# Patient Record
Sex: FEMALE | Race: BLACK OR AFRICAN AMERICAN | Employment: OTHER | URBAN - METROPOLITAN AREA
[De-identification: names, ages, dates, MRNs, and addresses within clinical notes are randomized per-mention and may not be internally consistent; named-entity substitution may affect disease eponyms.]

---

## 2017-05-05 PROBLEM — J96.00 ACUTE RESPIRATORY FAILURE (HCC): Status: ACTIVE | Noted: 2017-05-05

## 2017-05-05 PROBLEM — T78.03XA ANAPHYLAXIS DUE TO FISH: Status: ACTIVE | Noted: 2017-05-05

## 2017-05-30 PROBLEM — K21.9 GASTROESOPHAGEAL REFLUX DISEASE WITHOUT ESOPHAGITIS: Status: ACTIVE | Noted: 2017-05-30

## 2017-05-30 PROBLEM — K29.00 ACUTE SUPERFICIAL GASTRITIS WITHOUT HEMORRHAGE: Status: ACTIVE | Noted: 2017-05-30

## 2017-05-30 PROBLEM — R10.9 ABDOMINAL PAIN: Status: ACTIVE | Noted: 2017-05-30

## 2017-07-10 PROBLEM — S13.9XXA CERVICAL SPRAIN: Status: ACTIVE | Noted: 2017-07-10

## 2017-07-10 PROBLEM — J96.00 ACUTE RESPIRATORY FAILURE (HCC): Status: RESOLVED | Noted: 2017-05-05 | Resolved: 2017-07-10

## 2017-07-10 PROBLEM — G44.209 TENSION HEADACHE: Status: ACTIVE | Noted: 2017-07-10

## 2017-09-26 PROBLEM — H93.19 TINNITUS: Status: ACTIVE | Noted: 2017-09-26

## 2017-09-26 PROBLEM — H53.8 BLURRED VISION: Status: ACTIVE | Noted: 2017-09-26

## 2017-09-26 PROBLEM — Z86.73 HISTORY OF TIA (TRANSIENT ISCHEMIC ATTACK) AND STROKE: Status: ACTIVE | Noted: 2017-09-26

## 2017-10-27 PROBLEM — S63.502A WRIST SPRAIN, LEFT, INITIAL ENCOUNTER: Status: ACTIVE | Noted: 2017-10-27

## 2017-10-27 PROBLEM — S93.492A SPRAIN OF OTHER LIGAMENT OF LEFT ANKLE: Status: ACTIVE | Noted: 2017-10-27

## 2018-03-21 ENCOUNTER — HOSPITAL ENCOUNTER (EMERGENCY)
Age: 48
Discharge: HOME | End: 2018-03-21
Payer: MEDICAID

## 2018-03-21 VITALS
WEIGHT: 163 LBS | TEMPERATURE: 98 F | SYSTOLIC BLOOD PRESSURE: 122 MMHG | OXYGEN SATURATION: 97 % | DIASTOLIC BLOOD PRESSURE: 77 MMHG | HEIGHT: 59 IN | HEART RATE: 88 BPM | BODY MASS INDEX: 32.86 KG/M2 | RESPIRATION RATE: 16 BRPM

## 2018-03-21 DIAGNOSIS — T78.40XA ALLERGIC REACTION, INITIAL ENCOUNTER: Primary | ICD-10-CM

## 2018-03-21 PROCEDURE — 99282 EMERGENCY DEPT VISIT SF MDM: CPT

## 2018-03-21 RX ORDER — PREDNISONE 10 MG/1
40 TABLET ORAL DAILY
Qty: 20 TABLET | Refills: 0 | Status: SHIPPED | OUTPATIENT
Start: 2018-03-21 | End: 2018-03-26

## 2018-03-21 RX ORDER — EPINEPHRINE 0.3 MG/.3ML
0.3 INJECTION SUBCUTANEOUS
Qty: 1 EACH | Refills: 0 | Status: ON HOLD | OUTPATIENT
Start: 2018-03-21 | End: 2018-04-13 | Stop reason: HOSPADM

## 2018-03-21 RX ORDER — FAMOTIDINE 20 MG/1
20 TABLET, FILM COATED ORAL 2 TIMES DAILY
Qty: 14 TABLET | Refills: 0 | Status: ON HOLD | OUTPATIENT
Start: 2018-03-21 | End: 2018-04-13 | Stop reason: HOSPADM

## 2018-03-24 NOTE — ED PROVIDER NOTES
[facility]     Department of Emergency Medicine   ED  Provider Note  Admit Date/RoomTime: 3/21/2018  5:37 PM  ED Room: /   Chief Complaint:   Allergic Reaction (states believes she ate shimp two days ago and is now having throat itching)    History of Present Illness   Source of history provided by:  patient and spouse/SO. History/Exam Limitations: none. Douglas Claire is a 49 y.o. 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  Since onset the symptoms have been improving and mild in severity and relieved by medication: benadryl. The patient has a  history of allergies to food in the past.  She denies wheezing, chest tightness, shortness of breath, change in voice, metallic taste, light-headedness or loss of consciousness. She has an EpiPen but was afraid to use it. ROS   Pertinent positives and negatives are stated within HPI, all other systems reviewed and are negative. Past Surgical History:  has a past surgical history that includes Tubal ligation; Colonoscopy (3/9/2016); Colonoscopy (07/10/2017); and Upper gastrointestinal endoscopy (07/10/2017).   Social History:  reports that she quit outpatient follow-up. Counseling: The emergency provider has spoken with the patient and spouse/SO and discussed todays results, in addition to providing specific details for the plan of care and counseling regarding the diagnosis and prognosis. Questions are answered at this time and they are agreeable with the plan. Assessment      1. Allergic reaction, initial encounter      Plan   Discharge to home  Patient condition is good    New Medications     Discharge Medication List as of 3/21/2018  6:09 PM      START taking these medications    Details   !! EPINEPHrine (EPIPEN 2-ABUNDIO) 0.3 MG/0.3ML SOAJ injection Inject 0.3 mLs into the muscle once as needed (as needed) Use as directed for allergic reaction, Disp-1 each, R-0Print      predniSONE (DELTASONE) 10 MG tablet Take 4 tablets by mouth daily for 5 days, Disp-20 tablet, R-0Print      famotidine (PEPCID) 20 MG tablet Take 1 tablet by mouth 2 times daily for 7 days, Disp-14 tablet, R-0Print       !! - Potential duplicate medications found. Please discuss with provider. Electronically signed by Sulema Dandy, PA   DD: 3/24/18  **This report was transcribed using voice recognition software. Every effort was made to ensure accuracy; however, inadvertent computerized transcription errors may be present.   END OF ED PROVIDER NOTE       Miguelina Blake, [id number]  03/24/18 0607

## 2018-04-10 ENCOUNTER — HOSPITAL ENCOUNTER (OUTPATIENT)
Age: 48
Setting detail: OBSERVATION
Discharge: HOME | End: 2018-04-13
Attending: EMERGENCY MEDICINE | Admitting: INTERNAL MEDICINE
Payer: MEDICAID

## 2018-04-10 ENCOUNTER — APPOINTMENT (OUTPATIENT)
Dept: GENERAL RADIOLOGY | Age: 48
End: 2018-04-10
Payer: MEDICAID

## 2018-04-10 ENCOUNTER — APPOINTMENT (OUTPATIENT)
Dept: CT IMAGING | Age: 48
End: 2018-04-10
Payer: MEDICAID

## 2018-04-10 DIAGNOSIS — R51.9 HEADACHE, UNSPECIFIED HEADACHE TYPE: Primary | ICD-10-CM

## 2018-04-10 PROBLEM — R29.90 STROKE-LIKE SYMPTOMS: Status: ACTIVE | Noted: 2018-04-10

## 2018-04-10 LAB
ALBUMIN SERPL-MCNC: 4 G/DL (ref 3.5–5.2)
ALP BLD-CCNC: 88 U/L (ref 35–104)
ALT SERPL-CCNC: 44 U/L (ref 0–32)
ANION GAP SERPL CALCULATED.3IONS-SCNC: 14 MMOL/L (ref 7–16)
AST SERPL-CCNC: 26 U/L (ref 0–31)
BASOPHILS ABSOLUTE: 0.03 E9/L (ref 0–0.2)
BASOPHILS RELATIVE PERCENT: 0.3 % (ref 0–2)
BILIRUB SERPL-MCNC: 0.5 MG/DL (ref 0–1.2)
BUN BLDV-MCNC: 9 MG/DL (ref 6–20)
CALCIUM SERPL-MCNC: 9.3 MG/DL (ref 8.6–10.2)
CHLORIDE BLD-SCNC: 101 MMOL/L (ref 98–107)
CHP ED QC CHECK: NORMAL
CO2: 23 MMOL/L (ref 22–29)
CREAT SERPL-MCNC: 0.8 MG/DL (ref 0.5–1)
EKG ATRIAL RATE: 98 BPM
EKG P AXIS: 68 DEGREES
EKG P-R INTERVAL: 132 MS
EKG Q-T INTERVAL: 354 MS
EKG QRS DURATION: 84 MS
EKG QTC CALCULATION (BAZETT): 451 MS
EKG R AXIS: 66 DEGREES
EKG T AXIS: 46 DEGREES
EKG VENTRICULAR RATE: 98 BPM
EOSINOPHILS ABSOLUTE: 0.15 E9/L (ref 0.05–0.5)
EOSINOPHILS RELATIVE PERCENT: 1.5 % (ref 0–6)
GFR AFRICAN AMERICAN: >60
GFR NON-AFRICAN AMERICAN: >60 ML/MIN/1.73
GLUCOSE BLD-MCNC: 108 MG/DL
GLUCOSE BLD-MCNC: 194 MG/DL (ref 74–109)
HCT VFR BLD CALC: 40.6 % (ref 34–48)
HEMOGLOBIN: 13.2 G/DL (ref 11.5–15.5)
IMMATURE GRANULOCYTES #: 0.03 E9/L
IMMATURE GRANULOCYTES %: 0.3 % (ref 0–5)
INR BLD: 1.1
LYMPHOCYTES ABSOLUTE: 2.13 E9/L (ref 1.5–4)
LYMPHOCYTES RELATIVE PERCENT: 21.7 % (ref 20–42)
MAGNESIUM: 2 MG/DL (ref 1.6–2.6)
MCH RBC QN AUTO: 26.9 PG (ref 26–35)
MCHC RBC AUTO-ENTMCNC: 32.5 % (ref 32–34.5)
MCV RBC AUTO: 82.7 FL (ref 80–99.9)
METER GLUCOSE: 108 MG/DL (ref 70–110)
MONOCYTES ABSOLUTE: 0.77 E9/L (ref 0.1–0.95)
MONOCYTES RELATIVE PERCENT: 7.9 % (ref 2–12)
NEUTROPHILS ABSOLUTE: 6.69 E9/L (ref 1.8–7.3)
NEUTROPHILS RELATIVE PERCENT: 68.3 % (ref 43–80)
PDW BLD-RTO: 13.1 FL (ref 11.5–15)
PLATELET # BLD: 275 E9/L (ref 130–450)
PMV BLD AUTO: 9.9 FL (ref 7–12)
POTASSIUM SERPL-SCNC: 3.6 MMOL/L (ref 3.5–5)
PROTHROMBIN TIME: 12.6 SEC (ref 9.3–12.4)
RBC # BLD: 4.91 E12/L (ref 3.5–5.5)
SODIUM BLD-SCNC: 138 MMOL/L (ref 132–146)
TOTAL PROTEIN: 6.9 G/DL (ref 6.4–8.3)
TROPONIN: <0.01 NG/ML (ref 0–0.03)
WBC # BLD: 9.8 E9/L (ref 4.5–11.5)

## 2018-04-10 PROCEDURE — 36415 COLL VENOUS BLD VENIPUNCTURE: CPT

## 2018-04-10 PROCEDURE — 80053 COMPREHEN METABOLIC PANEL: CPT

## 2018-04-10 PROCEDURE — 83735 ASSAY OF MAGNESIUM: CPT

## 2018-04-10 PROCEDURE — 71045 X-RAY EXAM CHEST 1 VIEW: CPT

## 2018-04-10 PROCEDURE — 85610 PROTHROMBIN TIME: CPT

## 2018-04-10 PROCEDURE — 93005 ELECTROCARDIOGRAM TRACING: CPT | Performed by: NURSE PRACTITIONER

## 2018-04-10 PROCEDURE — 99285 EMERGENCY DEPT VISIT HI MDM: CPT

## 2018-04-10 PROCEDURE — 84484 ASSAY OF TROPONIN QUANT: CPT

## 2018-04-10 PROCEDURE — 82962 GLUCOSE BLOOD TEST: CPT

## 2018-04-10 PROCEDURE — 85025 COMPLETE CBC W/AUTO DIFF WBC: CPT

## 2018-04-10 PROCEDURE — 70450 CT HEAD/BRAIN W/O DYE: CPT

## 2018-04-10 ASSESSMENT — PAIN DESCRIPTION - PAIN TYPE: TYPE: ACUTE PAIN

## 2018-04-10 ASSESSMENT — ENCOUNTER SYMPTOMS
COLOR CHANGE: 0
COUGH: 0
SHORTNESS OF BREATH: 0
DIARRHEA: 0
ABDOMINAL PAIN: 0
WHEEZING: 0
CONSTIPATION: 0
VOMITING: 0
NAUSEA: 0

## 2018-04-10 ASSESSMENT — PAIN DESCRIPTION - DESCRIPTORS: DESCRIPTORS: ACHING

## 2018-04-10 ASSESSMENT — PAIN SCALES - GENERAL: PAINLEVEL_OUTOF10: 8

## 2018-04-10 ASSESSMENT — PAIN DESCRIPTION - LOCATION: LOCATION: EAR;HEAD

## 2018-04-10 ASSESSMENT — PAIN DESCRIPTION - ORIENTATION: ORIENTATION: RIGHT;LEFT

## 2018-04-11 PROBLEM — S93.492A SPRAIN OF OTHER LIGAMENT OF LEFT ANKLE: Status: RESOLVED | Noted: 2017-10-27 | Resolved: 2018-04-11

## 2018-04-11 PROBLEM — H53.8 BLURRED VISION: Status: RESOLVED | Noted: 2017-09-26 | Resolved: 2018-04-11

## 2018-04-11 PROBLEM — S13.9XXA CERVICAL SPRAIN: Status: RESOLVED | Noted: 2017-07-10 | Resolved: 2018-04-11

## 2018-04-11 PROBLEM — Z86.73 HISTORY OF TIA (TRANSIENT ISCHEMIC ATTACK) AND STROKE: Chronic | Status: ACTIVE | Noted: 2017-09-26

## 2018-04-11 PROBLEM — S63.502A WRIST SPRAIN, LEFT, INITIAL ENCOUNTER: Status: RESOLVED | Noted: 2017-10-27 | Resolved: 2018-04-11

## 2018-04-11 PROBLEM — K21.9 GASTROESOPHAGEAL REFLUX DISEASE WITHOUT ESOPHAGITIS: Chronic | Status: ACTIVE | Noted: 2017-05-30

## 2018-04-11 PROBLEM — R10.9 ABDOMINAL PAIN: Status: RESOLVED | Noted: 2017-05-30 | Resolved: 2018-04-11

## 2018-04-11 PROBLEM — G44.209 TENSION HEADACHE: Status: RESOLVED | Noted: 2017-07-10 | Resolved: 2018-04-11

## 2018-04-11 PROBLEM — K29.00 ACUTE SUPERFICIAL GASTRITIS WITHOUT HEMORRHAGE: Status: RESOLVED | Noted: 2017-05-30 | Resolved: 2018-04-11

## 2018-04-11 PROBLEM — T78.03XA ANAPHYLAXIS DUE TO FISH: Status: RESOLVED | Noted: 2017-05-05 | Resolved: 2018-04-11

## 2018-04-11 LAB
BILIRUBIN URINE: NEGATIVE
BLOOD, URINE: NEGATIVE
CHOLESTEROL, TOTAL: 135 MG/DL (ref 0–199)
CLARITY: CLEAR
COLOR: YELLOW
GLUCOSE URINE: 100 MG/DL
HDLC SERPL-MCNC: 42 MG/DL
KETONES, URINE: NEGATIVE MG/DL
LDL CHOLESTEROL CALCULATED: 77 MG/DL (ref 0–99)
LEUKOCYTE ESTERASE, URINE: NEGATIVE
NITRITE, URINE: NEGATIVE
PH UA: 6 (ref 5–9)
PROTEIN UA: NEGATIVE MG/DL
SPECIFIC GRAVITY UA: 1.01 (ref 1–1.03)
TRIGL SERPL-MCNC: 82 MG/DL (ref 0–149)
UROBILINOGEN, URINE: 0.2 E.U./DL
VLDLC SERPL CALC-MCNC: 16 MG/DL

## 2018-04-11 PROCEDURE — 6360000002 HC RX W HCPCS: Performed by: INTERNAL MEDICINE

## 2018-04-11 PROCEDURE — 99253 IP/OBS CNSLTJ NEW/EST LOW 45: CPT | Performed by: PSYCHIATRY & NEUROLOGY

## 2018-04-11 PROCEDURE — 81003 URINALYSIS AUTO W/O SCOPE: CPT

## 2018-04-11 PROCEDURE — G0378 HOSPITAL OBSERVATION PER HR: HCPCS

## 2018-04-11 PROCEDURE — 96372 THER/PROPH/DIAG INJ SC/IM: CPT

## 2018-04-11 PROCEDURE — 6370000000 HC RX 637 (ALT 250 FOR IP): Performed by: INTERNAL MEDICINE

## 2018-04-11 PROCEDURE — 36415 COLL VENOUS BLD VENIPUNCTURE: CPT

## 2018-04-11 PROCEDURE — 2580000003 HC RX 258: Performed by: INTERNAL MEDICINE

## 2018-04-11 PROCEDURE — 80061 LIPID PANEL: CPT

## 2018-04-11 RX ORDER — ACETAMINOPHEN 325 MG/1
650 TABLET ORAL EVERY 4 HOURS PRN
Status: DISCONTINUED | OUTPATIENT
Start: 2018-04-11 | End: 2018-04-13 | Stop reason: HOSPADM

## 2018-04-11 RX ORDER — SUCRALFATE 1 G/1
1 TABLET ORAL 4 TIMES DAILY
Status: DISCONTINUED | OUTPATIENT
Start: 2018-04-11 | End: 2018-04-13 | Stop reason: HOSPADM

## 2018-04-11 RX ORDER — ONDANSETRON 2 MG/ML
4 INJECTION INTRAMUSCULAR; INTRAVENOUS EVERY 6 HOURS PRN
Status: DISCONTINUED | OUTPATIENT
Start: 2018-04-11 | End: 2018-04-13 | Stop reason: HOSPADM

## 2018-04-11 RX ORDER — PANTOPRAZOLE SODIUM 40 MG/1
40 TABLET, DELAYED RELEASE ORAL DAILY
Status: DISCONTINUED | OUTPATIENT
Start: 2018-04-11 | End: 2018-04-13 | Stop reason: HOSPADM

## 2018-04-11 RX ORDER — SODIUM CHLORIDE 0.9 % (FLUSH) 0.9 %
10 SYRINGE (ML) INJECTION PRN
Status: DISCONTINUED | OUTPATIENT
Start: 2018-04-11 | End: 2018-04-13 | Stop reason: HOSPADM

## 2018-04-11 RX ORDER — ASPIRIN 81 MG/1
81 TABLET ORAL DAILY
Status: DISCONTINUED | OUTPATIENT
Start: 2018-04-11 | End: 2018-04-13 | Stop reason: HOSPADM

## 2018-04-11 RX ORDER — NAPROXEN 500 MG/1
500 TABLET ORAL 2 TIMES DAILY WITH MEALS
Status: DISCONTINUED | OUTPATIENT
Start: 2018-04-11 | End: 2018-04-13 | Stop reason: HOSPADM

## 2018-04-11 RX ORDER — GABAPENTIN 100 MG/1
100 CAPSULE ORAL NIGHTLY
Status: DISCONTINUED | OUTPATIENT
Start: 2018-04-11 | End: 2018-04-13 | Stop reason: HOSPADM

## 2018-04-11 RX ORDER — SODIUM CHLORIDE 0.9 % (FLUSH) 0.9 %
10 SYRINGE (ML) INJECTION EVERY 12 HOURS SCHEDULED
Status: DISCONTINUED | OUTPATIENT
Start: 2018-04-11 | End: 2018-04-13 | Stop reason: HOSPADM

## 2018-04-11 RX ORDER — DICYCLOMINE HYDROCHLORIDE 10 MG/1
20 CAPSULE ORAL EVERY 6 HOURS
Status: DISCONTINUED | OUTPATIENT
Start: 2018-04-11 | End: 2018-04-13 | Stop reason: HOSPADM

## 2018-04-11 RX ORDER — ALBUTEROL SULFATE 90 UG/1
2 AEROSOL, METERED RESPIRATORY (INHALATION) EVERY 6 HOURS PRN
Status: DISCONTINUED | OUTPATIENT
Start: 2018-04-11 | End: 2018-04-13 | Stop reason: HOSPADM

## 2018-04-11 RX ORDER — ACETAMINOPHEN 325 MG/1
TABLET ORAL
Status: DISPENSED
Start: 2018-04-11 | End: 2018-04-11

## 2018-04-11 RX ORDER — TRAMADOL HYDROCHLORIDE 50 MG/1
50 TABLET ORAL EVERY 6 HOURS PRN
Status: DISCONTINUED | OUTPATIENT
Start: 2018-04-11 | End: 2018-04-13 | Stop reason: HOSPADM

## 2018-04-11 RX ADMIN — ASPIRIN 81 MG: 81 TABLET, COATED ORAL at 08:32

## 2018-04-11 RX ADMIN — DICYCLOMINE HYDROCHLORIDE 20 MG: 10 CAPSULE ORAL at 21:24

## 2018-04-11 RX ADMIN — DICYCLOMINE HYDROCHLORIDE 20 MG: 10 CAPSULE ORAL at 08:32

## 2018-04-11 RX ADMIN — Medication 10 ML: at 21:29

## 2018-04-11 RX ADMIN — TRAMADOL HYDROCHLORIDE 50 MG: 50 TABLET, FILM COATED ORAL at 02:09

## 2018-04-11 RX ADMIN — SUCRALFATE 1 G: 1 TABLET ORAL at 16:00

## 2018-04-11 RX ADMIN — ACETAMINOPHEN 650 MG: 325 TABLET, FILM COATED ORAL at 00:38

## 2018-04-11 RX ADMIN — Medication 10 ML: at 08:32

## 2018-04-11 RX ADMIN — SUCRALFATE 1 G: 1 TABLET ORAL at 08:32

## 2018-04-11 RX ADMIN — DICYCLOMINE HYDROCHLORIDE 20 MG: 10 CAPSULE ORAL at 13:47

## 2018-04-11 RX ADMIN — BECLOMETHASONE DIPROPIONATE 1 PUFF: 40 AEROSOL, METERED RESPIRATORY (INHALATION) at 08:35

## 2018-04-11 RX ADMIN — SUCRALFATE 1 G: 1 TABLET ORAL at 12:59

## 2018-04-11 RX ADMIN — PANTOPRAZOLE SODIUM 40 MG: 40 TABLET, DELAYED RELEASE ORAL at 08:31

## 2018-04-11 RX ADMIN — ENOXAPARIN SODIUM 40 MG: 40 INJECTION SUBCUTANEOUS at 08:32

## 2018-04-11 RX ADMIN — TRAMADOL HYDROCHLORIDE 50 MG: 50 TABLET, FILM COATED ORAL at 22:54

## 2018-04-11 RX ADMIN — GABAPENTIN 100 MG: 100 CAPSULE ORAL at 21:29

## 2018-04-11 RX ADMIN — TRAMADOL HYDROCHLORIDE 50 MG: 50 TABLET, FILM COATED ORAL at 08:32

## 2018-04-11 RX ADMIN — SUCRALFATE 1 G: 1 TABLET ORAL at 21:26

## 2018-04-11 RX ADMIN — NAPROXEN 500 MG: 500 TABLET ORAL at 08:31

## 2018-04-11 RX ADMIN — BECLOMETHASONE DIPROPIONATE 1 PUFF: 40 AEROSOL, METERED RESPIRATORY (INHALATION) at 22:54

## 2018-04-11 RX ADMIN — NAPROXEN 500 MG: 500 TABLET ORAL at 16:00

## 2018-04-11 ASSESSMENT — PAIN DESCRIPTION - DESCRIPTORS
DESCRIPTORS: ACHING;THROBBING
DESCRIPTORS: CONSTANT;DISCOMFORT;DULL
DESCRIPTORS: ACHING;DISCOMFORT

## 2018-04-11 ASSESSMENT — PAIN SCALES - GENERAL
PAINLEVEL_OUTOF10: 8
PAINLEVEL_OUTOF10: 8
PAINLEVEL_OUTOF10: 7
PAINLEVEL_OUTOF10: 5
PAINLEVEL_OUTOF10: 7
PAINLEVEL_OUTOF10: 0
PAINLEVEL_OUTOF10: 10
PAINLEVEL_OUTOF10: 0

## 2018-04-11 ASSESSMENT — PAIN DESCRIPTION - ONSET
ONSET: ON-GOING
ONSET: ON-GOING

## 2018-04-11 ASSESSMENT — PAIN DESCRIPTION - FREQUENCY
FREQUENCY: CONTINUOUS
FREQUENCY: CONTINUOUS

## 2018-04-11 ASSESSMENT — PAIN DESCRIPTION - PAIN TYPE
TYPE: ACUTE PAIN
TYPE: ACUTE PAIN

## 2018-04-11 ASSESSMENT — PAIN DESCRIPTION - LOCATION
LOCATION: NECK
LOCATION: NECK
LOCATION: HEAD;NECK
LOCATION: HEAD;NECK

## 2018-04-11 ASSESSMENT — PAIN DESCRIPTION - PROGRESSION
CLINICAL_PROGRESSION: NOT CHANGED
CLINICAL_PROGRESSION: NOT CHANGED

## 2018-04-11 ASSESSMENT — PAIN DESCRIPTION - ORIENTATION: ORIENTATION: RIGHT;LEFT

## 2018-04-12 ENCOUNTER — APPOINTMENT (OUTPATIENT)
Dept: MRI IMAGING | Age: 48
End: 2018-04-12
Payer: MEDICAID

## 2018-04-12 PROCEDURE — 6360000004 HC RX CONTRAST MEDICATION: Performed by: RADIOLOGY

## 2018-04-12 PROCEDURE — 6370000000 HC RX 637 (ALT 250 FOR IP): Performed by: INTERNAL MEDICINE

## 2018-04-12 PROCEDURE — G0378 HOSPITAL OBSERVATION PER HR: HCPCS

## 2018-04-12 PROCEDURE — 72156 MRI NECK SPINE W/O & W/DYE: CPT

## 2018-04-12 PROCEDURE — 70553 MRI BRAIN STEM W/O & W/DYE: CPT

## 2018-04-12 PROCEDURE — 99232 SBSQ HOSP IP/OBS MODERATE 35: CPT | Performed by: PSYCHIATRY & NEUROLOGY

## 2018-04-12 PROCEDURE — A9579 GAD-BASE MR CONTRAST NOS,1ML: HCPCS | Performed by: RADIOLOGY

## 2018-04-12 PROCEDURE — 2580000003 HC RX 258: Performed by: INTERNAL MEDICINE

## 2018-04-12 PROCEDURE — 6360000002 HC RX W HCPCS: Performed by: INTERNAL MEDICINE

## 2018-04-12 PROCEDURE — G8978 MOBILITY CURRENT STATUS: HCPCS

## 2018-04-12 PROCEDURE — G8979 MOBILITY GOAL STATUS: HCPCS

## 2018-04-12 PROCEDURE — 96372 THER/PROPH/DIAG INJ SC/IM: CPT

## 2018-04-12 PROCEDURE — 97161 PT EVAL LOW COMPLEX 20 MIN: CPT

## 2018-04-12 RX ORDER — AMOXICILLIN AND CLAVULANATE POTASSIUM 875; 125 MG/1; MG/1
1 TABLET, FILM COATED ORAL EVERY 12 HOURS SCHEDULED
Status: DISCONTINUED | OUTPATIENT
Start: 2018-04-12 | End: 2018-04-13 | Stop reason: HOSPADM

## 2018-04-12 RX ADMIN — DICYCLOMINE HYDROCHLORIDE 20 MG: 10 CAPSULE ORAL at 21:03

## 2018-04-12 RX ADMIN — SUCRALFATE 1 G: 1 TABLET ORAL at 17:06

## 2018-04-12 RX ADMIN — NAPROXEN 500 MG: 500 TABLET ORAL at 17:06

## 2018-04-12 RX ADMIN — BECLOMETHASONE DIPROPIONATE 1 PUFF: 40 AEROSOL, METERED RESPIRATORY (INHALATION) at 09:13

## 2018-04-12 RX ADMIN — NAPROXEN 500 MG: 500 TABLET ORAL at 09:13

## 2018-04-12 RX ADMIN — ASPIRIN 81 MG: 81 TABLET, COATED ORAL at 09:13

## 2018-04-12 RX ADMIN — DICYCLOMINE HYDROCHLORIDE 20 MG: 10 CAPSULE ORAL at 02:07

## 2018-04-12 RX ADMIN — GABAPENTIN 100 MG: 100 CAPSULE ORAL at 21:03

## 2018-04-12 RX ADMIN — Medication 10 ML: at 09:14

## 2018-04-12 RX ADMIN — SUCRALFATE 1 G: 1 TABLET ORAL at 13:01

## 2018-04-12 RX ADMIN — SUCRALFATE 1 G: 1 TABLET ORAL at 21:03

## 2018-04-12 RX ADMIN — ENOXAPARIN SODIUM 40 MG: 40 INJECTION SUBCUTANEOUS at 09:14

## 2018-04-12 RX ADMIN — BECLOMETHASONE DIPROPIONATE 1 PUFF: 40 AEROSOL, METERED RESPIRATORY (INHALATION) at 21:08

## 2018-04-12 RX ADMIN — Medication 10 ML: at 21:03

## 2018-04-12 RX ADMIN — SUCRALFATE 1 G: 1 TABLET ORAL at 09:13

## 2018-04-12 RX ADMIN — AMOXICILLIN AND CLAVULANATE POTASSIUM 1 TABLET: 875; 125 TABLET, FILM COATED ORAL at 21:03

## 2018-04-12 RX ADMIN — PANTOPRAZOLE SODIUM 40 MG: 40 TABLET, DELAYED RELEASE ORAL at 09:14

## 2018-04-12 RX ADMIN — GADOTERIDOL 17 ML: 279.3 INJECTION, SOLUTION INTRAVENOUS at 15:26

## 2018-04-12 RX ADMIN — DICYCLOMINE HYDROCHLORIDE 20 MG: 10 CAPSULE ORAL at 09:13

## 2018-04-12 ASSESSMENT — PAIN DESCRIPTION - ORIENTATION
ORIENTATION: MID
ORIENTATION: MID

## 2018-04-12 ASSESSMENT — PAIN SCALES - GENERAL
PAINLEVEL_OUTOF10: 0
PAINLEVEL_OUTOF10: 5
PAINLEVEL_OUTOF10: 0
PAINLEVEL_OUTOF10: 0
PAINLEVEL_OUTOF10: 5
PAINLEVEL_OUTOF10: 5

## 2018-04-12 ASSESSMENT — PAIN DESCRIPTION - PAIN TYPE
TYPE: CHRONIC PAIN
TYPE: CHRONIC PAIN

## 2018-04-12 ASSESSMENT — PAIN DESCRIPTION - DESCRIPTORS
DESCRIPTORS: CONSTANT;DULL
DESCRIPTORS: CONSTANT;DULL

## 2018-04-12 ASSESSMENT — PAIN DESCRIPTION - FREQUENCY
FREQUENCY: CONTINUOUS
FREQUENCY: CONTINUOUS

## 2018-04-12 ASSESSMENT — PAIN DESCRIPTION - ONSET
ONSET: ON-GOING
ONSET: ON-GOING

## 2018-04-12 ASSESSMENT — PAIN DESCRIPTION - PROGRESSION
CLINICAL_PROGRESSION: NOT CHANGED
CLINICAL_PROGRESSION: NOT CHANGED

## 2018-04-12 ASSESSMENT — PAIN DESCRIPTION - LOCATION
LOCATION: NECK
LOCATION: NECK

## 2018-04-13 VITALS
HEART RATE: 66 BPM | BODY MASS INDEX: 37.7 KG/M2 | SYSTOLIC BLOOD PRESSURE: 118 MMHG | HEIGHT: 59 IN | TEMPERATURE: 98.3 F | OXYGEN SATURATION: 98 % | WEIGHT: 187 LBS | RESPIRATION RATE: 14 BRPM | DIASTOLIC BLOOD PRESSURE: 64 MMHG

## 2018-04-13 PROCEDURE — 97165 OT EVAL LOW COMPLEX 30 MIN: CPT

## 2018-04-13 PROCEDURE — 96372 THER/PROPH/DIAG INJ SC/IM: CPT

## 2018-04-13 PROCEDURE — G0378 HOSPITAL OBSERVATION PER HR: HCPCS

## 2018-04-13 PROCEDURE — G8987 SELF CARE CURRENT STATUS: HCPCS

## 2018-04-13 PROCEDURE — 99219 PR INITIAL OBSERVATION CARE/DAY 50 MINUTES: CPT | Performed by: NEUROLOGICAL SURGERY

## 2018-04-13 PROCEDURE — 6370000000 HC RX 637 (ALT 250 FOR IP): Performed by: INTERNAL MEDICINE

## 2018-04-13 PROCEDURE — G8988 SELF CARE GOAL STATUS: HCPCS

## 2018-04-13 PROCEDURE — 6360000002 HC RX W HCPCS: Performed by: INTERNAL MEDICINE

## 2018-04-13 PROCEDURE — 2580000003 HC RX 258: Performed by: INTERNAL MEDICINE

## 2018-04-13 PROCEDURE — 97530 THERAPEUTIC ACTIVITIES: CPT

## 2018-04-13 RX ORDER — AMOXICILLIN AND CLAVULANATE POTASSIUM 875; 125 MG/1; MG/1
1 TABLET, FILM COATED ORAL EVERY 12 HOURS SCHEDULED
Qty: 20 TABLET | Refills: 0 | Status: SHIPPED | OUTPATIENT
Start: 2018-04-13 | End: 2018-04-19

## 2018-04-13 RX ORDER — GABAPENTIN 100 MG/1
100 CAPSULE ORAL NIGHTLY
Qty: 30 CAPSULE | Refills: 0
Start: 2018-04-13 | End: 2018-09-07

## 2018-04-13 RX ADMIN — TRAMADOL HYDROCHLORIDE 50 MG: 50 TABLET, FILM COATED ORAL at 00:44

## 2018-04-13 RX ADMIN — BECLOMETHASONE DIPROPIONATE 1 PUFF: 40 AEROSOL, METERED RESPIRATORY (INHALATION) at 08:26

## 2018-04-13 RX ADMIN — DICYCLOMINE HYDROCHLORIDE 20 MG: 10 CAPSULE ORAL at 02:08

## 2018-04-13 RX ADMIN — AMOXICILLIN AND CLAVULANATE POTASSIUM 1 TABLET: 875; 125 TABLET, FILM COATED ORAL at 08:23

## 2018-04-13 RX ADMIN — Medication 10 ML: at 08:23

## 2018-04-13 RX ADMIN — TRAMADOL HYDROCHLORIDE 50 MG: 50 TABLET, FILM COATED ORAL at 08:24

## 2018-04-13 RX ADMIN — ASPIRIN 81 MG: 81 TABLET, COATED ORAL at 08:23

## 2018-04-13 RX ADMIN — ENOXAPARIN SODIUM 40 MG: 40 INJECTION SUBCUTANEOUS at 08:23

## 2018-04-13 RX ADMIN — SUCRALFATE 1 G: 1 TABLET ORAL at 08:23

## 2018-04-13 RX ADMIN — SUCRALFATE 1 G: 1 TABLET ORAL at 13:15

## 2018-04-13 RX ADMIN — PANTOPRAZOLE SODIUM 40 MG: 40 TABLET, DELAYED RELEASE ORAL at 08:24

## 2018-04-13 RX ADMIN — DICYCLOMINE HYDROCHLORIDE 20 MG: 10 CAPSULE ORAL at 13:15

## 2018-04-13 RX ADMIN — DICYCLOMINE HYDROCHLORIDE 20 MG: 10 CAPSULE ORAL at 08:23

## 2018-04-13 ASSESSMENT — PAIN DESCRIPTION - PAIN TYPE
TYPE: CHRONIC PAIN
TYPE: CHRONIC PAIN

## 2018-04-13 ASSESSMENT — PAIN DESCRIPTION - ONSET: ONSET: ON-GOING

## 2018-04-13 ASSESSMENT — PAIN SCALES - GENERAL
PAINLEVEL_OUTOF10: 5
PAINLEVEL_OUTOF10: 7

## 2018-04-13 ASSESSMENT — PAIN DESCRIPTION - FREQUENCY
FREQUENCY: CONTINUOUS
FREQUENCY: CONTINUOUS

## 2018-04-13 ASSESSMENT — PAIN DESCRIPTION - LOCATION
LOCATION: NECK
LOCATION: NECK

## 2018-04-13 ASSESSMENT — PAIN DESCRIPTION - DESCRIPTORS
DESCRIPTORS: ACHING;CONSTANT;DISCOMFORT
DESCRIPTORS: ACHING;CONSTANT

## 2018-04-13 ASSESSMENT — PAIN DESCRIPTION - PROGRESSION: CLINICAL_PROGRESSION: GRADUALLY WORSENING

## 2018-04-13 ASSESSMENT — PAIN DESCRIPTION - ORIENTATION
ORIENTATION: MID;POSTERIOR
ORIENTATION: MID

## 2018-04-19 ENCOUNTER — OFFICE VISIT (OUTPATIENT)
Dept: FAMILY MEDICINE CLINIC | Age: 48
End: 2018-04-19
Payer: MEDICAID

## 2018-04-19 VITALS
HEIGHT: 59 IN | WEIGHT: 187 LBS | SYSTOLIC BLOOD PRESSURE: 124 MMHG | RESPIRATION RATE: 20 BRPM | DIASTOLIC BLOOD PRESSURE: 68 MMHG | OXYGEN SATURATION: 97 % | BODY MASS INDEX: 37.7 KG/M2 | HEART RATE: 74 BPM

## 2018-04-19 DIAGNOSIS — M48.02 CERVICAL STENOSIS OF SPINAL CANAL: ICD-10-CM

## 2018-04-19 DIAGNOSIS — M50.30 DDD (DEGENERATIVE DISC DISEASE), CERVICAL: Primary | Chronic | ICD-10-CM

## 2018-04-19 DIAGNOSIS — M54.12 CERVICAL RADICULOPATHY: Chronic | ICD-10-CM

## 2018-04-19 PROCEDURE — 1111F DSCHRG MED/CURRENT MED MERGE: CPT | Performed by: FAMILY MEDICINE

## 2018-04-19 PROCEDURE — 99214 OFFICE O/P EST MOD 30 MIN: CPT | Performed by: FAMILY MEDICINE

## 2018-04-19 RX ORDER — MELOXICAM 7.5 MG/1
7.5 TABLET ORAL DAILY
Qty: 30 TABLET | Refills: 3 | Status: SHIPPED | OUTPATIENT
Start: 2018-04-19 | End: 2019-03-07 | Stop reason: SDUPTHER

## 2018-05-15 ENCOUNTER — OFFICE VISIT (OUTPATIENT)
Dept: NEUROSURGERY | Age: 48
End: 2018-05-15
Payer: MEDICAID

## 2018-05-15 VITALS
DIASTOLIC BLOOD PRESSURE: 71 MMHG | BODY MASS INDEX: 38.1 KG/M2 | HEIGHT: 59 IN | SYSTOLIC BLOOD PRESSURE: 105 MMHG | WEIGHT: 189 LBS | HEART RATE: 76 BPM

## 2018-05-15 DIAGNOSIS — M48.02 CERVICAL STENOSIS OF SPINE: Primary | ICD-10-CM

## 2018-05-15 PROCEDURE — 99214 OFFICE O/P EST MOD 30 MIN: CPT | Performed by: PHYSICIAN ASSISTANT

## 2018-05-15 RX ORDER — GABAPENTIN 300 MG/1
300 CAPSULE ORAL 4 TIMES DAILY
COMMUNITY
End: 2018-09-07

## 2018-09-07 ENCOUNTER — HOSPITAL ENCOUNTER (OUTPATIENT)
Age: 48
Discharge: HOME | End: 2018-09-09
Payer: MEDICAID

## 2018-09-07 ENCOUNTER — OFFICE VISIT (OUTPATIENT)
Dept: FAMILY MEDICINE CLINIC | Age: 48
End: 2018-09-07
Payer: MEDICAID

## 2018-09-07 VITALS
DIASTOLIC BLOOD PRESSURE: 70 MMHG | WEIGHT: 182 LBS | SYSTOLIC BLOOD PRESSURE: 122 MMHG | RESPIRATION RATE: 18 BRPM | BODY MASS INDEX: 36.69 KG/M2 | HEIGHT: 59 IN | HEART RATE: 75 BPM | OXYGEN SATURATION: 95 %

## 2018-09-07 DIAGNOSIS — R53.83 FATIGUE, UNSPECIFIED TYPE: ICD-10-CM

## 2018-09-07 DIAGNOSIS — M48.02 CERVICAL STENOSIS OF SPINAL CANAL: ICD-10-CM

## 2018-09-07 DIAGNOSIS — M50.30 DDD (DEGENERATIVE DISC DISEASE), CERVICAL: Chronic | ICD-10-CM

## 2018-09-07 DIAGNOSIS — J45.20 MILD INTERMITTENT ASTHMA, UNSPECIFIED WHETHER COMPLICATED: Primary | Chronic | ICD-10-CM

## 2018-09-07 LAB
ALBUMIN SERPL-MCNC: 4.5 G/DL (ref 3.5–5.2)
ALP BLD-CCNC: 96 U/L (ref 35–104)
ALT SERPL-CCNC: 22 U/L (ref 0–32)
ANION GAP SERPL CALCULATED.3IONS-SCNC: 17 MMOL/L (ref 7–16)
AST SERPL-CCNC: 17 U/L (ref 0–31)
BILIRUB SERPL-MCNC: 0.5 MG/DL (ref 0–1.2)
BUN BLDV-MCNC: 13 MG/DL (ref 6–20)
CALCIUM SERPL-MCNC: 9.7 MG/DL (ref 8.6–10.2)
CHLORIDE BLD-SCNC: 99 MMOL/L (ref 98–107)
CO2: 24 MMOL/L (ref 22–29)
CREAT SERPL-MCNC: 0.8 MG/DL (ref 0.5–1)
FOLATE: >20 NG/ML (ref 4.8–24.2)
GFR AFRICAN AMERICAN: >60
GFR NON-AFRICAN AMERICAN: >60 ML/MIN/1.73
GLUCOSE BLD-MCNC: 90 MG/DL (ref 74–109)
HCT VFR BLD CALC: 43.4 % (ref 34–48)
HEMOGLOBIN: 14.2 G/DL (ref 11.5–15.5)
MCH RBC QN AUTO: 27.4 PG (ref 26–35)
MCHC RBC AUTO-ENTMCNC: 32.7 % (ref 32–34.5)
MCV RBC AUTO: 83.8 FL (ref 80–99.9)
PDW BLD-RTO: 13.1 FL (ref 11.5–15)
PLATELET # BLD: 286 E9/L (ref 130–450)
PMV BLD AUTO: 11.4 FL (ref 7–12)
POTASSIUM SERPL-SCNC: 4.2 MMOL/L (ref 3.5–5)
RBC # BLD: 5.18 E12/L (ref 3.5–5.5)
SODIUM BLD-SCNC: 140 MMOL/L (ref 132–146)
T4 FREE: 1.32 NG/DL (ref 0.93–1.7)
TOTAL PROTEIN: 7.7 G/DL (ref 6.4–8.3)
TSH SERPL DL<=0.05 MIU/L-ACNC: 1.2 UIU/ML (ref 0.27–4.2)
VITAMIN B-12: 367 PG/ML (ref 211–946)
WBC # BLD: 6.6 E9/L (ref 4.5–11.5)

## 2018-09-07 PROCEDURE — 84443 ASSAY THYROID STIM HORMONE: CPT

## 2018-09-07 PROCEDURE — 36415 COLL VENOUS BLD VENIPUNCTURE: CPT | Performed by: FAMILY MEDICINE

## 2018-09-07 PROCEDURE — 99214 OFFICE O/P EST MOD 30 MIN: CPT | Performed by: FAMILY MEDICINE

## 2018-09-07 PROCEDURE — 84439 ASSAY OF FREE THYROXINE: CPT

## 2018-09-07 PROCEDURE — 82607 VITAMIN B-12: CPT

## 2018-09-07 PROCEDURE — 82746 ASSAY OF FOLIC ACID SERUM: CPT

## 2018-09-07 PROCEDURE — 80053 COMPREHEN METABOLIC PANEL: CPT

## 2018-09-07 PROCEDURE — 85027 COMPLETE CBC AUTOMATED: CPT

## 2018-09-07 RX ORDER — VITAMIN A ACETATE, BETA CAROTENE, ASCORBIC ACID, CHOLECALCIFEROL, .ALPHA.-TOCOPHEROL ACETATE, DL-, THIAMINE MONONITRATE, RIBOFLAVIN, NIACINAMIDE, PYRIDOXINE HYDROCHLORIDE, FOLIC ACID, CYANOCOBALAMIN, CALCIUM CARBONATE, FERROUS FUMARATE, ZINC OXIDE, CUPRIC OXIDE 3080; 12; 120; 400; 1; 1.84; 3; 20; 22; 920; 25; 200; 27; 10; 2 [IU]/1; UG/1; MG/1; [IU]/1; MG/1; MG/1; MG/1; MG/1; MG/1; [IU]/1; MG/1; MG/1; MG/1; MG/1; MG/1
TABLET, FILM COATED ORAL
Qty: 30 TABLET | Refills: 5 | Status: SHIPPED | OUTPATIENT
Start: 2018-09-07 | End: 2019-03-07 | Stop reason: SDUPTHER

## 2018-09-07 RX ORDER — ALBUTEROL SULFATE 90 UG/1
2 AEROSOL, METERED RESPIRATORY (INHALATION) PRN
Qty: 1 INHALER | Refills: 3 | Status: SHIPPED | OUTPATIENT
Start: 2018-09-07 | End: 2018-10-15 | Stop reason: SDUPTHER

## 2018-09-07 RX ORDER — TRAMADOL HYDROCHLORIDE 50 MG/1
50 TABLET ORAL EVERY 6 HOURS PRN
Qty: 120 TABLET | Refills: 0 | Status: SHIPPED | OUTPATIENT
Start: 2018-09-07 | End: 2018-10-15 | Stop reason: SDUPTHER

## 2018-09-07 ASSESSMENT — ENCOUNTER SYMPTOMS
BLOOD IN STOOL: 0
ABDOMINAL PAIN: 0
CHEST TIGHTNESS: 0
DIARRHEA: 0
RHINORRHEA: 0
CONSTIPATION: 0
COUGH: 0
VOMITING: 0
CHANGE IN BOWEL HABIT: 1
WHEEZING: 0
DIFFICULTY BREATHING: 0
EYE PAIN: 0
SHORTNESS OF BREATH: 0
SINUS PRESSURE: 0
SORE THROAT: 0
EYE ITCHING: 0
EYE REDNESS: 0
APNEA: 0
NAUSEA: 0
BACK PAIN: 0
COLOR CHANGE: 0

## 2018-09-07 ASSESSMENT — PATIENT HEALTH QUESTIONNAIRE - PHQ9
1. LITTLE INTEREST OR PLEASURE IN DOING THINGS: 0
SUM OF ALL RESPONSES TO PHQ9 QUESTIONS 1 & 2: 0
2. FEELING DOWN, DEPRESSED OR HOPELESS: 0
SUM OF ALL RESPONSES TO PHQ QUESTIONS 1-9: 0
SUM OF ALL RESPONSES TO PHQ QUESTIONS 1-9: 0

## 2018-09-07 NOTE — PROGRESS NOTES
Chief Complaint:     Chief Complaint   Patient presents with    Fatigue     x2 months    Fatigue     feet feels like they are sleeping    Dizziness    Neck Pain         Fatigue   This is a new problem. The current episode started more than 1 month ago. The problem occurs daily. The problem has been unchanged. Associated symptoms include a change in bowel habit (constipation), fatigue and neck pain. Pertinent negatives include no abdominal pain, anorexia, arthralgias, chest pain, congestion, coughing, fever, headaches, myalgias, nausea, numbness, rash, sore throat, vomiting or weakness. Nothing aggravates the symptoms. She has tried nothing for the symptoms. The treatment provided no relief. Asthma   There is no chest tightness, cough, difficulty breathing, shortness of breath or wheezing. This is a chronic problem. The problem occurs rarely. The problem has been resolved. Pertinent negatives include no appetite change, chest pain, ear pain, fever, headaches, myalgias, rhinorrhea or sore throat. Her symptoms are aggravated by URI and change in weather. Her symptoms are alleviated by beta-agonist. Her past medical history is significant for asthma. Neck Pain    This is a chronic problem. The current episode started more than 1 year ago. The problem occurs daily. The problem has been unchanged. The quality of the pain is described as aching. The pain is moderate. The symptoms are aggravated by position, twisting and bending. The pain is same all the time. Stiffness is present all day. Pertinent negatives include no chest pain, fever, headaches, numbness or weakness. She has tried oral narcotics for the symptoms. The treatment provided significant relief.        Patient Active Problem List   Diagnosis    Cervical stenosis of spinal canal    DDD (degenerative disc disease), cervical    Mild intermittent asthma    Cervical radiculopathy    Gastroesophageal reflux disease without esophagitis    Tinnitus    No rash noted. No erythema. Psychiatric: She has a normal mood and affect. Nursing note and vitals reviewed. ASSESSMENT/PLAN:    Patient Active Problem List   Diagnosis    Cervical stenosis of spinal canal    DDD (degenerative disc disease), cervical    Mild intermittent asthma    Cervical radiculopathy    Gastroesophageal reflux disease without esophagitis    Tinnitus    History of TIA (transient ischemic attack) and stroke    Stroke-like symptoms    Fatigue       Chaya Mcdaniel was seen today for fatigue, fatigue, dizziness and neck pain. Diagnoses and all orders for this visit:    Mild intermittent asthma, unspecified whether complicated    Cervical stenosis of spinal canal  -     traMADol (ULTRAM) 50 MG tablet; Take 1 tablet by mouth every 6 hours as needed for Pain for up to 30 days. .    DDD (degenerative disc disease), cervical  -     traMADol (ULTRAM) 50 MG tablet; Take 1 tablet by mouth every 6 hours as needed for Pain for up to 30 days. .    Fatigue, unspecified type  -     CBC; Future  -     Comprehensive Metabolic Panel; Future  -     TSH without Reflex; Future  -     T4, Free; Future  -     Vitamin B12 & Folate; Future    Other orders  -     Prenatal Vit-Fe Fumarate-FA (PRENATAL PLUS) 27-1 MG TABS; 1 pill daily  -     albuterol sulfate HFA (PROAIR HFA) 108 (90 Base) MCG/ACT inhaler; Inhale 2 puffs into the lungs as needed for Wheezing or Shortness of Breath          Return if symptoms worsen or fail to improve.         Bashir Solis DO  9/7/2018  11:07 AM

## 2018-10-15 DIAGNOSIS — M50.30 DDD (DEGENERATIVE DISC DISEASE), CERVICAL: Chronic | ICD-10-CM

## 2018-10-15 DIAGNOSIS — M48.02 CERVICAL STENOSIS OF SPINAL CANAL: ICD-10-CM

## 2018-10-15 RX ORDER — ALBUTEROL SULFATE 90 UG/1
2 AEROSOL, METERED RESPIRATORY (INHALATION) PRN
Qty: 1 INHALER | Refills: 3 | Status: SHIPPED | OUTPATIENT
Start: 2018-10-15 | End: 2019-03-07 | Stop reason: SDUPTHER

## 2018-10-15 RX ORDER — TRAMADOL HYDROCHLORIDE 50 MG/1
50 TABLET ORAL EVERY 6 HOURS PRN
Qty: 120 TABLET | Refills: 0 | Status: SHIPPED | OUTPATIENT
Start: 2018-10-15 | End: 2018-11-09

## 2018-11-09 ENCOUNTER — OFFICE VISIT (OUTPATIENT)
Dept: FAMILY MEDICINE CLINIC | Age: 48
End: 2018-11-09
Payer: MEDICAID

## 2018-11-09 VITALS
HEIGHT: 59 IN | OXYGEN SATURATION: 96 % | RESPIRATION RATE: 18 BRPM | WEIGHT: 174 LBS | HEART RATE: 92 BPM | SYSTOLIC BLOOD PRESSURE: 126 MMHG | BODY MASS INDEX: 35.08 KG/M2 | DIASTOLIC BLOOD PRESSURE: 74 MMHG

## 2018-11-09 DIAGNOSIS — F32.0 CURRENT MILD EPISODE OF MAJOR DEPRESSIVE DISORDER WITHOUT PRIOR EPISODE (HCC): ICD-10-CM

## 2018-11-09 DIAGNOSIS — R53.83 FATIGUE, UNSPECIFIED TYPE: ICD-10-CM

## 2018-11-09 DIAGNOSIS — F11.93 OPIATE WITHDRAWAL (HCC): ICD-10-CM

## 2018-11-09 DIAGNOSIS — M50.30 DDD (DEGENERATIVE DISC DISEASE), CERVICAL: Chronic | ICD-10-CM

## 2018-11-09 DIAGNOSIS — M48.02 CERVICAL STENOSIS OF SPINAL CANAL: Primary | ICD-10-CM

## 2018-11-09 PROCEDURE — 99214 OFFICE O/P EST MOD 30 MIN: CPT | Performed by: FAMILY MEDICINE

## 2018-11-09 PROCEDURE — G8484 FLU IMMUNIZE NO ADMIN: HCPCS | Performed by: FAMILY MEDICINE

## 2018-11-09 PROCEDURE — G8427 DOCREV CUR MEDS BY ELIG CLIN: HCPCS | Performed by: FAMILY MEDICINE

## 2018-11-09 PROCEDURE — 1036F TOBACCO NON-USER: CPT | Performed by: FAMILY MEDICINE

## 2018-11-09 PROCEDURE — G8417 CALC BMI ABV UP PARAM F/U: HCPCS | Performed by: FAMILY MEDICINE

## 2018-11-09 RX ORDER — DULOXETIN HYDROCHLORIDE 20 MG/1
20 CAPSULE, DELAYED RELEASE ORAL DAILY
Qty: 30 CAPSULE | Refills: 3 | Status: SHIPPED | OUTPATIENT
Start: 2018-11-09 | End: 2019-01-11 | Stop reason: SDUPTHER

## 2018-11-09 ASSESSMENT — ENCOUNTER SYMPTOMS
BLOOD IN STOOL: 0
VOMITING: 0
CONSTIPATION: 0
WHEEZING: 0
BACK PAIN: 1
SINUS PRESSURE: 0
ABDOMINAL PAIN: 0
VISUAL CHANGE: 1
NAUSEA: 0
EYE ITCHING: 0
COUGH: 0
DIARRHEA: 0
APNEA: 0
PHOTOPHOBIA: 0
RHINORRHEA: 0
SORE THROAT: 0
EYE PAIN: 1
EYE REDNESS: 0
SHORTNESS OF BREATH: 0
COLOR CHANGE: 0
BLURRED VISION: 1
CHEST TIGHTNESS: 0
BOWEL INCONTINENCE: 0

## 2018-11-09 ASSESSMENT — PATIENT HEALTH QUESTIONNAIRE - PHQ9
SUM OF ALL RESPONSES TO PHQ9 QUESTIONS 1 & 2: 0
SUM OF ALL RESPONSES TO PHQ QUESTIONS 1-9: 0
1. LITTLE INTEREST OR PLEASURE IN DOING THINGS: 0
SUM OF ALL RESPONSES TO PHQ QUESTIONS 1-9: 0
2. FEELING DOWN, DEPRESSED OR HOPELESS: 0

## 2019-01-11 ENCOUNTER — OFFICE VISIT (OUTPATIENT)
Dept: FAMILY MEDICINE CLINIC | Age: 49
End: 2019-01-11
Payer: MEDICAID

## 2019-01-11 VITALS
WEIGHT: 172 LBS | HEART RATE: 104 BPM | RESPIRATION RATE: 16 BRPM | SYSTOLIC BLOOD PRESSURE: 118 MMHG | HEIGHT: 59 IN | OXYGEN SATURATION: 94 % | BODY MASS INDEX: 34.68 KG/M2 | DIASTOLIC BLOOD PRESSURE: 74 MMHG

## 2019-01-11 DIAGNOSIS — R29.810 FACIAL WEAKNESS: ICD-10-CM

## 2019-01-11 DIAGNOSIS — F32.0 CURRENT MILD EPISODE OF MAJOR DEPRESSIVE DISORDER WITHOUT PRIOR EPISODE (HCC): ICD-10-CM

## 2019-01-11 DIAGNOSIS — R53.83 FATIGUE, UNSPECIFIED TYPE: ICD-10-CM

## 2019-01-11 DIAGNOSIS — Z86.73 HISTORY OF TIA (TRANSIENT ISCHEMIC ATTACK) AND STROKE: Primary | Chronic | ICD-10-CM

## 2019-01-11 DIAGNOSIS — J01.00 ACUTE NON-RECURRENT MAXILLARY SINUSITIS: ICD-10-CM

## 2019-01-11 PROBLEM — R29.90 STROKE-LIKE SYMPTOMS: Status: RESOLVED | Noted: 2018-04-10 | Resolved: 2019-01-11

## 2019-01-11 PROBLEM — J01.90 ACUTE SINUSITIS: Status: ACTIVE | Noted: 2019-01-11

## 2019-01-11 PROBLEM — H93.19 TINNITUS: Status: RESOLVED | Noted: 2017-09-26 | Resolved: 2019-01-11

## 2019-01-11 PROBLEM — F11.93 OPIATE WITHDRAWAL (HCC): Status: RESOLVED | Noted: 2018-11-09 | Resolved: 2019-01-11

## 2019-01-11 PROCEDURE — G8417 CALC BMI ABV UP PARAM F/U: HCPCS | Performed by: FAMILY MEDICINE

## 2019-01-11 PROCEDURE — 1036F TOBACCO NON-USER: CPT | Performed by: FAMILY MEDICINE

## 2019-01-11 PROCEDURE — 99214 OFFICE O/P EST MOD 30 MIN: CPT | Performed by: FAMILY MEDICINE

## 2019-01-11 PROCEDURE — G8484 FLU IMMUNIZE NO ADMIN: HCPCS | Performed by: FAMILY MEDICINE

## 2019-01-11 PROCEDURE — G8427 DOCREV CUR MEDS BY ELIG CLIN: HCPCS | Performed by: FAMILY MEDICINE

## 2019-01-11 RX ORDER — ALBUTEROL SULFATE 2.5 MG/3ML
2.5 SOLUTION RESPIRATORY (INHALATION) EVERY 6 HOURS PRN
Qty: 120 EACH | Refills: 3 | Status: SHIPPED | OUTPATIENT
Start: 2019-01-11 | End: 2019-03-07 | Stop reason: SDUPTHER

## 2019-01-11 RX ORDER — AMOXICILLIN 875 MG/1
875 TABLET, COATED ORAL 2 TIMES DAILY
Qty: 20 TABLET | Refills: 0 | Status: SHIPPED | OUTPATIENT
Start: 2019-01-11 | End: 2019-01-21

## 2019-01-11 RX ORDER — DULOXETIN HYDROCHLORIDE 30 MG/1
30 CAPSULE, DELAYED RELEASE ORAL DAILY
Qty: 30 CAPSULE | Refills: 5 | Status: SHIPPED | OUTPATIENT
Start: 2019-01-11 | End: 2019-03-07 | Stop reason: SDUPTHER

## 2019-01-11 ASSESSMENT — PATIENT HEALTH QUESTIONNAIRE - PHQ9
2. FEELING DOWN, DEPRESSED OR HOPELESS: 0
SUM OF ALL RESPONSES TO PHQ QUESTIONS 1-9: 0
1. LITTLE INTEREST OR PLEASURE IN DOING THINGS: 0
SUM OF ALL RESPONSES TO PHQ QUESTIONS 1-9: 0
SUM OF ALL RESPONSES TO PHQ9 QUESTIONS 1 & 2: 0

## 2019-01-11 ASSESSMENT — ENCOUNTER SYMPTOMS
APNEA: 0
COUGH: 1
SORE THROAT: 0
BLURRED VISION: 0
EYE REDNESS: 0
ABDOMINAL PAIN: 0
RHINORRHEA: 0
VOMITING: 0
BACK PAIN: 0
DIARRHEA: 0
CHEST TIGHTNESS: 0
COLOR CHANGE: 0
BLOOD IN STOOL: 0
EYE PAIN: 0
SHORTNESS OF BREATH: 0
SINUS PRESSURE: 1
NAUSEA: 0
EYE ITCHING: 0
VISUAL CHANGE: 1
WHEEZING: 0
CONSTIPATION: 0

## 2019-01-18 ENCOUNTER — HOSPITAL ENCOUNTER (OUTPATIENT)
Dept: CT IMAGING | Age: 49
Discharge: HOME | End: 2019-01-20
Payer: MEDICAID

## 2019-01-18 DIAGNOSIS — Z86.73 HISTORY OF TIA (TRANSIENT ISCHEMIC ATTACK) AND STROKE: Chronic | ICD-10-CM

## 2019-01-18 DIAGNOSIS — R29.810 FACIAL WEAKNESS: ICD-10-CM

## 2019-01-18 PROCEDURE — 70450 CT HEAD/BRAIN W/O DYE: CPT

## 2019-02-19 ENCOUNTER — OFFICE VISIT (OUTPATIENT)
Dept: FAMILY MEDICINE CLINIC | Age: 49
End: 2019-02-19
Payer: MEDICAID

## 2019-02-19 ENCOUNTER — HOSPITAL ENCOUNTER (OUTPATIENT)
Dept: GENERAL RADIOLOGY | Age: 49
Discharge: HOME | End: 2019-02-21
Payer: MEDICAID

## 2019-02-19 ENCOUNTER — HOSPITAL ENCOUNTER (OUTPATIENT)
Age: 49
Discharge: HOME | End: 2019-02-21
Payer: MEDICAID

## 2019-02-19 VITALS
OXYGEN SATURATION: 97 % | HEART RATE: 88 BPM | WEIGHT: 178 LBS | BODY MASS INDEX: 35.88 KG/M2 | SYSTOLIC BLOOD PRESSURE: 118 MMHG | DIASTOLIC BLOOD PRESSURE: 70 MMHG | HEIGHT: 59 IN | RESPIRATION RATE: 16 BRPM

## 2019-02-19 DIAGNOSIS — M48.02 CERVICAL STENOSIS OF SPINAL CANAL: ICD-10-CM

## 2019-02-19 DIAGNOSIS — M50.30 DDD (DEGENERATIVE DISC DISEASE), CERVICAL: Primary | Chronic | ICD-10-CM

## 2019-02-19 DIAGNOSIS — M48.061 SPINAL STENOSIS OF LUMBAR REGION WITHOUT NEUROGENIC CLAUDICATION: ICD-10-CM

## 2019-02-19 DIAGNOSIS — M79.7 FIBROMYALGIA: ICD-10-CM

## 2019-02-19 DIAGNOSIS — R53.83 FATIGUE, UNSPECIFIED TYPE: ICD-10-CM

## 2019-02-19 PROBLEM — R29.810 FACIAL WEAKNESS: Status: RESOLVED | Noted: 2019-01-11 | Resolved: 2019-02-19

## 2019-02-19 PROCEDURE — G8427 DOCREV CUR MEDS BY ELIG CLIN: HCPCS | Performed by: FAMILY MEDICINE

## 2019-02-19 PROCEDURE — 1036F TOBACCO NON-USER: CPT | Performed by: FAMILY MEDICINE

## 2019-02-19 PROCEDURE — 72110 X-RAY EXAM L-2 SPINE 4/>VWS: CPT

## 2019-02-19 PROCEDURE — G8417 CALC BMI ABV UP PARAM F/U: HCPCS | Performed by: FAMILY MEDICINE

## 2019-02-19 PROCEDURE — 99214 OFFICE O/P EST MOD 30 MIN: CPT | Performed by: FAMILY MEDICINE

## 2019-02-19 PROCEDURE — G8484 FLU IMMUNIZE NO ADMIN: HCPCS | Performed by: FAMILY MEDICINE

## 2019-02-19 RX ORDER — GABAPENTIN 100 MG/1
100 CAPSULE ORAL 3 TIMES DAILY
Qty: 90 CAPSULE | Refills: 0 | Status: SHIPPED | OUTPATIENT
Start: 2019-02-19 | End: 2019-03-07 | Stop reason: SDUPTHER

## 2019-02-19 RX ORDER — TRAMADOL HYDROCHLORIDE 50 MG/1
50 TABLET ORAL EVERY 4 HOURS PRN
Qty: 42 TABLET | Refills: 0 | Status: SHIPPED | OUTPATIENT
Start: 2019-02-19 | End: 2019-02-26

## 2019-02-19 ASSESSMENT — ENCOUNTER SYMPTOMS
EYE REDNESS: 0
SINUS PRESSURE: 0
CHEST TIGHTNESS: 0
ABDOMINAL PAIN: 0
CHANGE IN BOWEL HABIT: 0
CONSTIPATION: 0
VISUAL CHANGE: 0
COUGH: 0
SHORTNESS OF BREATH: 0
DIARRHEA: 0
BACK PAIN: 1
EYE PAIN: 0
BLOOD IN STOOL: 0
SORE THROAT: 0
EYE ITCHING: 0
APNEA: 0
COLOR CHANGE: 0
BOWEL INCONTINENCE: 1
NAUSEA: 0
VOMITING: 0
RHINORRHEA: 0
WHEEZING: 0

## 2019-02-19 ASSESSMENT — PATIENT HEALTH QUESTIONNAIRE - PHQ9
SUM OF ALL RESPONSES TO PHQ9 QUESTIONS 1 & 2: 0
SUM OF ALL RESPONSES TO PHQ QUESTIONS 1-9: 0
SUM OF ALL RESPONSES TO PHQ QUESTIONS 1-9: 0
1. LITTLE INTEREST OR PLEASURE IN DOING THINGS: 0
2. FEELING DOWN, DEPRESSED OR HOPELESS: 0

## 2019-03-08 RX ORDER — MELOXICAM 7.5 MG/1
7.5 TABLET ORAL DAILY
Qty: 30 TABLET | Refills: 3 | Status: SHIPPED | OUTPATIENT
Start: 2019-03-08 | End: 2019-06-21 | Stop reason: ALTCHOICE

## 2019-03-08 RX ORDER — GABAPENTIN 100 MG/1
100 CAPSULE ORAL 3 TIMES DAILY
Qty: 90 CAPSULE | Refills: 0 | Status: SHIPPED | OUTPATIENT
Start: 2019-03-08 | End: 2019-07-18 | Stop reason: SDUPTHER

## 2019-03-08 RX ORDER — ALBUTEROL SULFATE 2.5 MG/3ML
2.5 SOLUTION RESPIRATORY (INHALATION) EVERY 6 HOURS PRN
Qty: 120 EACH | Refills: 3 | Status: ON HOLD | OUTPATIENT
Start: 2019-03-08 | End: 2019-05-06 | Stop reason: HOSPADM

## 2019-03-08 RX ORDER — ALBUTEROL SULFATE 90 UG/1
2 AEROSOL, METERED RESPIRATORY (INHALATION) PRN
Qty: 1 INHALER | Refills: 3 | Status: SHIPPED | OUTPATIENT
Start: 2019-03-08 | End: 2020-06-25 | Stop reason: SDUPTHER

## 2019-03-08 RX ORDER — VITAMIN A ACETATE, BETA CAROTENE, ASCORBIC ACID, CHOLECALCIFEROL, .ALPHA.-TOCOPHEROL ACETATE, DL-, THIAMINE MONONITRATE, RIBOFLAVIN, NIACINAMIDE, PYRIDOXINE HYDROCHLORIDE, FOLIC ACID, CYANOCOBALAMIN, CALCIUM CARBONATE, FERROUS FUMARATE, ZINC OXIDE, CUPRIC OXIDE 3080; 12; 120; 400; 1; 1.84; 3; 20; 22; 920; 25; 200; 27; 10; 2 [IU]/1; UG/1; MG/1; [IU]/1; MG/1; MG/1; MG/1; MG/1; MG/1; [IU]/1; MG/1; MG/1; MG/1; MG/1; MG/1
TABLET, FILM COATED ORAL
Qty: 30 TABLET | Refills: 5 | Status: SHIPPED | OUTPATIENT
Start: 2019-03-08 | End: 2019-05-23 | Stop reason: SDUPTHER

## 2019-03-08 RX ORDER — ALBUTEROL SULFATE 2.5 MG/3ML
2.5 SOLUTION RESPIRATORY (INHALATION) EVERY 6 HOURS PRN
Qty: 120 EACH | Refills: 3 | Status: SHIPPED | OUTPATIENT
Start: 2019-03-08 | End: 2021-01-21 | Stop reason: SDUPTHER

## 2019-03-08 RX ORDER — DULOXETIN HYDROCHLORIDE 30 MG/1
30 CAPSULE, DELAYED RELEASE ORAL DAILY
Qty: 30 CAPSULE | Refills: 5 | Status: SHIPPED | OUTPATIENT
Start: 2019-03-08 | End: 2019-06-21 | Stop reason: ALTCHOICE

## 2019-03-18 DIAGNOSIS — M79.7 FIBROMYALGIA: ICD-10-CM

## 2019-03-18 DIAGNOSIS — M48.02 CERVICAL STENOSIS OF SPINAL CANAL: ICD-10-CM

## 2019-03-18 DIAGNOSIS — M50.30 DDD (DEGENERATIVE DISC DISEASE), CERVICAL: Chronic | ICD-10-CM

## 2019-03-18 DIAGNOSIS — M48.061 SPINAL STENOSIS OF LUMBAR REGION WITHOUT NEUROGENIC CLAUDICATION: ICD-10-CM

## 2019-03-18 RX ORDER — TRAMADOL HYDROCHLORIDE 50 MG/1
50 TABLET ORAL EVERY 4 HOURS PRN
Qty: 42 TABLET | Refills: 0 | OUTPATIENT
Start: 2019-03-18 | End: 2019-03-25

## 2019-04-23 ENCOUNTER — PATIENT MESSAGE (OUTPATIENT)
Dept: FAMILY MEDICINE CLINIC | Age: 49
End: 2019-04-23

## 2019-04-23 DIAGNOSIS — M48.02 CERVICAL STENOSIS OF SPINAL CANAL: ICD-10-CM

## 2019-04-23 DIAGNOSIS — M50.30 DDD (DEGENERATIVE DISC DISEASE), CERVICAL: Chronic | ICD-10-CM

## 2019-04-23 RX ORDER — TRAMADOL HYDROCHLORIDE 50 MG/1
50 TABLET ORAL EVERY 8 HOURS PRN
Qty: 90 TABLET | Refills: 0 | Status: SHIPPED | OUTPATIENT
Start: 2019-04-23 | End: 2019-05-23 | Stop reason: SDUPTHER

## 2019-04-23 NOTE — TELEPHONE ENCOUNTER
From: Gera hTao  To: Miguel Ovalles DO  Sent: 4/23/2019 1:29 PM EDT  Subject: Prescription Question    I need medicine for pain, tramadol

## 2019-05-04 ENCOUNTER — APPOINTMENT (OUTPATIENT)
Dept: CT IMAGING | Age: 49
End: 2019-05-04
Payer: MEDICAID

## 2019-05-04 ENCOUNTER — APPOINTMENT (OUTPATIENT)
Dept: GENERAL RADIOLOGY | Age: 49
End: 2019-05-04
Payer: MEDICAID

## 2019-05-04 ENCOUNTER — HOSPITAL ENCOUNTER (OUTPATIENT)
Age: 49
Setting detail: OBSERVATION
Discharge: HOME | End: 2019-05-06
Attending: EMERGENCY MEDICINE | Admitting: INTERNAL MEDICINE
Payer: MEDICAID

## 2019-05-04 DIAGNOSIS — R07.9 CHEST PAIN, UNSPECIFIED TYPE: Primary | ICD-10-CM

## 2019-05-04 LAB
ANION GAP SERPL CALCULATED.3IONS-SCNC: 12 MMOL/L (ref 7–16)
BASOPHILS ABSOLUTE: 0.04 E9/L (ref 0–0.2)
BASOPHILS RELATIVE PERCENT: 0.5 % (ref 0–2)
BUN BLDV-MCNC: 12 MG/DL (ref 6–20)
CALCIUM SERPL-MCNC: 9.2 MG/DL (ref 8.6–10.2)
CHLORIDE BLD-SCNC: 105 MMOL/L (ref 98–107)
CO2: 24 MMOL/L (ref 22–29)
CREAT SERPL-MCNC: 1.1 MG/DL (ref 0.5–1)
D DIMER: 297 NG/ML DDU
EOSINOPHILS ABSOLUTE: 0.25 E9/L (ref 0.05–0.5)
EOSINOPHILS RELATIVE PERCENT: 3.3 % (ref 0–6)
GFR AFRICAN AMERICAN: >60
GFR NON-AFRICAN AMERICAN: >60 ML/MIN/1.73
GLUCOSE BLD-MCNC: 119 MG/DL (ref 74–99)
HCT VFR BLD CALC: 41.7 % (ref 34–48)
HEMOGLOBIN: 13.3 G/DL (ref 11.5–15.5)
IMMATURE GRANULOCYTES #: 0.01 E9/L
IMMATURE GRANULOCYTES %: 0.1 % (ref 0–5)
LYMPHOCYTES ABSOLUTE: 2.4 E9/L (ref 1.5–4)
LYMPHOCYTES RELATIVE PERCENT: 32 % (ref 20–42)
MCH RBC QN AUTO: 27.5 PG (ref 26–35)
MCHC RBC AUTO-ENTMCNC: 31.9 % (ref 32–34.5)
MCV RBC AUTO: 86.3 FL (ref 80–99.9)
MONOCYTES ABSOLUTE: 0.59 E9/L (ref 0.1–0.95)
MONOCYTES RELATIVE PERCENT: 7.9 % (ref 2–12)
NEUTROPHILS ABSOLUTE: 4.22 E9/L (ref 1.8–7.3)
NEUTROPHILS RELATIVE PERCENT: 56.2 % (ref 43–80)
PDW BLD-RTO: 13.2 FL (ref 11.5–15)
PLATELET # BLD: 202 E9/L (ref 130–450)
PMV BLD AUTO: 10.5 FL (ref 7–12)
POTASSIUM SERPL-SCNC: 4.1 MMOL/L (ref 3.5–5)
PRO-BNP: 48 PG/ML (ref 0–125)
RBC # BLD: 4.83 E12/L (ref 3.5–5.5)
RBC # BLD: NORMAL 10*6/UL
SODIUM BLD-SCNC: 141 MMOL/L (ref 132–146)
TROPONIN: <0.01 NG/ML (ref 0–0.03)
WBC # BLD: 7.5 E9/L (ref 4.5–11.5)

## 2019-05-04 PROCEDURE — 6370000000 HC RX 637 (ALT 250 FOR IP): Performed by: EMERGENCY MEDICINE

## 2019-05-04 PROCEDURE — 83880 ASSAY OF NATRIURETIC PEPTIDE: CPT

## 2019-05-04 PROCEDURE — 84484 ASSAY OF TROPONIN QUANT: CPT

## 2019-05-04 PROCEDURE — 71275 CT ANGIOGRAPHY CHEST: CPT

## 2019-05-04 PROCEDURE — 99285 EMERGENCY DEPT VISIT HI MDM: CPT

## 2019-05-04 PROCEDURE — 71045 X-RAY EXAM CHEST 1 VIEW: CPT

## 2019-05-04 PROCEDURE — 93005 ELECTROCARDIOGRAM TRACING: CPT | Performed by: EMERGENCY MEDICINE

## 2019-05-04 PROCEDURE — 80048 BASIC METABOLIC PNL TOTAL CA: CPT

## 2019-05-04 PROCEDURE — 85378 FIBRIN DEGRADE SEMIQUANT: CPT

## 2019-05-04 PROCEDURE — 85025 COMPLETE CBC W/AUTO DIFF WBC: CPT

## 2019-05-04 RX ORDER — METHYLPREDNISOLONE SODIUM SUCCINATE 125 MG/2ML
125 INJECTION, POWDER, LYOPHILIZED, FOR SOLUTION INTRAMUSCULAR; INTRAVENOUS ONCE
Status: COMPLETED | OUTPATIENT
Start: 2019-05-05 | End: 2019-05-05

## 2019-05-04 RX ORDER — ASPIRIN 81 MG/1
243 TABLET, CHEWABLE ORAL ONCE
Status: COMPLETED | OUTPATIENT
Start: 2019-05-04 | End: 2019-05-04

## 2019-05-04 RX ORDER — DIPHENHYDRAMINE HYDROCHLORIDE 50 MG/ML
25 INJECTION INTRAMUSCULAR; INTRAVENOUS ONCE
Status: COMPLETED | OUTPATIENT
Start: 2019-05-05 | End: 2019-05-05

## 2019-05-04 RX ADMIN — ASPIRIN 81 MG 243 MG: 81 TABLET ORAL at 21:58

## 2019-05-04 ASSESSMENT — PAIN SCALES - GENERAL: PAINLEVEL_OUTOF10: 8

## 2019-05-04 ASSESSMENT — PAIN DESCRIPTION - PAIN TYPE: TYPE: ACUTE PAIN

## 2019-05-04 ASSESSMENT — PAIN DESCRIPTION - LOCATION: LOCATION: CHEST

## 2019-05-05 PROBLEM — G43.009 MIGRAINE WITHOUT AURA AND WITHOUT STATUS MIGRAINOSUS, NOT INTRACTABLE: Chronic | Status: ACTIVE | Noted: 2019-05-05

## 2019-05-05 PROBLEM — M62.830 LUMBAR PARASPINAL MUSCLE SPASM: Chronic | Status: ACTIVE | Noted: 2019-05-05

## 2019-05-05 PROBLEM — R07.9 CHEST PAIN: Status: ACTIVE | Noted: 2019-05-05

## 2019-05-05 PROBLEM — K58.9 IRRITABLE BOWEL SYNDROME WITHOUT DIARRHEA: Chronic | Status: ACTIVE | Noted: 2019-05-05

## 2019-05-05 PROBLEM — G89.29 CHRONIC LEFT-SIDED LOW BACK PAIN WITHOUT SCIATICA: Chronic | Status: ACTIVE | Noted: 2019-05-05

## 2019-05-05 PROBLEM — G47.33 OSA (OBSTRUCTIVE SLEEP APNEA): Status: ACTIVE | Noted: 2019-05-05

## 2019-05-05 PROBLEM — F32.0 CURRENT MILD EPISODE OF MAJOR DEPRESSIVE DISORDER WITHOUT PRIOR EPISODE (HCC): Chronic | Status: ACTIVE | Noted: 2018-11-09

## 2019-05-05 PROBLEM — E66.09 OBESITY DUE TO EXCESS CALORIES: Chronic | Status: ACTIVE | Noted: 2019-05-05

## 2019-05-05 PROBLEM — M54.50 CHRONIC LEFT-SIDED LOW BACK PAIN WITHOUT SCIATICA: Chronic | Status: ACTIVE | Noted: 2019-05-05

## 2019-05-05 LAB
EKG ATRIAL RATE: 81 BPM
EKG P AXIS: 43 DEGREES
EKG P-R INTERVAL: 128 MS
EKG Q-T INTERVAL: 382 MS
EKG QRS DURATION: 88 MS
EKG QTC CALCULATION (BAZETT): 443 MS
EKG R AXIS: 62 DEGREES
EKG T AXIS: 44 DEGREES
EKG VENTRICULAR RATE: 81 BPM
TROPONIN: <0.01 NG/ML (ref 0–0.03)
TROPONIN: <0.01 NG/ML (ref 0–0.03)

## 2019-05-05 PROCEDURE — 6360000002 HC RX W HCPCS: Performed by: INTERNAL MEDICINE

## 2019-05-05 PROCEDURE — 96376 TX/PRO/DX INJ SAME DRUG ADON: CPT

## 2019-05-05 PROCEDURE — 6360000004 HC RX CONTRAST MEDICATION: Performed by: RADIOLOGY

## 2019-05-05 PROCEDURE — G0378 HOSPITAL OBSERVATION PER HR: HCPCS

## 2019-05-05 PROCEDURE — 96374 THER/PROPH/DIAG INJ IV PUSH: CPT

## 2019-05-05 PROCEDURE — 6370000000 HC RX 637 (ALT 250 FOR IP): Performed by: INTERNAL MEDICINE

## 2019-05-05 PROCEDURE — 84484 ASSAY OF TROPONIN QUANT: CPT

## 2019-05-05 PROCEDURE — 6360000002 HC RX W HCPCS: Performed by: EMERGENCY MEDICINE

## 2019-05-05 PROCEDURE — 99219 PR INITIAL OBSERVATION CARE/DAY 50 MINUTES: CPT | Performed by: INTERNAL MEDICINE

## 2019-05-05 PROCEDURE — 93010 ELECTROCARDIOGRAM REPORT: CPT | Performed by: INTERNAL MEDICINE

## 2019-05-05 PROCEDURE — 36415 COLL VENOUS BLD VENIPUNCTURE: CPT

## 2019-05-05 PROCEDURE — 96375 TX/PRO/DX INJ NEW DRUG ADDON: CPT

## 2019-05-05 PROCEDURE — 94640 AIRWAY INHALATION TREATMENT: CPT

## 2019-05-05 PROCEDURE — 71275 CT ANGIOGRAPHY CHEST: CPT

## 2019-05-05 PROCEDURE — 2580000003 HC RX 258: Performed by: INTERNAL MEDICINE

## 2019-05-05 PROCEDURE — 96372 THER/PROPH/DIAG INJ SC/IM: CPT

## 2019-05-05 RX ORDER — PANTOPRAZOLE SODIUM 40 MG/1
40 TABLET, DELAYED RELEASE ORAL
Status: DISCONTINUED | OUTPATIENT
Start: 2019-05-05 | End: 2019-05-06 | Stop reason: HOSPADM

## 2019-05-05 RX ORDER — ORPHENADRINE CITRATE 30 MG/ML
60 INJECTION INTRAMUSCULAR; INTRAVENOUS EVERY 12 HOURS
Status: DISCONTINUED | OUTPATIENT
Start: 2019-05-05 | End: 2019-05-06 | Stop reason: HOSPADM

## 2019-05-05 RX ORDER — SODIUM CHLORIDE 0.9 % (FLUSH) 0.9 %
10 SYRINGE (ML) INJECTION EVERY 12 HOURS SCHEDULED
Status: DISCONTINUED | OUTPATIENT
Start: 2019-05-05 | End: 2019-05-06 | Stop reason: HOSPADM

## 2019-05-05 RX ORDER — FLUTICASONE PROPIONATE 110 UG/1
2 AEROSOL, METERED RESPIRATORY (INHALATION) 2 TIMES DAILY
Status: DISCONTINUED | OUTPATIENT
Start: 2019-05-05 | End: 2019-05-06 | Stop reason: HOSPADM

## 2019-05-05 RX ORDER — ALBUTEROL SULFATE 90 UG/1
2 AEROSOL, METERED RESPIRATORY (INHALATION) EVERY 6 HOURS PRN
Status: DISCONTINUED | OUTPATIENT
Start: 2019-05-05 | End: 2019-05-06 | Stop reason: HOSPADM

## 2019-05-05 RX ORDER — ACETAMINOPHEN 325 MG/1
650 TABLET ORAL EVERY 4 HOURS PRN
Status: DISCONTINUED | OUTPATIENT
Start: 2019-05-05 | End: 2019-05-06 | Stop reason: HOSPADM

## 2019-05-05 RX ORDER — TRAMADOL HYDROCHLORIDE 50 MG/1
50 TABLET ORAL EVERY 8 HOURS PRN
Status: DISCONTINUED | OUTPATIENT
Start: 2019-05-05 | End: 2019-05-06 | Stop reason: HOSPADM

## 2019-05-05 RX ORDER — DIVALPROEX SODIUM 250 MG/1
250 TABLET, DELAYED RELEASE ORAL 2 TIMES DAILY
COMMUNITY
End: 2019-05-06 | Stop reason: SDUPTHER

## 2019-05-05 RX ORDER — FLUTICASONE PROPIONATE 220 UG/1
1 AEROSOL, METERED RESPIRATORY (INHALATION) 2 TIMES DAILY
Status: DISCONTINUED | OUTPATIENT
Start: 2019-05-05 | End: 2019-05-05

## 2019-05-05 RX ORDER — ONDANSETRON 2 MG/ML
4 INJECTION INTRAMUSCULAR; INTRAVENOUS EVERY 6 HOURS PRN
Status: DISCONTINUED | OUTPATIENT
Start: 2019-05-05 | End: 2019-05-06 | Stop reason: HOSPADM

## 2019-05-05 RX ORDER — PRENATAL WITH FERROUS FUM AND FOLIC ACID 3080; 920; 120; 400; 22; 1.84; 3; 20; 10; 1; 12; 200; 27; 25; 2 [IU]/1; [IU]/1; MG/1; [IU]/1; MG/1; MG/1; MG/1; MG/1; MG/1; MG/1; UG/1; MG/1; MG/1; MG/1; MG/1
1 TABLET ORAL DAILY
Status: DISCONTINUED | OUTPATIENT
Start: 2019-05-05 | End: 2019-05-06 | Stop reason: HOSPADM

## 2019-05-05 RX ORDER — SODIUM CHLORIDE 0.9 % (FLUSH) 0.9 %
10 SYRINGE (ML) INJECTION PRN
Status: DISCONTINUED | OUTPATIENT
Start: 2019-05-05 | End: 2019-05-06 | Stop reason: HOSPADM

## 2019-05-05 RX ORDER — ALBUTEROL SULFATE 90 UG/1
2 AEROSOL, METERED RESPIRATORY (INHALATION) PRN
Status: DISCONTINUED | OUTPATIENT
Start: 2019-05-05 | End: 2019-05-05

## 2019-05-05 RX ORDER — GUAIFENESIN 400 MG/1
400 TABLET ORAL 4 TIMES DAILY PRN
Status: DISCONTINUED | OUTPATIENT
Start: 2019-05-05 | End: 2019-05-06 | Stop reason: HOSPADM

## 2019-05-05 RX ORDER — MELOXICAM 7.5 MG/1
7.5 TABLET ORAL DAILY
Status: DISCONTINUED | OUTPATIENT
Start: 2019-05-05 | End: 2019-05-06 | Stop reason: HOSPADM

## 2019-05-05 RX ORDER — DICYCLOMINE HYDROCHLORIDE 10 MG/1
20 CAPSULE ORAL EVERY 6 HOURS
Status: DISCONTINUED | OUTPATIENT
Start: 2019-05-05 | End: 2019-05-06 | Stop reason: HOSPADM

## 2019-05-05 RX ORDER — DIVALPROEX SODIUM 250 MG/1
250 TABLET, DELAYED RELEASE ORAL 2 TIMES DAILY
Status: DISCONTINUED | OUTPATIENT
Start: 2019-05-05 | End: 2019-05-06 | Stop reason: HOSPADM

## 2019-05-05 RX ORDER — ASPIRIN 81 MG/1
81 TABLET ORAL DAILY
Status: DISCONTINUED | OUTPATIENT
Start: 2019-05-05 | End: 2019-05-06 | Stop reason: HOSPADM

## 2019-05-05 RX ORDER — SUCRALFATE 1 G/1
1 TABLET ORAL 4 TIMES DAILY
Status: DISCONTINUED | OUTPATIENT
Start: 2019-05-05 | End: 2019-05-06 | Stop reason: HOSPADM

## 2019-05-05 RX ORDER — DULOXETIN HYDROCHLORIDE 30 MG/1
30 CAPSULE, DELAYED RELEASE ORAL DAILY
Status: DISCONTINUED | OUTPATIENT
Start: 2019-05-05 | End: 2019-05-06 | Stop reason: HOSPADM

## 2019-05-05 RX ADMIN — IOPAMIDOL 70 ML: 755 INJECTION, SOLUTION INTRAVENOUS at 00:37

## 2019-05-05 RX ADMIN — Medication 10 ML: at 09:49

## 2019-05-05 RX ADMIN — ENOXAPARIN SODIUM 40 MG: 40 INJECTION SUBCUTANEOUS at 09:48

## 2019-05-05 RX ADMIN — SUCRALFATE 1 G: 1 TABLET ORAL at 13:59

## 2019-05-05 RX ADMIN — MELOXICAM 7.5 MG: 7.5 TABLET ORAL at 09:49

## 2019-05-05 RX ADMIN — TRAMADOL HYDROCHLORIDE 50 MG: 50 TABLET, FILM COATED ORAL at 17:26

## 2019-05-05 RX ADMIN — DIVALPROEX SODIUM 250 MG: 250 TABLET, DELAYED RELEASE ORAL at 17:26

## 2019-05-05 RX ADMIN — DULOXETINE HYDROCHLORIDE 30 MG: 30 CAPSULE, DELAYED RELEASE ORAL at 09:48

## 2019-05-05 RX ADMIN — FLUTICASONE PROPIONATE 2 PUFF: 110 AEROSOL, METERED RESPIRATORY (INHALATION) at 21:10

## 2019-05-05 RX ADMIN — Medication 10 ML: at 19:21

## 2019-05-05 RX ADMIN — ORPHENADRINE CITRATE 60 MG: 30 INJECTION INTRAMUSCULAR; INTRAVENOUS at 09:48

## 2019-05-05 RX ADMIN — DIPHENHYDRAMINE HYDROCHLORIDE 25 MG: 50 INJECTION, SOLUTION INTRAMUSCULAR; INTRAVENOUS at 00:01

## 2019-05-05 RX ADMIN — DICYCLOMINE HYDROCHLORIDE 20 MG: 10 CAPSULE ORAL at 09:48

## 2019-05-05 RX ADMIN — DICYCLOMINE HYDROCHLORIDE 20 MG: 10 CAPSULE ORAL at 19:21

## 2019-05-05 RX ADMIN — SUCRALFATE 1 G: 1 TABLET ORAL at 17:26

## 2019-05-05 RX ADMIN — METHYLPREDNISOLONE SODIUM SUCCINATE 125 MG: 125 INJECTION, POWDER, FOR SOLUTION INTRAMUSCULAR; INTRAVENOUS at 00:01

## 2019-05-05 RX ADMIN — Medication 10 ML: at 22:00

## 2019-05-05 RX ADMIN — Medication 1 TABLET: at 09:48

## 2019-05-05 RX ADMIN — ASPIRIN 81 MG: 81 TABLET, COATED ORAL at 09:48

## 2019-05-05 RX ADMIN — SUCRALFATE 1 G: 1 TABLET ORAL at 22:00

## 2019-05-05 RX ADMIN — SUCRALFATE 1 G: 1 TABLET ORAL at 09:48

## 2019-05-05 RX ADMIN — DIVALPROEX SODIUM 250 MG: 250 TABLET, DELAYED RELEASE ORAL at 09:48

## 2019-05-05 RX ADMIN — PANTOPRAZOLE SODIUM 40 MG: 40 TABLET, DELAYED RELEASE ORAL at 09:48

## 2019-05-05 RX ADMIN — ORPHENADRINE CITRATE 60 MG: 30 INJECTION INTRAMUSCULAR; INTRAVENOUS at 19:21

## 2019-05-05 RX ADMIN — DICYCLOMINE HYDROCHLORIDE 20 MG: 10 CAPSULE ORAL at 13:59

## 2019-05-05 ASSESSMENT — PAIN SCALES - GENERAL
PAINLEVEL_OUTOF10: 0
PAINLEVEL_OUTOF10: 3
PAINLEVEL_OUTOF10: 7
PAINLEVEL_OUTOF10: 7

## 2019-05-05 ASSESSMENT — PAIN DESCRIPTION - DESCRIPTORS
DESCRIPTORS: ACHING;DISCOMFORT;CONSTANT
DESCRIPTORS: ACHING;DISCOMFORT;SORE

## 2019-05-05 ASSESSMENT — PAIN DESCRIPTION - ONSET
ONSET: ON-GOING
ONSET: GRADUAL

## 2019-05-05 ASSESSMENT — PAIN - FUNCTIONAL ASSESSMENT
PAIN_FUNCTIONAL_ASSESSMENT: ACTIVITIES ARE NOT PREVENTED
PAIN_FUNCTIONAL_ASSESSMENT: ACTIVITIES ARE NOT PREVENTED

## 2019-05-05 ASSESSMENT — PAIN DESCRIPTION - PROGRESSION
CLINICAL_PROGRESSION: GRADUALLY WORSENING
CLINICAL_PROGRESSION: NOT CHANGED

## 2019-05-05 ASSESSMENT — PAIN DESCRIPTION - ORIENTATION
ORIENTATION: MID;LOWER
ORIENTATION: MID

## 2019-05-05 ASSESSMENT — PAIN DESCRIPTION - LOCATION
LOCATION: BACK
LOCATION: CHEST

## 2019-05-05 ASSESSMENT — PAIN DESCRIPTION - PAIN TYPE
TYPE: ACUTE PAIN
TYPE: ACUTE PAIN

## 2019-05-05 ASSESSMENT — PAIN DESCRIPTION - FREQUENCY
FREQUENCY: INTERMITTENT
FREQUENCY: INTERMITTENT

## 2019-05-05 NOTE — H&P
[place] [address] [facility]     History and Physical      CHIEF COMPLAINT: Chest pain. History of Present Illness: The pt is a 51 y.o. female with a history of asthma, GERD and frequent migrainous frontal headaches and multiple negative nuclear stress tests (most recently in 6/2016), depression and chronic back pain, who was in usoh till yesterday evening around 7 pm other than for a frontal headache she had been having since afternoon. Around 7:30 pm, while playing with her grandson, pt experienced sharp left lower anterior chest pain parasternally. She also noted palpitations at that time. Pt rested for a while and took a tablet of aspirin. About 30 min later, pt also had sharp localized chest pain higher up on the chest and some soreness on the left side of the neck. As her frontal headache was defervescing, pt experienced nausea and sweating. She got concerned and came to the ER for evaluation. Informant(s) for H&P: Patient.     REVIEW OF SYSTEMS:  Constitutional: positive for sweats, negative for chills, fevers and weight loss  Eyes: negative  Ears, nose, mouth, throat, and face: negative for ear drainage, earaches, epistaxis, hoarseness, nasal congestion, sore throat, tinnitus and vertigo  Respiratory: positive for mild chronic ERWIN without recent change, negative for cough, hemoptysis, pleurisy/chest pain, sputum, stridor, wheezing and SOB at rest  Cardiovascular: positive for chest pain and palpitations, negative for exertional chest pressure/discomfort, irregular heart beat, lower extremity edema, near-syncope, orthopnea, paroxysmal nocturnal dyspnea and syncope  Gastrointestinal: positive for nausea and sweating along with defervescence of headache and sharp left ACW pain; pt has chronic GERD sx - she continues to consume vinegar; occasional constipation; no vomiiting, diarrhea, abdominal pain  Genitourinary:negative for dysuria, hematuria and pyuria  Musculoskeletal:positive for chronic persistent lower back paraspinal pain and tenderness - pt has cooling exposure at home d/t temp in the 60s and fan use  Neurological: positive for frequent migrainous frontal headaches 2-3x per week with associated light / sound sensitivity and occasionally with n/v; no seizure in over 10 years  Behavioral/Psych: positive for depression with occasionally prominent sx; negative for anxiety per pt  Endocrine: negative  Sleep: positive for snoring and multiple awakenings d/t nocturia; also positive for frequent migrainous frontal headache 2-3x per week, with photo / Jasper Noss and occasionally with associated nausea, vomiting    Past Medical History:   Diagnosis Date    Asthma     Cerebral artery occlusion with cerebral infarction ([facility] [address]     Cervical disc herniation     Cervical spinal stenosis     Chronic back pain     Chronic kidney disease     Disorder of thyroid gland 09/11/2012    GERD (gastroesophageal reflux disease)     Headache(784.0)     Hemorrhoids     History of colon polyps     Irritable bowel syndrome     Palpitations 07/24/2012    Scabies 05/18/2016    Seizure disorder ([facility] [address]     not on medications for this; last seizure years ago    Vitamin D deficiency 12/03/2014     Past Surgical History:   Procedure Laterality Date    COLONOSCOPY  3/9/2016    COLONOSCOPY  07/10/2017    TUBAL LIGATION      UPPER GASTROINTESTINAL ENDOSCOPY  07/10/2017     Medications Prior to Admission:    Medications Prior to Admission: divalproex (DEPAKOTE) 250 MG DR tablet, Take 250 mg by mouth 2 times daily  traMADol (ULTRAM) 50 MG tablet, Take 1 tablet by mouth every 8 hours as needed for Pain for up to 30 days.   albuterol (PROVENTIL) (2.5 MG/3ML) 0.083% nebulizer solution, Take 3 mLs by nebulization every 6 hours as needed for Wheezing  Prenatal Vit-Fe Fumarate-FA (PRENATAL PLUS) 27-1 MG TABS, 1 pill daily  DULoxetine (CYMBALTA) 30 MG extended release capsule, Take 1 capsule by mouth daily  albuterol (PROVENTIL) (2.5 MG/3ML) 0.083% nebulizer solution, Take 3 mLs by nebulization every 6 hours as needed for Wheezing  albuterol sulfate HFA (PROAIR HFA) 108 (90 Base) MCG/ACT inhaler, Inhale 2 puffs into the lungs as needed for Wheezing or Shortness of Breath  aspirin 81 MG tablet, Take 81 mg by mouth daily  dicyclomine (BENTYL) 20 MG tablet, Take 20 mg by mouth every 6 hours Take am dos 07/10  Cholecalciferol (VITAMIN D3) 1000 UNITS TABS,   loratadine (CLARITIN) 10 MG tablet, Take 10 mg by mouth as needed   meloxicam (MOBIC) 7.5 MG tablet, Take 1 tablet by mouth daily  gabapentin (NEURONTIN) 100 MG capsule, Take 1 capsule by mouth 3 times daily for 30 days. Intended supply: 30 days  guaiFENesin 400 MG tablet, Take 400 mg by mouth ONE TAB EVERY SIX HOURS AS NEEDED FOR COUGH  fluticasone propionate (FLOVENT) 250 MCG/BLIST AEPB inhaler, Inhale 1 puff into the lungs 2 times daily Use am dos 07/10.  omeprazole (PRILOSEC) 20 MG delayed release capsule, Take 20 mg by mouth daily Take am dos 07/10.  sucralfate (CARAFATE) 1 GM tablet, Take 1 tablet by mouth 4 times daily  ondansetron (ZOFRAN) 4 MG tablet, Take 1 tablet by mouth every 8 hours as needed for Nausea (Patient taking differently: Take 4 mg by mouth every 8 hours as needed for Nausea Take am dos if needed)  EPINEPHrine (EPIPEN 2-ABUNDIO) 0.3 MG/0.3ML SOAJ injection, Inject into muscle for allergic reaction    Allergies:    Fish-derived products and Dye [iodides]    Social History:    reports that she quit smoking about 31 years ago. Her smoking use included cigarettes. She has a 25.00 pack-year smoking history. She has never used smokeless tobacco. She reports that she does not drink alcohol or use drugs. Cigarettes: 0.5 PPD x 10 yrs, quit long ago. . Home-maker. Takes care of her grandchildren. Family History:   family history includes Anemia in her child;  Asthma in her child and child; Diabetes in an other family member; Heart Attack in her mother; cognition normal. Cranial nerves intact with no focal deficits. Motor strength 5/5 in UEs and LEs b/l. KJs symmetric and weak. Light touch sense at the toes normal b/l. Breast: deferred  Rectal: deferred  Genitalia:  Deferred      LABS:  Recent Labs     05/04/19 2159      K 4.1      CO2 24   BUN 12   CREATININE 1.1*   GLUCOSE 119*   CALCIUM 9.2   eGFR aa >60. AG 12.    Recent Labs     05/04/19 2159   WBC 7.5   RBC 4.83   HGB 13.3   HCT 41.7   MCV 86.3   MCH 27.5   MCHC 31.9*   RDW 13.2      MPV 10.5   Diff: N 56.2, L 32, M 7.9, E 3.3, B 0.5%. Recent Labs     05/04/19 2159   GLUCOSE 119*      Ref. Range 5/4/2019 21:59 5/5/2019 02:57   Troponin Latest Ref Range: 0.00 - 0.03 ng/mL <0.01 <0.01      Ref. Range 5/4/2019 21:59   D-Dimer, Alessandro Stephen Latest Units: ng/mL        Radiology:   Xr Chest Portable  Result Date: 5/4/2019  Clinical indications: Chest pain TECHNIQUE: Single frontal projection of the chest (1 view). COMPARISON: April 10, 2018. FINDINGS: Infiltrates in the lung bases have resolved. The heart, lungs, mediastinum and regional skeleton are unremarkable. No evidence for acute cardiopulmonary process. Cta Pulmonary W Contrast  Result Date: 5/5/2019  EXAM:  CT Angiography Chest With Contrast EXAM DATE/TIME:  5/5/2019 12:00 AM CLINICAL HISTORY:  51years old, female; Chest pain; Type not specified; Additional info: R/O pe. Iodine allergy premedicated TECHNIQUE:  Imaging protocol: Axial computed tomographic angiography images of the chest with intravenous contrast using CT angiography protocol. 3D rendering: MIP reconstructed images were created and reviewed. Radiation optimization: All CT scans at this facility use at least one of these dose optimization techniques: automated exposure control; mA and/or kV adjustment per patient size (includes targeted exams where dose is matched to clinical indication); or iterative reconstruction.   Contrast material: ; Contrast volume: 70 ml; Contrast route: IV; COMPARISON:  CR CHEST 2 VIEWS AP OR PA & LAT 5/18/2017 12:11 AM FINDINGS:  Pulmonary arteries: No pulmonary emboli. Aorta: Normal. No aortic aneurysm. No aortic dissection. Lungs: Small groundglass opacity within the left lower lobe periphery (series 2, image 66), likely focus of subclinical pneumonitis. Pleural space: Normal. No pneumothorax. No pleural effusion. Heart: Normal. No cardiomegaly. No pericardial effusion. Liver: 3 cm cyst within the anterior segment right hepatic lobe. Lymph nodes: Unremarkable. No enlarged lymph nodes. Bones/joints: Unremarkable. No acute fracture. Soft tissues: Unremarkable. No pulmonary emboli. This report has been electronically signed by Blake Pina MD.      EKG 09/6714 2125 hrs: Normal sinus rhythm, 81/m. Normal ECG. When compared with ECG of 30-JAN-2017 15:15, No significant change was found. ASSESSMENT:      Principal Problem:    Chest pain  Active Problems:    Migraines without aura and without status migrainosus, not intractable    Gastroesophageal reflux disease without esophagitis    Mild intermittent asthma    Current mild episode of major depressive disorder without prior episode (HCC)    Seizure disorder (HCC)    Chronic left-sided low back pain without sciatica    Lumbar paraspinal muscle spasm    C/f of CIERA (obstructive sleep apnea)    Obesity due to excess calories    Irritable bowel syndrome without diarrhea  Resolved Problems:    * No resolved hospital problems. *      PLAN:    · F/u troponin per protocol to r/o MI.  · Cardiology consult re any further cardiac testing. · Keep npo pending Cardio eval.  · Trial of Norflex 60 mg IV q12h for chest wall and lumbar muscle spasm tenderness. · Advised to avoid cooling exposure. · Advised to d/w PCP re referral for OP sleep medicine eval / sleep testing to guide further mgmt of suspected CIERA.   · Advised pt that correction of sleep disorders such as CIERA is likely to help control frequent migraines. · GERD: Continue meds as prior - PPI and Carafate. Adv to avoid consumption of caffeine, vinegar and foods / beverages that would worsen reflux. · Asthma: Albuterol prn for bronchospasm. Continue inh steroid as prior. · Continue med as prior for depression. · IBS: Continue Bentyl. · H/o seizures: Contine Depakote as prior. · DVT prophylaxis - Lovenox. · GI bleed prophylaxis - PPI as prior. Pt was evaluated in the presence of 6S Charge Nurse Constanza Campbell 2.   Please note that over 50 minutes was spent in evaluating the patient, review of records and results, discussion with staff, etc.    Electronically signed by Natasha Chino Hospitalist [facility] at [facility]

## 2019-05-05 NOTE — ED PROVIDER NOTES
alcohol or use drugs. Family History: family history includes Anemia in her child; Asthma in her child and child; Diabetes in an other family member; Heart Attack in her mother; Kidney Disease in her father. The patients home medications have been reviewed. Allergies: Fish-derived products and Dye [iodides]      ---------------------------------------------------PHYSICAL EXAM--------------------------------------    Constitutional/General: Alert and oriented x3, well appearing, non toxic in NAD  Head: Normocephalic and atraumatic  Eyes: PERRL, EOMI  Mouth: Oropharynx clear, handling secretions, no trismus  Neck: Supple, full ROM  Pulmonary: Lungs clear to auscultation bilaterally, no wheezes, rales, or rhonchi. Not in respiratory distress  Cardiovascular:  Regular rate. Regular rhythm. No murmurs, gallops, or rubs. 2+ distal pulses  Chest: no chest wall tenderness  Abdomen: Soft. Non tender. Non distended. +BS. No rebound, guarding, or rigidity. No pulsatile masses appreciated. Musculoskeletal: Moves all extremities x 4. Warm and well perfused, no clubbing, cyanosis, or edema. Capillary refill <3 seconds  Skin: warm and dry. No rashes. Neurologic: GCS 15, CN 2-12 grossly intact, no focal deficits, symmetric strength 5/5 in the upper and lower extremities bilaterally  Psych: Normal Affect    -------------------------------------------------- RESULTS -------------------------------------------------  I have personally reviewed all laboratory and imaging results for this patient. Results are listed below.      LABS:  Results for orders placed or performed during the hospital encounter of 05/04/19   CBC Auto Differential   Result Value Ref Range    WBC 7.5 4.5 - 11.5 E9/L    RBC 4.83 3.50 - 5.50 E12/L    Hemoglobin 13.3 11.5 - 15.5 g/dL    Hematocrit 41.7 34.0 - 48.0 %    MCV 86.3 80.0 - 99.9 fL    MCH 27.5 26.0 - 35.0 pg    MCHC 31.9 (L) 32.0 - 34.5 %    RDW 13.2 11.5 - 15.0 fL    Platelets 805 933 - 450 E9/L    MPV 10.5 7.0 - 12.0 fL    Neutrophils % 56.2 43.0 - 80.0 %    Immature Granulocytes % 0.1 0.0 - 5.0 %    Lymphocytes % 32.0 20.0 - 42.0 %    Monocytes % 7.9 2.0 - 12.0 %    Eosinophils % 3.3 0.0 - 6.0 %    Basophils % 0.5 0.0 - 2.0 %    Neutrophils # 4.22 1.80 - 7.30 E9/L    Immature Granulocytes # 0.01 E9/L    Lymphocytes # 2.40 1.50 - 4.00 E9/L    Monocytes # 0.59 0.10 - 0.95 E9/L    Eosinophils # 0.25 0.05 - 0.50 E9/L    Basophils # 0.04 0.00 - 0.20 E9/L    RBC Morphology Normal    Basic Metabolic Panel   Result Value Ref Range    Sodium 141 132 - 146 mmol/L    Potassium 4.1 3.5 - 5.0 mmol/L    Chloride 105 98 - 107 mmol/L    CO2 24 22 - 29 mmol/L    Anion Gap 12 7 - 16 mmol/L    Glucose 119 (H) 74 - 99 mg/dL    BUN 12 6 - 20 mg/dL    CREATININE 1.1 (H) 0.5 - 1.0 mg/dL    GFR Non-African American >60 >=60 mL/min/1.73    GFR African American >60     Calcium 9.2 8.6 - 10.2 mg/dL   Brain Natriuretic Peptide   Result Value Ref Range    Pro-BNP 48 0 - 125 pg/mL   Troponin   Result Value Ref Range    Troponin <0.01 0.00 - 0.03 ng/mL   D-Dimer, Quantitative   Result Value Ref Range    D-Dimer, Quant 297 ng/mL DDU   EKG 12 Lead   Result Value Ref Range    Ventricular Rate 81 BPM    Atrial Rate 81 BPM    P-R Interval 128 ms    QRS Duration 88 ms    Q-T Interval 382 ms    QTc Calculation (Bazett) 443 ms    P Axis 43 degrees    R Axis 62 degrees    T Axis 44 degrees       RADIOLOGY:  Interpreted by Radiologist.  CTA PULMONARY W CONTRAST   Final Result   No pulmonary emboli. This report has been electronically signed by Samantha Bowman MD.      XR CHEST PORTABLE   Final Result      No evidence for acute cardiopulmonary process. EKG:  This EKG is signed by emergency department physician.     Rate: 81  Rhythm: Sinus  Interpretation: non-specific EKG, incomplete right bundle, non specific t wave changes  Comparison: stable as compared to patient's most recent EKG 4/10/18      ------------------------- NURSING NOTES AND VITALS REVIEWED ---------------------------   The nursing notes within the ED encounter and vital signs as below have been reviewed by myself. /63   Pulse 74   Temp 97.9 °F (36.6 °C) (Oral)   Resp 14   Ht 4' 11\" (1.499 m)   Wt 176 lb (79.8 kg)   LMP 03/27/2008   SpO2 98%   BMI 35.55 kg/m²   Oxygen Saturation Interpretation: Normal    The patients available past medical records and past encounters were reviewed. ------------------------------ ED COURSE/MEDICAL DECISION MAKING----------------------  Medications   aspirin chewable tablet 243 mg (243 mg Oral Given 5/4/19 2158)   methylPREDNISolone sodium (SOLU-MEDROL) injection 125 mg (125 mg Intravenous Given 5/5/19 0001)   diphenhydrAMINE (BENADRYL) injection 25 mg (25 mg Intravenous Given 5/5/19 0001)   iopamidol (ISOVUE-370) 76 % injection 70 mL (70 mLs Intravenous Given 5/5/19 0037)         The HEART Risk Score for Acute Coronary Syndrome  Age <46 years, 55-63, 65+  ?  1   > 2 Risk Factors for CAD?*, 1-2, 0  1   History: slight, moderate, highly suspicious  1   EKG: Normal, nonspecific repolarization changes, ST depression   1   Troponin; low, 1-3x normal  Limit, 3x+  0   Total HEART Score:  0       Predicts 6-week risk of major adverse cardiac event. Low Score (0-3 points), risk of MACE of 0.9-1.7%. Moderate Score (4-6 points), risk of MACE of 12-16.6%. High Score (7-10 points), risk of MACE of 50-65%. CAD Risk Factors:      Smoker:    No.       FHx:    Yes. High Lipids:    No.       HTN:    No.       Diabetes:    No.       Cocaine Use:    No.       Lupus:    No.       Dissection/Aneurysm Risk Factors: Aortic Disease:    no (0). Aortic Valve Disease:    no (0). FHx:    no (0). Atrial Fibrillation:    no (0). Pregnancy:    no      HTN:    no (0). Marfan's / Maribel-Danlos Syndrome:    no (0). Timmons's Syndrome:    no (0). Sympathomimetic Use:    no (0). Total:                                                              0.        ED Course as of May 05 0109   Eliot Haile May 05, 2019   9971 Discussed case with Dr Antonina Wen, he will admit patient. [MF]      ED Course User Index  [MF] Karel Chow DO           Medical Decision Making:    Patient with exertional chest pain, shortness of breath, lightheadedness, initial workup a negative the patient be admitted for further evaluation and treatment. Re-Evaluations:             Re-evaluation. Patients symptoms are improving        This patient's ED course included: multiple bedside re-evaluations, IV medications, cardiac monitoring, continuous pulse oximetry, complex medical decision making and emergency management and a personal history and physicial eaxmination    This patient has remained hemodynamically stable during their ED course. Counseling: The emergency provider has spoken with the patient and discussed todays results, in addition to providing specific details for the plan of care and counseling regarding the diagnosis and prognosis. Questions are answered at this time and they are agreeable with the plan.       --------------------------------- IMPRESSION AND DISPOSITION ---------------------------------    IMPRESSION  1. Chest pain, unspecified type        DISPOSITION  Disposition: Admit to telemetry  Patient condition is stable        NOTE: This report was transcribed using voice recognition software.  Every effort was made to ensure accuracy; however, inadvertent computerized transcription errors may be present       Karel Chow DO  Resident  05/05/19 8446

## 2019-05-05 NOTE — PROGRESS NOTES
Dr Singh Shrestha in to see patient. He will do a stress test tomorrow morning, and wants it scheduled for 0900. He will put in the orders. CM notified. 0900 and 0930 slots not available. Will notify Dr Singh Shrestha.

## 2019-05-05 NOTE — ED NOTES
Radiology Procedure Waiver   Name: Miguel Angel Wood  : 1970  MRN: [id number]    Date:  19    Time: 11:56 PM    Benefits of immediately proceeding with radiology exam(s) without pre-testing outweigh the risks or are not indicated as specified below and therefore the following is/are being waived:    [] Benefits of immediate radiology exam(s) outweigh any risk. OR    Pre-exam testing is not indicated for the following reason(s):  [x] Pregnancy test   [] Patients LMP on-time and regular. [x] Patient had Tubal Ligation or has other Contraception Device. [] Patient  is Menopausal or Premenarcheal.    [] Patient had Full or Partial Hysterectomy. [] Protocol for CT contrast allegry   [] Patient has tolerated well previously   [] Patient does not have a true allergy    [] MRI Questionnaire     [] BUN/Creatinine   [] Patient age w/no hx of renal dysfunction. [] Patient on Dialysis. [] Recent Normal Labs.   Electronically signed by Raymundo Herrera DO on 19 at 11:56 PM               Raymundo Herrera DO  Resident  [id number]

## 2019-05-06 ENCOUNTER — APPOINTMENT (OUTPATIENT)
Dept: NUCLEAR MEDICINE | Age: 49
End: 2019-05-06
Payer: MEDICAID

## 2019-05-06 ENCOUNTER — TELEPHONE (OUTPATIENT)
Dept: FAMILY MEDICINE CLINIC | Age: 49
End: 2019-05-06

## 2019-05-06 VITALS
OXYGEN SATURATION: 98 % | SYSTOLIC BLOOD PRESSURE: 111 MMHG | WEIGHT: 176 LBS | RESPIRATION RATE: 14 BRPM | HEIGHT: 59 IN | DIASTOLIC BLOOD PRESSURE: 68 MMHG | HEART RATE: 70 BPM | TEMPERATURE: 98.5 F | BODY MASS INDEX: 35.48 KG/M2

## 2019-05-06 LAB
LV EF: 86 %
LVEF MODALITY: NORMAL

## 2019-05-06 PROCEDURE — 99217 PR OBSERVATION CARE DISCHARGE MANAGEMENT: CPT | Performed by: INTERNAL MEDICINE

## 2019-05-06 PROCEDURE — 94640 AIRWAY INHALATION TREATMENT: CPT

## 2019-05-06 PROCEDURE — G0378 HOSPITAL OBSERVATION PER HR: HCPCS

## 2019-05-06 PROCEDURE — 6370000000 HC RX 637 (ALT 250 FOR IP): Performed by: INTERNAL MEDICINE

## 2019-05-06 PROCEDURE — 2580000003 HC RX 258: Performed by: INTERNAL MEDICINE

## 2019-05-06 PROCEDURE — 96372 THER/PROPH/DIAG INJ SC/IM: CPT

## 2019-05-06 PROCEDURE — 78452 HT MUSCLE IMAGE SPECT MULT: CPT

## 2019-05-06 PROCEDURE — APPSS45 APP SPLIT SHARED TIME 31-45 MINUTES: Performed by: PHYSICIAN ASSISTANT

## 2019-05-06 PROCEDURE — 3430000000 HC RX DIAGNOSTIC RADIOPHARMACEUTICAL: Performed by: RADIOLOGY

## 2019-05-06 PROCEDURE — 6360000002 HC RX W HCPCS: Performed by: INTERNAL MEDICINE

## 2019-05-06 PROCEDURE — 96376 TX/PRO/DX INJ SAME DRUG ADON: CPT

## 2019-05-06 PROCEDURE — 93017 CV STRESS TEST TRACING ONLY: CPT

## 2019-05-06 PROCEDURE — A9500 TC99M SESTAMIBI: HCPCS | Performed by: RADIOLOGY

## 2019-05-06 RX ORDER — DIVALPROEX SODIUM 250 MG/1
250 TABLET, DELAYED RELEASE ORAL 2 TIMES DAILY
Qty: 60 TABLET | Refills: 2 | Status: SHIPPED | OUTPATIENT
Start: 2019-05-06 | End: 2019-05-23 | Stop reason: SDUPTHER

## 2019-05-06 RX ADMIN — Medication 10 ML: at 11:48

## 2019-05-06 RX ADMIN — DICYCLOMINE HYDROCHLORIDE 20 MG: 10 CAPSULE ORAL at 11:48

## 2019-05-06 RX ADMIN — MELOXICAM 7.5 MG: 7.5 TABLET ORAL at 11:48

## 2019-05-06 RX ADMIN — Medication 30 MILLICURIE: at 10:51

## 2019-05-06 RX ADMIN — FLUTICASONE PROPIONATE 2 PUFF: 110 AEROSOL, METERED RESPIRATORY (INHALATION) at 07:57

## 2019-05-06 RX ADMIN — PANTOPRAZOLE SODIUM 40 MG: 40 TABLET, DELAYED RELEASE ORAL at 05:55

## 2019-05-06 RX ADMIN — SUCRALFATE 1 G: 1 TABLET ORAL at 11:48

## 2019-05-06 RX ADMIN — DIVALPROEX SODIUM 250 MG: 250 TABLET, DELAYED RELEASE ORAL at 11:48

## 2019-05-06 RX ADMIN — ORPHENADRINE CITRATE 60 MG: 30 INJECTION INTRAMUSCULAR; INTRAVENOUS at 05:55

## 2019-05-06 RX ADMIN — DICYCLOMINE HYDROCHLORIDE 20 MG: 10 CAPSULE ORAL at 05:55

## 2019-05-06 RX ADMIN — ENOXAPARIN SODIUM 40 MG: 40 INJECTION SUBCUTANEOUS at 11:47

## 2019-05-06 RX ADMIN — Medication 1 TABLET: at 11:48

## 2019-05-06 RX ADMIN — DULOXETINE HYDROCHLORIDE 30 MG: 30 CAPSULE, DELAYED RELEASE ORAL at 11:47

## 2019-05-06 RX ADMIN — Medication 10 MILLICURIE: at 09:20

## 2019-05-06 RX ADMIN — ASPIRIN 81 MG: 81 TABLET, COATED ORAL at 11:48

## 2019-05-06 ASSESSMENT — PAIN SCALES - GENERAL
PAINLEVEL_OUTOF10: 0
PAINLEVEL_OUTOF10: 0

## 2019-05-06 NOTE — PROGRESS NOTES
PROGRESS NOTE       PATIENT PROBLEM LIST:  Principal Problem:    Chest pain  Active Problems:    Migraines without aura and without status migrainosus, not intractable    Seizure disorder (HCC)    Mild intermittent asthma    Gastroesophageal reflux disease without esophagitis    Current mild episode of major depressive disorder without prior episode (HCC)    Chronic left-sided low back pain without sciatica    Lumbar paraspinal muscle spasm    C/f of CIERA (obstructive sleep apnea)    Obesity due to excess calories    Irritable bowel syndrome without diarrhea  Resolved Problems:    * No resolved hospital problems. *      SUBJECTIVE:  Miguel Angel Wood states she feels somewhat better today and denies any lightheadedness, shortness of breath nor chest discomfort or palpitations. Nuclear stress test completed without incident and without arrhythmia. REVIEW OF SYSTEMS:  General ROS: positive for - fatigue. Psychological ROS: positive for - anxiety . Ophthalmic ROS: positive for - decreased vision and utilizes corrective lenses for visual acuity. ENT ROS: negative  Allergy and Immunology ROS: negative  Hematological and Lymphatic ROS: negative  Endocrine: no heat or cold intolerance and no polyphagia, polydipsia, or polyuria  Respiratory ROS: positive for - exertional shortness of breath  Cardiovascular ROS: positive for - chest pain, dyspnea on exertion, irregular heartbeat and shortness of breath-resolved. Gastrointestinal ROS: no abdominal pain, change in bowel habits, or black or bloody stools  Genito-Urinary ROS: no nocturia, dysuria, trouble voiding, frequency or hematuria  Musculoskeletal ROS: negative for- myalgias, arthralgias, or claudication  Neurological ROS: no TIA or stroke symptoms otherwise no significant change in symptoms or problems since yesterday as documented in previous progress notes.     SCHEDULED MEDICATIONS:   aspirin  81 mg Oral Daily    dicyclomine  20 mg Oral Q6H    DULoxetine  30 mg Oral Daily    meloxicam  7.5 mg Oral Daily    pantoprazole  40 mg Oral QAM AC    prenatal vitamin  1 tablet Oral Daily    sucralfate  1 g Oral 4x Daily    orphenadrine  60 mg Intravenous Q12H    sodium chloride flush  10 mL Intravenous 2 times per day    enoxaparin  40 mg Subcutaneous Daily    divalproex  250 mg Oral BID    fluticasone  2 puff Inhalation BID       VITAL SIGNS:                                                                                                                          /68   Pulse 70   Temp 98.5 °F (36.9 °C) (Oral)   Resp 14   Ht 4' 11\" (1.499 m)   Wt 176 lb (79.8 kg)   LMP 03/27/2008   SpO2 98%   BMI 35.55 kg/m²   Patient Vitals for the past 96 hrs (Last 3 readings):   Weight   05/04/19 2128 176 lb (79.8 kg)     OBJECTIVE:    HEENT: PERRL, EOM  Intact; sclera non-icteric, conjunctiva pink. Carotids are brisk in upstroke with normal contour. No carotid bruits. Normal jugular venous pulsation at 45°. No palpable cervical nor supraclavicular nodes. Thyroid not palpable. Trachea midline. Chest: Even excursion  Lungs: CTA B, no expiratory wheezes or rhonchi, no decreased tactile fremitus without inspiratory rales. Heart: Regular  rhythm; S1 > S2, no gallop or murmur. No clicks, rub, palpable thrills   or heaves. PMI nondisplaced, 5th intercostal space MCL. Abdomen: Soft, nontender, nondistended,  moderately protuberant, no masses or organomegaly. Bowel sounds active. Extremities: Without clubbing, cyanosis or edema. Pulses present 3+ upper extermities bilaterally; present 2+ DP and present 2+ PT bilaterally.      Data:   Scheduled Meds: Reviewed  Continuous Infusions:     Intake/Output Summary (Last 24 hours) at 5/6/2019 1337  Last data filed at 5/6/2019 0805  Gross per 24 hour   Intake 0 ml   Output --   Net 0 ml     CBC:   Recent Labs     05/04/19 2159   WBC 7.5   HGB 13.3   HCT 41.7        BMP:  Recent Labs     05/04/19 2159      K 4.1      CO2 24 BUN 12   CREATININE 1.1*   LABGLOM >60     ABGs:   Lab Results   Component Value Date    PH 7.333 05/05/2017    PO2 119.7 05/05/2017    PCO2 30.5 05/05/2017     INR: No results for input(s): INR in the last 72 hours. PRO-BNP:   Lab Results   Component Value Date    PROBNP 48 05/04/2019    PROBNP 60 06/22/2016      TSH:   Lab Results   Component Value Date    TSH 1.200 09/07/2018      Cardiac Injury Profile:   Recent Labs     05/04/19  2159 05/05/19  0257 05/05/19  0617   TROPONINI <0.01 <0.01 <0.01      Lipid Profile:   Lab Results   Component Value Date    TRIG 82 04/11/2018    HDL 42 04/11/2018    LDLCALC 77 04/11/2018    CHOL 135 04/11/2018      Hemoglobin A1C: No components found for: HGBA1C     RAD:   Xr Chest Portable    Result Date: 5/4/2019  Clinical indications: Chest pain TECHNIQUE: Single frontal projection of the chest (1 view). COMPARISON: April 10, 2018. FINDINGS: Infiltrates in the lung bases have resolved. The heart, lungs, mediastinum and regional skeleton are unremarkable. No evidence for acute cardiopulmonary process. Cta Pulmonary W Contrast    Result Date: 5/5/2019  EXAM:  CT Angiography Chest With Contrast EXAM DATE/TIME:  5/5/2019 12:00 AM CLINICAL HISTORY:  51years old, female; Chest pain; Type not specified; Additional info: R/O pe. Iodine allergy premedicated TECHNIQUE:  Imaging protocol: Axial computed tomographic angiography images of the chest with intravenous contrast using CT angiography protocol. 3D rendering: MIP reconstructed images were created and reviewed. Radiation optimization: All CT scans at this facility use at least one of these dose optimization techniques: automated exposure control; mA and/or kV adjustment per patient size (includes targeted exams where dose is matched to clinical indication); or iterative reconstruction.   Contrast material: ; Contrast volume: 70 ml; Contrast route: IV; COMPARISON:  CR CHEST 2 VIEWS AP OR PA & LAT 5/18/2017 12:11 AM FINDINGS:  Pulmonary arteries: No pulmonary emboli. Aorta: Normal. No aortic aneurysm. No aortic dissection. Lungs: Small groundglass opacity within the left lower lobe periphery (series 2, image 66), likely focus of subclinical pneumonitis. Pleural space: Normal. No pneumothorax. No pleural effusion. Heart: Normal. No cardiomegaly. No pericardial effusion. Liver: 3 cm cyst within the anterior segment right hepatic lobe. Lymph nodes: Unremarkable. No enlarged lymph nodes. Bones/joints: Unremarkable. No acute fracture. Soft tissues: Unremarkable. No pulmonary emboli. This report has been electronically signed by Sharif Castillo MD.    Nm Myocardial Spect Rest Exercise Or Rx    Result Date: 2019  Patient MRN:  [id number] : 1970 Age: 51 years Gender: Female Order Date:  2019 6:30 AM EXAM: NM MYOCARDIAL SPECT REST EXERCISE OR RX COMPARISON: 2016 INDICATION:  Chest pain Reason for Exam?->Chest pain Procedure Type->Rx TECHNIQUE: 30.9 mCi of technetium 99m sestamibi was intravenously injected. The patient reached a heart rate of 151 which 88 % of target heart rate. Previously a resting SPECT myocardial perfusion study had been acquired following the intravenous injection of 9.3 mCi of Tc-sestamibi. FINDINGS: There is no abnormal perfusion defects involving the left ventricular myocardium on both treadmill stress or rest SPECT myocardial perfusion images. Gated study shows normal left ventricular wall motion and myocardial thickening during systole. Resting left ventricular ejection fraction is calculated at 86%. Normal examination without evidence of treadmill stress induced left ventricular myocardial ischemia.        EKG: See Report  Echo: See Report      IMPRESSIONS:  Principal Problem:    Chest pain  Active Problems:    Migraines without aura and without status migrainosus, not intractable    Seizure disorder (HCC)    Mild intermittent asthma    Gastroesophageal reflux disease without esophagitis    Current mild episode of major depressive disorder without prior episode (HCC)    Chronic left-sided low back pain without sciatica    Lumbar paraspinal muscle spasm    C/f of CIERA (obstructive sleep apnea)    Obesity due to excess calories    Irritable bowel syndrome without diarrhea  Resolved Problems:    * No resolved hospital problems. *      RECOMMENDATIONS:  As nuclear stress test does not demonstrate any evidence of ischemia nor does she demonstrate evidence of stress-induced arrhythmia nor evidence for vasodepressor syncope I would recommend that she be discharged and follow-up as an outpatient with her primary physician Dr. Good Ferreira. I will see her in follow-up as well upon his request.    I have spent more than 26 minutes face to face with Santos Moise and reviewing notes and laboratory data, with greater than 50% of this time instructing and counseling the patient face to face regarding my findings and recommendations and I have answered all questions as posed to me by Ms. Demian Hamm. Kathleen Wilcox, DO FACP,FACC,FSCAI      NOTE:  This report was transcribed using voice recognition software.   Every effort was made to ensure accuracy; however, inadvertent computerized transcription errors may be present

## 2019-05-06 NOTE — DISCHARGE SUMMARY
[facility] EMERGENCY SERVICE Physician Discharge Summary       Miguelina Barakat DO  [id number] [phone]    Call in 1 week      [facility]camilleClaudio [address] [place]  JACOBChristian anscasey [address]  [phone]    Call  As needed for heart issues      Activity level: As tolerated     Diet: DIET CARDIAC; Dispo:Home    Condition at discharge: Stable    Patient ID:  Kp Vail  [id number]  51 y.o.  1970    Admit date: 5/4/2019    Discharge date and time:  5/6/2019  2:20 PM    Admission Diagnoses: Principal Problem:    Chest pain  Active Problems:    Migraines without aura and without status migrainosus, not intractable    Seizure disorder (HCC)    Mild intermittent asthma    Gastroesophageal reflux disease without esophagitis    Current mild episode of major depressive disorder without prior episode (HCC)    Chronic left-sided low back pain without sciatica    Lumbar paraspinal muscle spasm    C/f of CIERA (obstructive sleep apnea)    Obesity due to excess calories    Irritable bowel syndrome without diarrhea  Resolved Problems:    * No resolved hospital problems. *      Discharge Diagnoses: Principal Problem:    Chest pain  Active Problems:    Migraines without aura and without status migrainosus, not intractable    Seizure disorder (HCC)    Mild intermittent asthma    Gastroesophageal reflux disease without esophagitis    Current mild episode of major depressive disorder without prior episode (HCC)    Chronic left-sided low back pain without sciatica    Lumbar paraspinal muscle spasm    C/f of CIERA (obstructive sleep apnea)    Obesity due to excess calories    Irritable bowel syndrome without diarrhea  Resolved Problems:    * No resolved hospital problems. *      Consults:  IP CONSULT TO CARDIOLOGY    Procedures:   Stress Test 5-6-19    Hospital Course: Patient was admitted with Chest pain [R07.9].  Patient is a 50YOF with a PMHX of asthma, GERD and frequent migrainous frontal headaches, depression and chronic back pain who presented to the ED on 5-4-19 with chest pain. This chest pain was also associated with HA, nausea and diaphoresis. She did have an elevated D. Dimer and CTA was done without evidence of PE. Pt was admitted and cardiology was consulted. Her trop have been negative x 3, she has remained chest pain free since admission. She had a stress test this AM which revealed no treadmill induced ischemia or perfusion defects. Cardiology has cleared her for discharge and states that she may follow up with them as needed. Pt will be discharged home. Discharge Exam:  Vitals:    05/05/19 0947 05/05/19 1558 05/05/19 2111 05/06/19 0805   BP: 116/87 103/64 111/61 111/68   Pulse:  73 83 70   Resp:  18 14 14   Temp:  99.4 °F (37.4 °C) 98.9 °F (37.2 °C) 98.5 °F (36.9 °C)   TempSrc:  Oral Oral Oral   SpO2:  96% 97% 98%   Weight:       Height:           General Appearance: alert and oriented to person, place and time, well-developed and well-nourished, in no acute distress  Skin: warm and dry, no rash or erythema  Head: normocephalic and atraumatic  Eyes: extraocular eye movements intact and conjunctivae normal  Pulmonary/Chest: clear to auscultation bilaterally- no wheezes, rales or rhonchi, normal air movement, no respiratory distress  Cardiovascular: normal rate, regular rhythm, normal S1 and S2, no murmurs, no gallops and no JVD  Abdomen: soft, non-tender, non-distended, normal bowel sounds, no masses or organomegaly  Extremities: no cyanosis, no clubbing and no edema  No intake/output data recorded. No intake/output data recorded.       LABS:  Recent Labs     05/04/19  2159      K 4.1      CO2 24   BUN 12   CREATININE 1.1*   GLUCOSE 119*   CALCIUM 9.2       Recent Labs     05/04/19 2159   WBC 7.5   RBC 4.83   HGB 13.3   HCT 41.7   MCV 86.3   MCH 27.5   MCHC 31.9*   RDW 13.2      MPV 10.5       No results for input(s): POCGLU in the last 72 hours. Imaging:   NM MYOCARDIAL SPECT REST EXERCISE OR RX   Final Result      Normal examination without evidence of treadmill stress induced left   ventricular myocardial ischemia. CTA PULMONARY W CONTRAST   Final Result   No pulmonary emboli. This report has been electronically signed by Verona Sneed MD.      XR CHEST PORTABLE   Final Result      No evidence for acute cardiopulmonary process. Patient Instructions:      Medication List      CHANGE how you take these medications    * albuterol (2.5 MG/3ML) 0.083% nebulizer solution  Commonly known as:  PROVENTIL  Take 3 mLs by nebulization every 6 hours as needed for Wheezing  What changed:  Another medication with the same name was removed. Continue taking this medication, and follow the directions you see here. * albuterol sulfate  (90 Base) MCG/ACT inhaler  Commonly known as:  PROAIR HFA  Inhale 2 puffs into the lungs as needed for Wheezing or Shortness of Breath  What changed:  Another medication with the same name was removed. Continue taking this medication, and follow the directions you see here. ondansetron 4 MG tablet  Commonly known as:  ZOFRAN  Take 1 tablet by mouth every 8 hours as needed for Nausea  What changed:  additional instructions         * This list has 2 medication(s) that are the same as other medications prescribed for you. Read the directions carefully, and ask your doctor or other care provider to review them with you.             CONTINUE taking these medications    aspirin 81 MG tablet     CLARITIN 10 MG tablet  Generic drug:  loratadine     dicyclomine 20 MG tablet  Commonly known as:  BENTYL     divalproex 250 MG DR tablet  Commonly known as:  DEPAKOTE     DULoxetine 30 MG extended release capsule  Commonly known as:  CYMBALTA  Take 1 capsule by mouth daily     EPINEPHrine 0.3 MG/0.3ML Soaj injection  Commonly known as:  EPIPEN 2-ABUNDIO  Inject into muscle for allergic reaction fluticasone propionate 250 MCG/BLIST Aepb inhaler  Commonly known as:  FLOVENT     gabapentin 100 MG capsule  Commonly known as:  NEURONTIN  Take 1 capsule by mouth 3 times daily for 30 days. Intended supply: 30 days     guaiFENesin 400 MG tablet     meloxicam 7.5 MG tablet  Commonly known as:  MOBIC  Take 1 tablet by mouth daily     omeprazole 20 MG delayed release capsule  Commonly known as:  PRILOSEC     PRENATAL PLUS 27-1 MG Tabs  1 pill daily     sucralfate 1 GM tablet  Commonly known as:  CARAFATE  Take 1 tablet by mouth 4 times daily     traMADol 50 MG tablet  Commonly known as:  ULTRAM  Take 1 tablet by mouth every 8 hours as needed for Pain for up to 30 days. vitamin D3 1000 units Tabs              Note that more than 30 minutes was spent in preparing discharge papers, discussing discharge with patient, medication review, etc.    Signed:  Electronically signed by Tarun Saldana PA-C on 5/6/2019 at 2:20 PM    NOTE: This report was transcribed using voice recognition software. Every effort was made to ensure accuracy; however, inadvertent computerized transcription errors may be present.

## 2019-05-06 NOTE — CONSULTS
CARDIOLOGY CONSULTATION    Patient Name:  Rex David    :  1970    Reason for Consultation:   Chest discomfort; cardiac arrhythmia    History of Present Illness:   Rex David presents to [facility], following a history of sudden onset of retrosternal discomfort associated with the onset of a fast heartbeat and pounding in her chest yesterday while watching her grandchildren. Her symptoms lasted for several minutes he came concerned and came to the emergency room for further evaluation at this time. She has no previous history of documented cardiac arrhythmia nor myocardial infarction. She denies any change in recent medications. She does readily admit that she is under somewhat of increased stress and that she helps care for her grandchildren. Past Medical History:   has a past medical history of Asthma, Cerebral artery occlusion with cerebral infarction [facility]), Cervical disc herniation, Cervical spinal stenosis, Chronic back pain, Chronic kidney disease, Depression, Disorder of thyroid gland, GERD (gastroesophageal reflux disease), Headache(784.0), Hemorrhoids, History of colon polyps, Irritable bowel syndrome, Palpitations, Scabies, Seizure disorder ([address] and Vitamin D deficiency. Surgical History:   has a past surgical history that includes Tubal ligation; Colonoscopy (3/9/2016); Colonoscopy (07/10/2017); and Upper gastrointestinal endoscopy (07/10/2017). Social History:   reports that she quit smoking about 31 years ago. Her smoking use included cigarettes. She has a 25.00 pack-year smoking history. She has never used smokeless tobacco. She reports that she does not drink alcohol or use drugs. Family History:  family history includes Anemia in her child; Asthma in her child and child; Diabetes in an other family member; Heart Attack in her mother; Kidney Disease in her father.      Medications:  Prior to Admission medications    Medication Sig Start Date End Date Taking? Authorizing Provider   divalproex (DEPAKOTE) 250 MG DR tablet Take 250 mg by mouth 2 times daily   Yes Historical Provider, MD   traMADol (ULTRAM) 50 MG tablet Take 1 tablet by mouth every 8 hours as needed for Pain for up to 30 days. 4/23/19 5/23/19 Yes Abdi Grijalva, DO   albuterol (PROVENTIL) (2.5 MG/3ML) 0.083% nebulizer solution Take 3 mLs by nebulization every 6 hours as needed for Wheezing 3/8/19  Yes Abdi Mensah, DO   Prenatal Vit-Fe Fumarate-FA (PRENATAL PLUS) 27-1 MG TABS 1 pill daily 3/8/19  Yes Abdi Grijalva, DO   DULoxetine (CYMBALTA) 30 MG extended release capsule Take 1 capsule by mouth daily 3/8/19  Yes Abdi Grijalva, DO   albuterol (PROVENTIL) (2.5 MG/3ML) 0.083% nebulizer solution Take 3 mLs by nebulization every 6 hours as needed for Wheezing 3/8/19  Yes Abdi Grijalva, DO   albuterol sulfate HFA (PROAIR HFA) 108 (90 Base) MCG/ACT inhaler Inhale 2 puffs into the lungs as needed for Wheezing or Shortness of Breath 3/8/19  Yes Abdi Grijalva, DO   aspirin 81 MG tablet Take 81 mg by mouth daily   Yes Historical Provider, MD   dicyclomine (BENTYL) 20 MG tablet Take 20 mg by mouth every 6 hours Take am dos 07/10   Yes Historical Provider, MD   Cholecalciferol (VITAMIN D3) 1000 UNITS TABS  2/7/17  Yes Historical Provider, MD   loratadine (CLARITIN) 10 MG tablet Take 10 mg by mouth as needed    Yes Historical Provider, MD   meloxicam (MOBIC) 7.5 MG tablet Take 1 tablet by mouth daily 3/8/19   Zainab Jauregui,    gabapentin (NEURONTIN) 100 MG capsule Take 1 capsule by mouth 3 times daily for 30 days. Intended supply: 30 days 3/8/19 4/7/19  Abdi Grijalva DO   guaiFENesin 400 MG tablet Take 400 mg by mouth ONE TAB EVERY SIX HOURS AS NEEDED FOR COUGH    Historical Provider, MD   fluticasone propionate (FLOVENT) 250 MCG/BLIST AEPB inhaler Inhale 1 puff into the lungs 2 times daily Use am dos 07/10.     Historical Provider, MD   omeprazole (PRILOSEC) 20 MG delayed release capsule Take 20 mg by mouth daily Take am dos 07/10. Historical Provider, MD   sucralfate (CARAFATE) 1 GM tablet Take 1 tablet by mouth 4 times daily 5/18/17   Liz Osorio MD   ondansetron (ZOFRAN) 4 MG tablet Take 1 tablet by mouth every 8 hours as needed for Nausea  Patient taking differently: Take 4 mg by mouth every 8 hours as needed for Nausea Take am dos if needed 5/18/17   Liz Osorio MD   EPINEPHrine (EPIPEN 2-ABUNDIO) 0.3 MG/0.3ML SOAJ injection Inject into muscle for allergic reaction 5/8/17   Sarah Sánchez MD       Allergies:  Fish-derived products and Dye [iodides]     Review of Systems:   · Constitutional: there has been no unanticipated weight loss. There's been a significant decrease in energy level, and activity level. No fever chills or rigors. · Eyes: No visual changes or diplopia. No scleral icterus. · ENT: No Headaches, hearing loss or vertigo. No mouth sores or sore throat. No change in taste or smell. · Cardiovascular: Had chest discomfort, dyspnea on exertion, no significant palpitations, loss of consciousness, no phlebitis, no claudication. · Respiratory: No cough or wheezing, no sputum production. No hemoptysis, pleuritic pain. · Gastrointestinal: No abdominal pain, appetite loss, blood in stools. No change in bowel habits. No hematemesis  · Genitourinary: No dysuria, trouble voiding or hematuria. No nocturia or increased frequency. · Musculoskeletal:  No gait disturbance, weakness or joint complaints. · Integumentary: No rash or pruritis. · Neurological: No headache, diplopia, change in muscle strength, numbness or tingling. No change in gait, balance, coordination, mood, affect, memory, mentation, behavior. · Psychiatric: No anxiety or depression. · Endocrine: No temperature intolerance. No excessive thirst, fluid intake, or urination. No tremor. · Hematologic/Lymphatic: No abnormal bruising or bleeding, blood clots or swollen lymph nodes.   · Allergic/Immunologic: No nasal 05/04/19  2159   WBC 7.5   HGB 13.3        BMP:  Recent Labs     05/04/19  2159      K 4.1      CO2 24   BUN 12   CREATININE 1.1*   GLUCOSE 119*   LABGLOM >60     ABGs:   Lab Results   Component Value Date    PH 7.333 05/05/2017    PO2 119.7 05/05/2017    PCO2 30.5 05/05/2017     INR: No results for input(s): INR in the last 72 hours. PRO-BNP:   Lab Results   Component Value Date    PROBNP 48 05/04/2019    PROBNP 60 06/22/2016      Cardiac Injury Profile:   Recent Labs     05/05/19  0257 05/05/19  0617   TROPONINI <0.01 <0.01      Lipid Profile:   Lab Results   Component Value Date    TRIG 82 04/11/2018    HDL 42 04/11/2018    LDLCALC 77 04/11/2018    CHOL 135 04/11/2018      Hemoglobin A1C: No components found for: HGBA1C   ECG:  See report    Radiology:  Xr Chest Portable    Result Date: 5/4/2019  Clinical indications: Chest pain TECHNIQUE: Single frontal projection of the chest (1 view). COMPARISON: April 10, 2018. FINDINGS: Infiltrates in the lung bases have resolved. The heart, lungs, mediastinum and regional skeleton are unremarkable. No evidence for acute cardiopulmonary process. Cta Pulmonary W Contrast    Result Date: 5/5/2019  EXAM:  CT Angiography Chest With Contrast EXAM DATE/TIME:  5/5/2019 12:00 AM CLINICAL HISTORY:  51years old, female; Chest pain; Type not specified; Additional info: R/O pe. Iodine allergy premedicated TECHNIQUE:  Imaging protocol: Axial computed tomographic angiography images of the chest with intravenous contrast using CT angiography protocol. 3D rendering: MIP reconstructed images were created and reviewed. Radiation optimization: All CT scans at this facility use at least one of these dose optimization techniques: automated exposure control; mA and/or kV adjustment per patient size (includes targeted exams where dose is matched to clinical indication); or iterative reconstruction.   Contrast material: ; Contrast volume: 70 ml; Contrast route: IV; COMPARISON:  CR CHEST 2 VIEWS AP OR PA & LAT 5/18/2017 12:11 AM FINDINGS:  Pulmonary arteries: No pulmonary emboli. Aorta: Normal. No aortic aneurysm. No aortic dissection. Lungs: Small groundglass opacity within the left lower lobe periphery (series 2, image 66), likely focus of subclinical pneumonitis. Pleural space: Normal. No pneumothorax. No pleural effusion. Heart: Normal. No cardiomegaly. No pericardial effusion. Liver: 3 cm cyst within the anterior segment right hepatic lobe. Lymph nodes: Unremarkable. No enlarged lymph nodes. Bones/joints: Unremarkable. No acute fracture. Soft tissues: Unremarkable. No pulmonary emboli. This report has been electronically signed by Cirilo Perkins MD.      Assessment:    Principal Problem:    Chest pain  Active Problems:    Migraines without aura and without status migrainosus, not intractable    Seizure disorder (HCC)    Mild intermittent asthma    Gastroesophageal reflux disease without esophagitis    Current mild episode of major depressive disorder without prior episode (HCC)    Chronic left-sided low back pain without sciatica    Lumbar paraspinal muscle spasm    C/f of ICERA (obstructive sleep apnea)    Obesity due to excess calories    Irritable bowel syndrome without diarrhea  Resolved Problems:    * No resolved hospital problems. *      Plan: At this time would appear that indeed Mrs. Sharad Molina may have underlying angina pectoris associated with cardiac tachycardia in that her monitor demonstrates sinus tachycardia with rates as high as 135/m. Fortunately there is no evidence of ventricular arrhythmia nor atrial fibrillation or supraventricular tachycardia documented. Nonetheless associated with her tachycardia was anginal like symptoms and for this reason she will undergo a nuclear stress test in the morning as I was not consulted until late morning due to a mismatch in physician consultation.     I have spent more than 45 minutes face to face with Rosi Welsh Akash,and reviewing notes and laboratory data with greater than 50% of this time instructing and counseling the patient and her  regarding my findings and recommendations and I have answered all questions as posed to me by Ms. Sharad Molina and her  who is at her bedside. . Thank you, Nena Pettit DO for allowing me to consult in the care of this patient. David Sosa DO, FACP, FACC, [facility]    NOTE:  This report was transcribed using voice recognition software. Every effort was made to ensure accuracy; however, inadvertent computerized transcription errors may be present.

## 2019-05-06 NOTE — PROGRESS NOTES
[facility] Quality Flow/Interdisciplinary Rounds Progress Note        Quality Flow Rounds held on May 6, 2019    Disciplines Attending:  Bedside Nurse, ,  and Nursing Unit Leadership    Kristy Terry was admitted on 5/4/2019  9:21 PM    Anticipated Discharge Date:  Expected Discharge Date: 05/06/19    Disposition:    Raul Score:  Raul Scale Score: 23    Readmission Risk              Risk of Unplanned Readmission:        8           Discussed patient goal for the day, patient clinical progression, and barriers to discharge.   The following Goal(s) of the Day/Commitment(s) have been identified:  Discharge if stress negative      Eliseo Jd  May 6, 2019

## 2019-05-23 DIAGNOSIS — M50.30 DDD (DEGENERATIVE DISC DISEASE), CERVICAL: Chronic | ICD-10-CM

## 2019-05-23 DIAGNOSIS — M48.02 CERVICAL STENOSIS OF SPINAL CANAL: ICD-10-CM

## 2019-05-23 RX ORDER — TRAMADOL HYDROCHLORIDE 50 MG/1
50 TABLET ORAL EVERY 8 HOURS PRN
Qty: 90 TABLET | Refills: 0 | Status: SHIPPED | OUTPATIENT
Start: 2019-05-23 | End: 2019-06-21 | Stop reason: SDUPTHER

## 2019-05-23 RX ORDER — VITAMIN A ACETATE, BETA CAROTENE, ASCORBIC ACID, CHOLECALCIFEROL, .ALPHA.-TOCOPHEROL ACETATE, DL-, THIAMINE MONONITRATE, RIBOFLAVIN, NIACINAMIDE, PYRIDOXINE HYDROCHLORIDE, FOLIC ACID, CYANOCOBALAMIN, CALCIUM CARBONATE, FERROUS FUMARATE, ZINC OXIDE, CUPRIC OXIDE 3080; 12; 120; 400; 1; 1.84; 3; 20; 22; 920; 25; 200; 27; 10; 2 [IU]/1; UG/1; MG/1; [IU]/1; MG/1; MG/1; MG/1; MG/1; MG/1; [IU]/1; MG/1; MG/1; MG/1; MG/1; MG/1
TABLET, FILM COATED ORAL
Qty: 30 TABLET | Refills: 5 | Status: SHIPPED | OUTPATIENT
Start: 2019-05-23 | End: 2019-10-17 | Stop reason: SDUPTHER

## 2019-05-23 RX ORDER — DIVALPROEX SODIUM 250 MG/1
250 TABLET, DELAYED RELEASE ORAL 2 TIMES DAILY
Qty: 60 TABLET | Refills: 2 | Status: SHIPPED | OUTPATIENT
Start: 2019-05-23 | End: 2019-06-21 | Stop reason: ALTCHOICE

## 2019-06-21 ENCOUNTER — OFFICE VISIT (OUTPATIENT)
Dept: FAMILY MEDICINE CLINIC | Age: 49
End: 2019-06-21
Payer: MEDICAID

## 2019-06-21 VITALS
DIASTOLIC BLOOD PRESSURE: 70 MMHG | HEIGHT: 59 IN | BODY MASS INDEX: 35.48 KG/M2 | HEART RATE: 81 BPM | RESPIRATION RATE: 16 BRPM | OXYGEN SATURATION: 95 % | SYSTOLIC BLOOD PRESSURE: 124 MMHG | WEIGHT: 176 LBS

## 2019-06-21 DIAGNOSIS — K21.9 GASTROESOPHAGEAL REFLUX DISEASE WITHOUT ESOPHAGITIS: Primary | Chronic | ICD-10-CM

## 2019-06-21 DIAGNOSIS — M79.7 FIBROMYALGIA: ICD-10-CM

## 2019-06-21 DIAGNOSIS — B07.9 VIRAL WART ON FINGER: ICD-10-CM

## 2019-06-21 DIAGNOSIS — R07.9 CHEST PAIN, UNSPECIFIED TYPE: ICD-10-CM

## 2019-06-21 DIAGNOSIS — R53.83 FATIGUE, UNSPECIFIED TYPE: ICD-10-CM

## 2019-06-21 DIAGNOSIS — M48.02 CERVICAL STENOSIS OF SPINAL CANAL: ICD-10-CM

## 2019-06-21 DIAGNOSIS — M50.30 DDD (DEGENERATIVE DISC DISEASE), CERVICAL: Chronic | ICD-10-CM

## 2019-06-21 PROCEDURE — G8417 CALC BMI ABV UP PARAM F/U: HCPCS | Performed by: FAMILY MEDICINE

## 2019-06-21 PROCEDURE — 99214 OFFICE O/P EST MOD 30 MIN: CPT | Performed by: FAMILY MEDICINE

## 2019-06-21 PROCEDURE — 1036F TOBACCO NON-USER: CPT | Performed by: FAMILY MEDICINE

## 2019-06-21 PROCEDURE — G8427 DOCREV CUR MEDS BY ELIG CLIN: HCPCS | Performed by: FAMILY MEDICINE

## 2019-06-21 RX ORDER — OMEPRAZOLE 20 MG/1
20 CAPSULE, DELAYED RELEASE ORAL DAILY
Qty: 30 CAPSULE | Refills: 5 | Status: SHIPPED | OUTPATIENT
Start: 2019-06-21 | End: 2021-01-21 | Stop reason: SDUPTHER

## 2019-06-21 RX ORDER — TRAMADOL HYDROCHLORIDE 50 MG/1
50 TABLET ORAL EVERY 8 HOURS PRN
Qty: 90 TABLET | Refills: 0 | Status: SHIPPED | OUTPATIENT
Start: 2019-06-21 | End: 2019-07-18 | Stop reason: SDUPTHER

## 2019-06-21 RX ORDER — DULOXETIN HYDROCHLORIDE 20 MG/1
20 CAPSULE, DELAYED RELEASE ORAL DAILY
Qty: 30 CAPSULE | Refills: 3 | Status: SHIPPED | OUTPATIENT
Start: 2019-06-21 | End: 2019-10-17 | Stop reason: SDUPTHER

## 2019-06-21 ASSESSMENT — ENCOUNTER SYMPTOMS
EYE PAIN: 0
CONSTIPATION: 0
NAUSEA: 0
DIARRHEA: 0
VOMITING: 0
BLOOD IN STOOL: 0
CHEST TIGHTNESS: 0
ABDOMINAL PAIN: 0
BACK PAIN: 1
EYE ITCHING: 0
SHORTNESS OF BREATH: 0
SINUS PRESSURE: 0
COLOR CHANGE: 0
COUGH: 0
WHEEZING: 0
EYE REDNESS: 0
APNEA: 0
RHINORRHEA: 0
SORE THROAT: 0

## 2019-06-21 ASSESSMENT — PATIENT HEALTH QUESTIONNAIRE - PHQ9
SUM OF ALL RESPONSES TO PHQ QUESTIONS 1-9: 0
SUM OF ALL RESPONSES TO PHQ9 QUESTIONS 1 & 2: 0
1. LITTLE INTEREST OR PLEASURE IN DOING THINGS: 0
SUM OF ALL RESPONSES TO PHQ QUESTIONS 1-9: 0
2. FEELING DOWN, DEPRESSED OR HOPELESS: 0

## 2019-06-21 NOTE — PROGRESS NOTES
Chief Complaint:     Chief Complaint   Patient presents with    Chest Pain     went to ER on 5/4/19 and was david cruzte and said since shes been taking it she has had a headache.  Neck Pain    Gastroesophageal Reflux         Chest Pain    This is a new problem. The current episode started more than 1 month ago. The problem has been resolved. The pain is present in the substernal region and lateral region. The pain is mild. The quality of the pain is described as heavy. The pain does not radiate. Associated symptoms include back pain. Pertinent negatives include no abdominal pain, cough, dizziness, exertional chest pressure, fever, headaches, nausea, near-syncope, numbness, palpitations, shortness of breath, syncope, vomiting or weakness. The pain is aggravated by nothing. She has tried nothing for the symptoms. Prior diagnostic workup includes exercise treadmill test and chest x-ray. Neck Pain    This is a chronic problem. The current episode started more than 1 month ago. The problem occurs daily. The problem has been waxing and waning. The pain is associated with an unknown factor. The pain is present in the midline. The quality of the pain is described as aching. The pain is moderate. The symptoms are aggravated by position, twisting and bending. The pain is same all the time. Stiffness is present all day. Associated symptoms include chest pain. Pertinent negatives include no fever, headaches, numbness, syncope or weakness. She has tried nothing for the symptoms. The treatment provided no relief.        Patient Active Problem List   Diagnosis    Cervical stenosis of spinal canal    DDD (degenerative disc disease), cervical    Mild intermittent asthma    Gastroesophageal reflux disease without esophagitis    History of TIA (transient ischemic attack) and stroke    Fatigue    Current mild episode of major depressive disorder without prior episode (HCC)    Acute sinusitis    Spinal stenosis of lumbar region without neurogenic claudication    Fibromyalgia    Chest pain    Migraines without aura and without status migrainosus, not intractable    Chronic left-sided low back pain without sciatica    Lumbar paraspinal muscle spasm    C/f of CIERA (obstructive sleep apnea)    Obesity due to excess calories    Irritable bowel syndrome without diarrhea       Past Medical History:   Diagnosis Date    Asthma     Cerebral artery occlusion with cerebral infarction ([facility] [address]     Cervical disc herniation     Cervical spinal stenosis     Chronic back pain     Chronic kidney disease     Depression     Disorder of thyroid gland 09/11/2012    GERD (gastroesophageal reflux disease)     Headache(784.0)     Hemorrhoids     History of colon polyps     Irritable bowel syndrome     Palpitations 07/24/2012    Scabies 05/18/2016    Seizure disorder (HCC)     not on medications for this; last seizure years ago    Vitamin D deficiency 12/03/2014       Past Surgical History:   Procedure Laterality Date    COLONOSCOPY  3/9/2016    COLONOSCOPY  07/10/2017    TUBAL LIGATION      UPPER GASTROINTESTINAL ENDOSCOPY  07/10/2017       Current Outpatient Medications   Medication Sig Dispense Refill    traMADol (ULTRAM) 50 MG tablet Take 1 tablet by mouth every 8 hours as needed for Pain for up to 30 days.  90 tablet 0    fluticasone propionate (FLOVENT) 250 MCG/BLIST AEPB inhaler Inhale 1 puff into the lungs daily Use am dos 07/10. 1 each 5    omeprazole (PRILOSEC) 20 MG delayed release capsule Take 1 capsule by mouth daily Take am dos 07/10. 30 capsule 5    DULoxetine (CYMBALTA) 20 MG extended release capsule Take 1 capsule by mouth daily 30 capsule 3    Prenatal Vit-Fe Fumarate-FA (PRENATAL PLUS) 27-1 MG TABS 1 pill daily 30 tablet 5    albuterol (PROVENTIL) (2.5 MG/3ML) 0.083% nebulizer solution Take 3 mLs by nebulization every 6 hours as needed for Wheezing 120 each 3    albuterol sulfate HFA (PROAIR HFA) 108 nervous/anxious and is not hyperactive. All other systems reviewed and are negative. /70   Pulse 81   Resp 16   Ht 4' 11\" (1.499 m)   Wt 176 lb (79.8 kg)   LMP 03/27/2008   SpO2 95%   BMI 35.55 kg/m²     Physical Exam   Constitutional: She is oriented to person, place, and time. She appears well-developed and well-nourished. HENT:   Head: Normocephalic and atraumatic. Right Ear: External ear normal.   Left Ear: External ear normal.   Nose: Nose normal.   Mouth/Throat: Oropharynx is clear and moist.   Eyes: Pupils are equal, round, and reactive to light. Conjunctivae and EOM are normal. No scleral icterus. Neck: Normal range of motion. Neck supple. No thyromegaly present. Cardiovascular: Normal rate, regular rhythm and normal heart sounds. No murmur heard. Pulmonary/Chest: Effort normal and breath sounds normal. No respiratory distress. She has no wheezes. She has no rales. Abdominal: Soft. Bowel sounds are normal. She exhibits no distension. There is no tenderness. Musculoskeletal: Normal range of motion. She exhibits no edema or tenderness. Lymphadenopathy:     She has no cervical adenopathy. Neurological: She is alert and oriented to person, place, and time. She has normal reflexes. She displays normal reflexes. No cranial nerve deficit. Skin: Skin is warm and dry. No rash noted. No erythema. Psychiatric: She has a normal mood and affect. Nursing note and vitals reviewed.                               ASSESSMENT/PLAN:    Patient Active Problem List   Diagnosis    Cervical stenosis of spinal canal    DDD (degenerative disc disease), cervical    Mild intermittent asthma    Gastroesophageal reflux disease without esophagitis    History of TIA (transient ischemic attack) and stroke    Fatigue    Current mild episode of major depressive disorder without prior episode ([facility] [address]    Acute sinusitis    Spinal stenosis of lumbar region without neurogenic claudication    Fibromyalgia    Chest pain    Migraines without aura and without status migrainosus, not intractable    Chronic left-sided low back pain without sciatica    Lumbar paraspinal muscle spasm    C/f of CIERA (obstructive sleep apnea)    Obesity due to excess calories    Irritable bowel syndrome without diarrhea       Amanda Masker was seen today for chest pain, neck pain and gastroesophageal reflux. Diagnoses and all orders for this visit:    Gastroesophageal reflux disease without esophagitis    Cervical stenosis of spinal canal  -     traMADol (ULTRAM) 50 MG tablet; Take 1 tablet by mouth every 8 hours as needed for Pain for up to 30 days. DDD (degenerative disc disease), cervical  -     traMADol (ULTRAM) 50 MG tablet; Take 1 tablet by mouth every 8 hours as needed for Pain for up to 30 days. Fibromyalgia    Chest pain, unspecified type    Fatigue, unspecified type    Viral wart on finger  -     Loni Hancock DO, [facility] ([facility])    Other orders  -     fluticasone propionate (FLOVENT) 250 MCG/BLIST AEPB inhaler; Inhale 1 puff into the lungs daily Use am dos 07/10.  -     omeprazole (PRILOSEC) 20 MG delayed release capsule; Take 1 capsule by mouth daily Take am dos 07/10.  -     DULoxetine (CYMBALTA) 20 MG extended release capsule; Take 1 capsule by mouth daily          Return if symptoms worsen or fail to improve.         Particia DO Aj  6/21/2019  1:58 PM

## 2019-07-17 ENCOUNTER — PATIENT MESSAGE (OUTPATIENT)
Dept: FAMILY MEDICINE CLINIC | Age: 49
End: 2019-07-17

## 2019-07-17 DIAGNOSIS — M48.02 CERVICAL STENOSIS OF SPINAL CANAL: ICD-10-CM

## 2019-07-17 DIAGNOSIS — M50.30 DDD (DEGENERATIVE DISC DISEASE), CERVICAL: Chronic | ICD-10-CM

## 2019-07-18 RX ORDER — GABAPENTIN 100 MG/1
100 CAPSULE ORAL 3 TIMES DAILY
Qty: 90 CAPSULE | Refills: 1 | Status: SHIPPED | OUTPATIENT
Start: 2019-07-18 | End: 2019-09-19 | Stop reason: SDUPTHER

## 2019-07-18 RX ORDER — TRAMADOL HYDROCHLORIDE 50 MG/1
50 TABLET ORAL EVERY 8 HOURS PRN
Qty: 90 TABLET | Refills: 0 | Status: SHIPPED | OUTPATIENT
Start: 2019-07-18 | End: 2019-08-22 | Stop reason: SDUPTHER

## 2019-08-22 ENCOUNTER — OFFICE VISIT (OUTPATIENT)
Dept: PRIMARY CARE CLINIC | Age: 49
End: 2019-08-22
Payer: MEDICAID

## 2019-08-22 ENCOUNTER — HOSPITAL ENCOUNTER (OUTPATIENT)
Age: 49
Discharge: HOME | End: 2019-08-24
Payer: MEDICAID

## 2019-08-22 VITALS
DIASTOLIC BLOOD PRESSURE: 80 MMHG | HEART RATE: 78 BPM | OXYGEN SATURATION: 96 % | SYSTOLIC BLOOD PRESSURE: 124 MMHG | HEIGHT: 59 IN | BODY MASS INDEX: 35.28 KG/M2 | WEIGHT: 175 LBS | RESPIRATION RATE: 16 BRPM

## 2019-08-22 DIAGNOSIS — R73.9 HYPERGLYCEMIA: ICD-10-CM

## 2019-08-22 DIAGNOSIS — M48.02 CERVICAL STENOSIS OF SPINAL CANAL: ICD-10-CM

## 2019-08-22 DIAGNOSIS — M50.30 DDD (DEGENERATIVE DISC DISEASE), CERVICAL: Chronic | ICD-10-CM

## 2019-08-22 PROBLEM — J01.90 ACUTE SINUSITIS: Status: RESOLVED | Noted: 2019-01-11 | Resolved: 2019-08-22

## 2019-08-22 PROBLEM — R07.9 CHEST PAIN: Status: RESOLVED | Noted: 2019-05-05 | Resolved: 2019-08-22

## 2019-08-22 LAB
ALBUMIN SERPL-MCNC: 4.5 G/DL (ref 3.5–5.2)
ALP BLD-CCNC: 89 U/L (ref 35–104)
ALT SERPL-CCNC: 25 U/L (ref 0–32)
ANION GAP SERPL CALCULATED.3IONS-SCNC: 13 MMOL/L (ref 7–16)
AST SERPL-CCNC: 19 U/L (ref 0–31)
BILIRUB SERPL-MCNC: 0.4 MG/DL (ref 0–1.2)
BUN BLDV-MCNC: 16 MG/DL (ref 6–20)
CALCIUM SERPL-MCNC: 9.5 MG/DL (ref 8.6–10.2)
CHLORIDE BLD-SCNC: 100 MMOL/L (ref 98–107)
CHOLESTEROL, TOTAL: 141 MG/DL (ref 0–199)
CO2: 26 MMOL/L (ref 22–29)
CREAT SERPL-MCNC: 0.8 MG/DL (ref 0.5–1)
GFR AFRICAN AMERICAN: >60
GFR NON-AFRICAN AMERICAN: >60 ML/MIN/1.73
GLUCOSE BLD-MCNC: 124 MG/DL (ref 74–99)
HBA1C MFR BLD: 5.5 % (ref 4–5.6)
HCT VFR BLD CALC: 45.6 % (ref 34–48)
HDLC SERPL-MCNC: 56 MG/DL
HEMOGLOBIN: 14.2 G/DL (ref 11.5–15.5)
LDL CHOLESTEROL CALCULATED: 66 MG/DL (ref 0–99)
MCH RBC QN AUTO: 27.1 PG (ref 26–35)
MCHC RBC AUTO-ENTMCNC: 31.1 % (ref 32–34.5)
MCV RBC AUTO: 87 FL (ref 80–99.9)
PDW BLD-RTO: 13 FL (ref 11.5–15)
PLATELET # BLD: 289 E9/L (ref 130–450)
PMV BLD AUTO: 10.9 FL (ref 7–12)
POTASSIUM SERPL-SCNC: 3.8 MMOL/L (ref 3.5–5)
RBC # BLD: 5.24 E12/L (ref 3.5–5.5)
SODIUM BLD-SCNC: 139 MMOL/L (ref 132–146)
TOTAL PROTEIN: 7.3 G/DL (ref 6.4–8.3)
TRIGL SERPL-MCNC: 94 MG/DL (ref 0–149)
TSH SERPL DL<=0.05 MIU/L-ACNC: 1.34 UIU/ML (ref 0.27–4.2)
VLDLC SERPL CALC-MCNC: 19 MG/DL
WBC # BLD: 8.4 E9/L (ref 4.5–11.5)

## 2019-08-22 PROCEDURE — G8427 DOCREV CUR MEDS BY ELIG CLIN: HCPCS | Performed by: FAMILY MEDICINE

## 2019-08-22 PROCEDURE — 80053 COMPREHEN METABOLIC PANEL: CPT

## 2019-08-22 PROCEDURE — 85027 COMPLETE CBC AUTOMATED: CPT

## 2019-08-22 PROCEDURE — 83036 HEMOGLOBIN GLYCOSYLATED A1C: CPT

## 2019-08-22 PROCEDURE — G8417 CALC BMI ABV UP PARAM F/U: HCPCS | Performed by: FAMILY MEDICINE

## 2019-08-22 PROCEDURE — 80061 LIPID PANEL: CPT

## 2019-08-22 PROCEDURE — 84443 ASSAY THYROID STIM HORMONE: CPT

## 2019-08-22 PROCEDURE — 99214 OFFICE O/P EST MOD 30 MIN: CPT | Performed by: FAMILY MEDICINE

## 2019-08-22 PROCEDURE — 1036F TOBACCO NON-USER: CPT | Performed by: FAMILY MEDICINE

## 2019-08-22 PROCEDURE — 36415 COLL VENOUS BLD VENIPUNCTURE: CPT

## 2019-08-22 RX ORDER — LORATADINE 10 MG/1
10 TABLET ORAL PRN
Qty: 30 TABLET | Refills: 5 | Status: SHIPPED | OUTPATIENT
Start: 2019-08-22 | End: 2019-10-17 | Stop reason: SDUPTHER

## 2019-08-22 RX ORDER — MELATONIN
1 DAILY
Qty: 30 TABLET | Refills: 5 | Status: SHIPPED | OUTPATIENT
Start: 2019-08-22 | End: 2019-10-17 | Stop reason: SDUPTHER

## 2019-08-22 RX ORDER — EPINEPHRINE 0.3 MG/.3ML
INJECTION SUBCUTANEOUS
Qty: 0.3 ML | Refills: 0 | Status: SHIPPED | OUTPATIENT
Start: 2019-08-22

## 2019-08-22 RX ORDER — TRAMADOL HYDROCHLORIDE 50 MG/1
50 TABLET ORAL EVERY 8 HOURS PRN
Qty: 90 TABLET | Refills: 0 | Status: SHIPPED | OUTPATIENT
Start: 2019-08-22 | End: 2019-09-23 | Stop reason: SDUPTHER

## 2019-08-22 ASSESSMENT — PATIENT HEALTH QUESTIONNAIRE - PHQ9
SUM OF ALL RESPONSES TO PHQ9 QUESTIONS 1 & 2: 0
2. FEELING DOWN, DEPRESSED OR HOPELESS: 0
1. LITTLE INTEREST OR PLEASURE IN DOING THINGS: 0
SUM OF ALL RESPONSES TO PHQ QUESTIONS 1-9: 0
SUM OF ALL RESPONSES TO PHQ QUESTIONS 1-9: 0

## 2019-08-22 ASSESSMENT — ENCOUNTER SYMPTOMS
NAUSEA: 0
VOMITING: 0
SINUS PRESSURE: 0
CONSTIPATION: 0
EYE ITCHING: 0
COUGH: 0
VISUAL CHANGE: 0
COLOR CHANGE: 0
DIARRHEA: 0
ABDOMINAL PAIN: 0
WHEEZING: 0
CHEST TIGHTNESS: 0
APNEA: 0
BACK PAIN: 0
RHINORRHEA: 0
BLOOD IN STOOL: 0
EYE REDNESS: 0
EYE PAIN: 0
SORE THROAT: 0
SHORTNESS OF BREATH: 0

## 2019-09-19 RX ORDER — GABAPENTIN 100 MG/1
100 CAPSULE ORAL 3 TIMES DAILY
Qty: 90 CAPSULE | Refills: 1 | Status: SHIPPED | OUTPATIENT
Start: 2019-09-19 | End: 2019-11-23 | Stop reason: SDUPTHER

## 2019-09-23 ENCOUNTER — PATIENT MESSAGE (OUTPATIENT)
Dept: PRIMARY CARE CLINIC | Age: 49
End: 2019-09-23

## 2019-09-23 DIAGNOSIS — M50.30 DDD (DEGENERATIVE DISC DISEASE), CERVICAL: Chronic | ICD-10-CM

## 2019-09-23 DIAGNOSIS — M48.02 CERVICAL STENOSIS OF SPINAL CANAL: ICD-10-CM

## 2019-09-23 RX ORDER — TRAMADOL HYDROCHLORIDE 50 MG/1
50 TABLET ORAL EVERY 8 HOURS PRN
Qty: 90 TABLET | Refills: 0 | Status: SHIPPED | OUTPATIENT
Start: 2019-09-23 | End: 2019-10-17 | Stop reason: SDUPTHER

## 2019-10-17 ENCOUNTER — OFFICE VISIT (OUTPATIENT)
Dept: PRIMARY CARE CLINIC | Age: 49
End: 2019-10-17
Payer: MEDICAID

## 2019-10-17 VITALS
WEIGHT: 174 LBS | OXYGEN SATURATION: 96 % | BODY MASS INDEX: 35.08 KG/M2 | RESPIRATION RATE: 16 BRPM | HEIGHT: 59 IN | HEART RATE: 99 BPM

## 2019-10-17 DIAGNOSIS — M48.02 CERVICAL STENOSIS OF SPINAL CANAL: ICD-10-CM

## 2019-10-17 DIAGNOSIS — H66.005 RECURRENT ACUTE SUPPURATIVE OTITIS MEDIA WITHOUT SPONTANEOUS RUPTURE OF LEFT TYMPANIC MEMBRANE: Primary | ICD-10-CM

## 2019-10-17 DIAGNOSIS — M50.30 DDD (DEGENERATIVE DISC DISEASE), CERVICAL: Chronic | ICD-10-CM

## 2019-10-17 DIAGNOSIS — H60.332 ACUTE SWIMMER'S EAR OF LEFT SIDE: ICD-10-CM

## 2019-10-17 PROCEDURE — G8417 CALC BMI ABV UP PARAM F/U: HCPCS | Performed by: FAMILY MEDICINE

## 2019-10-17 PROCEDURE — 99213 OFFICE O/P EST LOW 20 MIN: CPT | Performed by: FAMILY MEDICINE

## 2019-10-17 PROCEDURE — 1036F TOBACCO NON-USER: CPT | Performed by: FAMILY MEDICINE

## 2019-10-17 PROCEDURE — G8427 DOCREV CUR MEDS BY ELIG CLIN: HCPCS | Performed by: FAMILY MEDICINE

## 2019-10-17 PROCEDURE — 4130F TOPICAL PREP RX AOE: CPT | Performed by: FAMILY MEDICINE

## 2019-10-17 PROCEDURE — G8484 FLU IMMUNIZE NO ADMIN: HCPCS | Performed by: FAMILY MEDICINE

## 2019-10-17 PROCEDURE — 3017F COLORECTAL CA SCREEN DOC REV: CPT | Performed by: FAMILY MEDICINE

## 2019-10-17 RX ORDER — DULOXETIN HYDROCHLORIDE 30 MG/1
30 CAPSULE, DELAYED RELEASE ORAL DAILY
Qty: 30 CAPSULE | Refills: 2 | Status: SHIPPED | OUTPATIENT
Start: 2019-10-17 | End: 2021-01-11

## 2019-10-17 RX ORDER — AZITHROMYCIN 250 MG/1
250 TABLET, FILM COATED ORAL SEE ADMIN INSTRUCTIONS
Qty: 6 TABLET | Refills: 0 | Status: SHIPPED | OUTPATIENT
Start: 2019-10-17 | End: 2019-10-22

## 2019-10-17 RX ORDER — NEOMYCIN SULFATE, POLYMYXIN B SULFATE AND HYDROCORTISONE 10; 3.5; 1 MG/ML; MG/ML; [USP'U]/ML
3 SUSPENSION/ DROPS AURICULAR (OTIC) 4 TIMES DAILY
Qty: 10 ML | Refills: 0 | Status: SHIPPED | OUTPATIENT
Start: 2019-10-17 | End: 2019-10-27

## 2019-10-17 RX ORDER — PREDNISONE 10 MG/1
TABLET ORAL
Qty: 18 TABLET | Refills: 0 | Status: SHIPPED | OUTPATIENT
Start: 2019-10-17 | End: 2020-01-02

## 2019-10-17 RX ORDER — LORATADINE 10 MG/1
10 TABLET ORAL PRN
Qty: 30 TABLET | Refills: 5 | Status: SHIPPED | OUTPATIENT
Start: 2019-10-17 | End: 2020-06-25 | Stop reason: SDUPTHER

## 2019-10-17 RX ORDER — VITAMIN A ACETATE, BETA CAROTENE, ASCORBIC ACID, CHOLECALCIFEROL, .ALPHA.-TOCOPHEROL ACETATE, DL-, THIAMINE MONONITRATE, RIBOFLAVIN, NIACINAMIDE, PYRIDOXINE HYDROCHLORIDE, FOLIC ACID, CYANOCOBALAMIN, CALCIUM CARBONATE, FERROUS FUMARATE, ZINC OXIDE, CUPRIC OXIDE 3080; 12; 120; 400; 1; 1.84; 3; 20; 22; 920; 25; 200; 27; 10; 2 [IU]/1; UG/1; MG/1; [IU]/1; MG/1; MG/1; MG/1; MG/1; MG/1; [IU]/1; MG/1; MG/1; MG/1; MG/1; MG/1
TABLET, FILM COATED ORAL
Qty: 30 TABLET | Refills: 5 | Status: SHIPPED | OUTPATIENT
Start: 2019-10-17 | End: 2020-06-25 | Stop reason: SDUPTHER

## 2019-10-17 RX ORDER — TRAMADOL HYDROCHLORIDE 50 MG/1
50 TABLET ORAL EVERY 8 HOURS PRN
Qty: 90 TABLET | Refills: 0 | Status: SHIPPED | OUTPATIENT
Start: 2019-10-17 | End: 2019-11-19 | Stop reason: SDUPTHER

## 2019-10-17 RX ORDER — MELATONIN
1 DAILY
Qty: 30 TABLET | Refills: 5 | Status: SHIPPED | OUTPATIENT
Start: 2019-10-17 | End: 2020-06-25 | Stop reason: SDUPTHER

## 2019-10-17 ASSESSMENT — ENCOUNTER SYMPTOMS
COLOR CHANGE: 0
CONSTIPATION: 0
ABDOMINAL PAIN: 0
SHORTNESS OF BREATH: 0
BACK PAIN: 0
EYE PAIN: 0
VOMITING: 0
CHEST TIGHTNESS: 0
DIARRHEA: 1
SINUS PRESSURE: 0
RHINORRHEA: 1
NAUSEA: 0
EYE REDNESS: 0
COUGH: 1
SORE THROAT: 0
WHEEZING: 0
EYE ITCHING: 0
BLOOD IN STOOL: 0
APNEA: 0

## 2019-11-19 ENCOUNTER — TELEPHONE (OUTPATIENT)
Dept: PRIMARY CARE CLINIC | Age: 49
End: 2019-11-19

## 2019-11-19 DIAGNOSIS — M50.30 DDD (DEGENERATIVE DISC DISEASE), CERVICAL: Chronic | ICD-10-CM

## 2019-11-19 DIAGNOSIS — M48.02 CERVICAL STENOSIS OF SPINAL CANAL: ICD-10-CM

## 2019-11-19 RX ORDER — TRAMADOL HYDROCHLORIDE 50 MG/1
50 TABLET ORAL EVERY 8 HOURS PRN
Qty: 90 TABLET | Refills: 0 | Status: SHIPPED | OUTPATIENT
Start: 2019-11-19 | End: 2019-12-20 | Stop reason: SDUPTHER

## 2019-11-25 RX ORDER — GABAPENTIN 100 MG/1
CAPSULE ORAL
Qty: 90 CAPSULE | Refills: 0 | Status: SHIPPED | OUTPATIENT
Start: 2019-11-25 | End: 2019-12-20

## 2019-12-20 ENCOUNTER — PATIENT MESSAGE (OUTPATIENT)
Dept: PRIMARY CARE CLINIC | Age: 49
End: 2019-12-20

## 2019-12-20 DIAGNOSIS — M48.02 CERVICAL STENOSIS OF SPINAL CANAL: ICD-10-CM

## 2019-12-20 DIAGNOSIS — M50.30 DDD (DEGENERATIVE DISC DISEASE), CERVICAL: Chronic | ICD-10-CM

## 2019-12-20 RX ORDER — GABAPENTIN 100 MG/1
CAPSULE ORAL
Qty: 90 CAPSULE | Refills: 0 | Status: SHIPPED | OUTPATIENT
Start: 2019-12-20 | End: 2020-01-16 | Stop reason: SDUPTHER

## 2019-12-20 RX ORDER — TRAMADOL HYDROCHLORIDE 50 MG/1
50 TABLET ORAL EVERY 8 HOURS PRN
Qty: 90 TABLET | Refills: 0 | Status: SHIPPED | OUTPATIENT
Start: 2019-12-20 | End: 2020-01-16 | Stop reason: SDUPTHER

## 2019-12-26 ENCOUNTER — HOSPITAL ENCOUNTER (EMERGENCY)
Age: 49
Discharge: HOME | End: 2019-12-26
Payer: MEDICAID

## 2019-12-26 ENCOUNTER — APPOINTMENT (OUTPATIENT)
Dept: GENERAL RADIOLOGY | Age: 49
End: 2019-12-26
Payer: MEDICAID

## 2019-12-26 VITALS
BODY MASS INDEX: 33.67 KG/M2 | OXYGEN SATURATION: 95 % | WEIGHT: 167 LBS | HEART RATE: 80 BPM | RESPIRATION RATE: 14 BRPM | DIASTOLIC BLOOD PRESSURE: 71 MMHG | HEIGHT: 59 IN | TEMPERATURE: 98.6 F | SYSTOLIC BLOOD PRESSURE: 115 MMHG

## 2019-12-26 DIAGNOSIS — M19.031 OSTEOARTHRITIS OF RIGHT WRIST, UNSPECIFIED OSTEOARTHRITIS TYPE: ICD-10-CM

## 2019-12-26 DIAGNOSIS — S63.501A SPRAIN OF RIGHT WRIST, INITIAL ENCOUNTER: Primary | ICD-10-CM

## 2019-12-26 PROCEDURE — 96372 THER/PROPH/DIAG INJ SC/IM: CPT

## 2019-12-26 PROCEDURE — 6360000002 HC RX W HCPCS: Performed by: NURSE PRACTITIONER

## 2019-12-26 PROCEDURE — 73110 X-RAY EXAM OF WRIST: CPT

## 2019-12-26 PROCEDURE — 99283 EMERGENCY DEPT VISIT LOW MDM: CPT

## 2019-12-26 RX ORDER — NAPROXEN 500 MG/1
500 TABLET ORAL 2 TIMES DAILY PRN
Qty: 14 TABLET | Refills: 0 | Status: SHIPPED | OUTPATIENT
Start: 2019-12-26 | End: 2020-01-02

## 2019-12-26 RX ORDER — KETOROLAC TROMETHAMINE 30 MG/ML
30 INJECTION, SOLUTION INTRAMUSCULAR; INTRAVENOUS ONCE
Status: COMPLETED | OUTPATIENT
Start: 2019-12-26 | End: 2019-12-26

## 2019-12-26 RX ADMIN — KETOROLAC TROMETHAMINE 30 MG: 30 INJECTION, SOLUTION INTRAMUSCULAR at 15:23

## 2019-12-26 ASSESSMENT — PAIN DESCRIPTION - DESCRIPTORS: DESCRIPTORS: ACHING;SORE;CONSTANT

## 2019-12-26 ASSESSMENT — PAIN DESCRIPTION - FREQUENCY: FREQUENCY: CONTINUOUS

## 2019-12-26 ASSESSMENT — PAIN DESCRIPTION - ONSET: ONSET: GRADUAL

## 2019-12-26 ASSESSMENT — PAIN DESCRIPTION - ORIENTATION: ORIENTATION: RIGHT

## 2019-12-26 ASSESSMENT — PAIN DESCRIPTION - LOCATION: LOCATION: WRIST

## 2019-12-26 ASSESSMENT — PAIN SCALES - GENERAL: PAINLEVEL_OUTOF10: 8

## 2019-12-26 ASSESSMENT — PAIN DESCRIPTION - PAIN TYPE: TYPE: ACUTE PAIN

## 2019-12-26 ASSESSMENT — PAIN DESCRIPTION - PROGRESSION: CLINICAL_PROGRESSION: GRADUALLY WORSENING

## 2020-01-02 ENCOUNTER — OFFICE VISIT (OUTPATIENT)
Dept: PRIMARY CARE CLINIC | Age: 50
End: 2020-01-02
Payer: MEDICAID

## 2020-01-02 VITALS
OXYGEN SATURATION: 96 % | HEIGHT: 59 IN | HEART RATE: 112 BPM | BODY MASS INDEX: 33.67 KG/M2 | SYSTOLIC BLOOD PRESSURE: 124 MMHG | RESPIRATION RATE: 16 BRPM | DIASTOLIC BLOOD PRESSURE: 68 MMHG | WEIGHT: 167 LBS

## 2020-01-02 PROBLEM — R42 DIZZINESS: Status: ACTIVE | Noted: 2020-01-02

## 2020-01-02 PROBLEM — M19.031 PRIMARY OSTEOARTHRITIS OF RIGHT WRIST: Status: ACTIVE | Noted: 2020-01-02

## 2020-01-02 PROBLEM — G44.85 PRIMARY STABBING HEADACHE: Status: ACTIVE | Noted: 2020-01-02

## 2020-01-02 PROCEDURE — 99214 OFFICE O/P EST MOD 30 MIN: CPT | Performed by: FAMILY MEDICINE

## 2020-01-02 PROCEDURE — G8484 FLU IMMUNIZE NO ADMIN: HCPCS | Performed by: FAMILY MEDICINE

## 2020-01-02 PROCEDURE — 3017F COLORECTAL CA SCREEN DOC REV: CPT | Performed by: FAMILY MEDICINE

## 2020-01-02 PROCEDURE — G8417 CALC BMI ABV UP PARAM F/U: HCPCS | Performed by: FAMILY MEDICINE

## 2020-01-02 PROCEDURE — 1036F TOBACCO NON-USER: CPT | Performed by: FAMILY MEDICINE

## 2020-01-02 PROCEDURE — G8427 DOCREV CUR MEDS BY ELIG CLIN: HCPCS | Performed by: FAMILY MEDICINE

## 2020-01-02 ASSESSMENT — ENCOUNTER SYMPTOMS
ABDOMINAL PAIN: 0
SINUS PRESSURE: 0
CHEST TIGHTNESS: 0
WHEEZING: 0
SORE THROAT: 0
SCALP TENDERNESS: 0
VOMITING: 0
FACIAL SWEATING: 0
SHORTNESS OF BREATH: 0
COUGH: 0
BACK PAIN: 0
BLOOD IN STOOL: 0
APNEA: 0
COLOR CHANGE: 0
EYE ITCHING: 0
CONSTIPATION: 0
EYE PAIN: 0
RHINORRHEA: 0
NAUSEA: 0
EYE REDNESS: 0
EYE WATERING: 0
BLURRED VISION: 0
VISUAL CHANGE: 0
DIARRHEA: 0

## 2020-01-02 NOTE — PROGRESS NOTES
Vit-Fe Fumarate-FA (PRENATAL PLUS) 27-1 MG TABS 1 pill daily 30 tablet 5    DULoxetine (CYMBALTA) 30 MG extended release capsule Take 1 capsule by mouth daily 30 capsule 2    EPINEPHrine (EPIPEN 2-ABUNDIO) 0.3 MG/0.3ML SOAJ injection Inject into muscle for allergic reaction 0.3 mL 0    clotrimazole-betamethasone (LOTRISONE) 1-0.05 % cream Apply topically 2 times daily. 45 g 1    omeprazole (PRILOSEC) 20 MG delayed release capsule Take 1 capsule by mouth daily Take am dos 07/10. 30 capsule 5    albuterol (PROVENTIL) (2.5 MG/3ML) 0.083% nebulizer solution Take 3 mLs by nebulization every 6 hours as needed for Wheezing 120 each 3    albuterol sulfate HFA (PROAIR HFA) 108 (90 Base) MCG/ACT inhaler Inhale 2 puffs into the lungs as needed for Wheezing or Shortness of Breath 1 Inhaler 3    aspirin 81 MG tablet Take 81 mg by mouth daily      dicyclomine (BENTYL) 20 MG tablet Take 20 mg by mouth every 6 hours Take am dos 07/10       No current facility-administered medications for this visit.         Allergies   Allergen Reactions    Fish-Derived Products Anaphylaxis    Dye [Iodides] Itching       Social History     Socioeconomic History    Marital status:      Spouse name: None    Number of children: None    Years of education: None    Highest education level: None   Occupational History    None   Social Needs    Financial resource strain: None    Food insecurity:     Worry: None     Inability: None    Transportation needs:     Medical: None     Non-medical: None   Tobacco Use    Smoking status: Former Smoker     Packs/day: 1.00     Years: 25.00     Pack years: 25.00     Types: Cigarettes     Last attempt to quit: 1988     Years since quittin.7    Smokeless tobacco: Never Used   Substance and Sexual Activity    Alcohol use: No    Drug use: No    Sexual activity: None   Lifestyle    Physical activity:     Days per week: None     Minutes per session: None    Stress: None   Relationships  Social connections:     Talks on phone: None     Gets together: None     Attends [employer] service: None     Active member of club or organization: None     Attends meetings of clubs or organizations: None     Relationship status: None    Intimate partner violence:     Fear of current or ex partner: None     Emotionally abused: None     Physically abused: None     Forced sexual activity: None   Other Topics Concern    None   Social History Narrative    None       Family History   Problem Relation Age of Onset    Heart Attack Mother     Kidney Disease Father         failure    Diabetes Other     Asthma Child     Asthma Child         bone disease    Anemia Child           Review of Systems   Constitutional: Negative for activity change, appetite change, fatigue, fever and weight loss. HENT: Negative for congestion, ear pain, hearing loss, nosebleeds, rhinorrhea, sinus pressure and sore throat. Eyes: Negative for blurred vision, pain, redness, itching and visual disturbance. Respiratory: Negative for apnea, cough, chest tightness, shortness of breath and wheezing. Cardiovascular: Negative for chest pain, palpitations and leg swelling. Gastrointestinal: Negative for abdominal pain, anorexia, blood in stool, constipation, diarrhea, nausea and vomiting. Endocrine: Negative. Genitourinary: Negative for decreased urine volume, difficulty urinating, dysuria, frequency, hematuria and urgency. Musculoskeletal: Negative for arthralgias, back pain, gait problem, myalgias, neck pain and stiffness. Skin: Negative for color change and rash. Allergic/Immunologic: Negative for environmental allergies and food allergies. Neurological: Positive for dizziness, tingling, headaches and loss of balance. Negative for weakness, light-headedness and numbness. Hematological: Negative for adenopathy. Does not bruise/bleed easily.    Psychiatric/Behavioral: Negative for behavioral problems, dysphoric mood and sleep disturbance. The patient is not nervous/anxious and is not hyperactive. All other systems reviewed and are negative. /68   Pulse 112   Resp 16   Ht 4' 11\" (1.499 m)   Wt 167 lb (75.8 kg)   LMP 03/27/2008   SpO2 96%   BMI 33.73 kg/m²     Physical Exam  Vitals signs and nursing note reviewed. Constitutional:       General: She is not in acute distress. Appearance: Normal appearance. She is well-developed. HENT:      Head: Normocephalic and atraumatic. Right Ear: Hearing, tympanic membrane and external ear normal. No tenderness. No middle ear effusion. Left Ear: Hearing, tympanic membrane and external ear normal. No tenderness. No middle ear effusion. Nose: Nose normal. No congestion or rhinorrhea. Right Turbinates: Not enlarged. Left Turbinates: Not enlarged. Mouth/Throat:      Mouth: Mucous membranes are moist.      Tongue: No lesions. Pharynx: Oropharynx is clear. No oropharyngeal exudate or posterior oropharyngeal erythema. Eyes:      General: No scleral icterus. Conjunctiva/sclera: Conjunctivae normal.      Pupils: Pupils are equal, round, and reactive to light. Neck:      Musculoskeletal: Normal range of motion and neck supple. No neck rigidity or muscular tenderness. Thyroid: No thyromegaly. Cardiovascular:      Rate and Rhythm: Normal rate and regular rhythm. Heart sounds: Normal heart sounds. No murmur. Pulmonary:      Effort: Pulmonary effort is normal. No respiratory distress. Breath sounds: Normal breath sounds. No wheezing or rales. Abdominal:      General: Bowel sounds are normal. There is no distension. Palpations: Abdomen is soft. Tenderness: There is no tenderness. Musculoskeletal:         General: No tenderness. Lymphadenopathy:      Cervical: No cervical adenopathy. Skin:     General: Skin is warm and dry. Findings: No erythema or rash.    Neurological:      General: No focal deficit present. Mental Status: She is alert and oriented to person, place, and time. Cranial Nerves: No cranial nerve deficit. Deep Tendon Reflexes: Reflexes are normal and symmetric. Reflexes normal.   Psychiatric:         Mood and Affect: Mood normal.                                 ASSESSMENT/PLAN:    Patient Active Problem List   Diagnosis    Cervical stenosis of spinal canal    DDD (degenerative disc disease), cervical    Mild intermittent asthma    Gastroesophageal reflux disease without esophagitis    History of TIA (transient ischemic attack) and stroke    Fatigue    Current mild episode of major depressive disorder without prior episode ([address] [address]    Spinal stenosis of lumbar region without neurogenic claudication    Fibromyalgia    Migraines without aura and without status migrainosus, not intractable    Chronic left-sided low back pain without sciatica    Lumbar paraspinal muscle spasm    C/f of CIERA (obstructive sleep apnea)    Obesity due to excess calories    Irritable bowel syndrome without diarrhea    Hyperglycemia    Primary stabbing headache    Dizziness    Primary osteoarthritis of right wrist       Courtney Erps was seen today for wrist pain and headache. Diagnoses and all orders for this visit:    History of TIA (transient ischemic attack) and stroke  -     MRI Brain WO Contrast; Future    Primary stabbing headache  -     MRI Brain WO Contrast; Future    Dizziness  -     MRI Brain WO Contrast; Future    Primary osteoarthritis of right wrist  -     Hawa Zhou MD, [facility], [address] [place] [address] ED if severe headache or any new Neurologic symptoms      Return if symptoms worsen or fail to improve. I spent 25 minutes with this patient. I spent greater than 50% of the time counseling this patient.         Altagracia Garcia DO  1/2/2020  10:23 AM

## 2020-01-03 ENCOUNTER — HOSPITAL ENCOUNTER (EMERGENCY)
Age: 50
Discharge: ANOTHER HOSPITAL | End: 2020-01-03
Attending: EMERGENCY MEDICINE
Payer: MEDICAID

## 2020-01-03 ENCOUNTER — APPOINTMENT (OUTPATIENT)
Dept: CT IMAGING | Age: 50
End: 2020-01-03
Payer: MEDICAID

## 2020-01-03 ENCOUNTER — HOSPITAL ENCOUNTER (OUTPATIENT)
Age: 50
Setting detail: OBSERVATION
Discharge: HOME | End: 2020-01-05
Attending: INTERNAL MEDICINE | Admitting: INTERNAL MEDICINE
Payer: MEDICAID

## 2020-01-03 ENCOUNTER — APPOINTMENT (OUTPATIENT)
Dept: GENERAL RADIOLOGY | Age: 50
End: 2020-01-03
Payer: MEDICAID

## 2020-01-03 VITALS
WEIGHT: 167 LBS | RESPIRATION RATE: 16 BRPM | TEMPERATURE: 98.2 F | HEIGHT: 59 IN | DIASTOLIC BLOOD PRESSURE: 58 MMHG | BODY MASS INDEX: 33.67 KG/M2 | HEART RATE: 98 BPM | SYSTOLIC BLOOD PRESSURE: 102 MMHG | OXYGEN SATURATION: 96 %

## 2020-01-03 PROBLEM — R29.90 STROKE-LIKE SYMPTOMS: Status: ACTIVE | Noted: 2020-01-03

## 2020-01-03 LAB
ALBUMIN SERPL-MCNC: 4.4 G/DL (ref 3.5–5.2)
ALP BLD-CCNC: 85 U/L (ref 35–104)
ALT SERPL-CCNC: 24 U/L (ref 0–32)
ANION GAP SERPL CALCULATED.3IONS-SCNC: 12 MMOL/L (ref 7–16)
APTT: 32.3 SEC (ref 24.5–35.1)
AST SERPL-CCNC: 23 U/L (ref 0–31)
BASOPHILS ABSOLUTE: 0.05 E9/L (ref 0–0.2)
BASOPHILS RELATIVE PERCENT: 0.6 % (ref 0–2)
BILIRUB SERPL-MCNC: 0.6 MG/DL (ref 0–1.2)
BILIRUBIN URINE: NEGATIVE
BLOOD, URINE: NEGATIVE
BUN BLDV-MCNC: 10 MG/DL (ref 6–20)
CALCIUM SERPL-MCNC: 9.5 MG/DL (ref 8.6–10.2)
CHLORIDE BLD-SCNC: 99 MMOL/L (ref 98–107)
CHP ED QC CHECK: YES
CLARITY: CLEAR
CO2: 24 MMOL/L (ref 22–29)
COLOR: ABNORMAL
CREAT SERPL-MCNC: 0.8 MG/DL (ref 0.5–1)
EKG ATRIAL RATE: 96 BPM
EKG P AXIS: 68 DEGREES
EKG P-R INTERVAL: 146 MS
EKG Q-T INTERVAL: 356 MS
EKG QRS DURATION: 90 MS
EKG QTC CALCULATION (BAZETT): 449 MS
EKG R AXIS: 42 DEGREES
EKG T AXIS: 44 DEGREES
EKG VENTRICULAR RATE: 96 BPM
EOSINOPHILS ABSOLUTE: 0.13 E9/L (ref 0.05–0.5)
EOSINOPHILS RELATIVE PERCENT: 1.5 % (ref 0–6)
GFR AFRICAN AMERICAN: >60
GFR NON-AFRICAN AMERICAN: >60 ML/MIN/1.73
GLUCOSE BLD-MCNC: 112 MG/DL
GLUCOSE BLD-MCNC: 67 MG/DL (ref 74–99)
GLUCOSE URINE: 100 MG/DL
HCT VFR BLD CALC: 45 % (ref 34–48)
HEMOGLOBIN: 14.4 G/DL (ref 11.5–15.5)
IMMATURE GRANULOCYTES #: 0.02 E9/L
IMMATURE GRANULOCYTES %: 0.2 % (ref 0–5)
INR BLD: 1
KETONES, URINE: NEGATIVE MG/DL
LEUKOCYTE ESTERASE, URINE: NEGATIVE
LYMPHOCYTES ABSOLUTE: 3.2 E9/L (ref 1.5–4)
LYMPHOCYTES RELATIVE PERCENT: 37.6 % (ref 20–42)
MCH RBC QN AUTO: 27.5 PG (ref 26–35)
MCHC RBC AUTO-ENTMCNC: 32 % (ref 32–34.5)
MCV RBC AUTO: 85.9 FL (ref 80–99.9)
METER GLUCOSE: 112 MG/DL (ref 74–99)
METER GLUCOSE: 61 MG/DL (ref 74–99)
MONOCYTES ABSOLUTE: 0.75 E9/L (ref 0.1–0.95)
MONOCYTES RELATIVE PERCENT: 8.8 % (ref 2–12)
NEUTROPHILS ABSOLUTE: 4.35 E9/L (ref 1.8–7.3)
NEUTROPHILS RELATIVE PERCENT: 51.3 % (ref 43–80)
NITRITE, URINE: NEGATIVE
PDW BLD-RTO: 12.6 FL (ref 11.5–15)
PH UA: 7 (ref 5–9)
PLATELET # BLD: 269 E9/L (ref 130–450)
PMV BLD AUTO: 10.2 FL (ref 7–12)
POTASSIUM REFLEX MAGNESIUM: 4.3 MMOL/L (ref 3.5–5)
PROTEIN UA: NEGATIVE MG/DL
PROTHROMBIN TIME: 11.8 SEC (ref 9.3–12.4)
RBC # BLD: 5.24 E12/L (ref 3.5–5.5)
SODIUM BLD-SCNC: 135 MMOL/L (ref 132–146)
SPECIFIC GRAVITY UA: <=1.005 (ref 1–1.03)
TOTAL PROTEIN: 7.8 G/DL (ref 6.4–8.3)
TROPONIN: <0.01 NG/ML (ref 0–0.03)
UROBILINOGEN, URINE: 0.2 E.U./DL
WBC # BLD: 8.5 E9/L (ref 4.5–11.5)

## 2020-01-03 PROCEDURE — 6360000004 HC RX CONTRAST MEDICATION: Performed by: RADIOLOGY

## 2020-01-03 PROCEDURE — 70498 CT ANGIOGRAPHY NECK: CPT

## 2020-01-03 PROCEDURE — 0042T CT BRAIN PERFUSION: CPT

## 2020-01-03 PROCEDURE — 99291 CRITICAL CARE FIRST HOUR: CPT

## 2020-01-03 PROCEDURE — 85730 THROMBOPLASTIN TIME PARTIAL: CPT

## 2020-01-03 PROCEDURE — 80053 COMPREHEN METABOLIC PANEL: CPT

## 2020-01-03 PROCEDURE — 93005 ELECTROCARDIOGRAM TRACING: CPT | Performed by: EMERGENCY MEDICINE

## 2020-01-03 PROCEDURE — 82962 GLUCOSE BLOOD TEST: CPT

## 2020-01-03 PROCEDURE — 85025 COMPLETE CBC W/AUTO DIFF WBC: CPT

## 2020-01-03 PROCEDURE — 96374 THER/PROPH/DIAG INJ IV PUSH: CPT

## 2020-01-03 PROCEDURE — 85610 PROTHROMBIN TIME: CPT

## 2020-01-03 PROCEDURE — 70450 CT HEAD/BRAIN W/O DYE: CPT

## 2020-01-03 PROCEDURE — 70496 CT ANGIOGRAPHY HEAD: CPT

## 2020-01-03 PROCEDURE — G0379 DIRECT REFER HOSPITAL OBSERV: HCPCS

## 2020-01-03 PROCEDURE — 71045 X-RAY EXAM CHEST 1 VIEW: CPT

## 2020-01-03 PROCEDURE — G0378 HOSPITAL OBSERVATION PER HR: HCPCS

## 2020-01-03 PROCEDURE — 6360000002 HC RX W HCPCS: Performed by: EMERGENCY MEDICINE

## 2020-01-03 PROCEDURE — 84484 ASSAY OF TROPONIN QUANT: CPT

## 2020-01-03 PROCEDURE — 2580000003 HC RX 258: Performed by: EMERGENCY MEDICINE

## 2020-01-03 PROCEDURE — 96375 TX/PRO/DX INJ NEW DRUG ADDON: CPT

## 2020-01-03 PROCEDURE — 93010 ELECTROCARDIOGRAM REPORT: CPT | Performed by: INTERNAL MEDICINE

## 2020-01-03 PROCEDURE — 81003 URINALYSIS AUTO W/O SCOPE: CPT

## 2020-01-03 RX ORDER — PROCHLORPERAZINE EDISYLATE 5 MG/ML
10 INJECTION INTRAMUSCULAR; INTRAVENOUS ONCE
Status: COMPLETED | OUTPATIENT
Start: 2020-01-03 | End: 2020-01-03

## 2020-01-03 RX ORDER — 0.9 % SODIUM CHLORIDE 0.9 %
1000 INTRAVENOUS SOLUTION INTRAVENOUS ONCE
Status: COMPLETED | OUTPATIENT
Start: 2020-01-03 | End: 2020-01-03

## 2020-01-03 RX ORDER — KETOROLAC TROMETHAMINE 30 MG/ML
15 INJECTION, SOLUTION INTRAMUSCULAR; INTRAVENOUS ONCE
Status: COMPLETED | OUTPATIENT
Start: 2020-01-03 | End: 2020-01-03

## 2020-01-03 RX ORDER — DIPHENHYDRAMINE HYDROCHLORIDE 50 MG/ML
25 INJECTION INTRAMUSCULAR; INTRAVENOUS ONCE
Status: COMPLETED | OUTPATIENT
Start: 2020-01-03 | End: 2020-01-03

## 2020-01-03 RX ORDER — METHYLPREDNISOLONE SODIUM SUCCINATE 125 MG/2ML
125 INJECTION, POWDER, LYOPHILIZED, FOR SOLUTION INTRAMUSCULAR; INTRAVENOUS ONCE
Status: COMPLETED | OUTPATIENT
Start: 2020-01-03 | End: 2020-01-03

## 2020-01-03 RX ORDER — DEXTROSE MONOHYDRATE 25 G/50ML
25 INJECTION, SOLUTION INTRAVENOUS ONCE
Status: COMPLETED | OUTPATIENT
Start: 2020-01-03 | End: 2020-01-03

## 2020-01-03 RX ORDER — SODIUM CHLORIDE 0.9 % (FLUSH) 0.9 %
10 SYRINGE (ML) INJECTION ONCE
Status: DISCONTINUED | OUTPATIENT
Start: 2020-01-03 | End: 2020-01-03 | Stop reason: HOSPADM

## 2020-01-03 RX ADMIN — PROCHLORPERAZINE EDISYLATE 10 MG: 5 INJECTION INTRAMUSCULAR; INTRAVENOUS at 17:41

## 2020-01-03 RX ADMIN — SODIUM CHLORIDE 1000 ML: 9 INJECTION, SOLUTION INTRAVENOUS at 18:05

## 2020-01-03 RX ADMIN — DEXTROSE MONOHYDRATE 25 G: 25 INJECTION, SOLUTION INTRAVENOUS at 16:16

## 2020-01-03 RX ADMIN — METHYLPREDNISOLONE SODIUM SUCCINATE 125 MG: 125 INJECTION, POWDER, FOR SOLUTION INTRAMUSCULAR; INTRAVENOUS at 16:53

## 2020-01-03 RX ADMIN — IOPAMIDOL 100 ML: 755 INJECTION, SOLUTION INTRAVENOUS at 17:01

## 2020-01-03 RX ADMIN — KETOROLAC TROMETHAMINE 15 MG: 30 INJECTION, SOLUTION INTRAMUSCULAR at 18:05

## 2020-01-03 RX ADMIN — DIPHENHYDRAMINE HYDROCHLORIDE 25 MG: 50 INJECTION, SOLUTION INTRAMUSCULAR; INTRAVENOUS at 16:53

## 2020-01-03 ASSESSMENT — PAIN SCALES - GENERAL
PAINLEVEL_OUTOF10: 10
PAINLEVEL_OUTOF10: 10
PAINLEVEL_OUTOF10: 7

## 2020-01-03 ASSESSMENT — PAIN DESCRIPTION - PROGRESSION: CLINICAL_PROGRESSION: NOT CHANGED

## 2020-01-03 ASSESSMENT — PAIN DESCRIPTION - DESCRIPTORS: DESCRIPTORS: ACHING

## 2020-01-03 ASSESSMENT — PAIN DESCRIPTION - LOCATION
LOCATION: HEAD
LOCATION: HEAD

## 2020-01-03 ASSESSMENT — PAIN DESCRIPTION - FREQUENCY: FREQUENCY: CONTINUOUS

## 2020-01-03 ASSESSMENT — PAIN DESCRIPTION - PAIN TYPE
TYPE: ACUTE PAIN
TYPE: ACUTE PAIN

## 2020-01-03 ASSESSMENT — PAIN DESCRIPTION - ONSET: ONSET: ON-GOING

## 2020-01-03 NOTE — ED NOTES
Radiology Procedure Waiver   Name: Sudeep Crenshaw  : 1970  MRN: [id number]    Date:  1/3/20    Time: 4:16 PM    Benefits of immediately proceeding with Radiology exam(s) without pre-testing outweigh the risks or are not indicated as specified below and therefore the following is/are being waived:    [] Pregnancy test   [] Patients LMP on-time and regular.   [] Patient had Tubal Ligation or has other Contraception Device. [] Patient  is Menopausal or Premenarcheal.    [] Patient had Full or Partial Hysterectomy. [x] Protocol for Iodine allergy    [] MRI Questionnaire     [] BUN/Creatinine   [] Patient age w/no hx of renal dysfunction. [] Patient on Dialysis. [] Recent Normal Labs.   Electronically signed by Bartolome Bowen DO on 1/3/20 at 4:16 PM               Prabha Goyal DO  Resident  [phone]

## 2020-01-03 NOTE — ED PROVIDER NOTES
of superficial pain with pinprick but patient is aware of being touched    9: Test Language/Aphasia 0 - no aphasia, normal   10: Test Dysarthria 1 - mild to moderate, patient slurs at least some words and at worst, can be understood with some difficulty   11: Test Extinction/Inattention 0 - no abnormality   Total NIH Stroke Score: 8     tPA Criteria*  Inclusion criteria:  - Ischemic stroke onset within 3 hours of drug administration  - Age 25 or older  - No hemorrhage or non-stroke cause of deficit on CT  - Measurable deficit on NIH Stroke Scale    Exclusion criteria: If the patient. ...  - has minor or improving symptoms  - had seizure at onset of stroke  - has had another stroke or serious head trauma within the last 3 months  - has had major surgery within the last 14 days  - has known history of intracranial hemorrhage  - has sustained systolic blood pressure >781 mmHg  - has sustained diastolic blood pressure >846 mmHg  - requires aggressive treatment is necessary to lower their blood pressure  - has symptoms suggestive of subarachnoid hemorrhage  - has had GI or urinary tract hemorrhage within the last 21 days  - has had an arterial puncture at a non-compressible site within the last 7 days  - received heparin within the last 48 hours and has an elevated PTT  - has a prothrombin time (PT) >15 seconds  - has a platelet count <216,228 uL  - serum blood glucose is <50 mg/dL or >400 mg/dL    Relative Contraindications:  - NIH Stroke score >22  - Patient's CT shows evidence of large MCA territory infarction (>1/3 the MCA territory)    *[facility] Policy Paper      Acute CVA Core Measures:     - t-PA Eligibility: IV t-PA was considered and not given due to violations in inclusion criteria including mild/rapidly resolving deficit    ED Course as of Jan 03 1811 Fri Jan 03, 2020   1643 PROCEDURE  1/3/20       Time: 16:40    PERIPHERAL IV INSERTION  Risks, benefits and alternatives (for applicable procedures below) described. Performed By: Junior Ohara DO. Indication: inability to gain peripheral access. Informed consent: The patient provided verbal consent for this procedure. .  Procedure: After routine sterile preparation, a 20 g IV catheter was inserted in right upper extremity. The site was covered in usual fashion. Ultrasound Guidance:   used. Patient tolerated the procedure Well.          [CF]   1704 Reassessment post CT imaging and perfusion studies, patient is still complaining of headache, however there is no further evidence of asymmetry to upper or lower extremities. She is moving all extremities with good coordination. Family is present and does note that the patient still has mild left-sided facial droop. The patient is still complaining of a bad headache as her only complaint. [KS]      ED Course User Index  [CF] Junior Ohara DO  [KS] Darylene Blaze, DO       --------------------------------------------- PAST HISTORY ---------------------------------------------  Past Medical History:  has a past medical history of Asthma, Cerebral artery occlusion with cerebral infarction [facility]), Cervical disc herniation, Cervical spinal stenosis, Chronic back pain, Chronic kidney disease, Depression, Disorder of thyroid gland, GERD (gastroesophageal reflux disease), Headache(784.0), Hemorrhoids, History of colon polyps, Irritable bowel syndrome, Palpitations, Scabies, Seizure disorder ([facility] [address] and Vitamin D deficiency. Past Surgical History:  has a past surgical history that includes Tubal ligation; Colonoscopy (3/9/2016); Colonoscopy (07/10/2017); and Upper gastrointestinal endoscopy (07/10/2017). Social History:  reports that she quit smoking about 31 years ago. Her smoking use included cigarettes. She has a 25.00 pack-year smoking history. She has never used smokeless tobacco. She reports that she does not drink alcohol or use drugs.     Family History: family history includes Anemia in her child; Asthma in her child and child; Diabetes in an other family member; Heart Attack in her mother; Kidney Disease in her father. The patients home medications have been reviewed.     Allergies: Fish-derived products and Dye [iodides]    -------------------------------------------------- RESULTS -------------------------------------------------    Lab  Results for orders placed or performed during the hospital encounter of 01/03/20   CBC Auto Differential   Result Value Ref Range    WBC 8.5 4.5 - 11.5 E9/L    RBC 5.24 3.50 - 5.50 E12/L    Hemoglobin 14.4 11.5 - 15.5 g/dL    Hematocrit 45.0 34.0 - 48.0 %    MCV 85.9 80.0 - 99.9 fL    MCH 27.5 26.0 - 35.0 pg    MCHC 32.0 32.0 - 34.5 %    RDW 12.6 11.5 - 15.0 fL    Platelets 238 488 - 169 E9/L    MPV 10.2 7.0 - 12.0 fL    Neutrophils % 51.3 43.0 - 80.0 %    Immature Granulocytes % 0.2 0.0 - 5.0 %    Lymphocytes % 37.6 20.0 - 42.0 %    Monocytes % 8.8 2.0 - 12.0 %    Eosinophils % 1.5 0.0 - 6.0 %    Basophils % 0.6 0.0 - 2.0 %    Neutrophils Absolute 4.35 1.80 - 7.30 E9/L    Immature Granulocytes # 0.02 E9/L    Lymphocytes Absolute 3.20 1.50 - 4.00 E9/L    Monocytes Absolute 0.75 0.10 - 0.95 E9/L    Eosinophils Absolute 0.13 0.05 - 0.50 E9/L    Basophils Absolute 0.05 0.00 - 0.20 E9/L   Comprehensive Metabolic Panel w/ Reflex to MG   Result Value Ref Range    Sodium 135 132 - 146 mmol/L    Potassium reflex Magnesium 4.3 3.5 - 5.0 mmol/L    Chloride 99 98 - 107 mmol/L    CO2 24 22 - 29 mmol/L    Anion Gap 12 7 - 16 mmol/L    Glucose 67 (L) 74 - 99 mg/dL    BUN 10 6 - 20 mg/dL    CREATININE 0.8 0.5 - 1.0 mg/dL    GFR Non-African American >60 >=60 mL/min/1.73    GFR African American >60     Calcium 9.5 8.6 - 10.2 mg/dL    Total Protein 7.8 6.4 - 8.3 g/dL    Alb 4.4 3.5 - 5.2 g/dL    Total Bilirubin 0.6 0.0 - 1.2 mg/dL    Alkaline Phosphatase 85 35 - 104 U/L    ALT 24 0 - 32 U/L    AST 23 0 - 31 U/L   Troponin   Result Value Ref Range    Troponin <0.01 0.00 - 0.03 ng/mL

## 2020-01-04 LAB — METER GLUCOSE: 131 MG/DL (ref 74–99)

## 2020-01-04 PROCEDURE — 6360000002 HC RX W HCPCS: Performed by: INTERNAL MEDICINE

## 2020-01-04 PROCEDURE — 2580000003 HC RX 258: Performed by: INTERNAL MEDICINE

## 2020-01-04 PROCEDURE — 6370000000 HC RX 637 (ALT 250 FOR IP): Performed by: INTERNAL MEDICINE

## 2020-01-04 PROCEDURE — 96372 THER/PROPH/DIAG INJ SC/IM: CPT

## 2020-01-04 PROCEDURE — 82962 GLUCOSE BLOOD TEST: CPT

## 2020-01-04 PROCEDURE — 6370000000 HC RX 637 (ALT 250 FOR IP): Performed by: NURSE PRACTITIONER

## 2020-01-04 PROCEDURE — 99218 PR INITIAL OBSERVATION CARE/DAY 30 MINUTES: CPT | Performed by: PSYCHIATRY & NEUROLOGY

## 2020-01-04 PROCEDURE — G0378 HOSPITAL OBSERVATION PER HR: HCPCS

## 2020-01-04 RX ORDER — ONDANSETRON 2 MG/ML
4 INJECTION INTRAMUSCULAR; INTRAVENOUS EVERY 6 HOURS PRN
Status: DISCONTINUED | OUTPATIENT
Start: 2020-01-04 | End: 2020-01-05 | Stop reason: HOSPADM

## 2020-01-04 RX ORDER — SODIUM CHLORIDE 0.9 % (FLUSH) 0.9 %
10 SYRINGE (ML) INJECTION PRN
Status: DISCONTINUED | OUTPATIENT
Start: 2020-01-04 | End: 2020-01-05 | Stop reason: HOSPADM

## 2020-01-04 RX ORDER — DICYCLOMINE HYDROCHLORIDE 10 MG/1
20 CAPSULE ORAL EVERY 6 HOURS
Status: DISCONTINUED | OUTPATIENT
Start: 2020-01-04 | End: 2020-01-05 | Stop reason: HOSPADM

## 2020-01-04 RX ORDER — ASPIRIN 81 MG/1
81 TABLET ORAL DAILY
Status: DISCONTINUED | OUTPATIENT
Start: 2020-01-04 | End: 2020-01-05 | Stop reason: HOSPADM

## 2020-01-04 RX ORDER — ASPIRIN 300 MG/1
300 SUPPOSITORY RECTAL DAILY
Status: DISCONTINUED | OUTPATIENT
Start: 2020-01-04 | End: 2020-01-05 | Stop reason: HOSPADM

## 2020-01-04 RX ORDER — TRAMADOL HYDROCHLORIDE 50 MG/1
50 TABLET ORAL EVERY 6 HOURS PRN
Status: DISCONTINUED | OUTPATIENT
Start: 2020-01-04 | End: 2020-01-05 | Stop reason: HOSPADM

## 2020-01-04 RX ORDER — ATORVASTATIN CALCIUM 40 MG/1
40 TABLET, FILM COATED ORAL NIGHTLY
Status: DISCONTINUED | OUTPATIENT
Start: 2020-01-04 | End: 2020-01-05 | Stop reason: HOSPADM

## 2020-01-04 RX ORDER — SODIUM CHLORIDE 0.9 % (FLUSH) 0.9 %
10 SYRINGE (ML) INJECTION EVERY 12 HOURS SCHEDULED
Status: DISCONTINUED | OUTPATIENT
Start: 2020-01-04 | End: 2020-01-05 | Stop reason: HOSPADM

## 2020-01-04 RX ORDER — GABAPENTIN 100 MG/1
100 CAPSULE ORAL 3 TIMES DAILY
Status: DISCONTINUED | OUTPATIENT
Start: 2020-01-04 | End: 2020-01-05 | Stop reason: HOSPADM

## 2020-01-04 RX ORDER — ALBUTEROL SULFATE 2.5 MG/3ML
2.5 SOLUTION RESPIRATORY (INHALATION) EVERY 6 HOURS PRN
Status: DISCONTINUED | OUTPATIENT
Start: 2020-01-04 | End: 2020-01-05 | Stop reason: HOSPADM

## 2020-01-04 RX ORDER — DULOXETIN HYDROCHLORIDE 30 MG/1
30 CAPSULE, DELAYED RELEASE ORAL DAILY
Status: DISCONTINUED | OUTPATIENT
Start: 2020-01-04 | End: 2020-01-05 | Stop reason: HOSPADM

## 2020-01-04 RX ORDER — PANTOPRAZOLE SODIUM 40 MG/1
40 TABLET, DELAYED RELEASE ORAL DAILY
Status: DISCONTINUED | OUTPATIENT
Start: 2020-01-04 | End: 2020-01-05 | Stop reason: HOSPADM

## 2020-01-04 RX ADMIN — SODIUM CHLORIDE, PRESERVATIVE FREE 10 ML: 5 INJECTION INTRAVENOUS at 09:45

## 2020-01-04 RX ADMIN — ATORVASTATIN CALCIUM 40 MG: 40 TABLET, FILM COATED ORAL at 22:25

## 2020-01-04 RX ADMIN — ASPIRIN 81 MG: 81 TABLET, COATED ORAL at 09:43

## 2020-01-04 RX ADMIN — GABAPENTIN 100 MG: 100 CAPSULE ORAL at 14:56

## 2020-01-04 RX ADMIN — TRAMADOL HYDROCHLORIDE 50 MG: 50 TABLET, FILM COATED ORAL at 00:39

## 2020-01-04 RX ADMIN — SODIUM CHLORIDE, PRESERVATIVE FREE 10 ML: 5 INJECTION INTRAVENOUS at 22:26

## 2020-01-04 RX ADMIN — DICYCLOMINE HYDROCHLORIDE 20 MG: 10 CAPSULE ORAL at 10:42

## 2020-01-04 RX ADMIN — TRAMADOL HYDROCHLORIDE 50 MG: 50 TABLET, FILM COATED ORAL at 22:27

## 2020-01-04 RX ADMIN — DICYCLOMINE HYDROCHLORIDE 20 MG: 10 CAPSULE ORAL at 22:25

## 2020-01-04 RX ADMIN — ENOXAPARIN SODIUM 40 MG: 40 INJECTION SUBCUTANEOUS at 09:44

## 2020-01-04 RX ADMIN — DULOXETINE HYDROCHLORIDE 30 MG: 30 CAPSULE, DELAYED RELEASE ORAL at 09:43

## 2020-01-04 RX ADMIN — PANTOPRAZOLE SODIUM 40 MG: 40 TABLET, DELAYED RELEASE ORAL at 09:43

## 2020-01-04 RX ADMIN — GABAPENTIN 100 MG: 100 CAPSULE ORAL at 22:25

## 2020-01-04 RX ADMIN — GABAPENTIN 100 MG: 100 CAPSULE ORAL at 09:43

## 2020-01-04 RX ADMIN — DICYCLOMINE HYDROCHLORIDE 20 MG: 10 CAPSULE ORAL at 15:02

## 2020-01-04 ASSESSMENT — PAIN DESCRIPTION - PROGRESSION
CLINICAL_PROGRESSION: NOT CHANGED
CLINICAL_PROGRESSION: NOT CHANGED

## 2020-01-04 ASSESSMENT — PAIN DESCRIPTION - DESCRIPTORS
DESCRIPTORS: ACHING
DESCRIPTORS: ACHING

## 2020-01-04 ASSESSMENT — PAIN DESCRIPTION - ONSET
ONSET: ON-GOING
ONSET: ON-GOING

## 2020-01-04 ASSESSMENT — PAIN SCALES - GENERAL
PAINLEVEL_OUTOF10: 0
PAINLEVEL_OUTOF10: 8
PAINLEVEL_OUTOF10: 8
PAINLEVEL_OUTOF10: 7
PAINLEVEL_OUTOF10: 3

## 2020-01-04 ASSESSMENT — PAIN DESCRIPTION - FREQUENCY
FREQUENCY: INTERMITTENT
FREQUENCY: CONTINUOUS

## 2020-01-04 ASSESSMENT — PAIN DESCRIPTION - PAIN TYPE
TYPE: ACUTE PAIN
TYPE: ACUTE PAIN

## 2020-01-04 ASSESSMENT — PAIN DESCRIPTION - LOCATION
LOCATION: BACK
LOCATION: HAND

## 2020-01-04 ASSESSMENT — PAIN - FUNCTIONAL ASSESSMENT: PAIN_FUNCTIONAL_ASSESSMENT: ACTIVITIES ARE NOT PREVENTED

## 2020-01-04 ASSESSMENT — PAIN DESCRIPTION - ORIENTATION: ORIENTATION: ANTERIOR

## 2020-01-04 NOTE — H&P
[address] [facility]  History and Physical      CHIEF COMPLAINT: Facial droop      HISTORY OF PRESENT ILLNESS:      The patient is a 47 y.o. female patient of Dr. Everett Han who presents with facial droop, headache, slurred speech. Patient with history of TIA, CVA, degenerative disc disease, fibromyalgia, to the emergency departmen for possible stroke,  main complaint is headache sudden onset of occipital headache, with associated left-sided facial paralysis, dysarthria, numbness to the left side of the face with associated left upper extremity weakness. Lasting for hours and completely resolved at this time Pt is a poor historian. History is largely obtained from chart review and discussions with staff/family as available. Past Medical History:    Past Medical History:   Diagnosis Date    Asthma     Cerebral artery occlusion with cerebral infarction ([facility] [address]     Cervical disc herniation     Cervical spinal stenosis     Chronic back pain     Chronic kidney disease     Depression     Disorder of thyroid gland 09/11/2012    GERD (gastroesophageal reflux disease)     Headache(784.0)     Hemorrhoids     History of colon polyps     Irritable bowel syndrome     Palpitations 07/24/2012    Scabies 05/18/2016    Seizure disorder (HCC)     not on medications for this; last seizure years ago    Vitamin D deficiency 12/03/2014       Past Surgical History:    Past Surgical History:   Procedure Laterality Date    COLONOSCOPY  3/9/2016    COLONOSCOPY  07/10/2017    TUBAL LIGATION      UPPER GASTROINTESTINAL ENDOSCOPY  07/10/2017       Medications Prior to Admission:    Medications Prior to Admission: gabapentin (NEURONTIN) 100 MG capsule, TAKE 1 CAPSULE BY MOUTH THREE TIMES DAILY  traMADol (ULTRAM) 50 MG tablet, Take 1 tablet by mouth every 8 hours as needed for Pain for up to 30 days.   loratadine (CLARITIN) 10 MG tablet, Take 1 tablet by mouth as needed (allergies)  Cholecalciferol (VITAMIN D3) 1000 units TABS, Take 1 tablet by mouth daily  Prenatal Vit-Fe Fumarate-FA (PRENATAL PLUS) 27-1 MG TABS, 1 pill daily  DULoxetine (CYMBALTA) 30 MG extended release capsule, Take 1 capsule by mouth daily  omeprazole (PRILOSEC) 20 MG delayed release capsule, Take 1 capsule by mouth daily Take am dos 07/10.  albuterol (PROVENTIL) (2.5 MG/3ML) 0.083% nebulizer solution, Take 3 mLs by nebulization every 6 hours as needed for Wheezing  albuterol sulfate HFA (PROAIR HFA) 108 (90 Base) MCG/ACT inhaler, Inhale 2 puffs into the lungs as needed for Wheezing or Shortness of Breath  aspirin 81 MG tablet, Take 81 mg by mouth daily  dicyclomine (BENTYL) 20 MG tablet, Take 20 mg by mouth every 6 hours Take am dos 07/10  EPINEPHrine (EPIPEN 2-ABUNDIO) 0.3 MG/0.3ML SOAJ injection, Inject into muscle for allergic reaction  [DISCONTINUED] clotrimazole-betamethasone (LOTRISONE) 1-0.05 % cream, Apply topically 2 times daily. Allergies:    Fish-derived products and Dye [iodides]    Social History:    reports that she quit smoking about 31 years ago. Her smoking use included cigarettes. She has a 25.00 pack-year smoking history. She has never used smokeless tobacco. She reports that she does not drink alcohol or use drugs. Family History:   family history includes Anemia in her child; Asthma in her child and child; Diabetes in an other family member; Heart Attack in her mother; Kidney Disease in her father. REVIEW OF SYSTEMS:  As above in the HPI, otherwise negative    PHYSICAL EXAM:    Vitals:  /61   Pulse 80   Temp 98.2 °F (36.8 °C) (Temporal)   Resp 18   LMP 03/27/2008   SpO2 97%     General:  Awake, alert, oriented X 3. Well developed, well nourished, well groomed. No apparent distress. HEENT:  Normocephalic, atraumatic. Pupils equal, round, reactive to light. No scleral icterus. No conjunctival injection. Normal lips, teeth, and gums. No nasal discharge.   Neck:  Supple  Heart:  RRR, no murmurs, gallops, LABCAST RARE 09/30/2014    WBCUA 2-5 05/17/2017    WBCUA 0-1 02/06/2012    RBCUA 0-1 05/17/2017    RBCUA NONE 06/20/2013    BACTERIA RARE 05/17/2017    CLARITYU Clear 01/03/2020    SPECGRAV <=1.005 01/03/2020    LEUKOCYTESUR Negative 01/03/2020    UROBILINOGEN 0.2 01/03/2020    BILIRUBINUR Negative 01/03/2020    BILIRUBINUR neg 02/06/2019    BILIRUBINUR NEGATIVE 02/06/2012    BLOODU Negative 01/03/2020    GLUCOSEU 100 01/03/2020    GLUCOSEU NEGATIVE 02/06/2012     ABG:    Lab Results   Component Value Date    PH 7.333 05/05/2017    PCO2 30.5 05/05/2017    PO2 119.7 05/05/2017    HCO3 15.8 05/05/2017    BE -8.8 05/05/2017    O2SAT 98.5 05/05/2017     HgBA1c:    Lab Results   Component Value Date    LABA1C 5.5 08/22/2019     FLP:    Lab Results   Component Value Date    TRIG 94 08/22/2019    HDL 56 08/22/2019    LDLCALC 66 08/22/2019    LABVLDL 19 08/22/2019     TSH:    Lab Results   Component Value Date    TSH 1.340 08/22/2019       MRI brain without contrast    (Results Pending)       ASSESSMENT:      Principal Problem:    Stroke-like symptoms  Active Problems:    Migraines without aura and without status migrainosus, not intractable    History of TIA (transient ischemic attack) and stroke    Obesity due to excess calories    Irritable bowel syndrome without diarrhea  Resolved Problems:    * No resolved hospital problems.  *      PLAN:    Admit Tele  asa  Statin  MRI  Neuro consult  Medications for other co morbidities cont as appropriate w dosage adjustments as necessary   PT/OT  D/C planning            Electronically signed by Shubham Small MD on 1/4/2020 at 8:50 AM

## 2020-01-04 NOTE — ED NOTES
Patient presents with severe left sided HA. Patient has severe weakness to left side of body. Patient has left sided facial droop. Patient states since prior CVA there has been residual effects of weakness and facial droop to left side of body. Patient states HA started  3 days ago but worsening in weakness began at 1315 today. Glucose 61. 1 amp of D50 given. Labs drawn and sent. Patient transported to CT now.      Nico Evans RN  01/03/20 [id number]

## 2020-01-04 NOTE — PROGRESS NOTES
Dr. Ambika Ricardo called for new admission orders. NP on call. Left message. Waiting for orders or reply.

## 2020-01-05 ENCOUNTER — APPOINTMENT (OUTPATIENT)
Dept: MRI IMAGING | Age: 50
End: 2020-01-05
Attending: INTERNAL MEDICINE
Payer: MEDICAID

## 2020-01-05 VITALS
TEMPERATURE: 97.8 F | HEART RATE: 72 BPM | DIASTOLIC BLOOD PRESSURE: 74 MMHG | BODY MASS INDEX: 37.9 KG/M2 | HEIGHT: 59 IN | OXYGEN SATURATION: 97 % | WEIGHT: 188 LBS | SYSTOLIC BLOOD PRESSURE: 122 MMHG | RESPIRATION RATE: 16 BRPM

## 2020-01-05 LAB
ANION GAP SERPL CALCULATED.3IONS-SCNC: 15 MMOL/L (ref 7–16)
BUN BLDV-MCNC: 12 MG/DL (ref 6–20)
CALCIUM SERPL-MCNC: 9.7 MG/DL (ref 8.6–10.2)
CHLORIDE BLD-SCNC: 100 MMOL/L (ref 98–107)
CHOLESTEROL, TOTAL: 150 MG/DL (ref 0–199)
CO2: 24 MMOL/L (ref 22–29)
CREAT SERPL-MCNC: 0.9 MG/DL (ref 0.5–1)
GFR AFRICAN AMERICAN: >60
GFR NON-AFRICAN AMERICAN: >60 ML/MIN/1.73
GLUCOSE BLD-MCNC: 106 MG/DL (ref 74–99)
HBA1C MFR BLD: 5.5 % (ref 4–5.6)
HCT VFR BLD CALC: 43.4 % (ref 34–48)
HDLC SERPL-MCNC: 55 MG/DL
HEMOGLOBIN: 14 G/DL (ref 11.5–15.5)
LDL CHOLESTEROL CALCULATED: 84 MG/DL (ref 0–99)
MCH RBC QN AUTO: 27.3 PG (ref 26–35)
MCHC RBC AUTO-ENTMCNC: 32.3 % (ref 32–34.5)
MCV RBC AUTO: 84.8 FL (ref 80–99.9)
PDW BLD-RTO: 12.5 FL (ref 11.5–15)
PLATELET # BLD: 281 E9/L (ref 130–450)
PMV BLD AUTO: 10.1 FL (ref 7–12)
POTASSIUM REFLEX MAGNESIUM: 3.9 MMOL/L (ref 3.5–5)
RBC # BLD: 5.12 E12/L (ref 3.5–5.5)
SODIUM BLD-SCNC: 139 MMOL/L (ref 132–146)
TRIGL SERPL-MCNC: 54 MG/DL (ref 0–149)
VLDLC SERPL CALC-MCNC: 11 MG/DL
WBC # BLD: 11.5 E9/L (ref 4.5–11.5)

## 2020-01-05 PROCEDURE — 70551 MRI BRAIN STEM W/O DYE: CPT

## 2020-01-05 PROCEDURE — 85027 COMPLETE CBC AUTOMATED: CPT

## 2020-01-05 PROCEDURE — 83036 HEMOGLOBIN GLYCOSYLATED A1C: CPT

## 2020-01-05 PROCEDURE — 36415 COLL VENOUS BLD VENIPUNCTURE: CPT

## 2020-01-05 PROCEDURE — 80061 LIPID PANEL: CPT

## 2020-01-05 PROCEDURE — 6370000000 HC RX 637 (ALT 250 FOR IP): Performed by: INTERNAL MEDICINE

## 2020-01-05 PROCEDURE — G0378 HOSPITAL OBSERVATION PER HR: HCPCS

## 2020-01-05 PROCEDURE — 99225 PR SBSQ OBSERVATION CARE/DAY 25 MINUTES: CPT | Performed by: PHYSICIAN ASSISTANT

## 2020-01-05 PROCEDURE — 80048 BASIC METABOLIC PNL TOTAL CA: CPT

## 2020-01-05 PROCEDURE — 6370000000 HC RX 637 (ALT 250 FOR IP): Performed by: NURSE PRACTITIONER

## 2020-01-05 RX ADMIN — DICYCLOMINE HYDROCHLORIDE 20 MG: 10 CAPSULE ORAL at 09:00

## 2020-01-05 RX ADMIN — ASPIRIN 81 MG: 81 TABLET, COATED ORAL at 09:06

## 2020-01-05 RX ADMIN — TRAMADOL HYDROCHLORIDE 50 MG: 50 TABLET, FILM COATED ORAL at 04:31

## 2020-01-05 RX ADMIN — DICYCLOMINE HYDROCHLORIDE 20 MG: 10 CAPSULE ORAL at 02:50

## 2020-01-05 RX ADMIN — GABAPENTIN 100 MG: 100 CAPSULE ORAL at 09:00

## 2020-01-05 RX ADMIN — DULOXETINE HYDROCHLORIDE 30 MG: 30 CAPSULE, DELAYED RELEASE ORAL at 09:06

## 2020-01-05 ASSESSMENT — PAIN SCALES - GENERAL
PAINLEVEL_OUTOF10: 0
PAINLEVEL_OUTOF10: 2
PAINLEVEL_OUTOF10: 5
PAINLEVEL_OUTOF10: 0

## 2020-01-05 NOTE — PROGRESS NOTES
Shelby Rojas is a 47 y.o. female     Neurology is following for unsteady gait and L sided weakness     PMH significant for cervical spinal stenosis and history of headaches     She presented after a severe throbbing headache, left sided weakness and dizziness. She was seen by neurology in 2018 for a similar episode. MRI was negative at that time. MRI on this admission is also negative. She reports feeling much better. She does report a slight headache, but much improved since admission. She feels her walking is better, but states she still feels some dizziness/unsteadiness. She endorses 8 HA days per month. She currently takes tylenol or ibuprofen for abortive therapy with minimal relief. She follows with NSGY as OP for her cervical stenosis. No family at bedside     + dizziness   Denies weakness, numbness, paresthesias, visual disturbance, speech or swallowing difficulties     ROS otherwise negative.      Current Facility-Administered Medications   Medication Dose Route Frequency Provider Last Rate Last Dose    traMADol (ULTRAM) tablet 50 mg  50 mg Oral Q6H PRN NOY Ch - CNP   50 mg at 01/05/20 0431    sodium chloride flush 0.9 % injection 10 mL  10 mL Intravenous 2 times per day Elías Quiroz MD   10 mL at 01/04/20 2226    sodium chloride flush 0.9 % injection 10 mL  10 mL Intravenous PRN Elías Quiroz MD        magnesium hydroxide (MILK OF MAGNESIA) 400 MG/5ML suspension 30 mL  30 mL Oral Daily PRN Elías Quiroz MD        ondansetron [facility]) injection 4 mg  4 mg Intravenous Q6H PRN Elías Quiroz MD        atorvastatin (LIPITOR) tablet 40 mg  40 mg Oral Nightly Elías Quiroz MD   40 mg at 01/04/20 2225    enoxaparin (LOVENOX) injection 40 mg  40 mg Subcutaneous Daily Elías Quiroz MD   40 mg at 01/04/20 0987    aspirin EC tablet 81 mg  81 mg Oral Daily Elías Quiroz MD   81 mg at 01/04/20 9248    Or    aspirin suppository 300 mg Normal   VIII: hearing Normal   IX: soft palate elevation  Normal   IX,X: gag reflex    XI: trapezius strength  5/5   XI: sternocleidomastoid strength 5/5   XI: neck extension strength  5/5   XII: tongue strength  Normal     Motor:  5/5 throughout with some inconsistencies on exam   Normal tone and bulk   No abnormal movements     Sensory:  Intact to LT in all extremities   Vibration is normal     Coordination:   FNF and HTS without dysmetria     Gait:  Cautious-- improved from yesterday     DTR:   +2 in the arms   +3 legs     No Babinskis    No pathological reflexes    Laboratory/Radiology:     CBC with Differential:    Lab Results   Component Value Date    WBC 11.5 01/05/2020    RBC 5.12 01/05/2020    HGB 14.0 01/05/2020    HCT 43.4 01/05/2020     01/05/2020    MCV 84.8 01/05/2020    MCH 27.3 01/05/2020    MCHC 32.3 01/05/2020    RDW 12.5 01/05/2020    SEGSPCT 58 03/28/2013    LYMPHOPCT 37.6 01/03/2020    MONOPCT 8.8 01/03/2020    BASOPCT 0.6 01/03/2020    MONOSABS 0.75 01/03/2020    LYMPHSABS 3.20 01/03/2020    EOSABS 0.13 01/03/2020    BASOSABS 0.05 01/03/2020     CMP:    Lab Results   Component Value Date     01/05/2020    K 3.9 01/05/2020     01/05/2020    CO2 24 01/05/2020    BUN 12 01/05/2020    CREATININE 0.9 01/05/2020    GFRAA >60 01/05/2020    LABGLOM >60 01/05/2020    GLUCOSE 106 01/05/2020    GLUCOSE 108 02/06/2012    PROT 7.8 01/03/2020    LABALBU 4.4 01/03/2020    LABALBU 4.4 03/09/2011    CALCIUM 9.7 01/05/2020    BILITOT 0.6 01/03/2020    ALKPHOS 85 01/03/2020    AST 23 01/03/2020    ALT 24 01/03/2020     Mri brain- on personal review-- no evidence of acute infarct or other findings   * official read pending      I personally reviewed all labs and images today   Assessment:     47year old woman presented with an abnormal gait, left sided weakness and R sided sensory symptoms in the setting of a severe headache    Weakness and sensory symptoms improved.     Mild headache still

## 2020-01-05 NOTE — PROGRESS NOTES
Subjective:  Feeling better   No CP or SOB  No fever or chills   No uncontrolled pain  No vomiting or diarrhea   No weak/numb    Objective:    BP (!) 104/55   Pulse 69   Temp 98.5 °F (36.9 °C) (Temporal)   Resp 16   Ht 4' 11\" (1.499 m)   Wt 188 lb (85.3 kg)   LMP 03/27/2008   SpO2 97%   BMI 37.97 kg/m²     24HR INTAKE/OUTPUT:      Intake/Output Summary (Last 24 hours) at 1/5/2020 0839  Last data filed at 1/4/2020 2315  Gross per 24 hour   Intake 1010 ml   Output 350 ml   Net 660 ml     nad  Heart:  RRR, no murmurs, gallops, or rubs. Lungs:  CTA bilaterally, no wheeze, rales or rhonchi  Abd: bowel sounds present, nontender, nondistended, no masses  Extrem:  No clubbing, cyanosis, or edema    Most Recent Labs  Lab Results   Component Value Date    WBC 11.5 01/05/2020    HGB 14.0 01/05/2020    HCT 43.4 01/05/2020     01/05/2020     01/05/2020    K 3.9 01/05/2020     01/05/2020    CREATININE 0.9 01/05/2020    BUN 12 01/05/2020    CO2 24 01/05/2020    GLUCOSE 106 (H) 01/05/2020    ALT 24 01/03/2020    AST 23 01/03/2020    INR 1.0 01/03/2020    TSH 1.340 08/22/2019    LABA1C 5.5 01/05/2020     No results for input(s): MG in the last 72 hours. Lab Results   Component Value Date    CALCIUM 9.7 01/05/2020    PHOS 4.0 10/01/2014        MRI brain without contrast    (Results Pending)       Assessment    Principal Problem:    Stroke-like symptoms  Active Problems:    Migraines without aura and without status migrainosus, not intractable    History of TIA (transient ischemic attack) and stroke    Obesity due to excess calories    Irritable bowel syndrome without diarrhea  Resolved Problems:    * No resolved hospital problems.  *      Plan:    Admit Tele  asa  Statin  MRI  Neuro Consult appreciated   Medications for other co morbidities cont as appropriate w dosage adjustments as necessary   PT/OT  D/C planning home       Electronically signed by Natalia Correia MD on 1/5/2020 at 8:39 AM

## 2020-01-05 NOTE — PROGRESS NOTES
Occupational Therapy  OT orders received and appreciated. Patient per nursing no therapy needs will discontinue OT orders. Thank you. Cayla Bentley.  Zeny [address], [address]

## 2020-01-05 NOTE — DISCHARGE SUMMARY
capsule, Refills: 2      omeprazole (PRILOSEC) 20 MG delayed release capsule Take 1 capsule by mouth daily Take am dos 07/10.   Qty: 30 capsule, Refills: 5      albuterol (PROVENTIL) (2.5 MG/3ML) 0.083% nebulizer solution Take 3 mLs by nebulization every 6 hours as needed for Wheezing  Qty: 120 each, Refills: 3      albuterol sulfate HFA (PROAIR HFA) 108 (90 Base) MCG/ACT inhaler Inhale 2 puffs into the lungs as needed for Wheezing or Shortness of Breath  Qty: 1 Inhaler, Refills: 3      aspirin 81 MG tablet Take 81 mg by mouth daily      dicyclomine (BENTYL) 20 MG tablet Take 20 mg by mouth every 6 hours Take am dos 07/10      EPINEPHrine (EPIPEN 2-ABUNDIO) 0.3 MG/0.3ML SOAJ injection Inject into muscle for allergic reaction  Qty: 0.3 mL, Refills: 0         STOP taking these medications       clotrimazole-betamethasone (LOTRISONE) 1-0.05 % cream Comments:   Reason for Stopping:             Activity: activity as tolerated  Diet: regular diet and cardiac diet    Follow-up with 1wk PCP    Note that over 30 minutes was spent in preparing discharge papers, discussing discharge with patient, staff, consultants, medication review, arranging follow up, etc.    Signed:  Bascom Peabody, MD  1/5/2020  2:39 PM

## 2020-01-07 ENCOUNTER — TELEPHONE (OUTPATIENT)
Dept: PRIMARY CARE CLINIC | Age: 50
End: 2020-01-07

## 2020-01-07 NOTE — TELEPHONE ENCOUNTER
[facility] Transitions Initial Follow Up Call    Outreach made within 2 business days of discharge: Yes    Patient: Aleyda Grace Patient : 1970   MRN: [id number]  Reason for Admission: There are no discharge diagnoses documented for the most recent discharge. Discharge Date: 20       Spoke with: Marlena Hensley    Discharge department/facility: [facility]    TCM Interactive Patient Contact:  Was patient able to fill all prescriptions: No: No scripts given  Was patient instructed to bring all medications to the follow-up visit: Yes  Is patient taking all medications as directed in the discharge summary? No prescriptions given  Does patient understand their discharge instructions: Yes  Does patient have questions or concerns that need addressed prior to 7-14 day follow up office visit: yes - still having dizziness.     Scheduled appointment with PCP within 7-14 days    Follow Up  Future Appointments   Date Time Provider Christine Acosta   1/10/2020 12:30 PM DO SHELBY López [facility]   2020 11:30 AM Jonna Ramirez MD [address]

## 2020-01-07 NOTE — TELEPHONE ENCOUNTER
Spoke with patient, she is scheduled for TCM on Friday, she is wondering if it is normal for her to be feeling dizzy since she was discharged from hospital.  No other symptoms associated currently.  Please advise

## 2020-01-16 ENCOUNTER — OFFICE VISIT (OUTPATIENT)
Dept: PRIMARY CARE CLINIC | Age: 50
End: 2020-01-16
Payer: MEDICAID

## 2020-01-16 VITALS
RESPIRATION RATE: 16 BRPM | WEIGHT: 188 LBS | SYSTOLIC BLOOD PRESSURE: 118 MMHG | OXYGEN SATURATION: 98 % | HEIGHT: 59 IN | DIASTOLIC BLOOD PRESSURE: 72 MMHG | BODY MASS INDEX: 37.9 KG/M2 | HEART RATE: 89 BPM

## 2020-01-16 PROBLEM — R42 DIZZINESS: Status: RESOLVED | Noted: 2020-01-02 | Resolved: 2020-01-16

## 2020-01-16 PROCEDURE — 1111F DSCHRG MED/CURRENT MED MERGE: CPT | Performed by: FAMILY MEDICINE

## 2020-01-16 PROCEDURE — 99215 OFFICE O/P EST HI 40 MIN: CPT | Performed by: FAMILY MEDICINE

## 2020-01-16 RX ORDER — ATORVASTATIN CALCIUM 20 MG/1
20 TABLET, FILM COATED ORAL DAILY
Qty: 30 TABLET | Refills: 5 | Status: SHIPPED | OUTPATIENT
Start: 2020-01-16 | End: 2020-08-14

## 2020-01-16 NOTE — PROGRESS NOTES
Post-Discharge Transitional Care Management Services or Hospital Follow Up      Halley Moore   YOB: 1970    Date of Office Visit:  1/16/2020  Date of Hospital Admission: 1/3/20  Date of Hospital Discharge: 1/5/20  Risk of hospital readmission (high >=14%.  Medium >=10%) :Readmission Risk Score: 0      Care management risk score Rising risk (score 2-5) and Complex Care (Scores >=6): 5     Non face to face  following discharge, date last encounter closed (first attempt may have been earlier): 1/7/2020  9:37 AM    Call initiated 2 business days of discharge: Yes    Patient Active Problem List   Diagnosis    Cervical stenosis of spinal canal    DDD (degenerative disc disease), cervical    Mild intermittent asthma    Gastroesophageal reflux disease without esophagitis    History of TIA (transient ischemic attack) and stroke    Fatigue    Current mild episode of major depressive disorder without prior episode ([facility] [address]    Spinal stenosis of lumbar region without neurogenic claudication    Fibromyalgia    Migraines without aura and without status migrainosus, not intractable    Chronic left-sided low back pain without sciatica    Lumbar paraspinal muscle spasm    C/f of CIERA (obstructive sleep apnea)    Obesity due to excess calories    Irritable bowel syndrome without diarrhea    Hyperglycemia    Primary stabbing headache    Primary osteoarthritis of right wrist    Stroke-like symptoms       Allergies   Allergen Reactions    Fish-Derived Products Anaphylaxis    Dye [Iodides] Itching       Medications listed as ordered at the time of discharge from [facility]   NATALY Marti [facility] Medication Instructions ERMIAS:    Printed on:01/16/20 1035   Medication Information                      albuterol (PROVENTIL) (2.5 MG/3ML) 0.083% nebulizer solution  Take 3 mLs by nebulization every 6 hours as needed for Wheezing             albuterol sulfate HFA (PROAIR HFA) 108 (90 Base) MCG/ACT inhaler  Inhale 2 puffs into the lungs as needed for Wheezing or Shortness of Breath             aspirin 81 MG tablet  Take 2 tablets by mouth daily             atorvastatin (LIPITOR) 20 MG tablet  Take 1 tablet by mouth daily             Cholecalciferol (VITAMIN D3) 1000 units TABS  Take 1 tablet by mouth daily             dicyclomine (BENTYL) 20 MG tablet  Take 20 mg by mouth every 6 hours Take am dos 07/10             DULoxetine (CYMBALTA) 30 MG extended release capsule  Take 1 capsule by mouth daily             EPINEPHrine (EPIPEN 2-ABUNDIO) 0.3 MG/0.3ML SOAJ injection  Inject into muscle for allergic reaction             gabapentin (NEURONTIN) 100 MG capsule  TAKE 1 CAPSULE BY MOUTH THREE TIMES DAILY             loratadine (CLARITIN) 10 MG tablet  Take 1 tablet by mouth as needed (allergies)             omeprazole (PRILOSEC) 20 MG delayed release capsule  Take 1 capsule by mouth daily Take am dos 07/10. Prenatal Vit-Fe Fumarate-FA (PRENATAL PLUS) 27-1 MG TABS  1 pill daily             traMADol (ULTRAM) 50 MG tablet  Take 1 tablet by mouth every 8 hours as needed for Pain for up to 30 days. Medications marked \"taking\" at this time  Outpatient Medications Marked as Taking for the 1/16/20 encounter (Office Visit) with Carol Simon, DO   Medication Sig Dispense Refill    aspirin 81 MG tablet Take 2 tablets by mouth daily 60 tablet 5    atorvastatin (LIPITOR) 20 MG tablet Take 1 tablet by mouth daily 30 tablet 5    gabapentin (NEURONTIN) 100 MG capsule TAKE 1 CAPSULE BY MOUTH THREE TIMES DAILY 90 capsule 0    traMADol (ULTRAM) 50 MG tablet Take 1 tablet by mouth every 8 hours as needed for Pain for up to 30 days.  90 tablet 0    loratadine (CLARITIN) 10 MG tablet Take 1 tablet by mouth as needed (allergies) 30 tablet 5    Cholecalciferol (VITAMIN D3) 1000 units TABS Take 1 tablet by mouth daily 30 tablet 5    Prenatal Vit-Fe Fumarate-FA (PRENATAL PLUS) 27-1 MG TABS 1 pill daily 30 tablet 5    DULoxetine (CYMBALTA) 30 MG extended release capsule Take 1 capsule by mouth daily 30 capsule 2    EPINEPHrine (EPIPEN 2-ABUNDIO) 0.3 MG/0.3ML SOAJ injection Inject into muscle for allergic reaction 0.3 mL 0    omeprazole (PRILOSEC) 20 MG delayed release capsule Take 1 capsule by mouth daily Take am dos 07/10. 30 capsule 5    albuterol (PROVENTIL) (2.5 MG/3ML) 0.083% nebulizer solution Take 3 mLs by nebulization every 6 hours as needed for Wheezing 120 each 3    albuterol sulfate HFA (PROAIR HFA) 108 (90 Base) MCG/ACT inhaler Inhale 2 puffs into the lungs as needed for Wheezing or Shortness of Breath 1 Inhaler 3    dicyclomine (BENTYL) 20 MG tablet Take 20 mg by mouth every 6 hours Take am dos 07/10          Medications patient taking as of now reconciled against medications ordered at time of hospital discharge: Yes    Chief Complaint   Patient presents with   [address] Management     transitional care management     Cerebrovascular Accident       History of Present illness - Follow up of Hospital diagnosis(es):   Discharge Diagnoses: Principal Problem:    Stroke-like symptoms  Active Problems:    Migraines without aura and without status migrainosus, not intractable    History of TIA (transient ischemic attack) and stroke    Obesity due to excess calories    Irritable bowel syndrome without diarrhea  Resolved Problems:    Inpatient course: Discharge summary reviewed- see chart. Interval history/Current status: stable    A comprehensive review of systems was negative except for what was noted in the HPI. Vitals:    01/16/20 1005   BP: 118/72   Pulse: 89   Resp: 16   SpO2: 98%   Weight: 188 lb (85.3 kg)   Height: 4' 11\" (1.499 m)     Body mass index is 37.97 kg/m².    Wt Readings from Last 3 Encounters:   01/16/20 188 lb (85.3 kg)   01/05/20 188 lb (85.3 kg)   01/03/20 167 lb (75.8 kg)     BP Readings from Last 3 Encounters:   01/16/20 118/72   01/05/20 122/74   01/03/20 (!) 102/58        Physical Exam:  General Appearance: alert and oriented to person, place and time, well developed and well- nourished, in no acute distress  Skin: warm and dry, no rash or erythema  Head: normocephalic and atraumatic  Eyes: pupils equal, round, and reactive to light, extraocular eye movements intact, conjunctivae normal  ENT: tympanic membrane, external ear and ear canal normal bilaterally, nose without deformity, nasal mucosa and turbinates normal without polyps  Neck: supple and non-tender without mass, no thyromegaly or thyroid nodules, no cervical lymphadenopathy  Pulmonary/Chest: clear to auscultation bilaterally- no wheezes, rales or rhonchi, normal air movement, no respiratory distress  Cardiovascular: normal rate, regular rhythm, normal S1 and S2, no murmurs, rubs, clicks, or gallops, distal pulses intact, no carotid bruits  Abdomen: soft, non-tender, non-distended, normal bowel sounds, no masses or organomegaly  Extremities: no cyanosis, clubbing or edema  Musculoskeletal: normal range of motion, no joint swelling, deformity or tenderness  Neurologic: reflexes normal and symmetric, no cranial nerve deficit, gait, coordination and speech normal    Assessment/Plan:  1. History of TIA (transient ischemic attack) and stroke  Resolved    2.  Stroke-like symptoms  Resolved         Medical Decision Making: high complexity

## 2020-01-21 ENCOUNTER — PATIENT MESSAGE (OUTPATIENT)
Dept: PRIMARY CARE CLINIC | Age: 50
End: 2020-01-21

## 2020-01-21 RX ORDER — TRAMADOL HYDROCHLORIDE 50 MG/1
50 TABLET ORAL EVERY 8 HOURS PRN
Qty: 90 TABLET | Refills: 0 | Status: SHIPPED
Start: 2020-01-21 | End: 2020-02-20 | Stop reason: SDUPTHER

## 2020-01-21 RX ORDER — GABAPENTIN 100 MG/1
CAPSULE ORAL
Qty: 90 CAPSULE | Refills: 2 | Status: SHIPPED
Start: 2020-01-21 | End: 2020-04-23

## 2020-01-21 NOTE — TELEPHONE ENCOUNTER
From: Nik Ambrocio  To: Samuel Figueroa DO  Sent: 1/21/2020 1:24 PM EST  Subject: Prescription Question    I need the medicine gabapentin and tramadol

## 2020-02-27 ENCOUNTER — OFFICE VISIT (OUTPATIENT)
Dept: ORTHOPEDIC SURGERY | Age: 50
End: 2020-02-27
Payer: MEDICAID

## 2020-02-27 VITALS
BODY MASS INDEX: 32.86 KG/M2 | TEMPERATURE: 98.1 F | WEIGHT: 163 LBS | HEART RATE: 82 BPM | DIASTOLIC BLOOD PRESSURE: 69 MMHG | HEIGHT: 59 IN | RESPIRATION RATE: 18 BRPM | SYSTOLIC BLOOD PRESSURE: 97 MMHG

## 2020-02-27 PROCEDURE — 1036F TOBACCO NON-USER: CPT | Performed by: ORTHOPAEDIC SURGERY

## 2020-02-27 PROCEDURE — G8417 CALC BMI ABV UP PARAM F/U: HCPCS | Performed by: ORTHOPAEDIC SURGERY

## 2020-02-27 PROCEDURE — 20550 NJX 1 TENDON SHEATH/LIGAMENT: CPT | Performed by: ORTHOPAEDIC SURGERY

## 2020-02-27 PROCEDURE — G8427 DOCREV CUR MEDS BY ELIG CLIN: HCPCS | Performed by: ORTHOPAEDIC SURGERY

## 2020-02-27 PROCEDURE — 3017F COLORECTAL CA SCREEN DOC REV: CPT | Performed by: ORTHOPAEDIC SURGERY

## 2020-02-27 PROCEDURE — G8484 FLU IMMUNIZE NO ADMIN: HCPCS | Performed by: ORTHOPAEDIC SURGERY

## 2020-02-27 PROCEDURE — 99203 OFFICE O/P NEW LOW 30 MIN: CPT | Performed by: ORTHOPAEDIC SURGERY

## 2020-02-27 PROCEDURE — 20605 DRAIN/INJ JOINT/BURSA W/O US: CPT | Performed by: ORTHOPAEDIC SURGERY

## 2020-02-27 RX ORDER — BETAMETHASONE SODIUM PHOSPHATE AND BETAMETHASONE ACETATE 3; 3 MG/ML; MG/ML
12 INJECTION, SUSPENSION INTRA-ARTICULAR; INTRALESIONAL; INTRAMUSCULAR; SOFT TISSUE ONCE
Status: COMPLETED | OUTPATIENT
Start: 2020-02-27 | End: 2020-02-27

## 2020-02-27 RX ADMIN — BETAMETHASONE SODIUM PHOSPHATE AND BETAMETHASONE ACETATE 12 MG: 3; 3 INJECTION, SUSPENSION INTRA-ARTICULAR; INTRALESIONAL; INTRAMUSCULAR; SOFT TISSUE at 13:00

## 2020-02-27 NOTE — PROGRESS NOTES
Department of Orthopedic Surgery  History and Physical      CHIEF COMPLAINT:  Right wrist pain    HISTORY OF PRESENT ILLNESS:                The patient is a RHD 47 y.o. female who presents with right wrist pain. Patient states for last 2 to 3 months she has had pain on the ulnar aspect of her right wrist.  She states she was cleaning her house. While she was mopping and doing any twisting maneuvers she is having pain on the ulnar aspect of her wrist.  She also reports occasional numbness and tingling. She states this is been present for a few years. She did have an EMG nerve conduction study test years ago. She thinks that this demonstrated carpal tunnel but is unsure. Patient does have a brace at home that she wears for her wrist pain. She also reports clicking to her right thumb for the last couple months. Past Medical History:        Diagnosis Date    Asthma     Cerebral artery occlusion with cerebral infarction ([facility] [address]     Cervical disc herniation     Cervical spinal stenosis     Chronic back pain     Chronic kidney disease     Depression     Disorder of thyroid gland 09/11/2012    GERD (gastroesophageal reflux disease)     Headache(784.0)     Hemorrhoids     History of colon polyps     Irritable bowel syndrome     Palpitations 07/24/2012    Scabies 05/18/2016    Seizure disorder (HCC)     not on medications for this; last seizure years ago    Vitamin D deficiency 12/03/2014     Past Surgical History:        Procedure Laterality Date    COLONOSCOPY  3/9/2016    COLONOSCOPY  07/10/2017    TUBAL LIGATION      UPPER GASTROINTESTINAL ENDOSCOPY  07/10/2017     Current Medications:   No current facility-administered medications for this visit. Allergies:  Fish-derived products and Dye [iodides]    Social History:   TOBACCO:   reports that she quit smoking about 31 years ago. Her smoking use included cigarettes. She has a 25.00 pack-year smoking history.  She has never used smokeless

## 2020-03-06 ENCOUNTER — OFFICE VISIT (OUTPATIENT)
Dept: NEUROLOGY | Age: 50
End: 2020-03-06
Payer: MEDICAID

## 2020-03-06 VITALS
DIASTOLIC BLOOD PRESSURE: 60 MMHG | WEIGHT: 168 LBS | HEIGHT: 59 IN | SYSTOLIC BLOOD PRESSURE: 90 MMHG | BODY MASS INDEX: 33.87 KG/M2

## 2020-03-06 PROBLEM — R20.2 NUMBNESS AND TINGLING IN RIGHT HAND: Chronic | Status: ACTIVE | Noted: 2020-03-06

## 2020-03-06 PROBLEM — R20.0 NUMBNESS AND TINGLING IN RIGHT HAND: Chronic | Status: ACTIVE | Noted: 2020-03-06

## 2020-03-06 PROCEDURE — 1036F TOBACCO NON-USER: CPT | Performed by: PSYCHIATRY & NEUROLOGY

## 2020-03-06 PROCEDURE — 95885 MUSC TST DONE W/NERV TST LIM: CPT | Performed by: PSYCHIATRY & NEUROLOGY

## 2020-03-06 PROCEDURE — 95910 NRV CNDJ TEST 7-8 STUDIES: CPT | Performed by: PSYCHIATRY & NEUROLOGY

## 2020-03-06 PROCEDURE — 3017F COLORECTAL CA SCREEN DOC REV: CPT | Performed by: PSYCHIATRY & NEUROLOGY

## 2020-03-06 PROCEDURE — G8427 DOCREV CUR MEDS BY ELIG CLIN: HCPCS | Performed by: PSYCHIATRY & NEUROLOGY

## 2020-03-06 PROCEDURE — G8484 FLU IMMUNIZE NO ADMIN: HCPCS | Performed by: PSYCHIATRY & NEUROLOGY

## 2020-03-06 PROCEDURE — 99212 OFFICE O/P EST SF 10 MIN: CPT | Performed by: PSYCHIATRY & NEUROLOGY

## 2020-03-06 PROCEDURE — G8417 CALC BMI ABV UP PARAM F/U: HCPCS | Performed by: PSYCHIATRY & NEUROLOGY

## 2020-03-06 NOTE — PROGRESS NOTES
Elbow 6.46 9.0 92.5 16 3.07 52 34.1   R Ulnar - ADM      Wrist 2.29 9.1 100 8   33.9      B. Elbow 4.90 9.3 102 16 2.60 61 33.8      A. Elbow 6.35 9.3 102 10 1.46 69 33.6           Sensory NCS      Nerve / Sites Onset Lat Peak Lat PP Amp Amp. 1-2 Distance Velocity Temp.    ms ms µV % cm m/s °C   R Median - Digit II (Antidromic)      Mid Palm 0.99 1.51 56.0 100 7 71 34.4      Wrist 2.45 3.07 52.7 108 14 57 34.4   R Ulnar - Digit V (Antidromic)      Wrist 2.24 2.81 37.3 100 14 63 34   R Radial - Anatomical  (Forearm)      Forearm 1.51 1.98 32.3 100 10 66 34   R Dorsal ulnar cutaneous - Hand dorsum (Forearm)      Forearm 1.46 1.77 18.6 100 8 55 34.1               Combined Sensory Index      Nerve / Sites Rec. Site Peak Lat NP Amp PP Amp Segments Peak Diff Temp. ms µV µV  ms °C   R Median - CSI      Median Thumb 2.86 29.5 42.5 Median - Radial 0.42 33.8      Radial Thumb 2.45 7.5 8.9 Median - Ulnar 0.31 33.6      Median Ring 3.07 28.5 40.7 Median palm - Ulnar palm 0.42 33.6      Ulnar Ring 2.76 24.0 29.6         Median-palm Wrist 1.93 81.0 114.8         Ulnar-palm Wrist 1.51 43.6 64.4         CSI     CSI 1.15          F  Wave      Nerve F Lat M Lat F-M Lat    ms ms ms   R Median-APB 22.2 3.3 19.0   R Ulnar-ADM 23.3 2.1 21.1       EMG         EMG Summary Table     Spontaneous MUAP Recruitment   Muscle IA Fib PSW Fasc H.F. Amp Dur. PPP Pattern   R. First dors interos Normal None None None None Normal Normal None Normal   R. Abductor pollicis brev Normal None None None None 1+ 1+ None Decr       Summary of Findings:   Nerve conduction studies:   · The following nerve conduction studies were abnormal:   · The right median combined sensory index is prolonged in latency. · All other nerve conduction studies listed in the table above were normal in latency, amplitude and conduction velocity. Needle EMG:   · Needle EMG was performed using a concentric needle.   · The following abnormalities were seen on needle EMG: Limited

## 2020-04-23 ENCOUNTER — VIRTUAL VISIT (OUTPATIENT)
Dept: PAIN MANAGEMENT | Age: 50
End: 2020-04-23
Payer: MEDICAID

## 2020-04-23 PROBLEM — M48.02 CERVICAL STENOSIS OF SPINE: Status: ACTIVE | Noted: 2020-04-23

## 2020-04-23 PROBLEM — M51.9 LUMBAR DISC DISORDER: Status: ACTIVE | Noted: 2020-04-23

## 2020-04-23 PROBLEM — M47.816 LUMBAR FACET ARTHROPATHY: Status: ACTIVE | Noted: 2020-04-23

## 2020-04-23 PROBLEM — M50.90 CERVICAL DISC DISORDER: Status: ACTIVE | Noted: 2020-04-23

## 2020-04-23 PROBLEM — M47.812 CERVICAL FACET JOINT SYNDROME: Status: ACTIVE | Noted: 2020-04-23

## 2020-04-23 PROBLEM — M54.12 CERVICAL RADICULOPATHY: Status: ACTIVE | Noted: 2020-04-23

## 2020-04-23 PROBLEM — G89.4 CHRONIC PAIN SYNDROME: Status: ACTIVE | Noted: 2020-04-23

## 2020-04-23 PROBLEM — M47.816 LUMBAR SPONDYLOSIS: Status: ACTIVE | Noted: 2020-04-23

## 2020-04-23 PROBLEM — M16.12 PRIMARY OSTEOARTHRITIS OF LEFT HIP: Status: ACTIVE | Noted: 2020-04-23

## 2020-04-23 PROCEDURE — G8427 DOCREV CUR MEDS BY ELIG CLIN: HCPCS | Performed by: PAIN MEDICINE

## 2020-04-23 PROCEDURE — 3017F COLORECTAL CA SCREEN DOC REV: CPT | Performed by: PAIN MEDICINE

## 2020-04-23 PROCEDURE — 99204 OFFICE O/P NEW MOD 45 MIN: CPT | Performed by: PAIN MEDICINE

## 2020-04-23 RX ORDER — AMMONIUM LACTATE 12 G/100G
LOTION TOPICAL
COMMUNITY
Start: 2020-01-15 | End: 2021-01-11

## 2020-04-23 NOTE — PROGRESS NOTES
[address]        [address] [facility], [address] [facility]      [phone]      Telehealth Consult Note      Patient:  CATHRYN Zacarias 1970    Date of Service:  20    Consent:  Telehealth Visit due to [place] 19 pandemic  The patient and/or health care decision maker is aware that he/she may receive a bill for this tele-health service Doxy Me, depending on his insurance coverage, and has provided verbal consent to proceed: Yes  I have considered the risks of abuse, dependence, addiction and diversion. My patient is aware that they will need a follow-up visit (in-person or virtually) at the appropriate time indicated for continued medications. Further, my patient is aware that when this acute crisis has lifted, they will be expected to return for an in-person visit and all elements of standard local and hospital guidelines in order to continue this medication. Patient location: Home  Physician Location: Home      Requesting Physician:  Yaneth An DO    Reason for Tele health Consult:      Patient presents with complaints of neck and low back pain that started a long time ago and has been progressively getting worse. HISTORY OF PRESENT ILLNESS:      Pain does radiate to Left upper and lower extremity. She  has numbness, tingling of the Left upper and lower extremity and does not have bladder or bowel dysfunction. Imaging:   Cervical spine MRI 2018  1. Multilevel degenerative disc disease extending from C3 through C7. Moderately severe spinal canal stenosis at C3-C4. Moderate spinal   canal stenosis at C4-C5. . Moderately severe spinal canal stenosis at   C5-C6. Abnormal signal intensity within the cervical spinal cord C3-C5   without evidence of enhancement. Findings are most likely reflective   of myelomalacia changes secondary to the underlying degree of cord   compression as described above. No active demyelination identified.    2. Slight loss of normally expected cervical lordosis C3-C6. CT Lumbar spine 2017  1. Mild diffuse osteopenia, no compression fracture or   spondylolisthesis.       2. Mild multilevel degenerative disc disease and facet joint   arthropathy of the lower lumbar spine more pronounced at L4-L5. Previous treatments: Physical Therapy, Epidural Steroid Injection with  and medications. .      Past Medical History: Reviewed    Past Surgical History: Reviewed     Home Medications: Reviewed    Allergies: Reviewed     Social History: Reviewed     REVIEW OF SYSTEMS:     Patient specifically denies fever/chills, chest pain, shortness of breath, new bowel or bladder complaints. All other review of systems was negative. PHYSICAL EXAMINATION:      General:       A & O x3    HEENT:    Head:normocephalic and atraumatic  Sclera: icterus absent,     Lungs:    Breathing:Breathing Pattern: regular, no distress    Cervical spine:    Inspection:normal  Range of motion:abnormal moderately flexion, extension rotation bilateral and is  painful. Positive spurling sign Left    Lumbar spine:    Range of motion:abnormal moderately Lateral bending, flexion, extension rotation left and is  painful.     Musculoskeletal:    SLR:negative right, negative left, sitting     Extremities:    Tremors:None bilaterally upper and lower  Range of motion:Generally normal shoulders, pain with internal rotation of hips positive Left  Intact:Yes    Neurological:     Motor:          No apparent weakness per patient       [address]    Dermatology:    Skin:no unusual rashes and no skin lesions    Impression:    Neck and low back pain with radiation to the Left upper and lower extremity  Cervical spine MRI 2019 Multilevel degenerative disc disease with moderate to severe stenosis at C3-4/C4-5 and C5-6  Lumbar spine CT 2017 Mild degenerative disc disease and facet arthropathy most pronounced at L4-5  Patient had seen neurosurgery and surgery

## 2020-05-14 ENCOUNTER — OFFICE VISIT (OUTPATIENT)
Dept: PAIN MANAGEMENT | Age: 50
End: 2020-05-14
Payer: MEDICAID

## 2020-05-14 PROCEDURE — G8427 DOCREV CUR MEDS BY ELIG CLIN: HCPCS | Performed by: PAIN MEDICINE

## 2020-05-14 PROCEDURE — 99212 OFFICE O/P EST SF 10 MIN: CPT | Performed by: PAIN MEDICINE

## 2020-05-14 PROCEDURE — 3017F COLORECTAL CA SCREEN DOC REV: CPT | Performed by: PAIN MEDICINE

## 2020-05-14 RX ORDER — TRAMADOL HYDROCHLORIDE 50 MG/1
50 TABLET ORAL EVERY 6 HOURS PRN
COMMUNITY
End: 2020-09-14 | Stop reason: SDUPTHER

## 2020-05-14 NOTE — PROGRESS NOTES
Jose Monte was read the following message We want to confirm that, for purposes of billing, this is a virtual visit with your provider for which we will submit a claim for reimbursement with your insurance company. You will be responsible for any copays, coinsurance amounts or other amounts not covered by your insurance company. If you do not accept this, unfortunately we will not be able to schedule a virtual visit with the provider. Do you accept? Ashlee responded Yes .

## 2020-05-14 NOTE — PROGRESS NOTES
[address]  [address] [facility], [address] [place] [address] [place]  [phone]    Tele health follow up Note      Karely Melton     Date of Visit:  5/14/2020    CC:  Tele health follow up   Chief Complaint   Patient presents with    Follow-up    Back Pain       Consent:  Telehealth Visit due to [place] 19 pandemic  The patient and/or health care decision maker is aware that he/she may receive a bill for this tele-health service Doxy Me, depending on his insurance coverage, and has provided verbal consent to proceed: Yes  I have considered the risks of abuse, dependence, addiction and diversion. My patient is aware that they will need a follow-up visit (in-person or virtually) at the appropriate time indicated for continued medications. Further, my patient is aware that when this acute crisis has lifted, they will be expected to return for an in-person visit and all elements of standard local and hospital guidelines in order to continue this medication. Patient Location:Home   Physician Location: Home     HPI:    Pain is unchanged. Appropriate analgesia with current medications regimen: Fair. Change in quality of symptoms:no. Medication side effects:none. Recent diagnostic testing:none. Recent interventional procedures:none. She has been on anticoagulation medications to include ASA. The patient  has not been on herbal supplements. The patient is not diabetic. Imaging:   Cervical spine MRI 04/2018  1. Multilevel degenerative disc disease extending from C3 through C7. Moderately severe spinal canal stenosis at C3-C4. Moderate spinal   canal stenosis at C4-C5. . Moderately severe spinal canal stenosis at   C5-C6. Abnormal signal intensity within the cervical spinal cord C3-C5   without evidence of enhancement. Findings are most likely reflective   of myelomalacia changes secondary to the underlying degree of cord   compression as described above.  No active demyelination identified. 2. Slight loss of normally expected cervical lordosis C3-C6.      CT Lumbar spine 2017  1. Mild diffuse osteopenia, no compression fracture or   spondylolisthesis.       2. Mild multilevel degenerative disc disease and facet joint   arthropathy of the lower lumbar spine more pronounced at L4-L5.      Previous treatments: Physical Therapy, Epidural Steroid Injection with  and medications. .         Potential Aberrant Drug-Related Behavior:      Urine Drug Screening:    OARRS report:  05/2020 consistent. Past Medical History: Reviewed    Past Surgical History: Reviewed     Home Medications: Reviewed    Allergies: Reviewed     Social History: Reviewed     REVIEW OF SYSTEMS:     Joseph Leon denies fever/chills, chest pain, shortness of breath, new bowel or bladder complaints. All other review of systems was negative. PHYSICAL EXAMINATION:      General:       A & O x3    HEENT:    Head:normocephalic and atraumatic  Sclera: icterus absent,     Lungs:    Breathing:Breathing Pattern: regular, no distress    Cervical spine:    Inspection:normal  Range of motion:abnormal moderately flexion, extension rotation bilateral and is  painful. Lumbar spine:    Range of motion:not tested. Musculoskeletal:    SLR:not done right, not done left, sitting     Extremities:    Tremors:None bilaterally upper and lower  Intact:Yes    Neurological:     Motor:          No apparent weakness per patient         Dermatology:    Skin:no unusual rashes and no skin lesions    Impression:    Neck and low back pain with radiation to the Left upper and lower extremity  Cervical spine MRI 2019 Multilevel degenerative disc disease with moderate to severe stenosis at C3-4/C4-5 and C5-6  Lumbar spine CT 2017 Mild degenerative disc disease and facet arthropathy most pronounced at L4-5  Patient had seen neurosurgery and surgery C3-4/C4-5 and C5-6 ACDF was recommended.    Plan:  Follow up on her neck/low back and Left hip

## 2020-06-23 ENCOUNTER — HOSPITAL ENCOUNTER (OUTPATIENT)
Age: 50
Discharge: HOME | End: 2020-06-25
Payer: MEDICAID

## 2020-06-23 ENCOUNTER — HOSPITAL ENCOUNTER (OUTPATIENT)
Dept: GENERAL RADIOLOGY | Age: 50
Discharge: HOME | End: 2020-06-25
Payer: MEDICAID

## 2020-06-23 PROCEDURE — 73502 X-RAY EXAM HIP UNI 2-3 VIEWS: CPT

## 2020-06-25 ENCOUNTER — VIRTUAL VISIT (OUTPATIENT)
Dept: PRIMARY CARE CLINIC | Age: 50
End: 2020-06-25
Payer: MEDICAID

## 2020-06-25 ENCOUNTER — VIRTUAL VISIT (OUTPATIENT)
Dept: PAIN MANAGEMENT | Age: 50
End: 2020-06-25
Payer: MEDICAID

## 2020-06-25 PROCEDURE — 3017F COLORECTAL CA SCREEN DOC REV: CPT | Performed by: PAIN MEDICINE

## 2020-06-25 PROCEDURE — 3017F COLORECTAL CA SCREEN DOC REV: CPT | Performed by: FAMILY MEDICINE

## 2020-06-25 PROCEDURE — 1036F TOBACCO NON-USER: CPT | Performed by: FAMILY MEDICINE

## 2020-06-25 PROCEDURE — G8427 DOCREV CUR MEDS BY ELIG CLIN: HCPCS | Performed by: PAIN MEDICINE

## 2020-06-25 PROCEDURE — G8427 DOCREV CUR MEDS BY ELIG CLIN: HCPCS | Performed by: FAMILY MEDICINE

## 2020-06-25 PROCEDURE — 99213 OFFICE O/P EST LOW 20 MIN: CPT | Performed by: PAIN MEDICINE

## 2020-06-25 PROCEDURE — 99213 OFFICE O/P EST LOW 20 MIN: CPT | Performed by: FAMILY MEDICINE

## 2020-06-25 PROCEDURE — G8417 CALC BMI ABV UP PARAM F/U: HCPCS | Performed by: FAMILY MEDICINE

## 2020-06-25 RX ORDER — TRAMADOL HYDROCHLORIDE 50 MG/1
50 TABLET ORAL DAILY PRN
Qty: 30 TABLET | Refills: 0 | Status: SHIPPED | OUTPATIENT
Start: 2020-06-25 | End: 2020-07-25

## 2020-06-25 RX ORDER — LORATADINE 10 MG/1
10 TABLET ORAL PRN
Qty: 30 TABLET | Refills: 5 | Status: SHIPPED
Start: 2020-06-25 | End: 2020-10-15 | Stop reason: SDUPTHER

## 2020-06-25 RX ORDER — VITAMIN A ACETATE, BETA CAROTENE, ASCORBIC ACID, CHOLECALCIFEROL, .ALPHA.-TOCOPHEROL ACETATE, DL-, THIAMINE MONONITRATE, RIBOFLAVIN, NIACINAMIDE, PYRIDOXINE HYDROCHLORIDE, FOLIC ACID, CYANOCOBALAMIN, CALCIUM CARBONATE, FERROUS FUMARATE, ZINC OXIDE, CUPRIC OXIDE 3080; 12; 120; 400; 1; 1.84; 3; 20; 22; 920; 25; 200; 27; 10; 2 [IU]/1; UG/1; MG/1; [IU]/1; MG/1; MG/1; MG/1; MG/1; MG/1; [IU]/1; MG/1; MG/1; MG/1; MG/1; MG/1
TABLET, FILM COATED ORAL
Qty: 30 TABLET | Refills: 5 | Status: SHIPPED
Start: 2020-06-25 | End: 2020-10-15 | Stop reason: SDUPTHER

## 2020-06-25 RX ORDER — ALBUTEROL SULFATE 90 UG/1
2 AEROSOL, METERED RESPIRATORY (INHALATION) PRN
Qty: 1 INHALER | Refills: 3 | Status: SHIPPED
Start: 2020-06-25 | End: 2021-01-21 | Stop reason: SDUPTHER

## 2020-06-25 RX ORDER — MELATONIN
1 DAILY
Qty: 30 TABLET | Refills: 5 | Status: SHIPPED
Start: 2020-06-25 | End: 2020-10-15 | Stop reason: SDUPTHER

## 2020-06-25 ASSESSMENT — ENCOUNTER SYMPTOMS
RHINORRHEA: 0
WHEEZING: 0
SHORTNESS OF BREATH: 0
APNEA: 0
BLOOD IN STOOL: 0
EYE PAIN: 0
CHEST TIGHTNESS: 0
COLOR CHANGE: 0
ABDOMINAL PAIN: 0
COUGH: 0
NAUSEA: 0
EYE ITCHING: 0
BACK PAIN: 0
DIARRHEA: 0
SORE THROAT: 0
EYE REDNESS: 0
SINUS PRESSURE: 0
VOMITING: 0
CONSTIPATION: 0

## 2020-06-25 NOTE — PROGRESS NOTES
described above. No active demyelination identified. 2. Slight loss of normally expected cervical lordosis C3-C6.      CT Lumbar spine 2017  1. Mild diffuse osteopenia, no compression fracture or   spondylolisthesis.       2. Mild multilevel degenerative disc disease and facet joint   arthropathy of the lower lumbar spine more pronounced at L4-L5.      Previous treatments: Physical Therapy, Epidural Steroid Injection with  and medications. .         Potential Aberrant Drug-Related Behavior:      Urine Drug Screening:    OARRS report:  06/2020 consistent. Past Medical History: Reviewed    Past Surgical History: Reviewed     Home Medications: Reviewed    Allergies: Reviewed     Social History: Reviewed     REVIEW OF SYSTEMS:     Trevor Hadley dencarline fever/chills, chest pain, shortness of breath, new bowel or bladder complaints. All other review of systems was negative. PHYSICAL EXAMINATION:      General:       A & O x3    HEENT:    Head:normocephalic and atraumatic  Sclera: icterus absent,     Lungs:    Breathing:Breathing Pattern: regular, no distress    Cervical spine:    Inspection:normal  Range of motion:abnormal moderately flexion, extension rotation bilateral and is  painful. Lumbar spine:    Range of motion:not tested. Musculoskeletal:    SLR:not done right, not done left, sitting     Extremities:    Tremors:None bilaterally upper and lower  Intact:Yes    Neurological:     Motor:          No apparent weakness per patient         Dermatology:    Skin:no unusual rashes and no skin lesions    Impression:    Neck and low back pain with radiation to the Left upper and lower extremity  Cervical spine MRI 2019 Multilevel degenerative disc disease with moderate to severe stenosis at C3-4/C4-5 and C5-6  Lumbar spine CT 2017 Mild degenerative disc disease and facet arthropathy most pronounced at L4-5  Patient had seen neurosurgery and surgery C3-4/C4-5 and C5-6 ACDF was recommended.    Plan:  Follow up on hours. Total Time: 11-20 minutes.     Cc: Referring physician

## 2020-06-25 NOTE — PROGRESS NOTES
Tripp Lucia was read the following message We want to confirm that, for purposes of billing, this is a virtual visit with your provider for which we will submit a claim for reimbursement with your insurance company. You will be responsible for any copays, coinsurance amounts or other amounts not covered by your insurance company. If you do not accept this, unfortunately we will not be able to schedule a virtual visit with the provider. Do you accept? Ashlee responded Yes .

## 2020-06-25 NOTE — PROGRESS NOTES
TeleMedicine Video Visit    This visit was performed as a virtual video visit using a synchronous, two-way, audio-video telehealth technology platform. Patient identification was verified at the start of the visit, including the patient's telephone number and physical location. I discussed with the patient the nature of our telehealth visits, that:     1. Due to the nature of an audio- video modality, the only components of a physical exam that could be done are the elements supported by direct observation. 2. I would evaluate the patient and recommend diagnostics and treatments based on my assessment. 3. If it was felt that the patient should be evaluated in clinic or an emergency room setting, then they would be directed there. 4. Our sessions are not being recorded and that personal health information is protected. 5. Our team would provide follow up care in person if/when the patient needs it. Patient does agree to proceed with telemedicine consultation. Patient's location: home address in [facility]  Physician  location other address in [facility] other people involved in call n/a      Time spent: Greater than 15    This visit was completed virtually using Doxy. me        Chief Complaint:     Chief Complaint   Patient presents with    Other     new script for therapy    Hip Pain         Hip Pain    The incident occurred more than 1 week ago. The incident occurred at home. There was no injury mechanism. The pain is present in the left hip and right hip. The quality of the pain is described as aching. The pain is moderate. The pain has been intermittent since onset. Pertinent negatives include no numbness. She reports no foreign bodies present. The symptoms are aggravated by movement, palpation and weight bearing. She has tried nothing for the symptoms. The treatment provided no relief.        Patient Active Problem List   Diagnosis    Cervical stenosis of spinal canal    DDD (degenerative disc disease), cervical    Mild intermittent asthma    Gastroesophageal reflux disease without esophagitis    History of TIA (transient ischemic attack) and stroke    Fatigue    Current mild episode of major depressive disorder without prior episode ([address]    Spinal stenosis of lumbar region without neurogenic claudication    Fibromyalgia    Migraines without aura and without status migrainosus, not intractable    Chronic left-sided low back pain without sciatica    Lumbar paraspinal muscle spasm    C/f of CIERA (obstructive sleep apnea)    Obesity due to excess calories    Irritable bowel syndrome without diarrhea    Hyperglycemia    Primary stabbing headache    Primary osteoarthritis of right wrist    Stroke-like symptoms    Numbness and tingling in right hand    Chronic pain syndrome    Primary osteoarthritis of left hip    Cervical disc disorder    Cervical facet joint syndrome    Cervical radiculopathy    Cervical stenosis of spine    Lumbar spondylosis    Lumbar facet arthropathy    Lumbar disc disorder       Past Medical History:   Diagnosis Date    Asthma     Cerebral artery occlusion with cerebral infarction ([facility] [address]     Cervical disc herniation     Cervical spinal stenosis     Chronic back pain     Chronic kidney disease     Depression     Disorder of thyroid gland 09/11/2012    GERD (gastroesophageal reflux disease)     Headache(784.0)     Hemorrhoids     History of colon polyps     Irritable bowel syndrome     Numbness and tingling in right hand 3/6/2020    Palpitations 07/24/2012    Scabies 05/18/2016    Seizure disorder ([facility] [address]     not on medications for this; last seizure years ago    Vitamin D deficiency 12/03/2014       Past Surgical History:   Procedure Laterality Date    COLONOSCOPY  3/9/2016    COLONOSCOPY  07/10/2017    TUBAL LIGATION      UPPER GASTROINTESTINAL ENDOSCOPY  07/10/2017       Current Outpatient Medications   Medication Sig Dispense Refill    albuterol Non-medical: None   Tobacco Use    Smoking status: Former Smoker     Packs/day: 1.00     Years: 25.00     Pack years: 25.00     Types: Cigarettes     Last attempt to quit: 1988     Years since quittin.2    Smokeless tobacco: Never Used   Substance and Sexual Activity    Alcohol use: No    Drug use: No    Sexual activity: None   Lifestyle    Physical activity     Days per week: None     Minutes per session: None    Stress: None   Relationships    Social connections     Talks on phone: None     Gets together: None     Attends Gnosticism service: None     Active member of club or organization: None     Attends meetings of clubs or organizations: None     Relationship status: None    Intimate partner violence     Fear of current or ex partner: None     Emotionally abused: None     Physically abused: None     Forced sexual activity: None   Other Topics Concern    None   Social History Narrative    None       Family History   Problem Relation Age of Onset    Heart Attack Mother     Kidney Disease Father         failure    Diabetes Other     Asthma Child     Asthma Child         bone disease    Anemia Child           Review of Systems   Constitutional: Negative for activity change, appetite change, fatigue and fever. HENT: Negative for congestion, ear pain, hearing loss, nosebleeds, rhinorrhea, sinus pressure and sore throat. Eyes: Negative for pain, redness, itching and visual disturbance. Respiratory: Negative for apnea, cough, chest tightness, shortness of breath and wheezing. Cardiovascular: Negative for chest pain, palpitations and leg swelling. Gastrointestinal: Negative for abdominal pain, blood in stool, constipation, diarrhea, nausea and vomiting. Endocrine: Negative. Genitourinary: Negative for decreased urine volume, difficulty urinating, dysuria, frequency, hematuria and urgency.    Musculoskeletal: Negative for arthralgias, back pain, gait problem, myalgias and neck

## 2020-07-11 ENCOUNTER — HOSPITAL ENCOUNTER (OUTPATIENT)
Dept: MRI IMAGING | Age: 50
Discharge: HOME | End: 2020-07-13
Payer: MEDICAID

## 2020-07-11 PROCEDURE — 73721 MRI JNT OF LWR EXTRE W/O DYE: CPT

## 2020-07-17 ENCOUNTER — PREP FOR PROCEDURE (OUTPATIENT)
Dept: PAIN MANAGEMENT | Age: 50
End: 2020-07-17

## 2020-07-17 ENCOUNTER — OFFICE VISIT (OUTPATIENT)
Dept: PAIN MANAGEMENT | Age: 50
End: 2020-07-17
Payer: MEDICAID

## 2020-07-17 VITALS
TEMPERATURE: 97.4 F | WEIGHT: 187 LBS | HEIGHT: 59 IN | BODY MASS INDEX: 37.7 KG/M2 | DIASTOLIC BLOOD PRESSURE: 70 MMHG | SYSTOLIC BLOOD PRESSURE: 116 MMHG | RESPIRATION RATE: 16 BRPM | OXYGEN SATURATION: 98 % | HEART RATE: 67 BPM

## 2020-07-17 PROBLEM — M25.552 PAIN IN LEFT HIP: Status: ACTIVE | Noted: 2020-07-17

## 2020-07-17 PROBLEM — M70.62 GREATER TROCHANTERIC BURSITIS OF LEFT HIP: Status: ACTIVE | Noted: 2020-07-17

## 2020-07-17 PROBLEM — G95.89 MYELOMALACIA OF CERVICAL CORD (HCC): Status: ACTIVE | Noted: 2020-07-17

## 2020-07-17 PROCEDURE — 99214 OFFICE O/P EST MOD 30 MIN: CPT | Performed by: PAIN MEDICINE

## 2020-07-17 PROCEDURE — G8417 CALC BMI ABV UP PARAM F/U: HCPCS | Performed by: PAIN MEDICINE

## 2020-07-17 PROCEDURE — G8427 DOCREV CUR MEDS BY ELIG CLIN: HCPCS | Performed by: PAIN MEDICINE

## 2020-07-17 PROCEDURE — 3017F COLORECTAL CA SCREEN DOC REV: CPT | Performed by: PAIN MEDICINE

## 2020-07-17 PROCEDURE — 1036F TOBACCO NON-USER: CPT | Performed by: PAIN MEDICINE

## 2020-07-17 PROCEDURE — 99213 OFFICE O/P EST LOW 20 MIN: CPT | Performed by: PAIN MEDICINE

## 2020-07-17 NOTE — PROGRESS NOTES
Do you currently have any of the following:    Fever: No  Headache:  No  Cough: No  Shortness of breath: No  Exposed to anyone with these symptoms: Abby                                                                                                                Veronica Lancaster presents to the [address] [address] on 7/17/2020. Tyrel Kimball is complaining of pain neck pain. . The pain is constant. The pain is described as aching and throbbing. Pain is rated on her best day at a 8, on her worst day at a 10, and on average at a 8 on the VAS scale. She took her last dose of Tramadol and Duloxitene. Tyrel Kimball does not have issues with constipation. Any procedures since your last visit: No,       She is not on NSAIDS and  is  on anticoagulation medications to include ASA and is managed by Gisel Schafer DO  . Pacemaker or defibrilator: No Physician managing device is NA.       /70   Pulse 67   Temp 97.4 °F (36.3 °C) (Infrared)   Resp 16   Ht 4' 11\" (1.499 m)   Wt 187 lb (84.8 kg)   LMP 03/27/2008   SpO2 98%   BMI 37.77 kg/m²      Patient's last menstrual period was 03/27/2008.

## 2020-07-17 NOTE — PROGRESS NOTES
[address] [facility], [address] [place] [address] Matheus  [phone]    Follow up Note      Beto Hutchinson     Date of Visit:  7/17/2020    CC:  Patient presents for follow up   Chief Complaint   Patient presents with    Neck Pain       Initial in person office evaluation for her neck/low back and Left hip pain with no acute issues. Appropriate analgesia with current medications regimen: Fair. Change in quality of symptoms:no. Medication side effects:none. Recent diagnostic testing:Left hip MRI. Recent interventional procedures:none.     She has been on anticoagulation medications to include ASA. The patient  has not been on herbal supplements. The patient is not diabetic.     Imaging:   Cervical spine MRI 04/2018  1. Multilevel degenerative disc disease extending from C3 through C7. Moderately severe spinal canal stenosis at C3-C4. Moderate spinal   canal stenosis at C4-C5. . Moderately severe spinal canal stenosis at   C5-C6. Abnormal signal intensity within the cervical spinal cord C3-C5   without evidence of enhancement. Findings are most likely reflective   of myelomalacia changes secondary to the underlying degree of cord   compression as described above. No active demyelination identified. 2. Slight loss of normally expected cervical lordosis C3-C6.      CT Lumbar spine 2017  1. Mild diffuse osteopenia, no compression fracture or   spondylolisthesis.       2. Mild multilevel degenerative disc disease and facet joint   arthropathy of the lower lumbar spine more pronounced at L4-L5.      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         Otherwise, no musculoskeletal pathology to explain patient's pain. Previous treatments: Physical Therapy, Epidural Steroid Injection with  and medications. .                                          Potential Aberrant Drug-Related Behavior:    No     Urine Drug Screening:  None      OARRS report:  07/2020 consistent. Past Medical History:   Diagnosis Date    Asthma     Cerebral artery occlusion with cerebral infarction ([facility] [address]     Cervical disc herniation     Cervical spinal stenosis     Chronic back pain     Chronic kidney disease     Depression     Disorder of thyroid gland 09/11/2012    GERD (gastroesophageal reflux disease)     Headache(784.0)     Hemorrhoids     History of colon polyps     Irritable bowel syndrome     Numbness and tingling in right hand 3/6/2020    Palpitations 07/24/2012    Scabies 05/18/2016    Seizure disorder ([facility] [address]     not on medications for this; last seizure years ago    Vitamin D deficiency 12/03/2014     Past Surgical History:   Procedure Laterality Date    COLONOSCOPY  3/9/2016    COLONOSCOPY  07/10/2017    TUBAL LIGATION      UPPER GASTROINTESTINAL ENDOSCOPY  07/10/2017     Prior to Admission medications    Medication Sig Start Date End Date Taking? Authorizing Provider   albuterol sulfate HFA (PROAIR HFA) 108 (90 Base) MCG/ACT inhaler Inhale 2 puffs into the lungs as needed for Wheezing or Shortness of Breath 6/25/20   Katharine Polo DO   vitamin D3 (CHOLECALCIFEROL) 25 MCG (1000 UT) TABS tablet Take 1 tablet by mouth daily 6/25/20   Katharine Polo DO   loratadine (CLARITIN) 10 MG tablet Take 1 tablet by mouth as needed (allergies) 6/25/20   Abdi Barkley DO   Prenatal Vit-Fe Fumarate-FA (PRENATAL PLUS) 27-1 MG TABS 1 pill daily 6/25/20   Abdi Barkley DO   traMADol (ULTRAM) 50 MG tablet Take 1 tablet by mouth daily as needed for Pain for up to 30 days. 6/25/20 7/25/20  James Zuniga MD   traMADol (ULTRAM) 50 MG tablet Take 50 mg by mouth every 6 hours as needed for Pain.     Historical Provider, MD   ammonium lactate (LAC-HYDRIN) 12 % lotion APPLY AA BID 1/15/20   Historical Provider, MD   aspirin 81 MG tablet Take 2 tablets by mouth daily 1/16/20   Abdi Grijalva DO   atorvastatin (LIPITOR) 20 MG tablet Take 1 tablet by mouth daily 20   Roseanna Pearson DO   DULoxetine (CYMBALTA) 30 MG extended release capsule Take 1 capsule by mouth daily 10/17/19   Roseanna Pearson DO   EPINEPHrine (EPIPEN 2-ABUNDIO) 0.3 MG/0.3ML SOAJ injection Inject into muscle for allergic reaction 19   Abdi Pugh DO   omeprazole (PRILOSEC) 20 MG delayed release capsule Take 1 capsule by mouth daily Take am dos 07/10. 19   Abdi Grijalva DO   albuterol (PROVENTIL) (2.5 MG/3ML) 0.083% nebulizer solution Take 3 mLs by nebulization every 6 hours as needed for Wheezing 3/8/19   Abdi Grijalva DO   dicyclomine (BENTYL) 20 MG tablet Take 20 mg by mouth every 6 hours Take am dos 07/10    Historical Provider, MD     Allergies   Allergen Reactions    Fish-Derived Products Anaphylaxis    Dye [Iodides] Itching     Social History     Socioeconomic History    Marital status:      Spouse name: Not on file    Number of children: Not on file    Years of education: Not on file    Highest education level: Not on file   Occupational History    Not on file   Social Needs    Financial resource strain: Not on file    Food insecurity     Worry: Not on file     Inability: Not on file    Transportation needs     Medical: Not on file     Non-medical: Not on file   Tobacco Use    Smoking status: Former Smoker     Packs/day: 1.00     Years: 25.00     Pack years: 25.00     Types: Cigarettes     Last attempt to quit: 1988     Years since quittin.2    Smokeless tobacco: Never Used   Substance and Sexual Activity    Alcohol use: No    Drug use: No    Sexual activity: Not on file   Lifestyle    Physical activity     Days per week: Not on file     Minutes per session: Not on file    Stress: Not on file   Relationships    Social connections     Talks on phone: Not on file     Gets together: Not on file     Attends Buddhist service: Not on file     Active member of club or organization: Not on file     Attends meetings of clubs or organizations: Not on file     Relationship status: Not on file    Intimate partner violence     Fear of current or ex partner: Not on file     Emotionally abused: Not on file     Physically abused: Not on file     Forced sexual activity: Not on file   Other Topics Concern    Not on file   Social History Narrative    Not on file     Family History   Problem Relation Age of Onset    Heart Attack Mother     Kidney Disease Father         failure    Diabetes Other     Asthma Child     Asthma Child         bone disease    Anemia Child      REVIEW OF SYSTEMS:     Iain Price denies fever/chills, chest pain, shortness of breath, new bowel or bladder complaints. All other review of systems was negative. PHYSICAL EXAMINATION:      [facility] 03/27/2008     General:      General appearance:   elderly, pleasant and well-hydrated. , in moderate discomfort and A & O x3  Build:Overweight    HEENT:    Head:normocephalic and atraumatic  Sclera: icterus absent,    Lungs:    Breathing:Breathing Pattern: regular, no distress    Abdomen:    Shape:non-distended and normal  Tenderness:none    Cervical spine:    Inspection:normal  Palpation:tenderness paravertebral muscles, facet loading, left, right, positive and tenderness. Range of motion:abnormal moderately flexion, extension rotation bilateral and is  painful. Lumbar spine:    Spine inspection:normal   CVA tenderness:No   Palpation:tenderness paravertebral muscles, facet loading, left, right, positive and tenderness. Range of motion:abnormal moderately Lateral bending, flexion, extension rotation bilateral and is  painful.     Musculoskeletal:    Trigger points in Paraveteral:absent bilaterally              Iln's:negative right, negative                left   SI joint tenderness:negative right, negative left              KAYODE test:negative right, negative             left  Piriformis tenderness:negative right, negative left  Trochanteric bursa tenderness:negative right, positive left  SLR:negative right, negative left, sitting     Extremities:    Tremors:None bilaterally upper and lower  Range of motion:Generally normal shoulders, pain with internal rotation of hips positive Left  Intact:Yes  Edema:Normal    Neurological:    Sensory:diminshed to light touch lateral aspect of Left arm. Motor:   Right Grip4+/5              Left Grip4+/5               Right Bicep4+/5           Left Bicep4+/5              Right Triceps4+/5       Left Triceps4+/5          Right Deltoid4+/5     Left Deltoid4+/5                  Right Quadriceps4+/5          Left Quadriceps4+/5           Right Gastrocnemius4+/5    Left Gastrocnemius4+/5  Right Ant Tibialis4+/5  Left Ant Tibialis4+/5  Reflexes:    Right Brachioradialis reflex2+  Left Brachioradialis reflex2+  Right Biceps reflex2+  Left Biceps reflex2+  Right Triceps reflex2+  Left Triceps reflex2+  Right Quadriceps reflex2+  Left Quadriceps reflex2+  Right Achilles reflex2+  Left Achilles reflex2+  Gait:antalgic    Dermatology:    Skin:no unusual rashes and no skin lesions    Impression:    Neck and low back pain with radiation to the Left upper and lower extremity  Cervical spine MRI 2019 Multilevel degenerative disc disease with moderate to severe stenosis at C3-4/C4-5 and C5-6  Lumbar spine CT 2017 Mild degenerative disc disease and facet arthropathy most pronounced at L4-5  Patient had seen neurosurgery and surgery C3-4/C4-5 and C5-6 ACDF was recommended.   Plan:  Initial in person office evaluation for her neck/low back and Left hip pain. Results of Left hip MRI were discussed with the patient. Patient is complaining of increased Left hip pain, On exam patient has tenderness over Left greater trochanter. Discussed Left greater trochanteric bursae injection. Patient agrees. Will consider interventional procedures for low back pain if needed later. Currently her neck pain is stable. Continue with Ultram 50mg QD for moderate to severe pain. Continue with OTC pain medications for mild to moderate pain. OARRS report reviewed 07/2020. Urine screen today. Patient encouraged to stay active and to lose weight. Patient had tried and failed physical therapy. Treatment plan discussed with the patient and her daughter including medications and procedure side effects. We discussed with the patient that combining opioids, benzodiazepines, alcohol, illicit drugs or sleep aids increases the risk of respiratory depression including death. We discussed that these medications may cause drowsiness, sedation or dizziness and have counseled the patient not to drive or operate machinery. We have discussed that these medications will be prescribed only by one provider. We have discussed with the patient about age related risk factors and have thoroughly discussed the importance of taking these medications as prescribed. The patient verbalizes understanding. heather Pena M.D.

## 2020-08-05 ENCOUNTER — HOSPITAL ENCOUNTER (OUTPATIENT)
Age: 50
Discharge: HOME | End: 2020-08-07
Payer: MEDICAID

## 2020-08-05 PROCEDURE — U0003 INFECTIOUS AGENT DETECTION BY NUCLEIC ACID (DNA OR RNA); SEVERE ACUTE RESPIRATORY SYNDROME CORONAVIRUS 2 (SARS-COV-2) (CORONAVIRUS DISEASE [COVID-19]), AMPLIFIED PROBE TECHNIQUE, MAKING USE OF HIGH THROUGHPUT TECHNOLOGIES AS DESCRIBED BY CMS-2020-01-R: HCPCS

## 2020-08-06 RX ORDER — TRAMADOL HYDROCHLORIDE 50 MG/1
50 TABLET ORAL DAILY PRN
Qty: 30 TABLET | Refills: 0 | Status: SHIPPED | OUTPATIENT
Start: 2020-08-06 | End: 2020-09-05

## 2020-08-07 LAB
SARS-COV-2: NOT DETECTED
SOURCE: NORMAL

## 2020-08-10 ENCOUNTER — HOSPITAL ENCOUNTER (OUTPATIENT)
Age: 50
Setting detail: OUTPATIENT SURGERY
Discharge: HOME | End: 2020-08-10
Attending: PAIN MEDICINE | Admitting: PAIN MEDICINE
Payer: MEDICAID

## 2020-08-10 ENCOUNTER — HOSPITAL ENCOUNTER (OUTPATIENT)
Dept: OPERATING ROOM | Age: 50
Setting detail: OUTPATIENT SURGERY
Discharge: HOME | End: 2020-08-10
Attending: PAIN MEDICINE
Payer: MEDICAID

## 2020-08-10 VITALS
DIASTOLIC BLOOD PRESSURE: 66 MMHG | WEIGHT: 177 LBS | BODY MASS INDEX: 35.68 KG/M2 | OXYGEN SATURATION: 99 % | SYSTOLIC BLOOD PRESSURE: 107 MMHG | RESPIRATION RATE: 14 BRPM | HEIGHT: 59 IN | HEART RATE: 77 BPM

## 2020-08-10 PROCEDURE — 7100000010 HC PHASE II RECOVERY - FIRST 15 MIN: Performed by: PAIN MEDICINE

## 2020-08-10 PROCEDURE — 2709999900 HC NON-CHARGEABLE SUPPLY: Performed by: PAIN MEDICINE

## 2020-08-10 PROCEDURE — 77002 NEEDLE LOCALIZATION BY XRAY: CPT | Performed by: PAIN MEDICINE

## 2020-08-10 PROCEDURE — 20610 DRAIN/INJ JOINT/BURSA W/O US: CPT | Performed by: PAIN MEDICINE

## 2020-08-10 PROCEDURE — 3600000005 HC SURGERY LEVEL 5 BASE: Performed by: PAIN MEDICINE

## 2020-08-10 PROCEDURE — 2500000003 HC RX 250 WO HCPCS: Performed by: PAIN MEDICINE

## 2020-08-10 PROCEDURE — 7100000011 HC PHASE II RECOVERY - ADDTL 15 MIN: Performed by: PAIN MEDICINE

## 2020-08-10 PROCEDURE — 3209999900 FLUORO FOR SURGICAL PROCEDURES

## 2020-08-10 PROCEDURE — 6360000002 HC RX W HCPCS: Performed by: PAIN MEDICINE

## 2020-08-10 RX ORDER — LIDOCAINE HYDROCHLORIDE 5 MG/ML
INJECTION, SOLUTION INFILTRATION; INTRAVENOUS PRN
Status: DISCONTINUED | OUTPATIENT
Start: 2020-08-10 | End: 2020-08-10 | Stop reason: ALTCHOICE

## 2020-08-10 ASSESSMENT — PAIN SCALES - GENERAL
PAINLEVEL_OUTOF10: 0
PAINLEVEL_OUTOF10: 0

## 2020-08-10 ASSESSMENT — PAIN - FUNCTIONAL ASSESSMENT: PAIN_FUNCTIONAL_ASSESSMENT: 0-10

## 2020-08-10 NOTE — H&P
[address]  [address] [facility], [address] [place] [address] [phone]    Procedure History & Physical      Ezequiel Asencio     HPI:    Patient  is here for Left hip pain for Left greater trochanteric bursae injection. Labs/imaging studies reviewed   All question and concerns addressed including R/B/A associated with the procedure    Past Medical History:   Diagnosis Date    Asthma     Cerebral artery occlusion with cerebral infarction ([facility] [address]     Cervical disc herniation     Cervical spinal stenosis     Chronic back pain     Chronic kidney disease     Depression     Disorder of thyroid gland 09/11/2012    GERD (gastroesophageal reflux disease)     Headache(784.0)     Hemorrhoids     History of colon polyps     Irritable bowel syndrome     Numbness and tingling in right hand 3/6/2020    Palpitations 07/24/2012    Scabies 05/18/2016    Seizure disorder ([facility] [address]     not on medications for this; last seizure years ago    Vitamin D deficiency 12/03/2014       Past Surgical History:   Procedure Laterality Date    COLONOSCOPY  3/9/2016    COLONOSCOPY  07/10/2017    TUBAL LIGATION      UPPER GASTROINTESTINAL ENDOSCOPY  07/10/2017       Prior to Admission medications    Medication Sig Start Date End Date Taking? Authorizing Provider   albuterol sulfate HFA (PROAIR HFA) 108 (90 Base) MCG/ACT inhaler Inhale 2 puffs into the lungs as needed for Wheezing or Shortness of Breath 6/25/20  Yes Abdi Maciel, DO   vitamin D3 (CHOLECALCIFEROL) 25 MCG (1000 UT) TABS tablet Take 1 tablet by mouth daily 6/25/20  Yes Abdi Grijalva, DO   loratadine (CLARITIN) 10 MG tablet Take 1 tablet by mouth as needed (allergies) 6/25/20  Yes Abdi Malagon, DO   Prenatal Vit-Fe Fumarate-FA (PRENATAL PLUS) 27-1 MG TABS 1 pill daily 6/25/20  Yes Abdi Grijalva, DO   traMADol (ULTRAM) 50 MG tablet Take 50 mg by mouth every 6 hours as needed for Pain.    Yes Historical Provider, MD   ammonium lactate (LAC-HYDRIN) 12 % lotion APPLY AA BID 1/15/20  Yes Historical Provider, MD   aspirin 81 MG tablet Take 2 tablets by mouth daily 20  Yes Abdi Grijalva,    atorvastatin (LIPITOR) 20 MG tablet Take 1 tablet by mouth daily 20  Yes Terence Saucer, DO   DULoxetine (CYMBALTA) 30 MG extended release capsule Take 1 capsule by mouth daily 10/17/19  Yes Terence Saucer, DO   EPINEPHrine (EPIPEN 2-ABUNDIO) 0.3 MG/0.3ML SOAJ injection Inject into muscle for allergic reaction 19  Yes Terence Saucer, DO   omeprazole (PRILOSEC) 20 MG delayed release capsule Take 1 capsule by mouth daily Take am dos 07/10. 19  Yes Terence Saucer, DO   albuterol (PROVENTIL) (2.5 MG/3ML) 0.083% nebulizer solution Take 3 mLs by nebulization every 6 hours as needed for Wheezing 3/8/19  Yes Abdi Grijalva,    dicyclomine (BENTYL) 20 MG tablet Take 20 mg by mouth every 6 hours Take am dos 07/10   Yes Historical Provider, MD   traMADol (ULTRAM) 50 MG tablet Take 1 tablet by mouth daily as needed for Pain for up to 30 days.  20  Jose Yang MD       Allergies   Allergen Reactions    Fish-Derived Products Anaphylaxis    Dye [Iodides] Itching       Social History     Socioeconomic History    Marital status:      Spouse name: Not on file    Number of children: Not on file    Years of education: Not on file    Highest education level: Not on file   Occupational History    Not on file   Social Needs    Financial resource strain: Not on file    Food insecurity     Worry: Not on file     Inability: Not on file    Transportation needs     Medical: Not on file     Non-medical: Not on file   Tobacco Use    Smoking status: Former Smoker     Packs/day: 1.00     Years: 25.00     Pack years: 25.00     Types: Cigarettes     Last attempt to quit: 1988     Years since quittin.3    Smokeless tobacco: Never Used   Substance and Sexual Activity    Alcohol use: No    Drug use: No    Sexual activity: Not on cyanosis. MUSCULOSKELETAL: negative for flaccid muscle tone or spastic movements. SKIN: gross examination reveals no signs of rashes, or diaphoresis. NEURO: Cranial nerves II-XII grossly intact. No signs of agitated mood. Assessment/Plan:    Left hip pain for Left greater trochanteric bursae injection.

## 2020-08-10 NOTE — OP NOTE
8/10/2020    Patient: Daljit Duval  :  1970  Age:  47 y.o. Sex:  female     PRE-OPERATIVE DIAGNOSIS:Left   Greater trochanter bursitis. POST-OPERATIVE DIAGNOSIS: Same. PROCEDURE PERFORMED: Left  Greater trochanter bursea steroid injection under fluoroscopic guidance. SURGEON: AILEEN Leon M.D. ANESTHESIA: Local    ESTIMATED BLOOD LOSS: None. BRIEF HISTORY: Daljit Duval comes in today for Left greater trochanter bursea steroid injection under fluoroscopic guidance . After discussing the potential risks and benefits of the procedure with the patient  Iain Price did request that we proceed. Ashlee's complete History & Physical examination were reviewed in depth, a copy of which is in the chart. DESCRIPTION OF PROCEDURE:      After confirming written and informed consent, a time-out was performed and [place] name and date of birth, the procedure to be performed as well as the plan for the location of the needle insertion were confirmed. Patient was brought into the procedure room and was placed in the lateral decubitus position on a fluoroscopy table. Standard monitors were placed and vital signs were observed throughout the procedure. The area of the Left hip was prepped with chloraprep and draped in a sterile manner. The overlying skin and subcutaneous tissues were anesthetized with 0.5% Lidocaine. At this time, a 22 gauge spinal 3 1/2 inch spinal needle was advanced in AP view until greater trochanter was contacted. After negative aspiration, 0.5 cc of 240 omnipaque was injected with appropiate contrast spread under live fluoroscopy. A solution of 0.25 % marcaine 4 cc and 40 mg DepoMedrol was injected easily after negative aspiration without complications. The needle was then removed and Band-Aid applied. Disposition the patient tolerated the procedure well and there were no complications . Vital signs remained stable throughout the procedure.  The patient was escorted to the recovery area where they remained until discharge and written discharge instructions for the procedure were given. Plan: Savanna Mcdowell will return to our pain management center as scheduled.      Kishan Sheehan MD

## 2020-08-14 ENCOUNTER — OFFICE VISIT (OUTPATIENT)
Dept: PAIN MANAGEMENT | Age: 50
End: 2020-08-14
Payer: MEDICAID

## 2020-08-14 VITALS
WEIGHT: 158 LBS | DIASTOLIC BLOOD PRESSURE: 70 MMHG | TEMPERATURE: 98.6 F | BODY MASS INDEX: 31.85 KG/M2 | HEIGHT: 59 IN | RESPIRATION RATE: 16 BRPM | HEART RATE: 68 BPM | SYSTOLIC BLOOD PRESSURE: 118 MMHG

## 2020-08-14 PROCEDURE — G8427 DOCREV CUR MEDS BY ELIG CLIN: HCPCS | Performed by: PAIN MEDICINE

## 2020-08-14 PROCEDURE — 99213 OFFICE O/P EST LOW 20 MIN: CPT | Performed by: PAIN MEDICINE

## 2020-08-14 PROCEDURE — G8417 CALC BMI ABV UP PARAM F/U: HCPCS | Performed by: PAIN MEDICINE

## 2020-08-14 PROCEDURE — 3017F COLORECTAL CA SCREEN DOC REV: CPT | Performed by: PAIN MEDICINE

## 2020-08-14 PROCEDURE — 1036F TOBACCO NON-USER: CPT | Performed by: PAIN MEDICINE

## 2020-08-14 NOTE — PROGRESS NOTES
Do you currently have any of the following:    Fever: No  Headache:  No  Cough: No  Shortness of breath: No  Exposed to anyone with these symptoms: No                                                                                                                Donte Jarvis presents to the [facility] on 8/14/2020. Amelia Reynoso is complaining of pain low back and neck pain. The pain is constant. The pain is described as colicky, stabbing, miserable and tingly. Pain is rated on her best day at a 7, on her worst day at a 10, and on average at a 8 on the VAS scale. She took her last dose of Tramadol last night. Amelia Reynoso does not have issues with constipation. Any procedures since your last visit: Yes,   8-10-20-LEFT GREATER TROCHANTERIC BURSAE INJECTION X-RAY (CPT 73007)   No relief since the injection   She is not on NSAIDS and  is not on anticoagulation medications to include ASA and is managed by Dr Albino Alonso. Pacemaker or defibrilator: no.       /70   Pulse 68   Temp 98.6 °F (37 °C) (Oral)   Resp 16   Ht 4' 11\" (1.499 m)   Wt 158 lb (71.7 kg)   LMP 03/27/2008   BMI 31.91 kg/m²      Patient's last menstrual period was 03/27/2008.

## 2020-08-14 NOTE — PROGRESS NOTES
Behavior:    No     Urine Drug Screening:  None      OARRS report:  08/2020 consistent. Past Medical History:   Diagnosis Date    Asthma     Cerebral artery occlusion with cerebral infarction ([facility] [address]     Cervical disc herniation     Cervical spinal stenosis     Chronic back pain     Chronic kidney disease     Depression     Disorder of thyroid gland 09/11/2012    GERD (gastroesophageal reflux disease)     Headache(784.0)     Hemorrhoids     History of colon polyps     Irritable bowel syndrome     Low back pain     Numbness and tingling in right hand 3/6/2020    Palpitations 07/24/2012    Scabies 05/18/2016    Seizure disorder ([facility] [address]     not on medications for this; last seizure years ago    Vitamin D deficiency 12/03/2014     Past Surgical History:   Procedure Laterality Date    COLONOSCOPY  3/9/2016    COLONOSCOPY  07/10/2017    HIP SURGERY Left 8/10/2020    LEFT GREATER TROCHANTERIC BURSAE INJECTION X-RAY (CPT 18636) performed by Ricarda Bowman MD at [facility] 08/10/2020    left greater trochanter bursae injection    TUBAL LIGATION      UPPER GASTROINTESTINAL ENDOSCOPY  07/10/2017     Prior to Admission medications    Medication Sig Start Date End Date Taking? Authorizing Provider   traMADol (ULTRAM) 50 MG tablet Take 1 tablet by mouth daily as needed for Pain for up to 30 days.  8/6/20 9/5/20 Yes Ricarda Bowman MD   albuterol sulfate HFA (PROAIR HFA) 108 (90 Base) MCG/ACT inhaler Inhale 2 puffs into the lungs as needed for Wheezing or Shortness of Breath 6/25/20  Yes Abdi Alonso, DO   vitamin D3 (CHOLECALCIFEROL) 25 MCG (1000 UT) TABS tablet Take 1 tablet by mouth daily 6/25/20  Yes Abdi Grijalva, DO   loratadine (CLARITIN) 10 MG tablet Take 1 tablet by mouth as needed (allergies) 6/25/20  Yes Abdi Campos, DO   Prenatal Vit-Fe Fumarate-FA (PRENATAL PLUS) 27-1 MG TABS 1 pill daily 6/25/20  Yes Abdi Grijalva, DO   traMADol (ULTRAM) 50 MG tablet Take 50 file    Stress: Not on file   Relationships    Social connections     Talks on phone: Not on file     Gets together: Not on file     Attends Protestant service: Not on file     Active member of club or organization: Not on file     Attends meetings of clubs or organizations: Not on file     Relationship status: Not on file    Intimate partner violence     Fear of current or ex partner: Not on file     Emotionally abused: Not on file     Physically abused: Not on file     Forced sexual activity: Not on file   Other Topics Concern    Not on file   Social History Narrative    Not on file     Family History   Problem Relation Age of Onset    Heart Attack Mother     Kidney Disease Father         failure    Diabetes Other     Asthma Child     Asthma Child         bone disease    Anemia Child      REVIEW OF SYSTEMS:     Cristhian Embs denies fever/chills, chest pain, shortness of breath, new bowel or bladder complaints. All other review of systems was negative. PHYSICAL EXAMINATION:      /70   Pulse 68   Temp 98.6 °F (37 °C) (Oral)   Resp 16   Ht 4' 11\" (1.499 m)   Wt 158 lb (71.7 kg)   LMP 03/27/2008   BMI 31.91 kg/m²     General:      General appearance:   elderly, pleasant and well-hydrated. , in moderate discomfort and A & O x3  Build:Overweight    HEENT:    Head:normocephalic and atraumatic  Sclera: icterus absent,    Lungs:    Breathing:Breathing Pattern: regular, no distress    Abdomen:    Shape:non-distended and normal  Tenderness:none    Cervical spine:    Inspection:normal  Palpation:tenderness paravertebral muscles, facet loading, left, right, positive and tenderness. Range of motion:abnormal moderately flexion, extension rotation bilateral and is  painful. Lumbar spine:    Spine inspection:normal   CVA tenderness:No   Palpation:tenderness paravertebral muscles, facet loading, left, right, positive and tenderness.   Range of motion:abnormal moderately Lateral bending, flexion, extension rotation bilateral and is  painful. Musculoskeletal:    Trigger points in Paraveteral:absent bilaterally              Lin's:negative right, negative left   SI joint tenderness:negative right, negative left              KAYODE test:negative right, negative left  Piriformis tenderness:negative right, negative left  Trochanteric bursa tenderness:negative right, positive left  SLR:negative right, negative left, sitting     Extremities:    Tremors:None bilaterally upper and lower  Range of motion:Generally normal shoulders, pain with internal rotation of hips positive Left  Intact:Yes  Edema:Normal    Neurological:    Sensory:diminshed to light touch lateral aspect of Left arm. Motor:   Right Grip4+/5              Left Grip4+/5               Right Bicep4+/5           Left Bicep4+/5              Right Triceps4+/5       Left Triceps4+/5          Right Deltoid4+/5     Left Deltoid4+/5                  Right Quadriceps4+/5          Left Quadriceps4+/5           Right Gastrocnemius4+/5    Left Gastrocnemius4+/5  Right Ant Tibialis4+/5  Left Ant Tibialis4+/5  Reflexes:    Right Brachioradialis reflex2+  Left Brachioradialis reflex2+  Right Biceps reflex2+  Left Biceps reflex2+  Right Triceps reflex2+  Left Triceps reflex2+  Right Quadriceps reflex2+  Left Quadriceps reflex2+  Right Achilles reflex2+  Left Achilles reflex2+  Gait:antalgic    Dermatology:    Skin:no unusual rashes and no skin lesions    Impression:    Neck and low back pain with radiation to the Left upper and lower extremity  Cervical spine MRI 2019 Multilevel degenerative disc disease with moderate to severe stenosis at C3-4/C4-5 and C5-6  Lumbar spine CT 2017 Mild degenerative disc disease and facet arthropathy most pronounced at L4-5  Patient had seen neurosurgery and surgery C3-4/C4-5 and C5-6 ACDF was recommended.   Plan:  Follow up on her neck/low back and Left hip pain.   Patient is s/p Left greater trochanter bursae injection with less than expected response. Patient given samples for Lidocaine patches for her left hip. Discussed referral to orthopedic surgeon but patient wants to hold off. Continue with Ultram 50mg QD for moderate to severe pain. Continue with OTC pain medications for mild to moderate pain. OARRS report reviewed 08/2020. Saliva screen today. Patient encouraged to stay active and to lose weight. Patient had tried and failed physical therapy. Treatment plan discussed with the patient and her daughter including medications and procedure side effects. We discussed with the patient that combining opioids, benzodiazepines, alcohol, illicit drugs or sleep aids increases the risk of respiratory depression including death. We discussed that these medications may cause drowsiness, sedation or dizziness and have counseled the patient not to drive or operate machinery. We have discussed that these medications will be prescribed only by one provider. We have discussed with the patient about age related risk factors and have thoroughly discussed the importance of taking these medications as prescribed. The patient verbalizes understanding. ccrefmichelleing kelly Leon M.D.

## 2020-09-11 ENCOUNTER — TELEPHONE (OUTPATIENT)
Dept: PAIN MANAGEMENT | Age: 50
End: 2020-09-11

## 2020-09-14 ENCOUNTER — VIRTUAL VISIT (OUTPATIENT)
Dept: PAIN MANAGEMENT | Age: 50
End: 2020-09-14
Payer: MEDICAID

## 2020-09-14 PROCEDURE — 99212 OFFICE O/P EST SF 10 MIN: CPT | Performed by: NURSE PRACTITIONER

## 2020-09-14 RX ORDER — TIZANIDINE 2 MG/1
2 TABLET ORAL 2 TIMES DAILY PRN
Qty: 60 TABLET | Refills: 1 | Status: SHIPPED | OUTPATIENT
Start: 2020-09-14 | End: 2020-10-14

## 2020-09-14 RX ORDER — TRAMADOL HYDROCHLORIDE 50 MG/1
50 TABLET ORAL EVERY 6 HOURS PRN
Qty: 30 TABLET | Refills: 1 | Status: SHIPPED | OUTPATIENT
Start: 2020-09-14 | End: 2020-10-14

## 2020-09-14 NOTE — PROGRESS NOTES
Ehsan Sewell was read the following message We want to confirm that, for purposes of billing, this is a virtual visit with your provider for which we will submit a claim for reimbursement with your insurance company. You will be responsible for any copays, coinsurance amounts or other amounts not covered by your insurance company. If you do not accept this, unfortunately we will not be able to schedule a virtual visit with the provider. Do you accept? Ashlee responded Yes .

## 2020-09-14 NOTE — TELEPHONE ENCOUNTER
Change of plans. Can you place patient on my schedule as a virtual visit.  I need to clarify some things first. Thank you

## 2020-09-14 NOTE — PROGRESS NOTES
[facility]  [address] [facility], [address] [place] [address] [place]  [phone]  Telephone follow up visit      Date of Visit:  9/14/2020    CC: low back and neck pain     Consent:   Telephone follow up due to Kristie 19 pandemic   The patient and/or health care decision maker is aware that he/she may receive a bill for this telephone service, depending on his insurance coverage, and has provided verbal consent to proceed: Yes    I have considered the risks of abuse, dependence, addiction and diversion. My patient is aware that they will need a follow-up visit (in-person or virtually) at the appropriate time indicated for continued medications. Further, my patient is aware that when this acute crisis has lifted, they will be expected to return for an in-person visit and all elements of standard local and hospital guidelines in order to continue this medication. Patient location: Home   Physician Location:Other address in [address] [place] [address] Dr POLK:  Follow up on her neck/low back and Left hip pain. Patient reports spasms to low back keeping her up at night and is main complaint  Appropriate analgesia with current medications regimen: no .  Change in quality of symptoms: yes worsening    Medication side effects:none. Recent diagnostic testing:none  Recent interventional procedures: none      She has been on anticoagulation medications to include ASA. The patient Ibrahima Sanones not been on herbal supplements.  The patient is not diabetic.     Imaging:     Cervical spine MRI 04/2018  1. Multilevel degenerative disc disease extending from C3 through C7. Moderately severe spinal canal stenosis at C3-C4. Moderate spinal   canal stenosis at C4-C5. . Moderately severe spinal canal stenosis at   C5-C6. Abnormal signal intensity within the cervical spinal cord C3-C5   without evidence of enhancement.  Findings are most likely reflective   of myelomalacia changes secondary to the underlying degree of cord   compression as described above. No active demyelination identified. 2. Slight loss of normally expected cervical lordosis C3-C6.      CT Lumbar spine 2017  1. Mild diffuse osteopenia, no compression fracture or   spondylolisthesis.       2. Mild multilevel degenerative disc disease and facet joint   arthropathy of the lower lumbar spine more pronounced at L4-L5.      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         Otherwise, no musculoskeletal pathology to explain patient's pain.      Previous treatments: Physical Therapy, Epidural Steroid Injection with  and medications. .                                          Potential Aberrant Drug-Related Behavior:    No     Urine Drug Screening:  None      OARRS report:  09/2020 consistent. Past Medical History: Reviewed    Past Surgical History: Reviewed     Home Medications: Reviewed    Allergies: Reviewed     Social History: Reviewed     REVIEW OF SYSTEMS:     Deven July denies fever/chills, chest pain, shortness of breath, new bowel or bladder complaints. All other review of systems was negative. PHYSICAL EXAMINATION:     General:       A & O x3    Lungs:    Breathing:Normal expansion. Non labored      IImpression:     Neck and low back pain with radiation to the Left upper and lower extremity  Cervical spine MRI 2019 Multilevel degenerative disc disease with moderate to severe stenosis at C3-4/C4-5 and C5-6  Lumbar spine CT 2017 Mild degenerative disc disease and facet arthropathy most pronounced at L4-5  Patient had seen neurosurgery and surgery C3-4/C4-5 and C5-6 ACDF was recommended.    Left greater trochanter bursae injection with less than expected  response.     Plan:  Follow up on her neck, low back, and Left hip pain with low back spasms  Lidocaine samples helped  Currently doing pool therapy 2/week and helps but only temporarily   Discussed referral to orthopedic surgeon but patient wants to hold off.  Refill Ultram 50mg QD for moderate to severe pain but does NOT like regularly  Trial Tizanidine 2mg po bid prn spasms, Failed flexeril made too sleepy  Continue with OTC pain medications for mild to moderate pain. OARRS report reviewed 09/2020.    Patient encouraged to stay active and to lose weight. Patient had tried  and failed physical therapy. Treatment plan discussed with the patient and her daughter including  medications and procedure side effects. We discussed with the patient that combining opioids, benzodiazepines, alcohol, illicit drugs or sleep aids increases the risk of respiratory depression including death. We discussed that these medications may cause drowsiness, sedation or dizziness and have counseled the patient not to drive or operate machinery. We have discussed that these medications will be prescribed only by one provider. We have discussed with the patient about age related risk factors and have thoroughly discussed the importance of taking these medications as prescribed. The patient verbalizes understanding. Patient advised regarding steps to help prevent the spread of COVID-19   SOURCE - https://catherine-abiodun.info/. html     1-Stay home except to get medical care  2-Clean your hands often for atleast 20 secnds, avoid touching: Avoid touching your eyes, nose, and mouth with unwashed hands. 3-Seek medical attention: Seek prompt medical attention if your illness is worsening (e.g., difficulty breathing). Call you doctor first.  3-Wear a facemask if you are sick   4-Cover your coughs and sneezes        I affirm this is a Patient Initiated Episode with an Established Patient who has not had a related appointment within my department in the past 7 days or scheduled within the next 24 hours.     Total Time: minutes: 5-10 minutes    Note: not billable if this call serves to triage the patient into an appointment for the relevant concern

## 2020-10-09 ENCOUNTER — OFFICE VISIT (OUTPATIENT)
Dept: PAIN MANAGEMENT | Age: 50
End: 2020-10-09
Payer: MEDICAID

## 2020-10-09 VITALS
DIASTOLIC BLOOD PRESSURE: 62 MMHG | TEMPERATURE: 98.4 F | HEART RATE: 78 BPM | SYSTOLIC BLOOD PRESSURE: 108 MMHG | RESPIRATION RATE: 16 BRPM

## 2020-10-09 PROCEDURE — G8417 CALC BMI ABV UP PARAM F/U: HCPCS | Performed by: PAIN MEDICINE

## 2020-10-09 PROCEDURE — 1036F TOBACCO NON-USER: CPT | Performed by: PAIN MEDICINE

## 2020-10-09 PROCEDURE — 99213 OFFICE O/P EST LOW 20 MIN: CPT | Performed by: PAIN MEDICINE

## 2020-10-09 PROCEDURE — 99213 OFFICE O/P EST LOW 20 MIN: CPT

## 2020-10-09 PROCEDURE — G8484 FLU IMMUNIZE NO ADMIN: HCPCS | Performed by: PAIN MEDICINE

## 2020-10-09 PROCEDURE — 3017F COLORECTAL CA SCREEN DOC REV: CPT | Performed by: PAIN MEDICINE

## 2020-10-09 PROCEDURE — G8427 DOCREV CUR MEDS BY ELIG CLIN: HCPCS | Performed by: PAIN MEDICINE

## 2020-10-09 NOTE — PROGRESS NOTES
[address] [facility], [address] [place] [address] [place]  [phone]    Follow up Note      Nettal Coil     Date of Visit:  10/9/2020    CC:  Patient presents for follow up   Chief Complaint   Patient presents with    Follow-up    Neck Pain    Hip Pain     left     HPI:  Follow up on her neck/low back and Left hip pain, patient had started aquatherapy which per patient had helped a lot with her low back pain. Appropriate analgesia with current medications regimen: Fair. Change in quality of symptoms:no. Medication side effects:yes Zanaflex dizziness. Recent diagnostic testing:none  Recent interventional procedures:no     She has been on anticoagulation medications to include ASA. The patient  has not been on herbal supplements. The patient is not diabetic.     Imaging:   Cervical spine MRI 04/2018  1. Multilevel degenerative disc disease extending from C3 through C7. Moderately severe spinal canal stenosis at C3-C4. Moderate spinal   canal stenosis at C4-C5. . Moderately severe spinal canal stenosis at   C5-C6. Abnormal signal intensity within the cervical spinal cord C3-C5   without evidence of enhancement. Findings are most likely reflective   of myelomalacia changes secondary to the underlying degree of cord   compression as described above. No active demyelination identified. 2. Slight loss of normally expected cervical lordosis C3-C6.      CT Lumbar spine 2017  1. Mild diffuse osteopenia, no compression fracture or   spondylolisthesis.       2. Mild multilevel degenerative disc disease and facet joint   arthropathy of the lower lumbar spine more pronounced at L4-L5.      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         Otherwise, no musculoskeletal pathology to explain patient's pain. Previous treatments: Physical Therapy, Epidural Steroid Injection with  and medications. .   Potential Aberrant Drug-Related Behavior:    No     Urine Drug Screening:  Saliva screen 08/2020 consistent for Ultram     OARRS report:  10/2020 consistent. Past Medical History:   Diagnosis Date    Asthma     Cerebral artery occlusion with cerebral infarction ([facility] [address]     Cervical disc herniation     Cervical spinal stenosis     Chronic back pain     Chronic kidney disease     Depression     Disorder of thyroid gland 09/11/2012    GERD (gastroesophageal reflux disease)     Headache(784.0)     Hemorrhoids     History of colon polyps     Irritable bowel syndrome     Low back pain     Numbness and tingling in right hand 3/6/2020    Palpitations 07/24/2012    Scabies 05/18/2016    Seizure disorder ([facility] [address]     not on medications for this; last seizure years ago    Vitamin D deficiency 12/03/2014     Past Surgical History:   Procedure Laterality Date    COLONOSCOPY  3/9/2016    COLONOSCOPY  07/10/2017    HIP SURGERY Left 8/10/2020    LEFT GREATER TROCHANTERIC BURSAE INJECTION X-RAY (CPT 34193) performed by Mary Manriquez MD at [facility] 08/10/2020    left greater trochanter bursae injection    TUBAL LIGATION      UPPER GASTROINTESTINAL ENDOSCOPY  07/10/2017     Prior to Admission medications    Medication Sig Start Date End Date Taking? Authorizing Provider   traMADol (ULTRAM) 50 MG tablet Take 1 tablet by mouth every 6 hours as needed for Pain for up to 30 days.  9/14/20 10/14/20 Yes NOY Kaiser CNP   tiZANidine (ZANAFLEX) 2 MG tablet Take 1 tablet by mouth 2 times daily as needed (spasms) 9/14/20 10/14/20 Yes NOY Kaiser CNP   albuterol sulfate HFA (PROAIR HFA) 108 (90 Base) MCG/ACT inhaler Inhale 2 puffs into the lungs as needed for Wheezing or Shortness of Breath 6/25/20  Yes Abdi Santos,    vitamin D3 (CHOLECALCIFEROL) 25 MCG (1000 UT) TABS tablet Take 1 tablet by mouth daily 6/25/20  Yes Bernie Wesley, DO loratadine (CLARITIN) 10 MG tablet Take 1 tablet by mouth as needed (allergies) 20  Yes Abdi Hodges, DO   Prenatal Vit-Fe Fumarate-FA (PRENATAL PLUS) 27-1 MG TABS 1 pill daily 20  Yes Abdi ANDERSON Valentine, DO   aspirin 81 MG tablet Take 2 tablets by mouth daily 20  Yes Abdi Grijalva, DO   EPINEPHrine (EPIPEN 2-ABUNDIO) 0.3 MG/0.3ML SOAJ injection Inject into muscle for allergic reaction 19  Yes David Gonzalez,    omeprazole (PRILOSEC) 20 MG delayed release capsule Take 1 capsule by mouth daily Take am dos 07/10. 19  Yes David Gonzalez DO   albuterol (PROVENTIL) (2.5 MG/3ML) 0.083% nebulizer solution Take 3 mLs by nebulization every 6 hours as needed for Wheezing 3/8/19  Yes Abdi Grijalva,    dicyclomine (BENTYL) 20 MG tablet Take 20 mg by mouth every 6 hours Take am dos 07/10   Yes Historical Provider, MD   ammonium lactate (LAC-HYDRIN) 12 % lotion APPLY AA BID 1/15/20   Historical Provider, MD   DULoxetine (CYMBALTA) 30 MG extended release capsule Take 1 capsule by mouth daily  Patient not taking: Reported on 2020 10/17/19   David Gonzalez DO     Allergies   Allergen Reactions    Fish-Derived Products Anaphylaxis    Dye [Iodides] Itching     Social History     Socioeconomic History    Marital status:      Spouse name: Not on file    Number of children: Not on file    Years of education: Not on file    Highest education level: Not on file   Occupational History    Not on file   Social Needs    Financial resource strain: Not on file    Food insecurity     Worry: Not on file     Inability: Not on file    Transportation needs     Medical: Not on file     Non-medical: Not on file   Tobacco Use    Smoking status: Former Smoker     Packs/day: 1.00     Years: 25.00     Pack years: 25.00     Types: Cigarettes     Last attempt to quit: 1988     Years since quittin.4    Smokeless tobacco: Never Used   Substance and Sexual Activity    Alcohol use: No    tenderness. Range of motion:abnormal moderately Lateral bending, flexion, extension rotation bilateral and is  painful. Musculoskeletal:    Trigger points in Paraveteral:absent bilaterally              Lin's:negative right, negative left   SI joint tenderness:negative right, negative left              KAYODE test:negative right, negative left  Piriformis tenderness:negative right, negative left  Trochanteric bursa tenderness:negative right, positive left  SLR:negative right, negative left, sitting     Extremities:    Tremors:None bilaterally upper and lower  Range of motion:Generally normal shoulders, pain with internal rotation of hips positive Left  Intact:Yes  Edema:Normal    Neurological:    Sensory:diminshed to light touch lateral aspect of Left arm.   Motor:   Right Grip4+/5              Left Grip4+/5               Right Bicep4+/5           Left Bicep4+/5              Right Triceps4+/5       Left Triceps4+/5          Right Deltoid4+/5     Left Deltoid4+/5                  Right Quadriceps4+/5          Left Quadriceps4+/5           Right Gastrocnemius4+/5    Left Gastrocnemius4+/5  Right Ant Tibialis4+/5  Left Ant Tibialis4+/5  Reflexes:    Right Brachioradialis reflex2+  Left Brachioradialis reflex2+  Right Biceps reflex2+  Left Biceps reflex2+  Right Triceps reflex2+  Left Triceps reflex2+  Right Quadriceps reflex2+  Left Quadriceps reflex2+  Right Achilles reflex2+  Left Achilles reflex2+  Gait:antalgic    Dermatology:    Skin:no unusual rashes and no skin lesions    Impression:    Neck and low back pain with radiation to the Left upper and lower extremity  Cervical spine MRI 2019 Multilevel degenerative disc disease with moderate to severe stenosis at C3-4/C4-5 and C5-6  Lumbar spine CT 2017 Mild degenerative disc disease and facet arthropathy most pronounced at L4-5  Patient had seen neurosurgery and surgery C3-4/C4-5 and C5-6 ACDF was recommended.   Failed greater trochanteric bursae injection. Patient had tried and failed Gabapentin. Plan:  Follow up on her neck/low back and Left hip pain. Patient mentioned that her low back pain feels a lot better since she started aqua therapy. Today she is complaining of increased neck pain with radiation to the right shoulder. Reviewed cervical spine MRI and discussed with the patient daugter. Patient is scheduled to see Malika Jung 10/14. Will follow up on his recommendations. Continue with Ultram 50mg QD for moderate to severe pain. Discontinue Zanaflex (side effects). OARRS report reviewed 10/2020. Patient encouraged to stay active and to lose weight. Patient had tried and failed physical therapy. Treatment plan discussed with the patient and her daughter including medications and procedure side effects. We discussed with the patient that combining opioids, benzodiazepines, alcohol, illicit drugs or sleep aids increases the risk of respiratory depression including death. We discussed that these medications may cause drowsiness, sedation or dizziness and have counseled the patient not to drive or operate machinery. We have discussed that these medications will be prescribed only by one provider. We have discussed with the patient about age related risk factors and have thoroughly discussed the importance of taking these medications as prescribed. The patient verbalizes understanding. ccrefmichelleing kelly Deleon M.D.

## 2020-10-16 RX ORDER — MELATONIN
1 DAILY
Qty: 30 TABLET | Refills: 5 | Status: SHIPPED
Start: 2020-10-16 | End: 2021-08-26 | Stop reason: SDUPTHER

## 2020-10-16 RX ORDER — LORATADINE 10 MG/1
10 TABLET ORAL PRN
Qty: 30 TABLET | Refills: 5 | Status: SHIPPED
Start: 2020-10-16 | End: 2021-01-15

## 2020-10-16 RX ORDER — VITAMIN A ACETATE, BETA CAROTENE, ASCORBIC ACID, CHOLECALCIFEROL, .ALPHA.-TOCOPHEROL ACETATE, DL-, THIAMINE MONONITRATE, RIBOFLAVIN, NIACINAMIDE, PYRIDOXINE HYDROCHLORIDE, FOLIC ACID, CYANOCOBALAMIN, CALCIUM CARBONATE, FERROUS FUMARATE, ZINC OXIDE, CUPRIC OXIDE 3080; 12; 120; 400; 1; 1.84; 3; 20; 22; 920; 25; 200; 27; 10; 2 [IU]/1; UG/1; MG/1; [IU]/1; MG/1; MG/1; MG/1; MG/1; MG/1; [IU]/1; MG/1; MG/1; MG/1; MG/1; MG/1
TABLET, FILM COATED ORAL
Qty: 30 TABLET | Refills: 5 | Status: SHIPPED
Start: 2020-10-16 | End: 2021-08-26 | Stop reason: SDUPTHER

## 2020-11-03 PROBLEM — M47.816 LUMBAR FACET ARTHROPATHY: Status: RESOLVED | Noted: 2020-04-23 | Resolved: 2020-11-03

## 2020-11-09 ENCOUNTER — OFFICE VISIT (OUTPATIENT)
Dept: PAIN MANAGEMENT | Age: 50
End: 2020-11-09
Payer: MEDICAID

## 2020-11-09 VITALS
TEMPERATURE: 98.2 F | BODY MASS INDEX: 31.85 KG/M2 | WEIGHT: 158 LBS | HEIGHT: 59 IN | SYSTOLIC BLOOD PRESSURE: 112 MMHG | HEART RATE: 101 BPM | OXYGEN SATURATION: 97 % | DIASTOLIC BLOOD PRESSURE: 62 MMHG | RESPIRATION RATE: 16 BRPM

## 2020-11-09 PROCEDURE — 99213 OFFICE O/P EST LOW 20 MIN: CPT

## 2020-11-09 PROCEDURE — 99213 OFFICE O/P EST LOW 20 MIN: CPT | Performed by: NURSE PRACTITIONER

## 2020-11-09 RX ORDER — HYDROCODONE BITARTRATE AND ACETAMINOPHEN 5; 325 MG/1; MG/1
1 TABLET ORAL DAILY
Qty: 7 TABLET | Refills: 0 | Status: SHIPPED
Start: 2020-11-09 | End: 2020-11-16 | Stop reason: SINTOL

## 2020-11-09 RX ORDER — TIZANIDINE 2 MG/1
TABLET ORAL
COMMUNITY
Start: 2020-10-16 | End: 2021-01-11

## 2020-11-09 RX ORDER — TRAMADOL HYDROCHLORIDE 50 MG/1
TABLET ORAL
COMMUNITY
Start: 2020-10-16 | End: 2020-11-09

## 2020-11-09 NOTE — PROGRESS NOTES
[address] [facility], [address] [place], [place] [address]  [phone]    Follow up Note      Gabe Samaniego     Date of Visit:  11/9/2020    CC:  Patient presents for follow up   Chief Complaint   Patient presents with    Lower Back Pain       HPI:  Follow up on her neck, low back and left hip pain. Neck and arm pain is main complaint. Pt would like to restart pool therapy because very helpful. Neck pain worsening with bilateral arm radicular sx. Appropriate analgesia with current medications regimen: NO.  Change in quality of symptoms:yes worsening.    Medication side effects: none  Recent diagnostic testing:none  Recent interventional procedures:no     She has been on anticoagulation medications to include ASA. The patient Emmanuel Lundberg not been on herbal supplements.  The patient is not diabetic.     Imaging:     Cervical spine MRI 04/2018  1. Multilevel degenerative disc disease extending from C3 through C7. Moderately severe spinal canal stenosis at C3-C4. Moderate spinal   canal stenosis at C4-C5. . Moderately severe spinal canal stenosis at   C5-C6. Abnormal signal intensity within the cervical spinal cord C3-C5   without evidence of enhancement. Findings are most likely reflective   of myelomalacia changes secondary to the underlying degree of cord   compression as described above. No active demyelination identified. 2. Slight loss of normally expected cervical lordosis C3-C6.      CT Lumbar spine 2017  1.  Mild diffuse osteopenia, no compression fracture or   spondylolisthesis.       2. Mild multilevel degenerative disc disease and facet joint   arthropathy of the lower lumbar spine more pronounced at L4-L5.      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         Otherwise, no musculoskeletal pathology to explain patient's pain.      Previous treatments: Physical Therapy, Epidural Steroid Injection with  and 10/16/20  Yes Philippe Almeida, DO   albuterol sulfate HFA (PROAIR HFA) 108 (90 Base) MCG/ACT inhaler Inhale 2 puffs into the lungs as needed for Wheezing or Shortness of Breath 20  Yes Abdi BARRON Valentine, DO   ammonium lactate (LAC-HYDRIN) 12 % lotion APPLY AA BID 1/15/20  Yes Historical Provider, MD   aspirin 81 MG tablet Take 2 tablets by mouth daily 20  Yes Abdi ANDERSON Valentine, DO   DULoxetine (CYMBALTA) 30 MG extended release capsule Take 1 capsule by mouth daily 10/17/19  Yes Abdi BARRON Valentine, DO   EPINEPHrine (EPIPEN 2-ABUNDIO) 0.3 MG/0.3ML SOAJ injection Inject into muscle for allergic reaction 19  Yes Philippe Almeida, DO   omeprazole (PRILOSEC) 20 MG delayed release capsule Take 1 capsule by mouth daily Take am dos 07/10. 19  Yes Philippe Almeida, DO   albuterol (PROVENTIL) (2.5 MG/3ML) 0.083% nebulizer solution Take 3 mLs by nebulization every 6 hours as needed for Wheezing 3/8/19  Yes Abdi F Valentine, DO   dicyclomine (BENTYL) 20 MG tablet Take 20 mg by mouth every 6 hours Take am dos 07/10   Yes Historical Provider, MD       Allergies   Allergen Reactions    Fish-Derived Products Anaphylaxis    Dye [Iodides] Itching       Social History     Socioeconomic History    Marital status:      Spouse name: Not on file    Number of children: Not on file    Years of education: Not on file    Highest education level: Not on file   Occupational History    Not on file   Social Needs    Financial resource strain: Not on file    Food insecurity     Worry: Not on file     Inability: Not on file    Transportation needs     Medical: Not on file     Non-medical: Not on file   Tobacco Use    Smoking status: Former Smoker     Packs/day: 1.00     Years: 25.00     Pack years: 25.00     Types: Cigarettes     Last attempt to quit: 1988     Years since quittin.5    Smokeless tobacco: Never Used   Substance and Sexual Activity    Alcohol use: No    Drug use: No    Sexual activity: Not on file Lifestyle    Physical activity     Days per week: Not on file     Minutes per session: Not on file    Stress: Not on file   Relationships    Social connections     Talks on phone: Not on file     Gets together: Not on file     Attends Latter-day service: Not on file     Active member of club or organization: Not on file     Attends meetings of clubs or organizations: Not on file     Relationship status: Not on file    Intimate partner violence     Fear of current or ex partner: Not on file     Emotionally abused: Not on file     Physically abused: Not on file     Forced sexual activity: Not on file   Other Topics Concern    Not on file   Social History Narrative    Not on file       Family History   Problem Relation Age of Onset    Heart Attack Mother     Kidney Disease Father         failure    Diabetes Other     Asthma Child     Asthma Child         bone disease    Anemia Child        REVIEW OF SYSTEMS:     Marlin Garsias denies fever/chills, chest pain, shortness of breath, new bowel or bladder complaints or suicidal ideations. All other review of systems wasnegative. Review of Systems    PHYSICAL EXAMINATION:      /62   Pulse 101   Temp 98.2 °F (36.8 °C) (Infrared)   Resp 16   Ht 4' 11\" (1.499 m)   Wt 158 lb (71.7 kg)   LMP 03/27/2008   SpO2 97%   BMI 31.91 kg/m²     General:       General appearance:   elderly, pleasant and well-hydrated. , in moderate discomfort and A & O x3  Build:Overweight     HEENT:     Head:normocephalic and atraumatic  Sclera: icterus absent,     Lungs:     Breathing:Breathing Pattern: regular, no distress     Abdomen:     Shape:non-distended and normal  Tenderness:none     Cervical spine:     Inspection:normal  Palpation:tenderness paravertebral muscles, facet loading, left, right, positive and tenderness.   Range of motion:abnormal moderately flexion, extension rotation bilateral and is  severely painful.     Lumbar spine:     Spine inspection:normal   CVA tenderness:No   Palpation:tenderness paravertebral muscles, facet loading, left, right, positive and tenderness. Range of motion:abnormal moderately Lateral bending, flexion, extension rotation bilateral and is  painful.     Musculoskeletal:     Trigger points in Paraveteral:absent bilaterally              Lin's:negative right, negative left   SI joint tenderness:negative right, negative left              KAYODE test:negative right, negative left  Piriformis tenderness:negative right, negative left  Trochanteric bursa tenderness:negative right, positive left  SLR:negative right, negative left, sitting      Extremities:     Tremors:None bilaterally upper and lower  Range of motion:Generally normal shoulders, pain with internal rotation of hips positive Left  Intact:Yes  Edema:Normal     Neurological:     Sensory:diminshed to light touch lateral aspect of Left arm. Motor:   Right Grip4+/5              Left Grip4+/5               Right Bicep4+/5           Left Bicep4+/5              Right Triceps4+/5       Left Triceps4+/5          Right Deltoid4+/5     Left Deltoid4+/5                  Right Quadriceps4+/5          Left Quadriceps4+/5           Right Gastrocnemius4+/5    Left Gastrocnemius4+/5  Right Ant Tibialis4+/5  Left Ant Tibialis4+/5    Gait:antalgic     Dermatology:     Skin:no unusual rashes and no skin lesions     Impression:     Neck and low back pain with radiation to the Left upper and lower extremity  Cervical spine MRI 2019 Multilevel degenerative disc disease with moderate to severe stenosis at C3-4/C4-5 and C5-6  Lumbar spine CT 2017 Mild degenerative disc disease and facet arthropathy most pronounced at L4-5  Patient had seen neurosurgery and surgery C3-4/C4-5 and C5-6 ACDF was recommended.   Failed greater trochanteric bursae injection. Patient had tried and failed Gabapentin. Plan:  Follow up on her neck,low back and left hip pain.   Neck pain with bilateral arm radicular sx with weakness

## 2020-11-16 ENCOUNTER — VIRTUAL VISIT (OUTPATIENT)
Dept: PAIN MANAGEMENT | Age: 50
End: 2020-11-16
Payer: MEDICAID

## 2020-11-16 PROCEDURE — 99212 OFFICE O/P EST SF 10 MIN: CPT | Performed by: NURSE PRACTITIONER

## 2020-11-16 RX ORDER — TRAMADOL HYDROCHLORIDE 50 MG/1
50 TABLET ORAL 2 TIMES DAILY PRN
Qty: 40 TABLET | Refills: 0 | Status: SHIPPED
Start: 2020-11-16 | End: 2020-12-14 | Stop reason: SDUPTHER

## 2020-11-16 NOTE — PROGRESS NOTES
Dorita Gandhi was read the following message We want to confirm that, for purposes of billing, this is a virtual visit with your provider for which we will submit a claim for reimbursement with your insurance company. You will be responsible for any copays, coinsurance amounts or other amounts not covered by your insurance company. If you do not accept this, unfortunately we will not be able to schedule a virtual visit with the provider. Do you accept? Ashlee responded Yes .

## 2020-11-16 NOTE — PROGRESS NOTES
extending from C3 through C7. Moderately severe spinal canal stenosis at C3-C4. Moderate spinal   canal stenosis at C4-C5. . Moderately severe spinal canal stenosis at   C5-C6. Abnormal signal intensity within the cervical spinal cord C3-C5   without evidence of enhancement. Findings are most likely reflective   of myelomalacia changes secondary to the underlying degree of cord   compression as described above. No active demyelination identified. 2. Slight loss of normally expected cervical lordosis C3-C6.      CT Lumbar spine 2017  1. Mild diffuse osteopenia, no compression fracture or   spondylolisthesis.       2. Mild multilevel degenerative disc disease and facet joint   arthropathy of the lower lumbar spine more pronounced at L4-L5.      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         Otherwise, no musculoskeletal pathology to explain patient's pain.      Previous treatments: Physical Therapy, Epidural Steroid Injection with  and medications. .  tizanidine dizziness, flexeril sedating                                        Potential Aberrant Drug-Related Behavior:    No     Urine Drug Screening:  Saliva screen 08/2020 consistent for Ultram     OARRS report:  11/2020 consistent.       Past Medical History:   Diagnosis Date    Asthma     Cerebral artery occlusion with cerebral infarction ([facility] [address]     Cervical disc herniation     Cervical spinal stenosis     Chronic back pain     Chronic kidney disease     Depression     Disorder of thyroid gland 09/11/2012    GERD (gastroesophageal reflux disease)     Headache(784.0)     Hemorrhoids     History of colon polyps     Irritable bowel syndrome     Low back pain     Numbness and tingling in right hand 3/6/2020    Palpitations 07/24/2012    Scabies 05/18/2016    Seizure disorder ([facility] [address]     not on medications for this; last seizure years ago    Vitamin D deficiency 12/03/2014 Past Surgical History:   Procedure Laterality Date    COLONOSCOPY  3/9/2016    COLONOSCOPY  07/10/2017    HIP SURGERY Left 8/10/2020    LEFT GREATER TROCHANTERIC BURSAE INJECTION X-RAY (CPT 97644) performed by Murali Ventura MD at [facility] Left 08/10/2020    left greater trochanter bursae injection    TUBAL LIGATION      UPPER GASTROINTESTINAL ENDOSCOPY  07/10/2017       Prior to Admission medications    Medication Sig Start Date End Date Taking? Authorizing Provider   tiZANidine (ZANAFLEX) 2 MG tablet TK 1 T PO BID PRF SPASMS 10/16/20   Historical Provider, MD   HYDROcodone-acetaminophen (NORCO) 5-325 MG per tablet Take 1 tablet by mouth daily for 7 days. Intended supply: 7 days.  Take lowest dose possible to manage pain 11/9/20 11/16/20  NOY Noriega - CNP   vitamin D3 (CHOLECALCIFEROL) 25 MCG (1000 UT) TABS tablet Take 1 tablet by mouth daily 10/16/20   Jon Boo, DO   loratadine (CLARITIN) 10 MG tablet Take 1 tablet by mouth as needed (allergies) 10/16/20   Abdi Solis DO   Prenatal Vit-Fe Fumarate-FA (PRENATAL PLUS) 27-1 MG TABS 1 pill daily 10/16/20   Joelyephiestevan Boo, DO   albuterol sulfate HFA (PROAIR HFA) 108 (90 Base) MCG/ACT inhaler Inhale 2 puffs into the lungs as needed for Wheezing or Shortness of Breath 6/25/20   Abdi Grijalva, DO   ammonium lactate (LAC-HYDRIN) 12 % lotion APPLY AA BID 1/15/20   Historical Provider, MD   aspirin 81 MG tablet Take 2 tablets by mouth daily 1/16/20   Josiephine Ema, DO   DULoxetine (CYMBALTA) 30 MG extended release capsule Take 1 capsule by mouth daily 10/17/19   Joelyephiestevan Boo, DO   EPINEPHrine (EPIPEN 2-ABUNDIO) 0.3 MG/0.3ML SOAJ injection Inject into muscle for allergic reaction 8/22/19   Abdi Grijalva, DO   omeprazole (PRILOSEC) 20 MG delayed release capsule Take 1 capsule by mouth daily Take am dos 07/10. 6/21/19   Abdi Grijalva, DO   albuterol (PROVENTIL) (2.5 MG/3ML) 0.083% nebulizer solution Take 3 mLs by nebulization every 6 hours as needed for Wheezing 3/8/19   Abdi Grijalva DO   dicyclomine (BENTYL) 20 MG tablet Take 20 mg by mouth every 6 hours Take am dos 07/10    Historical Provider, MD       Allergies   Allergen Reactions    Fish-Derived Products Anaphylaxis    Dye [Iodides] Itching       Social History     Socioeconomic History    Marital status:      Spouse name: Not on file    Number of children: Not on file    Years of education: Not on file    Highest education level: Not on file   Occupational History    Not on file   Social Needs    Financial resource strain: Not on file    Food insecurity     Worry: Not on file     Inability: Not on file    Transportation needs     Medical: Not on file     Non-medical: Not on file   Tobacco Use    Smoking status: Former Smoker     Packs/day: 1.00     Years: 25.00     Pack years: 25.00     Types: Cigarettes     Last attempt to quit: 1988     Years since quittin.5    Smokeless tobacco: Never Used   Substance and Sexual Activity    Alcohol use: No    Drug use: No    Sexual activity: Not on file   Lifestyle    Physical activity     Days per week: Not on file     Minutes per session: Not on file    Stress: Not on file   Relationships    Social connections     Talks on phone: Not on file     Gets together: Not on file     Attends [employer] service: Not on file     Active member of club or organization: Not on file     Attends meetings of clubs or organizations: Not on file     Relationship status: Not on file    Intimate partner violence     Fear of current or ex partner: Not on file     Emotionally abused: Not on file     Physically abused: Not on file     Forced sexual activity: Not on file   Other Topics Concern    Not on file   Social History Narrative    Not on file       Family History   Problem Relation Age of Onset    Heart Attack Mother     Kidney Disease Father         failure    Diabetes Other     Asthma Child     Asthma Child         bone disease    Anemia Child        REVIEW OF SYSTEMS:     Vivi Ruelas denies fever/chills, chest pain, shortness of breath, new bowel or bladder complaints or suicidal ideations. All other review of systems wasnegative. Review of Systems    PHYSICAL EXAMINATION:      LMP 03/27/2008      General:       A & O x3    Lungs:    Breathing:Normal expansion. nonlabored on the phone          Impression:     Neck and low back pain with radiation to the Left upper and lower extremity  Cervical spine MRI 2019 Multilevel degenerative disc disease with moderate to severe stenosis at C3-4/C4-5 and C5-6  Lumbar spine CT 2017 Mild degenerative disc disease and facet arthropathy most pronounced at L4-5  Patient had seen neurosurgery and surgery C3-4/C4-5 and C5-6 ACDF was recommended.   Failed greater trochanteric bursae injection. Patient had tried and failed Gabapentin. Plan:  Follow up on her neck,low back and left hip pain. Neck pain with bilateral arm radicular sx with weakness main complaint  Plans to restart aqua therapy for low back pain, really helps  Consider cervical epidural for radicular sx, pt thinking about it  Resend referral  for worsening neck pain  Restart Tramadol 50mg po daily-bid prn pain #40  Order MRI of cervical spine without contrast worsening neck pain  DC [place]   OARRS report reviewed 11/2020.   Patient encouraged to stay active and to lose weight. Patient had tried and failed physical therapy. Treatment plan discussed with the patient and her daughter including medications and procedure side effects.     We discussed with the patient that combining opioids, benzodiazepines, alcohol, illicit drugs or sleep aids increases the risk of respiratory depression including death. We discussed that these medications may cause drowsiness, sedation or dizziness and have counseled the patient not to drive or operate machinery.  We have discussed that these medications will be prescribed only by one provider. We have discussed with the patient about age related risk factors and have thoroughly discussed the importance of taking these medications as prescribed. The patient verbalizes understanding.      Haroldo Sena advised regarding steps to help prevent the spread of COVID-19   SOURCE - https://catherine-abiodun.info/. html     1-Stay home except to get medical care  2-Clean your hands often for atleast 20 secnds, avoid touching: Avoid touching your eyes, nose, and mouth with unwashed hands. 3-Seek medical attention: Seek prompt medical attention if your illness is worsening (e.g., difficulty breathing). Call you doctor first.  3-Wear a facemask if you are sick   4-Cover your coughs and sneezes        I affirm this is a Patient Initiated Episode with an Established Patient who has not had a related appointment within my department in the past 7 days or scheduled within the next 24 hours.     Total Time: minutes: 5-10 minutes    Note: not billable if this call serves to triage the patient into an appointment for the relevant concern        Electronically signed by NOY De La Garza CNP on 11/16/20 at 11:47 AM

## 2020-12-14 ENCOUNTER — PREP FOR PROCEDURE (OUTPATIENT)
Dept: PAIN MANAGEMENT | Age: 50
End: 2020-12-14

## 2020-12-14 ENCOUNTER — OFFICE VISIT (OUTPATIENT)
Dept: PAIN MANAGEMENT | Age: 50
End: 2020-12-14
Payer: MEDICAID

## 2020-12-14 VITALS
WEIGHT: 158 LBS | TEMPERATURE: 97.3 F | BODY MASS INDEX: 31.85 KG/M2 | SYSTOLIC BLOOD PRESSURE: 128 MMHG | HEIGHT: 59 IN | HEART RATE: 79 BPM | OXYGEN SATURATION: 97 % | RESPIRATION RATE: 16 BRPM | DIASTOLIC BLOOD PRESSURE: 68 MMHG

## 2020-12-14 PROCEDURE — 3017F COLORECTAL CA SCREEN DOC REV: CPT | Performed by: NURSE PRACTITIONER

## 2020-12-14 PROCEDURE — 1036F TOBACCO NON-USER: CPT | Performed by: NURSE PRACTITIONER

## 2020-12-14 PROCEDURE — G8417 CALC BMI ABV UP PARAM F/U: HCPCS | Performed by: NURSE PRACTITIONER

## 2020-12-14 PROCEDURE — G8484 FLU IMMUNIZE NO ADMIN: HCPCS | Performed by: NURSE PRACTITIONER

## 2020-12-14 PROCEDURE — 99213 OFFICE O/P EST LOW 20 MIN: CPT | Performed by: NURSE PRACTITIONER

## 2020-12-14 PROCEDURE — G8427 DOCREV CUR MEDS BY ELIG CLIN: HCPCS | Performed by: NURSE PRACTITIONER

## 2020-12-14 RX ORDER — NABUMETONE 500 MG/1
750 TABLET, FILM COATED ORAL 2 TIMES DAILY
Qty: 60 TABLET | Refills: 0 | Status: SHIPPED
Start: 2020-12-14 | End: 2021-03-08 | Stop reason: SDUPTHER

## 2020-12-14 RX ORDER — TIZANIDINE 2 MG/1
2 TABLET ORAL 2 TIMES DAILY PRN
Qty: 60 TABLET | Refills: 0 | Status: SHIPPED
Start: 2020-12-14 | End: 2021-01-12

## 2020-12-14 RX ORDER — TRAMADOL HYDROCHLORIDE 50 MG/1
50 TABLET ORAL 2 TIMES DAILY PRN
Qty: 40 TABLET | Refills: 0 | Status: SHIPPED
Start: 2020-12-16 | End: 2021-01-11 | Stop reason: DRUGHIGH

## 2020-12-14 NOTE — PROGRESS NOTES
Do you currently have any of the following:    Fever: No  Headache:  No  Cough: No  Shortness of breath: No  Exposed to anyone with these symptoms: No                                                                                                                Charisse Arrington presents to the [facility] on [facility]/14/2020. Paul Garzon is complaining of pain in lower back and right leg. . The pain is constant. The pain is described as aching, throbbing and right leg is cramping really bad. . Pain is rated on her best day at a 5, on her worst day at a 10, and on average at a 8 on the VAS scale. She took her last dose of Tramadol this Elena Luque does not have issues with constipation. Any procedures since your last visit: No,    She is not on NSAIDS and  is  on anticoagulation medications to include ASA and is managed by NA. Pacemaker or defibrilator: No Physician managing device is NA. Medication Contract and Consent for Opioid Use Documents Filed     Patient Documents       Type of Document Status Date Received Received By Description     Medication Contract Received 7/18/2019 11:59 AM Janell Violettajsesy  7/18/19 medication contract                   /68   Pulse 79   Temp 97.3 °F (36.3 °C) (Infrared)   Resp 16   Ht 4' 11\" (1.499 m)   Wt 158 lb (71.7 kg)   LMP 03/27/2008   SpO2 97%   BMI 31.91 kg/m²      Patient's last menstrual period was 03/27/2008.

## 2020-12-14 NOTE — PROGRESS NOTES
 UPPER GASTROINTESTINAL ENDOSCOPY  07/10/2017       Prior to Admission medications    Medication Sig Start Date End Date Taking? Authorizing Provider   traMADol (ULTRAM) 50 MG tablet Take 1 tablet by mouth 2 times daily as needed for Pain for up to 30 days. Intended supply: 30 days.  Take lowest dose possible to manage pain 11/16/20 12/16/20 Yes Candy Garcia, APRN - CNP   tiZANidine (ZANAFLEX) 2 MG tablet TK 1 T PO BID PRF SPASMS 10/16/20  Yes Historical Provider, MD   vitamin D3 (CHOLECALCIFEROL) 25 MCG (1000 UT) TABS tablet Take 1 tablet by mouth daily 10/16/20  Yes Arlen Lucero DO   loratadine (CLARITIN) 10 MG tablet Take 1 tablet by mouth as needed (allergies) 10/16/20  Yes Arlen Lucero DO   Prenatal Vit-Fe Fumarate-FA (PRENATAL PLUS) 27-1 MG TABS 1 pill daily 10/16/20  Yes Abdi Grijalva, DO   albuterol sulfate HFA (PROAIR HFA) 108 (90 Base) MCG/ACT inhaler Inhale 2 puffs into the lungs as needed for Wheezing or Shortness of Breath 6/25/20  Yes Abdi Winter, DO   aspirin 81 MG tablet Take 2 tablets by mouth daily 1/16/20  Yes Abdi Grijalva, DO   DULoxetine (CYMBALTA) 30 MG extended release capsule Take 1 capsule by mouth daily 10/17/19  Yes Abdi Grijalva, DO   EPINEPHrine (EPIPEN 2-ABUNDIO) 0.3 MG/0.3ML SOAJ injection Inject into muscle for allergic reaction 8/22/19  Yes Arlen Lucero DO   omeprazole (PRILOSEC) 20 MG delayed release capsule Take 1 capsule by mouth daily Take am dos 07/10. 6/21/19  Yes Arlen Lucero DO   albuterol (PROVENTIL) (2.5 MG/3ML) 0.083% nebulizer solution Take 3 mLs by nebulization every 6 hours as needed for Wheezing 3/8/19  Yes Abdi Grijalva, DO   dicyclomine (BENTYL) 20 MG tablet Take 20 mg by mouth every 6 hours Take am dos 07/10   Yes Historical Provider, MD   ammonium lactate (LAC-HYDRIN) 12 % lotion APPLY AA BID 1/15/20   Historical Provider, MD       Allergies   Allergen Reactions    Fish-Derived Products Anaphylaxis    Dye [Iodides] Itching Social History     Socioeconomic History    Marital status:      Spouse name: Not on file    Number of children: Not on file    Years of education: Not on file    Highest education level: Not on file   Occupational History    Not on file   Social Needs    Financial resource strain: Not on file    Food insecurity     Worry: Not on file     Inability: Not on file    Transportation needs     Medical: Not on file     Non-medical: Not on file   Tobacco Use    Smoking status: Former Smoker     Packs/day: 1.00     Years: 25.00     Pack years: 25.00     Types: Cigarettes     Quit date: 1988     Years since quittin.6    Smokeless tobacco: Never Used   Substance and Sexual Activity    Alcohol use: No    Drug use: No    Sexual activity: Not on file   Lifestyle    Physical activity     Days per week: Not on file     Minutes per session: Not on file    Stress: Not on file   Relationships    Social connections     Talks on phone: Not on file     Gets together: Not on file     Attends Restoration service: Not on file     Active member of club or organization: Not on file     Attends meetings of clubs or organizations: Not on file     Relationship status: Not on file    Intimate partner violence     Fear of current or ex partner: Not on file     Emotionally abused: Not on file     Physically abused: Not on file     Forced sexual activity: Not on file   Other Topics Concern    Not on file   Social History Narrative    Not on file       Family History   Problem Relation Age of Onset    Heart Attack Mother     Kidney Disease Father         failure    Diabetes Other     Asthma Child     Asthma Child         bone disease    Anemia Child        REVIEW OF SYSTEMS:     Cordell Sanz denies fever/chills, chest pain, shortness of breath, new bowel or bladder complaints or suicidal ideations. All other review of systems wasnegative.     Review of Systems    PHYSICAL EXAMINATION: /68   Pulse 79   Temp 97.3 °F (36.3 °C) (Infrared)   Resp 16   Ht 4' 11\" (1.499 m)   Wt 158 lb (71.7 kg)   LMP 03/27/2008   SpO2 97%   BMI 31.91 kg/m²     General:       General appearance:   elderly, pleasant and well-hydrated. , in severe discomfort and A & O x3  Build:Overweight     HEENT:     Head:normocephalic and atraumatic  Sclera: icterus absent,     Lungs:     Breathing:Breathing Pattern: regular, no distress     Abdomen:     Shape:non-distended and normal  Tenderness:none     Cervical spine:     Inspection:normal  Palpation:tenderness paravertebral muscles, facet loading, left, right, positive and tenderness. Range of motion:abnormal moderately flexion, extension rotation bilateral and is  severely painful.     Lumbar spine:     Spine inspection:normal   CVA tenderness:No   Palpation:tenderness paravertebral muscles, facet loading, left, right, positive and tenderness. Range of motion:abnormal moderately Lateral bending, flexion, extension rotation bilateral and is  painful.     Musculoskeletal:     Trigger points in Paraveteral:absent bilaterally              Lin's:negative right, negative left   SI joint tenderness:negative right, negative left              KAYODE test:negative right, negative left  Piriformis tenderness:negative right, negative left  Trochanteric bursa tenderness:negative right, positive left  SLR:negative right, negative left, sitting      Extremities:     Tremors:None bilaterally upper and lower  Range of motion:Generally normal shoulders, pain with internal rotation of hips positive Left  Intact:Yes  Edema:Normal     Neurological:     Sensory:diminshed to light touch lateral aspect of Left arm.   Motor:   Right Grip4+/5              Left Grip4+/5               Right Bicep4+/5           Left Bicep4+/5              Right Triceps4+/5       Left Triceps4+/5          Right Deltoid4+/5     Left Deltoid4+/5                  Right Quadriceps4+/5         Left Quadriceps4+/5           Right Gastrocnemius4+/5    Left Gastrocnemius4+/5  Right Ant Tibialis4+/5  Left Ant Tibialis4+/5     Gait:antalgic     Dermatology:     Skin:no unusual rashes and no skin lesions    Plan:  Follow up on her neck,low back and left hip pain. Neck pain primarily axial with arm weakness  Plans to restart aqua therapy for low back pain, really helps  Schedule for Bilateral Cervical medial branch block [id number] with sedation   Pending appointment with  for worsening neck pain  Refill Tramadol 50mg po daily-bid prn pain #40   Trial Relafen 750mg po bid   Trial Tizanidine 2mg po bid prn spasms   MRI of cervical spine reviewed with patient and daughter  [facility] report reviewed 12/2020.   Patient encouraged to stay active and to lose weight. Patient had tried and failed physical therapy. Treatment plan discussed with the patient and her daughter including medications and procedure side effects.     We discussed with the patient that combining opioids, benzodiazepines, alcohol, illicit drugs or sleep aids increases the risk of respiratory depression including death. We discussed that these medications may cause drowsiness, sedation or dizziness and have counseled the patient not to drive or operate machinery. We have discussed that these medications will be prescribed only by one provider. We have discussed with the patient about age related risk factors and have thoroughly discussed the importance of taking these medications as prescribed.  The patient verbalizes understanding.          ccreferring physic          Electronically signed by NOY Fung CNP on 12/14/20 at 10:36 AM EST

## 2020-12-23 ENCOUNTER — TELEPHONE (OUTPATIENT)
Dept: PAIN MANAGEMENT | Age: 50
End: 2020-12-23

## 2020-12-23 NOTE — TELEPHONE ENCOUNTER
12/23/2020-upon review of Ashlee's chart, it is noted that she takes ASA . Medical clearance obtained from Dr. Demian Vera OK to hold ASA  for 7 days. Call to Vivi Ruelas, advised her to hold her ASA  for 7 days before her 01/04/2021 procedure, last dose to be 12/27/2020. she shows an understanding to not take it before her procedure. Instructed of need for COVID-19 testing and self quarantining. Instructed her that the surgery center should call her a few days before for the pre op call and after 3:00 PM the business day before with the arrival time. Ashlee verbalized understanding.      COVID-19 symptom and exposure screening:    Fever: No  Headache:  No  Cough: No  Shortness of breath: No  Exposed to anyone with these symptoms: Yes     Georgia George RN  Pain Management

## 2020-12-29 ENCOUNTER — HOSPITAL ENCOUNTER (OUTPATIENT)
Age: 50
Discharge: HOME | End: 2020-12-31
Payer: MEDICAID

## 2020-12-29 PROCEDURE — U0003 INFECTIOUS AGENT DETECTION BY NUCLEIC ACID (DNA OR RNA); SEVERE ACUTE RESPIRATORY SYNDROME CORONAVIRUS 2 (SARS-COV-2) (CORONAVIRUS DISEASE [COVID-19]), AMPLIFIED PROBE TECHNIQUE, MAKING USE OF HIGH THROUGHPUT TECHNOLOGIES AS DESCRIBED BY CMS-2020-01-R: HCPCS

## 2020-12-30 DIAGNOSIS — M54.12 CERVICAL RADICULOPATHY: ICD-10-CM

## 2020-12-30 LAB — SARS-COV-2, PCR: NOT DETECTED

## 2021-01-11 ENCOUNTER — VIRTUAL VISIT (OUTPATIENT)
Dept: PAIN MANAGEMENT | Age: 51
End: 2021-01-11
Payer: MEDICAID

## 2021-01-11 DIAGNOSIS — R20.2 NUMBNESS AND TINGLING IN RIGHT HAND: ICD-10-CM

## 2021-01-11 DIAGNOSIS — M50.90 CERVICAL DISC DISORDER: ICD-10-CM

## 2021-01-11 DIAGNOSIS — M79.7 FIBROMYALGIA: ICD-10-CM

## 2021-01-11 DIAGNOSIS — G89.4 CHRONIC PAIN SYNDROME: Primary | ICD-10-CM

## 2021-01-11 DIAGNOSIS — M70.62 GREATER TROCHANTERIC BURSITIS OF LEFT HIP: ICD-10-CM

## 2021-01-11 DIAGNOSIS — M25.552 PAIN IN LEFT HIP: ICD-10-CM

## 2021-01-11 DIAGNOSIS — M50.30 DDD (DEGENERATIVE DISC DISEASE), CERVICAL: ICD-10-CM

## 2021-01-11 DIAGNOSIS — M48.02 CERVICAL STENOSIS OF SPINE: ICD-10-CM

## 2021-01-11 DIAGNOSIS — R20.0 NUMBNESS AND TINGLING IN RIGHT HAND: ICD-10-CM

## 2021-01-11 DIAGNOSIS — M16.12 PRIMARY OSTEOARTHRITIS OF LEFT HIP: ICD-10-CM

## 2021-01-11 DIAGNOSIS — M51.9 LUMBAR DISC DISORDER: ICD-10-CM

## 2021-01-11 DIAGNOSIS — M47.812 CERVICAL FACET JOINT SYNDROME: ICD-10-CM

## 2021-01-11 DIAGNOSIS — M54.12 CERVICAL RADICULOPATHY: ICD-10-CM

## 2021-01-11 DIAGNOSIS — M47.816 LUMBAR SPONDYLOSIS: ICD-10-CM

## 2021-01-11 DIAGNOSIS — M48.061 SPINAL STENOSIS OF LUMBAR REGION WITHOUT NEUROGENIC CLAUDICATION: ICD-10-CM

## 2021-01-11 PROCEDURE — 99213 OFFICE O/P EST LOW 20 MIN: CPT | Performed by: NURSE PRACTITIONER

## 2021-01-11 NOTE — PROGRESS NOTES
Abdirahman Pearson was read the following message We want to confirm that, for purposes of billing, this is a virtual visit with your provider for which we will submit a claim for reimbursement with your insurance company. You will be responsible for any copays, coinsurance amounts or other amounts not covered by your insurance company. If you do not accept this, unfortunately we will not be able to schedule or proceed with a virtual visit with the provider. Do you accept? Ashlee responded Yes .

## 2021-01-12 RX ORDER — TIZANIDINE 2 MG/1
TABLET ORAL
Qty: 60 TABLET | Refills: 0 | Status: SHIPPED
Start: 2021-01-12 | End: 2021-01-20 | Stop reason: SDUPTHER

## 2021-01-14 ENCOUNTER — TELEPHONE (OUTPATIENT)
Dept: PAIN MANAGEMENT | Age: 51
End: 2021-01-14

## 2021-01-14 NOTE — TELEPHONE ENCOUNTER
1/14/2021-upon review of Ashlee's chart, it is noted that she takes ASA . Medical clearance obtained from Dr. Matthew Henson OK to hold ASA  for 7 days. Call to Mely Basilio, advised her to hold her ASA  for 7 days before her 01/21/2020 procedure, last dose to be 01/13/2020. she shows an understanding to not take it before her procedure. Instructed of need for COVID-19 testing and self quarantining. Instructed her that the surgery center should call her a few days before for the pre op call and after 3:00 PM the business day before with the arrival time. Ashlee verbalized understanding.      COVID-19 symptom and exposure screening:    Fever: No  Headache:  No  Cough: No  Shortness of breath: No  Exposed to anyone with these symptoms: No     Georgia George RN  Pain Management

## 2021-01-18 ENCOUNTER — HOSPITAL ENCOUNTER (OUTPATIENT)
Age: 51
Discharge: HOME | End: 2021-01-18
Payer: MEDICAID

## 2021-01-18 DIAGNOSIS — M54.12 CERVICAL RADICULOPATHY: ICD-10-CM

## 2021-01-18 PROCEDURE — U0003 INFECTIOUS AGENT DETECTION BY NUCLEIC ACID (DNA OR RNA); SEVERE ACUTE RESPIRATORY SYNDROME CORONAVIRUS 2 (SARS-COV-2) (CORONAVIRUS DISEASE [COVID-19]), AMPLIFIED PROBE TECHNIQUE, MAKING USE OF HIGH THROUGHPUT TECHNOLOGIES AS DESCRIBED BY CMS-2020-01-R: HCPCS

## 2021-01-19 ENCOUNTER — TELEPHONE (OUTPATIENT)
Dept: PAIN MANAGEMENT | Age: 51
End: 2021-01-19

## 2021-01-19 LAB — SARS-COV-2, PCR: DETECTED

## 2021-01-19 NOTE — TELEPHONE ENCOUNTER
1-19-21-received a call from the surgery center that [facility] tested positive for COVID. Call to her, spoke to her , advised him that [facility] has Kristie. She is to socially isolate, wear a mask all the time around people and call her family doctor. Called surgery scheduling to cancel her procedure.     Elif Oviedo RN  Pain Management

## 2021-01-20 ENCOUNTER — TELEPHONE (OUTPATIENT)
Dept: PAIN MANAGEMENT | Age: 51
End: 2021-01-20

## 2021-01-20 DIAGNOSIS — M19.031 PRIMARY OSTEOARTHRITIS OF RIGHT WRIST: ICD-10-CM

## 2021-01-20 DIAGNOSIS — M47.816 LUMBAR SPONDYLOSIS: ICD-10-CM

## 2021-01-20 DIAGNOSIS — M16.12 PRIMARY OSTEOARTHRITIS OF LEFT HIP: ICD-10-CM

## 2021-01-20 DIAGNOSIS — M50.30 DDD (DEGENERATIVE DISC DISEASE), CERVICAL: ICD-10-CM

## 2021-01-20 DIAGNOSIS — M51.9 LUMBAR DISC DISORDER: ICD-10-CM

## 2021-01-20 DIAGNOSIS — M79.7 FIBROMYALGIA: ICD-10-CM

## 2021-01-20 DIAGNOSIS — M54.12 CERVICAL RADICULOPATHY: ICD-10-CM

## 2021-01-20 DIAGNOSIS — G95.89 MYELOMALACIA OF CERVICAL CORD (HCC): ICD-10-CM

## 2021-01-20 DIAGNOSIS — M50.90 CERVICAL DISC DISORDER: ICD-10-CM

## 2021-01-20 DIAGNOSIS — M48.02 CERVICAL STENOSIS OF SPINE: ICD-10-CM

## 2021-01-20 RX ORDER — TIZANIDINE 2 MG/1
2 TABLET ORAL 2 TIMES DAILY
Qty: 60 TABLET | Refills: 1 | Status: SHIPPED
Start: 2021-01-20 | End: 2021-03-08 | Stop reason: SDUPTHER

## 2021-01-20 RX ORDER — TIZANIDINE 2 MG/1
TABLET ORAL
Qty: 60 TABLET | Refills: 0 | Status: CANCELLED | OUTPATIENT
Start: 2021-01-20

## 2021-01-20 RX ORDER — TRAMADOL HYDROCHLORIDE 50 MG/1
50 TABLET ORAL 2 TIMES DAILY PRN
Qty: 40 TABLET | Refills: 0 | Status: SHIPPED
Start: 2021-01-20 | End: 2021-02-05 | Stop reason: SDUPTHER

## 2021-01-21 ENCOUNTER — VIRTUAL VISIT (OUTPATIENT)
Dept: PRIMARY CARE CLINIC | Age: 51
End: 2021-01-21
Payer: MEDICAID

## 2021-01-21 DIAGNOSIS — R53.83 FATIGUE, UNSPECIFIED TYPE: ICD-10-CM

## 2021-01-21 DIAGNOSIS — F32.0 MAJOR DEPRESSIVE DISORDER, SINGLE EPISODE, MILD (HCC): ICD-10-CM

## 2021-01-21 DIAGNOSIS — J45.20 MILD INTERMITTENT ASTHMA, UNSPECIFIED WHETHER COMPLICATED: Chronic | ICD-10-CM

## 2021-01-21 DIAGNOSIS — J45.20 MILD INTERMITTENT ASTHMA, UNCOMPLICATED: ICD-10-CM

## 2021-01-21 DIAGNOSIS — U07.1 COVID-19 VIRUS INFECTION: Primary | ICD-10-CM

## 2021-01-21 PROBLEM — G95.89 MYELOMALACIA OF CERVICAL CORD (HCC): Status: RESOLVED | Noted: 2020-07-17 | Resolved: 2021-01-21

## 2021-01-21 PROCEDURE — 3017F COLORECTAL CA SCREEN DOC REV: CPT | Performed by: FAMILY MEDICINE

## 2021-01-21 PROCEDURE — 1036F TOBACCO NON-USER: CPT | Performed by: FAMILY MEDICINE

## 2021-01-21 PROCEDURE — G8484 FLU IMMUNIZE NO ADMIN: HCPCS | Performed by: FAMILY MEDICINE

## 2021-01-21 PROCEDURE — G8427 DOCREV CUR MEDS BY ELIG CLIN: HCPCS | Performed by: FAMILY MEDICINE

## 2021-01-21 PROCEDURE — G8417 CALC BMI ABV UP PARAM F/U: HCPCS | Performed by: FAMILY MEDICINE

## 2021-01-21 PROCEDURE — 99214 OFFICE O/P EST MOD 30 MIN: CPT | Performed by: FAMILY MEDICINE

## 2021-01-21 RX ORDER — ALBUTEROL SULFATE 90 UG/1
2 AEROSOL, METERED RESPIRATORY (INHALATION) PRN
Qty: 1 INHALER | Refills: 3 | Status: SHIPPED
Start: 2021-01-21 | End: 2021-08-26 | Stop reason: SDUPTHER

## 2021-01-21 RX ORDER — ALBUTEROL SULFATE 2.5 MG/3ML
2.5 SOLUTION RESPIRATORY (INHALATION) EVERY 6 HOURS PRN
Qty: 120 EACH | Refills: 3 | Status: SHIPPED
Start: 2021-01-21 | End: 2022-01-03

## 2021-01-21 RX ORDER — OMEPRAZOLE 20 MG/1
20 CAPSULE, DELAYED RELEASE ORAL DAILY
Qty: 30 CAPSULE | Refills: 5 | Status: SHIPPED
Start: 2021-01-21 | End: 2022-06-08 | Stop reason: DRUGHIGH

## 2021-01-21 ASSESSMENT — ENCOUNTER SYMPTOMS
SORE THROAT: 0
CHEST TIGHTNESS: 0
NAUSEA: 0
COUGH: 0
DIARRHEA: 0
SINUS PRESSURE: 0
VOMITING: 0
RHINORRHEA: 0
SHORTNESS OF BREATH: 0
EYE ITCHING: 0
EYE PAIN: 0
APNEA: 0
BLOOD IN STOOL: 0
WHEEZING: 0
CONSTIPATION: 0
ABDOMINAL PAIN: 0
COLOR CHANGE: 0
EYE REDNESS: 0
BACK PAIN: 0

## 2021-01-21 NOTE — PROGRESS NOTES
TeleMedicine Video Visit    This visit was performed as a virtual video visit using a synchronous, two-way, audio-video telehealth technology platform. Patient identification was verified at the start of the visit, including the patient's telephone number and physical location. I discussed with the patient the nature of our telehealth visits, that:     1. Due to the nature of an audio- video modality, the only components of a physical exam that could be done are the elements supported by direct observation. 2. I would evaluate the patient and recommend diagnostics and treatments based on my assessment. 3. If it was felt that the patient should be evaluated in clinic or an emergency room setting, then they would be directed there. 4. Our sessions are not being recorded and that personal health information is protected. 5. Our team would provide follow up care in person if/when the patient needs it. Patient does agree to proceed with telemedicine consultation. Patient's location: home address in [facility]  Physician  location other address in [facility] other people involved in call her daughter      Time spent: Greater than 30    This visit was completed virtually using Doxy. me        Chief Complaint:     Chief Complaint   Patient presents with    Positive For Covid-19    Cough    Asthma    Mental Health Problem    Fatigue         Asthma  There is no cough, shortness of breath or wheezing. This is a chronic problem. The current episode started more than 1 year ago. The problem occurs intermittently. The problem has been waxing and waning. Pertinent negatives include no appetite change, chest pain, ear congestion, ear pain, fever, headaches, myalgias, nasal congestion, orthopnea, rhinorrhea, sneezing, sore throat or sweats. Her symptoms are aggravated by nothing. Her symptoms are alleviated by beta-agonist. She reports significant improvement on treatment. Her past medical history is significant for asthma. Mental Health Problem  The primary symptoms do not include dysphoric mood. The current episode started more than 1 month ago. This is a chronic problem. The degree of incapacity that she is experiencing as a consequence of her illness is mild. Additional symptoms of the illness include fatigue. Additional symptoms of the illness do not include anhedonia, insomnia, hypersomnia, appetite change, unexpected weight change, headaches or abdominal pain. She does not admit to suicidal ideas. She does not have a plan to attempt suicide. Fatigue  This is a recurrent problem. The current episode started 1 to 4 weeks ago. The problem occurs intermittently. The problem has been waxing and waning. Associated symptoms include fatigue. Pertinent negatives include no abdominal pain, arthralgias, chest pain, chills, congestion, coughing, fever, headaches, joint swelling, myalgias, nausea, neck pain, numbness, rash, sore throat, vomiting or weakness. Nothing aggravates the symptoms. She has tried nothing for the symptoms. The treatment provided no relief. Other  Associated symptoms include fatigue. Pertinent negatives include no abdominal pain, arthralgias, chest pain, chills, congestion, coughing, fever, headaches, joint swelling, myalgias, nausea, neck pain, numbness, rash, sore throat, vomiting or weakness.        Patient Active Problem List   Diagnosis    Cervical stenosis of spinal canal    DDD (degenerative disc disease), cervical    Mild intermittent asthma    Gastroesophageal reflux disease without esophagitis    History of TIA (transient ischemic attack) and stroke    Fatigue    Current mild episode of major depressive disorder without prior episode ([facility] [address]    Spinal stenosis of lumbar region without neurogenic claudication    Fibromyalgia    Migraines without aura and without status migrainosus, not intractable    Chronic left-sided low back pain without sciatica    Lumbar paraspinal muscle spasm  C/f of CIERA (obstructive sleep apnea)    Obesity due to excess calories    Irritable bowel syndrome without diarrhea    Hyperglycemia    Primary stabbing headache    Primary osteoarthritis of right wrist    Stroke-like symptoms    Numbness and tingling in right hand    Chronic pain syndrome    Primary osteoarthritis of left hip    Cervical disc disorder    Cervical facet joint syndrome    Cervical radiculopathy    Cervical stenosis of spine    Lumbar spondylosis    Lumbar disc disorder    Greater trochanteric bursitis of left hip    Pain in left hip       Past Medical History:   Diagnosis Date    Asthma     Cerebral artery occlusion with cerebral infarction ([facility] [address]     2015    Cervical disc herniation     Cervical spinal stenosis     Chronic back pain     Chronic kidney disease     Depression     Disorder of thyroid gland 09/11/2012    GERD (gastroesophageal reflux disease)     Headache(784.0)     Hemorrhoids     History of colon polyps     Irritable bowel syndrome     Low back pain     Numbness and tingling in right hand 03/06/2020    Palpitations 07/24/2012    Scabies 05/18/2016    Seizure disorder ([facility] [address]     not on medications for this; last seizure 2018    Vitamin D deficiency 12/03/2014       Past Surgical History:   Procedure Laterality Date    COLONOSCOPY  3/9/2016    COLONOSCOPY  07/10/2017    HIP SURGERY Left 8/10/2020    LEFT GREATER TROCHANTERIC BURSAE INJECTION X-RAY (CPT 01932) performed by Ilya Sampson MD at [facility] 08/10/2020    left greater trochanter bursae injection    TUBAL LIGATION      UPPER GASTROINTESTINAL ENDOSCOPY  07/10/2017       Current Outpatient Medications   Medication Sig Dispense Refill    albuterol sulfate HFA (PROAIR HFA) 108 (90 Base) MCG/ACT inhaler Inhale 2 puffs into the lungs as needed for Wheezing or Shortness of Breath 1 Inhaler 3  albuterol (PROVENTIL) (2.5 MG/3ML) 0.083% nebulizer solution Take 3 mLs by nebulization every 6 hours as needed for Wheezing 120 each 3    omeprazole (PRILOSEC) 20 MG delayed release capsule Take 1 capsule by mouth daily Take am dos 07/10. 30 capsule 5    tiZANidine (ZANAFLEX) 2 MG tablet Take 1 tablet by mouth 2 times daily 60 tablet 1    traMADol (ULTRAM) 50 MG tablet Take 1 tablet by mouth 2 times daily as needed for Pain for up to 30 days. Intended supply: 30 days. Take lowest dose possible to manage pain 40 tablet 0    nabumetone (RELAFEN) 500 MG tablet Take 1.5 tablets by mouth 2 times daily (Patient taking differently: Take 750 mg by mouth 2 times daily On hold for 6 days for preop) 60 tablet 0    vitamin D3 (CHOLECALCIFEROL) 25 MCG (1000 UT) TABS tablet Take 1 tablet by mouth daily 30 tablet 5    Prenatal Vit-Fe Fumarate-FA (PRENATAL PLUS) 27-1 MG TABS 1 pill daily 30 tablet 5    aspirin 81 MG tablet Take 2 tablets by mouth daily (Patient taking differently: Take 162 mg by mouth daily On hold preop for 7 days) 60 tablet 5    EPINEPHrine (EPIPEN 2-ABUNDIO) 0.3 MG/0.3ML SOAJ injection Inject into muscle for allergic reaction 0.3 mL 0    dicyclomine (BENTYL) 20 MG tablet Take 20 mg by mouth every 6 hours Take am dos 07/10       No current facility-administered medications for this visit.         Allergies   Allergen Reactions    Fish-Derived Products Anaphylaxis    Dye [Iodides] Itching       Social History     Socioeconomic History    Marital status:      Spouse name: Not on file    Number of children: Not on file    Years of education: Not on file    Highest education level: Not on file   Occupational History    Not on file   Social Needs    Financial resource strain: Not on file    Food insecurity     Worry: Not on file     Inability: Not on file    Transportation needs     Medical: Not on file     Non-medical: Not on file   Tobacco Use    Smoking status: Former Smoker Packs/day: 1.00     Years: 25.00     Pack years: 25.00     Types: Cigarettes     Quit date: 1988     Years since quittin.7    Smokeless tobacco: Never Used   Substance and Sexual Activity    Alcohol use: No    Drug use: No    Sexual activity: Not on file   Lifestyle    Physical activity     Days per week: Not on file     Minutes per session: Not on file    Stress: Not on file   Relationships    Social connections     Talks on phone: Not on file     Gets together: Not on file     Attends Zoroastrian service: Not on file     Active member of club or organization: Not on file     Attends meetings of clubs or organizations: Not on file     Relationship status: Not on file    Intimate partner violence     Fear of current or ex partner: Not on file     Emotionally abused: Not on file     Physically abused: Not on file     Forced sexual activity: Not on file   Other Topics Concern    Not on file   Social History Narrative    Not on file       Family History   Problem Relation Age of Onset    Heart Attack Mother     Kidney Disease Father         failure    Diabetes Other     Asthma Child     Asthma Child         bone disease    Anemia Child           Review of Systems   Constitutional: Positive for fatigue. Negative for activity change, appetite change, chills, fever and unexpected weight change. HENT: Negative for congestion, ear pain, hearing loss, nosebleeds, rhinorrhea, sinus pressure, sneezing and sore throat. Eyes: Negative for pain, redness, itching and visual disturbance. Respiratory: Negative for apnea, cough, chest tightness, shortness of breath and wheezing. Cardiovascular: Negative for chest pain, palpitations and leg swelling. Gastrointestinal: Negative for abdominal pain, blood in stool, constipation, diarrhea, nausea and vomiting. Endocrine: Negative. Genitourinary: Negative for decreased urine volume, difficulty urinating, dysuria, frequency, hematuria and urgency. Musculoskeletal: Negative for arthralgias, back pain, gait problem, joint swelling, myalgias and neck pain. Skin: Negative for color change and rash. Allergic/Immunologic: Negative for environmental allergies and food allergies. Neurological: Negative for dizziness, weakness, light-headedness, numbness and headaches. Hematological: Negative for adenopathy. Does not bruise/bleed easily. Psychiatric/Behavioral: Negative for behavioral problems, dysphoric mood and sleep disturbance. The patient is not nervous/anxious, does not have insomnia and is not hyperactive. All other systems reviewed and are negative. LMP 03/27/2008     Physical Exam  Vitals signs and nursing note reviewed. Constitutional:       General: She is not in acute distress. Appearance: Normal appearance. She is not toxic-appearing. Pulmonary:      Effort: Pulmonary effort is normal. No respiratory distress. Neurological:      Mental Status: She is alert and oriented to person, place, and time.    Psychiatric:         Attention and Perception: Attention normal.         Mood and Affect: Mood and affect normal.         Speech: Speech normal.                                 ASSESSMENT/PLAN:    Patient Active Problem List   Diagnosis    Cervical stenosis of spinal canal    DDD (degenerative disc disease), cervical    Mild intermittent asthma    Gastroesophageal reflux disease without esophagitis    History of TIA (transient ischemic attack) and stroke    Fatigue    Current mild episode of major depressive disorder without prior episode ([facility] [address]    Spinal stenosis of lumbar region without neurogenic claudication    Fibromyalgia    Migraines without aura and without status migrainosus, not intractable    Chronic left-sided low back pain without sciatica    Lumbar paraspinal muscle spasm

## 2021-01-25 ENCOUNTER — TELEPHONE (OUTPATIENT)
Dept: PAIN MANAGEMENT | Age: 51
End: 2021-01-25

## 2021-01-25 DIAGNOSIS — U07.1 COVID-19 VIRUS INFECTION: ICD-10-CM

## 2021-01-25 DIAGNOSIS — U07.1 COVID-19 VIRUS INFECTION: Primary | ICD-10-CM

## 2021-01-25 LAB — SARS-COV-2, PCR: DETECTED

## 2021-02-05 NOTE — PROGRESS NOTES
Christine Munson  [address] [facility], [address] [place] [address] [place]  [phone]  Telephone follow up visit      Date of Visit:  2/8/2021    CC:     Consent:  Telephone follow up due to Kristie 19 pandemic   The patient and/or health care decision maker is aware that he/she may receive a bill for this telephone service, depending on his insurance coverage, and has provided verbal consent to proceed: Yes    I have considered the risks of abuse, dependence, addiction and diversion. My patient is aware that they will need a follow-up visit (in-person or virtually) at the appropriate time indicated for continued medications. Further, my patient is aware that when this acute crisis has lifted, they will be expected to return for an in-person visit and all elements of standard local and hospital guidelines in order to continue this medication. Patient location: Home   Physician Location:Other address in [place]    HPI:  Pt notes had recent COVID test results and are now negative but still has loss of taste. Pt tearful during appointment today describing that her symptoms. Followed for neck, low back and left hip pain with axial neck pain main complaint. Pt scared to go outside now due to severity of covid sx. Pt continues to experience muscle spasms to neck and arms.    Appropriate analgesia with current medications regimen: yes. Change in quality of symptoms:yes worsening.    Medication side effects: none  Recent diagnostic testing: none  Recent interventional procedures:no     She has been on anticoagulation medications to include ASA. The patient Milagro Landows not been on herbal supplements.  The patient is not diabetic.     Imaging:      Cervical Spine MRI 11/2020: Impression   1.  Cervical spondylosis as described above with multiple levels of   broad-based central disc herniation notable at C3/C4, C4/C5, C5/C6, and C6/C7   resulting in mild-to-moderate effacement of ventral cord. 2.  No fracture or malalignment.             Cervical spine MRI 04/2018  1. Multilevel degenerative disc disease extending from C3 through C7. Moderately severe spinal canal stenosis at C3-C4. Moderate spinal   canal stenosis at C4-C5. . Moderately severe spinal canal stenosis at   C5-C6. Abnormal signal intensity within the cervical spinal cord C3-C5   without evidence of enhancement. Findings are most likely reflective   of myelomalacia changes secondary to the underlying degree of cord   compression as described above. No active demyelination identified. 2. Slight loss of normally expected cervical lordosis C3-C6.      CT Lumbar spine 2017  1. Mild diffuse osteopenia, no compression fracture or   spondylolisthesis.       2. Mild multilevel degenerative disc disease and facet joint   arthropathy of the lower lumbar spine more pronounced at L4-L5.      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         Otherwise, no musculoskeletal pathology to explain patient's pain.      Previous treatments: Physical Therapy, Epidural Steroid Injection with  and medications. .  tizanidine dizziness, flexeril sedating, norco severe gi distress and headaches                                        Potential Aberrant Drug-Related Behavior:    No     Urine Drug Screening:  Saliva screen 08/2020 consistent for Ultram     OARRS report:  02/2021 consistent. Past Medical History: Reviewed    Past Surgical History: Reviewed     Home Medications: Reviewed    Allergies: Reviewed     Social History: Reviewed     REVIEW OF SYSTEMS:     Connor Thomas denies fever/chills, chest pain, shortness of breath, new bowel or bladder complaints. All other review of systems was negative. PHYSICAL EXAMINATION:     General:       A & O x3, tearful     Lungs:    Breathing: slightly labored with talking       Impression:    Plan:    Followed for neck,low back and left hip pain. Neck pain primarily axial with arm weakness  On hold bilateral Cervical medial branch block [id number] with sedation because still having sx from  [facility] [address] with [address] for severe neck pain until COVID sx resolve  Refill Tramadol 50mg po daily-bid prn #40  Continue Relafen 750mg po bid   Continue Tizanidine 2mg po bid prn spasms   Virtual visit next month, fear of leaving house since recent covid, still recovering   OARRS report reviewed 02/2021.   Patient encouraged to stay active and to lose weight. Patient had tried and failed physical therapy. Treatment plan discussed with the patient and her daughter including medications and procedure side effects. We discussed with the patient that combining opioids, benzodiazepines, alcohol, illicit drugs or sleep aids increases the risk of respiratory depression including death. We discussed that these medications may cause drowsiness, sedation or dizziness and have counseled the patient not to drive or operate machinery. We have discussed that these medications will be prescribed only by one provider. We have discussed with the patient about age related risk factors and have thoroughly discussed the importance of taking these medications as prescribed. The patient verbalizes understanding. Patient advised regarding steps to help prevent the spread of COVID-19   SOURCE  [email or web address]-abiodun.info/. html     1-Stay home except to get medical care  2-Clean your hands often for atleast 20 secnds, avoid touching: Avoid touching your eyes, nose, and mouth with unwashed hands. 3-Seek medical attention: Seek prompt medical attention if your illness is worsening (e.g., difficulty breathing).   Call you doctor first.  3-Wear a facemask if you are sick   4-Cover your coughs and sneezes

## 2021-02-06 LAB
SARS-COV-2: NOT DETECTED
SOURCE: NORMAL

## 2021-02-08 ENCOUNTER — VIRTUAL VISIT (OUTPATIENT)
Dept: PAIN MANAGEMENT | Age: 51
End: 2021-02-08
Payer: MEDICAID

## 2021-02-08 DIAGNOSIS — M48.02 CERVICAL STENOSIS OF SPINAL CANAL: ICD-10-CM

## 2021-02-08 DIAGNOSIS — M50.90 CERVICAL DISC DISORDER: ICD-10-CM

## 2021-02-08 DIAGNOSIS — M79.7 FIBROMYALGIA: ICD-10-CM

## 2021-02-08 DIAGNOSIS — G95.89 MYELOMALACIA OF CERVICAL CORD (HCC): ICD-10-CM

## 2021-02-08 DIAGNOSIS — G89.29 CHRONIC LEFT-SIDED LOW BACK PAIN WITHOUT SCIATICA: ICD-10-CM

## 2021-02-08 DIAGNOSIS — M47.816 LUMBAR SPONDYLOSIS: ICD-10-CM

## 2021-02-08 DIAGNOSIS — M19.031 PRIMARY OSTEOARTHRITIS OF RIGHT WRIST: ICD-10-CM

## 2021-02-08 DIAGNOSIS — M48.02 CERVICAL STENOSIS OF SPINE: ICD-10-CM

## 2021-02-08 DIAGNOSIS — M51.9 LUMBAR DISC DISORDER: ICD-10-CM

## 2021-02-08 DIAGNOSIS — M54.12 CERVICAL RADICULOPATHY: ICD-10-CM

## 2021-02-08 DIAGNOSIS — M47.812 CERVICAL FACET JOINT SYNDROME: Primary | ICD-10-CM

## 2021-02-08 DIAGNOSIS — M50.30 DDD (DEGENERATIVE DISC DISEASE), CERVICAL: ICD-10-CM

## 2021-02-08 DIAGNOSIS — M70.62 GREATER TROCHANTERIC BURSITIS OF LEFT HIP: ICD-10-CM

## 2021-02-08 DIAGNOSIS — M16.12 PRIMARY OSTEOARTHRITIS OF LEFT HIP: ICD-10-CM

## 2021-02-08 DIAGNOSIS — M48.061 SPINAL STENOSIS OF LUMBAR REGION WITHOUT NEUROGENIC CLAUDICATION: ICD-10-CM

## 2021-02-08 DIAGNOSIS — M54.50 CHRONIC LEFT-SIDED LOW BACK PAIN WITHOUT SCIATICA: ICD-10-CM

## 2021-02-08 DIAGNOSIS — G89.4 CHRONIC PAIN SYNDROME: ICD-10-CM

## 2021-02-08 DIAGNOSIS — M62.830 LUMBAR PARASPINAL MUSCLE SPASM: ICD-10-CM

## 2021-02-08 PROCEDURE — 99421 OL DIG E/M SVC 5-10 MIN: CPT | Performed by: NURSE PRACTITIONER

## 2021-02-08 RX ORDER — TRAMADOL HYDROCHLORIDE 50 MG/1
50 TABLET ORAL 2 TIMES DAILY PRN
Qty: 40 TABLET | Refills: 0 | Status: SHIPPED
Start: 2021-02-19 | End: 2021-03-08

## 2021-02-08 NOTE — PROGRESS NOTES
Farzana Lyn was read the following message We want to confirm that, for purposes of billing, this is a virtual visit with your provider for which we will submit a claim for reimbursement with your insurance company. You will be responsible for any copays, coinsurance amounts or other amounts not covered by your insurance company. If you do not accept this, unfortunately we will not be able to schedule or proceed with a virtual visit with the provider. Do you accept? Ashlee responded Yes .

## 2021-02-14 ENCOUNTER — HOSPITAL ENCOUNTER (EMERGENCY)
Age: 51
Discharge: HOME | End: 2021-02-14
Attending: EMERGENCY MEDICINE
Payer: MEDICAID

## 2021-02-14 ENCOUNTER — APPOINTMENT (OUTPATIENT)
Dept: CT IMAGING | Age: 51
End: 2021-02-14
Payer: MEDICAID

## 2021-02-14 ENCOUNTER — APPOINTMENT (OUTPATIENT)
Dept: GENERAL RADIOLOGY | Age: 51
End: 2021-02-14
Payer: MEDICAID

## 2021-02-14 VITALS
BODY MASS INDEX: 33.87 KG/M2 | DIASTOLIC BLOOD PRESSURE: 73 MMHG | OXYGEN SATURATION: 96 % | WEIGHT: 168 LBS | HEIGHT: 59 IN | RESPIRATION RATE: 16 BRPM | SYSTOLIC BLOOD PRESSURE: 127 MMHG | TEMPERATURE: 97.3 F | HEART RATE: 72 BPM
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
## 2021-02-14 DIAGNOSIS — M25.562 ACUTE PAIN OF LEFT KNEE: ICD-10-CM

## 2021-02-14 DIAGNOSIS — M54.50 ACUTE LOW BACK PAIN WITHOUT SCIATICA, UNSPECIFIED BACK PAIN LATERALITY: ICD-10-CM

## 2021-02-14 PROCEDURE — 72131 CT LUMBAR SPINE W/O DYE: CPT

## 2021-02-14 PROCEDURE — 99284 EMERGENCY DEPT VISIT MOD MDM: CPT

## 2021-02-14 PROCEDURE — 6370000000 HC RX 637 (ALT 250 FOR IP): Performed by: NURSE PRACTITIONER

## 2021-02-14 PROCEDURE — 72220 X-RAY EXAM SACRUM TAILBONE: CPT

## 2021-02-14 PROCEDURE — 73562 X-RAY EXAM OF KNEE 3: CPT

## 2021-02-14 PROCEDURE — 73610 X-RAY EXAM OF ANKLE: CPT

## 2021-02-14 PROCEDURE — 73590 X-RAY EXAM OF LOWER LEG: CPT

## 2021-02-14 PROCEDURE — 96372 THER/PROPH/DIAG INJ SC/IM: CPT

## 2021-02-14 PROCEDURE — 6360000002 HC RX W HCPCS: Performed by: NURSE PRACTITIONER

## 2021-02-14 PROCEDURE — 99283 EMERGENCY DEPT VISIT LOW MDM: CPT

## 2021-02-14 RX ORDER — KETOROLAC TROMETHAMINE 30 MG/ML
30 INJECTION, SOLUTION INTRAMUSCULAR; INTRAVENOUS ONCE
Status: COMPLETED | OUTPATIENT
Start: 2021-02-14 | End: 2021-02-14

## 2021-02-14 RX ORDER — OXYCODONE HYDROCHLORIDE AND ACETAMINOPHEN 5; 325 MG/1; MG/1
1 TABLET ORAL ONCE
Status: COMPLETED | OUTPATIENT
Start: 2021-02-14 | End: 2021-02-14

## 2021-02-14 RX ORDER — LIDOCAINE 50 MG/G
1 PATCH TOPICAL DAILY
Qty: 10 PATCH | Refills: 0 | Status: SHIPPED | OUTPATIENT
Start: 2021-02-14 | End: 2021-02-24

## 2021-02-14 RX ADMIN — KETOROLAC TROMETHAMINE 30 MG: 30 INJECTION, SOLUTION INTRAMUSCULAR; INTRAVENOUS at 18:47

## 2021-02-14 RX ADMIN — OXYCODONE AND ACETAMINOPHEN 1 TABLET: 5; 325 TABLET ORAL at 18:46

## 2021-02-14 ASSESSMENT — PAIN DESCRIPTION - ONSET: ONSET: ON-GOING

## 2021-02-14 ASSESSMENT — PAIN DESCRIPTION - PAIN TYPE: TYPE: ACUTE PAIN

## 2021-02-14 ASSESSMENT — PAIN SCALES - GENERAL
PAINLEVEL_OUTOF10: 4
PAINLEVEL_OUTOF10: 8

## 2021-02-14 ASSESSMENT — PAIN DESCRIPTION - LOCATION
LOCATION: BACK;LEG
LOCATION: BACK;LEG

## 2021-02-14 ASSESSMENT — PAIN DESCRIPTION - FREQUENCY: FREQUENCY: CONTINUOUS

## 2021-02-14 ASSESSMENT — PAIN DESCRIPTION - ORIENTATION
ORIENTATION: LEFT
ORIENTATION: LEFT

## 2021-02-14 ASSESSMENT — PAIN DESCRIPTION - DESCRIPTORS: DESCRIPTORS: PRESSURE

## 2021-02-15 NOTE — ED PROVIDER NOTES
ED Attending  CC: No        [address] [facility]  Department of Emergency Medicine   ED  Encounter Note  Admit Date/RoomTime: 2021  6:01 PM  ED Room:     NAME: Dacia Vazquez  : 1970  MRN: [id number]     Chief Complaint:  Fall (fell down steps, injuring left back and leg.  )    History of Present Illness       Dacia Vazquez is a 45 y.o. old female who presents to the emergency department for complaint of back pain and left lower extremity pain. She reports that earlier today she missed 3 steps fell. Denies striking her head. Denies LOC. Reports chronic issues with the left hip and neck. Denies any increased pain to her neck and left hip post incident. Denies striking her head, denies LOC. Reports that she has been able to ambulate post incident but with significant pain to the left knee. Denies numbness, tingling, paresthesias, extremity weakness, loss of bowel bladder control, saddle anesthesia, headache, lightheadedness, dizziness, syncope, or any other complaints at this time. Her pain is aggraveated by any movement and relieved by nothing. Reports that she does take tramadol at home for her chronic hip pain. Reported that she last took it just after her fall. ROS   Pertinent positives and negatives are stated within HPI, all other systems reviewed and are negative. Past Medical History:  has a past medical history of Asthma, Cerebral artery occlusion with cerebral infarction [facility]), Cervical disc herniation, Cervical spinal stenosis, Chronic back pain, Chronic kidney disease, Depression, Disorder of thyroid gland, GERD (gastroesophageal reflux disease), Headache(784.0), Hemorrhoids, History of colon polyps, Irritable bowel syndrome, Low back pain, Numbness and tingling in right hand, Palpitations, Scabies, Seizure disorder ([facility] [address] and Vitamin D deficiency. Surgical History:  has a past surgical history that includes Tubal ligation; Colonoscopy (3/9/2016);  Colonoscopy (07/10/2017); Upper gastrointestinal endoscopy (07/10/2017); Nerve Block (Left, 08/10/2020); and hip surgery (Left, 8/10/2020). Social History:  reports that she quit smoking about 32 years ago. Her smoking use included cigarettes. She has a 25.00 pack-year smoking history. She has never used smokeless tobacco. She reports that she does not drink alcohol or use drugs. Family History: family history includes Anemia in her child; Asthma in her child and child; Diabetes in an other family member; Heart Attack in her mother; Kidney Disease in her father. Allergies: Fish-derived products and Dye [iodides]    Physical Exam   Oxygen Saturation Interpretation: Normal.        ED Triage Vitals   BP Temp Temp Source Pulse Resp SpO2 Height Weight   02/14/21 1733 02/14/21 1733 02/14/21 1733 02/14/21 1733 02/14/21 1733 02/14/21 1733 02/14/21 1739 02/14/21 1739   127/73 97.3 °F (36.3 °C) Temporal 72 16 96 % 4' 11\" (1.499 m) 168 lb (76.2 kg)         Constitutional:  Alert, development consistent with age. Neck:  Normal ROM. Supple. Knee:  Left medial, lateral, suprapatellar:             Tenderness:  moderate. .              Swelling/Effusion: Mild. Deformity: no.              ROM: diminished range with pain. Skin:  normal exam; no wounds, erythema, or swelling. Drawer's:  Negative. Lachman's: Unable to Assess. Apley's: Unable to Assess. Ciara's: Unable to Assess. Valgus/Varus Stress: Unable to Assess. Crepitus: Unable to Assess. Hip:            Tenderness:  Mild, which she reports is chronic. Swelling: None. Deformity: no.              ROM: full range of motion. Skin:  normal exam; no wounds, erythema, or swelling. Joint(s) Below: ankle. Tenderness:  mild. Swelling: No.              Deformity: no.             ROM: full range with pain.             Skin:  normal exam; no wounds, erythema, or swelling. Neurovascular: Motor deficit: none. Sensory deficit: none. Pulse deficit: none. Capillary refill: normal.  Back: Diffuse lower bilateral paravertebral tenderness. Sacral and coccyx tenderness noted. No wounds or erythema. No ecchymosis. Gait:  limp due to affected limb. Lymphatics: No lymphangitis or adenopathy noted. Neurological:  Oriented. Motor functions intact. Lab / Imaging Results   (All laboratory and radiology results have been personally reviewed by myself)  Labs:  No results found for this visit on 02/14/21. Imaging: All Radiology results interpreted by Radiologist unless otherwise noted. CT LUMBAR SPINE WO CONTRAST   Final Result   No evidence of acute lumbar spine trauma. Facet joint degenerative changes lower lumbar spine. XR KNEE LEFT (3 VIEWS)   Final Result   No acute abnormality of the knee. XR ANKLE LEFT (MIN 3 VIEWS)   Final Result   No acute abnormality of the ankle. XR TIBIA FIBULA LEFT (2 VIEWS)   Final Result   No acute osseous abnormality. XR SACRUM COCCYX (MIN 2 VIEWS)   Final Result   Atraumatic appearance of the sacrum and coccyx. ED Course / Medical Decision Making     Medications   oxyCODONE-acetaminophen (PERCOCET) 5-325 MG per tablet 1 tablet (1 tablet Oral Given 2/14/21 1846)   ketorolac (TORADOL) injection 30 mg (30 mg Intramuscular Given 2/14/21 1847)        Consult(s):   None    Procedure(s):   none. MDM:     Flash Preston is a 45 female who presented to the emergency department for complaint of left knee pain and low back pain post fall. She reported that she missed a step and fell down 3 steps while at home. Not strike her head. No LOC. She reported chronic history of neck and left hip pain. Stated that she uses tramadol at home. No neurovascular deficits. No motor or sensory deficits. No signs or concerns for cauda equina.   Imaging of left knee, left ankle and left tib-fib were reassuring for no acute osseous abnormalities. CT of lumbar spine was negative for acute acute abnormality or fracture. Noted was degenerative changes. She was noted to be tender to the coccyx region. Imaging was obtained and negative for any traumatic findings. No skin changes, bruising, or wounds. She remained hemodynamically stable, nontoxic, and appropriate for outpatient management. She was given orthopedic contact information and instructed to call for follow-up appointment. She verbalized understanding agrees with plan. She was instructed to continue to use tramadol as prescribed. She was also prescribed Lidoderm patches and Voltaren cream.  Instructed to have close follow-up with her PCP and advised to return for any new, change, worsening symptoms or concerns. At this time the patient is without objective evidence of an acute process requiring hospitalization or inpatient management. They have remained hemodynamically stable throughout their entire ED visit and are stable for discharge with outpatient follow-up. The plan has been discussed in detail and they are aware of the specific conditions for emergent return, as well as the importance of follow-up. Plan of Care/Counseling:  I reviewed today's visit with the patient in addition to providing specific details for the plan of care and counseling regarding the diagnosis and prognosis. Questions are answered at this time and are agreeable with the plan. Assessment      1. Fall, initial encounter    2. Acute low back pain without sciatica, unspecified back pain laterality    3. Acute pain of left knee      Plan   Discharge home.   Patient condition is good    New Medications     Discharge Medication List as of 2/14/2021  7:56 PM      START taking these medications    Details   diclofenac sodium (VOLTAREN) 1 % GEL Apply 2 g topically 2 times daily for 10 days, Topical, 2 TIMES DAILY Starting Sun 2/14/2021, Until Wed 2/24/2021, For 10 days, Disp-40 g, R-0, Print      lidocaine (LIDODERM) 5 % Place 1 patch onto the skin daily for 10 days 12 hours on, 12 hours off., Disp-10 patch, R-0Print           Electronically signed by NOY Garcia CNP   DD: 2/14/21  **This report was transcribed using voice recognition software. Every effort was made to ensure accuracy; however, inadvertent computerized transcription errors may be present.   END OF ED PROVIDER NOTE      NOY Garcia CNP  02/14/21 9120

## 2021-02-25 ENCOUNTER — TELEPHONE (OUTPATIENT)
Dept: PRIMARY CARE CLINIC | Age: 51
End: 2021-02-25

## 2021-02-25 ENCOUNTER — OFFICE VISIT (OUTPATIENT)
Dept: PRIMARY CARE CLINIC | Age: 51
End: 2021-02-25
Payer: MEDICAID

## 2021-02-25 VITALS
OXYGEN SATURATION: 97 % | BODY MASS INDEX: 36.77 KG/M2 | TEMPERATURE: 97.7 F | HEIGHT: 59 IN | SYSTOLIC BLOOD PRESSURE: 128 MMHG | WEIGHT: 182.4 LBS | DIASTOLIC BLOOD PRESSURE: 74 MMHG | HEART RATE: 78 BPM

## 2021-02-25 DIAGNOSIS — R53.83 FATIGUE, UNSPECIFIED TYPE: ICD-10-CM

## 2021-02-25 DIAGNOSIS — M70.62 GREATER TROCHANTERIC BURSITIS OF LEFT HIP: ICD-10-CM

## 2021-02-25 DIAGNOSIS — M47.816 LUMBAR SPONDYLOSIS: Primary | ICD-10-CM

## 2021-02-25 DIAGNOSIS — F32.0 CURRENT MILD EPISODE OF MAJOR DEPRESSIVE DISORDER WITHOUT PRIOR EPISODE (HCC): Chronic | ICD-10-CM

## 2021-02-25 DIAGNOSIS — M25.552 PAIN IN LEFT HIP: ICD-10-CM

## 2021-02-25 DIAGNOSIS — H53.8 BLURRED VISION: ICD-10-CM

## 2021-02-25 DIAGNOSIS — M51.9 LUMBAR DISC DISORDER: ICD-10-CM

## 2021-02-25 DIAGNOSIS — S83.92XA SPRAIN OF LEFT KNEE, UNSPECIFIED LIGAMENT, INITIAL ENCOUNTER: ICD-10-CM

## 2021-02-25 LAB
ALBUMIN SERPL-MCNC: 4.4 G/DL (ref 3.5–5.2)
ALP BLD-CCNC: 89 U/L (ref 35–104)
ALT SERPL-CCNC: 23 U/L (ref 0–32)
ANION GAP SERPL CALCULATED.3IONS-SCNC: 13 MMOL/L (ref 7–16)
AST SERPL-CCNC: 20 U/L (ref 0–31)
BILIRUB SERPL-MCNC: 0.4 MG/DL (ref 0–1.2)
BUN BLDV-MCNC: 12 MG/DL (ref 6–20)
CALCIUM SERPL-MCNC: 9.6 MG/DL (ref 8.6–10.2)
CHLORIDE BLD-SCNC: 104 MMOL/L (ref 98–107)
CO2: 24 MMOL/L (ref 22–29)
CREAT SERPL-MCNC: 0.9 MG/DL (ref 0.5–1)
GFR AFRICAN AMERICAN: >60
GFR NON-AFRICAN AMERICAN: >60 ML/MIN/1.73
GLUCOSE BLD-MCNC: 82 MG/DL (ref 74–99)
HCT VFR BLD CALC: 42.9 % (ref 34–48)
HEMOGLOBIN: 13.9 G/DL (ref 11.5–15.5)
MCH RBC QN AUTO: 27.3 PG (ref 26–35)
MCHC RBC AUTO-ENTMCNC: 32.4 % (ref 32–34.5)
MCV RBC AUTO: 84.3 FL (ref 80–99.9)
PDW BLD-RTO: 12.7 FL (ref 11.5–15)
PLATELET # BLD: 287 E9/L (ref 130–450)
PMV BLD AUTO: 10.5 FL (ref 7–12)
POTASSIUM SERPL-SCNC: 4.1 MMOL/L (ref 3.5–5)
RBC # BLD: 5.09 E12/L (ref 3.5–5.5)
SODIUM BLD-SCNC: 141 MMOL/L (ref 132–146)
TOTAL PROTEIN: 7.4 G/DL (ref 6.4–8.3)
TSH SERPL DL<=0.05 MIU/L-ACNC: 1.75 UIU/ML (ref 0.27–4.2)
WBC # BLD: 6.5 E9/L (ref 4.5–11.5)

## 2021-02-25 PROCEDURE — G8484 FLU IMMUNIZE NO ADMIN: HCPCS | Performed by: FAMILY MEDICINE

## 2021-02-25 PROCEDURE — G8417 CALC BMI ABV UP PARAM F/U: HCPCS | Performed by: FAMILY MEDICINE

## 2021-02-25 PROCEDURE — G8427 DOCREV CUR MEDS BY ELIG CLIN: HCPCS | Performed by: FAMILY MEDICINE

## 2021-02-25 PROCEDURE — 99214 OFFICE O/P EST MOD 30 MIN: CPT | Performed by: FAMILY MEDICINE

## 2021-02-25 PROCEDURE — 1036F TOBACCO NON-USER: CPT | Performed by: FAMILY MEDICINE

## 2021-02-25 PROCEDURE — 3017F COLORECTAL CA SCREEN DOC REV: CPT | Performed by: FAMILY MEDICINE

## 2021-02-25 RX ORDER — HYDROXYZINE 50 MG/1
50 TABLET, FILM COATED ORAL NIGHTLY
Qty: 30 TABLET | Refills: 0 | Status: SHIPPED | OUTPATIENT
Start: 2021-02-25 | End: 2021-03-27

## 2021-02-25 RX ORDER — PREDNISONE 10 MG/1
TABLET ORAL
Qty: 18 TABLET | Refills: 0 | Status: SHIPPED
Start: 2021-02-25 | End: 2021-03-08

## 2021-02-25 ASSESSMENT — ENCOUNTER SYMPTOMS
SHORTNESS OF BREATH: 0
BLURRED VISION: 1
WHEEZING: 0
SINUS PRESSURE: 0
RHINORRHEA: 0
EYE ITCHING: 0
PHOTOPHOBIA: 0
BACK PAIN: 1
COLOR CHANGE: 0
EYE REDNESS: 0
SORE THROAT: 0
COUGH: 0
NAUSEA: 0
ABDOMINAL PAIN: 0
EYE PAIN: 0
VOMITING: 0
CHEST TIGHTNESS: 0
BLOOD IN STOOL: 0
DIARRHEA: 0
APNEA: 0
CONSTIPATION: 0

## 2021-02-25 NOTE — PROGRESS NOTES
Chief Complaint:     Chief Complaint   Patient presents with    Eye Problem    Fall     fell 2 weeks fell down stairs, injured left knee, lower leg and back    Other     teeth feel loose    Back Pain    Leg Pain         Eye Problem   Both eyes are affected. This is a new problem. The current episode started in the past 7 days. The problem occurs daily. The problem has been unchanged. There was no injury mechanism. The patient is experiencing no pain. There is no known exposure to pink eye. She does not wear contacts. Associated symptoms include blurred vision. Pertinent negatives include no eye redness, fever, itching, nausea, photophobia, vomiting or weakness. She has tried nothing for the symptoms. The treatment provided no relief. Fall  The accident occurred more than 1 week ago (approx 2 weeks). The fall occurred while walking. She landed on hard floor. There was no blood loss. The point of impact was the right hip and left hip. The pain is present in the back, left hip and right hip. The symptoms are aggravated by ambulation, pressure on injury, standing and movement. Pertinent negatives include no abdominal pain, fever, headaches, hematuria, nausea, numbness or vomiting. She has tried nothing for the symptoms. The treatment provided no relief. Other  This is a new (tooth problem) problem. The current episode started in the past 7 days. The problem occurs daily. The problem has been unchanged. Pertinent negatives include no abdominal pain, arthralgias, chest pain, congestion, coughing, fatigue, fever, headaches, myalgias, nausea, neck pain, numbness, rash, sore throat, vomiting or weakness. Nothing aggravates the symptoms. She has tried nothing for the symptoms. The treatment provided no relief. Back Pain  This is a new problem. The current episode started 1 to 4 weeks ago. The problem occurs daily. The problem has been waxing and waning since onset. The pain is present in the lumbar spine.  The quality of the pain is described as aching. Associated symptoms include leg pain. Pertinent negatives include no abdominal pain, chest pain, dysuria, fever, headaches, numbness or weakness. She has tried nothing for the symptoms. The treatment provided no relief. Leg Pain   The incident occurred more than 1 week ago. The incident occurred at home. The injury mechanism was a fall. The pain is present in the left hip and right hip. The quality of the pain is described as aching. The pain is mild. The pain has been intermittent since onset. Pertinent negatives include no numbness. The symptoms are aggravated by movement, palpation and weight bearing. Mental Health Problem  The primary symptoms do not include dysphoric mood. Primary symptoms comment: anxiety. The current episode started more than 1 month ago. This is a new problem. The onset of the illness is precipitated by a stressful event and emotional stress. The degree of incapacity that she is experiencing as a consequence of her illness is mild. Additional symptoms of the illness include insomnia. Additional symptoms of the illness do not include hypersomnia, appetite change, unexpected weight change, fatigue, agitation, headaches or abdominal pain. She does not admit to suicidal ideas. She does not have a plan to attempt suicide. She does not contemplate harming herself. She has not already injured self. She does not contemplate injuring another person. She has not already  injured another person.        Patient Active Problem List   Diagnosis    Cervical stenosis of spinal canal    DDD (degenerative disc disease), cervical    Mild intermittent asthma    Gastroesophageal reflux disease without esophagitis    History of TIA (transient ischemic attack) and stroke    Fatigue    Current mild episode of major depressive disorder without prior episode ([facility] [address]    Spinal stenosis of lumbar region without neurogenic claudication    Fibromyalgia    Migraines without aura and without status migrainosus, not intractable    Chronic left-sided low back pain without sciatica    Lumbar paraspinal muscle spasm    C/f of CIERA (obstructive sleep apnea)    Obesity due to excess calories    Irritable bowel syndrome without diarrhea    Hyperglycemia    Primary stabbing headache    Primary osteoarthritis of right wrist    Stroke-like symptoms    Numbness and tingling in right hand    Chronic pain syndrome    Primary osteoarthritis of left hip    Cervical disc disorder    Cervical facet joint syndrome    Cervical radiculopathy    Cervical stenosis of spine    Lumbar spondylosis    Lumbar disc disorder    Greater trochanteric bursitis of left hip    Pain in left hip       Past Medical History:   Diagnosis Date    Asthma     Cerebral artery occlusion with cerebral infarction ([facility] [address]     2015    Cervical disc herniation     Cervical spinal stenosis     Chronic back pain     Chronic kidney disease     Depression     Disorder of thyroid gland 09/11/2012    GERD (gastroesophageal reflux disease)     Headache(784.0)     Hemorrhoids     History of colon polyps     Irritable bowel syndrome     Low back pain     Numbness and tingling in right hand 03/06/2020    Palpitations 07/24/2012    Scabies 05/18/2016    Seizure disorder (HCC)     not on medications for this; last seizure 2018    Vitamin D deficiency 12/03/2014       Past Surgical History:   Procedure Laterality Date    COLONOSCOPY  3/9/2016    COLONOSCOPY  07/10/2017    HIP SURGERY Left 8/10/2020    LEFT GREATER TROCHANTERIC BURSAE INJECTION X-RAY (CPT 92756) performed by Morales Stearns MD at [facility] 08/10/2020    left greater trochanter bursae injection    TUBAL LIGATION      UPPER GASTROINTESTINAL ENDOSCOPY  07/10/2017       Current Outpatient Medications   Medication Sig Dispense Refill    diclofenac sodium (VOLTAREN) 1 % GEL Apply topically 2 times daily      Elastic Bandages & Supports (KNEE BRACE) MISC Soft neutral position left knee brace  Size to fit  Dx:  Knee pain 1 each 0    predniSONE (DELTASONE) 10 MG tablet 30mg daily x3 days, then 20mg daily x3 days, then 10mg daily x3 days 18 tablet 0    hydrOXYzine (ATARAX) 50 MG tablet Take 1 tablet by mouth nightly 30 tablet 0    traMADol (ULTRAM) 50 MG tablet Take 1 tablet by mouth 2 times daily as needed for Pain for up to 30 days. Intended supply: 30 days. Take lowest dose possible to manage pain 40 tablet 0    albuterol sulfate HFA (PROAIR HFA) 108 (90 Base) MCG/ACT inhaler Inhale 2 puffs into the lungs as needed for Wheezing or Shortness of Breath 1 Inhaler 3    albuterol (PROVENTIL) (2.5 MG/3ML) 0.083% nebulizer solution Take 3 mLs by nebulization every 6 hours as needed for Wheezing 120 each 3    omeprazole (PRILOSEC) 20 MG delayed release capsule Take 1 capsule by mouth daily Take am dos 07/10. 30 capsule 5    nabumetone (RELAFEN) 500 MG tablet Take 1.5 tablets by mouth 2 times daily (Patient taking differently: Take 750 mg by mouth 2 times daily On hold for 6 days for preop) 60 tablet 0    vitamin D3 (CHOLECALCIFEROL) 25 MCG (1000 UT) TABS tablet Take 1 tablet by mouth daily 30 tablet 5    Prenatal Vit-Fe Fumarate-FA (PRENATAL PLUS) 27-1 MG TABS 1 pill daily 30 tablet 5    aspirin 81 MG tablet Take 2 tablets by mouth daily (Patient taking differently: Take 162 mg by mouth daily On hold preop for 7 days) 60 tablet 5    EPINEPHrine (EPIPEN 2-ABUNDIO) 0.3 MG/0.3ML SOAJ injection Inject into muscle for allergic reaction 0.3 mL 0    dicyclomine (BENTYL) 20 MG tablet Take 20 mg by mouth every 6 hours Take am dos 07/10       No current facility-administered medications for this visit.         Allergies   Allergen Reactions    Fish-Derived Products Anaphylaxis    Dye [Iodides] Itching       Social History     Socioeconomic History    Marital status:      Spouse name: None    Number of children: None    Years of education: None    Highest education level: None   Occupational History    None   Social Needs    Financial resource strain: None    Food insecurity     Worry: None     Inability: None    Transportation needs     Medical: None     Non-medical: None   Tobacco Use    Smoking status: Former Smoker     Packs/day: 1.00     Years: 25.00     Pack years: 25.00     Types: Cigarettes     Quit date: 1988     Years since quittin.8    Smokeless tobacco: Never Used   Substance and Sexual Activity    Alcohol use: No    Drug use: No    Sexual activity: None   Lifestyle    Physical activity     Days per week: None     Minutes per session: None    Stress: None   Relationships    Social connections     Talks on phone: None     Gets together: None     Attends Sabianism service: None     Active member of club or organization: None     Attends meetings of clubs or organizations: None     Relationship status: None    Intimate partner violence     Fear of current or ex partner: None     Emotionally abused: None     Physically abused: None     Forced sexual activity: None   Other Topics Concern    None   Social History Narrative    None       Family History   Problem Relation Age of Onset    Heart Attack Mother     Kidney Disease Father         failure    Diabetes Other     Asthma Child     Asthma Child         bone disease    Anemia Child           Review of Systems   Constitutional: Negative for activity change, appetite change, fatigue, fever and unexpected weight change. HENT: Negative for congestion, ear pain, hearing loss, nosebleeds, rhinorrhea, sinus pressure and sore throat. Eyes: Positive for blurred vision. Negative for photophobia, pain, redness, itching and visual disturbance. Respiratory: Negative for apnea, cough, chest tightness, shortness of breath and wheezing. Cardiovascular: Negative for chest pain, palpitations and leg swelling.    Gastrointestinal: Negative for abdominal pain, blood in stool, constipation, diarrhea, nausea and vomiting. Endocrine: Negative. Genitourinary: Negative for decreased urine volume, difficulty urinating, dysuria, frequency, hematuria and urgency. Musculoskeletal: Positive for back pain. Negative for arthralgias, gait problem, myalgias and neck pain. Skin: Negative for color change and rash. Allergic/Immunologic: Negative for environmental allergies and food allergies. Neurological: Negative for dizziness, weakness, light-headedness, numbness and headaches. Hematological: Negative for adenopathy. Does not bruise/bleed easily. Psychiatric/Behavioral: Negative for agitation, behavioral problems, dysphoric mood and sleep disturbance. The patient has insomnia. The patient is not nervous/anxious and is not hyperactive. All other systems reviewed and are negative. /74   Pulse 78   Temp 97.7 °F (36.5 °C)   Ht 4' 11\" (1.499 m)   Wt 182 lb 6.4 oz (82.7 kg)   LMP 03/27/2008   SpO2 97%   BMI 36.84 kg/m²     Physical Exam  Vitals signs and nursing note reviewed. Constitutional:       General: She is not in acute distress. Appearance: Normal appearance. She is well-developed. HENT:      Head: Normocephalic and atraumatic. Right Ear: Hearing, tympanic membrane and external ear normal. No tenderness. No middle ear effusion. Left Ear: Hearing, tympanic membrane and external ear normal. No tenderness. No middle ear effusion. Nose: Nose normal. No congestion or rhinorrhea. Right Turbinates: Not enlarged. Left Turbinates: Not enlarged. Mouth/Throat:      Mouth: Mucous membranes are moist.      Tongue: No lesions. Pharynx: Oropharynx is clear. No oropharyngeal exudate or posterior oropharyngeal erythema. Eyes:      General: No scleral icterus. Conjunctiva/sclera: Conjunctivae normal.      Pupils: Pupils are equal, round, and reactive to light.    Neck:      Musculoskeletal: Normal range of motion and neck supple. No neck rigidity or muscular tenderness. Thyroid: No thyromegaly. Cardiovascular:      Rate and Rhythm: Normal rate and regular rhythm. Heart sounds: Normal heart sounds. No murmur. Pulmonary:      Effort: Pulmonary effort is normal. No respiratory distress. Breath sounds: Normal breath sounds. No wheezing or rales. Abdominal:      General: Bowel sounds are normal. There is no distension. Palpations: Abdomen is soft. Tenderness: There is no abdominal tenderness. Musculoskeletal: Normal range of motion. General: No tenderness. Lymphadenopathy:      Cervical: No cervical adenopathy. Skin:     General: Skin is warm and dry. Findings: No erythema or rash. Neurological:      General: No focal deficit present. Mental Status: She is alert and oriented to person, place, and time. Cranial Nerves: No cranial nerve deficit. Deep Tendon Reflexes: Reflexes are normal and symmetric.  Reflexes normal.   Psychiatric:         Mood and Affect: Mood normal.                                 ASSESSMENT/PLAN:    Patient Active Problem List   Diagnosis    Cervical stenosis of spinal canal    DDD (degenerative disc disease), cervical    Mild intermittent asthma    Gastroesophageal reflux disease without esophagitis    History of TIA (transient ischemic attack) and stroke    Fatigue    Current mild episode of major depressive disorder without prior episode ([facility] [address]    Spinal stenosis of lumbar region without neurogenic claudication    Fibromyalgia    Migraines without aura and without status migrainosus, not intractable    Chronic left-sided low back pain without sciatica    Lumbar paraspinal muscle spasm    C/f of CIERA (obstructive sleep apnea)    Obesity due to excess calories    Irritable bowel syndrome without diarrhea    Hyperglycemia    Primary stabbing headache    Primary osteoarthritis of right wrist    Stroke-like

## 2021-02-25 NOTE — TELEPHONE ENCOUNTER
Lazaro Lyon calling said the person at [facility] medical supply is saying there isnt enough detail on the order.  Please place order and fax to [phone]

## 2021-03-08 ENCOUNTER — OFFICE VISIT (OUTPATIENT)
Dept: PAIN MANAGEMENT | Age: 51
End: 2021-03-08
Payer: MEDICAID

## 2021-03-08 VITALS
HEART RATE: 83 BPM | OXYGEN SATURATION: 96 % | TEMPERATURE: 98.6 F | RESPIRATION RATE: 16 BRPM | DIASTOLIC BLOOD PRESSURE: 76 MMHG | HEIGHT: 59 IN | BODY MASS INDEX: 36.69 KG/M2 | WEIGHT: 182 LBS | SYSTOLIC BLOOD PRESSURE: 118 MMHG

## 2021-03-08 DIAGNOSIS — M79.7 FIBROMYALGIA: ICD-10-CM

## 2021-03-08 DIAGNOSIS — M50.30 DDD (DEGENERATIVE DISC DISEASE), CERVICAL: ICD-10-CM

## 2021-03-08 DIAGNOSIS — M51.9 LUMBAR DISC DISORDER: ICD-10-CM

## 2021-03-08 DIAGNOSIS — M16.12 PRIMARY OSTEOARTHRITIS OF LEFT HIP: ICD-10-CM

## 2021-03-08 DIAGNOSIS — G89.4 CHRONIC PAIN SYNDROME: ICD-10-CM

## 2021-03-08 DIAGNOSIS — M54.12 CERVICAL RADICULOPATHY: Primary | ICD-10-CM

## 2021-03-08 DIAGNOSIS — M47.816 LUMBAR SPONDYLOSIS: ICD-10-CM

## 2021-03-08 DIAGNOSIS — M47.812 CERVICAL FACET JOINT SYNDROME: ICD-10-CM

## 2021-03-08 DIAGNOSIS — M50.90 CERVICAL DISC DISORDER: ICD-10-CM

## 2021-03-08 DIAGNOSIS — M25.562 ACUTE PAIN OF LEFT KNEE: ICD-10-CM

## 2021-03-08 DIAGNOSIS — M19.031 PRIMARY OSTEOARTHRITIS OF RIGHT WRIST: ICD-10-CM

## 2021-03-08 DIAGNOSIS — M48.02 CERVICAL STENOSIS OF SPINE: ICD-10-CM

## 2021-03-08 DIAGNOSIS — G95.89 MYELOMALACIA OF CERVICAL CORD (HCC): ICD-10-CM

## 2021-03-08 DIAGNOSIS — M62.830 LUMBAR PARASPINAL MUSCLE SPASM: ICD-10-CM

## 2021-03-08 PROCEDURE — G8417 CALC BMI ABV UP PARAM F/U: HCPCS | Performed by: NURSE PRACTITIONER

## 2021-03-08 PROCEDURE — 99213 OFFICE O/P EST LOW 20 MIN: CPT | Performed by: NURSE PRACTITIONER

## 2021-03-08 PROCEDURE — G8427 DOCREV CUR MEDS BY ELIG CLIN: HCPCS | Performed by: NURSE PRACTITIONER

## 2021-03-08 PROCEDURE — 3017F COLORECTAL CA SCREEN DOC REV: CPT | Performed by: NURSE PRACTITIONER

## 2021-03-08 PROCEDURE — G8484 FLU IMMUNIZE NO ADMIN: HCPCS | Performed by: NURSE PRACTITIONER

## 2021-03-08 PROCEDURE — 1036F TOBACCO NON-USER: CPT | Performed by: NURSE PRACTITIONER

## 2021-03-08 RX ORDER — TIZANIDINE 2 MG/1
2 TABLET ORAL 2 TIMES DAILY
Qty: 60 TABLET | Refills: 1 | Status: SHIPPED
Start: 2021-03-08 | End: 2021-04-09 | Stop reason: SDUPTHER

## 2021-03-08 RX ORDER — NABUMETONE 500 MG/1
750 TABLET, FILM COATED ORAL 2 TIMES DAILY
Qty: 60 TABLET | Refills: 1 | Status: SHIPPED
Start: 2021-03-08 | End: 2021-05-06 | Stop reason: SINTOL

## 2021-03-08 RX ORDER — TRAMADOL HYDROCHLORIDE 50 MG/1
50 TABLET ORAL 2 TIMES DAILY PRN
Qty: 40 TABLET | Refills: 0 | Status: CANCELLED | OUTPATIENT
Start: 2021-03-19 | End: 2021-04-18

## 2021-03-08 RX ORDER — OXYCODONE HYDROCHLORIDE AND ACETAMINOPHEN 5; 325 MG/1; MG/1
1 TABLET ORAL DAILY
Qty: 30 TABLET | Refills: 0 | Status: SHIPPED
Start: 2021-03-08 | End: 2021-04-09 | Stop reason: SDUPTHER

## 2021-03-08 NOTE — PROGRESS NOTES
[address] [facility], [address] [place] [id number]  [phone]    Follow up Note      Arcelia Lilly     Date of Visit:  3/8/2021    CC:  Patient presents for follow up knee and back pain  HPI:  Pt reports fell down the stairs last month and went and went to ER. Pt was given left knee immobilizer, prescribed po prednisone, percocet and referred to orthopedic. Pt notes she only took one Percocet and lasted all day for knee and back pain whereas Tramadol only lasts for 4 hours at best.  Continues to have pain in  neck radiating down left arm to hand, low back that radiates to left hip pain and acute left knee pain.  Previously discussed  Appropriate analgesia with current medications regimen: no.  Change in quality of symptoms:yes acute left knee pain  Recent diagnostic testing: none  Recent interventional procedures:no     She has been on anticoagulation medications to include ASA. The patient Heather Alemanion not been on herbal supplements.  The patient is not diabetic.     Imaging:      Cervical Spine MRI 11/2020: Impression   1.  Cervical spondylosis as described above with multiple levels of   broad-based central disc herniation notable at C3/C4, C4/C5, C5/C6, and C6/C7   resulting in mild-to-moderate effacement of ventral cord. 2.  No fracture or malalignment.             Cervical spine MRI 04/2018  1. Multilevel degenerative disc disease extending from C3 through C7. Moderately severe spinal canal stenosis at C3-C4. Moderate spinal   canal stenosis at C4-C5. . Moderately severe spinal canal stenosis at   C5-C6. Abnormal signal intensity within the cervical spinal cord C3-C5   without evidence of enhancement. Findings are most likely reflective   of myelomalacia changes secondary to the underlying degree of cord   compression as described above. No active demyelination identified. 2. Slight loss of normally expected cervical lordosis C3-C6.      CT Lumbar spine 2017  1.  Mild diffuse osteopenia, no compression fracture or   spondylolisthesis.       2. Mild multilevel degenerative disc disease and facet joint   arthropathy of the lower lumbar spine more pronounced at L4-L5.      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         Otherwise, no musculoskeletal pathology to explain patient's pain.      Previous treatments: Physical Therapy, Epidural Steroid Injection with  and medications. .  tizanidine dizziness, flexeril sedating, norco severe gi distress and headaches                                        Potential Aberrant Drug-Related Behavior:    No     Urine Drug Screening:  Saliva screen 08/2020 consistent for Ultram     OARRS report:  03/2021 consistent.         Past Medical History:   Diagnosis Date    Asthma     Cerebral artery occlusion with cerebral infarction ([facility] [address]     2015    Cervical disc herniation     Cervical spinal stenosis     Chronic back pain     Chronic kidney disease     Depression     Disorder of thyroid gland 09/11/2012    GERD (gastroesophageal reflux disease)     Headache(784.0)     Hemorrhoids     History of colon polyps     Irritable bowel syndrome     Low back pain     Numbness and tingling in right hand 03/06/2020    Palpitations 07/24/2012    Scabies 05/18/2016    Seizure disorder ([facility] [address]     not on medications for this; last seizure 2018    Vitamin D deficiency 12/03/2014       Past Surgical History:   Procedure Laterality Date    COLONOSCOPY  3/9/2016    COLONOSCOPY  07/10/2017    HIP SURGERY Left 8/10/2020    LEFT GREATER TROCHANTERIC BURSAE INJECTION X-RAY (CPT 97548) performed by Jordan Meng [facility] at [facility] Left 08/10/2020    left greater trochanter bursae injection    TUBAL LIGATION      UPPER GASTROINTESTINAL ENDOSCOPY  07/10/2017       Prior to Admission medications    Medication Sig Start Date End Date Taking?  Authorizing Provider Elastic Bandages & Supports (KNEE BRACE) MISC Soft neutral position left knee brace  Size to fit  Dx:  Knee sprain 2/26/21   Abdi Balderas DO   diclofenac sodium (VOLTAREN) 1 % GEL Apply topically 2 times daily    Historical Provider, MD   Elastic Bandages & Supports (KNEE BRACE) MISC Soft neutral position left knee brace  Size to fit  Dx:  Knee pain 2/25/21   Abdi Balderas DO   predniSONE (DELTASONE) 10 MG tablet 30mg daily x3 days, then 20mg daily x3 days, then 10mg daily x3 days 2/25/21   Arielle Carolina DO   hydrOXYzine (ATARAX) 50 MG tablet Take 1 tablet by mouth nightly 2/25/21 3/27/21  Abdi Balderas DO   traMADol (ULTRAM) 50 MG tablet Take 1 tablet by mouth 2 times daily as needed for Pain for up to 30 days. Intended supply: 30 days.  Take lowest dose possible to manage pain 2/19/21 3/21/21  NOY Cardenas CNP   albuterol sulfate HFA (PROAIR HFA) 108 (90 Base) MCG/ACT inhaler Inhale 2 puffs into the lungs as needed for Wheezing or Shortness of Breath 1/21/21   Arielle Carolina DO   albuterol (PROVENTIL) (2.5 MG/3ML) 0.083% nebulizer solution Take 3 mLs by nebulization every 6 hours as needed for Wheezing 1/21/21   Abdi Grijalva DO   omeprazole (PRILOSEC) 20 MG delayed release capsule Take 1 capsule by mouth daily Take am dos 07/10. 1/21/21   Abdi Balderas DO   nabumetone (RELAFEN) 500 MG tablet Take 1.5 tablets by mouth 2 times daily  Patient taking differently: Take 750 mg by mouth 2 times daily On hold for 6 days for preop 12/14/20   NOY Cardenas CNP   vitamin D3 (CHOLECALCIFEROL) 25 MCG (1000 UT) TABS tablet Take 1 tablet by mouth daily 10/16/20   Arielle Carolina DO   Prenatal Vit-Fe Fumarate-FA (PRENATAL PLUS) 27-1 MG TABS 1 pill daily 10/16/20   Arielle Carolina DO   aspirin 81 MG tablet Take 2 tablets by mouth daily  Patient taking differently: Take 162 mg by mouth daily On hold preop for 7 days 1/16/20   Abdi Grijalva DO   EPINEPHrine (EPIPEN 2-ABUNDIO) 0.3 MG/0.3ML SOAJ injection Inject into muscle for allergic reaction 19   Abdi Grijalva DO   dicyclomine (BENTYL) 20 MG tablet Take 20 mg by mouth every 6 hours Take am dos 07/10    Historical Provider, MD       Allergies   Allergen Reactions    Fish-Derived Products Anaphylaxis    Dye [Iodides] Itching       Social History     Socioeconomic History    Marital status:      Spouse name: Not on file    Number of children: Not on file    Years of education: Not on file    Highest education level: Not on file   Occupational History    Not on file   Social Needs    Financial resource strain: Not on file    Food insecurity     Worry: Not on file     Inability: Not on file    Transportation needs     Medical: Not on file     Non-medical: Not on file   Tobacco Use    Smoking status: Former Smoker     Packs/day: 1.00     Years: 25.00     Pack years: 25.00     Types: Cigarettes     Quit date: 1988     Years since quittin.9    Smokeless tobacco: Never Used   Substance and Sexual Activity    Alcohol use: No    Drug use: No    Sexual activity: Not on file   Lifestyle    Physical activity     Days per week: Not on file     Minutes per session: Not on file    Stress: Not on file   Relationships    Social connections     Talks on phone: Not on file     Gets together: Not on file     Attends Mu-ism service: Not on file     Active member of club or organization: Not on file     Attends meetings of clubs or organizations: Not on file     Relationship status: Not on file    Intimate partner violence     Fear of current or ex partner: Not on file     Emotionally abused: Not on file     Physically abused: Not on file     Forced sexual activity: Not on file   Other Topics Concern    Not on file   Social History Narrative    Not on file       Family History   Problem Relation Age of Onset    Heart Attack Mother     Kidney Disease Father         failure    Diabetes Other     Asthma Child     Asthma lesions, no palpable subcutaneous nodules and good skin turgor        Impression:  Neck and low back pain with radiation to the Left upper and lower extremity  Cervical spine MRI 2019 Multilevel degenerative disc disease with moderate to severe stenosis at C3-4/C4-5 and C5-6  Lumbar spine CT 2017 Mild degenerative disc disease and facet arthropathy most pronounced at L4-5  Patient had seen neurosurgery and surgery C3-4/C4-5 and C5-6 ACDF was recommended.   Failed greater trochanteric bursae injection. Patient had tried and failed Gabapentin. Plan:   Followed for neck pain radiating down left arm to hand, low back to left hip pain and acute left knee pain  Consider NANCI C6-C7 but patient refusing and wants to see neurosurgeon 1st  Reschedule  for severe neck  DC tramadol   Trial Percocet 5/325mg po daily prn pain #30  Refill Relafen 750mg po bid   Refill Tizanidine 2mg po bid prn spasms   OARRS report reviewed 03/2021.   Patient encouraged to stay active and to lose weight. Patient had tried and failed physical therapy. Treatment plan discussed with the patient and her daughter including medications and procedure side effects.         ccreferring physic           Electronically signed by NOY Diaz CNP on 3/8/21 at 1:07 PM EST

## 2021-03-08 NOTE — PROGRESS NOTES
Do you currently have any of the following:    Fever: No  Headache:  No  Cough: No  Shortness of breath: No  Exposed to anyone with these symptoms: No                                                                                                                Harjeet Araiza presents to the [facility] on 3/8/2021. Mervin Brown is complaining of pain in her neck, lower back and knee. . The pain is constant. The pain is described as aching, throbbing, shooting, stabbing and sharp. Pain is rated on her best day at a 5, on her worst day at a 10, and on average at a 8 on the VAS scale. She took her last dose of Tramadol this Elgin Castaneda does not have issues with constipation. Any procedures since your last visit: No,    She is not on NSAIDS and  is  on anticoagulation medications to include ASA and is managed by Shaka Dean DO  . Pacemaker or defibrillator: No Physician managing device is NA. Medication Contract and Consent for Opioid Use Documents Filed     Patient Documents       Type of Document Status Date Received Received By Description     Medication Contract Received 7/18/2019 11:59 AM oMnica SINGLETON 7/18/19 medication contract                   /76   Pulse 83   Temp 98.6 °F (37 °C) (Infrared)   Resp 16   Ht 4' 11\" (1.499 m)   Wt 182 lb (82.6 kg)   LMP 03/27/2008   SpO2 96%   BMI 36.76 kg/m²      Patient's last menstrual period was 03/27/2008.

## 2021-04-09 ENCOUNTER — OFFICE VISIT (OUTPATIENT)
Dept: PAIN MANAGEMENT | Age: 51
End: 2021-04-09
Payer: MEDICAID

## 2021-04-09 VITALS
HEART RATE: 76 BPM | HEIGHT: 59 IN | OXYGEN SATURATION: 95 % | BODY MASS INDEX: 36.69 KG/M2 | WEIGHT: 182 LBS | RESPIRATION RATE: 16 BRPM | TEMPERATURE: 97.7 F | SYSTOLIC BLOOD PRESSURE: 122 MMHG | DIASTOLIC BLOOD PRESSURE: 88 MMHG

## 2021-04-09 DIAGNOSIS — M48.02 CERVICAL STENOSIS OF SPINAL CANAL: Primary | ICD-10-CM

## 2021-04-09 DIAGNOSIS — M47.816 LUMBAR SPONDYLOSIS: ICD-10-CM

## 2021-04-09 DIAGNOSIS — M54.12 CERVICAL RADICULOPATHY: ICD-10-CM

## 2021-04-09 DIAGNOSIS — M47.812 CERVICAL FACET JOINT SYNDROME: ICD-10-CM

## 2021-04-09 DIAGNOSIS — M54.50 CHRONIC LEFT-SIDED LOW BACK PAIN WITHOUT SCIATICA: Chronic | ICD-10-CM

## 2021-04-09 DIAGNOSIS — G89.29 CHRONIC LEFT-SIDED LOW BACK PAIN WITHOUT SCIATICA: Chronic | ICD-10-CM

## 2021-04-09 DIAGNOSIS — M48.02 CERVICAL STENOSIS OF SPINE: ICD-10-CM

## 2021-04-09 DIAGNOSIS — M16.12 PRIMARY OSTEOARTHRITIS OF LEFT HIP: ICD-10-CM

## 2021-04-09 DIAGNOSIS — M79.7 FIBROMYALGIA: ICD-10-CM

## 2021-04-09 DIAGNOSIS — M51.9 LUMBAR DISC DISORDER: ICD-10-CM

## 2021-04-09 DIAGNOSIS — M50.90 CERVICAL DISC DISORDER: ICD-10-CM

## 2021-04-09 DIAGNOSIS — M25.562 ACUTE PAIN OF LEFT KNEE: ICD-10-CM

## 2021-04-09 DIAGNOSIS — M50.30 DDD (DEGENERATIVE DISC DISEASE), CERVICAL: Chronic | ICD-10-CM

## 2021-04-09 DIAGNOSIS — M70.62 GREATER TROCHANTERIC BURSITIS OF LEFT HIP: ICD-10-CM

## 2021-04-09 DIAGNOSIS — G89.4 CHRONIC PAIN SYNDROME: ICD-10-CM

## 2021-04-09 DIAGNOSIS — M62.830 LUMBAR PARASPINAL MUSCLE SPASM: ICD-10-CM

## 2021-04-09 PROCEDURE — 99213 OFFICE O/P EST LOW 20 MIN: CPT | Performed by: NURSE PRACTITIONER

## 2021-04-09 PROCEDURE — G8427 DOCREV CUR MEDS BY ELIG CLIN: HCPCS | Performed by: NURSE PRACTITIONER

## 2021-04-09 PROCEDURE — G8417 CALC BMI ABV UP PARAM F/U: HCPCS | Performed by: NURSE PRACTITIONER

## 2021-04-09 PROCEDURE — 1036F TOBACCO NON-USER: CPT | Performed by: NURSE PRACTITIONER

## 2021-04-09 PROCEDURE — 3017F COLORECTAL CA SCREEN DOC REV: CPT | Performed by: NURSE PRACTITIONER

## 2021-04-09 RX ORDER — OXYCODONE HYDROCHLORIDE AND ACETAMINOPHEN 5; 325 MG/1; MG/1
1 TABLET ORAL DAILY
Qty: 30 TABLET | Refills: 0 | Status: SHIPPED
Start: 2021-04-09 | End: 2021-05-06 | Stop reason: SDUPTHER

## 2021-04-09 RX ORDER — TIZANIDINE 2 MG/1
2 TABLET ORAL 2 TIMES DAILY
Qty: 60 TABLET | Refills: 1 | Status: SHIPPED
Start: 2021-04-09 | End: 2021-06-09 | Stop reason: SDUPTHER

## 2021-04-09 NOTE — PROGRESS NOTES
Do you currently have any of the following:    Fever: No  Headache:  No  Cough: No  Shortness of breath: No  Exposed to anyone with these symptoms: No                                                    .                                                             Amee Fish presents to the [facility] on 4/9/2021. Meghana Gerber is complaining of pain neck. The pain is constant. The pain is described as shooting, burning and numb. Pain is rated on her best day at a 4, on her worst day at a 9, and on average at a 6 on the VAS scale. She took her last dose of Percocet today. Meghana Gerber does not have issues with constipation. Any procedures since your last visit: No, with  % relief. She is not on NSAIDS and  is not on anticoagulation medications to include none and is managed by . Pacemaker or defibrillator: No Physician managing device is . Medication Contract and Consent for Opioid Use Documents Filed     Patient Documents       Type of Document Status Date Received Received By Description     Medication Contract Received 7/18/2019 11:59 AM Della SINGLETON 7/18/19 medication contract                   /88   Pulse 76   Temp 97.7 °F (36.5 °C) (Infrared)   Resp 16   Ht 4' 11\" (1.499 m)   Wt 182 lb (82.6 kg)   LMP 03/27/2008   SpO2 95%   BMI 36.76 kg/m²      Patient's last menstrual period was 03/27/2008.

## 2021-04-09 NOTE — PATIENT INSTRUCTIONS
Patient Education        Learning About a Cervical Epidural Injection  What is a cervical epidural steroid injection? A cervical epidural steroid injection is a shot of medicine into the area around the spinal cord in your neck. You may get it to help with pain, tingling, or numbness in your neck, shoulder, or arm. It may have a steroid to reduce swelling and pain and a local anesthetic to numb the nerves. How is a cervical epidural steroid injection done? The doctor will use a tiny needle to numb the skin where you are getting the injection. After the skin is numb, your doctor will use a larger needle for the epidural injection. He or she may use X-ray or ultrasound to help guide the needle. You may feel some pressure. But you should not feel pain. How long does an epidural steroid injection take? It takes about 10 to 15 minutes to get this injection. You will probably go home about 20 to 30 minutes after you get it. What can you expect after a cervical epidural steroid injection? If your injection included local anesthetic medicine, your neck, shoulder, arm, or hand may feel heavy or numb right after the shot. With a local anesthetic, your pain may be gone right away. But it may return after a few hours. This is because the steroid hasn't started working yet. Before the steroid starts to work, your neck, shoulder, or arm may be sore for a few days. These injections don't always work. When they do, it takes 1 to 5 days. The pain relief can last for several days to a few months or longer. Some people are dizzy or feel sick to their stomach after getting this shot. These symptoms usually don't last very long. You may want to do less than normal for a few days. But you may also be able to return to your daily routine. If your pain is better, you may be able to keep doing your normal activities or physical therapy. But try not to overdo it, even if your pain has improved a lot.  If your pain is only a little better or if it comes back, your doctor may want you to get another injection in a few weeks. If your pain has not changed, talk to your doctor about other treatment choices. Follow-up care is a key part of your treatment and safety. Be sure to make and go to all appointments, and call your doctor if you are having problems. It's also a good idea to know your test results and keep a list of the medicines you take. Where can you learn more? Go to [email or web address] and sign in to your [employer] account. Enter L710 in the EventKloud box to learn more about \"Learning About a Cervical Epidural Injection. \"     If you do not have an account, please click on the \"Sign Up Now\" link. Current as of: November 16, 2020               Content Version: 12.8  © 5432-7040 Healthwise, Incorporated. Care instructions adapted under license by [facility] ([facility]). If you have questions about a medical condition or this instruction, always ask your healthcare professional. [address] any warranty or liability for your use of this information.

## 2021-04-09 NOTE — PROGRESS NOTES
[facility], [address] [place] [id number]  [phone]    Follow up Note      Roxana Negrete     Date of Visit:  4/9/2021    CC:  Patient presents for follow up knee and back pain    HPI:  Pt here with no changes to left knee and chronic low back pain. Pt main complaint is neck pain radiating down left arm to hand accompanied by numbness and tingling to left hand. Pt also reports Left troch bursitis returning and would like to repeat injection after neck addressed first. Pt reports >90% relief x 7-8 months with previous troch bursa injection. Pt using otc ointments with no relief. Pt also notes severe itching with relafen last month and will discontinue. Do not recommend any other NSAIDS due to hx of gi problems. Appropriate analgesia with current medications regimen: no.  Change in quality of symptoms: left wrist pain  Recent diagnostic testing: none  Recent interventional procedures:no     She has been on anticoagulation medications to include ASA. The patient Sandy Seats not been on herbal supplements.  The patient is not diabetic.     Imaging:      Cervical Spine MRI 11/2020: Impression   1.  Cervical spondylosis as described above with multiple levels of   broad-based central disc herniation notable at C3/C4, C4/C5, C5/C6, and C6/C7   resulting in mild-to-moderate effacement of ventral cord. 2.  No fracture or malalignment.             Cervical spine MRI 04/2018  1. Multilevel degenerative disc disease extending from C3 through C7. Moderately severe spinal canal stenosis at C3-C4. Moderate spinal   canal stenosis at C4-C5. . Moderately severe spinal canal stenosis at   C5-C6. Abnormal signal intensity within the cervical spinal cord C3-C5   without evidence of enhancement. Findings are most likely reflective   of myelomalacia changes secondary to the underlying degree of cord   compression as described above. No active demyelination identified.    2. Slight loss of normally expected cervical lordosis C3-C6.      CT Lumbar spine 2017  1. Mild diffuse osteopenia, no compression fracture or   spondylolisthesis.       2. Mild multilevel degenerative disc disease and facet joint   arthropathy of the lower lumbar spine more pronounced at L4-L5.      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         Otherwise, no musculoskeletal pathology to explain patient's pain.      Previous treatments: Physical Therapy, Epidural Steroid Injection with  and medications. .  tizanidine dizziness, flexeril sedating, norco severe gi distress and headaches                                        Potential Aberrant Drug-Related Behavior:    No     Urine Drug Screening:  Saliva screen 08/2020 consistent for Ultram     OARRS report:  04/2021 consistent.         Past Medical History:   Diagnosis Date    Asthma     Cerebral artery occlusion with cerebral infarction ([facility] [address]     2015    Cervical disc herniation     Cervical spinal stenosis     Chronic back pain     Chronic kidney disease     Depression     Disorder of thyroid gland 09/11/2012    GERD (gastroesophageal reflux disease)     Headache(784.0)     Hemorrhoids     History of colon polyps     Irritable bowel syndrome     Low back pain     Numbness and tingling in right hand 03/06/2020    Palpitations 07/24/2012    Scabies 05/18/2016    Seizure disorder ([address]     not on medications for this; last seizure 2018    Vitamin D deficiency 12/03/2014       Past Surgical History:   Procedure Laterality Date    COLONOSCOPY  3/9/2016    COLONOSCOPY  07/10/2017    HIP SURGERY Left 8/10/2020    LEFT GREATER TROCHANTERIC BURSAE INJECTION X-RAY (CPT 82355) performed by Vega Harris MD at [facility] 08/10/2020    left greater trochanter bursae injection    TUBAL LIGATION      UPPER GASTROINTESTINAL ENDOSCOPY  07/10/2017       Prior to Admission medications    Medication Sig Start Date End Date Taking?  Authorizing Provider   nabumetone (RELAFEN) 500 MG tablet Take 1.5 tablets by mouth 2 times daily 3/8/21  Yes NOY Berg CNP   Elastic Bandages & Supports (KNEE BRACE) MISC Soft neutral position left knee brace  Size to fit  Dx:  Knee sprain 2/26/21  Yes Earmon Boots, DO   diclofenac sodium (VOLTAREN) 1 % GEL Apply topically 2 times daily   Yes Historical Provider, MD   Elastic Bandages & Supports (KNEE BRACE) MISC Soft neutral position left knee brace  Size to fit  Dx:  Knee pain 2/25/21  Yes Earmon Boots, DO   albuterol sulfate HFA (PROAIR HFA) 108 (90 Base) MCG/ACT inhaler Inhale 2 puffs into the lungs as needed for Wheezing or Shortness of Breath 1/21/21  Yes Abdi ANDERSON Valentine, DO   albuterol (PROVENTIL) (2.5 MG/3ML) 0.083% nebulizer solution Take 3 mLs by nebulization every 6 hours as needed for Wheezing 1/21/21  Yes Abdi ANDERSON Valentine, DO   omeprazole (PRILOSEC) 20 MG delayed release capsule Take 1 capsule by mouth daily Take am dos 07/10. 1/21/21  Yes Markos Boots, DO   vitamin D3 (CHOLECALCIFEROL) 25 MCG (1000 UT) TABS tablet Take 1 tablet by mouth daily 10/16/20  Yes Abdi ANDERSON Jenna Santiago, DO   Prenatal Vit-Fe Fumarate-FA (PRENATAL PLUS) 27-1 MG TABS 1 pill daily 10/16/20  Yes Abdi ANDERSON Valentine, DO   aspirin 81 MG tablet Take 2 tablets by mouth daily  Patient taking differently: Take 162 mg by mouth daily On hold preop for 7 days 1/16/20  Yes Abdi ANDERSON Valentine, DO   EPINEPHrine (EPIPEN 2-ABUNDIO) 0.3 MG/0.3ML SOAJ injection Inject into muscle for allergic reaction 8/22/19  Yes Abdi ANDERSON Valentine, DO   dicyclomine (BENTYL) 20 MG tablet Take 20 mg by mouth every 6 hours Take am dos 07/10   Yes Historical Provider, MD       Allergies   Allergen Reactions    Fish-Derived Products Anaphylaxis    Dye [Iodides] Itching       Social History     Socioeconomic History    Marital status:      Spouse name: Not on file    Number of children: Not on file    Years of education: Not on file    Highest education level: Not on file   Occupational History    Not on file   Social Needs    Financial resource strain: Not on file    Food insecurity     Worry: Not on file     Inability: Not on file    Transportation needs     Medical: Not on file     Non-medical: Not on file   Tobacco Use    Smoking status: Former Smoker     Packs/day: 1.00     Years: 25.00     Pack years: 25.00     Types: Cigarettes     Quit date: 1988     Years since quittin.9    Smokeless tobacco: Never Used   Substance and Sexual Activity    Alcohol use: No    Drug use: No    Sexual activity: Not on file   Lifestyle    Physical activity     Days per week: Not on file     Minutes per session: Not on file    Stress: Not on file   Relationships    Social connections     Talks on phone: Not on file     Gets together: Not on file     Attends Voodoo service: Not on file     Active member of club or organization: Not on file     Attends meetings of clubs or organizations: Not on file     Relationship status: Not on file    Intimate partner violence     Fear of current or ex partner: Not on file     Emotionally abused: Not on file     Physically abused: Not on file     Forced sexual activity: Not on file   Other Topics Concern    Not on file   Social History Narrative    Not on file       Family History   Problem Relation Age of Onset    Heart Attack Mother     Kidney Disease Father         failure    Diabetes Other     Asthma Child     Asthma Child         bone disease    Anemia Child        REVIEW OF SYSTEMS:     Jules Reyes denies fever/chills, chest pain, shortness of breath, new bowel or bladder complaints or suicidal ideations. All other review of systems wasnegative.     Review of Systems    PHYSICAL EXAMINATION:      /88   Pulse 76   Temp 97.7 °F (36.5 °C) (Infrared)   Resp 16   Ht 4' 11\" (1.499 m)   Wt 182 lb (82.6 kg)   LMP 2008   SpO2 95% BMI 36.76 kg/m²     General:       General appearance:   elderly, pleasant and well-hydrated. , in moderate discomfort and A & O x3  Build:Overweight     HEENT:     Head:normocephalic and atraumatic  Sclera: icterus absent,     Lungs:     Breathing:Breathing Pattern: regular, no distress     Abdomen:     Shape:non-distended and normal  Tenderness:none     Cervical spine:     Inspection:normal  Palpation:tenderness paravertebral muscles, facet loading, left, right, positive and tenderness. Range of motion:abnormal moderately flexion, extension rotation bilateral and is  severely painful.      Lumbar spine:     Spine inspection:normal   CVA tenderness:No   Palpation:tenderness paravertebral muscles, facet loading, left, right, positive and tenderness.   Range of motion:abnormal moderately Lateral bending, flexion, extension rotation bilateral and is  painful.     Musculoskeletal:     Trigger points in Paraveteral:absent bilaterally              Lin's:negative right, negative left   SI joint tenderness:negative right, negative left              KAYODE test:negative right, negative left  Piriformis tenderness:negative right, negative left  Trochanteric bursa tenderness:negative right, positive left  SLR:negative right, negative left, sitting      Extremities:     Tremors:None bilaterally upper and lower  Range of motion:Generally normal shoulders, pain with internal rotation of hips negative  Intact:Yes  Edema: left knee, immobilizer to left knee, ROM limited due to pain and acute injury      Neurological:     Sensory:diminshed to light touch lateral aspect of Left arm.   Gait:antalgic    Dermatology:    Skin:no unusual rashes, no skin lesions, no palpable subcutaneous nodules and good skin turgor        Impression:  Neck and low back pain with radiation to the Left upper and lower extremity  Cervical spine MRI 2019 Multilevel degenerative disc disease with moderate to severe stenosis at C3-4/C4-5 and C5-6  Lumbar spine CT 2017 Mild degenerative disc disease and facet arthropathy most pronounced at L4-5  Patient had seen neurosurgery and surgery C3-4/C4-5 and C5-6 ACDF was recommended.   Failed greater trochanteric bursae injection. Patient had tried and failed Gabapentin. Plan:   Followed for neck pain radiating down left arm to hand with n/t,and low back to left hip pain and acute left knee pain  Schedule for Cervical epidural steroid injection C6-C7 with sedation moderate canal stenosis with sedation   Consider repeat left troch bursa injection after NANCI  Refill Percocet 5/325mg po daily prn pain #30, will send in refill if needs before injection follow up visit  DC relafen caused itching  Refill Tizanidine 2mg po bid prn spasms   OARRS report reviewed 04/2021.   Patient encouraged to stay active and to lose weight. Patient had tried and failed physical therapy. Treatment plan discussed with the patient and her daughter including medications and procedure side effects.         ccreferring physic           Electronically signed by NOY Chapman CNP on 4/9/21 at 1:07 PM EST

## 2021-04-13 ENCOUNTER — TELEPHONE (OUTPATIENT)
Dept: PAIN MANAGEMENT | Age: 51
End: 2021-04-13

## 2021-04-13 NOTE — TELEPHONE ENCOUNTER
4-13-21-call to Elva Vargas, advised her to hold her aspirin 7 days before her 4-19-21 procedure, last dose was 4-12-21. She shows an understanding to not take it before her procedure.     Milton Rawls RN  Pain Management

## 2021-04-14 ENCOUNTER — HOSPITAL ENCOUNTER (OUTPATIENT)
Age: 51
Discharge: HOME | End: 2021-04-16
Payer: MEDICAID

## 2021-04-14 DIAGNOSIS — M54.12 CERVICAL RADICULOPATHY: ICD-10-CM

## 2021-04-14 PROCEDURE — U0003 INFECTIOUS AGENT DETECTION BY NUCLEIC ACID (DNA OR RNA); SEVERE ACUTE RESPIRATORY SYNDROME CORONAVIRUS 2 (SARS-COV-2) (CORONAVIRUS DISEASE [COVID-19]), AMPLIFIED PROBE TECHNIQUE, MAKING USE OF HIGH THROUGHPUT TECHNOLOGIES AS DESCRIBED BY CMS-2020-01-R: HCPCS

## 2021-04-14 PROCEDURE — U0005 INFEC AGEN DETEC AMPLI PROBE: HCPCS

## 2021-04-16 ENCOUNTER — ANESTHESIA EVENT (OUTPATIENT)
Dept: OPERATING ROOM | Age: 51
End: 2021-04-16
Payer: MEDICAID

## 2021-04-16 LAB
SARS-COV-2: NOT DETECTED
SOURCE: NORMAL

## 2021-04-16 NOTE — ANESTHESIA PRE PROCEDURE
Department of Anesthesiology  Preprocedure Note       Name:  Anayeli Green   Age:  45 y.o.  :  1970                                          MRN:  [id number]         Date:  2021      Surgeon: Ok Floor):  Orestes Mantilla MD    Procedure: Procedure(s):  CERVICAL EPIDURAL STEROID INJECTION C6-C7 WITH SEDATION    Medications prior to admission:   Prior to Admission medications    Medication Sig Start Date End Date Taking? Authorizing Provider   oxyCODONE-acetaminophen (PERCOCET) 5-325 MG per tablet Take 1 tablet by mouth daily for 30 days. Intended supply: 30 days.  Take lowest dose possible to manage pain 21  NOY Fuentes CNP   tiZANidine (ZANAFLEX) 2 MG tablet Take 1 tablet by mouth 2 times daily 21  NOY Fuentes CNP   nabumetone (RELAFEN) 500 MG tablet Take 1.5 tablets by mouth 2 times daily 3/8/21   NOY Fuentes CNP   Elastic Bandages & Supports (KNEE BRACE) MISC Soft neutral position left knee brace  Size to fit  Dx:  Knee sprain 21   Abdi Grove DO   diclofenac sodium (VOLTAREN) 1 % GEL Apply topically 2 times daily    Historical Provider, MD   Elastic Bandages & Supports (KNEE BRACE) MISC Soft neutral position left knee brace  Size to fit  Dx:  Knee pain 21   Abdi Grove DO   albuterol sulfate HFA (PROAIR HFA) 108 (90 Base) MCG/ACT inhaler Inhale 2 puffs into the lungs as needed for Wheezing or Shortness of Breath 21   Stephen Estrin, DO   albuterol (PROVENTIL) (2.5 MG/3ML) 0.083% nebulizer solution Take 3 mLs by nebulization every 6 hours as needed for Wheezing 21   Abdi Grijalva DO   omeprazole (PRILOSEC) 20 MG delayed release capsule Take 1 capsule by mouth daily Take am dos 07/10. 21   Abdi Grove DO   vitamin D3 (CHOLECALCIFEROL) 25 MCG (1000 UT) TABS tablet Take 1 tablet by mouth daily 10/16/20   Unk Estrin, DO   Prenatal Vit-Fe Fumarate-FA (PRENATAL PLUS) 27-1 MG TABS 1 pill daily 10/16/20 Kiran Gutierrez DO   aspirin 81 MG tablet Take 2 tablets by mouth daily  Patient taking differently: Take 162 mg by mouth daily On hold preop for 7 days 1/16/20   Kiran Gutierrez DO   EPINEPHrine (EPIPEN 2-ABUNDIO) 0.3 MG/0.3ML SOAJ injection Inject into muscle for allergic reaction 8/22/19   Abdi Grijalva DO   dicyclomine (BENTYL) 20 MG tablet Take 20 mg by mouth every 6 hours Take am dos 07/10    Historical Provider, MD       Current medications:    No current facility-administered medications for this encounter. Current Outpatient Medications   Medication Sig Dispense Refill    oxyCODONE-acetaminophen (PERCOCET) 5-325 MG per tablet Take 1 tablet by mouth daily for 30 days. Intended supply: 30 days.  Take lowest dose possible to manage pain 30 tablet 0    tiZANidine (ZANAFLEX) 2 MG tablet Take 1 tablet by mouth 2 times daily 60 tablet 1    nabumetone (RELAFEN) 500 MG tablet Take 1.5 tablets by mouth 2 times daily 60 tablet 1    Elastic Bandages & Supports (KNEE BRACE) MISC Soft neutral position left knee brace  Size to fit  Dx:  Knee sprain 1 each 0    diclofenac sodium (VOLTAREN) 1 % GEL Apply topically 2 times daily      Elastic Bandages & Supports (KNEE BRACE) MISC Soft neutral position left knee brace  Size to fit  Dx:  Knee pain 1 each 0    albuterol sulfate HFA (PROAIR HFA) 108 (90 Base) MCG/ACT inhaler Inhale 2 puffs into the lungs as needed for Wheezing or Shortness of Breath 1 Inhaler 3    albuterol (PROVENTIL) (2.5 MG/3ML) 0.083% nebulizer solution Take 3 mLs by nebulization every 6 hours as needed for Wheezing 120 each 3    omeprazole (PRILOSEC) 20 MG delayed release capsule Take 1 capsule by mouth daily Take am dos 07/10. 30 capsule 5    vitamin D3 (CHOLECALCIFEROL) 25 MCG (1000 UT) TABS tablet Take 1 tablet by mouth daily 30 tablet 5    Prenatal Vit-Fe Fumarate-FA (PRENATAL PLUS) 27-1 MG TABS 1 pill daily 30 tablet 5    aspirin 81 MG tablet Take 2 tablets by mouth daily (Patient Diagnosis Date    Asthma     Cerebral artery occlusion with cerebral infarction [facility])     2015    Cervical disc herniation     Cervical spinal stenosis     Chronic back pain     Chronic kidney disease     Depression     Disorder of thyroid gland 2012    GERD (gastroesophageal reflux disease)     Headache(784.0)     Hemorrhoids     History of colon polyps     Irritable bowel syndrome     Low back pain     Numbness and tingling in right hand 2020    Palpitations 2012    Scabies 2016    Seizure disorder ([address]     not on medications for this; last seizure 2018    Vitamin D deficiency 2014       Past Surgical History:        Procedure Laterality Date    COLONOSCOPY  2016    COLONOSCOPY  07/10/2017    HIP SURGERY Left 08/10/2020    LEFT GREATER TROCHANTERIC BURSAE INJECTION X-RAY (CPT 85829) performed by Bhavik Dykes MD at [facility]  07/10/2017       Social History:    Social History     Tobacco Use    Smoking status: Former Smoker     Packs/day: 1.00     Years: 25.00     Pack years: 25.00     Types: Cigarettes     Quit date: 1988     Years since quittin.0    Smokeless tobacco: Never Used   Substance Use Topics    Alcohol use: No                                Counseling given: Not Answered      Vital Signs (Current):   Vitals:    21 1331   Weight: 182 lb (82.6 kg)   Height: 4' 11\" (1.499 m)                                              BP Readings from Last 3 Encounters:   21 122/88   21 118/76   21 128/74       NPO Status:                                                                                 BMI:   Wt Readings from Last 3 Encounters:   21 182 lb (82.6 kg)   21 182 lb (82.6 kg)   21 182 lb 6.4 oz (82.7 kg)     Body mass index is 36.76 kg/m².     CBC:   Lab Results   Component Value Date    WBC 6.5 2021    RBC 5.09 2021 medications for this; last seizure 2018): no interval change, CVA:, neuromuscular disease (cerical radiculopathy):, headaches: migraine headaches, psychiatric history:depression/anxiety             GI/Hepatic/Renal:   (+) GERD:,      (-) no morbid obesity      ROS comment: Irritable bowel syndrome. Endo/Other:    (+) : arthritis: OA., .                 Abdominal:   (+) obese,         Vascular:   + PVD, aortic or cerebral, . Anesthesia Plan      MAC     ASA 3       Induction: intravenous. Anesthetic plan and risks discussed with patient. Plan discussed with CRNA. PAT Chart Review:  Chart reviewed per routine by Suzan Ballesteros MD.  Above represents information available via shared medical record including previous anesthesia history, drug and allergy history. Confirmation of above and final plan per Day of Surgery (DOS) anesthesiologist.      Suzan Ballesteros MD   4/16/2021    Pt seen, examined, chart reviewed, plan discussed.   [facility]  4/19/2021  11:10 AM

## 2021-04-19 ENCOUNTER — ANESTHESIA (OUTPATIENT)
Dept: OPERATING ROOM | Age: 51
End: 2021-04-19
Payer: MEDICAID

## 2021-04-19 ENCOUNTER — HOSPITAL ENCOUNTER (OUTPATIENT)
Age: 51
Setting detail: OUTPATIENT SURGERY
Discharge: HOME | End: 2021-04-19
Attending: PAIN MEDICINE | Admitting: PAIN MEDICINE
Payer: MEDICAID

## 2021-04-19 ENCOUNTER — HOSPITAL ENCOUNTER (OUTPATIENT)
Dept: OPERATING ROOM | Age: 51
Setting detail: OUTPATIENT SURGERY
Discharge: HOME | End: 2021-04-19
Attending: PAIN MEDICINE
Payer: MEDICAID

## 2021-04-19 VITALS
HEART RATE: 84 BPM | TEMPERATURE: 98 F | SYSTOLIC BLOOD PRESSURE: 102 MMHG | OXYGEN SATURATION: 97 % | DIASTOLIC BLOOD PRESSURE: 63 MMHG | BODY MASS INDEX: 36.69 KG/M2 | RESPIRATION RATE: 16 BRPM | WEIGHT: 182 LBS | HEIGHT: 59 IN

## 2021-04-19 VITALS
SYSTOLIC BLOOD PRESSURE: 100 MMHG | DIASTOLIC BLOOD PRESSURE: 70 MMHG | OXYGEN SATURATION: 99 % | RESPIRATION RATE: 19 BRPM

## 2021-04-19 DIAGNOSIS — M54.12 CERVICAL RADICULOPATHY: Primary | ICD-10-CM

## 2021-04-19 DIAGNOSIS — M54.12 CERVICAL RADICULOPATHY, CHRONIC: ICD-10-CM

## 2021-04-19 PROCEDURE — 62321 NJX INTERLAMINAR CRV/THRC: CPT | Performed by: PAIN MEDICINE

## 2021-04-19 PROCEDURE — 7100000011 HC PHASE II RECOVERY - ADDTL 15 MIN: Performed by: PAIN MEDICINE

## 2021-04-19 PROCEDURE — 2580000003 HC RX 258: Performed by: ANESTHESIOLOGY

## 2021-04-19 PROCEDURE — 2709999900 HC NON-CHARGEABLE SUPPLY: Performed by: PAIN MEDICINE

## 2021-04-19 PROCEDURE — 6360000002 HC RX W HCPCS: Performed by: NURSE ANESTHETIST, CERTIFIED REGISTERED

## 2021-04-19 PROCEDURE — 3600000005 HC SURGERY LEVEL 5 BASE: Performed by: PAIN MEDICINE

## 2021-04-19 PROCEDURE — 3209999900 FLUORO FOR SURGICAL PROCEDURES

## 2021-04-19 PROCEDURE — A9579 GAD-BASE MR CONTRAST NOS,1ML: HCPCS | Performed by: PAIN MEDICINE

## 2021-04-19 PROCEDURE — 7100000010 HC PHASE II RECOVERY - FIRST 15 MIN: Performed by: PAIN MEDICINE

## 2021-04-19 PROCEDURE — 6360000002 HC RX W HCPCS: Performed by: PAIN MEDICINE

## 2021-04-19 PROCEDURE — 6360000004 HC RX CONTRAST MEDICATION: Performed by: PAIN MEDICINE

## 2021-04-19 PROCEDURE — 3700000000 HC ANESTHESIA ATTENDED CARE: Performed by: PAIN MEDICINE

## 2021-04-19 PROCEDURE — 2500000003 HC RX 250 WO HCPCS: Performed by: PAIN MEDICINE

## 2021-04-19 RX ORDER — LIDOCAINE HYDROCHLORIDE 5 MG/ML
INJECTION, SOLUTION INFILTRATION; INTRAVENOUS PRN
Status: DISCONTINUED | OUTPATIENT
Start: 2021-04-19 | End: 2021-04-19 | Stop reason: ALTCHOICE

## 2021-04-19 RX ORDER — FENTANYL CITRATE 50 UG/ML
INJECTION, SOLUTION INTRAMUSCULAR; INTRAVENOUS PRN
Status: DISCONTINUED | OUTPATIENT
Start: 2021-04-19 | End: 2021-04-19 | Stop reason: SDUPTHER

## 2021-04-19 RX ORDER — MIDAZOLAM HYDROCHLORIDE 1 MG/ML
INJECTION INTRAMUSCULAR; INTRAVENOUS PRN
Status: DISCONTINUED | OUTPATIENT
Start: 2021-04-19 | End: 2021-04-19 | Stop reason: SDUPTHER

## 2021-04-19 RX ORDER — SODIUM CHLORIDE, SODIUM LACTATE, POTASSIUM CHLORIDE, CALCIUM CHLORIDE 600; 310; 30; 20 MG/100ML; MG/100ML; MG/100ML; MG/100ML
INJECTION, SOLUTION INTRAVENOUS CONTINUOUS
Status: DISCONTINUED | OUTPATIENT
Start: 2021-04-19 | End: 2021-04-19 | Stop reason: HOSPADM

## 2021-04-19 RX ADMIN — FENTANYL CITRATE 50 MCG: 50 INJECTION, SOLUTION INTRAMUSCULAR; INTRAVENOUS at 12:19

## 2021-04-19 RX ADMIN — MIDAZOLAM 1 MG: 1 INJECTION INTRAMUSCULAR; INTRAVENOUS at 12:19

## 2021-04-19 RX ADMIN — MIDAZOLAM 1 MG: 1 INJECTION INTRAMUSCULAR; INTRAVENOUS at 12:18

## 2021-04-19 RX ADMIN — SODIUM CHLORIDE, POTASSIUM CHLORIDE, SODIUM LACTATE AND CALCIUM CHLORIDE: 600; 310; 30; 20 INJECTION, SOLUTION INTRAVENOUS at 11:21

## 2021-04-19 RX ADMIN — FENTANYL CITRATE 50 MCG: 50 INJECTION, SOLUTION INTRAMUSCULAR; INTRAVENOUS at 12:18

## 2021-04-19 RX ADMIN — SODIUM CHLORIDE, POTASSIUM CHLORIDE, SODIUM LACTATE AND CALCIUM CHLORIDE: 600; 310; 30; 20 INJECTION, SOLUTION INTRAVENOUS at 12:18

## 2021-04-19 ASSESSMENT — PAIN - FUNCTIONAL ASSESSMENT: PAIN_FUNCTIONAL_ASSESSMENT: 0-10

## 2021-04-19 ASSESSMENT — PAIN DESCRIPTION - DESCRIPTORS: DESCRIPTORS: ACHING

## 2021-04-19 ASSESSMENT — PAIN SCALES - GENERAL
PAINLEVEL_OUTOF10: 0
PAINLEVEL_OUTOF10: 0

## 2021-04-19 ASSESSMENT — LIFESTYLE VARIABLES: SMOKING_STATUS: 0

## 2021-04-19 NOTE — H&P
[facility]  [address] [facility], [address] [place] [address]  [phone]    Procedure History & Physical      Valentin Huston     HPI:    Patient  is here for neck and arm pain for NANCI C6-7   Labs/imaging studies reviewed   All question and concerns addressed including R/B/A associated with the procedure    Past Medical History:   Diagnosis Date    Asthma     Cerebral artery occlusion with cerebral infarction [facility])     2015    Cervical disc herniation     Cervical spinal stenosis     Chronic back pain     Chronic kidney disease     Depression     Disorder of thyroid gland 09/11/2012    GERD (gastroesophageal reflux disease)     Headache(784.0)     Hemorrhoids     History of colon polyps     Irritable bowel syndrome     Low back pain     Numbness and tingling in right hand 03/06/2020    Palpitations 07/24/2012    Scabies 05/18/2016    Seizure disorder ([address]     not on medications for this; last seizure 2018    Vitamin D deficiency 12/03/2014       Past Surgical History:   Procedure Laterality Date    COLONOSCOPY  03/09/2016    COLONOSCOPY  07/10/2017    HIP SURGERY Left 08/10/2020    LEFT GREATER TROCHANTERIC BURSAE INJECTION X-RAY (CPT 82567) performed by Shannon Jensen MD at [address] [place] [address]  07/10/2017       Prior to Admission medications    Medication Sig Start Date End Date Taking? Authorizing Provider   oxyCODONE-acetaminophen (PERCOCET) 5-325 MG per tablet Take 1 tablet by mouth daily for 30 days. Intended supply: 30 days.  Take lowest dose possible to manage pain 4/9/21 5/9/21 Yes NOY Foreman CNP   tiZANidine (ZANAFLEX) 2 MG tablet Take 1 tablet by mouth 2 times daily 4/9/21 5/9/21  NOY Foreman CNP   nabumetone (RELAFEN) 500 MG tablet Take 1.5 tablets by mouth 2 times daily 3/8/21   NOY Foreman CNP   Elastic Bandages & Supports (KNEE BRACE) MISC Soft neutral position left knee brace  Size to fit  Dx:  Knee sprain 2/26/21   [facility], DO   diclofenac sodium (VOLTAREN) 1 % GEL Apply topically 2 times daily    Historical Provider, MD   Elastic Bandages & Supports (KNEE BRACE) MISC Soft neutral position left knee brace  Size to fit  Dx:  Knee pain 2/25/21   [facility], DO   albuterol sulfate HFA (PROAIR HFA) 108 (90 Base) MCG/ACT inhaler Inhale 2 puffs into the lungs as needed for Wheezing or Shortness of Breath 1/21/21   [facility], DO   albuterol (PROVENTIL) (2.5 MG/3ML) 0.083% nebulizer solution Take 3 mLs by nebulization every 6 hours as needed for Wheezing 1/21/21   Abdi Grijalva, DO   omeprazole (PRILOSEC) 20 MG delayed release capsule Take 1 capsule by mouth daily Take am dos 07/10. 1/21/21   [facility], DO   vitamin D3 (CHOLECALCIFEROL) 25 MCG (1000 UT) TABS tablet Take 1 tablet by mouth daily 10/16/20   [facility], DO   Prenatal Vit-Fe Fumarate-FA (PRENATAL PLUS) 27-1 MG TABS 1 pill daily 10/16/20   [facility], DO   aspirin 81 MG tablet Take 2 tablets by mouth daily  Patient taking differently: Take 162 mg by mouth daily On hold preop for 7 days 1/16/20   [facility], DO   EPINEPHrine (EPIPEN 2-ABUNDIO) 0.3 MG/0.3ML SOAJ injection Inject into muscle for allergic reaction 8/22/19   Abdi Grijalva, DO   dicyclomine (BENTYL) 20 MG tablet Take 20 mg by mouth every 6 hours Take am dos 07/10    Historical Provider, MD       Allergies   Allergen Reactions    Fish-Derived Products Anaphylaxis    Dye [Iodides] Itching       Social History     Socioeconomic History    Marital status:      Spouse name: Not on file    Number of children: Not on file    Years of education: Not on file    Highest education level: Not on file   Occupational History    Not on file   Social Needs    Financial resource strain: Not on file    Food insecurity     Worry: Not on file     Inability: Not on file    Transportation needs     Medical: Not on file Non-medical: Not on file   Tobacco Use    Smoking status: Former Smoker     Packs/day: 1.00     Years: 25.00     Pack years: 25.00     Types: Cigarettes     Quit date: 1988     Years since quittin.0    Smokeless tobacco: Never Used   Substance and Sexual Activity    Alcohol use: No    Drug use: No    Sexual activity: Not on file   Lifestyle    Physical activity     Days per week: Not on file     Minutes per session: Not on file    Stress: Not on file   Relationships    Social connections     Talks on phone: Not on file     Gets together: Not on file     Attends Scientologist service: Not on file     Active member of club or organization: Not on file     Attends meetings of clubs or organizations: Not on file     Relationship status: Not on file    Intimate partner violence     Fear of current or ex partner: Not on file     Emotionally abused: Not on file     Physically abused: Not on file     Forced sexual activity: Not on file   Other Topics Concern    Not on file   Social History Narrative    Not on file       Family History   Problem Relation Age of Onset    Heart Attack Mother     Kidney Disease Father         failure    Diabetes Other     Asthma Child     Asthma Child         bone disease    Anemia Child          REVIEW OF SYSTEMS:    CONSTITUTIONAL:  negative for  fevers, chills, sweats and fatigue    RESPIRATORY:  negative for  dry cough, cough with sputum, dyspnea, wheezing and chest pain    CARDIOVASCULAR:  negative for chest pain, dyspnea, palpitations, syncope    GASTROINTESTINAL:  negative for nausea, vomiting, change in bowel habits, diarrhea, constipation and abdominal pain    MUSCULOSKELETAL: negative for muscle weakness    SKIN: negative for itching or rashes.     BEHAVIOR/PSYCH:  negative for poor appetite, increased appetite, decreased sleep and poor concentration    All other systems negative      PHYSICAL EXAM:    VITALS:  /79   Pulse 71   Temp 98 °F (36.7 °C) (Skin)

## 2021-04-19 NOTE — OP NOTE
2021    Patient: Ginna Reinoso  :  1970  Age:  45 y.o. Sex:  female     PRE-PROCEDURE DIAGNOSIS: Cervical degenerative disc disease, cervical radicular pain. POST-PROCEDURE DIAGNOSIS: Same. PROCEDURE: Fluoroscopic guided cervical epidural steroid injection, #1 at C6-7 level. SURGEON: AILEEN Bauman M.D. ANESTHESIA: MAC    ESTIMATED BLOOD LOSS: None.  ______________________________________________________________________  BRIEF HISTORY:  Ginna Reinoso comes in today for the first  therapeutic cervical epidural injection under fluoroscopic guidance. The potential complications of this procedure were discussed with the her again today. She has elected to undergo the aforementioned procedure. [place] complete History & Physical examination were reviewed in depth, a copy of which is in the chart. DESCRIPTION OF PROCEDURE:    After confirming written and informed consent, a time-out was performed and [place] name and date of birth, the procedure to be performed as well as the plan for the location of the needle insertion were confirmed. The patient was brought into the procedure room and placed in the prone position with the head flexed midline on the fluoroscopy table. A pillow was placed under the patient's head to increase cervical interlaminar space. Standard monitors were placed, and vital signs were observed throughout the procedure. The area was prepped with chloraprep and the C6-7 interspace was marked under fluoroscopy. The skin and subcutaneous tissues at the above level were anesthestized with 0.5% lidocaine. An # 18 gauge 3 1/2 tuohy epidural needle was inserted and advanced toward the inferior lamina until bony contact was made. The needle was then advanced superiorly toward epidural space . From this point on hanging drop/loss of resistance technique with 5 cc glass syringe was used to confirm entrance of the needle into the epidural space under intermittent lateral fluoroscopy. Once in the epidural space , negative aspiration for blood and CSF was confirmed . Needle tip placement was confirmed by visualizing epidural spread of 0.5 ml of omnipaque 240 visualized in both AP and lateral live fluoroscopic views. Then after negative aspiration, a solution of 0.5 % Lidocaine 3 ml and 40 mg DepoMedrol was easily injected. The needle was gently removed intact. The patient neck was cleaned and a Band-Aid was placed over the needle insertion point. Disposition the patient tolerated the procedure well and there were no complications . Vital signs remained stable throughout the procedure. The patient was escorted to the recovery area where they remained until discharge and written discharge instructions for the procedure were given. Plan: Amber Wall will return to our pain management center as scheduled.      Maurizio Barakat MD

## 2021-04-19 NOTE — PROGRESS NOTES
Reviewed discharge instructions with pt and daughter, America Patrick verbalized understanding. D/C per wheelchair to front exit.

## 2021-05-06 ENCOUNTER — OFFICE VISIT (OUTPATIENT)
Dept: PAIN MANAGEMENT | Age: 51
End: 2021-05-06
Payer: MEDICAID

## 2021-05-06 VITALS
RESPIRATION RATE: 20 BRPM | BODY MASS INDEX: 36.29 KG/M2 | HEIGHT: 59 IN | WEIGHT: 180 LBS | OXYGEN SATURATION: 97 % | SYSTOLIC BLOOD PRESSURE: 110 MMHG | TEMPERATURE: 97.8 F | DIASTOLIC BLOOD PRESSURE: 70 MMHG | HEART RATE: 70 BPM

## 2021-05-06 DIAGNOSIS — M25.562 ACUTE PAIN OF LEFT KNEE: ICD-10-CM

## 2021-05-06 DIAGNOSIS — M47.812 CERVICAL FACET JOINT SYNDROME: ICD-10-CM

## 2021-05-06 DIAGNOSIS — M79.7 FIBROMYALGIA: ICD-10-CM

## 2021-05-06 DIAGNOSIS — G89.4 CHRONIC PAIN SYNDROME: ICD-10-CM

## 2021-05-06 DIAGNOSIS — M51.9 LUMBAR DISC DISORDER: ICD-10-CM

## 2021-05-06 DIAGNOSIS — M62.830 LUMBAR PARASPINAL MUSCLE SPASM: ICD-10-CM

## 2021-05-06 DIAGNOSIS — M47.816 LUMBAR SPONDYLOSIS: ICD-10-CM

## 2021-05-06 DIAGNOSIS — M48.02 CERVICAL STENOSIS OF SPINE: ICD-10-CM

## 2021-05-06 DIAGNOSIS — M54.12 CERVICAL RADICULOPATHY: ICD-10-CM

## 2021-05-06 DIAGNOSIS — M16.12 PRIMARY OSTEOARTHRITIS OF LEFT HIP: ICD-10-CM

## 2021-05-06 DIAGNOSIS — M50.90 CERVICAL DISC DISORDER: Primary | ICD-10-CM

## 2021-05-06 DIAGNOSIS — M50.30 DDD (DEGENERATIVE DISC DISEASE), CERVICAL: Chronic | ICD-10-CM

## 2021-05-06 PROCEDURE — 99213 OFFICE O/P EST LOW 20 MIN: CPT | Performed by: NURSE PRACTITIONER

## 2021-05-06 PROCEDURE — 1036F TOBACCO NON-USER: CPT | Performed by: NURSE PRACTITIONER

## 2021-05-06 PROCEDURE — 3017F COLORECTAL CA SCREEN DOC REV: CPT | Performed by: NURSE PRACTITIONER

## 2021-05-06 PROCEDURE — G8417 CALC BMI ABV UP PARAM F/U: HCPCS | Performed by: NURSE PRACTITIONER

## 2021-05-06 PROCEDURE — G8427 DOCREV CUR MEDS BY ELIG CLIN: HCPCS | Performed by: NURSE PRACTITIONER

## 2021-05-06 RX ORDER — PREGABALIN 50 MG/1
50 CAPSULE ORAL 3 TIMES DAILY
Qty: 90 CAPSULE | Refills: 0 | Status: SHIPPED
Start: 2021-05-06 | End: 2021-06-09

## 2021-05-06 RX ORDER — DICLOFENAC EPOLAMINE 0.01 G/1
1 SYSTEM TOPICAL 2 TIMES DAILY
Qty: 60 PATCH | Refills: 2 | Status: SHIPPED
Start: 2021-05-06 | End: 2021-06-09 | Stop reason: CLARIF

## 2021-05-06 RX ORDER — OXYCODONE HYDROCHLORIDE AND ACETAMINOPHEN 5; 325 MG/1; MG/1
1 TABLET ORAL DAILY
Qty: 30 TABLET | Refills: 0 | Status: SHIPPED | OUTPATIENT
Start: 2021-05-06 | End: 2021-06-05

## 2021-05-06 NOTE — PROGRESS NOTES
[address] [facility], [address] [place] [address]  [phone]    Follow up Note      Na Giraldo     Date of Visit:  5/6/2021    CC:  Patient presents for follow up knee and back pain    HPI:  Pt here with no changes to left knee and chronic low back pain. Pt main complaint is neck pain primarily axial  accompanied by numbness and tingling to left hand. Pt notes some improvement with n/t s/p NANCI. Pt using otc ointments with no relief. Discussed trialing CMBB after edema improves. Trial lyrica this month and flector patch. Appropriate analgesia with current medications regimen: no.  Change in quality of symptoms: left wrist pain  Recent diagnostic testing: none  Recent interventional procedures: 04/19/21: cervical epidural C6-C7 with no relief neck pain, helped some with n/t, increased edema s/p injection      She has been on anticoagulation medications to include ASA. The patient Sveta Abts not been on herbal supplements.  The patient is not diabetic.     Imaging:      Cervical Spine MRI 11/2020: Impression   1.  Cervical spondylosis as described above with multiple levels of   broad-based central disc herniation notable at C3/C4, C4/C5, C5/C6, and C6/C7   resulting in mild-to-moderate effacement of ventral cord. 2.  No fracture or malalignment.             Cervical spine MRI 04/2018  1. Multilevel degenerative disc disease extending from C3 through C7. Moderately severe spinal canal stenosis at C3-C4. Moderate spinal   canal stenosis at C4-C5. . Moderately severe spinal canal stenosis at   C5-C6. Abnormal signal intensity within the cervical spinal cord C3-C5   without evidence of enhancement. Findings are most likely reflective   of myelomalacia changes secondary to the underlying degree of cord   compression as described above. No active demyelination identified. 2. Slight loss of normally expected cervical lordosis C3-C6.      CT Lumbar spine 2017  1.  Mild diffuse osteopenia, no compression fracture or   spondylolisthesis.       2. Mild multilevel degenerative disc disease and facet joint   arthropathy of the lower lumbar spine more pronounced at L4-L5.      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         Otherwise, no musculoskeletal pathology to explain patient's pain.      Previous treatments: Physical Therapy, Epidural Steroid Injection with  and medications. .  tizanidine dizziness, flexeril sedating, norco severe gi distress and headaches                                        Potential Aberrant Drug-Related Behavior:    No     Urine Drug Screening:  Saliva screen 08/2020 consistent for Ultram     OARRS report:  05/2021 consistent.         Past Medical History:   Diagnosis Date    Asthma     Cerebral artery occlusion with [facility] infarction ([facility]     2015    Cervical disc herniation     Cervical spinal stenosis     Chronic back pain     Chronic kidney disease     Depression     Disorder of thyroid gland 09/11/2012    GERD (gastroesophageal reflux disease)     Headache(784.0)     Hemorrhoids     History of colon polyps     Irritable bowel syndrome     Low back pain     Numbness and tingling in right hand 03/06/2020    Palpitations 07/24/2012    Scabies 05/18/2016    Seizure disorder ([facility]     not on medications for this; last seizure 2018    Vitamin D deficiency 12/03/2014       Past Surgical History:   Procedure Laterality Date    COLONOSCOPY  03/09/2016    COLONOSCOPY  07/10/2017    HIP SURGERY Left 08/10/2020    LEFT GREATER TROCHANTERIC BURSAE INJECTION X-RAY (CPT 61586) performed by [facility]ianna Yusuf [facility] at [facility] N/A     epidural c6-c7    PAIN MANAGEMENT PROCEDURE N/A 4/19/2021    CERVICAL EPIDURAL INTERLAMINAR STEROID INJECTION C6-C7 WITH SEDATION performed by Rhianna Yusuf MD at [address] [place] [address] ENDOSCOPY  07/10/2017       Prior to Admission medications    Medication Sig Start Date End Date Taking? Authorizing Provider   oxyCODONE-acetaminophen (PERCOCET) 5-325 MG per tablet Take 1 tablet by mouth daily for 30 days. Intended supply: 30 days.  Take lowest dose possible to manage pain 4/9/21 5/9/21 Yes NOY Joshua CNP   tiZANidine (ZANAFLEX) 2 MG tablet Take 1 tablet by mouth 2 times daily 4/9/21 5/9/21 Yes NOY Joshua CNP   nabumetone (RELAFEN) 500 MG tablet Take 1.5 tablets by mouth 2 times daily 3/8/21  Yes NOY Joshua CNP   Elastic Bandages & Supports (KNEE BRACE) MISC Soft neutral position left knee brace  Size to fit  Dx:  Knee sprain 2/26/21  Yes Micah Mondragon DO   Elastic Bandages & Supports (KNEE BRACE) MISC Soft neutral position left knee brace  Size to fit  Dx:  Knee pain 2/25/21  Yes Micah Mondragon DO   albuterol sulfate HFA (PROAIR HFA) 108 (90 Base) MCG/ACT inhaler Inhale 2 puffs into the lungs as needed for Wheezing or Shortness of Breath 1/21/21  Yes Abdi Grijalva DO   albuterol (PROVENTIL) (2.5 MG/3ML) 0.083% nebulizer solution Take 3 mLs by nebulization every 6 hours as needed for Wheezing 1/21/21  Yes Abdi Sauli, DO   omeprazole (PRILOSEC) 20 MG delayed release capsule Take 1 capsule by mouth daily Take am dos 07/10. 1/21/21  Yes Micah Mondragon DO   vitamin D3 (CHOLECALCIFEROL) 25 MCG (1000 UT) TABS tablet Take 1 tablet by mouth daily 10/16/20  Yes Abdi BARRON Evaline Herb, DO   Prenatal Vit-Fe Fumarate-FA (PRENATAL PLUS) 27-1 MG TABS 1 pill daily 10/16/20  Yes Abdi Sauli, DO   aspirin 81 MG tablet Take 2 tablets by mouth daily  Patient taking differently: Take 162 mg by mouth daily On hold preop for 7 days 1/16/20  Yes Abdi Grijalva, DO   EPINEPHrine (EPIPEN 2-ABUNDIO) 0.3 MG/0.3ML SOAJ injection Inject into muscle for allergic reaction 8/22/19  Yes Abdi Grijalva DO   dicyclomine (BENTYL) 20 MG tablet Take 20 mg by mouth every 6 hours Take am dos 07/10   Yes Historical Provider, MD   diclofenac sodium (VOLTAREN) 1 % GEL Apply topically 2 times daily    Historical Provider, MD       Allergies   Allergen Reactions    Fish-Derived Products Anaphylaxis    Dye [Iodides] Itching       Social History     Socioeconomic History    Marital status:      Spouse name: Not on file    Number of children: Not on file    Years of education: Not on file    Highest education level: Not on file   Occupational History    Not on file   Social Needs    Financial resource strain: Not on file    Food insecurity     Worry: Not on file     Inability: Not on file    Transportation needs     Medical: Not on file     Non-medical: Not on file   Tobacco Use    Smoking status: Former Smoker     Packs/day: 1.00     Years: 25.00     Pack years: 25.00     Types: Cigarettes     Quit date: 1988     Years since quittin.0    Smokeless tobacco: Never Used   Substance and Sexual Activity    Alcohol use: No    Drug use: No    Sexual activity: Not on file   Lifestyle    Physical activity     Days per week: Not on file     Minutes per session: Not on file    Stress: Not on file   Relationships    Social connections     Talks on phone: Not on file     Gets together: Not on file     Attends Buddhism service: Not on file     Active member of club or organization: Not on file     Attends meetings of clubs or organizations: Not on file     Relationship status: Not on file    Intimate partner violence     Fear of current or ex partner: Not on file     Emotionally abused: Not on file     Physically abused: Not on file     Forced sexual activity: Not on file   Other Topics Concern    Not on file   Social History Narrative    Not on file       Family History   Problem Relation Age of Onset    Heart Attack Mother     Kidney Disease Father         failure    Diabetes Other     Asthma Child     Asthma Child         bone disease    Anemia Child        REVIEW OF SYSTEMS:     Meghana Gerber denies fever/chills, chest pain, shortness of breath, new bowel or bladder complaints or suicidal ideations. All other review of systems wasnegative. Review of Systems    PHYSICAL EXAMINATION:      /70   Pulse 70   Temp 97.8 °F (36.6 °C)   Resp 20   Ht 4' 11\" (1.499 m)   Wt 180 lb (81.6 kg)   LMP 03/27/2008   SpO2 97%   BMI 36.36 kg/m²     General:       General appearance:   elderly, pleasant and well-hydrated. , in moderate discomfort and A & O x3  Build:Overweight     HEENT:     Head:normocephalic and atraumatic  Sclera: icterus absent,     Lungs:     Breathing:Breathing Pattern: regular, no distress     Abdomen:     Shape:non-distended and normal  Tenderness:none     Cervical spine:     Inspection:normal  Palpation:tenderness paravertebral muscles, facet loading, left, right, positive and tenderness. Range of motion:abnormal moderately flexion, extension rotation bilateral and is  severely painful. Edema to left upper back s/p injection tender to touch       Lumbar spine:     Spine inspection:normal   CVA tenderness:No   Palpation:tenderness paravertebral muscles, facet loading, left, right, positive and tenderness.   Range of motion:abnormal moderately Lateral bending, flexion, extension rotation bilateral and is  painful.     Musculoskeletal:     Trigger points in Paraveteral:absent bilaterally  Positive for edema to left trapezius, neck              Lin's:negative right, negative left   SI joint tenderness:negative right, negative left              KAYODE test:negative right, negative left  Piriformis tenderness:negative right, negative left  Trochanteric bursa tenderness:negative right, positive left  SLR:negative right, negative left, sitting      Extremities:     Tremors:None bilaterally upper and lower  Range of motion:Generally normal shoulders, pain with internal rotation of hips negative  Intact:Yes  Edema: left knee, immobilizer to left knee, ROM limited due

## 2021-05-07 ENCOUNTER — TELEPHONE (OUTPATIENT)
Dept: PAIN MANAGEMENT | Age: 51
End: 2021-05-07

## 2021-06-09 ENCOUNTER — OFFICE VISIT (OUTPATIENT)
Dept: PAIN MANAGEMENT | Age: 51
End: 2021-06-09
Payer: MEDICAID

## 2021-06-09 ENCOUNTER — PREP FOR PROCEDURE (OUTPATIENT)
Dept: PAIN MANAGEMENT | Age: 51
End: 2021-06-09

## 2021-06-09 VITALS
HEIGHT: 59 IN | SYSTOLIC BLOOD PRESSURE: 90 MMHG | RESPIRATION RATE: 20 BRPM | DIASTOLIC BLOOD PRESSURE: 60 MMHG | HEART RATE: 78 BPM | TEMPERATURE: 96.9 F | BODY MASS INDEX: 36.29 KG/M2 | OXYGEN SATURATION: 94 % | WEIGHT: 180 LBS

## 2021-06-09 DIAGNOSIS — G89.4 CHRONIC PAIN SYNDROME: ICD-10-CM

## 2021-06-09 DIAGNOSIS — M47.816 LUMBAR SPONDYLOSIS: ICD-10-CM

## 2021-06-09 DIAGNOSIS — M50.90 CERVICAL DISC DISORDER: ICD-10-CM

## 2021-06-09 DIAGNOSIS — M16.12 PRIMARY OSTEOARTHRITIS OF LEFT HIP: ICD-10-CM

## 2021-06-09 DIAGNOSIS — M47.816 LUMBAR FACET ARTHROPATHY: ICD-10-CM

## 2021-06-09 DIAGNOSIS — M51.9 LUMBAR DISC DISORDER: ICD-10-CM

## 2021-06-09 DIAGNOSIS — M79.7 FIBROMYALGIA: ICD-10-CM

## 2021-06-09 DIAGNOSIS — M54.12 CERVICAL RADICULOPATHY: ICD-10-CM

## 2021-06-09 DIAGNOSIS — M47.812 CERVICAL FACET JOINT SYNDROME: Primary | ICD-10-CM

## 2021-06-09 PROCEDURE — 1036F TOBACCO NON-USER: CPT | Performed by: NURSE PRACTITIONER

## 2021-06-09 PROCEDURE — G8417 CALC BMI ABV UP PARAM F/U: HCPCS | Performed by: NURSE PRACTITIONER

## 2021-06-09 PROCEDURE — G8427 DOCREV CUR MEDS BY ELIG CLIN: HCPCS | Performed by: NURSE PRACTITIONER

## 2021-06-09 PROCEDURE — 3017F COLORECTAL CA SCREEN DOC REV: CPT | Performed by: NURSE PRACTITIONER

## 2021-06-09 PROCEDURE — 99213 OFFICE O/P EST LOW 20 MIN: CPT | Performed by: NURSE PRACTITIONER

## 2021-06-09 RX ORDER — TIZANIDINE 2 MG/1
2 TABLET ORAL 2 TIMES DAILY
Qty: 60 TABLET | Refills: 1 | Status: SHIPPED | OUTPATIENT
Start: 2021-06-09 | End: 2021-07-09

## 2021-06-09 RX ORDER — OXYCODONE HYDROCHLORIDE AND ACETAMINOPHEN 5; 325 MG/1; MG/1
1 TABLET ORAL DAILY
Qty: 30 TABLET | Refills: 0 | Status: SHIPPED
Start: 2021-06-09 | End: 2021-06-30 | Stop reason: SDUPTHER

## 2021-06-09 RX ORDER — OXYCODONE HYDROCHLORIDE AND ACETAMINOPHEN 5; 325 MG/1; MG/1
1 TABLET ORAL EVERY 12 HOURS PRN
COMMUNITY
End: 2021-06-09 | Stop reason: SDUPTHER

## 2021-06-09 NOTE — PROGRESS NOTES
[address] [facility], [address] [place], [place] [id number]  [phone]    Follow up Note      Danny Carson     Date of Visit:  6/9/2021    CC:  Patient presents for follow up knee and back pain    HPI:  Pt here with increased low back pain axial in nature started yesterday. Pt notes tried lyrica and had headaches with medication. Pt continues to have neck pain primarily axial  accompanied by numbness and tingling to left hand. Pt notes some improvement with n/t s/p NANCI. Pt using otc ointments with no relief. Appropriate analgesia with current medications regimen: no.  Change in quality of symptoms:nnone  Recent diagnostic testing: none  Recent interventional procedures: none     She has been on anticoagulation medications to include ASA. The patient Pilo Taylorot not been on herbal supplements.  The patient is not diabetic.     Imaging:      Cervical Spine MRI 11/2020: Impression   1.  Cervical spondylosis as described above with multiple levels of   broad-based central disc herniation notable at C3/C4, C4/C5, C5/C6, and C6/C7   resulting in mild-to-moderate effacement of ventral cord. 2.  No fracture or malalignment.             Cervical spine MRI 04/2018  1. Multilevel degenerative disc disease extending from C3 through C7. Moderately severe spinal canal stenosis at C3-C4. Moderate spinal   canal stenosis at C4-C5. . Moderately severe spinal canal stenosis at   C5-C6. Abnormal signal intensity within the cervical spinal cord C3-C5   without evidence of enhancement. Findings are most likely reflective   of myelomalacia changes secondary to the underlying degree of cord   compression as described above. No active demyelination identified. 2. Slight loss of normally expected cervical lordosis C3-C6.      CT Lumbar spine 2017  1.  Mild diffuse osteopenia, no compression fracture or   spondylolisthesis.       2. Mild multilevel degenerative disc disease and facet joint   arthropathy of Social History     Socioeconomic History    Marital status:      Spouse name: Not on file    Number of children: Not on file    Years of education: Not on file    Highest education level: Not on file   Occupational History    Not on file   Tobacco Use    Smoking status: Former Smoker     Packs/day: 1.00     Years: 25.00     Pack years: 25.00     Types: Cigarettes     Quit date: 1988     Years since quittin.1    Smokeless tobacco: Never Used   Vaping Use    Vaping Use: Never used   Substance and Sexual Activity    Alcohol use: No    Drug use: No    Sexual activity: Not on file   Other Topics Concern    Not on file   Social History Narrative    Not on file     Social Determinants of Health     Financial Resource Strain:     Difficulty of Paying Living Expenses:    Food Insecurity:     Worried About Running Out of Food in the Last Year:     [address] in the Last Year:    Transportation Needs:     Lack of Transportation (Medical):  Lack of Transportation (Non-Medical):    Physical Activity:     Days of Exercise per Week:     Minutes of Exercise per Session:    Stress:     Feeling of Stress :    Social Connections:     Frequency of Communication with Friends and Family:     Frequency of Social Gatherings with Friends and Family:     Attends [employer] Services:     Active Member of Clubs or Organizations:     Attends Club or Organization Meetings:     Marital Status:    Intimate Partner Violence:     Fear of Current or Ex-Partner:     Emotionally Abused:     Physically Abused:     Sexually Abused:        Family History   Problem Relation Age of Onset    Heart Attack Mother     Kidney Disease Father         failure    Diabetes Other     Asthma Child     Asthma Child         bone disease    Anemia Child        REVIEW OF SYSTEMS:     Alan Echeverria denies fever/chills, chest pain, shortness of breath, new bowel or bladder complaints or suicidal ideations.  All other review of systems wasnegative. Review of Systems    PHYSICAL EXAMINATION:      BP 90/60   Pulse 78   Temp 96.9 °F (36.1 °C)   Resp 20   Ht 4' 11\" (1.499 m)   Wt 180 lb (81.6 kg)   LMP 03/27/2008   SpO2 94%   Breastfeeding No   BMI 36.36 kg/m²     General:       General appearance:   elderly, pleasant and well-hydrated. , in moderate discomfort and A & O x3  Build:Overweight     HEENT:     Head:normocephalic and atraumatic  Sclera: icterus absent,     Lungs:     Breathing:Breathing Pattern: regular, no distress     Abdomen:     Shape:non-distended and normal  Tenderness:none     Cervical spine:     Inspection:normal  Palpation:tenderness paravertebral muscles, facet loading, left, right, positive and tenderness. Range of motion:abnormal moderately flexion, extension rotation bilateral and is  severely painful. Edema to left upper back s/p injection tender to touch       Lumbar spine:     Spine inspection:normal   CVA tenderness:No   Palpation:tenderness paravertebral muscles, facet loading, left, right, positive and tenderness.   Range of motion:abnormal moderately Lateral bending, flexion, extension rotation bilateral and is  painful.     Musculoskeletal:     Trigger points in Paraveteral:absent bilaterally  Positive for edema to left trapezius, neck              Lin's:negative right, negative left   SI joint tenderness:negative right, negative left              KAYODE test:negative right, negative left  Piriformis tenderness:negative right, negative left  Trochanteric bursa tenderness:negative right, positive left  SLR:negative right, negative left, sitting      Extremities:     Tremors:None bilaterally upper and lower  Range of motion:Generally normal shoulders, pain with internal rotation of hips negative  Intact:Yes  Edema: left knee, immobilizer to left knee, ROM limited due to pain and acute injury      Neurological:     Sensory:diminshed to light touch lateral aspect of Left arm.   Gait:antalgic    Dermatology:    Skin:no unusual rashes, no skin lesions, no palpable subcutaneous nodules and good skin turgor        Impression:  Neck and low back pain with radiation to the Left upper and lower extremity  Cervical spine MRI 2019 Multilevel degenerative disc disease with moderate to severe stenosis at C3-4/C4-5 and C5-6  Lumbar spine CT 2017 Mild degenerative disc disease and facet arthropathy most pronounced at L4-5  Patient had seen neurosurgery and surgery C3-4/C4-5 and C5-6 ACDF was recommended.   Failed greater trochanteric bursae injection. Patient had tried and failed Gabapentin. Plan:   Followed for axial neck pain with n/t to hands ,and low back to left hip pain  Schedule Bilateral Cervical medial branch facet block C4 C5 C6 with sedation x1 for axial neck pain,  04/19/21: cervical epidural C6-C7 with no improvement in neck pain but some in improvement with n/t hands  DC Lyrica headaches noted  Flector patch not covered   Refill  Percocet 5/325mg po daily prn pain #30,   Refill Tizanidine 2mg po bid prn spasms   [facility] report reviewed 06/2021.   Patient encouraged to stay active and to lose weight. Patient had tried and failed physical therapy. Treatment plan discussed with the patient and her daughter including medications and procedure side effects.         ccreferring physic           Electronically signed by NOY Leblanc CNP on 6/9/21 at 1:07 PM EST

## 2021-06-09 NOTE — PATIENT INSTRUCTIONS
Patient Education        Learning About Medial Branch Block and Neurotomy  What are medial branch block and neurotomy? Facet joints connect your vertebrae to each other. Problems in these joints can cause chronic (long-term) pain in the neck or back. Medial branch nerves are the nerves that carry many of the pain messages from your facet joints. Radiofrequency medial branch neurotomy is a type of medial branch neurotomy that is used to relieve arthritis pain. It uses radio waves to damage nerves in your neck or back so that they can no longer send pain messages to your brain. Before your doctor knows if a neurotomy will help you, you will get a medial branch block to find out if certain nerves are the ones that are a source of your pain. You will need two separate visits to the outpatient center or hospital to have both procedures. You will need someone to drive you home. How is a medial branch block done? The doctor will use a tiny needle to numb the skin where you will get the block. Then the doctor puts the block needle into the numbed area. You may feel some pressure, but you should not feel pain. Using fluoroscopy (live X-ray) to guide the needle, the doctor injects medicine onto one or more nerves to make them numb. If you get relief from your pain in the next 4 to 6 hours, it's a sign that those nerves may be contributing to your pain. The relief will last only a short time. You may then have a medial branch neurotomy at a later visit to try to get longer relief. How is a medial branch neurotomy done? The doctor will use a tiny needle to numb the skin where you will get the neurotomy. Then the doctor puts the neurotomy needle into the numbed area. You may feel some pressure. Using fluoroscopy (live X-ray) to guide the needle, the doctor sends radio waves through the needle to the nerve for 60 to 90 seconds. The radio waves heat the nerve, which damages it. The doctor may do this several times. And more than one nerve may be treated. How long do medial branch block and neurotomy take? It takes 20 to 30 minutes to get the block. You can go home after the doctor watches you for about an hour. It takes 45 to 90 minutes to get a neurotomy, depending on how many nerves are heated. You will probably go home 30 to 60 minutes after the procedure. What can you expect after a neurotomy? You will get instructions on how to report how much pain you have when you are at home. You may feel a little sore or tender at the injection site at first. But after a successful neurotomy, most people have pain relief right away. It often lasts for several months, but your pain may come back. If your pain does come back, it may mean that the damaged nerve has healed and can send pain messages again. Or it can mean that a different nerve is causing pain. Your doctor will discuss your options with you. Follow-up care is a key part of your treatment and safety. Be sure to make and go to all appointments, and call your doctor if you are having problems. It's also a good idea to know your test results and keep a list of the medicines you take. Where can you learn more? Go to https://[email or web address]. org and sign in to your [employer] account. Enter S385 in the [employer] box to learn more about \"Learning About Medial Branch Block and Neurotomy. \"     If you do not have an account, please click on the \"Sign Up Now\" link. Current as of: August 4, 2020               Content Version: 12.8  © 2006-2021 [employer]. Care instructions adapted under license by [facility] ([facility]). If you have questions about a medical condition or this instruction, always ask your healthcare professional. [place] [address] any warranty or liability for your use of this information.

## 2021-06-24 ENCOUNTER — TELEPHONE (OUTPATIENT)
Dept: PAIN MANAGEMENT | Age: 51
End: 2021-06-24

## 2021-06-24 NOTE — TELEPHONE ENCOUNTER
Call to [facility] with  that procedure was approved for 7/8/2021 and that the surgery center should call her a few days before for the pre op call and after 3:00 PM the business day before with the arrival time. Instructed Ashlee to hold aspirin containing products for 7 days. Instructed to call office back if any questions. Ashlee verbalized understanding.

## 2021-06-25 ENCOUNTER — TELEPHONE (OUTPATIENT)
Dept: PAIN MANAGEMENT | Age: 51
End: 2021-06-25

## 2021-06-25 NOTE — TELEPHONE ENCOUNTER
6/25/2021-upon review of Ashlee's chart, it is noted that she takes ASA . Medical clearance obtained from Dr. Roderick Fang OK to hold ASA  for 7 days. Call to [facility] Leong, advised her to hold her ASA  for 7 days before her 07/08/2021 procedure, last dose to be 06/30/2021. she shows an understanding to not take it before her procedure. Instructed of need for COVID-19 testing and self quarantining. Instructed her that the surgery center should call her a few days before for the pre op call and after 3:00 PM the business day before with the arrival time. Ashlee verbalized understanding.      COVID-19 symptom and exposure screening:    Fever: No  Headache:  No  Cough: No  Shortness of breath: No  Exposed to anyone with these symptoms: No     Georgia George RN  Pain Management

## 2021-06-30 ENCOUNTER — OFFICE VISIT (OUTPATIENT)
Dept: PAIN MANAGEMENT | Age: 51
End: 2021-06-30
Payer: MEDICAID

## 2021-06-30 VITALS
DIASTOLIC BLOOD PRESSURE: 60 MMHG | BODY MASS INDEX: 35.28 KG/M2 | SYSTOLIC BLOOD PRESSURE: 108 MMHG | HEIGHT: 59 IN | RESPIRATION RATE: 20 BRPM | WEIGHT: 175 LBS | OXYGEN SATURATION: 95 % | TEMPERATURE: 97.5 F | HEART RATE: 79 BPM

## 2021-06-30 DIAGNOSIS — M47.816 LUMBAR SPONDYLOSIS: ICD-10-CM

## 2021-06-30 DIAGNOSIS — M79.7 FIBROMYALGIA: ICD-10-CM

## 2021-06-30 DIAGNOSIS — M16.12 PRIMARY OSTEOARTHRITIS OF LEFT HIP: ICD-10-CM

## 2021-06-30 DIAGNOSIS — M47.816 LUMBAR FACET ARTHROPATHY: ICD-10-CM

## 2021-06-30 DIAGNOSIS — M70.62 GREATER TROCHANTERIC BURSITIS OF LEFT HIP: Primary | ICD-10-CM

## 2021-06-30 DIAGNOSIS — M47.812 CERVICAL FACET JOINT SYNDROME: ICD-10-CM

## 2021-06-30 DIAGNOSIS — M51.9 LUMBAR DISC DISORDER: ICD-10-CM

## 2021-06-30 DIAGNOSIS — G89.4 CHRONIC PAIN SYNDROME: ICD-10-CM

## 2021-06-30 DIAGNOSIS — M54.12 CERVICAL RADICULOPATHY: ICD-10-CM

## 2021-06-30 DIAGNOSIS — M50.90 CERVICAL DISC DISORDER: ICD-10-CM

## 2021-06-30 PROCEDURE — 1036F TOBACCO NON-USER: CPT | Performed by: NURSE PRACTITIONER

## 2021-06-30 PROCEDURE — 99213 OFFICE O/P EST LOW 20 MIN: CPT | Performed by: NURSE PRACTITIONER

## 2021-06-30 PROCEDURE — G8428 CUR MEDS NOT DOCUMENT: HCPCS | Performed by: NURSE PRACTITIONER

## 2021-06-30 PROCEDURE — G8417 CALC BMI ABV UP PARAM F/U: HCPCS | Performed by: NURSE PRACTITIONER

## 2021-06-30 PROCEDURE — 3017F COLORECTAL CA SCREEN DOC REV: CPT | Performed by: NURSE PRACTITIONER

## 2021-06-30 RX ORDER — OXYCODONE HYDROCHLORIDE AND ACETAMINOPHEN 5; 325 MG/1; MG/1
1 TABLET ORAL DAILY
Qty: 30 TABLET | Refills: 0 | Status: SHIPPED
Start: 2021-07-09 | End: 2021-07-16 | Stop reason: SDUPTHER

## 2021-06-30 NOTE — PROGRESS NOTES
Do you currently have any of the following:    Fever: No  Headache:  No  Cough: No  Shortness of breath: No  Exposed to anyone with these symptoms: No                                                                                                                Sarah Bearden [facility] to the [facility] 6/30[facility]2021. Jules Reyes is complaining of pain neck and bilateral shoulders to fingers with numbness present,  right side is greater and also left hip pain. The pain is constant. The pain is described as burning and numb. Pain is rated on her best day at a 3, on her worst day at a 10, and on average at a 6 on the VAS scale. She took her last dose of Percocet this am. Jules Reyes does not have issues with constipation. Any procedures since your last visit: Yes, with 0 % relief. She is not on NSAIDS and  is  on anticoagulation medications to include ASA and is managed by Dr. Danyelle Carson. Pacemaker or defibrillator: No     Medication Contract and Consent for Opioid Use Documents Filed     Patient Documents       Type of Document Status Date Received Received By Description     Medication Contract Received 7/18/2019 11:59 AM Clint SINGLETON 7/18/19 medication contract                   /60   Pulse 79   Temp 97.5 °F (36.4 °C)   Resp 20   Ht 4' 11\" (1.499 m)   Wt 175 lb (79.4 kg)   LMP 03/27/2008   SpO2 95%   BMI 35.35 kg/m²      Patient's last menstrual period was 03/27/2008.

## 2021-06-30 NOTE — PROGRESS NOTES
[address] [place] [address], [address] [place] [id number]  [phone]    Follow up Note      Franco Rios     Date of Visit:  6/30/2021    CC:  Patient presents for follow up knee and back pain    HPI:  Pt here with low back pain axial in nature started one month ago. Pt continues to have neck pain primarily axial accompanied by numbness and tingling to left hand. Pt using otc ointments with no relief. Pt scheduled for [facility] July 8th with dr Jignesh Naranjo. Pt also notes she had a Left bursa injection a year ago with Dr Chelle Mckeon and really helped and now pain is returning and would like to schedule in office with Chelle Mckeon for repeat. Pt notes pain significantly improved 2-3 weeks post injection    Appropriate analgesia with current medications regimen: no.  Change in quality of symptoms:nnone  Recent diagnostic testing: none  Recent interventional procedures: none     She has been on anticoagulation medications to include ASA. The patient Danielle Fairchilddon not been on herbal supplements.  The patient is not diabetic.     Imaging:      Cervical Spine MRI 11/2020: Impression   1.  Cervical spondylosis as described above with multiple levels of   broad-based central disc herniation notable at C3/C4, C4/C5, C5/C6, and C6/C7   resulting in mild-to-moderate effacement of ventral cord. 2.  No fracture or malalignment.             Cervical spine MRI 04/2018  1. Multilevel degenerative disc disease extending from C3 through C7. Moderately severe spinal canal stenosis at C3-C4. Moderate spinal   canal stenosis at C4-C5. . Moderately severe spinal canal stenosis at   C5-C6. Abnormal signal intensity within the cervical spinal cord C3-C5   without evidence of enhancement. Findings are most likely reflective   of myelomalacia changes secondary to the underlying degree of cord   compression as described above. No active demyelination identified.    2. Slight loss of normally expected cervical lordosis C3-C6.      CT Lumbar spine 2017  1. Mild diffuse osteopenia, no compression fracture or   spondylolisthesis.       2. Mild multilevel degenerative disc disease and facet joint   arthropathy of the lower lumbar spine more pronounced at L4-L5.      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         Otherwise, no musculoskeletal pathology to explain patient's pain.      Previous treatments: Physical Therapy, Epidural Steroid Injection with  and medications. .  tizanidine dizziness, flexeril sedating, norco severe gi distress and headaches, lyrica headaches, flector patch not covered                                        Potential Aberrant Drug-Related Behavior:    No     Urine Drug Screening:  Saliva screen 08/2020 consistent for Ultram     OARRS report:  06/2021 consistent.         Past Medical History:   Diagnosis Date    Asthma     Cerebral artery occlusion with cerebral infarction ([facility] [address]     2015    Cervical disc herniation     Cervical spinal stenosis     Chronic back pain     Chronic kidney disease     Depression     Disorder of thyroid gland 09/11/2012    GERD (gastroesophageal reflux disease)     Headache(784.0)     Hemorrhoids     History of colon polyps     Irritable bowel syndrome     Low back pain     Numbness and tingling in right hand 03/06/2020    Palpitations 07/24/2012    Scabies 05/18/2016    Seizure disorder ([address]     not on medications for this; last seizure 2018    Vitamin D deficiency 12/03/2014       Past Surgical History:   Procedure Laterality Date    COLONOSCOPY  03/09/2016    COLONOSCOPY  07/10/2017    HIP SURGERY Left 08/10/2020    LEFT GREATER TROCHANTERIC BURSAE INJECTION X-RAY (CPT 38678) performed by Franco Prescott MD at [facility] N/A     epidural c6-c7    PAIN MANAGEMENT PROCEDURE N/A 4/19/2021    CERVICAL EPIDURAL INTERLAMINAR STEROID INJECTION C6-C7 WITH SEDATION performed by Shantal Romero Niki Garcia MD at [facility] ENDOSCOPY  07/10/2017       Prior to Admission medications    Medication Sig Start Date End Date Taking? Authorizing Provider   oxyCODONE-acetaminophen (PERCOCET) 5-325 MG per tablet Take 1 tablet by mouth daily for 30 days.  6/9/21 7/9/21  Miguelina Hackett, APRN - CNP   tiZANidine (ZANAFLEX) 2 MG tablet Take 1 tablet by mouth 2 times daily 6/9/21 7/9/21  Miguelina Hackett, APRN - CNP   diclofenac sodium (VOLTAREN) 1 % GEL Apply 4 g topically 2 times daily 6/9/21 7/9/21  Miguelina Hackett, APRN - CNP   Elastic Bandages & Supports (KNEE BRACE) MISC Soft neutral position left knee brace  Size to fit  Dx:  Knee sprain 2/26/21   [place] Balint, DO   Elastic Bandages & Supports (KNEE BRACE) MISC Soft neutral position left knee brace  Size to fit  Dx:  Knee pain 2/25/21   Tina Irwin, DO   albuterol sulfate HFA (PROAIR HFA) 108 (90 Base) MCG/ACT inhaler Inhale 2 puffs into the lungs as needed for Wheezing or Shortness of Breath 1/21/21   [place] Dc, DO   albuterol (PROVENTIL) (2.5 MG/3ML) 0.083% nebulizer solution Take 3 mLs by nebulization every 6 hours as needed for Wheezing 1/21/21   Abdi Grijalva, DO   omeprazole (PRILOSEC) 20 MG delayed release capsule Take 1 capsule by mouth daily Take am dos 07/10. 1/21/21   Tina Irwin, DO   vitamin D3 (CHOLECALCIFEROL) 25 MCG (1000 UT) TABS tablet Take 1 tablet by mouth daily 10/16/20   Tina Balelisabeth, DO   Prenatal Vit-Fe Fumarate-FA (PRENATAL PLUS) 27-1 MG TABS 1 pill daily 10/16/20   Tina Irwin, DO   aspirin 81 MG tablet Take 2 tablets by mouth daily  Patient taking differently: Take 162 mg by mouth daily On hold preop for 7 days 1/16/20   Tina Irwin, DO   EPINEPHrine (EPIPEN 2-ABUNDIO) 0.3 MG/0.3ML SOAJ injection Inject into muscle for allergic reaction 8/22/19   Abdi Grijalva DO   dicyclomine (BENTYL) 20 MG tablet Take 20 mg by mouth every 6 hours Take am dos 07/10    Historical Provider, MD       Allergies   Allergen Reactions    Fish-Derived Products Anaphylaxis    Dye [Iodides] Itching       Social History     Socioeconomic History    Marital status:      Spouse name: Not on file    Number of children: Not on file    Years of education: Not on file    Highest education level: Not on file   Occupational History    Not on file   Tobacco Use    Smoking status: Former Smoker     Packs/day: 1.00     Years: 25.00     Pack years: 25.00     Types: Cigarettes     Quit date: 1988     Years since quittin.2    Smokeless tobacco: Never Used   Vaping Use    Vaping Use: Never used   Substance and Sexual Activity    Alcohol use: No    Drug use: No    Sexual activity: Not on file   Other Topics Concern    Not on file   Social History Narrative    Not on file     Social Determinants of Health     Financial Resource Strain:     Difficulty of Paying Living Expenses:    Food Insecurity:     Worried About Running Out of Food in the Last Year:     Ran Out of Food in the Last Year:    Transportation Needs:     Lack of Transportation (Medical):      Lack of Transportation (Non-Medical):    Physical Activity:     Days of Exercise per Week:     Minutes of Exercise per Session:    Stress:     Feeling of Stress :    Social Connections:     Frequency of Communication with Friends and Family:     Frequency of Social Gatherings with Friends and Family:     Attends Buddhism Services:     Active Member of Clubs or Organizations:     Attends Club or Organization Meetings:     Marital Status:    Intimate Partner Violence:     Fear of Current or Ex-Partner:     Emotionally Abused:     Physically Abused:     Sexually Abused:        Family History   Problem Relation Age of Onset    Heart Attack Mother     Kidney Disease Father         failure    Diabetes Other     Asthma Child     Asthma Child         bone disease    Anemia Child        REVIEW OF SYSTEMS:     Saniya Lerner denies fever/chills, chest pain, shortness of breath, new bowel or bladder complaints or suicidal ideations. All other review of systems wasnegative. Review of Systems    PHYSICAL EXAMINATION:      [facility] 03/27/2008     General:       General appearance:   elderly, pleasant and well-hydrated. , in moderate discomfort and A & O x3  Build:Overweight     HEENT:     Head:normocephalic and atraumatic  Sclera: icterus absent,     Lungs:     Breathing:Breathing Pattern: regular, no distress     Abdomen:     Shape:non-distended and normal  Tenderness:none     Cervical spine:     Inspection:normal  Palpation:tenderness paravertebral muscles, facet loading, left, right, positive and tenderness. Range of motion:abnormal moderately flexion, extension rotation bilateral and is  severely painful. Edema to left upper back s/p injection tender to touch       Lumbar spine:     Spine inspection:normal   CVA tenderness:No   Palpation:tenderness paravertebral muscles, facet loading, left, right, positive and tenderness.   Range of motion:abnormal moderately Lateral bending, flexion, extension rotation bilateral and is  painful.     Musculoskeletal:     Trigger points in Paraveteral:absent bilaterally  Positive for edema to left trapezius, neck              Lin's:negative right, negative left   SI joint tenderness:negative right, negative left              KAYODE test:negative right, negative left  Piriformis tenderness:negative right, negative left  Trochanteric bursa tenderness:negative right, positive left  SLR:negative right, negative left, sitting      Extremities:     Tremors:None bilaterally upper and lower  Range of motion:Generally normal shoulders, pain with internal rotation of hips negative  Intact:Yes  Edema: left knee, immobilizer to left knee, ROM limited due to pain and acute injury      Neurological:     Sensory:diminshed to light touch lateral aspect of Left arm.   Gait:antalgic    Dermatology:    Skin:no unusual rashes, no skin lesions, no palpable subcutaneous nodules and good skin turgor        Impression:  Neck and low back pain with radiation to the Left upper and lower extremity  Cervical spine MRI 2019 Multilevel degenerative disc disease with moderate to severe stenosis at C3-4/C4-5 and C5-6  Lumbar spine CT 2017 Mild degenerative disc disease and facet arthropathy most pronounced at L4-5  Patient had seen neurosurgery and surgery C3-4/C4-5 and C5-6 ACDF was recommended.   Failed greater trochanteric bursae injection. Patient had tried and failed Gabapentin. Plan:   Followed for axial neck pain with n/t to hands and low back to left hip pain  Schedule for repeat Left greater Troch bursae with Dr Annabelle Saucedo, last injection one year ago with >80% relief, delayed response noted  Scheduled Bilateral Cervical medial branch facet block C4 C5 C6 with sedation x1 7/8/21 (no steroids please)  04/19/21: cervical epidural C6-C7 with no improvement in axial neck pain but helped some with radicular sx >50%  Refill  Percocet 5/325mg po daily prn pain #30,   No Refill Tizanidine 2mg po bid prn spasms 1 refill on file   NO NSAIDS, hx of gi ulcers  OARRS report reviewed 06/2021.   Patient encouraged to stay active and to lose weight. Patient had tried and failed physical therapy. Treatment plan discussed with the patient and her daughter including medications and procedure side effects.         ccreferring physic      Electronically signed by NOY Gallegos CNP on 6/30/21 at 1:07 PM EST

## 2021-07-07 ENCOUNTER — TELEPHONE (OUTPATIENT)
Dept: PAIN MANAGEMENT | Age: 51
End: 2021-07-07

## 2021-07-15 ENCOUNTER — TELEPHONE (OUTPATIENT)
Dept: PAIN MANAGEMENT | Age: 51
End: 2021-07-15

## 2021-07-16 DIAGNOSIS — M79.7 FIBROMYALGIA: ICD-10-CM

## 2021-07-16 DIAGNOSIS — M47.816 LUMBAR SPONDYLOSIS: ICD-10-CM

## 2021-07-16 DIAGNOSIS — M54.12 CERVICAL RADICULOPATHY: ICD-10-CM

## 2021-07-16 DIAGNOSIS — M47.812 CERVICAL FACET JOINT SYNDROME: ICD-10-CM

## 2021-07-16 DIAGNOSIS — M50.90 CERVICAL DISC DISORDER: ICD-10-CM

## 2021-07-16 DIAGNOSIS — M16.12 PRIMARY OSTEOARTHRITIS OF LEFT HIP: ICD-10-CM

## 2021-07-16 DIAGNOSIS — M51.9 LUMBAR DISC DISORDER: ICD-10-CM

## 2021-07-16 DIAGNOSIS — G89.4 CHRONIC PAIN SYNDROME: ICD-10-CM

## 2021-07-16 DIAGNOSIS — M47.816 LUMBAR FACET ARTHROPATHY: ICD-10-CM

## 2021-07-16 RX ORDER — OXYCODONE HYDROCHLORIDE AND ACETAMINOPHEN 5; 325 MG/1; MG/1
1 TABLET ORAL DAILY
Qty: 30 TABLET | Refills: 0 | Status: SHIPPED
Start: 2021-07-16 | End: 2021-08-13 | Stop reason: SDUPTHER

## 2021-07-29 ENCOUNTER — TELEPHONE (OUTPATIENT)
Dept: PAIN MANAGEMENT | Age: 51
End: 2021-07-29

## 2021-07-29 NOTE — TELEPHONE ENCOUNTER
7-29-21-call to Blake Roberts, advised her to hold her aspirin before her 8-5-21 procedure, she said she does not take aspirin, she has not for a long time.     Cassandra Caepllan RN  Pain Management

## 2021-07-30 ENCOUNTER — HOSPITAL ENCOUNTER (OUTPATIENT)
Age: 51
Discharge: HOME | End: 2021-08-01
Payer: MEDICAID

## 2021-07-30 ENCOUNTER — OFFICE VISIT (OUTPATIENT)
Dept: PAIN MANAGEMENT | Age: 51
End: 2021-07-30
Payer: MEDICAID

## 2021-07-30 VITALS
TEMPERATURE: 97.5 F | HEART RATE: 67 BPM | RESPIRATION RATE: 18 BRPM | BODY MASS INDEX: 30.24 KG/M2 | SYSTOLIC BLOOD PRESSURE: 104 MMHG | OXYGEN SATURATION: 98 % | HEIGHT: 59 IN | DIASTOLIC BLOOD PRESSURE: 62 MMHG | WEIGHT: 150 LBS

## 2021-07-30 DIAGNOSIS — G89.4 CHRONIC PAIN SYNDROME: ICD-10-CM

## 2021-07-30 DIAGNOSIS — M47.812 FACET ARTHROPATHY, CERVICAL: ICD-10-CM

## 2021-07-30 PROCEDURE — 99213 OFFICE O/P EST LOW 20 MIN: CPT | Performed by: PAIN MEDICINE

## 2021-07-30 PROCEDURE — U0003 INFECTIOUS AGENT DETECTION BY NUCLEIC ACID (DNA OR RNA); SEVERE ACUTE RESPIRATORY SYNDROME CORONAVIRUS 2 (SARS-COV-2) (CORONAVIRUS DISEASE [COVID-19]), AMPLIFIED PROBE TECHNIQUE, MAKING USE OF HIGH THROUGHPUT TECHNOLOGIES AS DESCRIBED BY CMS-2020-01-R: HCPCS

## 2021-07-30 RX ORDER — TIZANIDINE 2 MG/1
TABLET ORAL
COMMUNITY
Start: 2021-07-14 | End: 2021-08-10

## 2021-07-30 NOTE — PROGRESS NOTES
Do you currently have any of the following:    Fever: No  Headache:  No  Cough: No  Shortness of breath: No  Exposed to anyone with these symptoms: No                                                                                                                Charisse Arrington presents to the [facility] on 7/30/2021. Paul Garzon is complaining of pain neck and left hip. The pain is constant. The pain is described as stabbing, sharp and electrical shock. Pain is rated on her best day at a 4, on her worst day at a 8, and on average at a 5 on the VAS scale. She took her last dose of Percocet and zanaflex today . Paul Garzon does not have issues with constipation. Any procedures since your last visit: No, with  % relief. She is not on NSAIDS and  is not on anticoagulation medications to include asa Wayna Push, DO  . Pacemaker or defibrillator: No .    Medication Contract and Consent for Opioid Use Documents Filed     Patient Documents       Type of Document Status Date Received Received By Description     Medication Contract Received 7/18/2019 11:59 AM Janell Violettajessy  7/18/19 medication contract                   /62   Pulse 67   Temp 97.5 °F (36.4 °C) (Infrared)   Resp 18   Ht 4' 11\" (1.499 m)   Wt 150 lb (68 kg)   LMP 03/27/2008   SpO2 98%   BMI 30.30 kg/m²      Patient's last menstrual period was 03/27/2008.

## 2021-07-31 ENCOUNTER — ANESTHESIA EVENT (OUTPATIENT)
Dept: OPERATING ROOM | Age: 51
End: 2021-07-31
Payer: MEDICAID

## 2021-07-31 ASSESSMENT — LIFESTYLE VARIABLES: SMOKING_STATUS: 0

## 2021-07-31 NOTE — ANESTHESIA PRE PROCEDURE
Department of Anesthesiology  Preprocedure Note       Name:  Shawn Luke   Age:  45 y.o.  :  1970                                          MRN:  [id number]         Date:  2021      Surgeon: Amilcar Giang):  Christian Hyman MD    Procedure: Procedure(s):  BILATERAL CERVICAL MEDIAL BRANCH FACET BLOCK C4 C5 C6 WITH SEDATION (CPT 82792)    Medications prior to admission:   Prior to Admission medications    Medication Sig Start Date End Date Taking? Authorizing Provider   tiZANidine (ZANAFLEX) 2 MG tablet TAKE 1 TABLET BY MOUTH TWICE DAILY 21   Historical Provider, MD   oxyCODONE-acetaminophen (PERCOCET) 5-325 MG per tablet Take 1 tablet by mouth daily for 30 days. Patient taking differently: Take 1 tablet by mouth nightly.   7/16/21 8/15/21  NOY Sultana CNP   diclofenac sodium (VOLTAREN) 1 % GEL Apply 4 g topically 2 times daily 21  NOY Sultana CNP   Elastic Bandages & Supports (KNEE BRACE) MISC Soft neutral position left knee brace  Size to fit  Dx:  Knee sprain 21   Boneta Jewels, DO   Elastic Bandages & Supports (KNEE BRACE) MISC Soft neutral position left knee brace  Size to fit  Dx:  Knee pain 21   Boneta Jewels, DO   albuterol sulfate HFA (PROAIR HFA) 108 (90 Base) MCG/ACT inhaler Inhale 2 puffs into the lungs as needed for Wheezing or Shortness of Breath 21   Boneta Jewels, DO   albuterol (PROVENTIL) (2.5 MG/3ML) 0.083% nebulizer solution Take 3 mLs by nebulization every 6 hours as needed for Wheezing 21   Abdi Grijalva, DO   omeprazole (PRILOSEC) 20 MG delayed release capsule Take 1 capsule by mouth daily Take am dos 07/10. 21   Abdi Haddad, DO   vitamin D3 (CHOLECALCIFEROL) 25 MCG (1000 UT) TABS tablet Take 1 tablet by mouth daily 10/16/20   Boneta Jewels, DO   Prenatal Vit-Fe Fumarate-FA (PRENATAL PLUS) 27-1 MG TABS 1 pill daily 10/16/20   Derick Reveles, DO   aspirin 81 MG tablet Take 2 tablets by mouth daily  Patient taking differently: Take 162 mg by mouth daily On hold preop for 7 days 1/16/20   Boneta Jewels, DO   EPINEPHrine (EPIPEN 2-ABUNDIO) 0.3 MG/0.3ML SOAJ injection Inject into muscle for allergic reaction 8/22/19   Abdi Grijalva, DO   dicyclomine (BENTYL) 20 MG tablet Take 20 mg by mouth every 6 hours Take am dos 07/10    Historical Provider, MD       Current medications:    Current Outpatient Medications   Medication Sig Dispense Refill    tiZANidine (ZANAFLEX) 2 MG tablet TAKE 1 TABLET BY MOUTH TWICE DAILY      oxyCODONE-acetaminophen (PERCOCET) 5-325 MG per tablet Take 1 tablet by mouth daily for 30 days.  (Patient taking differently: Take 1 tablet by mouth nightly. ) 30 tablet 0    diclofenac sodium (VOLTAREN) 1 % GEL Apply 4 g topically 2 times daily 4 g 2    Elastic Bandages & Supports (KNEE BRACE) MISC Soft neutral position left knee brace  Size to fit  Dx:  Knee sprain 1 each 0    Elastic Bandages & Supports (KNEE BRACE) MISC Soft neutral position left knee brace  Size to fit  Dx:  Knee pain 1 each 0    albuterol sulfate HFA (PROAIR HFA) 108 (90 Base) MCG/ACT inhaler Inhale 2 puffs into the lungs as needed for Wheezing or Shortness of Breath 1 Inhaler 3    albuterol (PROVENTIL) (2.5 MG/3ML) 0.083% nebulizer solution Take 3 mLs by nebulization every 6 hours as needed for Wheezing 120 each 3    omeprazole (PRILOSEC) 20 MG delayed release capsule Take 1 capsule by mouth daily Take am dos 07/10. 30 capsule 5    vitamin D3 (CHOLECALCIFEROL) 25 MCG (1000 UT) TABS tablet Take 1 tablet by mouth daily 30 tablet 5    Prenatal Vit-Fe Fumarate-FA (PRENATAL PLUS) 27-1 MG TABS 1 pill daily 30 tablet 5    aspirin 81 MG tablet Take 2 tablets by mouth daily (Patient taking differently: Take 162 mg by mouth daily On hold preop for 7 days) 60 tablet 5    EPINEPHrine (EPIPEN 2-ABUNDIO) 0.3 MG/0.3ML SOAJ injection Inject into muscle for allergic reaction 0.3 mL 0    dicyclomine (BENTYL) 20 MG tablet Take 20 mg by mouth every 6 hours Take am dos 07/10       No current facility-administered medications for this visit. Allergies:     Allergies   Allergen Reactions    Fish-Derived Products Anaphylaxis    Dye [Iodides] Itching       Problem List:    Patient Active Problem List   Diagnosis Code    Cervical stenosis of spinal canal M48.02    DDD (degenerative disc disease), cervical M50.30    Mild intermittent asthma J45.20    Gastroesophageal reflux disease without esophagitis K21.9    History of TIA (transient ischemic attack) and stroke Z86.73    Fatigue R53.83    Current mild episode of major depressive disorder without prior episode ([facility] [address] F32.0    Spinal stenosis of lumbar region without neurogenic claudication M48.061    Fibromyalgia M79.7    Migraines without aura and without status migrainosus, not intractable G43.009    Chronic left-sided low back pain without sciatica M54.5, G89.29    Lumbar paraspinal muscle spasm M62.830    C/f of CIERA (obstructive sleep apnea) G47.33    Obesity due to excess calories E66.09    Irritable bowel syndrome without diarrhea K58.9    Hyperglycemia R73.9    Primary stabbing headache G44.85    Primary osteoarthritis of right wrist M19.031    Stroke-like symptoms R29.90    Numbness and tingling in right hand R20.0, R20.2    Chronic pain syndrome G89.4    Primary osteoarthritis of left hip M16.12    Cervical disc disorder M50.90    Cervical facet joint syndrome M47.812    Cervical radiculopathy M54.12    Cervical stenosis of spine M48.02    Lumbar spondylosis M47.816    Lumbar disc disorder M51.9    Greater trochanteric bursitis of left hip M70.62    Myelomalacia of cervical cord (HCC) G95.89    Pain in left hip M25.552    Acute pain of left knee M25.562       Past Medical History:        Diagnosis Date    Asthma     Cerebral artery occlusion with cerebral infarction ([facility] [address]     2015-no deficits    Cervical disc herniation     Cervical spinal stenosis     Chronic back pain     Chronic kidney disease     Depression     Disorder of thyroid gland 2012    no medication    GERD (gastroesophageal reflux disease)     Headache(784.0)     Hemorrhoids     History of colon polyps     Hypotension     Irritable bowel syndrome     Low back pain     Numbness and tingling in right hand 2020    Palpitations 2012    Scabies 2016    Seizure disorder (HCC)     not on medications for this; last seizure 2018    Vitamin D deficiency 2014       Past Surgical History:        Procedure Laterality Date    COLONOSCOPY  2016    COLONOSCOPY  07/10/2017    HIP SURGERY Left 08/10/2020    LEFT GREATER TROCHANTERIC BURSAE INJECTION X-RAY (CPT 48718) performed by Josh Dejesus MD at [facility] N/A     epidural c6-c7    PAIN MANAGEMENT PROCEDURE N/A 2021    CERVICAL EPIDURAL INTERLAMINAR STEROID INJECTION C6-C7 WITH SEDATION performed by Josh Dejesus MD at [address] [place] [address] ENDOSCOPY  07/10/2017       Social History:    Social History     Tobacco Use    Smoking status: Former Smoker     Packs/day: 1.00     Years: 25.00     Pack years: 25.00     Types: Cigarettes     Quit date: 1988     Years since quittin.3    Smokeless tobacco: Never Used   Substance Use Topics    Alcohol use: No                                Counseling given: Not Answered      Vital Signs (Current): There were no vitals filed for this visit.                                            BP Readings from Last 3 Encounters:   21 104/62   21 108/60   21 90/60       NPO Status:  >8.H                                                                               BMI:   Wt Readings from Last 3 Encounters:   21 150 lb (68 kg)   21 175 lb (79.4 kg)   21 180 lb (81.6 kg)     There is no height or weight on file to calculate BMI.    CBC:   Lab Results   Component Value Date    WBC 6.5 02/25/2021    RBC 5.09 02/25/2021    HGB 13.9 02/25/2021    HCT 42.9 02/25/2021    MCV 84.3 02/25/2021    RDW 12.7 02/25/2021     02/25/2021       CMP:   Lab Results   Component Value Date     02/25/2021    K 4.1 02/25/2021    K 3.9 01/05/2020     02/25/2021    CO2 24 02/25/2021    BUN 12 02/25/2021    CREATININE 0.9 02/25/2021    GFRAA >60 02/25/2021    LABGLOM >60 02/25/2021    GLUCOSE 82 02/25/2021    GLUCOSE 108 02/06/2012    PROT 7.4 02/25/2021    CALCIUM 9.6 02/25/2021    BILITOT 0.4 02/25/2021    ALKPHOS 89 02/25/2021    AST 20 02/25/2021    ALT 23 02/25/2021       POC Tests: No results for input(s): POCGLU, POCNA, POCK, POCCL, POCBUN, POCHEMO, POCHCT in the last 72 hours.     Coags:   Lab Results   Component Value Date    PROTIME 11.8 01/03/2020    PROTIME 11.2 02/06/2012    INR 1.0 01/03/2020    APTT 32.3 01/03/2020       HCG (If Applicable):   Lab Results   Component Value Date    PREGTESTUR negative 04/10/2017        ABGs: No results found for: PHART, PO2ART, UUT0QWF, TSU2BSR, BEART, [id number]     Type & Screen (If Applicable):  No results found for: LABABO, LABRH    Drug/Infectious Status (If Applicable):  No results found for: HIV, HEPCAB    COVID-19 Screening (If Applicable):   Lab Results   Component Value Date    COVID19 Not Detected 04/14/2021    COVID19 DETECTED 01/22/2021           Anesthesia Evaluation  Patient summary reviewed no history of anesthetic complications:   Airway: Mallampati: II  TM distance: >3 FB   Neck ROM: full  Mouth opening: > = 3 FB Dental:          Pulmonary: breath sounds clear to auscultation  (+) sleep apnea:  asthma:     (-) not a current smoker                          ROS comment: COVID Infection Jan 2021   Cardiovascular:Negative CV ROS  Exercise tolerance: good (>4 METS),         ECG reviewed  Rhythm: regular  Rate: normal    Stress test reviewed             ROS comment: Stress Test 5/4/19  Normal examination without evidence of treadmill stress induced left  ventricular myocardial ischemia. Neuro/Psych:   (+) seizures ( not on medications for this; last seizure 2018): no interval change, CVA:, neuromuscular disease (cerical radiculopathy):, headaches: migraine headaches, psychiatric history:depression/anxiety             GI/Hepatic/Renal:   (+) GERD:,      (-) no morbid obesity      ROS comment: Irritable bowel syndrome. Endo/Other:    (+) : arthritis: OA., .                 Abdominal:   (+) obese,           Vascular:   + PVD, aortic or cerebral, . Other Findings:             Anesthesia Plan      MAC     ASA 3       Induction: intravenous. Anesthetic plan and risks discussed with patient. Plan discussed with CRNA. PAT Chart Review:  Chart reviewed per routine by Florin Denise MD.  Above represents information available via shared medical record including previous anesthesia history, drug and allergy history. Confirmation of above and final plan per Day of Surgery (DOS) anesthesiologist.      Michaelle Perdue MD   7/31/2021    Pt seen, examined, chart reviewed, plan discussed.   Michaelle Perdue MD  7/31/2021  10:17 AM

## 2021-08-02 LAB
SARS-COV-2: NOT DETECTED
SOURCE: NORMAL

## 2021-08-04 ENCOUNTER — PREP FOR PROCEDURE (OUTPATIENT)
Dept: PAIN MANAGEMENT | Age: 51
End: 2021-08-04

## 2021-08-05 ENCOUNTER — HOSPITAL ENCOUNTER (OUTPATIENT)
Age: 51
Setting detail: OUTPATIENT SURGERY
Discharge: HOME | End: 2021-08-05
Attending: PAIN MEDICINE | Admitting: PAIN MEDICINE
Payer: MEDICAID

## 2021-08-05 ENCOUNTER — ANESTHESIA (OUTPATIENT)
Dept: OPERATING ROOM | Age: 51
End: 2021-08-05
Payer: MEDICAID

## 2021-08-05 ENCOUNTER — HOSPITAL ENCOUNTER (OUTPATIENT)
Dept: OPERATING ROOM | Age: 51
Setting detail: OUTPATIENT SURGERY
Discharge: HOME | End: 2021-08-05
Attending: PAIN MEDICINE
Payer: MEDICAID

## 2021-08-05 VITALS
SYSTOLIC BLOOD PRESSURE: 103 MMHG | DIASTOLIC BLOOD PRESSURE: 74 MMHG | RESPIRATION RATE: 17 BRPM | OXYGEN SATURATION: 100 % | TEMPERATURE: 98.6 F

## 2021-08-05 VITALS
HEART RATE: 64 BPM | SYSTOLIC BLOOD PRESSURE: 115 MMHG | BODY MASS INDEX: 35.48 KG/M2 | TEMPERATURE: 97.6 F | DIASTOLIC BLOOD PRESSURE: 73 MMHG | RESPIRATION RATE: 16 BRPM | WEIGHT: 176 LBS | HEIGHT: 59 IN | OXYGEN SATURATION: 95 %

## 2021-08-05 DIAGNOSIS — M47.892 OTHER OSTEOARTHRITIS OF SPINE, CERVICAL REGION: ICD-10-CM

## 2021-08-05 DIAGNOSIS — M47.812 FACET ARTHROPATHY, CERVICAL: Primary | ICD-10-CM

## 2021-08-05 PROCEDURE — 77002 NEEDLE LOCALIZATION BY XRAY: CPT | Performed by: PAIN MEDICINE

## 2021-08-05 PROCEDURE — 7100000011 HC PHASE II RECOVERY - ADDTL 15 MIN: Performed by: PAIN MEDICINE

## 2021-08-05 PROCEDURE — 3700000000 HC ANESTHESIA ATTENDED CARE: Performed by: PAIN MEDICINE

## 2021-08-05 PROCEDURE — 20610 DRAIN/INJ JOINT/BURSA W/O US: CPT | Performed by: PAIN MEDICINE

## 2021-08-05 PROCEDURE — 6360000002 HC RX W HCPCS: Performed by: NURSE ANESTHETIST, CERTIFIED REGISTERED

## 2021-08-05 PROCEDURE — 3700000001 HC ADD 15 MINUTES (ANESTHESIA): Performed by: PAIN MEDICINE

## 2021-08-05 PROCEDURE — 2709999900 HC NON-CHARGEABLE SUPPLY: Performed by: PAIN MEDICINE

## 2021-08-05 PROCEDURE — 6360000002 HC RX W HCPCS: Performed by: PAIN MEDICINE

## 2021-08-05 PROCEDURE — 3600000015 HC SURGERY LEVEL 5 ADDTL 15MIN: Performed by: PAIN MEDICINE

## 2021-08-05 PROCEDURE — 3209999900 FLUORO FOR SURGICAL PROCEDURES

## 2021-08-05 PROCEDURE — 2500000003 HC RX 250 WO HCPCS: Performed by: PAIN MEDICINE

## 2021-08-05 PROCEDURE — 64490 INJ PARAVERT F JNT C/T 1 LEV: CPT | Performed by: PAIN MEDICINE

## 2021-08-05 PROCEDURE — 2580000003 HC RX 258: Performed by: ANESTHESIOLOGY

## 2021-08-05 PROCEDURE — 7100000010 HC PHASE II RECOVERY - FIRST 15 MIN: Performed by: PAIN MEDICINE

## 2021-08-05 PROCEDURE — 3600000005 HC SURGERY LEVEL 5 BASE: Performed by: PAIN MEDICINE

## 2021-08-05 PROCEDURE — 64491 INJ PARAVERT F JNT C/T 2 LEV: CPT | Performed by: PAIN MEDICINE

## 2021-08-05 RX ORDER — MIDAZOLAM HYDROCHLORIDE 1 MG/ML
INJECTION INTRAMUSCULAR; INTRAVENOUS PRN
Status: DISCONTINUED | OUTPATIENT
Start: 2021-08-05 | End: 2021-08-05 | Stop reason: SDUPTHER

## 2021-08-05 RX ORDER — LIDOCAINE HYDROCHLORIDE 5 MG/ML
INJECTION, SOLUTION INFILTRATION; INTRAVENOUS PRN
Status: DISCONTINUED | OUTPATIENT
Start: 2021-08-05 | End: 2021-08-05 | Stop reason: ALTCHOICE

## 2021-08-05 RX ORDER — SODIUM CHLORIDE, SODIUM LACTATE, POTASSIUM CHLORIDE, CALCIUM CHLORIDE 600; 310; 30; 20 MG/100ML; MG/100ML; MG/100ML; MG/100ML
INJECTION, SOLUTION INTRAVENOUS CONTINUOUS
Status: DISCONTINUED | OUTPATIENT
Start: 2021-08-05 | End: 2021-08-05 | Stop reason: HOSPADM

## 2021-08-05 RX ORDER — DIPHENHYDRAMINE HYDROCHLORIDE 50 MG/ML
12.5 INJECTION INTRAMUSCULAR; INTRAVENOUS
Status: DISCONTINUED | OUTPATIENT
Start: 2021-08-05 | End: 2021-08-05 | Stop reason: HOSPADM

## 2021-08-05 RX ORDER — PROMETHAZINE HYDROCHLORIDE 25 MG/ML
25 INJECTION, SOLUTION INTRAMUSCULAR; INTRAVENOUS
Status: DISCONTINUED | OUTPATIENT
Start: 2021-08-05 | End: 2021-08-05 | Stop reason: HOSPADM

## 2021-08-05 RX ORDER — HYDRALAZINE HYDROCHLORIDE 20 MG/ML
5 INJECTION INTRAMUSCULAR; INTRAVENOUS EVERY 10 MIN PRN
Status: DISCONTINUED | OUTPATIENT
Start: 2021-08-05 | End: 2021-08-05 | Stop reason: HOSPADM

## 2021-08-05 RX ORDER — LABETALOL HYDROCHLORIDE 5 MG/ML
5 INJECTION, SOLUTION INTRAVENOUS EVERY 10 MIN PRN
Status: DISCONTINUED | OUTPATIENT
Start: 2021-08-05 | End: 2021-08-05 | Stop reason: HOSPADM

## 2021-08-05 RX ORDER — MEPERIDINE HYDROCHLORIDE 25 MG/ML
12.5 INJECTION INTRAMUSCULAR; INTRAVENOUS; SUBCUTANEOUS EVERY 5 MIN PRN
Status: DISCONTINUED | OUTPATIENT
Start: 2021-08-05 | End: 2021-08-05 | Stop reason: HOSPADM

## 2021-08-05 RX ORDER — FENTANYL CITRATE 50 UG/ML
INJECTION, SOLUTION INTRAMUSCULAR; INTRAVENOUS PRN
Status: DISCONTINUED | OUTPATIENT
Start: 2021-08-05 | End: 2021-08-05 | Stop reason: SDUPTHER

## 2021-08-05 RX ADMIN — MIDAZOLAM 2 MG: 1 INJECTION INTRAMUSCULAR; INTRAVENOUS at 13:50

## 2021-08-05 RX ADMIN — SODIUM CHLORIDE, POTASSIUM CHLORIDE, SODIUM LACTATE AND CALCIUM CHLORIDE: 600; 310; 30; 20 INJECTION, SOLUTION INTRAVENOUS at 12:46

## 2021-08-05 RX ADMIN — FENTANYL CITRATE 50 MCG: 50 INJECTION, SOLUTION INTRAMUSCULAR; INTRAVENOUS at 13:53

## 2021-08-05 RX ADMIN — FENTANYL CITRATE 50 MCG: 50 INJECTION, SOLUTION INTRAMUSCULAR; INTRAVENOUS at 13:51

## 2021-08-05 ASSESSMENT — PAIN DESCRIPTION - DESCRIPTORS: DESCRIPTORS: ACHING

## 2021-08-05 ASSESSMENT — PAIN SCALES - GENERAL
PAINLEVEL_OUTOF10: 0

## 2021-08-05 ASSESSMENT — PAIN - FUNCTIONAL ASSESSMENT: PAIN_FUNCTIONAL_ASSESSMENT: 0-10

## 2021-08-05 NOTE — ANESTHESIA POSTPROCEDURE EVALUATION
Department of Anesthesiology  Postprocedure Note    Patient: Tyree Chowdhury  MRN: [id number]  YOB: 1970  Date of evaluation: 8/5/2021  Time:  2:41 PM     Procedure Summary     Date: 08/05/21 Room / Location: [address] [place] [address] 04 / 4[facility]    Anesthesia Start: 3580 Anesthesia Stop: 1958    Procedure: BILATERAL CERVICAL MEDIAL BRANCH FACET BLOCK C4 C5 C6 WITH SEDATION WITH LEFT TROCHANTERIC BURSA INJECTION(CPT 81176) (Bilateral ) Diagnosis: (CERVICAL SPONDYLOSIS)    Surgeons: Samira Rivera MD Responsible Provider: Adán Hamilton MD    Anesthesia Type: MAC ASA Status: 3          Anesthesia Type: MAC    Willis Phase I: Willis Score: 10    Willis Phase II: Willis Score: 10    Last vitals: Reviewed and per EMR flowsheets.        Anesthesia Post Evaluation    Patient location during evaluation: PACU  Patient participation: complete - patient participated  Level of consciousness: awake and alert  Airway patency: patent  Nausea & Vomiting: no nausea and no vomiting  Complications: no  Cardiovascular status: hemodynamically stable  Respiratory status: room air and spontaneous ventilation  Hydration status: stable

## 2021-08-05 NOTE — H&P
education level: Not on file   Occupational History    Not on file   Tobacco Use    Smoking status: Former Smoker     Packs/day: 1.00     Years: 25.00     Pack years: 25.00     Types: Cigarettes     Quit date: 1988     Years since quittin.3    Smokeless tobacco: Never Used   Vaping Use    Vaping Use: Never used   Substance and Sexual Activity    Alcohol use: No    Drug use: No    Sexual activity: Not on file   Other Topics Concern    Not on file   Social History Narrative    Not on file     Social Determinants of Health     Financial Resource Strain:     Difficulty of Paying Living Expenses:    Food Insecurity:     Worried About Running Out of Food in the Last Year:     [address] in the Last Year:    Transportation Needs:     Lack of Transportation (Medical):      Lack of Transportation (Non-Medical):    Physical Activity:     Days of Exercise per Week:     Minutes of Exercise per Session:    Stress:     Feeling of Stress :    Social Connections:     Frequency of Communication with Friends and Family:     Frequency of Social Gatherings with Friends and Family:     Attends [employer] Services:     Active Member of Clubs or Organizations:     Attends Club or Organization Meetings:     Marital Status:    Intimate Partner Violence:     Fear of Current or Ex-Partner:     Emotionally Abused:     Physically Abused:     Sexually Abused:        Family History   Problem Relation Age of Onset    Heart Attack Mother     Kidney Disease Father         failure    Diabetes Other     Asthma Child     Asthma Child         bone disease    Anemia Child          REVIEW OF SYSTEMS:    CONSTITUTIONAL:  negative for  fevers, chills, sweats and fatigue    RESPIRATORY:  negative for  dry cough, cough with sputum, dyspnea, wheezing and chest pain    CARDIOVASCULAR:  negative for chest pain, dyspnea, palpitations, syncope    GASTROINTESTINAL:  negative for nausea, vomiting, change in bowel habits,

## 2021-08-05 NOTE — ADDENDUM NOTE
Addendum  created 08/05/21 1455 by NOY Son CRNA    Attestation recorded in [address] accepted, [employer] filed

## 2021-08-09 NOTE — OP NOTE
2021    Patient: Jeyson Chapman  :  1970  Age:  45 y.o. Sex:  female     PRE-PROCEDURE DIAGNOSIS: Bilateral  Cervical facet syndrome, cervical spondylosis. POST-PROCEDURE DIAGNOSIS: Same. PROCEDURE: # 2  Bilateral Cervical medial branch blocks at  Levels C4, C5 and C6    SURGEON: AILEEN jo M.D. ANESTHESIA: MAC    ESTIMATED BLOOD LOSS:  None.  ______________________________________________________________________  BRIEF HISTORY:  Jeyson Chapman comes in today for Bilateral cervical facet medial branch block at levels C4, C5 and C6 . The potential complications of this procedure were discussed with her again today. She has elected to undergo the aforementioned procedure. [place] complete History & Physical examination were reviewed in depth, a copy of which is in the chart. DESCRIPTION OF PROCEDURE:    After confirming written and informed consent, a time-out was performed and [place] name and date of birth, the procedure to be performed as well as the plan for the location of the needle insertion were confirmed. The patient was brought into the procedure room and placed in the lateral recumbent on the fluoroscopy table. Standard monitors were placed and vital signs were observed throughout the procedure. The area of the cervical spine was prepped with chloraprep and draped in the usual sterile manner. Lateral fluoroscopy views were used to identify and rubens the middle of the centroid of the facets of the targeted levels. The spinal needle was directed until bone was contacted at each level. After negative aspiration was confirmed, a solution of marcaine 0.25% and 40 mg DepoMedrol 1 cc was injected equally at the levels. The needles were removed and the patient body part was cleaned and bandage was placed over the needle insertion. Disposition the patient tolerated the procedure well and there were no complications . Vital signs remained stable throughout the procedure.  The patient was escorted to the recovery area where they remained until discharge and written discharge instructions for the procedure were given. Plan: Fran Mendez will return to our pain management center as scheduled.      Donovan Martinez MD

## 2021-08-09 NOTE — OP NOTE
2021    Patient: Hernandez Cerna  :  1970  Age:  45 y.o. Sex:  female     PRE-OPERATIVE DIAGNOSIS:Left   Greater trochanter bursitis. POST-OPERATIVE DIAGNOSIS: Same. PROCEDURE PERFORMED: Left  Greater trochanter bursea steroid injection under fluoroscopic guidance. SURGEON: M. Nelwyn Rinne M.D. ANESTHESIA: MAC    ESTIMATED BLOOD LOSS: None. BRIEF HISTORY: Hernandez Cerna comes in today for Left greater trochanter bursea steroid injection under fluoroscopic guidance . After discussing the potential risks and benefits of the procedure with the patient  Demar Orozco did request that we proceed. Ashlee's complete History & Physical examination were reviewed in depth, a copy of which is in the chart. DESCRIPTION OF PROCEDURE:      After confirming written and informed consent, a time-out was performed and [place] name and date of birth, the procedure to be performed as well as the plan for the location of the needle insertion were confirmed. Patient was brought into the procedure room and was placed in the lateral decubitus position on a fluoroscopy table. Standard monitors were placed and vital signs were observed throughout the procedure. The area of the Left hip was prepped with chloraprep and draped in a sterile manner. The overlying skin and subcutaneous tissues were anesthetized with 0.5% Lidocaine. At this time, a 22 gauge spinal 3 1/2 inch spinal needle was advanced in AP view until greater trochanter was contacted. After negative aspiration, 0.5 cc of 240 omnipaque was injected with appropiate contrast spread under live fluoroscopy. A solution of 0.25 % marcaine 4 cc and 40 mg DepoMedrol was injected easily after negative aspiration without complications. The needle was then removed and Band-Aid applied. Disposition the patient tolerated the procedure well and there were no complications . Vital signs remained stable throughout the procedure.  The patient was escorted to the recovery area where they remained until discharge and written discharge instructions for the procedure were given. Plan: Rosa Curry will return to our pain management center as scheduled.      Boy Alcocer MD

## 2021-08-10 RX ORDER — TIZANIDINE 2 MG/1
TABLET ORAL
Qty: 60 TABLET | Refills: 1 | Status: SHIPPED
Start: 2021-08-10 | End: 2021-08-13 | Stop reason: SDUPTHER

## 2021-08-13 ENCOUNTER — OFFICE VISIT (OUTPATIENT)
Dept: PAIN MANAGEMENT | Age: 51
End: 2021-08-13
Payer: MEDICAID

## 2021-08-13 VITALS
RESPIRATION RATE: 16 BRPM | OXYGEN SATURATION: 98 % | DIASTOLIC BLOOD PRESSURE: 70 MMHG | SYSTOLIC BLOOD PRESSURE: 102 MMHG | HEART RATE: 70 BPM | TEMPERATURE: 97.7 F

## 2021-08-13 DIAGNOSIS — M47.816 LUMBAR FACET ARTHROPATHY: ICD-10-CM

## 2021-08-13 DIAGNOSIS — M79.7 FIBROMYALGIA: ICD-10-CM

## 2021-08-13 DIAGNOSIS — G89.4 CHRONIC PAIN SYNDROME: ICD-10-CM

## 2021-08-13 DIAGNOSIS — M54.12 CERVICAL RADICULOPATHY: ICD-10-CM

## 2021-08-13 DIAGNOSIS — M51.9 LUMBAR DISC DISORDER: ICD-10-CM

## 2021-08-13 DIAGNOSIS — M47.812 CERVICAL FACET JOINT SYNDROME: ICD-10-CM

## 2021-08-13 DIAGNOSIS — M50.90 CERVICAL DISC DISORDER: ICD-10-CM

## 2021-08-13 DIAGNOSIS — M47.816 LUMBAR SPONDYLOSIS: ICD-10-CM

## 2021-08-13 DIAGNOSIS — M16.12 PRIMARY OSTEOARTHRITIS OF LEFT HIP: ICD-10-CM

## 2021-08-13 PROCEDURE — 99213 OFFICE O/P EST LOW 20 MIN: CPT | Performed by: NURSE PRACTITIONER

## 2021-08-13 PROCEDURE — 1036F TOBACCO NON-USER: CPT | Performed by: NURSE PRACTITIONER

## 2021-08-13 PROCEDURE — G8427 DOCREV CUR MEDS BY ELIG CLIN: HCPCS | Performed by: NURSE PRACTITIONER

## 2021-08-13 PROCEDURE — G8417 CALC BMI ABV UP PARAM F/U: HCPCS | Performed by: NURSE PRACTITIONER

## 2021-08-13 PROCEDURE — 99213 OFFICE O/P EST LOW 20 MIN: CPT

## 2021-08-13 PROCEDURE — 3017F COLORECTAL CA SCREEN DOC REV: CPT | Performed by: NURSE PRACTITIONER

## 2021-08-13 RX ORDER — TIZANIDINE 2 MG/1
2 TABLET ORAL 2 TIMES DAILY PRN
Qty: 60 TABLET | Refills: 1 | Status: SHIPPED
Start: 2021-08-13 | End: 2022-03-14

## 2021-08-13 RX ORDER — OXYCODONE HYDROCHLORIDE AND ACETAMINOPHEN 5; 325 MG/1; MG/1
1 TABLET ORAL DAILY
Qty: 30 TABLET | Refills: 0 | Status: SHIPPED
Start: 2021-08-15 | End: 2021-09-10

## 2021-08-13 RX ORDER — LIDOCAINE 50 MG/G
OINTMENT TOPICAL
Qty: 1 TUBE | Refills: 3 | Status: SHIPPED
Start: 2021-08-13 | End: 2022-01-03

## 2021-08-13 NOTE — PROGRESS NOTES
[facility], [address] [place] [id number]  [phone]    Follow up Note      Estel Coil     Date of Visit:  8/13/2021    CC:  Patient presents for follow up knee and back pain    HPI:  Pt here s/p cervical MBB C456 with significant pain relief and improved ROM to right shoulder but increased spasms to left upper neck. Pt reports significant improvement with left troch bursa injection. Pt continues to have low back pain axial in nature. Appropriate analgesia with current medications regimen: no.  Change in quality of symptoms:improved   Recent diagnostic testing: none  Recent interventional procedures: 8/5/21: S.p BILATERAL CERVICAL MEDIAL BRANCH FACET BLOCK C4 C5 C6 WITH >60-70% relief but left shoulder spasms noted    LEFT TROCHANTERIC BURSA INJECTION with >80% relief, significant improvement     She has been on anticoagulation medications to include ASA. The patient Abran Hawthorney not been on herbal supplements.  The patient is not diabetic.     Imaging:      Cervical Spine MRI 11/2020: Impression   1.  Cervical spondylosis as described above with multiple levels of   broad-based central disc herniation notable at C3/C4, C4/C5, C5/C6, and C6/C7   resulting in mild-to-moderate effacement of ventral cord. 2.  No fracture or malalignment.             Cervical spine MRI 04/2018  1. Multilevel degenerative disc disease extending from C3 through C7. Moderately severe spinal canal stenosis at C3-C4. Moderate spinal   canal stenosis at C4-C5. . Moderately severe spinal canal stenosis at   C5-C6. Abnormal signal intensity within the cervical spinal cord C3-C5   without evidence of enhancement. Findings are most likely reflective   of myelomalacia changes secondary to the underlying degree of cord   compression as described above. No active demyelination identified. 2. Slight loss of normally expected cervical lordosis C3-C6.      CT Lumbar spine 2017  1.  Mild diffuse osteopenia, no compression fracture or   spondylolisthesis.       2. Mild multilevel degenerative disc disease and facet joint   arthropathy of the lower lumbar spine more pronounced at L4-L5.      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         Otherwise, no musculoskeletal pathology to explain patient's pain.      Previous treatments: Physical Therapy, Epidural Steroid Injection with  and medications. .  tizanidine dizziness, flexeril sedating, norco severe gi distress and headaches, lyrica headaches, flector patch not covered                                        Potential Aberrant Drug-Related Behavior:    No     Urine Drug Screening:  Saliva screen 08/2020 consistent for Ultram     OARRS report:  06/2021 consistent.         Past Medical History:   Diagnosis Date    Asthma     Cerebral artery occlusion with cerebral infarction ([facility] [address]     2015-no deficits    Cervical disc herniation     Cervical spinal stenosis     Chronic back pain     Chronic kidney disease     Depression     Disorder of thyroid gland 09/11/2012    no medication    GERD (gastroesophageal reflux disease)     Headache(784.0)     Hemorrhoids     History of colon polyps     Hypotension     Irritable bowel syndrome     Low back pain     Numbness and tingling in right hand 03/06/2020    Palpitations 07/24/2012    Scabies 05/18/2016    Seizure disorder ([address]     not on medications for this; last seizure 2018    Vitamin D deficiency 12/03/2014       Past Surgical History:   Procedure Laterality Date    COLONOSCOPY  03/09/2016    COLONOSCOPY  07/10/2017    HIP SURGERY Left 08/10/2020    LEFT GREATER TROCHANTERIC BURSAE INJECTION X-RAY (CPT 26509) performed by [facility]     epidural c6-c7    NERVE BLOCK Bilateral 8/5/2021    BILATERAL CERVICAL MEDIAL BRANCH FACET BLOCK C4 C5 C6 WITH SEDATION WITH LEFT TROCHANTERIC MG/0.3ML SOAJ injection Inject into muscle for allergic reaction 19  Yes Kemal Perez DO   dicyclomine (BENTYL) 20 MG tablet Take 20 mg by mouth every 6 hours Take am dos 07/10   Yes Historical Provider, MD   diclofenac sodium (VOLTAREN) 1 % GEL Apply 4 g topically 2 times daily  Patient not taking: Reported on 2021  Sangita Leos APRN - CNP       Allergies   Allergen Reactions    Fish-Derived Products Anaphylaxis    Dye [Iodides] Itching       Social History     Socioeconomic History    Marital status:      Spouse name: Not on file    Number of children: Not on file    Years of education: Not on file    Highest education level: Not on file   Occupational History    Not on file   Tobacco Use    Smoking status: Former Smoker     Packs/day: 1.00     Years: 25.00     Pack years: 25.00     Types: Cigarettes     Quit date: 1988     Years since quittin.3    Smokeless tobacco: Never Used   Vaping Use    Vaping Use: Never used   Substance and Sexual Activity    Alcohol use: No    Drug use: No    Sexual activity: Not on file   Other Topics Concern    Not on file   Social History Narrative    Not on file     Social Determinants of Health     Financial Resource Strain:     Difficulty of Paying Living Expenses:    Food Insecurity:     Worried About Running Out of Food in the Last Year:     Ran Out of Food in the Last Year:    Transportation Needs:     Lack of Transportation (Medical):      Lack of Transportation (Non-Medical):    Physical Activity:     Days of Exercise per Week:     Minutes of Exercise per Session:    Stress:     Feeling of Stress :    Social Connections:     Frequency of Communication with Friends and Family:     Frequency of Social Gatherings with Friends and Family:     Attends [employer] Services:     Active Member of Clubs or Organizations:     Attends Club or Organization Meetings:     Marital Status:    Intimate Partner Violence:  Fear of Current or Ex-Partner:     Emotionally Abused:     Physically Abused:     Sexually Abused:        Family History   Problem Relation Age of Onset    Heart Attack Mother     Kidney Disease Father         failure    Diabetes Other     Asthma Child     Asthma Child         bone disease    Anemia Child        REVIEW OF SYSTEMS:     Tim Qureshi denies fever/chills, chest pain, shortness of breath, new bowel or bladder complaints or suicidal ideations. All other review of systems wasnegative. Review of Systems    PHYSICAL EXAMINATION:      /70   Pulse 70   Temp 97.7 °F (36.5 °C) (Infrared)   Resp 16   LMP 03/27/2008   SpO2 98%     General:       General appearance:   elderly, pleasant and well-hydrated. , in moderate discomfort and A & O x3  Build:Overweight     HEENT:     Head:normocephalic and atraumatic  Sclera: icterus absent,     Lungs:     Breathing:Breathing Pattern: regular, no distress     Abdomen:     Shape:non-distended and normal  Tenderness:none     Cervical spine:     Inspection:normal  Palpation:tenderness paravertebral muscles, facet loading, left, right, positive and tenderness to left side only . Range of motion:abnormal moderately flexion, extension rotation bilateral and is  mildly painful.        Lumbar spine:     Spine inspection:normal   CVA tenderness:No   Palpation:tenderness paravertebral muscles, facet loading, left, right, positive and tenderness.   Range of motion:abnormal moderately Lateral bending, flexion, extension rotation bilateral and is  painful.     Musculoskeletal:     Trigger points in Paraveteral:absent bilaterally  Positive for edema to left trapezius, neck              Lin's:negative right, negative left   SI joint tenderness:negative right, negative left              KAYODE test:negative right, negative left  Piriformis tenderness:negative right, negative left  Trochanteric bursa tenderness:negative right, positive left  SLR:negative right, negative left, sitting      Extremities:     Tremors:None bilaterally upper and lower  Range of motion:Generally normal shoulders, pain with internal rotation of hips negative  Intact:Yes  Edema: left knee, immobilizer to left knee, ROM limited due to pain and acute injury      Neurological:     Sensory:diminshed to light touch lateral aspect of Left arm.   Gait:antalgic    Dermatology:    Skin:no unusual rashes, no skin lesions, no palpable subcutaneous nodules and good skin turgor        Impression:  Neck and low back pain with radiation to the Left upper and lower extremity  Cervical spine MRI 2019 Multilevel degenerative disc disease with moderate to severe stenosis at C3-4/C4-5 and C5-6  Lumbar spine CT 2017 Mild degenerative disc disease and facet arthropathy most pronounced at L4-5  Patient had seen neurosurgery and surgery C3-4/C4-5 and C5-6 ACDF was recommended.   Failed greater trochanteric bursae injection. Patient had tried and failed Gabapentin. Plan:  Followed for axial neck pain with n/t to hands and low back to left hip pain  8/5/21: S/p Bilateral Cervical medial branch facet block C4 C5 C6 with >70% relief,  to left bothersome (no steroids), Left troch bursa >80% relief  04/19/21: cervical epidural C6-C7 with no improvement in axial neck pain but helped some with radicular sx >50%  Referral for aquatic therapy for neck and low back   Refill  Percocet 5/325mg po daily prn pain #30,   Refill Tizanidine 2mg po bid prn spasms   Refill lidocaint ointment prn   NO NSAIDS, hx of gi ulcers  OARRS report reviewed   Patient encouraged to stay active and to lose weight. Patient had tried and failed physical therapy. Treatment plan discussed with the patient and her daughter including medications and procedure side effects.         ccreferring physic      Electronically signed by NOY Muñoz CNP on 8/13/21 at 11:07 AM EST

## 2021-08-26 ENCOUNTER — TELEPHONE (OUTPATIENT)
Dept: PRIMARY CARE CLINIC | Age: 51
End: 2021-08-26

## 2021-08-26 ENCOUNTER — OFFICE VISIT (OUTPATIENT)
Dept: PRIMARY CARE CLINIC | Age: 51
End: 2021-08-26
Payer: MEDICAID

## 2021-08-26 VITALS
SYSTOLIC BLOOD PRESSURE: 120 MMHG | RESPIRATION RATE: 16 BRPM | OXYGEN SATURATION: 98 % | WEIGHT: 175 LBS | HEIGHT: 59 IN | DIASTOLIC BLOOD PRESSURE: 72 MMHG | HEART RATE: 74 BPM | BODY MASS INDEX: 35.28 KG/M2

## 2021-08-26 DIAGNOSIS — R00.2 PALPITATION: Primary | ICD-10-CM

## 2021-08-26 DIAGNOSIS — M70.62 GREATER TROCHANTERIC BURSITIS OF LEFT HIP: ICD-10-CM

## 2021-08-26 DIAGNOSIS — M50.90 CERVICAL DISC DISORDER: ICD-10-CM

## 2021-08-26 DIAGNOSIS — M51.9 LUMBAR DISC DISORDER: ICD-10-CM

## 2021-08-26 DIAGNOSIS — G43.009 MIGRAINE WITHOUT AURA AND WITHOUT STATUS MIGRAINOSUS, NOT INTRACTABLE: Chronic | ICD-10-CM

## 2021-08-26 DIAGNOSIS — J45.20 MILD INTERMITTENT ASTHMA, UNSPECIFIED WHETHER COMPLICATED: Chronic | ICD-10-CM

## 2021-08-26 DIAGNOSIS — K21.9 GASTROESOPHAGEAL REFLUX DISEASE WITHOUT ESOPHAGITIS: Chronic | ICD-10-CM

## 2021-08-26 PROCEDURE — G8417 CALC BMI ABV UP PARAM F/U: HCPCS | Performed by: FAMILY MEDICINE

## 2021-08-26 PROCEDURE — G8427 DOCREV CUR MEDS BY ELIG CLIN: HCPCS | Performed by: FAMILY MEDICINE

## 2021-08-26 PROCEDURE — 1036F TOBACCO NON-USER: CPT | Performed by: FAMILY MEDICINE

## 2021-08-26 PROCEDURE — 93000 ELECTROCARDIOGRAM COMPLETE: CPT | Performed by: FAMILY MEDICINE

## 2021-08-26 PROCEDURE — 99214 OFFICE O/P EST MOD 30 MIN: CPT | Performed by: FAMILY MEDICINE

## 2021-08-26 PROCEDURE — 3017F COLORECTAL CA SCREEN DOC REV: CPT | Performed by: FAMILY MEDICINE

## 2021-08-26 RX ORDER — VITAMIN A ACETATE, BETA CAROTENE, ASCORBIC ACID, CHOLECALCIFEROL, .ALPHA.-TOCOPHEROL ACETATE, DL-, THIAMINE MONONITRATE, RIBOFLAVIN, NIACINAMIDE, PYRIDOXINE HYDROCHLORIDE, FOLIC ACID, CYANOCOBALAMIN, CALCIUM CARBONATE, FERROUS FUMARATE, ZINC OXIDE, CUPRIC OXIDE 3080; 12; 120; 400; 1; 1.84; 3; 20; 22; 920; 25; 200; 27; 10; 2 [IU]/1; UG/1; MG/1; [IU]/1; MG/1; MG/1; MG/1; MG/1; MG/1; [IU]/1; MG/1; MG/1; MG/1; MG/1; MG/1
TABLET, FILM COATED ORAL
Qty: 30 TABLET | Refills: 5 | Status: SHIPPED
Start: 2021-08-26 | End: 2022-09-22 | Stop reason: SDUPTHER

## 2021-08-26 RX ORDER — ALBUTEROL SULFATE 90 UG/1
2 AEROSOL, METERED RESPIRATORY (INHALATION) PRN
Qty: 1 INHALER | Refills: 3 | Status: SHIPPED
Start: 2021-08-26 | End: 2021-08-26 | Stop reason: SDUPTHER

## 2021-08-26 RX ORDER — ASPIRIN 81 MG/1
81 TABLET ORAL DAILY
Qty: 90 TABLET | Refills: 1 | Status: SHIPPED
Start: 2021-08-26 | End: 2022-01-24 | Stop reason: ALTCHOICE

## 2021-08-26 RX ORDER — ALBUTEROL SULFATE 90 UG/1
2 AEROSOL, METERED RESPIRATORY (INHALATION) EVERY 4 HOURS PRN
Qty: 1 INHALER | Refills: 3 | Status: SHIPPED
Start: 2021-08-26 | End: 2021-11-01

## 2021-08-26 RX ORDER — MELATONIN
1 DAILY
Qty: 30 TABLET | Refills: 5 | Status: SHIPPED
Start: 2021-08-26 | End: 2022-09-22 | Stop reason: SDUPTHER

## 2021-08-26 ASSESSMENT — ENCOUNTER SYMPTOMS
VISUAL CHANGE: 0
BLOOD IN STOOL: 0
CHEST TIGHTNESS: 0
BLURRED VISION: 0
VOMITING: 0
DIARRHEA: 0
WHEEZING: 0
EYE PAIN: 0
EYE REDNESS: 0
BACK PAIN: 0
EYE ITCHING: 0
CONSTIPATION: 0
APNEA: 0
FACIAL SWEATING: 0
ABDOMINAL PAIN: 0
SHORTNESS OF BREATH: 0
EYE WATERING: 0
SCALP TENDERNESS: 0
SINUS PRESSURE: 0
COUGH: 0
RHINORRHEA: 0
NAUSEA: 0
SORE THROAT: 0
COLOR CHANGE: 0

## 2021-08-26 NOTE — PROGRESS NOTES
Chief Complaint:     Chief Complaint   Patient presents with    Headache     said when she is sleeping it wakes her up    Palpitations     x1 week. Headache   This is a recurrent problem. The current episode started in the past 7 days. The problem occurs intermittently. The problem has been waxing and waning. The pain is located in the occipital region. The pain does not radiate. The pain quality is not similar to prior headaches. The quality of the pain is described as stabbing. The pain is severe. Associated symptoms include a loss of balance and tingling. Pertinent negatives include no abdominal pain, abnormal behavior, anorexia, back pain, blurred vision, coughing, dizziness, ear pain, eye pain, eye redness, eye watering, facial sweating, fever, hearing loss, nausea, neck pain, numbness, rhinorrhea, scalp tenderness, sinus pressure, sore throat, visual change, vomiting, weakness or weight loss. Nothing aggravates the symptoms. She has tried nothing for the symptoms. The treatment provided no relief. Palpitations   This is a recurrent problem. The problem occurs intermittently. The problem has been waxing and waning. Nothing aggravates the symptoms. Pertinent negatives include no anxiety, chest pain, coughing, dizziness, fever, nausea, numbness, shortness of breath, vomiting or weakness. She has tried nothing for the symptoms. The treatment provided no relief.        Patient Active Problem List   Diagnosis    Cervical stenosis of spinal canal    DDD (degenerative disc disease), cervical    Mild intermittent asthma    Gastroesophageal reflux disease without esophagitis    History of TIA (transient ischemic attack) and stroke    Fatigue    Current mild episode of major depressive disorder without prior episode ([facility] [address]    Spinal stenosis of lumbar region without neurogenic claudication    Fibromyalgia    Migraines without aura and without status migrainosus, not intractable    Chronic left-sided Bull Farias MD at [id number] [id number] [address] N/A 4/19/2021    CERVICAL EPIDURAL INTERLAMINAR STEROID INJECTION C6-C7 WITH SEDATION performed by Vanna Kaye MD at [facility] ENDOSCOPY  07/10/2017       Current Outpatient Medications   Medication Sig Dispense Refill    albuterol sulfate HFA (PROAIR HFA) 108 (90 Base) MCG/ACT inhaler Inhale 2 puffs into the lungs as needed for Wheezing or Shortness of Breath 1 Inhaler 3    vitamin D3 (CHOLECALCIFEROL) 25 MCG (1000 UT) TABS tablet Take 1 tablet by mouth daily 30 tablet 5    Prenatal Vit-Fe Fumarate-FA (PRENATAL PLUS) 27-1 MG TABS 1 pill daily 30 tablet 5    aspirin EC 81 MG EC tablet Take 1 tablet by mouth daily 90 tablet 1    oxyCODONE-acetaminophen (PERCOCET) 5-325 MG per tablet Take 1 tablet by mouth daily for 30 days. 30 tablet 0    tiZANidine (ZANAFLEX) 2 MG tablet Take 1 tablet by mouth 2 times daily as needed (spasms) 60 tablet 1    lidocaine (XYLOCAINE) 5 % ointment Apply topically as needed. 1 Tube 3    Elastic Bandages & Supports (KNEE BRACE) MISC Soft neutral position left knee brace  Size to fit  Dx:  Knee sprain 1 each 0    Elastic Bandages & Supports (KNEE BRACE) MISC Soft neutral position left knee brace  Size to fit  Dx:  Knee pain 1 each 0    albuterol (PROVENTIL) (2.5 MG/3ML) 0.083% nebulizer solution Take 3 mLs by nebulization every 6 hours as needed for Wheezing 120 each 3    omeprazole (PRILOSEC) 20 MG delayed release capsule Take 1 capsule by mouth daily Take am dos 07/10. 30 capsule 5    EPINEPHrine (EPIPEN 2-ABUNDIO) 0.3 MG/0.3ML SOAJ injection Inject into muscle for allergic reaction 0.3 mL 0    dicyclomine (BENTYL) 20 MG tablet Take 20 mg by mouth every 6 hours Take am dos 07/10      diclofenac sodium (VOLTAREN) 1 % GEL Apply 4 g topically 2 times daily (Patient not taking: Reported on 8/13/2021) 4 g 2     No current facility-administered medications for this visit. Allergies   Allergen Reactions    Fish-Derived Products Anaphylaxis    Dye [Iodides] Itching       Social History     Socioeconomic History    Marital status:      Spouse name: None    Number of children: None    Years of education: None    Highest education level: None   Occupational History    None   Tobacco Use    Smoking status: Former Smoker     Packs/day: 1.00     Years: 25.00     Pack years: 25.00     Types: Cigarettes     Quit date: 1988     Years since quittin.3    Smokeless tobacco: Never Used   Vaping Use    Vaping Use: Never used   Substance and Sexual Activity    Alcohol use: No    Drug use: No    Sexual activity: None   Other Topics Concern    None   Social History Narrative    None     Social Determinants of Health     Financial Resource Strain:     Difficulty of Paying Living Expenses:    Food Insecurity:     Worried About Running Out of Food in the Last Year:     Ran Out of Food in the Last Year:    Transportation Needs:     Lack of Transportation (Medical):      Lack of Transportation (Non-Medical):    Physical Activity:     Days of Exercise per Week:     Minutes of Exercise per Session:    Stress:     Feeling of Stress :    Social Connections:     Frequency of Communication with Friends and Family:     Frequency of Social Gatherings with Friends and Family:     Attends Gnosticism Services:     Active Member of Clubs or Organizations:     Attends Club or Organization Meetings:     Marital Status:    Intimate Partner Violence:     Fear of Current or Ex-Partner:     Emotionally Abused:     Physically Abused:     Sexually Abused:        Family History   Problem Relation Age of Onset    Heart Attack Mother     Kidney Disease Father         failure    Diabetes Other     Asthma Child     Asthma Child         bone disease    Anemia Child           Review of Systems   Constitutional: Negative for activity change, appetite change, fatigue, fever and weight loss. HENT: Negative for congestion, ear pain, hearing loss, nosebleeds, rhinorrhea, sinus pressure and sore throat. Eyes: Negative for blurred vision, pain, redness, itching and visual disturbance. Respiratory: Negative for apnea, cough, chest tightness, shortness of breath and wheezing. Cardiovascular: Positive for palpitations. Negative for chest pain and leg swelling. Gastrointestinal: Negative for abdominal pain, anorexia, blood in stool, constipation, diarrhea, nausea and vomiting. Endocrine: Negative. Genitourinary: Negative for decreased urine volume, difficulty urinating, dysuria, frequency, hematuria and urgency. Musculoskeletal: Negative for arthralgias, back pain, gait problem, myalgias, neck pain and stiffness. Skin: Negative for color change and rash. Allergic/Immunologic: Negative for environmental allergies and food allergies. Neurological: Positive for tingling, headaches and loss of balance. Negative for dizziness, weakness, light-headedness and numbness. Hematological: Negative for adenopathy. Does not bruise/bleed easily. Psychiatric/Behavioral: Negative for behavioral problems, dysphoric mood and sleep disturbance. The patient is not nervous/anxious and is not hyperactive. All other systems reviewed and are negative. /72   Pulse 74   Resp 16   Ht 4' 11\" (1.499 m)   Wt 175 lb (79.4 kg)   LMP 03/27/2008   SpO2 98%   BMI 35.35 kg/m²     Physical Exam  Vitals and nursing note reviewed. Constitutional:       General: She is not in acute distress. Appearance: Normal appearance. She is well-developed. HENT:      Head: Normocephalic and atraumatic. Right Ear: Hearing, tympanic membrane and external ear normal. No tenderness. No middle ear effusion. Left Ear: Hearing, tympanic membrane and external ear normal. No tenderness. No middle ear effusion. Nose: Nose normal. No congestion or rhinorrhea.       Right Turbinates: Not enlarged. Left Turbinates: Not enlarged. Mouth/Throat:      Mouth: Mucous membranes are moist.      Tongue: No lesions. Pharynx: Oropharynx is clear. No oropharyngeal exudate or posterior oropharyngeal erythema. Eyes:      General: No scleral icterus. Conjunctiva/sclera: Conjunctivae normal.      Pupils: Pupils are equal, round, and reactive to light. Neck:      Thyroid: No thyromegaly. Cardiovascular:      Rate and Rhythm: Normal rate and regular rhythm. Heart sounds: Normal heart sounds. No murmur heard. Pulmonary:      Effort: Pulmonary effort is normal. No respiratory distress. Breath sounds: Normal breath sounds. No wheezing or rales. Abdominal:      General: Bowel sounds are normal. There is no distension. Palpations: Abdomen is soft. Tenderness: There is no abdominal tenderness. Musculoskeletal:         General: No tenderness. Cervical back: Normal range of motion and neck supple. No rigidity. No muscular tenderness. Lymphadenopathy:      Cervical: No cervical adenopathy. Skin:     General: Skin is warm and dry. Findings: No erythema or rash. Neurological:      General: No focal deficit present. Mental Status: She is alert and oriented to person, place, and time. Cranial Nerves: No cranial nerve deficit. Deep Tendon Reflexes: Reflexes are normal and symmetric.  Reflexes normal.   Psychiatric:         Mood and Affect: Mood normal.                                 ASSESSMENT/PLAN:    Patient Active Problem List   Diagnosis    Cervical stenosis of spinal canal    DDD (degenerative disc disease), cervical    Mild intermittent asthma    Gastroesophageal reflux disease without esophagitis    History of TIA (transient ischemic attack) and stroke    Fatigue    Current mild episode of major depressive disorder without prior episode ([address]    Spinal stenosis of lumbar region without neurogenic claudication    Fibromyalgia    Migraines without aura and without status migrainosus, not intractable    Chronic left-sided low back pain without sciatica    Lumbar paraspinal muscle spasm    C/f of CIERA (obstructive sleep apnea)    Obesity due to excess calories    Irritable bowel syndrome without diarrhea    Hyperglycemia    Primary stabbing headache    Primary osteoarthritis of right wrist    Stroke-like symptoms    Numbness and tingling in right hand    Chronic pain syndrome    Primary osteoarthritis of left hip    Cervical disc disorder    Cervical facet joint syndrome    Cervical radiculopathy    Cervical stenosis of spine    Lumbar spondylosis    Lumbar disc disorder    Greater trochanteric bursitis of left hip    Myelomalacia of cervical cord (HCC)    Pain in left hip    Acute pain of left knee    Facet arthropathy, cervical    Palpitation       Vivi Ruelas was seen today for headache and palpitations. Diagnoses and all orders for this visit:    Palpitation  -     EKG 12 Lead  -     CBC; Future  -     Comprehensive Metabolic Panel; Future  -     TSH without Reflex; Future  -     Timothy Monge MD, Cardiology, [place]    Migraines without aura and without status migrainosus, not intractable  -     MRI BRAIN WO CONTRAST; Future  -     Michelle Shannon MD, Neurology, [place]    Mild intermittent asthma, unspecified whether complicated    Gastroesophageal reflux disease without esophagitis    Lumbar disc disorder  -     External Referral To Physical Therapy    Cervical disc disorder  -     External Referral To Physical Therapy    Greater trochanteric bursitis of left hip  -     External Referral To Physical Therapy    Other orders  -     albuterol sulfate HFA (PROAIR HFA) 108 (90 Base) MCG/ACT inhaler; Inhale 2 puffs into the lungs as needed for Wheezing or Shortness of Breath  -     vitamin D3 (CHOLECALCIFEROL) 25 MCG (1000 UT) TABS tablet;  Take 1 tablet by mouth daily  -     Prenatal Vit-Fe Fumarate-FA (PRENATAL PLUS) 27-1 MG TABS; 1 pill daily  -     aspirin EC 81 MG EC tablet; Take 1 tablet by mouth daily          Return in about 6 months (around 2/26/2022) for [address] labs. I spent 30 minutes with this patient. I spent greater than 50% of the time counseling this patient.         Jeffery Class, DO  8/26/2021  10:29 AM

## 2021-08-26 NOTE — TELEPHONE ENCOUNTER
Pharmacy calling needing a frequency on the albuterol inhaler script. Asking if new updated script could be sent.

## 2021-08-27 ENCOUNTER — TELEPHONE (OUTPATIENT)
Dept: ADMINISTRATIVE | Age: 51
End: 2021-08-27

## 2021-08-27 NOTE — TELEPHONE ENCOUNTER
Patient Appointment Form:      PCP: Dr. Mahesh Floyd  Referring: Dr. Mahesh Floyd    Has the Patient:    Seen a Cardiologist? yes    date:5/4/19  [address] [place] [address]    Had a heart catheterization? no    Had heart surgery? no    Had a stress test or nuclear stress test? yes   date: 5/4/19   facility name:  [facility])    Had an echocardiogram? yes   date: 6/23/16   facility name:  [facility])    Had a vascular ultrasound? no    Had a 24/48 heart monitor or extended cardiac event monitor? no    Had recent blood work in the last 6 months? yes    date: 8/26/21    ordering physician: Surinder Fajardo    Had a pacemaker/ICD/ILR implant? no    Seen an Electrophysiologist? no        Will send records via: in Epic      Date & time of appointment:  [email or web address]

## 2021-09-02 ENCOUNTER — TELEPHONE (OUTPATIENT)
Dept: PRIMARY CARE CLINIC | Age: 51
End: 2021-09-02

## 2021-09-02 NOTE — TELEPHONE ENCOUNTER
I have a call out to [facility] to obtain codes for aquatic therapy.  I need them in order to prior auth the therapy

## 2021-09-02 NOTE — TELEPHONE ENCOUNTER
----- Message from Darren Guevara sent at 9/1/2021  3:55 PM EDT -----  Subject: Message to Provider    QUESTIONS  Information for Provider? Olya Brush from the rehab facility is requesting,   for [employer], Dr. Jose Tan approval for the evaluation for   physical therapy, outpatient aquatic therapy. their NPI # is [id number]   and the fax # is [phone]  ---------------------------------------------------------------------------  --------------  CALL BACK INFO  What is the best way for the office to contact you? OK to leave message on   voicemail  Preferred Call Back Phone Number? [phone]  ---------------------------------------------------------------------------  --------------  SCRIPT ANSWERS  Relationship to Patient? Third Party  Representative Name?  Olya Brush

## 2021-09-07 ENCOUNTER — HOSPITAL ENCOUNTER (OUTPATIENT)
Dept: MRI IMAGING | Age: 51
Discharge: HOME | End: 2021-09-09
Payer: MEDICAID

## 2021-09-07 DIAGNOSIS — G43.009 MIGRAINE WITHOUT AURA AND WITHOUT STATUS MIGRAINOSUS, NOT INTRACTABLE: Chronic | ICD-10-CM

## 2021-09-07 PROCEDURE — 70551 MRI BRAIN STEM W/O DYE: CPT

## 2021-09-10 ENCOUNTER — OFFICE VISIT (OUTPATIENT)
Dept: PAIN MANAGEMENT | Age: 51
End: 2021-09-10
Payer: MEDICAID

## 2021-09-10 VITALS
SYSTOLIC BLOOD PRESSURE: 124 MMHG | WEIGHT: 160 LBS | HEART RATE: 69 BPM | DIASTOLIC BLOOD PRESSURE: 84 MMHG | OXYGEN SATURATION: 97 % | HEIGHT: 59 IN | BODY MASS INDEX: 32.25 KG/M2 | TEMPERATURE: 97.1 F | RESPIRATION RATE: 16 BRPM

## 2021-09-10 DIAGNOSIS — M54.12 CERVICAL RADICULOPATHY: Primary | ICD-10-CM

## 2021-09-10 DIAGNOSIS — M50.90 CERVICAL DISC DISORDER: ICD-10-CM

## 2021-09-10 DIAGNOSIS — M47.816 LUMBAR SPONDYLOSIS: ICD-10-CM

## 2021-09-10 DIAGNOSIS — M47.816 LUMBAR FACET ARTHROPATHY: ICD-10-CM

## 2021-09-10 DIAGNOSIS — M51.9 LUMBAR DISC DISORDER: ICD-10-CM

## 2021-09-10 DIAGNOSIS — G89.4 CHRONIC PAIN SYNDROME: ICD-10-CM

## 2021-09-10 DIAGNOSIS — M47.812 CERVICAL FACET JOINT SYNDROME: ICD-10-CM

## 2021-09-10 DIAGNOSIS — M79.7 FIBROMYALGIA: ICD-10-CM

## 2021-09-10 DIAGNOSIS — M16.12 PRIMARY OSTEOARTHRITIS OF LEFT HIP: ICD-10-CM

## 2021-09-10 PROCEDURE — G8417 CALC BMI ABV UP PARAM F/U: HCPCS | Performed by: NURSE PRACTITIONER

## 2021-09-10 PROCEDURE — 3017F COLORECTAL CA SCREEN DOC REV: CPT | Performed by: NURSE PRACTITIONER

## 2021-09-10 PROCEDURE — 99213 OFFICE O/P EST LOW 20 MIN: CPT | Performed by: NURSE PRACTITIONER

## 2021-09-10 PROCEDURE — G8428 CUR MEDS NOT DOCUMENT: HCPCS | Performed by: NURSE PRACTITIONER

## 2021-09-10 PROCEDURE — 1036F TOBACCO NON-USER: CPT | Performed by: NURSE PRACTITIONER

## 2021-09-10 RX ORDER — TRAMADOL HYDROCHLORIDE 50 MG/1
50 TABLET ORAL 2 TIMES DAILY PRN
Qty: 60 TABLET | Refills: 0 | Status: SHIPPED | OUTPATIENT
Start: 2021-09-10 | End: 2021-10-10

## 2021-09-10 RX ORDER — METHYLPREDNISOLONE 4 MG/1
TABLET ORAL
Qty: 1 KIT | Refills: 0 | Status: SHIPPED | OUTPATIENT
Start: 2021-09-10 | End: 2021-09-16

## 2021-09-10 RX ORDER — OXYCODONE HYDROCHLORIDE AND ACETAMINOPHEN 5; 325 MG/1; MG/1
1 TABLET ORAL DAILY
Qty: 30 TABLET | Refills: 0 | Status: CANCELLED | OUTPATIENT
Start: 2021-09-14 | End: 2021-10-14

## 2021-09-10 NOTE — PROGRESS NOTES
[address] [facility], [address] [place] [id number]  [phone]    Follow up Note      Raj Deal     Date of Visit:  9/10/2021    CC:  Patient presents for follow up knee and back pain    HPI:  Pt here with increased left sided neck pain with edema present. Pt reports pain so severe that on some days she has to take 2 Percocet a day to help with pain. Pt notes CMBB helped but does not wan to do again. Appropriate analgesia with current medications regimen: no.  Change in quality of symptoms:improved   Recent diagnostic testing: none  Recent interventional procedures: none     She has been on anticoagulation medications to include ASA. The patient Karin Calzadament not been on herbal supplements.  The patient is not diabetic.     Imaging:      Cervical Spine MRI 11/2020: Impression   1.  Cervical spondylosis as described above with multiple levels of   broad-based central disc herniation notable at C3/C4, C4/C5, C5/C6, and C6/C7   resulting in mild-to-moderate effacement of ventral cord. 2.  No fracture or malalignment.             Cervical spine MRI 04/2018  1. Multilevel degenerative disc disease extending from C3 through C7. Moderately severe spinal canal stenosis at C3-C4. Moderate spinal   canal stenosis at C4-C5. . Moderately severe spinal canal stenosis at   C5-C6. Abnormal signal intensity within the cervical spinal cord C3-C5   without evidence of enhancement. Findings are most likely reflective   of myelomalacia changes secondary to the underlying degree of cord   compression as described above. No active demyelination identified. 2. Slight loss of normally expected cervical lordosis C3-C6.      CT Lumbar spine 2017  1.  Mild diffuse osteopenia, no compression fracture or   spondylolisthesis.       2. Mild multilevel degenerative disc disease and facet joint   arthropathy of the lower lumbar spine more pronounced at L4-L5.      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         Otherwise, no musculoskeletal pathology to explain patient's pain.      Previous treatments: Physical Therapy, Epidural Steroid Injection with  and medications. gabapentin and  tizanidine dizziness, flexeril sedating, norco severe gi distress and headaches, lyrica headaches, flector patch not covered                                        Potential Aberrant Drug-Related Behavior:    No     Urine Drug Screening:  Saliva screen 08/2020 consistent for Ultram     OARRS report:  09/2021 consistent.         Past Medical History:   Diagnosis Date    Asthma     Cerebral artery occlusion with cerebral infarction ([facility] [address]     2015-no deficits    Cervical disc herniation     Cervical spinal stenosis     Chronic back pain     Chronic kidney disease     Depression     Disorder of thyroid gland 09/11/2012    no medication    GERD (gastroesophageal reflux disease)     Headache(784.0)     Hemorrhoids     History of colon polyps     Hypotension     Irritable bowel syndrome     Low back pain     Numbness and tingling in right hand 03/06/2020    Palpitations 07/24/2012    Scabies 05/18/2016    Seizure disorder ([facility] [address]     not on medications for this; last seizure 2018    Vitamin D deficiency 12/03/2014       Past Surgical History:   Procedure Laterality Date    COLONOSCOPY  03/09/2016    COLONOSCOPY  07/10/2017    HIP SURGERY Left 08/10/2020    LEFT GREATER TROCHANTERIC BURSAE INJECTION X-RAY (CPT 03388) performed by Rekha Grijalva MD at [facility] N/A     epidural c6-c7    NERVE BLOCK Bilateral 8/5/2021    BILATERAL CERVICAL MEDIAL BRANCH FACET BLOCK C4 C5 C6 WITH SEDATION WITH LEFT TROCHANTERIC BURSA INJECTION(CPT 85457) performed by Rekha Grijalva MD at [address] [place] [address] N/A 4/19/2021    CERVICAL EPIDURAL INTERLAMINAR STEROID INJECTION C6-C7 WITH SEDATION performed by Nini Troy Sandra Serna MD at [facility] ENDOSCOPY  07/10/2017       Prior to Admission medications    Medication Sig Start Date End Date Taking? Authorizing Provider   vitamin D3 (CHOLECALCIFEROL) 25 MCG (1000 UT) TABS tablet Take 1 tablet by mouth daily 8/26/21   Wilder Jara DO   Prenatal Vit-Fe Fumarate-FA (PRENATAL PLUS) 27-1 MG TABS 1 pill daily 8/26/21   Wilder Jara DO   aspirin EC 81 MG EC tablet Take 1 tablet by mouth daily 8/26/21   Wilder Jara DO   albuterol sulfate HFA (PROAIR HFA) 108 (90 Base) MCG/ACT inhaler Inhale 2 puffs into the lungs every 4 hours as needed for Wheezing or Shortness of Breath 8/26/21   Wilder Jara DO   oxyCODONE-acetaminophen (PERCOCET) 5-325 MG per tablet Take 1 tablet by mouth daily for 30 days. 8/15/21 9/14/21  NOY Murrell CNP   tiZANidine (ZANAFLEX) 2 MG tablet Take 1 tablet by mouth 2 times daily as needed (spasms) 8/13/21 9/12/21  NOY Murrell CNP   lidocaine (XYLOCAINE) 5 % ointment Apply topically as needed.  8/13/21   NOY Murrell CNP   diclofenac sodium (VOLTAREN) 1 % GEL Apply 4 g topically 2 times daily  Patient not taking: Reported on 8/13/2021 6/9/21 7/30/21  NOY Murrell CNP   Elastic Bandages & Supports (KNEE BRACE) MISC Soft neutral position left knee brace  Size to fit  Dx:  Knee sprain 2/26/21   Wilder Jara DO   Elastic Bandages & Supports (KNEE BRACE) MISC Soft neutral position left knee brace  Size to fit  Dx:  Knee pain 2/25/21   Wilder Jara DO   albuterol (PROVENTIL) (2.5 MG/3ML) 0.083% nebulizer solution Take 3 mLs by nebulization every 6 hours as needed for Wheezing 1/21/21   Abdi Grijalva DO   omeprazole (PRILOSEC) 20 MG delayed release capsule Take 1 capsule by mouth daily Take am dos 07/10. 1/21/21   Abdi Andrew DO   EPINEPHrine (EPIPEN 2-ABUNDIO) 0.3 MG/0.3ML SOAJ injection Inject into muscle for allergic reaction 8/22/19   Wilder Jara, DO   dicyclomine (BENTYL) 20 MG tablet Take 20 mg by mouth every 6 hours Take am dos 07/10    Historical Provider, MD       Allergies   Allergen Reactions    Fish-Derived Products Anaphylaxis    Dye [Iodides] Itching       Social History     Socioeconomic History    Marital status:      Spouse name: Not on file    Number of children: Not on file    Years of education: Not on file    Highest education level: Not on file   Occupational History    Not on file   Tobacco Use    Smoking status: Former Smoker     Packs/day: 1.00     Years: 25.00     Pack years: 25.00     Types: Cigarettes     Quit date: 1988     Years since quittin.4    Smokeless tobacco: Never Used   Vaping Use    Vaping Use: Never used   Substance and Sexual Activity    Alcohol use: No    Drug use: No    Sexual activity: Not on file   Other Topics Concern    Not on file   Social History Narrative    Not on file     Social Determinants of Health     Financial Resource Strain:     Difficulty of Paying Living Expenses:    Food Insecurity:     Worried About Running Out of Food in the Last Year:     Ran Out of Food in the Last Year:    Transportation Needs:     Lack of Transportation (Medical):      Lack of Transportation (Non-Medical):    Physical Activity:     Days of Exercise per Week:     Minutes of Exercise per Session:    Stress:     Feeling of Stress :    Social Connections:     Frequency of Communication with Friends and Family:     Frequency of Social Gatherings with Friends and Family:     Attends Latter-day Services:     Active Member of Clubs or Organizations:     Attends Club or Organization Meetings:     Marital Status:    Intimate Partner Violence:     Fear of Current or Ex-Partner:     Emotionally Abused:     Physically Abused:     Sexually Abused:        Family History   Problem Relation Age of Onset    Heart Attack Mother     Kidney Disease Father         failure    Diabetes Other     Asthma Child     Asthma Child         bone disease    Anemia Child        REVIEW OF SYSTEMS:     [facility] denies fever/chills, chest pain, shortness of breath, new bowel or bladder complaints or suicidal ideations. All other review of systems wasnegative. Review of Systems    PHYSICAL EXAMINATION:      [facility] 03/27/2008     General:       General appearance:   elderly, pleasant and well-hydrated. , in moderate discomfort and A & O x3  Build:Overweight     HEENT:     Head:normocephalic and atraumatic  Sclera: icterus absent,     Lungs:     Breathing:Breathing Pattern: regular, no distress     Abdomen:     Shape:non-distended and normal  Tenderness:none     Cervical spine:     Inspection:normal  Palpation:tenderness paravertebral muscles, facet loading, left, right, positive and tenderness to left side only with edema  Range of motion:abnormal moderately flexion, extension rotation bilateral and is  mildly painful.        Lumbar spine:     Spine inspection:normal   CVA tenderness:No   Palpation:tenderness paravertebral muscles, facet loading, left, right, positive and tenderness.   Range of motion:abnormal moderately Lateral bending, flexion, extension rotation bilateral and is  painful.     Musculoskeletal:     Trigger points in Paraveteral:absent bilaterally  Positive for edema to left trapezius, neck              Lin's:negative right, negative left   SI joint tenderness:negative right, negative left              KYAODE test:negative right, negative left  Piriformis tenderness:negative right, negative left  Trochanteric bursa tenderness:negative right, positive left  SLR:negative right, negative left, sitting      Extremities:     Tremors:None bilaterally upper and lower  Range of motion:Generally normal shoulders, pain with internal rotation of hips negative  Intact:Yes  Edema: left knee, immobilizer to left knee, ROM limited due to pain and acute injury      Neurological:     Sensory:diminshed to light touch lateral aspect of Left arm.   Gait:antalgic    Dermatology:    Skin:no unusual rashes, no skin lesions, no palpable subcutaneous nodules and good skin turgor        Impression:  Neck and low back pain with radiation to the Left upper and lower extremity  Cervical spine MRI 2019 Multilevel degenerative disc disease with moderate to severe stenosis at C3-4/C4-5 and C5-6  Lumbar spine CT 2017 Mild degenerative disc disease and facet arthropathy most pronounced at L4-5  Patient had seen neurosurgery and surgery C3-4/C4-5 and C5-6 ACDF was recommended.   Failed greater trochanteric bursae injection. Patient had tried and failed Gabapentin. Plan:  Followed for axial neck pain main complaint with edema with n/t to hands and low back to left hip pain, with worsening neck pain,   8/5/21:  Bilateral Cervical medial branch facet block C4 C5 C6 with >70% relief, and Left troch bursa >80% relief, does NOT want to repeat   04/19/21: cervical epidural C6-C7 with no improvement in axial neck pain but helped some with radicular sx >50%  Pending authorization for aquatic therapy for neck and low back   DC Percocet possible tolerance  Trial Tramadol 50mg po bid prn pain    Trial Medrol dose pack  No Refill Tizanidine 2mg po bid prn spasms 1 refill on file   Refill lidocaine ointment prn   NO NSAIDS, hx of gi ulcers  OARRS report reviewed   Patient encouraged to stay active and to lose weight. Patient had tried and failed physical therapy. Treatment plan discussed with the patient and her daughter including medications and procedure side effects.         ccreferring physic      Electronically signed by NOY Meek CNP on 9/10/21 at 11:07 AM EST

## 2021-10-04 ENCOUNTER — OFFICE VISIT (OUTPATIENT)
Dept: FAMILY MEDICINE CLINIC | Age: 51
End: 2021-10-04
Payer: MEDICAID

## 2021-10-04 ENCOUNTER — OFFICE VISIT (OUTPATIENT)
Dept: NEUROLOGY | Age: 51
End: 2021-10-04
Payer: MEDICAID

## 2021-10-04 VITALS
HEART RATE: 63 BPM | SYSTOLIC BLOOD PRESSURE: 96 MMHG | OXYGEN SATURATION: 98 % | TEMPERATURE: 97.4 F | DIASTOLIC BLOOD PRESSURE: 64 MMHG | HEIGHT: 59 IN | BODY MASS INDEX: 35.48 KG/M2 | WEIGHT: 176 LBS

## 2021-10-04 VITALS
WEIGHT: 160 LBS | BODY MASS INDEX: 32.25 KG/M2 | DIASTOLIC BLOOD PRESSURE: 50 MMHG | HEIGHT: 59 IN | SYSTOLIC BLOOD PRESSURE: 80 MMHG

## 2021-10-04 DIAGNOSIS — G43.009 MIGRAINE WITHOUT AURA AND WITHOUT STATUS MIGRAINOSUS, NOT INTRACTABLE: Chronic | ICD-10-CM

## 2021-10-04 DIAGNOSIS — R51.9 CHRONIC DAILY HEADACHE: ICD-10-CM

## 2021-10-04 DIAGNOSIS — R00.2 PALPITATION: ICD-10-CM

## 2021-10-04 DIAGNOSIS — G43.019 MIGRAINE WITHOUT AURA, INTRACTABLE: Primary | ICD-10-CM

## 2021-10-04 DIAGNOSIS — R53.83 FATIGUE, UNSPECIFIED TYPE: ICD-10-CM

## 2021-10-04 DIAGNOSIS — J45.20 MILD INTERMITTENT ASTHMA, UNSPECIFIED WHETHER COMPLICATED: Chronic | ICD-10-CM

## 2021-10-04 DIAGNOSIS — G47.33 OSA (OBSTRUCTIVE SLEEP APNEA): Primary | ICD-10-CM

## 2021-10-04 DIAGNOSIS — I95.1 ORTHOSTATIC HYPOTENSION: ICD-10-CM

## 2021-10-04 PROCEDURE — 99214 OFFICE O/P EST MOD 30 MIN: CPT | Performed by: FAMILY MEDICINE

## 2021-10-04 PROCEDURE — G8427 DOCREV CUR MEDS BY ELIG CLIN: HCPCS | Performed by: PSYCHIATRY & NEUROLOGY

## 2021-10-04 PROCEDURE — G8427 DOCREV CUR MEDS BY ELIG CLIN: HCPCS | Performed by: FAMILY MEDICINE

## 2021-10-04 PROCEDURE — G8484 FLU IMMUNIZE NO ADMIN: HCPCS | Performed by: PSYCHIATRY & NEUROLOGY

## 2021-10-04 PROCEDURE — 3017F COLORECTAL CA SCREEN DOC REV: CPT | Performed by: PSYCHIATRY & NEUROLOGY

## 2021-10-04 PROCEDURE — 1036F TOBACCO NON-USER: CPT | Performed by: PSYCHIATRY & NEUROLOGY

## 2021-10-04 PROCEDURE — 1036F TOBACCO NON-USER: CPT | Performed by: FAMILY MEDICINE

## 2021-10-04 PROCEDURE — 3017F COLORECTAL CA SCREEN DOC REV: CPT | Performed by: FAMILY MEDICINE

## 2021-10-04 PROCEDURE — G8484 FLU IMMUNIZE NO ADMIN: HCPCS | Performed by: FAMILY MEDICINE

## 2021-10-04 PROCEDURE — G8417 CALC BMI ABV UP PARAM F/U: HCPCS | Performed by: PSYCHIATRY & NEUROLOGY

## 2021-10-04 PROCEDURE — G8417 CALC BMI ABV UP PARAM F/U: HCPCS | Performed by: FAMILY MEDICINE

## 2021-10-04 PROCEDURE — 99213 OFFICE O/P EST LOW 20 MIN: CPT | Performed by: PSYCHIATRY & NEUROLOGY

## 2021-10-04 ASSESSMENT — ENCOUNTER SYMPTOMS
WHEEZING: 0
BLURRED VISION: 0
NAUSEA: 0
ABDOMINAL PAIN: 0
EYE ITCHING: 0
VOMITING: 0
VISUAL CHANGE: 0
DIARRHEA: 0
GASTROINTESTINAL NEGATIVE: 1
EYE PAIN: 0
COLOR CHANGE: 0
RHINORRHEA: 0
BACK PAIN: 1
SCALP TENDERNESS: 0
BACK PAIN: 0
EYE REDNESS: 0
SORE THROAT: 0
EYES NEGATIVE: 1
BLOOD IN STOOL: 0
CONSTIPATION: 0
EYE WATERING: 0
COUGH: 0
ALLERGIC/IMMUNOLOGIC NEGATIVE: 1
CHEST TIGHTNESS: 0
SINUS PRESSURE: 0
SHORTNESS OF BREATH: 0
FACIAL SWEATING: 0
APNEA: 0
APNEA: 1

## 2021-10-04 NOTE — PROGRESS NOTES
Chief Complaint:     Chief Complaint   Patient presents with    Medication Refill     Sleep apena machine. Sent by Dr Cathey Aschoff Other     CIERA    Fatigue         Other  Associated symptoms include fatigue and headaches. Pertinent negatives include no abdominal pain, anorexia, arthralgias, chest pain, congestion, coughing, fever, myalgias, nausea, neck pain, numbness, rash, sore throat, visual change, vomiting or weakness. Fatigue  Associated symptoms include fatigue and headaches. Pertinent negatives include no abdominal pain, anorexia, arthralgias, chest pain, congestion, coughing, fever, myalgias, nausea, neck pain, numbness, rash, sore throat, visual change, vomiting or weakness. Headache   This is a recurrent problem. The current episode started in the past 7 days. The problem occurs intermittently. The problem has been waxing and waning. The pain is located in the occipital region. The pain does not radiate. The pain quality is not similar to prior headaches. The quality of the pain is described as stabbing. The pain is severe. Associated symptoms include a loss of balance and tingling. Pertinent negatives include no abdominal pain, abnormal behavior, anorexia, back pain, blurred vision, coughing, dizziness, ear pain, eye pain, eye redness, eye watering, facial sweating, fever, hearing loss, nausea, neck pain, numbness, rhinorrhea, scalp tenderness, sinus pressure, sore throat, visual change, vomiting, weakness or weight loss. Nothing aggravates the symptoms. She has tried nothing for the symptoms. The treatment provided no relief. Palpitations   This is a recurrent problem. The problem occurs intermittently. The problem has been waxing and waning. Nothing aggravates the symptoms. Pertinent negatives include no anxiety, chest pain, coughing, dizziness, fever, nausea, numbness, shortness of breath, vomiting or weakness. She has tried nothing for the symptoms. The treatment provided no relief. Patient Active Problem List   Diagnosis    Cervical stenosis of spinal canal    DDD (degenerative disc disease), cervical    Mild intermittent asthma    Gastroesophageal reflux disease without esophagitis    History of TIA (transient ischemic attack) and stroke    Fatigue    Current mild episode of major depressive disorder without prior episode ([address]    Spinal stenosis of lumbar region without neurogenic claudication    Fibromyalgia    Migraines without aura and without status migrainosus, not intractable    Chronic left-sided low back pain without sciatica    Lumbar paraspinal muscle spasm    C/f of CIERA (obstructive sleep apnea)    Obesity due to excess calories    Irritable bowel syndrome without diarrhea    Hyperglycemia    Primary stabbing headache    Primary osteoarthritis of right wrist    Stroke-like symptoms    Numbness and tingling in right hand    Chronic pain syndrome    Primary osteoarthritis of left hip    Cervical disc disorder    Cervical facet joint syndrome    Cervical radiculopathy    Cervical stenosis of spine    Lumbar spondylosis    Lumbar disc disorder    Greater trochanteric bursitis of left hip    Myelomalacia of cervical cord (HCC)    Pain in left hip    Acute pain of left knee    Facet arthropathy, cervical    Palpitation       Past Medical History:   Diagnosis Date    Asthma     Cerebral artery occlusion with cerebral infarction ([address]     2015-no deficits    Cervical disc herniation     Cervical spinal stenosis     Chronic back pain     Chronic kidney disease     Depression     Disorder of thyroid gland 09/11/2012    no medication    GERD (gastroesophageal reflux disease)     Headache(784.0)     Hemorrhoids     History of colon polyps     Hypotension     Irritable bowel syndrome     Low back pain     Numbness and tingling in right hand 03/06/2020    Palpitations 07/24/2012    Scabies 05/18/2016    Seizure disorder ([address]     not on of Clubs or Organizations:     Attends Club or Organization Meetings:     Marital Status:    Intimate Partner Violence:     Fear of Current or Ex-Partner:     Emotionally Abused:     Physically Abused:     Sexually Abused:        Family History   Problem Relation Age of Onset    Heart Attack Mother     Migraines Mother     Kidney Disease Father         failure    Migraines Sister     Asthma Child     Asthma Child         bone disease    Anemia Child     Diabetes Other           Review of Systems   Constitutional: Positive for fatigue. Negative for activity change, appetite change, fever and weight loss. HENT: Negative for congestion, ear pain, hearing loss, nosebleeds, rhinorrhea, sinus pressure and sore throat. Eyes: Negative for blurred vision, pain, redness, itching and visual disturbance. Respiratory: Negative for apnea, cough, chest tightness, shortness of breath and wheezing. Cardiovascular: Positive for palpitations. Negative for chest pain and leg swelling. Gastrointestinal: Negative for abdominal pain, anorexia, blood in stool, constipation, diarrhea, nausea and vomiting. Endocrine: Negative. Genitourinary: Negative for decreased urine volume, difficulty urinating, dysuria, frequency, hematuria and urgency. Musculoskeletal: Negative for arthralgias, back pain, gait problem, myalgias and neck pain. Skin: Negative for color change and rash. Allergic/Immunologic: Negative for environmental allergies and food allergies. Neurological: Positive for tingling, headaches and loss of balance. Negative for dizziness, weakness, light-headedness and numbness. Hematological: Negative for adenopathy. Does not bruise/bleed easily. Psychiatric/Behavioral: Negative for behavioral problems, dysphoric mood and sleep disturbance. The patient is not nervous/anxious and is not hyperactive. All other systems reviewed and are negative.         BP 96/64   Pulse 63   Temp 97.4 °F (36.3 °C)   Ht 4' 11\" (1.499 m)   Wt 176 lb (79.8 kg)   LMP 03/27/2008   SpO2 98%   BMI 35.55 kg/m²     Physical Exam  Vitals and nursing note reviewed. Constitutional:       General: She is not in acute distress. Appearance: Normal appearance. She is well-developed. HENT:      Head: Normocephalic and atraumatic. Right Ear: Hearing, tympanic membrane and external ear normal. No tenderness. No middle ear effusion. Left Ear: Hearing, tympanic membrane and external ear normal. No tenderness. No middle ear effusion. Nose: Nose normal. No congestion or rhinorrhea. Right Turbinates: Not enlarged. Left Turbinates: Not enlarged. Mouth/Throat:      Mouth: Mucous membranes are moist.      Tongue: No lesions. Pharynx: Oropharynx is clear. No oropharyngeal exudate or posterior oropharyngeal erythema. Eyes:      General: No scleral icterus. Conjunctiva/sclera: Conjunctivae normal.      Pupils: Pupils are equal, round, and reactive to light. Neck:      Thyroid: No thyromegaly. Cardiovascular:      Rate and Rhythm: Normal rate and regular rhythm. Heart sounds: Normal heart sounds. No murmur heard. Pulmonary:      Effort: Pulmonary effort is normal. No respiratory distress. Breath sounds: Normal breath sounds. No wheezing or rales. Abdominal:      General: Bowel sounds are normal. There is no distension. Palpations: Abdomen is soft. Tenderness: There is no abdominal tenderness. Musculoskeletal:         General: No tenderness. Normal range of motion. Cervical back: Normal range of motion and neck supple. No rigidity. No muscular tenderness. Lymphadenopathy:      Cervical: No cervical adenopathy. Skin:     General: Skin is warm and dry. Findings: No erythema or rash. Neurological:      General: No focal deficit present. Mental Status: She is alert and oriented to person, place, and time. Cranial Nerves: No cranial nerve deficit. Deep Tendon Reflexes: Reflexes are normal and symmetric. Reflexes normal.   Psychiatric:         Mood and Affect: Mood normal.                                 ASSESSMENT/PLAN:    Patient Active Problem List   Diagnosis    Cervical stenosis of spinal canal    DDD (degenerative disc disease), cervical    Mild intermittent asthma    Gastroesophageal reflux disease without esophagitis    History of TIA (transient ischemic attack) and stroke    Fatigue    Current mild episode of major depressive disorder without prior episode ([facility] [address]    Spinal stenosis of lumbar region without neurogenic claudication    Fibromyalgia    Migraines without aura and without status migrainosus, not intractable    Chronic left-sided low back pain without sciatica    Lumbar paraspinal muscle spasm    C/f of CIERA (obstructive sleep apnea)    Obesity due to excess calories    Irritable bowel syndrome without diarrhea    Hyperglycemia    Primary stabbing headache    Primary osteoarthritis of right wrist    Stroke-like symptoms    Numbness and tingling in right hand    Chronic pain syndrome    Primary osteoarthritis of left hip    Cervical disc disorder    Cervical facet joint syndrome    Cervical radiculopathy    Cervical stenosis of spine    Lumbar spondylosis    Lumbar disc disorder    Greater trochanteric bursitis of left hip    Myelomalacia of cervical cord (HCC)    Pain in left hip    Acute pain of left knee    Facet arthropathy, cervical    Palpitation       Ariana Lolly was seen today for medication refill, other and fatigue. Diagnoses and all orders for this visit:    C/f of CIERA (obstructive sleep apnea)  -     Sleep Study with PAP Titration;  Future    Palpitation    Mild intermittent asthma, unspecified whether complicated    Migraines without aura and without status migrainosus, not intractable    Fatigue, unspecified type    Orthostatic hypotension      Follow up with Cardiology as scheduled    Return if symptoms worsen or fail to improve. I spent 30 minutes with this patient. I spent greater than 50% of the time counseling this patient.         Vane Tracy DO  10/4/2021  4:04 PM

## 2021-10-04 NOTE — PROGRESS NOTES
Neurology Consult Note:    Patient: Anahi Gillespie  : 1970  Date: 10/04/21  Referring provider: Mariangel Mir DO    Referral to Neurology: Migraine without aura, chronic daily headache pattern x 5 years. Dear Mariangel Mir DO:    Thank you for your referral of Anahi Gillespie to the Neurology clinic, an alert, anxious 69-year-old Kazakh-speaking woman who was referred for chronic episodic migraines not associated with a visual aura. She presents with her granddaughter who serves as . Below is a summary of her headache history obtained today:    Onset: x 5 years. Location: Left-sided migraines reported. Pain type: pounding, aching  Frequency: chronic daily headache pattern, \"all of the time\". Duration: hrs.-day. Positive symptoms:nausea, sonophobia not photophobia, no LOV. Triggers: stress, neck pain, hot weather  Caffeine use: 1 cup coffee/day. Family history: mother w/migraine, sister, and aunt. Medications used/tried: As needed tramadol-neck, back pain, gabapentin, tizanidine, Flexeril, Norco, Lyrica, epidural steroid injections, sumatriptan, topiramate, divalproex-didn't tolerate; Dr. Donnie Thomas, former neurologist.   Note: Patient does not want to take any med. causing drowsiness or increased fatigue; also has CIERA. Medical/psychiatric comorbidities: CKD, chronic cervical and lumbar degenerative disc disease, chronic cervicalgia, low back pain syndrome, glucose impairment, obstructive sleep apnea-not use CPAP, asthma, fibromyalgia, myelomalacia of cervical spinal cord, bursitis, anxiety/depression, history of TIA, palpitations, history of scabies in 2018. Lab Data: Reviewed from 2021. Imaging Data: MR brain scan without contrast, 2021: No acute intracranial abnormality.      pain management consult note reviewed from 9/10/2021, cervical radiculopathy, treated with Ultram.    Current Outpatient Medications   Medication Sig Dispense Refill    Erenumab-aooe 70 MG/ML SOAJ Inject one dose subcutaneously every 30 days 1 pen 11    traMADol (ULTRAM) 50 MG tablet Take 1 tablet by mouth 2 times daily as needed for Pain for up to 30 days. Intended supply: 30 days. Take lowest dose possible to manage pain 60 tablet 0    vitamin D3 (CHOLECALCIFEROL) 25 MCG (1000 UT) TABS tablet Take 1 tablet by mouth daily 30 tablet 5    Prenatal Vit-Fe Fumarate-FA (PRENATAL PLUS) 27-1 MG TABS 1 pill daily 30 tablet 5    aspirin EC 81 MG EC tablet Take 1 tablet by mouth daily 90 tablet 1    albuterol sulfate HFA (PROAIR HFA) 108 (90 Base) MCG/ACT inhaler Inhale 2 puffs into the lungs every 4 hours as needed for Wheezing or Shortness of Breath 1 Inhaler 3    lidocaine (XYLOCAINE) 5 % ointment Apply topically as needed. 1 Tube 3    Elastic Bandages & Supports (KNEE BRACE) MISC Soft neutral position left knee brace  Size to fit  Dx:  Knee sprain 1 each 0    Elastic Bandages & Supports (KNEE BRACE) MISC Soft neutral position left knee brace  Size to fit  Dx:  Knee pain 1 each 0    albuterol (PROVENTIL) (2.5 MG/3ML) 0.083% nebulizer solution Take 3 mLs by nebulization every 6 hours as needed for Wheezing 120 each 3    omeprazole (PRILOSEC) 20 MG delayed release capsule Take 1 capsule by mouth daily Take am dos 07/10. 30 capsule 5    EPINEPHrine (EPIPEN 2-ABUNDIO) 0.3 MG/0.3ML SOAJ injection Inject into muscle for allergic reaction 0.3 mL 0    dicyclomine (BENTYL) 20 MG tablet Take 20 mg by mouth every 6 hours Take am dos 07/10       No current facility-administered medications for this visit.        Allergies   Allergen Reactions    Fish-Derived Products Anaphylaxis    Dye [Iodides] Itching       Patient Active Problem List   Diagnosis    Cervical stenosis of spinal canal    DDD (degenerative disc disease), cervical    Mild intermittent asthma    Gastroesophageal reflux disease without esophagitis    History of TIA (transient ischemic attack) and stroke    Fatigue    Current mild episode of major depressive disorder without prior episode ([address]    Spinal stenosis of lumbar region without neurogenic claudication    Fibromyalgia    Migraines without aura and without status migrainosus, not intractable    Chronic left-sided low back pain without sciatica    Lumbar paraspinal muscle spasm    C/f of CIERA (obstructive sleep apnea)    Obesity due to excess calories    Irritable bowel syndrome without diarrhea    Hyperglycemia    Primary stabbing headache    Primary osteoarthritis of right wrist    Stroke-like symptoms    Numbness and tingling in right hand    Chronic pain syndrome    Primary osteoarthritis of left hip    Cervical disc disorder    Cervical facet joint syndrome    Cervical radiculopathy    Cervical stenosis of spine    Lumbar spondylosis    Lumbar disc disorder    Greater trochanteric bursitis of left hip    Myelomalacia of cervical cord (HCC)    Pain in left hip    Acute pain of left knee    Facet arthropathy, cervical    Palpitation       Past Medical History:   Diagnosis Date    Asthma     [facility]erebral artery occlusion with cerebral infarction ([facility] [address]     2015-no deficits    Cervical disc herniation     Cervical spinal stenosis     Chronic back pain     Chronic kidney disease     Depression     Disorder of thyroid gland 09/11/2012    no medication    GERD (gastroesophageal reflux disease)     Headache(784.0)     Hemorrhoids     History of colon polyps     Hypotension     Irritable bowel syndrome     Low back pain     Numbness and tingling in right hand 03/06/2020    Palpitations 07/24/2012    Scabies 05/18/2016    Seizure disorder ([facility] [address]     not on medications for this; last seizure 2018    Vitamin D deficiency 12/03/2014       Past Surgical History:   Procedure Laterality Date    COLONOSCOPY  03/09/2016    COLONOSCOPY  07/10/2017    HIP SURGERY Left 08/10/2020    LEFT GREATER TROCHANTERIC BURSAE INJECTION X-RAY (CPT 57971) performed by Lavonne Mancera Mary Smith MD at [address]     epidural c6-c7    NERVE BLOCK Bilateral 2021    BILATERAL CERVICAL MEDIAL BRANCH FACET BLOCK C4 C5 C6 WITH SEDATION WITH LEFT TROCHANTERIC BURSA INJECTION(CPT 01617) performed by Isabell Galvan MD at [address] [facility] [address] N/A 2021    CERVICAL EPIDURAL INTERLAMINAR STEROID INJECTION C6-C7 WITH SEDATION performed by Isabell Galvan MD at [address] [facility] ENDOSCOPY  07/10/2017       Family History   Problem Relation Age of Onset    Heart Attack Mother     Migraines Mother     Kidney Disease Father         failure    Migraines Sister     Asthma Child     Asthma Child         bone disease    Anemia Child     Diabetes Other        Social History     Socioeconomic History    Marital status:      Spouse name: Not on file    Number of children: Not on file    Years of education: Not on file    Highest education level: Not on file   Occupational History    Not on file   Tobacco Use    Smoking status: Former Smoker     Packs/day: 1.00     Years: 25.00     Pack years: 25.00     Types: Cigarettes     Quit date: 1988     Years since quittin.4    Smokeless tobacco: Never Used   Vaping Use    Vaping Use: Never used   Substance and Sexual Activity    Alcohol use: No    Drug use: No    Sexual activity: Not on file   Other Topics Concern    Not on file   Social History Narrative    Not on file     Social Determinants of Health     Financial Resource Strain:     Difficulty of Paying Living Expenses:    Food Insecurity:     Worried About Running Out of Food in the Last Year:     Ran Out of Food in the Last Year:    Transportation Needs:     Lack of Transportation (Medical):      Lack of Transportation (Non-Medical):    Physical Activity:     Days of Exercise per Week:     Minutes of Exercise per Session:    Stress:     Feeling of Stress :    Social Connections:  Frequency of Communication with Friends and Family:     Frequency of Social Gatherings with Friends and Family:     Attends Anabaptism Services:     Active Member of Clubs or Organizations:     Attends Club or Organization Meetings:     Marital Status:    Intimate Partner Violence:     Fear of Current or Ex-Partner:     Emotionally Abused:     Physically Abused:     Sexually Abused:      Review of Systems   Constitutional: Negative. HENT: Negative. Eyes: Negative. Respiratory: Positive for apnea. Hx asthma, CIERA-not treated   Cardiovascular: Negative. Gastrointestinal: Negative. Endocrine:        Glucose impairment   Genitourinary: Negative. Musculoskeletal: Positive for arthralgias, back pain and neck pain. Skin: Negative. Allergic/Immunologic: Negative. Neurological: Positive for headaches. Hematological: Negative. Psychiatric/Behavioral: Positive for decreased concentration and sleep disturbance. The patient is nervous/anxious. Neurologic Exam:  BP (!) 80/50 (Site: Right Upper Arm, Position: Sitting, Cuff Size: Medium Adult)   Ht 4' 11\" (1.499 m)   Wt 160 lb (72.6 kg)   LMP 03/27/2008   BMI 32.32 kg/m²   General appearance: Alert, anxious, well-groomed, seated next to her granddaughter, no acute distress. HEENT: Normocephalic/atraumatic. Neck: Supple, no bruits or adventitious sounds heard. Cardiac: RRR  Respiratory: grossly clear  Extremities: No edema, erythema or cyanosis  Skin: No apparent lesions or rashes  Musculoskeletal: no fasciculations or tremors  Mental Status: Alert, oriented x3 at baseline. Speech/Language: Clear, grossly fluent, understands and speaks some [place]. Native Ukrainian speaking. Attention span/Concentration: Mildly reduced with easy distractibility. Affect/Mood: Moderate anxiety level.   Insight/Judgement: [employer] of Knowledge/Current events: Unable to accurately assess, limited historian  CN II-XII:     Pupils: Equal, reactive to light, 2.5 mm     EOM's: Full without nystagmus  Visual Fields: Full to confrontation  Fundi: Miosis to light  CN V: normal V1-V3  CN VII: No facial droop  CN VIII: Hearing grossly intact  CN IX-XII: Tongue midline  SCM/Trapezii: 5/5 power  Motor: 5/5 power, effort related, in the upper and lower extremities without tremor or drift and normal motor tone, intact fine motor function of both hands, symmetric. DTR's: 71+ and symmetric in the upper and lower extremities, no ankle clonus, plantar responses are flexor. Sensory: Grossly intact subjectively to light touch and sharp stick testing. Coordination/Gait: No gross limb dysmetria, truncal or cerebellar gait ataxia. Assessment/Plan:  1. Chronic daily headache pattern, chronic migraines without visual aura, left hemicranial preference. 2.  For migraine prophylaxis, reviewed all 3 major groups of prophylactic headache medications and while under the care of Dr. Eliana Joshi, former neurologist, she tried mostly all of the oral prophylactic migraine medications, muscle relaxants, and previously Ultram, and either did not respond, had intolerable side effects or adverse reactions, and also expressly wishes not to receive any medication that may increase tiredness, fatigue, or cause drowsiness or cause weight gain. She is also prescribed as needed tramadol by [facility]) pain management for chronic cervical and lumbar degenerative pain syndromes and has fibromyalgia. I also recommended she have her obstructive sleep apnea condition addressed as he is not currently using CPAP and this may cause/contribute to increased headaches and migraines. She has a low blood pressure reading today of 80/50, and vascular prophylactic headache medications would also be contraindicated. Hypotension or low blood pressures may also cause/contribute to worsening of headaches.   3.  A CGRPtargeted therapy was discussed, I.e.,  Aimovig, 70 mg monthly subcutaneous injections for migraine prophylaxis. Patient information was provided. This will require insurance authorization. 4.  Patient information was provided from the [address] [place] [address] website. 5.  Follow-up in the Neurology clinic within 3 months otherwise, if we are able to obtain authorization for Aimovig. Sincerely,      Mely Bonilla MD    This note was created using speech recognition transcription software. Despite proofreading, there may be several typographical errors present that may affect the meaning of the content. Please call with any questions. Note: A total time of 40 mins. was spent on the date of service in preparation for this visit, which included face-to-face patient care and completing clinical documentation, and including counseling and coordination of care based on clinical impression, neurologic diagnosis, review of pertinent imaging studies, test results, implementation and discussion of treatment plan, risk factor reduction and patient and/or family education.

## 2021-10-06 ENCOUNTER — OFFICE VISIT (OUTPATIENT)
Dept: CARDIOLOGY CLINIC | Age: 51
End: 2021-10-06
Payer: MEDICAID

## 2021-10-06 VITALS
WEIGHT: 174.3 LBS | DIASTOLIC BLOOD PRESSURE: 60 MMHG | SYSTOLIC BLOOD PRESSURE: 108 MMHG | BODY MASS INDEX: 35.14 KG/M2 | HEIGHT: 59 IN | HEART RATE: 70 BPM | RESPIRATION RATE: 16 BRPM

## 2021-10-06 DIAGNOSIS — R00.2 PALPITATION: ICD-10-CM

## 2021-10-06 DIAGNOSIS — R73.9 HYPERGLYCEMIA: Primary | ICD-10-CM

## 2021-10-06 PROCEDURE — G8427 DOCREV CUR MEDS BY ELIG CLIN: HCPCS | Performed by: INTERNAL MEDICINE

## 2021-10-06 PROCEDURE — G8417 CALC BMI ABV UP PARAM F/U: HCPCS | Performed by: INTERNAL MEDICINE

## 2021-10-06 PROCEDURE — 99244 OFF/OP CNSLTJ NEW/EST MOD 40: CPT | Performed by: INTERNAL MEDICINE

## 2021-10-06 PROCEDURE — G8484 FLU IMMUNIZE NO ADMIN: HCPCS | Performed by: INTERNAL MEDICINE

## 2021-10-06 PROCEDURE — 93000 ELECTROCARDIOGRAM COMPLETE: CPT | Performed by: INTERNAL MEDICINE

## 2021-10-06 NOTE — PROGRESS NOTES
OUTPATIENT CARDIOLOGY CONSULT    Name: Bobbe Cranker    Age: 45 y.o. Date of Service: 10/6/2021    Reason for Consultation: Palpitations    Referring Physician: James Burgos DO    History of Present Illness:  Bobbe Cranker is a 45 y.o. female who presents today for further evaluation of palpitations. No prior cardiac history, but has prior history of TIA or CVA, as well as migraines and untreated sleep apnea. Her baseline blood pressure runs low. .  States 3-4 times a week has been having sudden onset of palpitations which feels like heart is racing, usually occurs at rest.  Last a few seconds. Gets occasional chest discomfort that feels like acid reflux. She has had prior stress testing in 2016 and 2019 was unremarkable. Had an echo in 2016 that was unremarkable. She has previously seen Dr. Nick Cardenas in inpatient consultation last in 2019. Review of Systems:  Complete review of systems otherwise negative except as described above.     Past Medical History:  Past Medical History:   Diagnosis Date    Asthma     Cerebral artery occlusion with cerebral infarction ([facility] [address]     2015-no deficits    Cervical disc herniation     Cervical spinal stenosis     Chronic back pain     Chronic kidney disease     Depression     Disorder of thyroid gland 09/11/2012    no medication    GERD (gastroesophageal reflux disease)     Headache(784.0)     Hemorrhoids     History of colon polyps     Hypotension     Irritable bowel syndrome     Low back pain     Numbness and tingling in right hand 03/06/2020    Palpitations 07/24/2012    Scabies 05/18/2016    Seizure disorder ([facility] [address]     not on medications for this; last seizure 2018    Vitamin D deficiency 12/03/2014       Past Surgical History:  Past Surgical History:   Procedure Laterality Date    COLONOSCOPY  03/09/2016    COLONOSCOPY  07/10/2017    HIP SURGERY Left 08/10/2020    LEFT GREATER TROCHANTERIC BURSAE INJECTION X-RAY (CPT 63282) performed by [facility] Divya Sosa MD at [address]     epidural c6-c7    NERVE BLOCK Bilateral 2021    BILATERAL CERVICAL MEDIAL BRANCH FACET BLOCK C4 C5 C6 WITH SEDATION WITH LEFT TROCHANTERIC BURSA INJECTION(CPT 67179) performed [facility]na [facility] [address] [facility] [address] [facility]A 2021    CERVICAL EPIDURAL INTERLAMINAR STEROID INJECTION C6-C7 WITH SEDATION performed by Lorena Sultana MD at [address] ENDOSCOPY  07/10/2017       Family History:  Family History   Problem Relation Age of Onset    Heart Attack Mother     Migraines Mother     Kidney Disease Father         failure    Migraines Sister     Asthma Child     Asthma Child         bone disease    Anemia Child     Diabetes Other        Social History:  Social History     Tobacco Use    Smoking status: Former Smoker     Packs/day: 1.00     Years: 25.00     Pack years: 25.00     Types: Cigarettes     Quit date: 1988     Years since quittin.4    Smokeless tobacco: Never Used   Vaping Use    Vaping Use: Never used   Substance Use Topics    Alcohol use: No    Drug use: No        Allergies: Allergies   Allergen Reactions    Fish-Derived Products Anaphylaxis    Dye [Iodides] Itching       Current Medications:    Current Outpatient Medications:     traMADol (ULTRAM) 50 MG tablet, Take 1 tablet by mouth 2 times daily as needed for Pain for up to 30 days. Intended supply: 30 days.  Take lowest dose possible to manage pain, Disp: 60 tablet, Rfl: 0    vitamin D3 (CHOLECALCIFEROL) 25 MCG (1000 UT) TABS tablet, Take 1 tablet by mouth daily, Disp: 30 tablet, Rfl: 5    Prenatal Vit-Fe Fumarate-FA (PRENATAL PLUS) 27-1 MG TABS, 1 pill daily, Disp: 30 tablet, Rfl: 5    aspirin EC 81 MG EC tablet, Take 1 tablet by mouth daily, Disp: 90 tablet, Rfl: 1    albuterol sulfate HFA (PROAIR HFA) 108 (90 Base) MCG/ACT inhaler, Inhale 2 puffs into the lungs every 4 hours as needed for Wheezing or Shortness of Breath, Disp: 1 Inhaler, Rfl: 3    lidocaine (XYLOCAINE) 5 % ointment, Apply topically as needed. , Disp: 1 Tube, Rfl: 3    Elastic Bandages & Supports (KNEE BRACE) MISC, Soft neutral position left knee brace Size to fit Dx:  Knee sprain, Disp: 1 each, Rfl: 0    Elastic Bandages & Supports (KNEE BRACE) MISC, Soft neutral position left knee brace Size to fit Dx:  Knee pain, Disp: 1 each, Rfl: 0    albuterol (PROVENTIL) (2.5 MG/3ML) 0.083% nebulizer solution, Take 3 mLs by nebulization every 6 hours as needed for Wheezing, Disp: 120 each, Rfl: 3    omeprazole (PRILOSEC) 20 MG delayed release capsule, Take 1 capsule by mouth daily Take am dos 07/10., Disp: 30 capsule, Rfl: 5    EPINEPHrine (EPIPEN 2-ABUNDIO) 0.3 MG/0.3ML SOAJ injection, Inject into muscle for allergic reaction, Disp: 0.3 mL, Rfl: 0    dicyclomine (BENTYL) 20 MG tablet, Take 20 mg by mouth every 6 hours Take am dos 07/10, Disp: , Rfl:     Erenumab-aooe 70 MG/ML SOAJ, Inject one dose subcutaneously every 30 days (Patient not taking: Reported on 10/6/2021), Disp: 1 pen, Rfl: 11    Physical Exam:  /60   Pulse 70   Resp 16   Ht 4' 11\" (1.499 m)   Wt 174 lb 4.8 oz (79.1 kg)   LMP 03/27/2008   BMI 35.20 kg/m²   Wt Readings from Last 3 Encounters:   10/06/21 174 lb 4.8 oz (79.1 kg)   10/04/21 176 lb (79.8 kg)   10/04/21 160 lb (72.6 kg)     Appearance: Awake, alert, no acute respiratory distress  Skin: Intact, no rash  Eyes: EOMI, no conjunctival erythema  ENMT: Moist mucous membranes. Neck: Supple, no elevated JVP, no carotid bruits  Lungs: Clear to auscultation bilaterally. No wheezes, rales, or rhonchi. Cardiac: Regular rhythm with a normal rate.   S1 & S2 normal, no murmurs  Abdomen: Soft, nontender, +bowel sounds  Extremities: Moves all extremities x 4, no lower extremity edema  Neurologic: No focal motor deficits apparent, normal mood and affect  Peripheral Pulses: Intact posterior tibial pulses Normal LV segmental wall motion. Normal left ventricular wall thickness. Estimated left ventricular ejection fraction is 65 %. Diastolic function normal as LAESV Index is <32 ml/m2. Normal right ventricular size and function. Physiologic and/or trace mitral regurgitation is present. RVSP is 56 mmHg. Pulmonary hypertension is mild to moderate . Physiologic and/or trace tricuspid regurgitation. Physiologic and/or trace pulmonic regurgitation present. Technically fair quality study. Technically difficult study due to patient''s body habitus. No comparison study available. Suggest clinical correlation. Stress test:    Pharmacologic stress 5/2019  Resting left ventricular ejection fraction is calculated at 86%.         Impression       Normal examination without evidence of treadmill stress induced left   ventricular myocardial ischemia. Cardiac catheterization:     Orders Placed This Encounter   Procedures    Cardiac event monitor    EKG 12 lead    ECHO COMPLETE        Requested Prescriptions      No prescriptions requested or ordered in this encounter        ASSESSMENT / PLAN:  1. Palpitations  2. CIERA, untreated  3. History of vasovagal syncope with blood draw  4. History of TIA  5. Migraines  6. Obesity BMI 35.2 kg/m²  7. Fibromyalgia    Recommendations:  Baseline EKG unremarkable. Recent thyroid function electrolytes within normal limits. · 7-day event monitor to evaluate her episodes of tachycardia, rule out atrial fibrillation or other arrhythmias  · Repeat 2D echocardiogram, has been several years  · She is scheduled for CPAP titration study, which should help with migraines as well as palpitations  · Medical therapy for palpitation to be followed medic as her blood pressure does run low  · Aggressive risk factor modification  · Further recommendations pending above    Thank you for allowing me to participate in your patient's care.  Please feel free to contact me if you have any questions or concerns.     Sergio Marrero MD, [address]

## 2021-10-06 NOTE — PROGRESS NOTES
Patient was in today had 7 day Zio XT placed per Dr. Rebecca Barrett. Patent was given instructions and questions answered, patient verbalized understanding.   Serial # [id number]    SHARMILA Hyman

## 2021-10-08 ENCOUNTER — OFFICE VISIT (OUTPATIENT)
Dept: PAIN MANAGEMENT | Age: 51
End: 2021-10-08
Payer: MEDICAID

## 2021-10-08 VITALS
BODY MASS INDEX: 35.48 KG/M2 | HEART RATE: 72 BPM | HEIGHT: 59 IN | DIASTOLIC BLOOD PRESSURE: 70 MMHG | WEIGHT: 176 LBS | RESPIRATION RATE: 20 BRPM | OXYGEN SATURATION: 98 % | SYSTOLIC BLOOD PRESSURE: 110 MMHG | TEMPERATURE: 97.5 F

## 2021-10-08 DIAGNOSIS — M47.816 LUMBAR SPONDYLOSIS: ICD-10-CM

## 2021-10-08 DIAGNOSIS — M54.12 CERVICAL RADICULOPATHY: ICD-10-CM

## 2021-10-08 DIAGNOSIS — M51.9 LUMBAR DISC DISORDER: ICD-10-CM

## 2021-10-08 DIAGNOSIS — M47.816 LUMBAR FACET ARTHROPATHY: ICD-10-CM

## 2021-10-08 DIAGNOSIS — G89.4 CHRONIC PAIN SYNDROME: ICD-10-CM

## 2021-10-08 DIAGNOSIS — M47.812 CERVICAL FACET JOINT SYNDROME: Primary | ICD-10-CM

## 2021-10-08 DIAGNOSIS — M48.02 CERVICAL STENOSIS OF SPINAL CANAL: ICD-10-CM

## 2021-10-08 DIAGNOSIS — E66.09 CLASS 2 OBESITY DUE TO EXCESS CALORIES WITHOUT SERIOUS COMORBIDITY WITH BODY MASS INDEX (BMI) OF 35.0 TO 35.9 IN ADULT: ICD-10-CM

## 2021-10-08 DIAGNOSIS — G95.89 MYELOMALACIA OF CERVICAL CORD (HCC): ICD-10-CM

## 2021-10-08 PROCEDURE — 99213 OFFICE O/P EST LOW 20 MIN: CPT | Performed by: PAIN MEDICINE

## 2021-10-08 PROCEDURE — G8417 CALC BMI ABV UP PARAM F/U: HCPCS | Performed by: PAIN MEDICINE

## 2021-10-08 PROCEDURE — 3017F COLORECTAL CA SCREEN DOC REV: CPT | Performed by: PAIN MEDICINE

## 2021-10-08 PROCEDURE — G8484 FLU IMMUNIZE NO ADMIN: HCPCS | Performed by: PAIN MEDICINE

## 2021-10-08 PROCEDURE — G8427 DOCREV CUR MEDS BY ELIG CLIN: HCPCS | Performed by: PAIN MEDICINE

## 2021-10-08 PROCEDURE — 1036F TOBACCO NON-USER: CPT | Performed by: PAIN MEDICINE

## 2021-10-08 RX ORDER — OXYCODONE HYDROCHLORIDE AND ACETAMINOPHEN 5; 325 MG/1; MG/1
1 TABLET ORAL DAILY PRN
Qty: 30 TABLET | Refills: 0 | Status: SHIPPED
Start: 2021-10-10 | End: 2021-11-05 | Stop reason: SDUPTHER

## 2021-10-08 NOTE — PROGRESS NOTES
Do you currently have any of the following:    Fever: No  Headache:  No  Cough: No  Shortness of breath: No  Exposed to anyone with these symptoms: No                                                                                                                Raul Callaway presents to the [facility] on 10[facility]8/[facility]21. Tayla Wray is complaining of pain in her neck and bilateral shoulders left is greater. The pain is constant. The pain is described as stabbing. Pain is rated on her best day at a 8, on her worst day at a 10, and on average at a 9 on the VAS scale. She took her last dose of Tramadol this am. Tayla Wray does not have issues with constipation. Any procedures since your last visit: Yes, with 50 % relief. She is not on NSAIDS and  is  on anticoagulation medications to include ASA and is managed by Dr. Gila Juárez PCP. Pacemaker or defibrillator: No     Medication Contract and Consent for Opioid Use Documents Filed     Patient Documents       Type of Document Status Date Received Received By Description     Medication Contract Received 7/18/2019 11:59 AM Laine SINGLETON 7/18/19 medication contract     Medication Contract Received 10/9/2020  3:01 PM ALEXEI VENTURA pain pt agreement                   /70   Pulse 72   Temp 97.5 °F (36.4 °C)   Resp 20   Ht 4' 11\" (1.499 m)   Wt 176 lb (79.8 kg)   LMP 03/27/2008   SpO2 98%   BMI 35.55 kg/m²      Patient's last menstrual period was 03/27/2008.

## 2021-10-08 NOTE — PROGRESS NOTES
[facility]  [address] [facility], [address] [place] [address] Matheus  [phone]    Follow up Note      Hipolito Rachele     Date of Visit:  10/8/2021    CC:  Patient presents for follow up   Chief Complaint   Patient presents with    Follow-up    Neck Pain     radiates down left arm to fingers     HPI:  Follow up on her neck/low back and Left hip pain,  Appropriate analgesia with current medications regimen: No  Change in quality of symptoms:no. Medication side effects:Yes Ultram  Recent diagnostic testing:none  Recent interventional procedures:no     She has been on anticoagulation medications to include ASA. The patient  has not been on herbal supplements. The patient is not diabetic.     Imaging:   Cervical spine MRI 04/2018  1. Multilevel degenerative disc disease extending from C3 through C7. Moderately severe spinal canal stenosis at C3-C4. Moderate spinal   canal stenosis at C4-C5. . Moderately severe spinal canal stenosis at   C5-C6. Abnormal signal intensity within the cervical spinal cord C3-C5   without evidence of enhancement. Findings are most likely reflective   of myelomalacia changes secondary to the underlying degree of cord   compression as described above. No active demyelination identified. 2. Slight loss of normally expected cervical lordosis C3-C6.      CT Lumbar spine 2017  1. Mild diffuse osteopenia, no compression fracture or   spondylolisthesis.       2. Mild multilevel degenerative disc disease and facet joint   arthropathy of the lower lumbar spine more pronounced at L4-L5.      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         Otherwise, no musculoskeletal pathology to explain patient's pain. Previous treatments: Physical Therapy, Epidural Steroid Injection with  and medications. .                                          Potential Aberrant Drug-Related Behavior:    No     Urine Drug Screening:  Saliva screen 08/2020 consistent for Ultram     OARRS report:  10/2021 consistent. Past Medical History:   Diagnosis Date    Asthma     Cerebral artery occlusion with cerebral infarction ([facility] [address]     2015-no deficits    Cervical disc herniation     Cervical spinal stenosis     Chronic back pain     Chronic kidney disease     Depression     Disorder of thyroid gland 09/11/2012    no medication    GERD (gastroesophageal reflux disease)     Headache(784.0)     Hemorrhoids     History of colon polyps     Hypotension     Irritable bowel syndrome     Low back pain     Numbness and tingling in right hand 03/06/2020    Palpitations 07/24/2012    Scabies 05/18/2016    Seizure disorder ([facility] [address]     not on medications for this; last seizure 2018    Vitamin D deficiency 12/03/2014     Past Surgical History:   Procedure Laterality Date    COLONOSCOPY  03/09/2016    COLONOSCOPY  07/10/2017    HIP SURGERY Left 08/10/2020    LEFT GREATER TROCHANTERIC BURSAE INJECTION X-RAY (CPT 45282) performed by Amada Lnagford MD at [facility] N/A     epidural c6-c7    NERVE BLOCK Bilateral 8/5/2021    BILATERAL CERVICAL MEDIAL BRANCH FACET BLOCK C4 C5 C6 WITH SEDATION WITH LEFT TROCHANTERIC BURSA INJECTION(CPT 88893) performed by Amada Langford MD at [address] [place] [address] N/A 4/19/2021    CERVICAL EPIDURAL INTERLAMINAR STEROID INJECTION C6-C7 WITH SEDATION performed by Amada Langford MD at [address] [facility] ENDOSCOPY  07/10/2017     Prior to Admission medications    Medication Sig Start Date End Date Taking? Authorizing Provider   oxyCODONE-acetaminophen (PERCOCET) 5-325 MG per tablet Take 1 tablet by mouth daily as needed for Pain for up to 30 days. 10/10/21 11/9/21 Yes Amada Langford MD   traMADol (ULTRAM) 50 MG tablet Take 1 tablet by mouth 2 times daily as needed for Pain for up to 30 days. Intended supply: 30 days. Take lowest dose possible to manage pain 9/10/21 10/10/21 Yes NOY Beyer CNP   Prenatal Vit-Fe Fumarate-FA (PRENATAL PLUS) 27-1 MG TABS 1 pill daily 8/26/21  Yes Abdi F Valentine, DO   aspirin EC 81 MG EC tablet Take 1 tablet by mouth daily 8/26/21  Yes Abdi F Valentine, DO   albuterol sulfate HFA (PROAIR HFA) 108 (90 Base) MCG/ACT inhaler Inhale 2 puffs into the lungs every 4 hours as needed for Wheezing or Shortness of Breath 8/26/21  Yes Abdi F Valentine, DO   lidocaine (XYLOCAINE) 5 % ointment Apply topically as needed.  8/13/21  Yes NOY Beyer CNP   Elastic Bandages & Supports (KNEE BRACE) MISC Soft neutral position left knee brace  Size to fit  Dx:  Knee sprain 2/26/21  Yes Meghana Oneill DO   Elastic Bandages & Supports (KNEE BRACE) MISC Soft neutral position left knee brace  Size to fit  Dx:  Knee pain 2/25/21  Yes Meghana Oneill DO   albuterol (PROVENTIL) (2.5 MG/3ML) 0.083% nebulizer solution Take 3 mLs by nebulization every 6 hours as needed for Wheezing 1/21/21  Yes Meghana Oneill DO   omeprazole (PRILOSEC) 20 MG delayed release capsule Take 1 capsule by mouth daily Take am dos 07/10. 1/21/21  Yes Meghana Oneill DO   EPINEPHrine (EPIPEN 2-ABUNDIO) 0.3 MG/0.3ML SOAJ injection Inject into muscle for allergic reaction 8/22/19  Yes Meghana Oneill DO   dicyclomine (BENTYL) 20 MG tablet Take 20 mg by mouth every 6 hours Take am dos 07/10   Yes Historical Provider, MD Priscilla Garcia 70 MG/ML SOAJ Inject one dose subcutaneously every 30 days  Patient not taking: Reported on 10/6/2021 10/4/21 10/4/22  Horace Maria MD   vitamin D3 (CHOLECALCIFEROL) 25 MCG (1000 UT) TABS tablet Take 1 tablet by mouth daily 8/26/21   Meghana Oneill DO     Allergies   Allergen Reactions    Fish-Derived Products Anaphylaxis    Dye [Iodides] Itching     Social History     Socioeconomic History    Marital status:      Spouse name: Not on file    Number of children: Not on file    Years of education: Not on file    Highest education level: Not on file   Occupational History    Not on file   Tobacco Use    Smoking status: Former Smoker     Packs/day: 1.00     Years: 25.00     Pack years: 25.00     Types: Cigarettes     Quit date: 1988     Years since quittin.4    Smokeless tobacco: Never Used   Vaping Use    Vaping Use: Never used   Substance and Sexual Activity    Alcohol use: No    Drug use: No    Sexual activity: Not on file   Other Topics Concern    Not on file   Social History Narrative    Not on file     Social Determinants of Health     Financial Resource Strain:     Difficulty of Paying Living Expenses:    Food Insecurity:     Worried About Running Out of Food in the Last Year:     [address] in the Last Year:    Transportation Needs:     Lack of Transportation (Medical):  Lack of Transportation (Non-Medical):    Physical Activity:     Days of Exercise per Week:     Minutes of Exercise per Session:    Stress:     Feeling of Stress :    Social Connections:     Frequency of Communication with Friends and Family:     Frequency of Social Gatherings with Friends and Family:     Attends [employer] Services:     Active Member of Clubs or Organizations:     Attends Club or Organization Meetings:     Marital Status:    Intimate Partner Violence:     Fear of Current or Ex-Partner:     Emotionally Abused:     Physically Abused:     Sexually Abused:      Family History   Problem Relation Age of Onset    Heart Attack Mother     Migraines Mother     Kidney Disease Father         failure    Migraines Sister     Asthma Child     Asthma Child         bone disease    Anemia Child     Diabetes Other      REVIEW OF SYSTEMS:     Ann Roblero denies fever/chills, chest pain, shortness of breath, new bowel or bladder complaints. All other review of systems was negative.     PHYSICAL EXAMINATION:      /70   Pulse 72   Temp 97.5 °F (36.4 °C)   Resp 20   Ht 4' 11\" (1.499 m)   Wt 176 lb (79.8 kg)   LMP 03/27/2008   SpO2 98%   BMI 35.55 kg/m²     General:      General appearance:   elderly, pleasant and well-hydrated. , in moderate discomfort and A & O x3  Build:Overweight    HEENT:    Head:normocephalic and atraumatic  Sclera: icterus absent,    Lungs:    Breathing:Breathing Pattern: regular, no distress    Abdomen:    Shape:non-distended and normal  Tenderness:none    Cervical spine:    Inspection:normal  Palpation:tenderness paravertebral muscles, facet loading, left, right, positive and tenderness. Range of motion:abnormal moderately flexion, extension rotation bilateral and is  painful. Lumbar spine:    Spine inspection:normal   CVA tenderness:No   Palpation:tenderness paravertebral muscles, facet loading, left, right, positive and tenderness. Range of motion:abnormal moderately Lateral bending, flexion, extension rotation bilateral and is  painful. Musculoskeletal:    Trigger points in Paraveteral:absent   SI joint tenderness:negative right, negative left  SLR:negative right, negative left, sitting     Extremities:    Tremors:None bilaterally upper and lower  Range of motion:Generally normal shoulders, pain with internal rotation of hips positive Left  Intact:Yes  Edema:Normal    Neurological:    Sensory:diminshed to light touch lateral aspect of Left arm.   Motor:   Right Grip4+/5              Left Grip4+/5               Right Bicep4+/5           Left Bicep4+/5              Right Triceps4+/5       Left Triceps4+/5          Right Deltoid4+/5     Left Deltoid4+/5                  Right Quadriceps4+/5          Left Quadriceps4+/5           Right Gastrocnemius4+/5    Left Gastrocnemius4+/5  Right Ant Tibialis4+/5  Left Ant Tibialis4+/5  Reflexes:    Right Brachioradialis reflex2+  Left Brachioradialis reflex2+  Right Biceps reflex2+  Left Biceps reflex2+  Right Triceps reflex2+  Left Triceps reflex2+  Right Quadriceps reflex2+  Left Quadriceps reflex2+  Right Achilles reflex2+  Left Achilles reflex2+  Gait:antalgic    Dermatology:    Skin:no unusual rashes and no skin lesions    Impression:    Neck and low back pain with radiation to the Left upper and lower extremity  Cervical spine MRI 2019 Multilevel degenerative disc disease with moderate to severe stenosis at C3-4/C4-5 and C5-6  Lumbar spine CT 2017 Mild degenerative disc disease and facet arthropathy most pronounced at L4-5  Patient had seen neurosurgery and surgery C3-4/C4-5 and C5-6 ACDF was recommended.   Patient had tried and failed Gabapentin/zanaflex/flexeril/Norco/  Lyrica  Plan:  Follow up on her neck/low back and Left hip pain with complaints of medications ineffectivness. Discontinue Ultram, will restart Percocet QD PRN for moderate to severe pain. PRN Zanaflex. Moderate relief with Medrol dose susannah. Reviewed cervical spine MRI moderate severe cervical spine stenosis at C5-6 and C6-7 with uncovertebral and facet arthrosis, previously had seen neurosurgery and is a surgical candidate. Patient had underwent cervical epidural at C6-7 x 1(04/2021) for her radicular symptoms and Bilateral C3,4,5 medial branch block x 1(08/2021) for her mechanical neck pain. Both interventions had helped with her pain. Patient would benefit from repeat cervical epidural and radiofrequency ablation but she is apprehensive about it. Avoid NSAIDs(H/O GI ulcers)  OARRS report reviewed 10/2021. Patient encouraged to stay active and to lose weight. Treatment plan discussed with the patient and her including medications side effects. A copy of today's visit was sent to Aminata Shukla. We discussed with the patient that combining opioids, benzodiazepines, alcohol, illicit drugs or sleep aids increases the risk of respiratory depression including death. We discussed that these medications may cause drowsiness, sedation or dizziness and have counseled the patient not to drive or operate machinery.  We have discussed that these medications will be prescribed only by one provider. We have discussed with the patient about age related risk factors and have thoroughly discussed the importance of taking these medications as prescribed. The patient verbalizes understanding. heather Burciaga M.D.

## 2021-10-20 ENCOUNTER — TELEPHONE (OUTPATIENT)
Dept: NEUROLOGY | Age: 51
End: 2021-10-20

## 2021-10-20 NOTE — TELEPHONE ENCOUNTER
Received fax stating Doreatha Cornea was denied, doesn't not meet criteria or guidelines. Pt was informed at ov that medication might be denied and to f/u with pcp office, no f/u needed at neurology.

## 2021-11-01 RX ORDER — ALBUTEROL SULFATE 90 UG/1
2 AEROSOL, METERED RESPIRATORY (INHALATION) EVERY 4 HOURS PRN
Qty: 6.7 G | Refills: 1 | Status: SHIPPED
Start: 2021-11-01 | End: 2022-09-07 | Stop reason: ALTCHOICE

## 2021-11-02 ENCOUNTER — TELEPHONE (OUTPATIENT)
Dept: CARDIOLOGY CLINIC | Age: 51
End: 2021-11-02

## 2021-11-02 DIAGNOSIS — R00.2 PALPITATION: ICD-10-CM

## 2021-11-03 ENCOUNTER — OFFICE VISIT (OUTPATIENT)
Dept: FAMILY MEDICINE CLINIC | Age: 51
End: 2021-11-03
Payer: MEDICAID

## 2021-11-03 VITALS
WEIGHT: 176 LBS | HEART RATE: 76 BPM | TEMPERATURE: 97.6 F | OXYGEN SATURATION: 98 % | DIASTOLIC BLOOD PRESSURE: 62 MMHG | SYSTOLIC BLOOD PRESSURE: 128 MMHG | BODY MASS INDEX: 35.55 KG/M2

## 2021-11-03 DIAGNOSIS — R59.0 PREAURICULAR LYMPHADENOPATHY: ICD-10-CM

## 2021-11-03 DIAGNOSIS — H60.312 ACUTE DIFFUSE OTITIS EXTERNA OF LEFT EAR: Primary | ICD-10-CM

## 2021-11-03 PROCEDURE — G8427 DOCREV CUR MEDS BY ELIG CLIN: HCPCS | Performed by: PHYSICIAN ASSISTANT

## 2021-11-03 PROCEDURE — G8417 CALC BMI ABV UP PARAM F/U: HCPCS | Performed by: PHYSICIAN ASSISTANT

## 2021-11-03 PROCEDURE — G8484 FLU IMMUNIZE NO ADMIN: HCPCS | Performed by: PHYSICIAN ASSISTANT

## 2021-11-03 PROCEDURE — 1036F TOBACCO NON-USER: CPT | Performed by: PHYSICIAN ASSISTANT

## 2021-11-03 PROCEDURE — 99203 OFFICE O/P NEW LOW 30 MIN: CPT | Performed by: PHYSICIAN ASSISTANT

## 2021-11-03 PROCEDURE — 4130F TOPICAL PREP RX AOE: CPT | Performed by: PHYSICIAN ASSISTANT

## 2021-11-03 PROCEDURE — 3017F COLORECTAL CA SCREEN DOC REV: CPT | Performed by: PHYSICIAN ASSISTANT

## 2021-11-03 RX ORDER — AMOXICILLIN AND CLAVULANATE POTASSIUM 875; 125 MG/1; MG/1
1 TABLET, FILM COATED ORAL 2 TIMES DAILY
Qty: 20 TABLET | Refills: 0 | Status: SHIPPED | OUTPATIENT
Start: 2021-11-03 | End: 2021-11-13

## 2021-11-03 RX ORDER — PREDNISONE 10 MG/1
TABLET ORAL
Qty: 18 TABLET | Refills: 0 | Status: SHIPPED
Start: 2021-11-03 | End: 2022-01-03

## 2021-11-03 NOTE — PROGRESS NOTES
11/3/21  Vishnu Rater : 1970 Sex: female  Age 45 y.o. Subjective:  Chief Complaint   Patient presents with    Otalgia     L prabhu x1d          HPI:   Vishnu Rater , 45 y.o. female presents to [facility] care for evaluation of left ear pain    HPI  48-year-old female presents to [facility] for evaluation of left ear pain. This left ear pain started yesterday. The patient states that she had something similar about 3 months ago. The patient has noted some lymph nodes noted on the left cervical area. The patient is not having any difficulty swallowing. No sore throat. The patient is able to open and close mouth. No traumatic injury to left ear or the left side of the neck. The patient denies any right ear pain. ROS:   Unless otherwise stated in this report the patient's positive and negative responses for review of systems for constitutional, eyes, ENT, cardiovascular, respiratory, gastrointestinal, neurological, , musculoskeletal, and integument systems and related systems to the presenting problem are either stated in the history of present illness or were not pertinent or were negative for the symptoms and/or complaints related to the presenting medical problem. Positives and pertinent negatives as per HPI. All others reviewed and are negative.       PMH:     Past Medical History:   Diagnosis Date    Asthma     Cerebral artery occlusion with cerebral infarction ([address]     -no deficits    Cervical disc herniation     Cervical spinal stenosis     Chronic back pain     Chronic kidney disease     Depression     Disorder of thyroid gland 2012    no medication    GERD (gastroesophageal reflux disease)     Headache(784.0)     Hemorrhoids     History of colon polyps     Hypotension     Irritable bowel syndrome     Low back pain     Numbness and tingling in right hand 2020    Palpitations 2012    Scabies 2016    Seizure disorder ([address]     not on medications for this; last seizure 2018    Vitamin D deficiency 12/03/2014       Past Surgical History:   Procedure Laterality Date    COLONOSCOPY  03/09/2016    COLONOSCOPY  07/10/2017    HIP SURGERY Left 08/10/2020    LEFT GREATER TROCHANTERIC BURSAE INJECTION X-RAY (CPT 27228) performed by Emelyn Abbott MD at [facility] N/A     epidural c6-c7    NERVE BLOCK Bilateral 8/5/2021    BILATERAL CERVICAL MEDIAL BRANCH FACET BLOCK C4 C5 C6 WITH SEDATION WITH LEFT TROCHANTERIC BURSA INJECTION(CPT 31728) performed by Emelyn Abbott MD at [address] [place] [address] S N/A 4/19/2021    CERVICAL EPIDURAL INTERLAMINAR STEROID INJECTION C6-C7 WITH SEDATION performed by Emelyn Abbott MD at [facility] ENDOSCOPY  07/10/2017       Family History   Problem Relation Age of Onset    Heart Attack Mother     Migraines Mother     Kidney Disease Father         failure    Migraines Sister     Asthma Child     Asthma Child         bone disease    Anemia Child     Diabetes Other        Medications:     Current Outpatient Medications:     amoxicillin-clavulanate (AUGMENTIN) 875-125 MG per tablet, Take 1 tablet by mouth 2 times daily for 10 days, Disp: 20 tablet, Rfl: 0    predniSONE (DELTASONE) 10 MG tablet, 3 tabs once daily for 3 days, 2 tabs once daily for 3 days, 1 tab once daily for 3 days, Disp: 18 tablet, Rfl: 0    neomycin-polymyxin-hydrocortisone (CORTISPORIN) 3.5-75062-2 otic solution, Place 4 drops into the left ear 3 times daily for 10 days, Disp: 10 mL, Rfl: 0    albuterol sulfate  (90 Base) MCG/ACT inhaler, INHALE 2 PUFFS INTO THE LUNGS EVERY 4 HOURS AS NEEDED FOR WHEEZING OR SHORTNESS OF BREATH, Disp: 6.7 g, Rfl: 1    oxyCODONE-acetaminophen (PERCOCET) 5-325 MG per tablet, Take 1 tablet by mouth daily as needed for Pain for up to 30 days. , Disp: 30 tablet, Rfl: 0    Erenumab-aooe 70 MG/ML SOAJ, Inject one dose subcutaneously every 30 days (Patient not taking: Reported on 10/6/2021), Disp: 1 pen, Rfl: 11    vitamin D3 (CHOLECALCIFEROL) 25 MCG (1000 UT) TABS tablet, Take 1 tablet by mouth daily, Disp: 30 tablet, Rfl: 5    Prenatal Vit-Fe Fumarate-FA (PRENATAL PLUS) 27-1 MG TABS, 1 pill daily, Disp: 30 tablet, Rfl: 5    aspirin EC 81 MG EC tablet, Take 1 tablet by mouth daily, Disp: 90 tablet, Rfl: 1    lidocaine (XYLOCAINE) 5 % ointment, Apply topically as needed. , Disp: 1 Tube, Rfl: 3    Elastic Bandages & Supports (KNEE BRACE) MISC, Soft neutral position left knee brace Size to fit Dx:  Knee sprain, Disp: 1 each, Rfl: 0    Elastic Bandages & Supports (KNEE BRACE) MISC, Soft neutral position left knee brace Size to fit Dx:  Knee pain, Disp: 1 each, Rfl: 0    albuterol (PROVENTIL) (2.5 MG/3ML) 0.083% nebulizer solution, Take 3 mLs by nebulization every 6 hours as needed for Wheezing, Disp: 120 each, Rfl: 3    omeprazole (PRILOSEC) 20 MG delayed release capsule, Take 1 capsule by mouth daily Take am dos 0710., Disp: 30 capsule, Rfl: 5    EPINEPHrine (EPIPEN 2-ABUNDIO) 0.3 MG/0.3ML SOAJ injection, Inject into muscle for allergic reaction, Disp: 0.3 mL, Rfl: 0    dicyclomine (BENTYL) 20 MG tablet, Take 20 mg by mouth every 6 hours Take am dos 07/10, Disp: , Rfl:     Allergies: Allergies   Allergen Reactions    Fish-Derived Products Anaphylaxis    Dye [Iodides] Itching       Social History:     Social History     Tobacco Use    Smoking status: Former Smoker     Packs/day: 1.00     Years: 25.00     Pack years: 25.00     Types: Cigarettes     Quit date: 1988     Years since quittin.5    Smokeless tobacco: Never Used   Vaping Use    Vaping Use: Never used   Substance Use Topics    Alcohol use: No    Drug use: No       Patient lives at home.     Physical Exam:     Vitals:    21 1218   BP: 128/62   Pulse: 76   Temp: 97.6 °F (36.4 °C)   SpO2: 98%   Weight: 176 lb (79.8 kg)       Exam:  Physical Exam  Nurse's notes and vital signs reviewed. The patient is not hypoxic. ? General: Alert, no acute distress, patient resting comfortably Patient is not toxic or lethargic. Skin: Warm, intact, no pallor noted. There is no evidence of rash at this time. Head: Normocephalic, atraumatic  Eye: Normal conjunctiva  Ears, Nose, Throat: Right tympanic membrane clear, left tympanic membrane is erythematous around the perimeter of the tympanic membrane but patient had quite a bit of pain with movement of the pinna, tragus there does seem to be quite a bit of swelling of the external canal and pain with insertion of the speculum. The patient also had some diffuse swelling noted to the ear and in the preauricular area, there was no evidence of erythema, warmth. No drainage or discharge noted. No pre- or post-auricular tenderness on the right, erythema, or swelling noted. No rhinorrhea or congestion noted. Posterior oropharynx shows no erythema, tonsillar hypertrophy, or exudate. the uvula is midline. No trismus or drooling is noted. Moist mucous membranes. Neck: Lateral cervical lymphadenopathy on the left, no significant cervical lymphadenopathy on the right, no posterior lymph nodes detected. No erythema, no masses, no fluctuance or induration noted. No meningeal signs. Cardiovascular: Regular Rate and Rhythm  Respiratory: No acute distress, no rhonchi, wheezing or crackles noted. No stridor or retractions are noted. Neurological: A&O x4, normal speech  Psychiatric: Cooperative         Testing:           Medical Decision Making:     Vital signs reviewed    Past medical history reviewed. Allergies reviewed. Medications reviewed. Patient on arrival does not appear to be in any apparent distress or discomfort. The patient has been seen and evaluated. The patient does not appear to be toxic or lethargic. The patient will be treated with Augmentin, prednisone, and Cortisporin.   The patient states that she has done well with this in the past.  This is a similar presentation that she had 3 months ago. If not improving in the next couple of days would recommend return and CT imaging    The patient was educated on the proper dosage of motrin and tylenol and the appropriate intervals of each. The patient is to increase fluid intake over the next several days. The patient is to use OTC decongestant as needed. The patient is to return to express care or go directly to the emergency department should any of the signs or symptoms worsen. The patient is to followup with primary care physician in 2-3 days for repeat evaluation. The patient has no other questions or concerns at this time the patient will be discharged home. Clinical Impression:   Gucci Maza was seen today for otalgia. Diagnoses and all orders for this visit:    Acute diffuse otitis externa of left ear    Preauricular lymphadenopathy    Other orders  -     amoxicillin-clavulanate (AUGMENTIN) 875-125 MG per tablet; Take 1 tablet by mouth 2 times daily for 10 days  -     predniSONE (DELTASONE) 10 MG tablet; 3 tabs once daily for 3 days, 2 tabs once daily for 3 days, 1 tab once daily for 3 days  -     neomycin-polymyxin-hydrocortisone (CORTISPORIN) 3.5-98340-9 otic solution; Place 4 drops into the left ear 3 times daily for 10 days        The patient is to call for any concerns or return if any of the signs or symptoms worsen. The patient is to follow-up with PCP in the next 2-3 days for repeat evaluation repeat assessment or go directly to the emergency department.      SIGNATURE: Magnus Greenwood III, PA-C

## 2021-11-05 ENCOUNTER — OFFICE VISIT (OUTPATIENT)
Dept: PAIN MANAGEMENT | Age: 51
End: 2021-11-05
Payer: MEDICAID

## 2021-11-05 VITALS
RESPIRATION RATE: 16 BRPM | SYSTOLIC BLOOD PRESSURE: 122 MMHG | BODY MASS INDEX: 35.48 KG/M2 | OXYGEN SATURATION: 98 % | HEART RATE: 78 BPM | WEIGHT: 176 LBS | TEMPERATURE: 97.1 F | HEIGHT: 59 IN | DIASTOLIC BLOOD PRESSURE: 72 MMHG

## 2021-11-05 DIAGNOSIS — M50.30 DDD (DEGENERATIVE DISC DISEASE), CERVICAL: Primary | Chronic | ICD-10-CM

## 2021-11-05 DIAGNOSIS — M47.812 CERVICAL FACET JOINT SYNDROME: ICD-10-CM

## 2021-11-05 DIAGNOSIS — M51.9 LUMBAR DISC DISORDER: ICD-10-CM

## 2021-11-05 DIAGNOSIS — M16.12 PRIMARY OSTEOARTHRITIS OF LEFT HIP: ICD-10-CM

## 2021-11-05 DIAGNOSIS — M48.02 CERVICAL STENOSIS OF SPINE: ICD-10-CM

## 2021-11-05 DIAGNOSIS — M47.816 LUMBAR SPONDYLOSIS: ICD-10-CM

## 2021-11-05 DIAGNOSIS — M47.812 FACET ARTHROPATHY, CERVICAL: ICD-10-CM

## 2021-11-05 DIAGNOSIS — M47.816 LUMBAR FACET ARTHROPATHY: ICD-10-CM

## 2021-11-05 DIAGNOSIS — M54.12 CERVICAL RADICULOPATHY: ICD-10-CM

## 2021-11-05 DIAGNOSIS — M50.90 CERVICAL DISC DISORDER: ICD-10-CM

## 2021-11-05 PROBLEM — R00.2 PALPITATION: Status: RESOLVED | Noted: 2021-08-26 | Resolved: 2021-11-05

## 2021-11-05 PROCEDURE — G8427 DOCREV CUR MEDS BY ELIG CLIN: HCPCS | Performed by: NURSE PRACTITIONER

## 2021-11-05 PROCEDURE — G8417 CALC BMI ABV UP PARAM F/U: HCPCS | Performed by: NURSE PRACTITIONER

## 2021-11-05 PROCEDURE — 3017F COLORECTAL CA SCREEN DOC REV: CPT | Performed by: NURSE PRACTITIONER

## 2021-11-05 PROCEDURE — 99213 OFFICE O/P EST LOW 20 MIN: CPT | Performed by: NURSE PRACTITIONER

## 2021-11-05 PROCEDURE — G8484 FLU IMMUNIZE NO ADMIN: HCPCS | Performed by: NURSE PRACTITIONER

## 2021-11-05 PROCEDURE — 1036F TOBACCO NON-USER: CPT | Performed by: NURSE PRACTITIONER

## 2021-11-05 RX ORDER — OXYCODONE HYDROCHLORIDE AND ACETAMINOPHEN 5; 325 MG/1; MG/1
1 TABLET ORAL DAILY PRN
Qty: 30 TABLET | Refills: 0 | Status: SHIPPED
Start: 2021-11-09 | End: 2021-12-02 | Stop reason: SDUPTHER

## 2021-11-05 RX ORDER — TIZANIDINE 2 MG/1
TABLET ORAL
COMMUNITY
Start: 2021-10-07 | End: 2021-12-02 | Stop reason: SDUPTHER

## 2021-11-05 NOTE — PROGRESS NOTES
[address] [place] [address], [address] [place] [id number]  [phone]    Follow up Note      Basilia Arguello     Date of Visit:  11/5/2021    CC:  Patient presents for follow up neck and low back     HPI:  Follow up on her neck,low back and left hip pain with no acute changes. Pt reports currently on po abx and steroids for ear infection. Appropriate analgesia with current medications regimen: No  Change in quality of symptoms:no.    Medication side effects:none  Recent diagnostic testing:none  Recent interventional procedures:no    She has been on anticoagulation medications to include ASA. The patient Reji Cuevasy not been on herbal supplements.  The patient is not diabetic.     Imaging:     Cervical spine MRI 04/2018  1. Multilevel degenerative disc disease extending from C3 through C7. Moderately severe spinal canal stenosis at C3-C4. Moderate spinal   canal stenosis at C4-C5. . Moderately severe spinal canal stenosis at   C5-C6. Abnormal signal intensity within the cervical spinal cord C3-C5   without evidence of enhancement. Findings are most likely reflective   of myelomalacia changes secondary to the underlying degree of cord   compression as described above. No active demyelination identified. 2. Slight loss of normally expected cervical lordosis C3-C6.      CT Lumbar spine 2017  1. Mild diffuse osteopenia, no compression fracture or   spondylolisthesis.       2. Mild multilevel degenerative disc disease and facet joint   arthropathy of the lower lumbar spine more pronounced at L4-L5.      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         Otherwise, no musculoskeletal pathology to explain patient's pain.      Previous treatments: Physical Therapy, Epidural Steroid Injection with  and medications. .                                          Potential Aberrant Drug-Related Behavior:    No     Urine Drug Screening:  Saliva screen 08/2020 consistent for Ultram     OARRS report:  11/2021 consistent. Opioid agreement: 11/5/21         Past Medical History:   Diagnosis Date    Asthma     Cerebral artery occlusion with cerebral infarction ([facility] [place] [address]     2015-no deficits    Cervical disc herniation     Cervical spinal stenosis     Chronic back pain     Chronic kidney disease     Depression     Disorder of thyroid gland 09/11/2012    no medication    GERD (gastroesophageal reflux disease)     Headache(784.0)     Hemorrhoids     History of colon polyps     Hypotension     Irritable bowel syndrome     Low back pain     Numbness and tingling in right hand 03/06/2020    Palpitations 07/24/2012    Scabies 05/18/2016    Seizure disorder (HCC)     not on medications for this; last seizure 2018    Vitamin D deficiency 12/03/2014       Past Surgical History:   Procedure Laterality Date    COLONOSCOPY  03/09/2016    COLONOSCOPY  07/10/2017    HIP SURGERY Left 08/10/2020    LEFT GREATER TROCHANTERIC BURSAE INJECTION X-RAY (CPT 66008) performed by [facility]to [facility] at [facility] N/A     epidural c6-c7    NERVE BLOCK Bilateral 8/5/2021    BILATERAL CERVICAL MEDIAL BRANCH FACET BLOCK C4 C5 C6 WITH SEDATION WITH LEFT TROCHANTERIC BURSA INJECTION(CPT 66727) performed by Edilberto Johnson MD at [address] [place] [address] N/A 4/19/2021    CERVICAL EPIDURAL INTERLAMINAR STEROID INJECTION C6-C7 WITH SEDATION performed by Edilberto Johnson MD at [facility] ENDOSCOPY  07/10/2017       Prior to Admission medications    Medication Sig Start Date End Date Taking?  Authorizing Provider   amoxicillin-clavulanate (AUGMENTIN) 875-125 MG per tablet Take 1 tablet by mouth 2 times daily for 10 days 11/3/21 11/13/21  GARCIA Morales III   predniSONE (DELTASONE) 10 MG tablet 3 tabs once daily for 3 days, 2 tabs once daily for 3 days, 1 tab once daily for 3 days 11/3/21   Martin Sheryl III, PA   neomycin-polymyxin-hydrocortisone (CORTISPORIN) 3.5-72736-8 otic solution Place 4 drops into the left ear 3 times daily for 10 days 11/3/21 11/13/21  Martin Sheryl III, PA   albuterol sulfate  (90 Base) MCG/ACT inhaler INHALE 2 PUFFS INTO THE LUNGS EVERY 4 HOURS AS NEEDED FOR WHEEZING OR SHORTNESS OF BREATH 11/1/21   Abdi Koch, DO   oxyCODONE-acetaminophen (PERCOCET) 5-325 MG per tablet Take 1 tablet by mouth daily as needed for Pain for up to 30 days. 10/10/21 11/9/21  Joshua Ramirez MD   Erenumab-aooe 70 MG/ML SOAJ Inject one dose subcutaneously every 30 days  Patient not taking: Reported on 10/6/2021 10/4/21 10/4/22  Vicki Lin MD   vitamin D3 (CHOLECALCIFEROL) 25 MCG (1000 UT) TABS tablet Take 1 tablet by mouth daily 8/26/21   Mark Maravilla, DO   Prenatal Vit-Fe Fumarate-FA (PRENATAL PLUS) 27-1 MG TABS 1 pill daily 8/26/21   Mark Maravilla DO   aspirin EC 81 MG EC tablet Take 1 tablet by mouth daily 8/26/21   Abdi Grijalva, DO   lidocaine (XYLOCAINE) 5 % ointment Apply topically as needed.  8/13/21   NOY Bell - CNP   Elastic Bandages & Supports (KNEE BRACE) MISC Soft neutral position left knee brace  Size to fit  Dx:  Knee sprain 2/26/21   Mark Maravilla, DO   Elastic Bandages & Supports (KNEE BRACE) MISC Soft neutral position left knee brace  Size to fit  Dx:  Knee pain 2/25/21   Mark Maravilla, DO   albuterol (PROVENTIL) (2.5 MG/3ML) 0.083% nebulizer solution Take 3 mLs by nebulization every 6 hours as needed for Wheezing 1/21/21   Abdi Grijalva DO   omeprazole (PRILOSEC) 20 MG delayed release capsule Take 1 capsule by mouth daily Take am dos 07/10. 1/21/21   Abdi Lalla August, DO   EPINEPHrine (EPIPEN 2-ABUNDIO) 0.3 MG/0.3ML SOAJ injection Inject into muscle for allergic reaction 8/22/19   Abdi Grijalva,    dicyclomine (BENTYL) 20 MG tablet Take 20 mg by mouth every 6 hours Take am dos 07/10    Historical Provider, MD       Allergies   Allergen Reactions    Fish-Derived Products Anaphylaxis    Dye [Iodides] Itching       Social History     Socioeconomic History    Marital status:      Spouse name: Not on file    Number of children: Not on file    Years of education: Not on file    Highest education level: Not on file   Occupational History    Not on file   Tobacco Use    Smoking status: Former Smoker     Packs/day: 1.00     Years: 25.00     Pack years: 25.00     Types: Cigarettes     Quit date: 1988     Years since quittin.5    Smokeless tobacco: Never Used   Vaping Use    Vaping Use: Never used   Substance and Sexual Activity    Alcohol use: No    Drug use: No    Sexual activity: Not on file   Other Topics Concern    Not on file   Social History Narrative    Not on file     Social Determinants of Health     Financial Resource Strain:     Difficulty of Paying Living Expenses:    Food Insecurity:     Worried About Running Out of Food in the Last Year:     Ran Out of Food in the Last Year:    Transportation Needs:     Lack of Transportation (Medical):      Lack of Transportation (Non-Medical):    Physical Activity:     Days of Exercise per Week:     Minutes of Exercise per Session:    Stress:     Feeling of Stress :    Social Connections:     Frequency of Communication with Friends and Family:     Frequency of Social Gatherings with Friends and Family:     Attends Confucianism Services:     Active Member of Clubs or Organizations:     Attends Club or Organization Meetings:     Marital Status:    Intimate Partner Violence:     Fear of Current or Ex-Partner:     Emotionally Abused:     Physically Abused:     Sexually Abused:        Family History   Problem Relation Age of Onset    Heart Attack Mother     Migraines Mother     Kidney Disease Father         failure    Migraines Sister     Asthma Child     Asthma Child         bone disease    Anemia Child     Diabetes Other REVIEW OF SYSTEMS:     Diane Minium denies fever/chills, chest pain, shortness of breath, new bowel or bladder complaints or suicidal ideations. All other review of systems wasnegative. Review of Systems    PHYSICAL EXAMINATION:      [facility] 03/27/2008     General:       General appearance:   elderly, pleasant and well-hydrated. , in moderate discomfort and A & O x3  Build:Overweight     HEENT:     Head:normocephalic and atraumatic  Sclera: icterus absent,     Lungs:     Breathing:Breathing Pattern: regular, no distress     Abdomen:     Shape:non-distended and normal  Tenderness:none     Cervical spine:     Inspection:normal  Palpation:tenderness paravertebral muscles, facet loading, left, right, positive and tenderness. Range of motion:abnormal moderately flexion, extension rotation bilateral and is  painful.     Lumbar spine:     Spine inspection:normal   CVA tenderness:No   Palpation:tenderness paravertebral muscles, facet loading, left, right, positive and tenderness. Range of motion:abnormal moderately Lateral bending, flexion, extension rotation bilateral and is  painful.     Musculoskeletal:     Trigger points in Paraveteral:absent   SI joint tenderness:negative right, negative left  SLR:negative right, negative left, sitting      Extremities:     Tremors:None bilaterally upper and lower  Range of motion:Generally normal shoulders, pain with internal rotation of hips positive Left  Intact:Yes  Edema:Normal     Neurological:     Sensory:diminshed to light touch lateral aspect of Left arm.   Motor:   Right Grip4+/5              Left Grip4+/5               Right Bicep4+/5           Left Bicep4+/5              Right Triceps4+/5       Left Triceps4+/5          Right Deltoid4+/5     Left Deltoid4+/5                  Right Quadriceps4+/5          Left Quadriceps4+/5           Right Gastrocnemius4+/5    Left Gastrocnemius4+/5  Right Ant Tibialis4+/5  Left Ant Tibialis4+/5    Gait:antalgic     Dermatology:     Skin:no unusual rashes and no skin lesions     Impression:     Neck and low back pain with radiation to the Left upper and lower extremity  Cervical spine MRI 2019 Multilevel degenerative disc disease with moderate to severe stenosis at C3-4/C4-5 and C5-6  Lumbar spine CT 2017 Mild degenerative disc disease and facet arthropathy most pronounced at L4-5  Patient had seen neurosurgery and surgery C3-4/C4-5 and C5-6 ACDF was recommended.   Patient had tried and failed Gabapentin/zanaflex/flexeril/Norco/Lyrica    Plan:  Follow up on her neck,low back and left hip pain with complaints of medications ineffectivness. Refill Percocet QD PRN for moderate to severe pain. Opioid agreement updated today   UDS today for compliance   Previously had seen neurosurgery and is a surgical candidate. Patient had underwent cervical epidural at C6-7 (04/2021) for her radicular symptoms and bilateral C3,4,5 medial branch block x 1(08/2021) for her mechanical neck pain. Both interventions had helped with her pain. Patient would benefit from repeat cervical epidural and radiofrequency ablation but she is apprehensive about it. Avoid NSAIDs(H/O GI ulcers)  OARRS report reviewed   Patient encouraged to stay active and to lose weight. Treatment plan discussed with the patient and her including medications side effects.       We discussed with the patient that combining opioids, benzodiazepines, alcohol, illicit drugs or sleep aids increases the risk of respiratory depression including death. We discussed that these medications may cause drowsiness, sedation or dizziness and have counseled the patient not to drive or operate machinery. We have discussed that these medications will be prescribed only by one provider. We have discussed with the patient about age related risk factors and have thoroughly discussed the importance of taking these medications as prescribed.  The patient verbalizes understanding.     ccreferring physic    Electronically signed by NOY May CNP on 11/5/21 at 10:49 AM EDT

## 2021-11-05 NOTE — PROGRESS NOTES
Do you currently have any of the following:    Fever: No  Headache:  No  Cough: No  Shortness of breath: No  Exposed to anyone with these symptoms: No                                                                                                                Kamini Hannah presents to the [facility] on 11/5/2021. Victorino Rizvi is complaining of pain in left hip and neck. . The pain is constant. The pain is described as aching and throbbing. Pain is rated on her best day at a 3, on her worst day at a 8, and on average at a 5 on the VAS scale. She took her last dose of Percocet and Neurontin . Victorino Rizvi does not have issues with constipation. Any procedures since your last visit: No    She is not on NSAIDS and  is  on anticoagulation medications to include ASA and is managed by NA. Pacemaker or defibrillator: No Physician managing device is NA. Medication Contract and Consent for Opioid Use Documents Filed     Patient Documents       Type of Document Status Date Received Received By Description     Medication Contract Received 7/18/2019 11:59 AM Bernie SINGLETON 7/18/19 medication contract     Medication Contract Received 10/9/2020  3:01 PM ALEXEI VENTURA pain pt agreement                   /72   Pulse 78   Temp 97.1 °F (36.2 °C) (Infrared)   Resp 16   Ht 4' 11\" (1.499 m)   Wt 176 lb (79.8 kg)   LMP 03/27/2008   SpO2 98%   BMI 35.55 kg/m²      Patient's last menstrual period was 03/27/2008.

## 2021-11-08 LAB
6-MONOACETYLMORPHINE, URINE: NOT DETECTED
ALCOHOL URINE: NOT DETECTED
AMPHETAMINE SCREEN, URINE: NOT DETECTED
BARBITURATE SCREEN URINE: NOT DETECTED
BENZODIAZEPINE SCREEN, URINE: NOT DETECTED
BUPRENORPHINE URINE: NOT DETECTED
CANNABINOID SCREEN URINE: NOT DETECTED
COCAINE METABOLITE SCREEN URINE: NOT DETECTED
FENTANYL SCREEN, URINE: NOT DETECTED
INTEGRITY CHECK, CREATININE, URINE: 175.3
INTEGRITY CHECK, OXIDANT, URINE: <40
INTEGRITY CHECK, PH, URINE: 5.5 (ref 4.5–9)
INTEGRITY CHECK, SPECIFIC GRAVITY, URINE: 1.02 (ref 1–1.03)
INTEGRITY CHECK, SPECIMEN INTEGRITY, URINE: ABNORMAL
Lab: ABNORMAL
METHADONE SCREEN, URINE: NOT DETECTED
OPIATE SCREEN URINE: NOT DETECTED
OXYCODONE URINE: POSITIVE
PHENCYCLIDINE SCREEN URINE: NOT DETECTED
TRAMADOL SCREEN URINE: NOT DETECTED

## 2021-11-09 LAB
6-MAM, QUANTITATIVE, URINE: <10
7-AMINOCLONAZEPAM, QUANTITATIVE, URINE: <50
ALPHA-HYDROXYALPRAZOLAM, QUANTITATIVE, URINE: <50
ALPHA-HYDROXYMIDAZOLAM, QUANTITATIVE, URINE: <50
ALPHA-HYDROXYTRIAZOLAM, QUANTITATIVE, URINE: <50
ALPRAZOLAM URINE QUANT: <50
CHLORDIAZEPOXIDE, QUANTITATIVE, URINE: <50
CLONAZEPAM, QUANTITATIVE, URINE: <50
CODEINE, QUANTITATIVE, URINE: <50
COMMENT: NORMAL
DIAZEPAM URINE QUANT: <50
FLUNITRAZEPAM, QUANTITATIVE, URINE: <50
FLURAZEPAM, QUANTITATIVE, URINE: <50
HYDROCODONE, QUANTITATIVE, URINE: <50
HYDROMORPHONE, QUANTITATIVE, URINE: <50
LORAZEPAM URINE QUANT: <50
MIDAZOLAM URINE QUANT: <50
MORPHINE, QUANTITATIVE, URINE: <50
NORDIAZEPAM URINE QUANT: <50
NORHYDROCODONE, QUANTITATIVE, URINE: <50
NOROXYCODONE, QUANTITATIVE, URINE: >1000
OXAZEPAM URINE QUANT: <50
OXYCODONE URINE, QUANTITATIVE: >1000
OXYMORPHONE, QUANTITATIVE, URINE: 616.7
TEMAZEPAM, QUANTITATIVE, URINE: <50

## 2021-12-02 ENCOUNTER — TELEPHONE (OUTPATIENT)
Dept: PAIN MANAGEMENT | Age: 51
End: 2021-12-02

## 2021-12-02 DIAGNOSIS — M47.812 CERVICAL FACET JOINT SYNDROME: ICD-10-CM

## 2021-12-02 DIAGNOSIS — M54.12 CERVICAL RADICULOPATHY: ICD-10-CM

## 2021-12-02 RX ORDER — TIZANIDINE 2 MG/1
2 TABLET ORAL 2 TIMES DAILY PRN
Qty: 60 TABLET | Refills: 1 | Status: SHIPPED | OUTPATIENT
Start: 2021-12-02 | End: 2022-01-01

## 2021-12-02 RX ORDER — OXYCODONE HYDROCHLORIDE AND ACETAMINOPHEN 5; 325 MG/1; MG/1
1 TABLET ORAL DAILY PRN
Qty: 30 TABLET | Refills: 0 | Status: SHIPPED
Start: 2021-12-08 | End: 2022-01-18 | Stop reason: SDUPTHER

## 2021-12-13 ENCOUNTER — OFFICE VISIT (OUTPATIENT)
Dept: PAIN MANAGEMENT | Age: 51
End: 2021-12-13
Payer: MEDICAID

## 2021-12-13 VITALS
BODY MASS INDEX: 30.24 KG/M2 | HEIGHT: 59 IN | SYSTOLIC BLOOD PRESSURE: 120 MMHG | DIASTOLIC BLOOD PRESSURE: 68 MMHG | WEIGHT: 150 LBS | RESPIRATION RATE: 16 BRPM | TEMPERATURE: 97.3 F | HEART RATE: 72 BPM | OXYGEN SATURATION: 98 %

## 2021-12-13 DIAGNOSIS — M54.12 CERVICAL RADICULOPATHY: ICD-10-CM

## 2021-12-13 PROCEDURE — 1036F TOBACCO NON-USER: CPT | Performed by: NURSE PRACTITIONER

## 2021-12-13 PROCEDURE — G8417 CALC BMI ABV UP PARAM F/U: HCPCS | Performed by: NURSE PRACTITIONER

## 2021-12-13 PROCEDURE — 3017F COLORECTAL CA SCREEN DOC REV: CPT | Performed by: NURSE PRACTITIONER

## 2021-12-13 PROCEDURE — 99213 OFFICE O/P EST LOW 20 MIN: CPT | Performed by: NURSE PRACTITIONER

## 2021-12-13 PROCEDURE — G8427 DOCREV CUR MEDS BY ELIG CLIN: HCPCS | Performed by: NURSE PRACTITIONER

## 2021-12-13 PROCEDURE — G8484 FLU IMMUNIZE NO ADMIN: HCPCS | Performed by: NURSE PRACTITIONER

## 2021-12-13 RX ORDER — DULOXETIN HYDROCHLORIDE 30 MG/1
30 CAPSULE, DELAYED RELEASE ORAL DAILY
Qty: 30 CAPSULE | Refills: 0 | Status: SHIPPED
Start: 2021-12-13 | End: 2022-01-03

## 2021-12-13 NOTE — PROGRESS NOTES
[facility], [address] [place], [place] [id number]  [phone]    Follow up Note      Lee Torrez     Date of Visit:  12/13/2021    CC:  Patient presents for follow up neck and low back     HPI:  Follow up on her neck,low back and left hip pain and neck pain worsening down left arm. Pt not interested in trying any more injections at this time. Dicussed trialing cymbalta because pt dealing with depression because of pain and frustrated at this time. Appropriate analgesia with current medications regimen: somewhat  Change in quality of symptoms:no.    Medication side effects:none  Recent diagnostic testing:none  Recent interventional procedures:no    She has been on anticoagulation medications to include ASA. The patient Maya Folds not been on herbal supplements.  The patient is not diabetic.     Imaging:     Cervical spine MRI 04/2018  1. Multilevel degenerative disc disease extending from C3 through C7. Moderately severe spinal canal stenosis at C3-C4. Moderate spinal   canal stenosis at C4-C5. . Moderately severe spinal canal stenosis at   C5-C6. Abnormal signal intensity within the cervical spinal cord C3-C5   without evidence of enhancement. Findings are most likely reflective   of myelomalacia changes secondary to the underlying degree of cord   compression as described above. No active demyelination identified. 2. Slight loss of normally expected cervical lordosis C3-C6.      CT Lumbar spine 2017  1.  Mild diffuse osteopenia, no compression fracture or   spondylolisthesis.       2. Mild multilevel degenerative disc disease and facet joint   arthropathy of the lower lumbar spine more pronounced at L4-L5.      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         Otherwise, no musculoskeletal pathology to explain patient's pain.      Previous treatments: Physical Therapy, Epidural Steroid Injection with  and medications. .                                          Potential Aberrant Drug-Related Behavior:    No     Urine Drug Screening:  Saliva screen 08/2020 consistent for Ultram  11/2021: consistent for oxycodone    OARRS report:  12/2021 consistent. Opioid agreement: 11/5/21         Past Medical History:   Diagnosis Date    Asthma     Cerebral artery occlusion with cerebral infarction ([facility] [address]     2015-no deficits    Cervical disc herniation     Cervical spinal stenosis     Chronic back pain     Chronic kidney disease     Depression     Disorder of thyroid gland 09/11/2012    no medication    GERD (gastroesophageal reflux disease)     Headache(784.0)     Hemorrhoids     History of colon polyps     Hypotension     Irritable bowel syndrome     Low back pain     Numbness and tingling in right hand 03/06/2020    Palpitations 07/24/2012    Scabies 05/18/2016    Seizure disorder (HCC)     not on medications for this; last seizure 2018    Vitamin D deficiency 12/03/2014       Past Surgical History:   Procedure Laterality Date    COLONOSCOPY  03/09/2016    COLONOSCOPY  07/10/2017    HIP SURGERY Left 08/10/2020    LEFT GREATER TROCHANTERIC BURSAE INJECTION X-RAY (CPT 32177) performed by Mathieu Mancilla MD at [facility] N/A     epidural c6-c7    NERVE BLOCK Bilateral 8/5/2021    BILATERAL CERVICAL MEDIAL BRANCH FACET BLOCK C4 C5 C6 WITH SEDATION WITH LEFT TROCHANTERIC BURSA INJECTION(CPT 55549) performed by Mathieu Mancilla MD at [address] [place] [address] N/A 4/19/2021    CERVICAL EPIDURAL INTERLAMINAR STEROID INJECTION C6-C7 WITH SEDATION performed by Mathieu Mancilla MD at [facility] ENDOSCOPY  07/10/2017       Prior to Admission medications    Medication Sig Start Date End Date Taking?  Authorizing Provider   oxyCODONE-acetaminophen (PERCOCET) 5-325 MG per tablet Take 1 tablet by mouth daily as needed for Pain for up to 30 days. 12/8/21 1/7/22 Yes Brayaneil Grade, APRN - CNP   tiZANidine (ZANAFLEX) 2 MG tablet Take 1 tablet by mouth 2 times daily as needed (spasms) 12/2/21 1/1/22 Yes Brayaneil Grade, APRN - CNP   predniSONE (DELTASONE) 10 MG tablet 3 tabs once daily for 3 days, 2 tabs once daily for 3 days, 1 tab once daily for 3 days 11/3/21  Yes Linda Mazariegos III, PA   albuterol sulfate  (90 Base) MCG/ACT inhaler INHALE 2 PUFFS INTO THE LUNGS EVERY 4 HOURS AS NEEDED FOR WHEEZING OR SHORTNESS OF BREATH  Patient taking differently: Inhale 2 puffs into the lungs every 4 hours as needed for Wheezing or Shortness of Breath  11/1/21  Yes Abdi F Vlaentine, DO   Erenumab-aooe 70 MG/ML SOAJ Inject one dose subcutaneously every 30 days 10/4/21 10/4/22 Yes Danika De La Paz MD   vitamin D3 (CHOLECALCIFEROL) 25 MCG (1000 UT) TABS tablet Take 1 tablet by mouth daily 8/26/21  Yes Luis Miguel Garibay DO   Prenatal Vit-Fe Fumarate-FA (PRENATAL PLUS) 27-1 MG TABS 1 pill daily 8/26/21  Yes Abdi F Valentine, DO   aspirin EC 81 MG EC tablet Take 1 tablet by mouth daily 8/26/21  Yes Abdi F Valentine, DO   lidocaine (XYLOCAINE) 5 % ointment Apply topically as needed.  8/13/21  Yes Brayaneil Grade, APRN - CNP   Elastic Bandages & Supports (KNEE BRACE) MISC Soft neutral position left knee brace  Size to fit  Dx:  Knee pain 2/25/21  Yes Luis Miguel Garibay DO   albuterol (PROVENTIL) (2.5 MG/3ML) 0.083% nebulizer solution Take 3 mLs by nebulization every 6 hours as needed for Wheezing  Patient taking differently: Take 2.5 mg by nebulization every 6 hours as needed for Wheezing  1/21/21  Yes Luis Miguel Garibay DO   omeprazole (PRILOSEC) 20 MG delayed release capsule Take 1 capsule by mouth daily Take am dos 07/10. 1/21/21  Yes Luis Miguel Garibay DO   EPINEPHrine (EPIPEN 2-ABUNDIO) 0.3 MG/0.3ML SOAJ injection Inject into muscle for allergic reaction 8/22/19  Yes Abdi Grijalva DO   dicyclomine (BENTYL) 20 MG tablet Take 20 mg by mouth every 6 Stability:     Unable to Pay for Housing in the Last Year: Not on file    Number of Places Lived in the Last Year: Not on file    Unstable Housing in the Last Year: Not on file       Family History   Problem Relation Age of Onset    Heart Attack Mother     Migraines Mother     Kidney Disease Father         failure    Migraines Sister     Asthma Child     Asthma Child         bone disease    Anemia Child     Diabetes Other        REVIEW OF SYSTEMS:     Wayne [place] denies fever/chills, chest pain, shortness of breath, new bowel or bladder complaints or suicidal ideations. All other review of systems wasnegative. Review of Systems    PHYSICAL EXAMINATION:      /68   Pulse 72   Temp 97.3 °F (36.3 °C) (Infrared)   Resp 16   Ht 4' 11\" (1.499 m)   Wt 150 lb (68 kg)   LMP 03/27/2008   SpO2 98%   BMI 30.30 kg/m²     General:       General appearance:   elderly, pleasant and well-hydrated. , in moderate discomfort and A & O x3  Build:Overweight     HEENT:     Head:normocephalic and atraumatic  Sclera: icterus absent,     Lungs:     Breathing:Breathing Pattern: regular, no distress     Abdomen:     Shape:non-distended and normal  Tenderness:none     Cervical spine:     Inspection:normal  Palpation:tenderness paravertebral muscles, facet loading, left, right, positive and tenderness. Range of motion:abnormal moderately flexion, extension rotation bilateral and is  painful.     Lumbar spine:     Spine inspection:normal   CVA tenderness:No   Palpation:tenderness paravertebral muscles, facet loading, left, right, positive and tenderness.   Range of motion:abnormal moderately Lateral bending, flexion, extension rotation bilateral and is  painful.     Musculoskeletal:     Trigger points in Paraveteral:absent   SI joint tenderness:negative right, negative left  SLR:negative right, negative left, sitting      Extremities:     Tremors:None bilaterally upper and lower  Range of motion:Generally normal shoulders, pain with internal rotation of hips positive Left  Intact:Yes  Edema:Normal     Neurological:     Sensory:diminshed to light touch lateral aspect of Left arm. Motor:   Right Grip4+/5              Left Grip4+/5               Right Bicep4+/5           Left Bicep4+/5              Right Triceps4+/5       Left Triceps4+/5          Right Deltoid4+/5     Left Deltoid4+/5                  Right Quadriceps4+/5          Left Quadriceps4+/5           Right Gastrocnemius4+/5    Left Gastrocnemius4+/5  Right Ant Tibialis4+/5  Left Ant Tibialis4+/5    Gait:antalgic     Dermatology:     Skin:no unusual rashes and no skin lesions     Impression:     Neck and low back pain with radiation to the Left upper and lower extremity  Cervical spine MRI 2019 Multilevel degenerative disc disease with moderate to severe stenosis at C3-4/C4-5 and C5-6  Lumbar spine CT 2017 Mild degenerative disc disease and facet arthropathy most pronounced at L4-5  Patient had seen neurosurgery and surgery C3-4/C4-5 and C5-6 ACDF was recommended.   Patient had tried and failed Gabapentin/zanaflex/flexeril/Norco/Lyrica    Plan:  Follow up on her neck,low back and left hip pain with complaints of medications ineffectivness. Continue Percocet QD PRN for moderate to severe pain. UPDATE OPIOID AGREEMENT TODAY   Trial Cymbalta 30mg po daily  Previously had seen neurosurgery and is a surgical candidate. Patient had underwent cervical epidural at C6-7 (04/2021) for her radicular symptoms and bilateral C3,4,5 medial branch block x 1(08/2021) for her mechanical neck pain. Both interventions had helped with her pain. Patient would benefit from repeat cervical epidural and radiofrequency ablation but she is apprehensive about it. Avoid NSAIDs(H/O GI ulcers)  OARRS report reviewed   Patient encouraged to stay active and to lose weight.   Treatment plan discussed with the patient and her including medications side effects.       We discussed with the patient that combining opioids, benzodiazepines, alcohol, illicit drugs or sleep aids increases the risk of respiratory depression including death. We discussed that these medications may cause drowsiness, sedation or dizziness and have counseled the patient not to drive or operate machinery. We have discussed that these medications will be prescribed only by one provider. We have discussed with the patient about age related risk factors and have thoroughly discussed the importance of taking these medications as prescribed.  The patient verbalizes understanding.     ccreferring physic

## 2021-12-13 NOTE — PROGRESS NOTES
Do you currently have any of the following:    Fever: No  Headache:  No  Cough: No  Shortness of breath: No  Exposed to anyone with these symptoms: No                                                                                                                Brenda Evans presents to the [facility] on 12/13/2021. Gucci Maza is complaining of pain lower back and neck and left elbow. The pain is constant. The pain is described as aching and throbbing. Pain is rated on her best day at a 6, on her worst day at a 10, and on average at a 6 on the VAS scale. She took her last dose of Percocet zanaflex. Gucci Maza does not have issues with constipation. Any procedures since your last visit: No,    She is  on NSAIDS and  is not on anticoagulation medications to include ASA and is managed by Arcadio Marcelo DO  . Pacemaker or defibrillator: No .    Medication Contract and Consent for Opioid Use Documents Filed     Patient Documents     Type of Document Status Date Received Received By Description    Medication Contract Received 7/18/2019 11:59 AM Rosaura SINGLETON 7/18/19 medication contract    Medication Contract Received 10/9/2020  3:01 PM [facility] [address] [place] [address] pain pt agreement    Medication Contract Received 11/5/2021 11:29 AM SNOW MUNIZ Pain Mg[facility] Patient Agreement 11.5.2021                   /68   Pulse 72   Temp 97.3 °F (36.3 °C) (Infrared)   Resp 16   Ht 4' 11\" (1.499 m)   Wt 150 lb (68 kg)   LMP 03/27/2008   SpO2 98%   BMI 30.30 kg/m²      Patient's last menstrual period was 03/27/2008.

## 2022-01-03 ENCOUNTER — APPOINTMENT (OUTPATIENT)
Dept: CT IMAGING | Age: 52
End: 2022-01-03
Payer: MEDICAID

## 2022-01-03 ENCOUNTER — HOSPITAL ENCOUNTER (EMERGENCY)
Age: 52
Discharge: HOME | End: 2022-01-03
Attending: EMERGENCY MEDICINE
Payer: MEDICAID

## 2022-01-03 VITALS
RESPIRATION RATE: 18 BRPM | OXYGEN SATURATION: 97 % | SYSTOLIC BLOOD PRESSURE: 114 MMHG | TEMPERATURE: 98.4 F | HEIGHT: 59 IN | BODY MASS INDEX: 32.25 KG/M2 | DIASTOLIC BLOOD PRESSURE: 82 MMHG | HEART RATE: 72 BPM | WEIGHT: 160 LBS

## 2022-01-03 DIAGNOSIS — M54.50 ACUTE EXACERBATION OF CHRONIC LOW BACK PAIN: Primary | ICD-10-CM

## 2022-01-03 DIAGNOSIS — G89.29 ACUTE EXACERBATION OF CHRONIC LOW BACK PAIN: Primary | ICD-10-CM

## 2022-01-03 LAB
ALBUMIN SERPL-MCNC: 4.3 G/DL (ref 3.5–5.2)
ALP BLD-CCNC: 86 U/L (ref 35–104)
ALT SERPL-CCNC: 24 U/L (ref 0–32)
ANION GAP SERPL CALCULATED.3IONS-SCNC: 11 MMOL/L (ref 7–16)
AST SERPL-CCNC: 17 U/L (ref 0–31)
BACTERIA: NORMAL /HPF
BASOPHILS ABSOLUTE: 0.04 E9/L (ref 0–0.2)
BASOPHILS RELATIVE PERCENT: 0.6 % (ref 0–2)
BILIRUB SERPL-MCNC: 0.4 MG/DL (ref 0–1.2)
BILIRUBIN URINE: NEGATIVE
BLOOD, URINE: NEGATIVE
BUN BLDV-MCNC: 13 MG/DL (ref 6–20)
CALCIUM SERPL-MCNC: 9.8 MG/DL (ref 8.6–10.2)
CHLORIDE BLD-SCNC: 105 MMOL/L (ref 98–107)
CLARITY: CLEAR
CO2: 22 MMOL/L (ref 22–29)
COLOR: YELLOW
CREAT SERPL-MCNC: 0.8 MG/DL (ref 0.5–1)
EOSINOPHILS ABSOLUTE: 0.09 E9/L (ref 0.05–0.5)
EOSINOPHILS RELATIVE PERCENT: 1.4 % (ref 0–6)
GFR AFRICAN AMERICAN: >60
GFR NON-AFRICAN AMERICAN: >60 ML/MIN/1.73
GLUCOSE BLD-MCNC: 108 MG/DL (ref 74–99)
GLUCOSE URINE: NEGATIVE MG/DL
HCT VFR BLD CALC: 42.8 % (ref 34–48)
HEMOGLOBIN: 14 G/DL (ref 11.5–15.5)
IMMATURE GRANULOCYTES #: 0.01 E9/L
IMMATURE GRANULOCYTES %: 0.2 % (ref 0–5)
KETONES, URINE: NEGATIVE MG/DL
LACTIC ACID: 1.2 MMOL/L (ref 0.5–2.2)
LEUKOCYTE ESTERASE, URINE: ABNORMAL
LYMPHOCYTES ABSOLUTE: 2.27 E9/L (ref 1.5–4)
LYMPHOCYTES RELATIVE PERCENT: 35.6 % (ref 20–42)
MCH RBC QN AUTO: 27.4 PG (ref 26–35)
MCHC RBC AUTO-ENTMCNC: 32.7 % (ref 32–34.5)
MCV RBC AUTO: 83.8 FL (ref 80–99.9)
MONOCYTES ABSOLUTE: 0.57 E9/L (ref 0.1–0.95)
MONOCYTES RELATIVE PERCENT: 8.9 % (ref 2–12)
NEUTROPHILS ABSOLUTE: 3.4 E9/L (ref 1.8–7.3)
NEUTROPHILS RELATIVE PERCENT: 53.3 % (ref 43–80)
NITRITE, URINE: NEGATIVE
PDW BLD-RTO: 12.5 FL (ref 11.5–15)
PH UA: 5.5 (ref 5–9)
PLATELET # BLD: 248 E9/L (ref 130–450)
PMV BLD AUTO: 10.1 FL (ref 7–12)
POTASSIUM SERPL-SCNC: 4.2 MMOL/L (ref 3.5–5)
PROTEIN UA: NEGATIVE MG/DL
RBC # BLD: 5.11 E12/L (ref 3.5–5.5)
RBC UA: NORMAL /HPF (ref 0–2)
SODIUM BLD-SCNC: 138 MMOL/L (ref 132–146)
SPECIFIC GRAVITY UA: <=1.005 (ref 1–1.03)
TOTAL PROTEIN: 7.4 G/DL (ref 6.4–8.3)
UROBILINOGEN, URINE: 0.2 E.U./DL
WBC # BLD: 6.4 E9/L (ref 4.5–11.5)
WBC UA: NORMAL /HPF (ref 0–5)

## 2022-01-03 PROCEDURE — 85025 COMPLETE CBC W/AUTO DIFF WBC: CPT

## 2022-01-03 PROCEDURE — 96375 TX/PRO/DX INJ NEW DRUG ADDON: CPT

## 2022-01-03 PROCEDURE — 74176 CT ABD & PELVIS W/O CONTRAST: CPT

## 2022-01-03 PROCEDURE — 83605 ASSAY OF LACTIC ACID: CPT

## 2022-01-03 PROCEDURE — 2580000003 HC RX 258: Performed by: STUDENT IN AN ORGANIZED HEALTH CARE EDUCATION/TRAINING PROGRAM

## 2022-01-03 PROCEDURE — 81001 URINALYSIS AUTO W/SCOPE: CPT

## 2022-01-03 PROCEDURE — 6360000002 HC RX W HCPCS: Performed by: STUDENT IN AN ORGANIZED HEALTH CARE EDUCATION/TRAINING PROGRAM

## 2022-01-03 PROCEDURE — 96372 THER/PROPH/DIAG INJ SC/IM: CPT

## 2022-01-03 PROCEDURE — 80053 COMPREHEN METABOLIC PANEL: CPT

## 2022-01-03 PROCEDURE — 96374 THER/PROPH/DIAG INJ IV PUSH: CPT

## 2022-01-03 PROCEDURE — 99284 EMERGENCY DEPT VISIT MOD MDM: CPT

## 2022-01-03 RX ORDER — KETOROLAC TROMETHAMINE 30 MG/ML
15 INJECTION, SOLUTION INTRAMUSCULAR; INTRAVENOUS ONCE
Status: COMPLETED | OUTPATIENT
Start: 2022-01-03 | End: 2022-01-03

## 2022-01-03 RX ORDER — ORPHENADRINE CITRATE 30 MG/ML
60 INJECTION INTRAMUSCULAR; INTRAVENOUS ONCE
Status: COMPLETED | OUTPATIENT
Start: 2022-01-03 | End: 2022-01-03

## 2022-01-03 RX ORDER — LIDOCAINE 4 G/G
1 PATCH TOPICAL DAILY
Qty: 10 PATCH | Refills: 0 | Status: SHIPPED | OUTPATIENT
Start: 2022-01-03 | End: 2022-01-13

## 2022-01-03 RX ORDER — 0.9 % SODIUM CHLORIDE 0.9 %
1000 INTRAVENOUS SOLUTION INTRAVENOUS ONCE
Status: COMPLETED | OUTPATIENT
Start: 2022-01-03 | End: 2022-01-03

## 2022-01-03 RX ORDER — MORPHINE SULFATE 2 MG/ML
4 INJECTION, SOLUTION INTRAMUSCULAR; INTRAVENOUS ONCE
Status: COMPLETED | OUTPATIENT
Start: 2022-01-03 | End: 2022-01-03

## 2022-01-03 RX ORDER — ORPHENADRINE CITRATE 100 MG/1
100 TABLET, EXTENDED RELEASE ORAL 2 TIMES DAILY
Qty: 20 TABLET | Refills: 0 | Status: SHIPPED | OUTPATIENT
Start: 2022-01-03 | End: 2022-01-13

## 2022-01-03 RX ADMIN — KETOROLAC TROMETHAMINE 15 MG: 30 INJECTION, SOLUTION INTRAMUSCULAR at 14:04

## 2022-01-03 RX ADMIN — ORPHENADRINE CITRATE 60 MG: 30 INJECTION INTRAMUSCULAR; INTRAVENOUS at 14:04

## 2022-01-03 RX ADMIN — MORPHINE SULFATE 4 MG: 2 INJECTION, SOLUTION INTRAMUSCULAR; INTRAVENOUS at 10:24

## 2022-01-03 RX ADMIN — SODIUM CHLORIDE 1000 ML: 9 INJECTION, SOLUTION INTRAVENOUS at 10:25

## 2022-01-03 ASSESSMENT — PAIN SCALES - GENERAL
PAINLEVEL_OUTOF10: 10
PAINLEVEL_OUTOF10: 10
PAINLEVEL_OUTOF10: 9
PAINLEVEL_OUTOF10: 10

## 2022-01-03 ASSESSMENT — ENCOUNTER SYMPTOMS
EYE REDNESS: 0
WHEEZING: 0
ABDOMINAL PAIN: 1
SORE THROAT: 0
EYE DISCHARGE: 0
SINUS PRESSURE: 0
COUGH: 0
VOMITING: 0
NAUSEA: 0
SHORTNESS OF BREATH: 0
ABDOMINAL DISTENTION: 0
DIARRHEA: 0
EYE PAIN: 0
BACK PAIN: 0

## 2022-01-03 ASSESSMENT — PAIN DESCRIPTION - PAIN TYPE
TYPE: ACUTE PAIN
TYPE: ACUTE PAIN

## 2022-01-03 ASSESSMENT — PAIN DESCRIPTION - LOCATION
LOCATION: FLANK
LOCATION: FLANK

## 2022-01-03 NOTE — ED NOTES
Patient ambulatory to the bathroom without assistance. Assistance offered but declined. Patient voids and then returns to Room #18-B, ambulatory without assistance, gait steady. Patient continues to complain of back pain while ambulating and repositioning.      [address] [place] [address]  01/03/22 [id number]

## 2022-01-03 NOTE — ED PROVIDER NOTES
[facility]  Department of Emergency Medicine     Written by: Robby Prince DO  Patient Name: Roque Baird  Attending Provider: Latasha Dasilva MD  Admit Date: 1/3/2022  9:18 AM  MRN: [id number]                   : 1970        Chief Complaint   Patient presents with    Back Pain     patient states she has been having pain in back but that now she states that she is having pain when urinating and pain radiating to back     - Chief complaint    Patient is a 48-year-old female past medical history of chronic back pain, GERD, seizures and kidney stones. Patient notes chief complaint of left flank pain. Patient stated symptoms began 2 days ago. She describes the pain as a sharp stabbing sensation. She notes the pain begins in her left flank and radiates to her left pelvis. Patient currently rates pain a 10 out of 10. Patient denies any exacerbating relieving factors. Stated symptoms have been constant since onset. Patient notes that she has had similar episodes in the past when she was diagnosed with kidney stones. Patient denies any dysuria or blood in her urine currently. Patient denies any fevers, chills, nausea, vomiting, chest pain, cough or shortness of breath. The history is provided by the patient. No  was used. Review of Systems   Constitutional: Negative for chills and fever. HENT: Negative for ear pain, sinus pressure and sore throat. Eyes: Negative for pain, discharge and redness. Respiratory: Negative for cough, shortness of breath and wheezing. Cardiovascular: Negative for chest pain. Gastrointestinal: Positive for abdominal pain. Negative for abdominal distention, diarrhea, nausea and vomiting. Genitourinary: Negative for dysuria and frequency. Musculoskeletal: Negative for arthralgias and back pain. Skin: Negative for rash and wound. Neurological: Negative for weakness and headaches.    Hematological: Negative for station bilaterally, abdomen soft nontender. On examination of the back there is some left-sided flank pain with radiation to the left pelvic region, there is no midline lumbar spinal tenderness. Muscle strength 5 out of 5 to bilateral lower extremities, sensation intact, no saddle anesthesia noted. Laboratory work obtained CBC unremarkable, CMP unremarkable, lactic acid 1.2, urinalysis not due to infection. CT scan of the abdomen pelvis demonstrate no acute normalities. Patient given for morphine, 1 L normal saline, Toradol and Norflex with minimal improvement. I am consistent acute on chronic low back pain. Discussed options with patient including additional pain medication and admission however she states that she would not like to be admitted this time and like to try her home pain medications with close outpatient follow-up. Patient was given prescription for lidocaine patch as well as Norflex and instructed to follow-up with primary care doctor soon as possible. Patient notes any new worrisome symptoms she was instructed to return to emergency department for evaluation. Plan of care discussed with patient occluding discharge, all questions were answered, patient was in agreement plan of care discharged home in stable condition.        Amount and/or Complexity of Data Reviewed  Clinical lab tests: ordered and reviewed  Tests in the radiology section of CPT®: ordered and reviewed    Risk of Complications, Morbidity, and/or Mortality  Presenting problems: moderate  Diagnostic procedures: moderate  Management options: moderate    Patient Progress  Patient progress: stable           --------------------------------------------- PAST HISTORY ---------------------------------------------  Past Medical History:  has a past medical history of Asthma, Cerebral artery occlusion with cerebral infarction [facility]), Cervical disc herniation, Cervical spinal stenosis, Chronic back pain, Chronic kidney disease, Depression, Disorder of thyroid gland, GERD (gastroesophageal reflux disease), Headache(784.0), Hemorrhoids, History of colon polyps, Hypotension, Irritable bowel syndrome, Low back pain, Numbness and tingling in right hand, Palpitations, Scabies, Seizure disorder ([facility] [address] and Vitamin D deficiency. Past Surgical History:  has a past surgical history that includes Tubal ligation; Colonoscopy (03/09/2016); Colonoscopy (07/10/2017); Upper gastrointestinal endoscopy (07/10/2017); hip surgery (Left, 08/10/2020); Nerve Block (N/A); Pain management procedure (N/A, 4/19/2021); and Nerve Block (Bilateral, 8/5/2021). Social History:  reports that she quit smoking about 33 years ago. Her smoking use included cigarettes. She has a 25.00 pack-year smoking history. She has never used smokeless tobacco. She reports that she does not drink alcohol and does not use drugs. Family History: family history includes Anemia in her child; Asthma in her child and child; Diabetes in an other family member; Heart Attack in her mother; Kidney Disease in her father; Migraines in her mother and sister. The patients home medications have been reviewed.     Allergies: Fish-derived products and Dye [iodides]    -------------------------------------------------- RESULTS -------------------------------------------------  Labs:  Results for orders placed or performed during the hospital encounter of 01/03/22   CBC Auto Differential   Result Value Ref Range    WBC 6.4 4.5 - 11.5 E9/L    RBC 5.11 3.50 - 5.50 E12/L    Hemoglobin 14.0 11.5 - 15.5 g/dL    Hematocrit 42.8 34.0 - 48.0 %    MCV 83.8 80.0 - 99.9 fL    MCH 27.4 26.0 - 35.0 pg    MCHC 32.7 32.0 - 34.5 %    RDW 12.5 11.5 - 15.0 fL    Platelets 105 982 - 660 E9/L    MPV 10.1 7.0 - 12.0 fL    Neutrophils % 53.3 43.0 - 80.0 %    Immature Granulocytes % 0.2 0.0 - 5.0 %    Lymphocytes % 35.6 20.0 - 42.0 %    Monocytes % 8.9 2.0 - 12.0 %    Eosinophils % 1.4 0.0 - 6.0 %    Basophils % 0.6 0.0 - 2.0 %    Neutrophils Absolute 3.40 1.80 - 7.30 E9/L    Immature Granulocytes # 0.01 E9/L    Lymphocytes Absolute 2.27 1.50 - 4.00 E9/L    Monocytes Absolute 0.57 0.10 - 0.95 E9/L    Eosinophils Absolute 0.09 0.05 - 0.50 E9/L    Basophils Absolute 0.04 0.00 - 0.20 E9/L   Comprehensive Metabolic Panel   Result Value Ref Range    Sodium 138 132 - 146 mmol/L    Potassium 4.2 3.5 - 5.0 mmol/L    Chloride 105 98 - 107 mmol/L    CO2 22 22 - 29 mmol/L    Anion Gap 11 7 - 16 mmol/L    Glucose 108 (H) 74 - 99 mg/dL    BUN 13 6 - 20 mg/dL    CREATININE 0.8 0.5 - 1.0 mg/dL    GFR Non-African American >60 >=60 mL/min/1.73    GFR African American >60     Calcium 9.8 8.6 - 10.2 mg/dL    Total Protein 7.4 6.4 - 8.3 g/dL    Albumin 4.3 3.5 - 5.2 g/dL    Total Bilirubin 0.4 0.0 - 1.2 mg/dL    Alkaline Phosphatase 86 35 - 104 U/L    ALT 24 0 - 32 U/L    AST 17 0 - 31 U/L   Lactic Acid, Plasma   Result Value Ref Range    Lactic Acid 1.2 0.5 - 2.2 mmol/L   Urinalysis   Result Value Ref Range    Color, UA Yellow Straw/Yellow    Clarity, UA Clear Clear    Glucose, Ur Negative Negative mg/dL    Bilirubin Urine Negative Negative    Ketones, Urine Negative Negative mg/dL    Specific Gravity, UA <=1.005 1.005 - 1.030    Blood, Urine Negative Negative    pH, UA 5.5 5.0 - 9.0    Protein, UA Negative Negative mg/dL    Urobilinogen, Urine 0.2 <2.0 E.U./dL    Nitrite, Urine Negative Negative    Leukocyte Esterase, Urine SMALL (A) Negative   Microscopic Urinalysis   Result Value Ref Range    WBC, UA 2-5 0 - 5 /HPF    RBC, UA NONE 0 - 2 /HPF    Bacteria, UA NONE SEEN None Seen /HPF       Radiology:  CT ABDOMEN PELVIS WO CONTRAST Additional Contrast? None   Final Result   Diffuse hepatic steatosis. Somewhat distended gallbladder. Ultrasound may be obtained if clinical   suspicion for cholelithiasis is high. No radiopaque urolithiasis or hydroureteronephrosis on either side.       RECOMMENDATIONS:   Unavailable ------------------------- NURSING NOTES AND VITALS REVIEWED ---------------------------  Date / Time Roomed:  1/3/2022  9:18 AM  ED Bed Assignment:  18B/18B-18    The nursing notes within the ED encounter and vital signs as below have been reviewed. /82   Pulse 72   Temp 98.4 °F (36.9 °C)   Resp 18   Ht 4' 11\" (1.499 m)   Wt 160 lb (72.6 kg)   LMP 03/27/2008   SpO2 97%   BMI 32.32 kg/m²   Oxygen Saturation Interpretation: Normal      ------------------------------------------ PROGRESS NOTES ------------------------------------------  2:34 PM EST  I have spoken with the patient and discussed todays results, in addition to providing specific details for the plan of care and counseling regarding the diagnosis and prognosis. Their questions are answered at this time and they are agreeable with the plan. I discussed at length with them reasons for immediate return here for re evaluation. They will followup with their primary care physician by calling their office tomorrow. --------------------------------- ADDITIONAL PROVIDER NOTES ---------------------------------  At this time the patient is without objective evidence of an acute process requiring hospitalization or inpatient management. They have remained hemodynamically stable throughout their entire ED visit and are stable for discharge with outpatient follow-up. The plan has been discussed in detail and they are aware of the specific conditions for emergent return, as well as the importance of follow-up. New Prescriptions    LIDOCAINE 4 % EXTERNAL PATCH    Place 1 patch onto the skin daily for 10 days    ORPHENADRINE (NORFLEX) 100 MG EXTENDED RELEASE TABLET    Take 1 tablet by mouth 2 times daily for 10 days       Diagnosis:  1. Acute exacerbation of chronic low back pain        Disposition:  Patient's disposition: Discharge to home  Patient's condition is stable.       Patient was seen and evaluated by myself and my attending Shun Bernstein MD. Assessment and Plan discussed with attending provider, please see attestation for final plan of care.      DO Lizbeth Arguello,   Resident  01/03/22 [id number]

## 2022-01-03 NOTE — ED NOTES
Bed: 18B-18  Expected date: 1/3/22  Expected time:   Means of arrival:   Comments:  Deandre Muñoz [facility]  01/03/22 [id number]

## 2022-01-03 NOTE — ED NOTES
Patient assisted to the bathroom. Patient is standing at the bedside upon RN entering room. Patient ambulatory to the bathroom without difficulty. Patient complains of pain with standing and ambulating. Bathroom occupied, taken via wheelchair to a different bathroom. Patient stands up out of wheelchair without assistance, uses bathroom and returns to wheelchair without assistance. Patient returned to room, wants to stand at bedside. Patient encouraged to sit and/or lay on cart, but patient refuses. Patient asks for visitor chair to be moved to bedside to sit on. Patient encouraged to be careful, encouraged to seek help if needed.      Vish Maldonado RN  01/03/22 [id number]

## 2022-01-10 RX ORDER — DULOXETIN HYDROCHLORIDE 30 MG/1
30 CAPSULE, DELAYED RELEASE ORAL DAILY
Qty: 30 CAPSULE | Refills: 0 | OUTPATIENT
Start: 2022-01-10 | End: 2022-02-09

## 2022-01-13 ENCOUNTER — OFFICE VISIT (OUTPATIENT)
Dept: PAIN MANAGEMENT | Age: 52
End: 2022-01-13
Payer: MEDICAID

## 2022-01-13 ENCOUNTER — PREP FOR PROCEDURE (OUTPATIENT)
Dept: PAIN MANAGEMENT | Age: 52
End: 2022-01-13

## 2022-01-13 VITALS
RESPIRATION RATE: 16 BRPM | DIASTOLIC BLOOD PRESSURE: 82 MMHG | HEART RATE: 82 BPM | SYSTOLIC BLOOD PRESSURE: 118 MMHG | OXYGEN SATURATION: 98 % | TEMPERATURE: 97.7 F | WEIGHT: 160 LBS | HEIGHT: 59 IN | BODY MASS INDEX: 32.25 KG/M2

## 2022-01-13 DIAGNOSIS — G89.4 CHRONIC PAIN SYNDROME: ICD-10-CM

## 2022-01-13 DIAGNOSIS — M54.12 CERVICAL RADICULOPATHY: ICD-10-CM

## 2022-01-13 DIAGNOSIS — M47.812 CERVICAL FACET JOINT SYNDROME: ICD-10-CM

## 2022-01-13 DIAGNOSIS — M47.816 LUMBAR FACET ARTHROPATHY: Primary | ICD-10-CM

## 2022-01-13 DIAGNOSIS — M54.16 LUMBAR RADICULOPATHY: ICD-10-CM

## 2022-01-13 PROCEDURE — G8417 CALC BMI ABV UP PARAM F/U: HCPCS | Performed by: NURSE PRACTITIONER

## 2022-01-13 PROCEDURE — G8484 FLU IMMUNIZE NO ADMIN: HCPCS | Performed by: NURSE PRACTITIONER

## 2022-01-13 PROCEDURE — 99213 OFFICE O/P EST LOW 20 MIN: CPT | Performed by: NURSE PRACTITIONER

## 2022-01-13 PROCEDURE — G8427 DOCREV CUR MEDS BY ELIG CLIN: HCPCS | Performed by: NURSE PRACTITIONER

## 2022-01-13 PROCEDURE — 3017F COLORECTAL CA SCREEN DOC REV: CPT | Performed by: NURSE PRACTITIONER

## 2022-01-13 PROCEDURE — 1036F TOBACCO NON-USER: CPT | Performed by: NURSE PRACTITIONER

## 2022-01-13 PROCEDURE — 99214 OFFICE O/P EST MOD 30 MIN: CPT | Performed by: NURSE PRACTITIONER

## 2022-01-13 RX ORDER — TIZANIDINE 2 MG/1
TABLET ORAL PRN
COMMUNITY
Start: 2022-01-04 | End: 2022-02-14

## 2022-01-13 RX ORDER — LIDOCAINE 50 MG/G
1 PATCH TOPICAL DAILY
Qty: 10 PATCH | Refills: 3 | Status: SHIPPED | OUTPATIENT
Start: 2022-01-13 | End: 2022-09-15 | Stop reason: SDUPTHER

## 2022-01-13 RX ORDER — HYDROCODONE BITARTRATE AND ACETAMINOPHEN 5; 325 MG/1; MG/1
1 TABLET ORAL 2 TIMES DAILY PRN
Qty: 12 TABLET | Refills: 0 | Status: SHIPPED
Start: 2022-01-13 | End: 2022-01-18

## 2022-01-13 RX ORDER — SENNOSIDES 8.6 MG
650 CAPSULE ORAL EVERY 8 HOURS PRN
Qty: 60 TABLET | Refills: 3 | Status: SHIPPED
Start: 2022-01-13 | End: 2022-02-14 | Stop reason: SINTOL

## 2022-01-13 NOTE — PROGRESS NOTES
[address] [facility], [address] [place], [place] [id number]  [phone]    Follow up Note      Radha Marcelo     Date of Visit:  1/13/2022    CC:  Patient presents for follow up neck and low back     HPI:  Pt Pt recently in hospital for uncontrolled low back pain. Pt seen today for complaints of neck pain radiating down left arm accompanied by n/t fingers. Also reports low back pain radiating down left leg to knee. Pt would like to pursue injections for neck first the address low back. Appropriate analgesia with current medications regimen: somewhat  Change in quality of symptoms:no.    Medication side effects:none  Recent diagnostic testing:none  Recent interventional procedures:no    She has been on anticoagulation medications to include ASA. The patient Wendy Patrick not been on herbal supplements.  The patient is not diabetic.     Imaging:     Cervical spine MRI 04/2018  1. Multilevel degenerative disc disease extending from C3 through C7. Moderately severe spinal canal stenosis at C3-C4. Moderate spinal   canal stenosis at C4-C5. . Moderately severe spinal canal stenosis at   C5-C6. Abnormal signal intensity within the cervical spinal cord C3-C5   without evidence of enhancement. Findings are most likely reflective   of myelomalacia changes secondary to the underlying degree of cord   compression as described above. No active demyelination identified. 2. Slight loss of normally expected cervical lordosis C3-C6.      CT Lumbar spine 2017  1.  Mild diffuse osteopenia, no compression fracture or   spondylolisthesis.       2. Mild multilevel degenerative disc disease and facet joint   arthropathy of the lower lumbar spine more pronounced at L4-L5.      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         Otherwise, no musculoskeletal pathology to explain patient's pain.      Previous treatments: Physical Therapy, Epidural Steroid Injection with  and medications. Ailyn Swan severe side effects                                        Potential Aberrant Drug-Related Behavior:    No     Urine Drug Screening:  Saliva screen 08/2020 consistent for Ultram  11/2021: consistent for oxycodone    OARRS report:  1/2022 consistent. Opioid agreement: 12/13/21         Past Medical History:   Diagnosis Date    Asthma     Cerebral artery occlusion with cerebral infarction ([facility] [address]     2015-no deficits    Cervical disc herniation     Cervical spinal stenosis     Chronic back pain     Chronic kidney disease     Depression     Disorder of thyroid gland 09/11/2012    no medication    GERD (gastroesophageal reflux disease)     Headache(784.0)     Hemorrhoids     History of colon polyps     Hypotension     Irritable bowel syndrome     Low back pain     Numbness and tingling in right hand 03/06/2020    Palpitations 07/24/2012    Scabies 05/18/2016    Seizure disorder (HCC)     not on medications for this; last seizure 2018    Vitamin D deficiency 12/03/2014       Past Surgical History:   Procedure Laterality Date    COLONOSCOPY  03/09/2016    COLONOSCOPY  07/10/2017    HIP SURGERY Left 08/10/2020    LEFT GREATER TROCHANTERIC BURSAE INJECTION X-RAY (CPT 84755) performed by Vonnie Parks MD at [facility] N/A     epidural c6-c7    NERVE BLOCK Bilateral 8/5/2021    BILATERAL CERVICAL MEDIAL BRANCH FACET BLOCK C4 C5 C6 WITH SEDATION WITH LEFT TROCHANTERIC BURSA INJECTION(CPT 28984) performed by Vonnie Parks MD at [address] [place] [address] N/A 4/19/2021    CERVICAL EPIDURAL INTERLAMINAR STEROID INJECTION C6-C7 WITH SEDATION performed by Vonnie Parks MD at [facility] ENDOSCOPY  07/10/2017       Prior to Admission medications    Medication Sig Start Date End Date Taking?  Authorizing Provider   lidocaine 4 % external patch Place 1 patch onto the skin daily for 10 days 1/3/22 1/13/22  Ed Temple DO   orphenadrine (NORFLEX) 100 MG extended release tablet Take 1 tablet by mouth 2 times daily for 10 days 1/3/22 1/13/22  Ed Temple DO   albuterol sulfate  (90 Base) MCG/ACT inhaler INHALE 2 PUFFS INTO THE LUNGS EVERY 4 HOURS AS NEEDED FOR WHEEZING OR SHORTNESS OF BREATH  Patient taking differently: Inhale 2 puffs into the lungs every 4 hours as needed for Wheezing or Shortness of Breath  21   Candace Grewal DO   vitamin D3 (CHOLECALCIFEROL) 25 MCG (1000 UT) TABS tablet Take 1 tablet by mouth daily 21   Candace Grewal DO   Prenatal Vit-Fe Fumarate-FA (PRENATAL PLUS) 27-1 MG TABS 1 pill daily 21   Candace Grewal DO   aspirin EC 81 MG EC tablet Take 1 tablet by mouth daily 21   Candace Grewal DO   omeprazole (PRILOSEC) 20 MG delayed release capsule Take 1 capsule by mouth daily Take am dos 07/10. 21   Abdi Dye DO   EPINEPHrine (EPIPEN 2-ABUNDIO) 0.3 MG/0.3ML SOAJ injection Inject into muscle for allergic reaction 19   Abdi Grijalva DO   dicyclomine (BENTYL) 20 MG tablet Take 20 mg by mouth every 6 hours Take am dos 07/10    Historical Provider, MD       Allergies   Allergen Reactions    Fish-Derived Products Anaphylaxis    Dye [Iodides] Itching       Social History     Socioeconomic History    Marital status:      Spouse name: Not on file    Number of children: Not on file    Years of education: Not on file    Highest education level: Not on file   Occupational History    Not on file   Tobacco Use    Smoking status: Former Smoker     Packs/day: 1.00     Years: 25.00     Pack years: 25.00     Types: Cigarettes     Quit date: 1988     Years since quittin.7    Smokeless tobacco: Never Used   Vaping Use    Vaping Use: Never used   Substance and Sexual Activity    Alcohol use: No    Drug use: No    Sexual activity: Not on file   Other Topics Concern    Not on file   Social History Narrative    Not on file     Social Determinants of Health     Financial Resource Strain:     Difficulty of Paying Living Expenses: Not on file   Food Insecurity:     Worried About Running Out of Food in the Last Year: Not on file    Vlad of Food in the Last Year: Not on file   Transportation Needs:     Lack of Transportation (Medical): Not on file    Lack of Transportation (Non-Medical): Not on file   Physical Activity:     Days of Exercise per Week: Not on file    Minutes of Exercise per Session: Not on file   Stress:     Feeling of Stress : Not on file   Social Connections:     Frequency of Communication with Friends and Family: Not on file    Frequency of Social Gatherings with Friends and Family: Not on file    Attends Anabaptism Services: Not on file    Active Member of [address] [place] [address] [employer] or Organizations: Not on file    Attends Club or Organization Meetings: Not on file    Marital Status: Not on file   Intimate Partner Violence:     Fear of Current or Ex-Partner: Not on file    Emotionally Abused: Not on file    Physically Abused: Not on file    Sexually Abused: Not on file   Housing Stability:     Unable to Pay for Housing in the Last Year: Not on file    Number of [place] in the Last Year: Not on file    Unstable Housing in the Last Year: Not on file       Family History   Problem Relation Age of Onset    Heart Attack Mother     Migraines Mother     Kidney Disease Father         failure    Migraines Sister     Asthma Child     Asthma Child         bone disease    Anemia Child     Diabetes Other        REVIEW OF SYSTEMS:     Mark Rabago denies fever/chills, chest pain, shortness of breath, new bowel or bladder complaints or suicidal ideations. All other review of systems wasnegative. Review of Systems    PHYSICAL EXAMINATION:      [facility] 03/27/2008     General:       General appearance:   elderly, pleasant and well-hydrated.    , in moderate discomfort and A & O x3  Build:Overweight     HEENT:     Head:normocephalic and atraumatic  Sclera: icterus absent,     Lungs:     Breathing:Breathing Pattern: regular, no distress     Abdomen:     Shape:non-distended and normal  Tenderness:none     Cervical spine:     Inspection:normal  Palpation:tenderness paravertebral muscles, facet loading, left, right, positive and tenderness. Range of motion:abnormal moderately flexion, extension rotation bilateral and is  painful.     Lumbar spine:     Spine inspection:normal   CVA tenderness:No   Palpation:tenderness paravertebral muscles, facet loading, left, right, positive and tenderness. Range of motion:abnormal moderately Lateral bending, flexion, extension rotation bilateral and is  painful.     Musculoskeletal:     Trigger points in Paraveteral:absent   SI joint tenderness:negative right, negative left  SLR:negative right, positive left, sitting      Extremities:     Tremors:None bilaterally upper and lower  Range of motion:Generally normal shoulders, pain with internal rotation of hips positive Left  Intact:Yes  Edema:Normal     Neurological:     Sensory:diminshed to light touch lateral aspect of Left arm.   Motor:   Right Grip4+/5              Left Grip4+/5               Right Bicep4+/5           Left Bicep4+/5              Right Triceps4+/5       Left Triceps4+/5          Right Deltoid4+/5     Left Deltoid4+/5                  Right Quadriceps4+/5          Left Quadriceps4+/5           Right Gastrocnemius4+/5    Left Gastrocnemius4+/5  Right Ant Tibialis4+/5  Left Ant Tibialis4+/5    Gait:antalgic     Dermatology:     Skin:no unusual rashes and no skin lesions     Impression:    Cervical spine MRI 2019 Multilevel degenerative disc  disease with moderate to severe stenosis at C3-4/C4-5  and C5-6  Lumbar spine CT 2017 Mild degenerative disc disease and facet arthropathy most pronounced at L4-5  Patient had seen neurosurgery and surgery C3-4/C4-5 and C5-6 ACDF was recommended.   Patient had tried and failed Gabapentin/zanaflex/flexeril/Norco/Lyrica   Previously had seen neurosurgery and is a surgical  candidate. Plan:  Follow up on neck pain radiating down left arm with n/t hands and low back radiating down left leg to knee. DC Percocet  Trial Norco 5/325mg po 1/2 tab po one in am and 1/2 with tylenol arthritis in evening  Cymbalta severe side effects  Trial tylenol arthritis prn pain  Refill tizanidine 2mg po prn spasms  Order lidoderm patchs q 24 hours prn pain  Schedule for Cervical epidural left paramedian C6-7 with sedation x1,   Wants to schedule for low back epidural after neck addressed first  Patient had underwent cervical epidural at C6-7 (04/2021) for her radicular symptoms and bilateral C3,4,5 medial branch block x 1(08/2021) for her mechanical neck pain. Both interventions had helped with her pain. Avoid NSAIDs(H/O GI ulcers)  OARRS report reviewed   Patient encouraged to stay active and to lose weight. Treatment plan discussed with the patient and her including medications side effects.       We discussed with the patient that combining opioids, benzodiazepines, alcohol, illicit drugs or sleep aids increases the risk of respiratory depression including death. We discussed that these medications may cause drowsiness, sedation or dizziness and have counseled the patient not to drive or operate machinery. We have discussed that these medications will be prescribed only by one provider. We have discussed with the patient about age related risk factors and have thoroughly discussed the importance of taking these medications as prescribed. The patient verbalizes understanding.     More than 30 min spent with the patient     ccreferring physic

## 2022-01-13 NOTE — PROGRESS NOTES
Do you currently have any of the following:    Fever: No  Headache:  No  Cough: No  Shortness of breath: No  Exposed to anyone with these symptoms: No                                                                                                                Valentin [place] presents to the [facility] on 1/13/2022. Stacia Fan is complaining of pain lower back and down legs. Has back pain recently found out she has a cysts on her liver. . The pain is persistent. The pain is described as aching, shooting and sharp. Pain is rated on her best day at a 3, on her worst day at a 10, and on average at a 6 on the VAS scale. She took her last dose of Percocet and zanaflex . Stacia Fan does not have issues with constipation. Any procedures since your last visit: No,      She is not on NSAIDS and  is  on anticoagulation medications to include ASA  PCP. Pacemaker or defibrillator: No.    Medication Contract and Consent for Opioid Use Documents Filed     Patient Documents     Type of Document Status Date Received Received By Description    Medication Contract Received 7/18/2019 11:59 AM Sonia SINGLETON 7/18/19 medication contract    Medication Contract Received 10/9/2020  3:01 PM LORENZO, [facility] [address] [place] [address] pain pt agreement    Medication Contract Received 11/5/2021 11:29 AM Estella MUNIZ Pain Mgmt Patient Agreement 11.5.2021    Medication Contract Received 12/13/2021  4:21 PM LEONARDA PINA pain management                   /82   Pulse 82   Temp 97.7 °F (36.5 °C) (Infrared)   Resp 16   Ht 4' 11\" (1.499 m)   Wt 160 lb (72.6 kg)   LMP 03/27/2008   SpO2 98%   BMI 32.32 kg/m²      Patient's last menstrual period was 03/27/2008.

## 2022-01-18 ENCOUNTER — TELEPHONE (OUTPATIENT)
Dept: PAIN MANAGEMENT | Age: 52
End: 2022-01-18

## 2022-01-18 ENCOUNTER — HOSPITAL ENCOUNTER (OUTPATIENT)
Dept: SLEEP CENTER | Age: 52
Discharge: HOME | End: 2022-01-18
Payer: MEDICAID

## 2022-01-18 DIAGNOSIS — M47.812 CERVICAL FACET JOINT SYNDROME: ICD-10-CM

## 2022-01-18 DIAGNOSIS — G47.33 OSA (OBSTRUCTIVE SLEEP APNEA): ICD-10-CM

## 2022-01-18 DIAGNOSIS — M54.12 CERVICAL RADICULOPATHY: ICD-10-CM

## 2022-01-18 PROCEDURE — 95810 POLYSOM 6/> YRS 4/> PARAM: CPT

## 2022-01-18 RX ORDER — OXYCODONE HYDROCHLORIDE AND ACETAMINOPHEN 5; 325 MG/1; MG/1
1 TABLET ORAL DAILY PRN
Qty: 30 TABLET | Refills: 0 | Status: SHIPPED
Start: 2022-01-18 | End: 2022-01-27

## 2022-01-18 NOTE — TELEPHONE ENCOUNTER
Please make sure she is taking it with food. Also try 1/2 tablet twice a day with food as well. If nausea still persists in the next day or two please call office to let me know.

## 2022-01-19 VITALS
OXYGEN SATURATION: 97 % | BODY MASS INDEX: 32.25 KG/M2 | HEIGHT: 59 IN | HEART RATE: 70 BPM | DIASTOLIC BLOOD PRESSURE: 79 MMHG | WEIGHT: 160 LBS | SYSTOLIC BLOOD PRESSURE: 112 MMHG

## 2022-01-19 ASSESSMENT — SLEEP AND FATIGUE QUESTIONNAIRES
HOW LIKELY ARE YOU TO NOD OFF OR FALL ASLEEP WHILE SITTING AND READING: 2
HOW LIKELY ARE YOU TO NOD OFF OR FALL ASLEEP WHILE LYING DOWN TO REST IN THE AFTERNOON WHEN CIRCUMSTANCES PERMIT: 0
HOW LIKELY ARE YOU TO NOD OFF OR FALL ASLEEP WHILE WATCHING TV: 3
HOW LIKELY ARE YOU TO NOD OFF OR FALL ASLEEP WHILE SITTING QUIETLY AFTER LUNCH WITHOUT ALCOHOL: 0
HOW LIKELY ARE YOU TO NOD OFF OR FALL ASLEEP WHILE SITTING INACTIVE IN A PUBLIC PLACE: 1
HOW LIKELY ARE YOU TO NOD OFF OR FALL ASLEEP IN A CAR, WHILE STOPPED FOR A FEW MINUTES IN TRAFFIC: 0
ESS TOTAL SCORE: 9
HOW LIKELY ARE YOU TO NOD OFF OR FALL ASLEEP WHILE SITTING AND TALKING TO SOMEONE: 0
HOW LIKELY ARE YOU TO NOD OFF OR FALL ASLEEP WHEN YOU ARE A PASSENGER IN A CAR FOR AN HOUR WITHOUT A BREAK: 3

## 2022-01-21 ENCOUNTER — OFFICE VISIT (OUTPATIENT)
Dept: PRIMARY CARE CLINIC | Age: 52
End: 2022-01-21
Payer: MEDICAID

## 2022-01-21 VITALS
DIASTOLIC BLOOD PRESSURE: 74 MMHG | TEMPERATURE: 97.5 F | WEIGHT: 173 LBS | BODY MASS INDEX: 34.88 KG/M2 | HEIGHT: 59 IN | HEART RATE: 76 BPM | OXYGEN SATURATION: 96 % | SYSTOLIC BLOOD PRESSURE: 118 MMHG

## 2022-01-21 DIAGNOSIS — M47.816 LUMBAR FACET ARTHROPATHY: Primary | ICD-10-CM

## 2022-01-21 DIAGNOSIS — R91.1 LUNG NODULE: ICD-10-CM

## 2022-01-21 DIAGNOSIS — M47.816 LUMBAR SPONDYLOSIS: ICD-10-CM

## 2022-01-21 DIAGNOSIS — M17.12 PRIMARY OSTEOARTHRITIS OF LEFT KNEE: ICD-10-CM

## 2022-01-21 DIAGNOSIS — K76.89 HEPATIC CYST: ICD-10-CM

## 2022-01-21 PROCEDURE — G8484 FLU IMMUNIZE NO ADMIN: HCPCS | Performed by: FAMILY MEDICINE

## 2022-01-21 PROCEDURE — G8417 CALC BMI ABV UP PARAM F/U: HCPCS | Performed by: FAMILY MEDICINE

## 2022-01-21 PROCEDURE — G8427 DOCREV CUR MEDS BY ELIG CLIN: HCPCS | Performed by: FAMILY MEDICINE

## 2022-01-21 PROCEDURE — 3017F COLORECTAL CA SCREEN DOC REV: CPT | Performed by: FAMILY MEDICINE

## 2022-01-21 PROCEDURE — 1036F TOBACCO NON-USER: CPT | Performed by: FAMILY MEDICINE

## 2022-01-21 PROCEDURE — 99214 OFFICE O/P EST MOD 30 MIN: CPT | Performed by: FAMILY MEDICINE

## 2022-01-21 ASSESSMENT — ENCOUNTER SYMPTOMS
WHEEZING: 0
SORE THROAT: 0
CHANGE IN BOWEL HABIT: 0
DIARRHEA: 0
APNEA: 0
NAUSEA: 0
COLOR CHANGE: 0
RHINORRHEA: 0
CHEST TIGHTNESS: 0
CONSTIPATION: 0
ABDOMINAL PAIN: 0
BLOOD IN STOOL: 0
EYE ITCHING: 0
SHORTNESS OF BREATH: 0
EYE PAIN: 0
EYE REDNESS: 0
BACK PAIN: 1
VOMITING: 0
SINUS PRESSURE: 0
COUGH: 0

## 2022-01-21 ASSESSMENT — PATIENT HEALTH QUESTIONNAIRE - PHQ9
SUM OF ALL RESPONSES TO PHQ QUESTIONS 1-9: 0
2. FEELING DOWN, DEPRESSED OR HOPELESS: 0
SUM OF ALL RESPONSES TO PHQ QUESTIONS 1-9: 0
SUM OF ALL RESPONSES TO PHQ QUESTIONS 1-9: 0
SUM OF ALL RESPONSES TO PHQ9 QUESTIONS 1 & 2: 0
1. LITTLE INTEREST OR PLEASURE IN DOING THINGS: 0
SUM OF ALL RESPONSES TO PHQ QUESTIONS 1-9: 0

## 2022-01-21 NOTE — PROGRESS NOTES
Chief Complaint:     Chief Complaint   Patient presents with    Follow-Up from [address] [place] [address]    Other    Back Pain    Knee Pain         Other  This is a new (go over CT results from ER) problem. The current episode started 1 to 4 weeks ago. The problem occurs daily. The problem has been unchanged. Associated symptoms include arthralgias and myalgias. Pertinent negatives include no abdominal pain, change in bowel habit, chest pain, chills, congestion, coughing, diaphoresis, fatigue, fever, headaches, joint swelling, nausea, neck pain, numbness, rash, sore throat, vomiting or weakness. Nothing aggravates the symptoms. She has tried nothing for the symptoms. The treatment provided no relief. Back Pain  This is a recurrent problem. The current episode started more than 1 month ago. The problem occurs daily. The problem is unchanged. The pain is present in the lumbar spine. The quality of the pain is described as aching. The pain is moderate. The symptoms are aggravated by bending, position and twisting. Pertinent negatives include no abdominal pain, chest pain, dysuria, fever, headaches, numbness or weakness. She has tried nothing for the symptoms. The treatment provided no relief. Knee Pain   The incident occurred more than 1 week ago. The incident occurred at home. There was no injury mechanism. The pain is present in the left knee. The quality of the pain is described as aching. The pain has been intermittent since onset. Associated symptoms include a loss of motion and muscle weakness. Pertinent negatives include no numbness. She reports no foreign bodies present. The symptoms are aggravated by movement, palpation and weight bearing. She has tried nothing for the symptoms. The treatment provided no relief.        Patient Active Problem List   Diagnosis    Cervical stenosis of spinal canal    DDD (degenerative disc disease), cervical    History of TIA (transient ischemic attack) and stroke    Spinal stenosis of lumbar region without neurogenic claudication    Fibromyalgia    Migraines without aura and without status migrainosus, not intractable    Primary stabbing headache    Chronic pain syndrome    Primary osteoarthritis of left hip    Cervical disc disorder    Cervical facet joint syndrome    Cervical radiculopathy    Cervical stenosis of spine    Lumbar spondylosis    Lumbar facet arthropathy    Lumbar disc disorder    Greater trochanteric bursitis of left hip    Pain in left hip    Facet arthropathy, cervical    Primary osteoarthritis of left knee    Hepatic cyst    Lung nodule       Past Medical History:   Diagnosis Date    Asthma     Cerebral artery occlusion with cerebral infarction ([address]     2015-no deficits    Cervical disc herniation     Cervical spinal stenosis     Chronic back pain     Chronic kidney disease     Depression     Disorder of thyroid gland 09/11/2012    no medication    GERD (gastroesophageal reflux disease)     Headache(784.0)     Hemorrhoids     History of colon polyps     Hypotension     Irritable bowel syndrome     Low back pain     Numbness and tingling in right hand 03/06/2020    Palpitations 07/24/2012    Scabies 05/18/2016    Seizure disorder (HCC)     not on medications for this; last seizure 2018    Vitamin D deficiency 12/03/2014       Past Surgical History:   Procedure Laterality Date    COLONOSCOPY  03/09/2016    COLONOSCOPY  07/10/2017    HIP SURGERY Left 08/10/2020    LEFT GREATER TROCHANTERIC BURSAE INJECTION X-RAY (CPT 63262) performed by Carolin Jean Baptiste MD at [facility] N/A     epidural c6-c7    NERVE BLOCK Bilateral 8/5/2021    BILATERAL CERVICAL MEDIAL BRANCH FACET BLOCK C4 C5 C6 WITH SEDATION WITH LEFT TROCHANTERIC BURSA INJECTION(CPT 47264) performed by Carolin Jean Baptiste MD at [address] [place] [address] N/A 4/19/2021    CERVICAL EPIDURAL INTERLAMINAR STEROID INJECTION C6-C7 WITH SEDATION performed by Marisel Garsia MD at [facility] ENDOSCOPY  07/10/2017       Current Outpatient Medications   Medication Sig Dispense Refill    Elastic Bandages & Supports (KNEE BRACE) MISC Soft neutral position left knee brace  Size to fit  Dx:  Knee pain 1 each 0    oxyCODONE-acetaminophen (PERCOCET) 5-325 MG per tablet Take 1 tablet by mouth daily as needed for Pain for up to 30 days. 30 tablet 0    tiZANidine (ZANAFLEX) 2 MG tablet       acetaminophen (TYLENOL 8 HOUR ARTHRITIS PAIN) 650 MG extended release tablet Take 1 tablet by mouth every 8 hours as needed for Pain 60 tablet 3    lidocaine (LIDODERM) 5 % Place 1 patch onto the skin daily 12 hours on, 12 hours off. 10 patch 3    albuterol sulfate  (90 Base) MCG/ACT inhaler INHALE 2 PUFFS INTO THE LUNGS EVERY 4 HOURS AS NEEDED FOR WHEEZING OR SHORTNESS OF BREATH (Patient taking differently: Inhale 2 puffs into the lungs every 4 hours as needed for Wheezing or Shortness of Breath ) 6.7 g 1    vitamin D3 (CHOLECALCIFEROL) 25 MCG (1000 UT) TABS tablet Take 1 tablet by mouth daily 30 tablet 5    Prenatal Vit-Fe Fumarate-FA (PRENATAL PLUS) 27-1 MG TABS 1 pill daily 30 tablet 5    aspirin EC 81 MG EC tablet Take 1 tablet by mouth daily 90 tablet 1    omeprazole (PRILOSEC) 20 MG delayed release capsule Take 1 capsule by mouth daily Take am dos 07/10. 30 capsule 5    EPINEPHrine (EPIPEN 2-ABUNDIO) 0.3 MG/0.3ML SOAJ injection Inject into muscle for allergic reaction 0.3 mL 0    dicyclomine (BENTYL) 20 MG tablet Take 20 mg by mouth every 6 hours Take am dos 07/10       No current facility-administered medications for this visit.        Allergies   Allergen Reactions    Fish-Derived Products Anaphylaxis    Dye [Iodides] Itching       Social History     Socioeconomic History    Marital status:      Spouse name: Not on file    Number of children: Not on file    Years of education: Not on file    Highest education level: Not on file   Occupational History    Not on file   Tobacco Use    Smoking status: Former Smoker     Packs/day: 1.00     Years: 25.00     Pack years: 25.00     Types: Cigarettes     Quit date: 1988     Years since quittin.7    Smokeless tobacco: Never Used   Vaping Use    Vaping Use: Never used   Substance and Sexual Activity    Alcohol use: No    Drug use: No    Sexual activity: Not on file   Other Topics Concern    Not on file   Social History Narrative    Not on file     Social Determinants of Health     Financial Resource Strain:     Difficulty of Paying Living Expenses: Not on file   Food Insecurity:     Worried About Running Out of Food in the Last Year: Not on file    Vlad of Food in the Last Year: Not on file   Transportation Needs:     Lack of Transportation (Medical): Not on file    Lack of Transportation (Non-Medical):  Not on file   Physical Activity:     Days of Exercise per Week: Not on file    Minutes of Exercise per Session: Not on file   Stress:     Feeling of Stress : Not on file   Social Connections:     Frequency of Communication with Friends and Family: Not on file    Frequency of Social Gatherings with Friends and Family: Not on file    Attends Mu-ism Services: Not on file    Active Member of [address] [place] [address] [employer] or Organizations: Not on file    Attends Club or Organization Meetings: Not on file    Marital Status: Not on file   Intimate Partner Violence:     Fear of Current or Ex-Partner: Not on file    Emotionally Abused: Not on file    Physically Abused: Not on file    Sexually Abused: Not on file   Housing Stability:     Unable to Pay for Housing in the Last Year: Not on file    Number of [place] in the Last Year: Not on file    Unstable Housing in the Last Year: Not on file       Family History   Problem Relation Age of Onset    Heart Attack Mother     Migraines Mother     Kidney Disease Father         failure    Migraines Sister    Kennykay Castrofredrick Asthma Child     Asthma Child         bone disease    Anemia Child     Diabetes Other           Review of Systems   Constitutional: Negative for activity change, appetite change, chills, diaphoresis, fatigue and fever. HENT: Negative for congestion, ear pain, hearing loss, nosebleeds, rhinorrhea, sinus pressure and sore throat. Eyes: Negative for pain, redness, itching and visual disturbance. Respiratory: Negative for apnea, cough, chest tightness, shortness of breath and wheezing. Cardiovascular: Negative for chest pain, palpitations and leg swelling. Gastrointestinal: Negative for abdominal pain, blood in stool, change in bowel habit, constipation, diarrhea, nausea and vomiting. Endocrine: Negative. Genitourinary: Negative for decreased urine volume, difficulty urinating, dysuria, frequency, hematuria and urgency. Musculoskeletal: Positive for arthralgias, back pain and myalgias. Negative for gait problem, joint swelling and neck pain. Skin: Negative for color change and rash. Allergic/Immunologic: Negative for environmental allergies and food allergies. Neurological: Negative for dizziness, weakness, light-headedness, numbness and headaches. Hematological: Negative for adenopathy. Does not bruise/bleed easily. Psychiatric/Behavioral: Negative for behavioral problems, dysphoric mood and sleep disturbance. The patient is not nervous/anxious and is not hyperactive. All other systems reviewed and are negative. /74   Pulse 76   Temp 97.5 °F (36.4 °C)   Ht 4' 11\" (1.499 m)   Wt 173 lb (78.5 kg)   LMP 03/27/2008   SpO2 96%   BMI 34.94 kg/m²     Physical Exam  Vitals and nursing note reviewed. Constitutional:       General: She is not in acute distress. Appearance: Normal appearance. She is well-developed. HENT:      Head: Normocephalic and atraumatic. Right Ear: Hearing, tympanic membrane and external ear normal. No tenderness. No middle ear effusion. Left Ear: Hearing, tympanic membrane and external ear normal. No tenderness. No middle ear effusion. Nose: Nose normal. No congestion or rhinorrhea. Right Turbinates: Not enlarged. Left Turbinates: Not enlarged. Mouth/Throat:      Mouth: Mucous membranes are moist.      Tongue: No lesions. Pharynx: Oropharynx is clear. No oropharyngeal exudate or posterior oropharyngeal erythema. Eyes:      General: No scleral icterus. Conjunctiva/sclera: Conjunctivae normal.      Pupils: Pupils are equal, round, and reactive to light. Neck:      Thyroid: No thyromegaly. Cardiovascular:      Rate and Rhythm: Normal rate and regular rhythm. Heart sounds: Normal heart sounds. No murmur heard. Pulmonary:      Effort: Pulmonary effort is normal. No respiratory distress. Breath sounds: Normal breath sounds. No wheezing or rales. Abdominal:      General: Bowel sounds are normal. There is no distension. Palpations: Abdomen is soft. Tenderness: There is no abdominal tenderness. Musculoskeletal:         General: No tenderness. Normal range of motion. Cervical back: Normal range of motion and neck supple. No rigidity. No muscular tenderness. Lymphadenopathy:      Cervical: No cervical adenopathy. Skin:     General: Skin is warm and dry. Findings: No erythema or rash. Neurological:      General: No focal deficit present. Mental Status: She is alert and oriented to person, place, and time. Cranial Nerves: No cranial nerve deficit. Deep Tendon Reflexes: Reflexes are normal and symmetric.  Reflexes normal.   Psychiatric:         Mood and Affect: Mood normal.                                 ASSESSMENT/PLAN:    Patient Active Problem List   Diagnosis    Cervical stenosis of spinal canal    DDD (degenerative disc disease), cervical    History of TIA (transient ischemic attack) and stroke    Spinal stenosis of lumbar region without neurogenic claudication    Fibromyalgia    Migraines without aura and without status migrainosus, not intractable    Primary stabbing headache    Chronic pain syndrome    Primary osteoarthritis of left hip    Cervical disc disorder    Cervical facet joint syndrome    Cervical radiculopathy    Cervical stenosis of spine    Lumbar spondylosis    Lumbar facet arthropathy    Lumbar disc disorder    Greater trochanteric bursitis of left hip    Pain in left hip    Facet arthropathy, cervical    Primary osteoarthritis of left knee    Hepatic cyst    Lung nodule       Karin Rodriguez was seen today for follow-up from hospital, other, back pain and knee pain. Diagnoses and all orders for this visit:    Lumbar facet arthropathy  -     Farzad Campos, [address], [facility]    Lumbar spondylosis  -     Minh Campos, [address], [facility]    Primary osteoarthritis of left knee    Hepatic cyst  -     Vignesh Montelongo MD, Gastroenterology, [facility]    Lung nodule  -     CT CHEST WO CONTRAST; Future    Other orders  -     Elastic Bandages & Supports (KNEE BRACE) MISC; Soft neutral position left knee brace  Size to fit  Dx:  Knee pain          Return if symptoms worsen or fail to improve. I spent 30 minutes with this patient. I spent greater than 50% of the time counseling this patient.         Merline Boor, DO  1/21/2022  11:56 AM

## 2022-01-24 ENCOUNTER — ANESTHESIA EVENT (OUTPATIENT)
Dept: OPERATING ROOM | Age: 52
End: 2022-01-24
Payer: MEDICAID

## 2022-01-24 DIAGNOSIS — G47.33 OBSTRUCTIVE SLEEP APNEA: Primary | ICD-10-CM

## 2022-01-24 PROCEDURE — 95810 POLYSOM 6/> YRS 4/> PARAM: CPT | Performed by: INTERNAL MEDICINE

## 2022-01-24 ASSESSMENT — LIFESTYLE VARIABLES: SMOKING_STATUS: 0

## 2022-01-24 NOTE — Clinical Note
Albert NIÑO and Violeta,  Can you please contact Ms. Erlin Orr and let her know the results of her study? If possible, can you also determine if she is amenable to a trial of CPAP therapy? She is German-speaking, so if you feel that a clinic visit with a  would be more helpful, let's get that set up first.  There really is not a rush, since she has mild CIERA. She would be fine to follow with Davion Hu.   Thanks, Bere Hollis

## 2022-01-24 NOTE — PROGRESS NOTES
Patient's PSG interpreted, consistent with mild CIERA that occurs predominantly in REM sleep. Given the significance of her reported symptoms, she may benefit from a trial of auto CPAP therapy, which has been ordered. She will be notified of results and order will be sent to preferred DME. She will be reminded of insurance requirements for compliance and 30-day window to exchange mask interface. She will follow up in clinic within 3 months.     Vicky Nair MD

## 2022-01-24 NOTE — PROGRESS NOTES
[address] [address]                               SLEEP STUDY REPORT    PATIENT NAME: Lazarus Bough                        :        1970  MED REC NO:   [id number]                            ROOM:  ACCOUNT NO:   [de-identified]                           ADMIT DATE: 2022  PROVIDER:     Juliet Soulier, MD    DATE OF STUDY:  2022    FULL NIGHT POLYSOMNOGRAM REPORT    SERVICE PROVIDER:  [facility]. REFERRING PROVIDER:  Dr. Luc Ta    AGE: 45 yrs      SEX: Female          HEIGHT: 4 ft 11 in         WEIGHT: 176 lbs          BMI: 35.5 kg/m2    NECK CIRCUMFERENCE: 13.5 in    Symptoms: Witnessed apneas, nocturnal choking/gasping, loud snoring, restless sleep, morning headaches, difficulty with memory/concentration, nocturnal sweating, leg jerks, heartburn, teeth grinding, waking with heart pounding, nocturia, waking confused, sleep paralysis, racing thoughts. The [place] Sleepiness Scale was 9 out of 24 (scores above or equal to 10 are suggestive of hypersomnolence). Indication: Suspected obstructive sleep apnea. Medical History:  Obesity, asthma, chronic pain, fibromyalgia, history of TIA/stroke, panic attacks/anxiety. Medications: Tizanidine, Lidocaine patch, acetaminophen, aspirin. DESCRIPTION: This full night polysomnogram consisted of EEG, EOG, EMG and 2-lead ECG monitoring. Oronasal airflow (nasal pressure transducer and thermistor), chest and abdominal efforts by respiratory inductance plethysmography or polyvinylidene fluoride (PVDF) sensor, and pulse oximetry were monitored as well. Hypopneas were scored as at least a 30% reduction in amplitude of the semi-quantitative flow signal, associated with a 4% or greater oxygen desaturation.  Respiratory effort related arousals (RERAs) were scored as at least a 30% reduction in amplitude of the semi-quantitative flow signal, associated with an EEG microarousal.     FINDINGS:  SLEEP CONTINUITY AND SLEEP ARCHITECTURE:  Lights were turned off at 1005 and lights were turned on at 4:58 AM. Total recording time was 414 minutes and total sleep time was 324 minutes. The overall sleep efficiency was slightly reduced at 78%. Sleep onset latency was increased at 40 minutes and REM latency was normal at 86 minutes. There were 22 awakenings during this study. The duration of wakefulness after sleep onset was 50 minutes. The amount of N1 sleep was 5% of total sleep time, or 17 minutes. The amount of N2 sleep was 79% of total sleep time, or 255 minutes. The amount of REM sleep was reduced at 16% of total sleep time, or 53 minutes. Slow wave sleep was virtually absent. The microarousal index was normal.    RESPIRATORY MONITORING:  This study documented 5 obstructive apneas, 0 central apneas, 0 mixed apneas, no 55 hypopneas, and 8 respiratory effort related arousals (RERAs) during the total recorded sleep time. The overall apnea/hypopnea index (AHI) was 11. The overall respiratory disturbance index (RDI includes RERAs) was 12.6. The non-REM RDI was 5 and the REM RDI was 53. The patient slept in the supine position for 53% of total sleep time, with the majority of REM sleep occurring in supine sleep, and the supine RDI was 18. The patient spent the remainder of the night in the left and right lateral positions, with respective RDI's of 0 and 7.4. The average oxyhemoglobin saturation was 95% while awake, 93% during non-REM sleep and 92% during REM sleep. The overall 4% oxygen desaturation index during sleep was 11. The lowest oxygen saturation during sleep was 83%. Oxygen saturations were less than or equal to 88% for 5 minutes or 1.5% of the total sleep time. Snoring ranged from mild to loud. EEG:  No abnormal epileptiform activity was observed. ECG: The electrocardiogram documented normal sinus rhythm.   The average heart rate during sleep was 64 beats per minute. PERIODIC LIMB MOVEMENTS: No periodic limb movements were noted. EMG/VIDEO MONITORING: Loss of REM atonia, bruxism, and parasomnias were not observed. IMPRESSION:   1. This study is consistent with mild obstructive sleep apnea that occurs predominantly in REM sleep. The severity of this patient's sleep disordered breathing was likely underestimated due to the reduced percentage of REM sleep in the study. 2.  A split night study was not performed due to the mild nature of this patient's sleep disordered breathing. RECOMMENDATIONS:    1. Treatment for mild obstructive sleep apnea should be based on the patient's symptoms and comorbid conditions. Treatment options include CPAP therapy, oral appliance therapy, nasal EPAP, positional therapy, and/or weight loss efforts. Given this patient's reported symptoms, she may benefit from a trial of auto CPAP therapy, if she is amenable. This will be ordered by the interpreting provider. 2.  Per insurance requirements, the patient will be seen in clinical follow up within 3 months of receiving auto-CPAP therapy in order to document adherence to therapy, assess efficacy of the prescribed settings, and evaluate resolution of sleep-related complaints. 3.  The patient should be strongly counseled against driving while drowsy. 4.  Weight loss efforts should be encouraged, as the severity of obstructive sleep apnea may improve although no guarantee of cure can be made. EQUIPMENT ORDERING INFORMATION:  DEVICE:  Auto CPAP with heated humidification and a remote modem. SETTINGS:  5 to 15 cm of water with EPR of 3. MASK TYPE:  Full-face mask, or alternative as chosen by patient. MASK SIZE:  To be fit by DME. SUPPLEMENTAL OXYGEN:  None.         Merrick Patel MD      D: 01/24/2022 13:26:13       T: 01/24/2022 13:37:05     [email or web address]  Job#: [id number]     Doc#: [id number]    CC:

## 2022-01-25 ENCOUNTER — TELEPHONE (OUTPATIENT)
Dept: SLEEP CENTER | Age: 52
End: 2022-01-25

## 2022-01-25 NOTE — TELEPHONE ENCOUNTER
Spoke with pt and her EC re SS results (mild CIERA)  Explained to them that Dr would like to start pt on CPAP therapy. Was asked if this will help. Explained that it will help her with her breathing at night. Pt is amendable to starting on CPAP therapy. Pt has [facility] community plan and [address] [place] [address] is contracted with them for DME. All info will be sent to [address] [place] [address].  F/u appt for compliance was made for 4/25 and  will be needed.

## 2022-01-27 ENCOUNTER — HOSPITAL ENCOUNTER (OUTPATIENT)
Age: 52
Discharge: HOME | End: 2022-01-29
Payer: MEDICAID

## 2022-01-27 ENCOUNTER — TELEPHONE (OUTPATIENT)
Dept: PAIN MANAGEMENT | Age: 52
End: 2022-01-27

## 2022-01-27 ENCOUNTER — NURSE ONLY (OUTPATIENT)
Dept: PRIMARY CARE CLINIC | Age: 52
End: 2022-01-27

## 2022-01-27 DIAGNOSIS — M47.812 CERVICAL FACET JOINT SYNDROME: ICD-10-CM

## 2022-01-27 DIAGNOSIS — M54.12 CERVICAL RADICULOPATHY: ICD-10-CM

## 2022-01-27 DIAGNOSIS — M47.816 LUMBAR FACET ARTHROPATHY: ICD-10-CM

## 2022-01-27 DIAGNOSIS — G89.4 CHRONIC PAIN SYNDROME: ICD-10-CM

## 2022-01-27 DIAGNOSIS — M54.16 LUMBAR RADICULOPATHY: ICD-10-CM

## 2022-01-27 PROCEDURE — U0005 INFEC AGEN DETEC AMPLI PROBE: HCPCS

## 2022-01-27 PROCEDURE — U0003 INFECTIOUS AGENT DETECTION BY NUCLEIC ACID (DNA OR RNA); SEVERE ACUTE RESPIRATORY SYNDROME CORONAVIRUS 2 (SARS-COV-2) (CORONAVIRUS DISEASE [COVID-19]), AMPLIFIED PROBE TECHNIQUE, MAKING USE OF HIGH THROUGHPUT TECHNOLOGIES AS DESCRIBED BY CMS-2020-01-R: HCPCS

## 2022-01-27 RX ORDER — HYDROCODONE BITARTRATE AND ACETAMINOPHEN 5; 325 MG/1; MG/1
0.5 TABLET ORAL EVERY 8 HOURS PRN
Qty: 34 TABLET | Refills: 0 | Status: SHIPPED
Start: 2022-01-27 | End: 2022-02-14 | Stop reason: SDUPTHER

## 2022-01-27 NOTE — TELEPHONE ENCOUNTER
PDMP reviewed and Norco remainder prescription sent to pharmacy on file to take 1/2 tab po tid prn pain.  Yes Okay to take tylenol arthritis prn separate from norco.

## 2022-01-27 NOTE — TELEPHONE ENCOUNTER
The tylenol arthritis and Norco together upset her stomach and she gets GERD. If she takes them separate she is ok. I did instruct to get them separate. The Norco she needs a refill on the Norco, she only had 12 tablets.

## 2022-01-28 ENCOUNTER — TELEPHONE (OUTPATIENT)
Dept: CARDIOLOGY | Age: 52
End: 2022-01-28

## 2022-01-28 NOTE — TELEPHONE ENCOUNTER
CALLED PT TO SCHEDULE ECHO FOR 02-21-22. REVIEWED COVID CHECKLIST WITH PATIENT.     Electronically signed by Orville Reynolds on 1/28/2022 at 12:29 PM

## 2022-01-29 LAB
SARS-COV-2: NOT DETECTED
SOURCE: NORMAL

## 2022-01-31 ENCOUNTER — HOSPITAL ENCOUNTER (OUTPATIENT)
Dept: OPERATING ROOM | Age: 52
Setting detail: OUTPATIENT SURGERY
Discharge: HOME | End: 2022-01-31
Attending: PAIN MEDICINE
Payer: MEDICAID

## 2022-01-31 ENCOUNTER — HOSPITAL ENCOUNTER (OUTPATIENT)
Age: 52
Setting detail: OUTPATIENT SURGERY
Discharge: HOME | End: 2022-01-31
Attending: PAIN MEDICINE | Admitting: PAIN MEDICINE
Payer: MEDICAID

## 2022-01-31 ENCOUNTER — ANESTHESIA (OUTPATIENT)
Dept: OPERATING ROOM | Age: 52
End: 2022-01-31
Payer: MEDICAID

## 2022-01-31 VITALS
DIASTOLIC BLOOD PRESSURE: 58 MMHG | OXYGEN SATURATION: 96 % | RESPIRATION RATE: 18 BRPM | HEIGHT: 59 IN | WEIGHT: 167 LBS | TEMPERATURE: 98 F | SYSTOLIC BLOOD PRESSURE: 111 MMHG | BODY MASS INDEX: 33.67 KG/M2 | HEART RATE: 80 BPM

## 2022-01-31 VITALS
OXYGEN SATURATION: 100 % | RESPIRATION RATE: 23 BRPM | SYSTOLIC BLOOD PRESSURE: 98 MMHG | DIASTOLIC BLOOD PRESSURE: 77 MMHG

## 2022-01-31 DIAGNOSIS — M54.12 CERVICAL RADICULOPATHY: ICD-10-CM

## 2022-01-31 DIAGNOSIS — M54.12 CERVICAL RADICULOPATHY: Primary | ICD-10-CM

## 2022-01-31 PROCEDURE — 2580000003 HC RX 258: Performed by: ANESTHESIOLOGY

## 2022-01-31 PROCEDURE — 2709999900 HC NON-CHARGEABLE SUPPLY: Performed by: PAIN MEDICINE

## 2022-01-31 PROCEDURE — 2500000003 HC RX 250 WO HCPCS: Performed by: PAIN MEDICINE

## 2022-01-31 PROCEDURE — 7100000010 HC PHASE II RECOVERY - FIRST 15 MIN: Performed by: PAIN MEDICINE

## 2022-01-31 PROCEDURE — 6360000002 HC RX W HCPCS: Performed by: NURSE ANESTHETIST, CERTIFIED REGISTERED

## 2022-01-31 PROCEDURE — 62321 NJX INTERLAMINAR CRV/THRC: CPT | Performed by: PAIN MEDICINE

## 2022-01-31 PROCEDURE — A9579 GAD-BASE MR CONTRAST NOS,1ML: HCPCS | Performed by: PAIN MEDICINE

## 2022-01-31 PROCEDURE — 6360000004 HC RX CONTRAST MEDICATION: Performed by: PAIN MEDICINE

## 2022-01-31 PROCEDURE — 6360000002 HC RX W HCPCS: Performed by: PAIN MEDICINE

## 2022-01-31 PROCEDURE — 3209999900 FLUORO FOR SURGICAL PROCEDURES

## 2022-01-31 PROCEDURE — 2580000003 HC RX 258: Performed by: NURSE ANESTHETIST, CERTIFIED REGISTERED

## 2022-01-31 PROCEDURE — 3600000005 HC SURGERY LEVEL 5 BASE: Performed by: PAIN MEDICINE

## 2022-01-31 PROCEDURE — 7100000011 HC PHASE II RECOVERY - ADDTL 15 MIN: Performed by: PAIN MEDICINE

## 2022-01-31 PROCEDURE — 3700000000 HC ANESTHESIA ATTENDED CARE: Performed by: PAIN MEDICINE

## 2022-01-31 RX ORDER — SODIUM CHLORIDE, SODIUM LACTATE, POTASSIUM CHLORIDE, CALCIUM CHLORIDE 600; 310; 30; 20 MG/100ML; MG/100ML; MG/100ML; MG/100ML
INJECTION, SOLUTION INTRAVENOUS CONTINUOUS PRN
Status: DISCONTINUED | OUTPATIENT
Start: 2022-01-31 | End: 2022-01-31 | Stop reason: SDUPTHER

## 2022-01-31 RX ORDER — SODIUM CHLORIDE, SODIUM LACTATE, POTASSIUM CHLORIDE, CALCIUM CHLORIDE 600; 310; 30; 20 MG/100ML; MG/100ML; MG/100ML; MG/100ML
INJECTION, SOLUTION INTRAVENOUS CONTINUOUS
Status: DISCONTINUED | OUTPATIENT
Start: 2022-01-31 | End: 2022-01-31 | Stop reason: HOSPADM

## 2022-01-31 RX ORDER — MIDAZOLAM HYDROCHLORIDE 1 MG/ML
INJECTION INTRAMUSCULAR; INTRAVENOUS PRN
Status: DISCONTINUED | OUTPATIENT
Start: 2022-01-31 | End: 2022-01-31 | Stop reason: SDUPTHER

## 2022-01-31 RX ORDER — FENTANYL CITRATE 50 UG/ML
INJECTION, SOLUTION INTRAMUSCULAR; INTRAVENOUS PRN
Status: DISCONTINUED | OUTPATIENT
Start: 2022-01-31 | End: 2022-01-31 | Stop reason: SDUPTHER

## 2022-01-31 RX ORDER — LIDOCAINE HYDROCHLORIDE 5 MG/ML
INJECTION, SOLUTION INFILTRATION; INTRAVENOUS PRN
Status: DISCONTINUED | OUTPATIENT
Start: 2022-01-31 | End: 2022-01-31 | Stop reason: ALTCHOICE

## 2022-01-31 RX ADMIN — FENTANYL CITRATE 100 MCG: 50 INJECTION, SOLUTION INTRAMUSCULAR; INTRAVENOUS at 14:55

## 2022-01-31 RX ADMIN — MIDAZOLAM 2 MG: 1 INJECTION INTRAMUSCULAR; INTRAVENOUS at 14:53

## 2022-01-31 RX ADMIN — SODIUM CHLORIDE, POTASSIUM CHLORIDE, SODIUM LACTATE AND CALCIUM CHLORIDE: 600; 310; 30; 20 INJECTION, SOLUTION INTRAVENOUS at 13:30

## 2022-01-31 RX ADMIN — SODIUM CHLORIDE, POTASSIUM CHLORIDE, SODIUM LACTATE AND CALCIUM CHLORIDE: 600; 310; 30; 20 INJECTION, SOLUTION INTRAVENOUS at 14:53

## 2022-01-31 ASSESSMENT — PAIN SCALES - GENERAL
PAINLEVEL_OUTOF10: 0

## 2022-01-31 ASSESSMENT — PAIN - FUNCTIONAL ASSESSMENT: PAIN_FUNCTIONAL_ASSESSMENT: 0-10

## 2022-01-31 NOTE — ANESTHESIA POSTPROCEDURE EVALUATION
Department of Anesthesiology  Postprocedure Note    Patient: Tiffani Green  MRN: [id number]  YOB: 1970  Date of evaluation: 1/31/2022  Time:  3:13 PM     Procedure Summary     Date: 01/31/22 Room / Location: [address] [place] [address] 04 / [facility]    Anesthesia Start: 4259 Anesthesia Stop: 4744    Procedure: CERVICAL EPIDURAL STEROID INJECTION LEFT PARAMEDIAN C6-C7 WITH SEDATION (Left ) Diagnosis: (CERVICAL RADICULOPATHY)    Surgeons: Yosvany Loaiza MD Responsible Provider: Rachana Berumen MD    Anesthesia Type: MAC ASA Status: 3          Anesthesia Type: MAC    Willis Phase I: Willis Score: 10    Willis Phase II: Willis Score: 10    Last vitals: Reviewed and per EMR flowsheets.        Anesthesia Post Evaluation    Patient location during evaluation: PACU  Patient participation: complete - patient participated  Level of consciousness: awake  Airway patency: patent  Nausea & Vomiting: no nausea and no vomiting  Complications: no  Cardiovascular status: hemodynamically stable  Respiratory status: acceptable  Hydration status: euvolemic

## 2022-01-31 NOTE — H&P
[address]  [address] [facility], [address] [place] [address] Matheus  [phone]    Procedure History & Physical      Pat Coon     HPI:    Patient  is here for neck and arm pain for NANCI C6-7  Labs/imaging studies reviewed   All question and concerns addressed including R/B/A associated with the procedure    Past Medical History:   Diagnosis Date    Asthma     uses inhalers twice a day    Cerebral artery occlusion with cerebral infarction ([address]     2015-slight right sided weakness, ambulates normal    Cervical disc herniation     Cervical spinal stenosis     Chronic back pain     Chronic kidney disease     Depression     Disorder of thyroid gland 09/11/2012    no medication    GERD (gastroesophageal reflux disease)     Headache(784.0)     Hemorrhoids     History of colon polyps     Hypotension     Irritable bowel syndrome     Low back pain     Numbness and tingling in right hand 03/06/2020    Palpitations 07/24/2012    Scabies 05/18/2016    Seizure disorder ([address]     not on medications for this; last seizure 2018    Vitamin D deficiency 12/03/2014       Past Surgical History:   Procedure Laterality Date    COLONOSCOPY  03/09/2016    COLONOSCOPY  07/10/2017    HIP SURGERY Left 08/10/2020    LEFT GREATER TROCHANTERIC BURSAE INJECTION X-RAY (CPT 34769) performed by Gina Bowman MD at [facility] N/A     epidural c6-c7    NERVE BLOCK Bilateral 8/5/2021    BILATERAL CERVICAL MEDIAL BRANCH FACET BLOCK C4 C5 C6 WITH SEDATION WITH LEFT TROCHANTERIC BURSA INJECTION(CPT 83597) performed by Gina Bowman MD at [address] [place] [address] N/A 4/19/2021    CERVICAL EPIDURAL INTERLAMINAR STEROID INJECTION C6-C7 WITH SEDATION performed by Gina Bowman MD at [facility] ENDOSCOPY  07/10/2017       Prior to Admission medications    Medication Sig Start Date End Date Taking?  Authorizing Provider   tiZANidine (ZANAFLEX) 2 MG tablet as needed  1/4/22  Yes Historical Provider, MD   acetaminophen (TYLENOL 8 HOUR ARTHRITIS PAIN) 650 MG extended release tablet Take 1 tablet by mouth every 8 hours as needed for Pain 1/13/22 2/12/22 Yes NOY Arroyo CNP   albuterol sulfate  (90 Base) MCG/ACT inhaler INHALE 2 PUFFS INTO THE LUNGS EVERY 4 HOURS AS NEEDED FOR WHEEZING OR SHORTNESS OF BREATH  Patient taking differently: Inhale 2 puffs into the lungs every 4 hours as needed for Wheezing or Shortness of Breath  11/1/21  Yes Abdi Ji,    vitamin D3 (CHOLECALCIFEROL) 25 MCG (1000 UT) TABS tablet Take 1 tablet by mouth daily 8/26/21  Yes Geraleestevan Bell, DO   Prenatal Vit-Fe Fumarate-FA (PRENATAL PLUS) 27-1 MG TABS 1 pill daily 8/26/21  Yes Geralene Arabella, DO   omeprazole (PRILOSEC) 20 MG delayed release capsule Take 1 capsule by mouth daily Take am dos 07/10. Patient taking differently: Take 20 mg by mouth daily  1/21/21  Yes Geralene Arabella, DO   dicyclomine (BENTYL) 20 MG tablet Take 20 mg by mouth every 6 hours    Yes Historical Provider, MD   HYDROcodone-acetaminophen (NORCO) 5-325 MG per tablet Take 0.5 tablets by mouth every 8 hours as needed for Pain for up to 23 days. Intended supply: 23 days. Remainder of month supply 1/27/22 2/19/22  NOY Arroyo CNP   Elastic Bandages & Supports (KNEE BRACE) MISC Soft neutral position left knee brace  Size to fit  Dx:  Knee pain 1/21/22   Geralene Stakelvin, DO   lidocaine (LIDODERM) 5 % Place 1 patch onto the skin daily 12 hours on, 12 hours off. 1/13/22 2/12/22  NOY Arroyo CNP   EPINEPHrine (EPIPEN 2-ABUNDIO) 0.3 MG/0.3ML SOAJ injection Inject into muscle for allergic reaction 8/22/19   Abdi Pederson, DO       Allergies   Allergen Reactions    Fish-Derived Products Anaphylaxis    Dye [Iodides] Itching       Social History     Socioeconomic History    Marital status:      Spouse name: Not on file    Number of children: Not on file    Years of education: Not on file    Highest education level: Not on file   Occupational History    Not on file   Tobacco Use    Smoking status: Former Smoker     Packs/day: 1.00     Years: 25.00     Pack years: 25.00     Types: Cigarettes     Quit date: 1988     Years since quittin.8    Smokeless tobacco: Never Used   Vaping Use    Vaping Use: Never used   Substance and Sexual Activity    Alcohol use: No    Drug use: No    Sexual activity: Not on file   Other Topics Concern    Not on file   Social History Narrative    Not on file     Social Determinants of Health     Financial Resource Strain:     Difficulty of Paying Living Expenses: Not on file   Food Insecurity:     Worried About Running Out of Food in the Last Year: Not on file    Vlad of Food in the Last Year: Not on file   Transportation Needs:     Lack of Transportation (Medical): Not on file    Lack of Transportation (Non-Medical):  Not on file   Physical Activity:     Days of Exercise per Week: Not on file    Minutes of Exercise per Session: Not on file   Stress:     Feeling of Stress : Not on file   Social Connections:     Frequency of Communication with Friends and Family: Not on file    Frequency of Social Gatherings with Friends and Family: Not on file    Attends Jehovah's witness Services: Not on file    Active Member of [address] [place] [address] [employer] or Organizations: Not on file    Attends Club or Organization Meetings: Not on file    Marital Status: Not on file   Intimate Partner Violence:     Fear of Current or Ex-Partner: Not on file    Emotionally Abused: Not on file    Physically Abused: Not on file    Sexually Abused: Not on file   Housing Stability:     Unable to Pay for Housing in the Last Year: Not on file    Number of [place] in the Last Year: Not on file    Unstable Housing in the Last Year: Not on file       Family History   Problem Relation Age of Onset    Heart Attack Mother    [facility] Migraines Mother  Kidney Disease Father         failure    Migraines Sister     Asthma Child     Asthma Child         bone disease    Anemia Child     Diabetes Other          REVIEW OF SYSTEMS:    CONSTITUTIONAL:  negative for  fevers, chills, sweats and fatigue    RESPIRATORY:  negative for  dry cough, cough with sputum, dyspnea, wheezing and chest pain    CARDIOVASCULAR:  negative for chest pain, dyspnea, palpitations, syncope    GASTROINTESTINAL:  negative for nausea, vomiting, change in bowel habits, diarrhea, constipation and abdominal pain    MUSCULOSKELETAL: negative for muscle weakness    SKIN: negative for itching or rashes. BEHAVIOR/PSYCH:  negative for poor appetite, increased appetite, decreased sleep and poor concentration    All other systems negative      PHYSICAL EXAM:    VITALS:  /79   Pulse 76   Temp 98.4 °F (36.9 °C) (Skin)   Resp 18   Ht 4' 11\" (1.499 m)   Wt 167 lb (75.8 kg)   LMP 03/27/2008   SpO2 97%   BMI 33.73 kg/m²     CONSTITUTIONAL:  awake, alert, cooperative, no apparent distress, and appears stated age    EYES: PERRLA, EOMI    LUNGS:  No increased work of breathing, no audible wheezing    CARDIOVASCULAR:  regular rate and rhythm    ABDOMEN:  Soft non tender non distended     EXTREMITIES: no signs of clubbing or cyanosis. MUSCULOSKELETAL: negative for flaccid muscle tone or spastic movements. SKIN: gross examination reveals no signs of rashes, or diaphoresis. NEURO: Cranial nerves II-XII grossly intact. No signs of agitated mood. Assessment/Plan:    Neck and arm pain for NANCI C6-7.

## 2022-01-31 NOTE — OP NOTE
2022    Patient: Radha Marcelo  :  1970  Age:  45 y.o. Sex:  female     PRE-PROCEDURE DIAGNOSIS: Cervical degenerative disc disease, cervical radicular pain. POST-PROCEDURE DIAGNOSIS: Same. PROCEDURE: Fluoroscopic guided cervical epidural steroid injection, #1 at C6-7 level. SURGEON: AILEEN Spann M.D. ANESTHESIA: MAC    ESTIMATED BLOOD LOSS: None.  ______________________________________________________________________  BRIEF HISTORY:  Radha Marcelo comes in today for the first  therapeutic cervical epidural injection under fluoroscopic guidance. The potential complications of this procedure were discussed with the her again today. She has elected to undergo the aforementioned procedure. [place] complete History & Physical examination were reviewed in depth, a copy of which is in the chart. DESCRIPTION OF PROCEDURE:    After confirming written and informed consent, a time-out was performed and [place] name and date of birth, the procedure to be performed as well as the plan for the location of the needle insertion were confirmed. The patient was brought into the procedure room and placed in the prone position with the head flexed midline on the fluoroscopy table. A pillow was placed under the patient's head to increase cervical interlaminar space. Standard monitors were placed, and vital signs were observed throughout the procedure. The area was prepped with chloraprep and the C6-7 interspace was marked under fluoroscopy. The skin and subcutaneous tissues at the above level were anesthestized with 0.5% lidocaine. An # 18 gauge 3 1/2 tuohy epidural needle was inserted and advanced toward the inferior lamina until bony contact was made. The needle was then advanced superiorly toward epidural space . From this point on hanging drop/loss of resistance technique with 5 cc glass syringe was used to confirm entrance of the needle into the epidural space under intermittent lateral fluoroscopy. Once in the epidural space , negative aspiration for blood and CSF was confirmed . Needle tip placement was confirmed by visualizing epidural spread of 0.5 ml of omnipaque 240 visualized in both AP and lateral live fluoroscopic views. Then after negative aspiration, a solution of 0.5 % Lidocaine 1 ml and 80 mg DepoMedrol was easily injected. The needle was gently removed intact. The patient neck was cleaned and a Band-Aid was placed over the needle insertion point. Disposition the patient tolerated the procedure well and there were no complications . Vital signs remained stable throughout the procedure. The patient was escorted to the recovery area where they remained until discharge and written discharge instructions for the procedure were given. Plan: Colt Aquino will return to our pain management center as scheduled.      Ria Hodgkins, MD

## 2022-02-14 ENCOUNTER — OFFICE VISIT (OUTPATIENT)
Dept: PAIN MANAGEMENT | Age: 52
End: 2022-02-14
Payer: MEDICAID

## 2022-02-14 VITALS
WEIGHT: 175 LBS | RESPIRATION RATE: 16 BRPM | OXYGEN SATURATION: 98 % | HEIGHT: 59 IN | BODY MASS INDEX: 35.28 KG/M2 | SYSTOLIC BLOOD PRESSURE: 130 MMHG | TEMPERATURE: 97.3 F | HEART RATE: 80 BPM | DIASTOLIC BLOOD PRESSURE: 82 MMHG

## 2022-02-14 DIAGNOSIS — M54.16 LUMBAR RADICULOPATHY: ICD-10-CM

## 2022-02-14 DIAGNOSIS — G89.4 CHRONIC PAIN SYNDROME: ICD-10-CM

## 2022-02-14 DIAGNOSIS — M47.812 CERVICAL FACET JOINT SYNDROME: ICD-10-CM

## 2022-02-14 DIAGNOSIS — M47.816 LUMBAR FACET ARTHROPATHY: ICD-10-CM

## 2022-02-14 DIAGNOSIS — M54.12 CERVICAL RADICULOPATHY: ICD-10-CM

## 2022-02-14 PROCEDURE — G8427 DOCREV CUR MEDS BY ELIG CLIN: HCPCS | Performed by: NURSE PRACTITIONER

## 2022-02-14 PROCEDURE — 1036F TOBACCO NON-USER: CPT | Performed by: NURSE PRACTITIONER

## 2022-02-14 PROCEDURE — 3017F COLORECTAL CA SCREEN DOC REV: CPT | Performed by: NURSE PRACTITIONER

## 2022-02-14 PROCEDURE — 99213 OFFICE O/P EST LOW 20 MIN: CPT | Performed by: NURSE PRACTITIONER

## 2022-02-14 PROCEDURE — G8484 FLU IMMUNIZE NO ADMIN: HCPCS | Performed by: NURSE PRACTITIONER

## 2022-02-14 PROCEDURE — G8417 CALC BMI ABV UP PARAM F/U: HCPCS | Performed by: NURSE PRACTITIONER

## 2022-02-14 RX ORDER — BACLOFEN 5 MG/1
5 TABLET ORAL 3 TIMES DAILY
Qty: 45 TABLET | Refills: 0 | Status: SHIPPED
Start: 2022-02-14 | End: 2022-02-15 | Stop reason: CLARIF

## 2022-02-14 RX ORDER — HYDROCODONE BITARTRATE AND ACETAMINOPHEN 5; 325 MG/1; MG/1
0.5 TABLET ORAL EVERY 8 HOURS PRN
Qty: 45 TABLET | Refills: 0 | Status: SHIPPED
Start: 2022-02-14 | End: 2022-03-14 | Stop reason: SDUPTHER

## 2022-02-14 NOTE — PROGRESS NOTES
Do you currently have any of the following:    Fever: No  Headache:  No  Cough: No  Shortness of breath: No  Exposed to anyone with these symptoms: No                                                                                                                Juan Matamoros presents to the [address] [address] on 2/14/2022. Roberto Martinez is complaining of pain in her neck and low back. The pain is constant. The pain is described as aching, throbbing, shooting, stabbing and sharp. Pain is rated on her best day at a 8, on her worst day at a 10, and on average at a 9 on the VAS scale. She took her last dose of [place] and tizanidine . Roberto Martinez does not have issues with constipation. Any procedures since your last visit: Yes, with 50 % relief. X 3 days, then it came back. She was having back spasms after that. She is not on NSAIDS and  is not on anticoagulation medications to include none and is managed by NA. Pacemaker or defibrillator: No Physician managing device is NA. Medication Contract and Consent for Opioid Use Documents Filed     Patient Documents     Type of Document Status Date Received Received By Description    Medication Contract Received 7/18/2019 11:59 AM Joanne SINGLETON 7/18/19 medication contract    Medication Contract Received 10/9/2020  3:01 PM LORENZO, [facility] [address] [place] [address] pain pt agreement    Medication Contract Received 11/5/2021 11:29 AM Trevor MUNIZ Pain Mgmt Patient Agreement 11.5.2021    Medication Contract Received 12/13/2021  4:21 PM LEONARDA PINA pain management                   /82   Pulse 80   Temp 97.3 °F (36.3 °C) (Infrared)   Resp 16   Ht 4' 11\" (1.499 m)   Wt 175 lb (79.4 kg)   LMP 03/27/2008   SpO2 98%   BMI 35.35 kg/m²      Patient's last menstrual period was 03/27/2008.

## 2022-02-14 NOTE — PROGRESS NOTES
[address] [facility], [address] [place] [id number]  [phone]    Follow up Note      Richard Polanco     Date of Visit:  2/14/2022    CC:  Patient presents for follow up neck and low back     HPI:  Pt here s/p cervical epidural helped with raidcular sx but here with increased spasms post injection. Tizanidine helps but makes her sleepy. Pt also notes tylenol arthritis caused heartburn and gi upset. Appropriate analgesia with current medications regimen: somewhat  Change in quality of symptoms:yes  Medication side effects: tylenol arthritis heartburn   Recent diagnostic testing:none  Recent interventional procedures: 1/31/22: Cervical epidural left paramedian C6-7 with     She has been on anticoagulation medications to include ASA. The patient Bladimir Shah not been on herbal supplements.  The patient is not diabetic.     Imaging:     Cervical spine MRI 04/2018  1. Multilevel degenerative disc disease extending from C3 through C7. Moderately severe spinal canal stenosis at C3-C4. Moderate spinal   canal stenosis at C4-C5. . Moderately severe spinal canal stenosis at   C5-C6. Abnormal signal intensity within the cervical spinal cord C3-C5   without evidence of enhancement. Findings are most likely reflective   of myelomalacia changes secondary to the underlying degree of cord   compression as described above. No active demyelination identified. 2. Slight loss of normally expected cervical lordosis C3-C6.      CT Lumbar spine 2017  1.  Mild diffuse osteopenia, no compression fracture or   spondylolisthesis.       2. Mild multilevel degenerative disc disease and facet joint   arthropathy of the lower lumbar spine more pronounced at L4-L5.      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         Otherwise, no musculoskeletal pathology to explain patient's pain.      Previous treatments: Physical Therapy, Epidural Steroid Injection with  and medications. Wade Adler severe side effects, tylenol arthritis heartburn and gi distress,  Tizanidine helps but makes tired                                        Potential Aberrant Drug-Related Behavior:    No     Urine Drug Screening:  Saliva screen 08/2020 consistent for Ultram  11/2021: consistent for oxycodone    OARRS report:  1/2022 consistent.     Opioid agreement: 12/13/21         Past Medical History:   Diagnosis Date    Asthma     uses inhalers twice a day    Cerebral artery occlusion with cerebral infarction ([facility] [address]     2015-slight right sided weakness, ambulates normal    Cervical disc herniation     Cervical spinal stenosis     Chronic back pain     Chronic kidney disease     Depression     Disorder of thyroid gland 09/11/2012    no medication    GERD (gastroesophageal reflux disease)     Headache(784.0)     Hemorrhoids     History of colon polyps     Hypotension     Irritable bowel syndrome     Low back pain     Numbness and tingling in right hand 03/06/2020    Palpitations 07/24/2012    Scabies 05/18/2016    Seizure disorder ([facility] [address]     not on medications for this; last seizure 2018    Sleep apnea     Vitamin D deficiency 12/03/2014       Past Surgical History:   Procedure Laterality Date    COLONOSCOPY  03/09/2016    COLONOSCOPY  07/10/2017    HIP SURGERY Left 08/10/2020    LEFT GREATER TROCHANTERIC BURSAE INJECTION X-RAY (CPT 02183) performed by Bryanna Roberson MD at [facility] N/A     epidural c6-c7    NERVE BLOCK Bilateral 8/5/2021    BILATERAL CERVICAL MEDIAL BRANCH FACET BLOCK C4 C5 C6 WITH SEDATION WITH LEFT TROCHANTERIC BURSA INJECTION(CPT 53238) performed by Bryanna Roberson MD at [address] N/A 4/19/2021    CERVICAL EPIDURAL INTERLAMINAR STEROID INJECTION C6-C7 WITH SEDATION performed by Bryanna Roberson MD at [address] Left 1/31/2022    CERVICAL EPIDURAL STEROID INJECTION LEFT PARAMEDIAN C6-C7 WITH SEDATION performed by Stuart Williamson MD at [address] [facility] ENDOSCOPY  07/10/2017       Prior to Admission medications    Medication Sig Start Date End Date Taking? Authorizing Provider   HYDROcodone-acetaminophen (NORCO) 5-325 MG per tablet Take 0.5 tablets by mouth every 8 hours as needed for Pain for up to 23 days. Intended supply: 23 days. Remainder of month supply 1/27/22 2/19/22 Yes NOY Stovall CNP   Elastic Bandages & Supports (KNEE BRACE) MISC Soft neutral position left knee brace  Size to fit  Dx:  Knee pain 1/21/22  Yes Ginger Narayanan, DO   tiZANidine (ZANAFLEX) 2 MG tablet as needed  1/4/22  Yes Historical Provider, MD   albuterol sulfate  (90 Base) MCG/ACT inhaler INHALE 2 PUFFS INTO THE LUNGS EVERY 4 HOURS AS NEEDED FOR WHEEZING OR SHORTNESS OF BREATH  Patient taking differently: Inhale 2 puffs into the lungs every 4 hours as needed for Wheezing or Shortness of Breath  11/1/21  Yes Abdi Patel,    vitamin D3 (CHOLECALCIFEROL) 25 MCG (1000 UT) TABS tablet Take 1 tablet by mouth daily 8/26/21  Yes Ginger Narayanan DO   Prenatal Vit-Fe Fumarate-FA (PRENATAL PLUS) 27-1 MG TABS 1 pill daily 8/26/21  Yes Abdi Grijalva DO   omeprazole (PRILOSEC) 20 MG delayed release capsule Take 1 capsule by mouth daily Take am dos 07/10.   Patient taking differently: Take 20 mg by mouth daily  1/21/21  Yes Ginger Narayanan DO   EPINEPHrine (EPIPEN 2-ABUNDIO) 0.3 MG/0.3ML SOAJ injection Inject into muscle for allergic reaction 8/22/19  Yes Abdi Grijalva DO   dicyclomine (BENTYL) 20 MG tablet Take 20 mg by mouth every 6 hours    Yes Historical Provider, MD   acetaminophen (TYLENOL 8 HOUR ARTHRITIS PAIN) 650 MG extended release tablet Take 1 tablet by mouth every 8 hours as needed for Pain 1/13/22 2/12/22  NOY Stovall CNP       Allergies   Allergen Reactions    Fish-Derived Products Anaphylaxis    Dye [Iodides] Itching       Social History     Socioeconomic History    Marital status:      Spouse name: Not on file    Number of children: Not on file    Years of education: Not on file    Highest education level: Not on file   Occupational History    Not on file   Tobacco Use    Smoking status: Former Smoker     Packs/day: 1.00     Years: 25.00     Pack years: 25.00     Types: Cigarettes     Quit date: 1988     Years since quittin.8    Smokeless tobacco: Never Used   Vaping Use    Vaping Use: Never used   Substance and Sexual Activity    Alcohol use: No    Drug use: No    Sexual activity: Not on file   Other Topics Concern    Not on file   Social History Narrative    Not on file     Social Determinants of Health     Financial Resource Strain:     Difficulty of Paying Living Expenses: Not on file   Food Insecurity:     Worried About [address] in the Last Year: Not on file    Vlad of Food in the Last Year: Not on file   Transportation Needs:     Lack of Transportation (Medical): Not on file    Lack of Transportation (Non-Medical):  Not on file   Physical Activity:     Days of Exercise per Week: Not on file    Minutes of Exercise per Session: Not on file   Stress:     Feeling of Stress : Not on file   Social Connections:     Frequency of Communication with Friends and Family: Not on file    Frequency of Social Gatherings with Friends and Family: Not on file    Attends Holiness Services: Not on file    Active Member of Clubs or Organizations: Not on file    Attends Club or Organization Meetings: Not on file    Marital Status: Not on file   Intimate Partner Violence:     Fear of Current or Ex-Partner: Not on file    Emotionally Abused: Not on file    Physically Abused: Not on file    Sexually Abused: Not on file   Housing Stability:     Unable to Pay for Housing in the Last Year: Not on file    Number of [place] in the Last Year: Not on file    Unstable Housing in the Last Year: Not on file       Family History   Problem Relation Age of Onset    Heart Attack Mother     Migraines Mother     Kidney Disease Father         failure    Migraines Sister     Asthma Child     Asthma Child         bone disease    Anemia Child     Diabetes Other        REVIEW OF SYSTEMS:     Shilpa Peterson denies fever/chills, chest pain, shortness of breath, new bowel or bladder complaints or suicidal ideations. All other review of systems wasnegative. Review of Systems    PHYSICAL EXAMINATION:      /82   Pulse 80   Temp 97.3 °F (36.3 °C) (Infrared)   Resp 16   Ht 4' 11\" (1.499 m)   Wt 175 lb (79.4 kg)   LMP 03/27/2008   SpO2 98%   BMI 35.35 kg/m²     General:       General appearance:   elderly, pleasant and well-hydrated. , in moderate discomfort and A & O x3  Build:Overweight     HEENT:     Head:normocephalic and atraumatic  Sclera: icterus absent,     Lungs:     Breathing:Breathing Pattern: regular, no distress     Abdomen:     Shape:non-distended and normal  Tenderness:none     Cervical spine:     Inspection:normal  Palpation:tenderness paravertebral muscles, facet loading, left, right, positive and tenderness. Range of motion:abnormal moderately flexion, extension rotation bilateral and is  painful.     Lumbar spine:     Spine inspection:normal   CVA tenderness:No   Palpation:tenderness paravertebral muscles, facet loading, left, right, positive and tenderness.   Range of motion:abnormal moderately Lateral bending, flexion, extension rotation bilateral and is  painful.     Musculoskeletal:     Trigger points in Paraveteral:absent   SI joint tenderness:negative right, negative left  SLR:negative right, positive left, sitting      Extremities:     Tremors:None bilaterally upper and lower  Range of motion:Generally normal shoulders, pain with internal rotation of hips positive Left  Intact:Yes  Edema:Normal     Neurological:     Sensory:diminshed to light touch lateral aspect of Left arm. Motor:   Right Grip4+/5              Left Grip4+/5               Right Bicep4+/5           Left Bicep4+/5              Right Triceps4+/5       Left Triceps4+/5          Right Deltoid4+/5     Left Deltoid4+/5                  Right Quadriceps4+/5          Left Quadriceps4+/5           Right Gastrocnemius4+/5    Left Gastrocnemius4+/5  Right Ant Tibialis4+/5  Left Ant Tibialis4+/5    Gait:antalgic     Dermatology:     Skin:no unusual rashes and no skin lesions     Impression:    Cervical spine MRI 2019 Multilevel degenerative disc disease with  moderate to  severe stenosis at C3-4/C4-5 and C5-6  Lumbar spine CT 2017 Mild degenerative disc disease and facet arthropathy most pronounced at L4-5  Patient had seen neurosurgery and surgery C3-4/C4-5 and C5-6 ACDF was recommended.   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 Patient had underwent cervical epidural at C6-7 (04/2021) for her radicular  symptoms and bilateral C3,4,5 medial branch block x 1(08/2021) for her  mechanical neck pain. Both interventions had helped with her pain. Plan:   Follow up on neck pain radiating down left arm with n/t hands and low back  radiating down left leg to knee. 1/31/22: Cervical epidural left paramedian C6-7 with with >80% relief  Trial baclofen 5mg po bid prn spasms  DC tylenol arthritis   DC tizanidine  Continue lidoderm patchs q 24 hours prn pain  Wants to schedule for low back epidural in 1-2 months  Avoid NSAIDs(H/O GI ulcers)  OARRS report reviewed   Patient encouraged to stay active and to lose weight. Treatment plan discussed with the patient and her including medications side effects.       We discussed with the patient that combining opioids, benzodiazepines, alcohol, illicit drugs or sleep aids increases the risk of respiratory depression including death.  We discussed that these medications may cause drowsiness, sedation or dizziness and have counseled the patient not to drive or operate machinery. We have discussed that these medications will be prescribed only by one provider. We have discussed with the patient about age related risk factors and have thoroughly discussed the importance of taking these medications as prescribed. The patient verbalizes understanding.     More than 30 min spent with the patient     ccreferring physic

## 2022-02-15 ENCOUNTER — HOSPITAL ENCOUNTER (OUTPATIENT)
Dept: CT IMAGING | Age: 52
Discharge: HOME | End: 2022-02-17
Payer: MEDICAID

## 2022-02-15 DIAGNOSIS — R91.1 LUNG NODULE: ICD-10-CM

## 2022-02-15 PROCEDURE — 71250 CT THORAX DX C-: CPT

## 2022-02-15 RX ORDER — BACLOFEN 10 MG/1
5 TABLET ORAL 2 TIMES DAILY
Qty: 30 TABLET | Refills: 0 | Status: SHIPPED
Start: 2022-02-15 | End: 2022-03-14 | Stop reason: SDUPTHER

## 2022-02-16 DIAGNOSIS — R91.1 LUNG NODULE: Primary | ICD-10-CM

## 2022-02-21 ENCOUNTER — HOSPITAL ENCOUNTER (OUTPATIENT)
Dept: CARDIOLOGY | Age: 52
Discharge: HOME | End: 2022-02-21
Payer: MEDICAID

## 2022-02-21 DIAGNOSIS — R00.2 PALPITATION: ICD-10-CM

## 2022-02-21 LAB
LV EF: 63 %
LVEF MODALITY: NORMAL

## 2022-02-21 PROCEDURE — 93306 TTE W/DOPPLER COMPLETE: CPT | Performed by: PSYCHIATRY & NEUROLOGY

## 2022-02-22 ENCOUNTER — OFFICE VISIT (OUTPATIENT)
Dept: CHIROPRACTIC MEDICINE | Age: 52
End: 2022-02-22
Payer: MEDICAID

## 2022-02-22 VITALS
BODY MASS INDEX: 35.28 KG/M2 | HEIGHT: 59 IN | OXYGEN SATURATION: 97 % | HEART RATE: 87 BPM | WEIGHT: 175 LBS | TEMPERATURE: 97.1 F

## 2022-02-22 DIAGNOSIS — M99.02 SEGMENTAL AND SOMATIC DYSFUNCTION OF THORACIC REGION: ICD-10-CM

## 2022-02-22 DIAGNOSIS — M47.22 CERVICAL SPONDYLOSIS WITH RADICULOPATHY: ICD-10-CM

## 2022-02-22 DIAGNOSIS — M54.04 PANNICULITIS AFFECTING REGIONS OF NECK AND BACK, THORACIC REGION: ICD-10-CM

## 2022-02-22 DIAGNOSIS — M99.03 SEGMENTAL AND SOMATIC DYSFUNCTION OF LUMBAR REGION: ICD-10-CM

## 2022-02-22 DIAGNOSIS — M99.01 SEGMENTAL AND SOMATIC DYSFUNCTION OF CERVICAL REGION: Primary | ICD-10-CM

## 2022-02-22 DIAGNOSIS — M51.36 DDD (DEGENERATIVE DISC DISEASE), LUMBAR: ICD-10-CM

## 2022-02-22 PROCEDURE — G8417 CALC BMI ABV UP PARAM F/U: HCPCS | Performed by: CHIROPRACTOR

## 2022-02-22 PROCEDURE — G8427 DOCREV CUR MEDS BY ELIG CLIN: HCPCS | Performed by: CHIROPRACTOR

## 2022-02-22 PROCEDURE — 3017F COLORECTAL CA SCREEN DOC REV: CPT | Performed by: CHIROPRACTOR

## 2022-02-22 PROCEDURE — G8484 FLU IMMUNIZE NO ADMIN: HCPCS | Performed by: CHIROPRACTOR

## 2022-02-22 PROCEDURE — 1036F TOBACCO NON-USER: CPT | Performed by: CHIROPRACTOR

## 2022-02-22 PROCEDURE — 99204 OFFICE O/P NEW MOD 45 MIN: CPT | Performed by: CHIROPRACTOR

## 2022-02-22 PROCEDURE — 98941 CHIROPRACT MANJ 3-4 REGIONS: CPT | Performed by: CHIROPRACTOR

## 2022-02-22 ASSESSMENT — ENCOUNTER SYMPTOMS
COUGH: 0
SHORTNESS OF BREATH: 0
BOWEL INCONTINENCE: 0
BACK PAIN: 1

## 2022-02-22 NOTE — PROGRESS NOTES
Patient is here for back and left hip. Patient states she got injection in back and hip.   Sarah Pagan DO  Electronically signed by Jose Carbone LPN on 8/81/1559 at 68:03 AM

## 2022-02-22 NOTE — PROGRESS NOTES
[facility]    22  Flash Bautista : 1970 Sex: female  Age: 45 y.o. Patient was referred by Antonino Bell DO    Chief Complaint   Patient presents with    Back Pain    Hip Pain       Neck and lbp to left leg  Gets left leg pain, will swell laterally. Feels like she is going to fall over, lose balance  Back and leg worse with cold     Had cervical injections recently with some relief for a few days  Had lumbar injections too with benefit reported. Some herniated discs, arthritis noted  Neck pain today, not bad at \"5/10\"  LBP \"pressure\" intermittent   Using some patches and creams from pain management which helps. Had ED visit 6 months ago -  Urination issue - no incontinence though    No prior chiro care      Back Pain  This is a chronic problem. The current episode started more than 1 year ago. The problem occurs intermittently. The problem has been gradually improving since onset. The pain is present in the lumbar spine (neck). The pain radiates to the left foot. The symptoms are aggravated by bending, twisting, position, standing and coughing. Associated symptoms include leg pain. Pertinent negatives include no bladder incontinence, bowel incontinence or fever. Risk factors include obesity. She has tried analgesics and NSAIDs (pain management; aquatic therapy) for the symptoms. Red Flags:  none    Review of Systems   Constitutional: Negative for chills and fever. Respiratory: Negative for cough and shortness of breath. Gastrointestinal: Negative for bowel incontinence. Genitourinary: Negative for bladder incontinence. Musculoskeletal: Positive for back pain and neck pain. Current Outpatient Medications:     baclofen (LIORESAL) 10 MG tablet, Take 0.5 tablets by mouth 2 times daily, Disp: 30 tablet, Rfl: 0    HYDROcodone-acetaminophen (NORCO) 5-325 MG per tablet, Take 0.5 tablets by mouth every 8 hours as needed for Pain for up to 30 days.  Intended supply: 30 days.Remainder of month supply, Disp: 45 tablet, Rfl: 0    Elastic Bandages & Supports (KNEE BRACE) MISC, Soft neutral position left knee brace Size to fit Dx:  Knee pain, Disp: 1 each, Rfl: 0    albuterol sulfate  (90 Base) MCG/ACT inhaler, INHALE 2 PUFFS INTO THE LUNGS EVERY 4 HOURS AS NEEDED FOR WHEEZING OR SHORTNESS OF BREATH (Patient taking differently: Inhale 2 puffs into the lungs every 4 hours as needed for Wheezing or Shortness of Breath ), Disp: 6.7 g, Rfl: 1    vitamin D3 (CHOLECALCIFEROL) 25 MCG (1000 UT) TABS tablet, Take 1 tablet by mouth daily, Disp: 30 tablet, Rfl: 5    Prenatal Vit-Fe Fumarate-FA (PRENATAL PLUS) 27-1 MG TABS, 1 pill daily, Disp: 30 tablet, Rfl: 5    omeprazole (PRILOSEC) 20 MG delayed release capsule, Take 1 capsule by mouth daily Take am dos 07/10.  (Patient taking differently: Take 20 mg by mouth daily ), Disp: 30 capsule, Rfl: 5    EPINEPHrine (EPIPEN 2-ABUNDIO) 0.3 MG/0.3ML SOAJ injection, Inject into muscle for allergic reaction, Disp: 0.3 mL, Rfl: 0    dicyclomine (BENTYL) 20 MG tablet, Take 20 mg by mouth every 6 hours , Disp: , Rfl:     Allergies   Allergen Reactions    Fish-Derived Products Anaphylaxis    Dye [Iodides] Itching       Past Medical History:   Diagnosis Date    Asthma     uses inhalers twice a day    Cerebral artery occlusion with cerebral infarction ([facility] [address]     2015-slight right sided weakness, ambulates normal    Cervical disc herniation     Cervical spinal stenosis     Chronic back pain     Chronic kidney disease     Depression     Disorder of thyroid gland 09/11/2012    no medication    GERD (gastroesophageal reflux disease)     Headache(784.0)     Hemorrhoids     History of colon polyps     Hypotension     Irritable bowel syndrome     Low back pain     Numbness and tingling in right hand 03/06/2020    Palpitations 07/24/2012    Scabies 05/18/2016    Seizure disorder ([facility] [address]     not on medications for this; last seizure 2018    Sleep apnea     Vitamin D deficiency 2014     Family History   Problem Relation Age of Onset    Heart Attack Mother     Migraines Mother     Kidney Disease Father         failure    Migraines Sister     Asthma Child     Asthma Child         bone disease    Anemia Child     Diabetes Other      Past Surgical History:   Procedure Laterality Date    COLONOSCOPY  2016    COLONOSCOPY  07/10/2017    HIP SURGERY Left 08/10/2020    LEFT GREATER TROCHANTERIC BURSAE INJECTION X-RAY (CPT 41831) performed by [facility]anna Roberson MD at [facility] N/A     epidural c6-c7    NERVE BLOCK Bilateral 2021    BILATERAL CERVICAL MEDIAL BRANCH FACET BLOCK C4 C5 C6 WITH SEDATION WITH LEFT TROCHANTERIC BURSA INJECTION(CPT 82882) performed by Bryanna Roberson MD at [address] N/A 2021    CERVICAL EPIDURAL INTERLAMINAR STEROID INJECTION C6-C7 WITH SEDATION performed by Bryanna Roberson MD at [address] Left 2022    CERVICAL EPIDURAL STEROID INJECTION LEFT PARAMEDIAN C6-C7 WITH SEDATION performed by Bryanna Roberson MD at [address] ENDOSCOPY  07/10/2017     Social History     Socioeconomic History    Marital status:      Spouse name: Not on file    Number of children: Not on file    Years of education: Not on file    Highest education level: Not on file   Occupational History    Not on file   Tobacco Use    Smoking status: Former Smoker     Packs/day: 1.00     Years: 25.00     Pack years: 25.00     Types: Cigarettes     Quit date: 1988     Years since quittin.8    Smokeless tobacco: Never Used   Vaping Use    Vaping Use: Never used   Substance and Sexual Activity    Alcohol use: No    Drug use: No    Sexual activity: Not on file   Other Topics Concern    Not on file   Social History Narrative    Not on file     Social Determinants of Health Financial Resource Strain:     Difficulty of Paying Living Expenses: Not on file   Food Insecurity:     Worried About Running Out of Food in the Last Year: Not on file    Vlad of Food in the Last Year: Not on file   Transportation Needs:     Lack of Transportation (Medical): Not on file    Lack of Transportation (Non-Medical): Not on file   Physical Activity:     Days of Exercise per Week: Not on file    Minutes of Exercise per Session: Not on file   Stress:     Feeling of Stress : Not on file   Social Connections:     Frequency of Communication with Friends and Family: Not on file    Frequency of Social Gatherings with Friends and Family: Not on file    Attends Jehovah's witness Services: Not on file    Active Member of [address] [place] [address] [employer] or Organizations: Not on file    Attends Club or Organization Meetings: Not on file    Marital Status: Not on file   Intimate Partner Violence:     Fear of Current or Ex-Partner: Not on file    Emotionally Abused: Not on file    Physically Abused: Not on file    Sexually Abused: Not on file   Housing Stability:     Unable to Pay for Housing in the Last Year: Not on file    Number of [place] in the Last Year: Not on file    Unstable Housing in the Last Year: Not on file       Vitals:    02/22/22 1023   Pulse: 87   Temp: 97.1 °F (36.2 °C)   SpO2: 97%   Weight: 175 lb (79.4 kg)   Height: 4' 11\" (1.499 m)       EXAM:  Appearance: alert, well appearing, and in no distress, oriented to person, place, and time and overweight. Cervical AROM:moderately limited with pain    Lumbar AROM: moderately limited with pain    Reflexes are +1 and symmetrical at the Biceps, Brachioradialis, Triceps, Patella and Achilles. Manual muscle testing reveal 5/5 strength throughout the UE and LE indicator muscles B/L. Sensation to light touch is WNL to the upper and lower extremity dermatomes. Lin's and Tromner's are absent. Posterior Tibial and Radial pulses 2/4.       Seated SLR: Positive Left  Supine SLR:Not Tested Bilateral  See's:Negative Bilateral    Cervical Compression: Positive  Cervical Distraction: positive  Foraminal Compression: negative Bilateral  Maximum Cervical Rotatory Compression Testing: Negative Bilateral  She has pain in the midline at L4-S1, C4-6  Hypertonic, tender fibers are noted with palpation in the paraspinal muscles of the cervical spine, thoracic spine and lumbar spine. Joint fixation is noted with motion screening at: C7-T1, T4-7, L5-S1      Stacia Fan was seen today for back pain and hip pain. Diagnoses and all orders for this visit:    Segmental and somatic dysfunction of cervical region    Segmental and somatic dysfunction of thoracic region    Segmental and somatic dysfunction of lumbar region    Cervical spondylosis with radiculopathy    DDD (degenerative disc disease), lumbar    Panniculitis affecting regions of neck and back, thoracic region      DEGENERATIVE CHANGES: Facet joint degenerative changes L4 L5 and L5-S1. SOFT TISSUES/RETROPERITONEUM: No paraspinal mass is seen.       Impression   No evidence of acute lumbar spine trauma. Facet joint degenerative changes lower lumbar spine     Impression   1.  Cervical spondylosis as described above with multiple levels of   broad-based central disc herniation notable at C3/C4, C4/C5, C5/C6, and C6/C7   resulting in mild-to-moderate effacement of ventral cord. 2.  No fracture or malalignment. Treatment Plan:   I spoke with her regarding my exam findings and treatment options. I recommended that we start a trial of conservative care today consisting manipulation. I will plan on seeing her 1 times per week for 4-6 total visits, then reassess to determine response to care and future options. With consent, I did begin today. Problem/Goals  Decrease pain and/or swelling and Increase ROM and/or flexibility    Today's Treatment  In a seated position I started treatment today with her consent. Specifically mechanically assisted manipulation to the listed cervical, thoracic, lumbar segments. Tolerated well. I spoke to her regarding posttreatment soreness. Should any arise, she  may use ice for 10-15 minutes, over-the-counter NSAIDs or topical gels. Seen By:  Merline Hageman, DC    * This note was created using voice recognition software.   The note was reviewed, however grammatical errors may exist. Admitted with groin abcess

## 2022-02-23 NOTE — PROGRESS NOTES
Transfer to [facility] for CVA    NIH was an 8 now its a 1  LKW 1:15pm
stairs/step(s)

## 2022-02-28 ENCOUNTER — OFFICE VISIT (OUTPATIENT)
Dept: CHIROPRACTIC MEDICINE | Age: 52
End: 2022-02-28
Payer: MEDICAID

## 2022-02-28 VITALS — OXYGEN SATURATION: 96 % | BODY MASS INDEX: 35.35 KG/M2 | HEART RATE: 73 BPM | TEMPERATURE: 98 F | WEIGHT: 175 LBS

## 2022-02-28 DIAGNOSIS — M99.03 SEGMENTAL AND SOMATIC DYSFUNCTION OF LUMBAR REGION: ICD-10-CM

## 2022-02-28 DIAGNOSIS — M99.01 SEGMENTAL AND SOMATIC DYSFUNCTION OF CERVICAL REGION: Primary | ICD-10-CM

## 2022-02-28 DIAGNOSIS — M54.04 PANNICULITIS AFFECTING REGIONS OF NECK AND BACK, THORACIC REGION: ICD-10-CM

## 2022-02-28 DIAGNOSIS — M47.22 CERVICAL SPONDYLOSIS WITH RADICULOPATHY: ICD-10-CM

## 2022-02-28 DIAGNOSIS — M51.36 DDD (DEGENERATIVE DISC DISEASE), LUMBAR: ICD-10-CM

## 2022-02-28 DIAGNOSIS — M99.02 SEGMENTAL AND SOMATIC DYSFUNCTION OF THORACIC REGION: ICD-10-CM

## 2022-02-28 PROCEDURE — 98941 CHIROPRACT MANJ 3-4 REGIONS: CPT | Performed by: CHIROPRACTOR

## 2022-02-28 PROCEDURE — 99999 PR OFFICE/OUTPT VISIT,PROCEDURE ONLY: CPT | Performed by: CHIROPRACTOR

## 2022-02-28 NOTE — PROGRESS NOTES
22  Radha Cue : 1970 Sex: female  Age: 45 y.o. Chief Complaint   Patient presents with    Lower Back Pain     Pt presents this AM for her 1 week follow up appointment. States that she is continuing to experience similar symptoms. Reports that most discomfort is in neck this AM.       HPI:   Pain is unchanged. On average, pain is perceived as moderate (4-6 pain scale). Patient denies new numbness, new weakness, new tingling      Current Outpatient Medications:     baclofen (LIORESAL) 10 MG tablet, Take 0.5 tablets by mouth 2 times daily, Disp: 30 tablet, Rfl: 0    HYDROcodone-acetaminophen (NORCO) 5-325 MG per tablet, Take 0.5 tablets by mouth every 8 hours as needed for Pain for up to 30 days. Intended supply: 30 days. Remainder of month supply, Disp: 45 tablet, Rfl: 0    Elastic Bandages & Supports (KNEE BRACE) MISC, Soft neutral position left knee brace Size to fit Dx:  Knee pain, Disp: 1 each, Rfl: 0    albuterol sulfate  (90 Base) MCG/ACT inhaler, INHALE 2 PUFFS INTO THE LUNGS EVERY 4 HOURS AS NEEDED FOR WHEEZING OR SHORTNESS OF BREATH (Patient taking differently: Inhale 2 puffs into the lungs every 4 hours as needed for Wheezing or Shortness of Breath ), Disp: 6.7 g, Rfl: 1    vitamin D3 (CHOLECALCIFEROL) 25 MCG (1000 UT) TABS tablet, Take 1 tablet by mouth daily, Disp: 30 tablet, Rfl: 5    Prenatal Vit-Fe Fumarate-FA (PRENATAL PLUS) 27-1 MG TABS, 1 pill daily, Disp: 30 tablet, Rfl: 5    omeprazole (PRILOSEC) 20 MG delayed release capsule, Take 1 capsule by mouth daily Take am dos 07/10.  (Patient taking differently: Take 20 mg by mouth daily ), Disp: 30 capsule, Rfl: 5    EPINEPHrine (EPIPEN 2-ABUNDIO) 0.3 MG/0.3ML SOAJ injection, Inject into muscle for allergic reaction, Disp: 0.3 mL, Rfl: 0    dicyclomine (BENTYL) 20 MG tablet, Take 20 mg by mouth every 6 hours , Disp: , Rfl:     Exam:   Vitals:    22 1033   Pulse: 73   Temp: 98 °F (36.7 °C)   SpO2: 96%       There are hypertonic and tender fibers noted today in the cervical, thoracic and lumbar paraspinal muscles. Joint fixation is noted with motion screening at C6-T2, T5-7, T12-L1, L4-S1 and bilateral SI joints. Colt Aquino was seen today for lower back pain. Diagnoses and all orders for this visit:    Segmental and somatic dysfunction of cervical region    Segmental and somatic dysfunction of thoracic region    Segmental and somatic dysfunction of lumbar region    Panniculitis affecting regions of neck and back, thoracic region    DDD (degenerative disc disease), lumbar    Cervical spondylosis with radiculopathy        Treatment Plan:  Mechanically assisted manipulation was performed to the segment listed in the cervical spine, thoracic spine, lumbar spine and sacral spine. .  Tolerated well. I will see her back in 1 week for continued care.         Seen By:  Lore Canseco DC

## 2022-03-04 ENCOUNTER — TELEPHONE (OUTPATIENT)
Dept: CARDIOLOGY CLINIC | Age: 52
End: 2022-03-04

## 2022-03-04 DIAGNOSIS — K76.89 LIVER CYST: Primary | ICD-10-CM

## 2022-03-04 NOTE — RESULT ENCOUNTER NOTE
Echo appears normal, pumping function is normal and valves look good. There are several views that showed the liver, and there appeared to be an abnormal cystic structure within the liver. I recommend a limited ultrasound of the liver to further delineate this, and follow-up with her PCP. Please make sure Dr. Emely Rae is CCed on the results of the liver ultrasound. Can notify us if still having ongoing palpitations. Otherwise I can see her back in 6 months or as needed.

## 2022-03-04 NOTE — TELEPHONE ENCOUNTER
Left message for patient to contact office. Order for liver US in [facility].    ----- Message from Ruben Shelton MD sent at 3/4/2022  2:30 PM EST -----  Echo appears normal, pumping function is normal and valves look good. There are several views that showed the liver, and there appeared to be an abnormal cystic structure within the liver. I recommend a limited ultrasound of the liver to further delineate this, and follow-up with her PCP. Please make sure Dr. Prosper Valentine is CCed on the results of the liver ultrasound. Can notify us if still having ongoing palpitations. Otherwise I can see her back in 6 months or as needed.

## 2022-03-10 ENCOUNTER — OFFICE VISIT (OUTPATIENT)
Dept: PRIMARY CARE CLINIC | Age: 52
End: 2022-03-10
Payer: MEDICAID

## 2022-03-10 VITALS
TEMPERATURE: 97.1 F | HEIGHT: 59 IN | SYSTOLIC BLOOD PRESSURE: 130 MMHG | BODY MASS INDEX: 36.49 KG/M2 | OXYGEN SATURATION: 97 % | DIASTOLIC BLOOD PRESSURE: 80 MMHG | HEART RATE: 82 BPM | WEIGHT: 181 LBS

## 2022-03-10 DIAGNOSIS — K62.5 RECTAL BLEED: ICD-10-CM

## 2022-03-10 DIAGNOSIS — K62.5 RECTAL BLEED: Primary | ICD-10-CM

## 2022-03-10 LAB
ALBUMIN SERPL-MCNC: 4.5 G/DL (ref 3.5–5.2)
ALP BLD-CCNC: 91 U/L (ref 35–104)
ALT SERPL-CCNC: 28 U/L (ref 0–32)
ANION GAP SERPL CALCULATED.3IONS-SCNC: 13 MMOL/L (ref 7–16)
AST SERPL-CCNC: 22 U/L (ref 0–31)
BILIRUB SERPL-MCNC: 0.5 MG/DL (ref 0–1.2)
BUN BLDV-MCNC: 13 MG/DL (ref 6–20)
C-REACTIVE PROTEIN: 0.3 MG/DL (ref 0–0.4)
CALCIUM SERPL-MCNC: 10.3 MG/DL (ref 8.6–10.2)
CHLORIDE BLD-SCNC: 98 MMOL/L (ref 98–107)
CO2: 26 MMOL/L (ref 22–29)
CREAT SERPL-MCNC: 0.8 MG/DL (ref 0.5–1)
FERRITIN: 257 NG/ML
GFR AFRICAN AMERICAN: >60
GFR NON-AFRICAN AMERICAN: >60 ML/MIN/1.73
GLUCOSE BLD-MCNC: 69 MG/DL (ref 74–99)
HCT VFR BLD CALC: 43 % (ref 34–48)
HEMOGLOBIN: 13.8 G/DL (ref 11.5–15.5)
IRON SATURATION: 19 % (ref 15–50)
IRON: 63 MCG/DL (ref 37–145)
MCH RBC QN AUTO: 28.3 PG (ref 26–35)
MCHC RBC AUTO-ENTMCNC: 32.1 % (ref 32–34.5)
MCV RBC AUTO: 88.3 FL (ref 80–99.9)
PDW BLD-RTO: 12.7 FL (ref 11.5–15)
PLATELET # BLD: 277 E9/L (ref 130–450)
PMV BLD AUTO: 10.7 FL (ref 7–12)
POTASSIUM SERPL-SCNC: 4 MMOL/L (ref 3.5–5)
RBC # BLD: 4.87 E12/L (ref 3.5–5.5)
SODIUM BLD-SCNC: 137 MMOL/L (ref 132–146)
TOTAL IRON BINDING CAPACITY: 326 MCG/DL (ref 250–450)
TOTAL PROTEIN: 7.9 G/DL (ref 6.4–8.3)
WBC # BLD: 6.9 E9/L (ref 4.5–11.5)

## 2022-03-10 PROCEDURE — 99213 OFFICE O/P EST LOW 20 MIN: CPT | Performed by: FAMILY MEDICINE

## 2022-03-10 PROCEDURE — 1036F TOBACCO NON-USER: CPT | Performed by: FAMILY MEDICINE

## 2022-03-10 PROCEDURE — G8427 DOCREV CUR MEDS BY ELIG CLIN: HCPCS | Performed by: FAMILY MEDICINE

## 2022-03-10 PROCEDURE — 3017F COLORECTAL CA SCREEN DOC REV: CPT | Performed by: FAMILY MEDICINE

## 2022-03-10 PROCEDURE — G8417 CALC BMI ABV UP PARAM F/U: HCPCS | Performed by: FAMILY MEDICINE

## 2022-03-10 PROCEDURE — G8484 FLU IMMUNIZE NO ADMIN: HCPCS | Performed by: FAMILY MEDICINE

## 2022-03-10 ASSESSMENT — ENCOUNTER SYMPTOMS
ABDOMINAL PAIN: 0
EYE REDNESS: 0
BACK PAIN: 0
RHINORRHEA: 0
BLOOD IN STOOL: 0
CHEST TIGHTNESS: 0
EYE PAIN: 0
APNEA: 0
HEMATEMESIS: 0
SINUS PRESSURE: 0
SHORTNESS OF BREATH: 0
HEMATOCHEZIA: 1
VOMITING: 0
WHEEZING: 0
SORE THROAT: 0
COUGH: 0
DIARRHEA: 0
COLOR CHANGE: 0
CONSTIPATION: 0
EYE ITCHING: 0
NAUSEA: 0
RECTAL PAIN: 0

## 2022-03-10 NOTE — PROGRESS NOTES
Chief Complaint:     Chief Complaint   Patient presents with    Rectal Bleeding     Started Sunday. Rectal Bleeding   The current episode started 3 to 5 days ago. The problem occurs occasionally. The problem has been unchanged. The pain is mild. The stool is described as soft, bloody and mixed with blood. There was no prior successful therapy. There was no prior unsuccessful therapy. Pertinent negatives include no fever, no abdominal pain, no diarrhea, no hematemesis, no nausea, no rectal pain, no vomiting, no hematuria, no chest pain, no headaches, no coughing and no rash. There were no sick contacts. She has received no recent medical care.        Patient Active Problem List   Diagnosis    Cervical stenosis of spinal canal    DDD (degenerative disc disease), cervical    History of TIA (transient ischemic attack) and stroke    Spinal stenosis of lumbar region without neurogenic claudication    Fibromyalgia    Migraines without aura and without status migrainosus, not intractable    Primary stabbing headache    Chronic pain syndrome    Primary osteoarthritis of left hip    Cervical disc disorder    Cervical facet joint syndrome    Cervical radiculopathy    Cervical stenosis of spine    Lumbar spondylosis    Lumbar facet arthropathy    Lumbar disc disorder    Greater trochanteric bursitis of left hip    Pain in left hip    Facet arthropathy, cervical    Primary osteoarthritis of left knee    Hepatic cyst    Lung nodule       Past Medical History:   Diagnosis Date    Asthma     uses inhalers twice a day    Cerebral artery occlusion with cerebral infarction ([facility] [address]     2015-slight right sided weakness, ambulates normal    Cervical disc herniation     Cervical spinal stenosis     Chronic back pain     Chronic kidney disease     Depression     Disorder of thyroid gland 09/11/2012    no medication    GERD (gastroesophageal reflux disease)     Headache(784.0)     Hemorrhoids     History of colon polyps     Hypotension     Irritable bowel syndrome     Low back pain     Numbness and tingling in right hand 03/06/2020    Palpitations 07/24/2012    Scabies 05/18/2016    Seizure disorder (HCC)     not on medications for this; last seizure 2018    Sleep apnea     Vitamin D deficiency 12/03/2014       Past Surgical History:   Procedure Laterality Date    COLONOSCOPY  03/09/2016    COLONOSCOPY  07/10/2017    HIP SURGERY Left 08/10/2020    LEFT GREATER TROCHANTERIC BURSAE INJECTION X-RAY (CPT 88016) performed by Junior Jj MD at [facility] N/A     epidural c6-c7    NERVE BLOCK Bilateral 8/5/2021    BILATERAL CERVICAL MEDIAL BRANCH FACET BLOCK C4 C5 C6 WITH SEDATION WITH LEFT TROCHANTERIC BURSA INJECTION(CPT 01417) performed by Junior Jj MD at [address] N/A 4/19/2021    CERVICAL EPIDURAL INTERLAMINAR STEROID INJECTION C6-C7 WITH SEDATION performed by Junior Jj MD at [address] Left 1/31/2022    CERVICAL EPIDURAL STEROID INJECTION LEFT PARAMEDIAN C6-C7 WITH SEDATION performed by [facility] Jj [facility] at [facility] ENDOSCOPY  07/10/2017       Current Outpatient Medications   Medication Sig Dispense Refill    baclofen (LIORESAL) 10 MG tablet Take 0.5 tablets by mouth 2 times daily 30 tablet 0    HYDROcodone-acetaminophen (NORCO) 5-325 MG per tablet Take 0.5 tablets by mouth every 8 hours as needed for Pain for up to 30 days. Intended supply: 30 days. Remainder of month supply 45 tablet 0    Elastic Bandages & Supports (KNEE BRACE) MISC Soft neutral position left knee brace  Size to fit  Dx:  Knee pain 1 each 0    albuterol sulfate  (90 Base) MCG/ACT inhaler INHALE 2 PUFFS INTO THE LUNGS EVERY 4 HOURS AS NEEDED FOR WHEEZING OR SHORTNESS OF BREATH (Patient taking differently: Inhale 2 puffs into the lungs every 4 hours as needed for Wheezing or Shortness of Breath ) 6.7 g 1    vitamin D3 (CHOLECALCIFEROL) 25 MCG (1000 UT) TABS tablet Take 1 tablet by mouth daily 30 tablet 5    Prenatal Vit-Fe Fumarate-FA (PRENATAL PLUS) 27-1 MG TABS 1 pill daily 30 tablet 5    omeprazole (PRILOSEC) 20 MG delayed release capsule Take 1 capsule by mouth daily Take am dos /10. (Patient taking differently: Take 20 mg by mouth daily ) 30 capsule 5    EPINEPHrine (EPIPEN 2-ABUNDIO) 0.3 MG/0.3ML SOAJ injection Inject into muscle for allergic reaction 0.3 mL 0    dicyclomine (BENTYL) 20 MG tablet Take 20 mg by mouth every 6 hours        No current facility-administered medications for this visit. Allergies   Allergen Reactions    Fish-Derived Products Anaphylaxis    Dye [Iodides] Itching       Social History     Socioeconomic History    Marital status:      Spouse name: None    Number of children: None    Years of education: None    Highest education level: None   Occupational History    None   Tobacco Use    Smoking status: Former Smoker     Packs/day: 1.00     Years: 25.00     Pack years: 25.00     Types: Cigarettes     Quit date: 1988     Years since quittin.9    Smokeless tobacco: Never Used   Vaping Use    Vaping Use: Never used   Substance and Sexual Activity    Alcohol use: No    Drug use: No    Sexual activity: None   Other Topics Concern    None   Social History Narrative    None     Social Determinants of Health     Financial Resource Strain:     Difficulty of Paying Living Expenses: Not on file   Food Insecurity:     Worried About Running Out of Food in the Last Year: Not on file    Vlad of Food in the Last Year: Not on file   Transportation Needs:     Lack of Transportation (Medical): Not on file    Lack of Transportation (Non-Medical):  Not on file   Physical Activity:     Days of Exercise per Week: Not on file    Minutes of Exercise per Session: Not on file   Stress:     Feeling of Stress : Not on file   Social Connections:     Frequency of Communication with Friends and Family: Not on file    Frequency of Social Gatherings with Friends and Family: Not on file    Attends Confucianism Services: Not on file    Active Member of Clubs or Organizations: Not on file    Attends Club or Organization Meetings: Not on file    Marital Status: Not on file   Intimate Partner Violence:     Fear of Current or Ex-Partner: Not on file    Emotionally Abused: Not on file    Physically Abused: Not on file    Sexually Abused: Not on file   Housing Stability:     Unable to Pay for Housing in the Last Year: Not on file    Number of [place] in the Last Year: Not on file    Unstable Housing in the Last Year: Not on file       Family History   Problem Relation Age of Onset    Heart Attack Mother     Migraines Mother     Kidney Disease Father         failure    Migraines Sister     Asthma Child     Asthma Child         bone disease    Anemia Child     Diabetes Other           Review of Systems   Constitutional: Negative for activity change, appetite change, fatigue and fever. HENT: Negative for congestion, ear pain, hearing loss, nosebleeds, rhinorrhea, sinus pressure and sore throat. Eyes: Negative for pain, redness, itching and visual disturbance. Respiratory: Negative for apnea, cough, chest tightness, shortness of breath and wheezing. Cardiovascular: Negative for chest pain, palpitations and leg swelling. Gastrointestinal: Positive for hematochezia. Negative for abdominal pain, blood in stool, constipation, diarrhea, hematemesis, nausea, rectal pain and vomiting. Endocrine: Negative. Genitourinary: Negative for decreased urine volume, difficulty urinating, dysuria, frequency, hematuria and urgency. Musculoskeletal: Negative for arthralgias, back pain, gait problem, myalgias and neck pain. Skin: Negative for color change and rash.    Allergic/Immunologic: Negative for environmental allergies and food allergies. Neurological: Negative for dizziness, weakness, light-headedness, numbness and headaches. Hematological: Negative for adenopathy. Does not bruise/bleed easily. Psychiatric/Behavioral: Negative for behavioral problems, dysphoric mood and sleep disturbance. The patient is not nervous/anxious and is not hyperactive. All other systems reviewed and are negative. /80   Pulse 82   Temp 97.1 °F (36.2 °C)   Ht 4' 11\" (1.499 m)   Wt 181 lb (82.1 kg)   LMP 03/27/2008   SpO2 97%   BMI 36.56 kg/m²     Physical Exam  Vitals and nursing note reviewed. Constitutional:       General: She is not in acute distress. Appearance: Normal appearance. She is well-developed. HENT:      Head: Normocephalic and atraumatic. Right Ear: Hearing, tympanic membrane and external ear normal. No tenderness. No middle ear effusion. Left Ear: Hearing, tympanic membrane and external ear normal. No tenderness. No middle ear effusion. Nose: Nose normal. No congestion or rhinorrhea. Right Turbinates: Not enlarged. Left Turbinates: Not enlarged. Mouth/Throat:      Mouth: Mucous membranes are moist.      Tongue: No lesions. Pharynx: Oropharynx is clear. No oropharyngeal exudate or posterior oropharyngeal erythema. Eyes:      General: No scleral icterus. Conjunctiva/sclera: Conjunctivae normal.      Pupils: Pupils are equal, round, and reactive to light. Neck:      Thyroid: No thyromegaly. Cardiovascular:      Rate and Rhythm: Normal rate and regular rhythm. Heart sounds: Normal heart sounds. No murmur heard. Pulmonary:      Effort: Pulmonary effort is normal. No respiratory distress. Breath sounds: Normal breath sounds. No wheezing or rales. Abdominal:      General: Bowel sounds are normal. There is no distension. Palpations: Abdomen is soft. Tenderness: There is no abdominal tenderness.    Musculoskeletal: General: No tenderness. Normal range of motion. Cervical back: Normal range of motion and neck supple. No rigidity. No muscular tenderness. Lymphadenopathy:      Cervical: No cervical adenopathy. Skin:     General: Skin is warm and dry. Findings: No erythema or rash. Neurological:      General: No focal deficit present. Mental Status: She is alert and oriented to person, place, and time. Cranial Nerves: No cranial nerve deficit. Deep Tendon Reflexes: Reflexes are normal and symmetric. Reflexes normal.   Psychiatric:         Mood and Affect: Mood normal.                                 ASSESSMENT/PLAN:    Patient Active Problem List   Diagnosis    Cervical stenosis of spinal canal    DDD (degenerative disc disease), cervical    History of TIA (transient ischemic attack) and stroke    Spinal stenosis of lumbar region without neurogenic claudication    Fibromyalgia    Migraines without aura and without status migrainosus, not intractable    Primary stabbing headache    Chronic pain syndrome    Primary osteoarthritis of left hip    Cervical disc disorder    Cervical facet joint syndrome    Cervical radiculopathy    Cervical stenosis of spine    Lumbar spondylosis    Lumbar facet arthropathy    Lumbar disc disorder    Greater trochanteric bursitis of left hip    Pain in left hip    Facet arthropathy, cervical    Primary osteoarthritis of left knee    Hepatic cyst    Lung nodule       Artemio Mir was seen today for rectal bleeding. Diagnoses and all orders for this visit:    Rectal bleed  -     Rc Holland MD, General Surgery, [place]  -     CBC; Future  -     Comprehensive Metabolic Panel; Future  -     Sedimentation Rate; Future  -     C-Reactive Protein; Future  -     Ferritin; Future  -     Iron and TIBC; Future          Return if symptoms worsen or fail to improve. I spent 30 minutes with this patient.   I spent greater than 50% of the time counseling this patient.         Vidya Rosado DO  3/10/2022  1:58 PM

## 2022-03-11 LAB — SEDIMENTATION RATE, ERYTHROCYTE: 19 MM/HR (ref 0–20)

## 2022-03-14 ENCOUNTER — OFFICE VISIT (OUTPATIENT)
Dept: PAIN MANAGEMENT | Age: 52
End: 2022-03-14
Payer: MEDICAID

## 2022-03-14 VITALS
OXYGEN SATURATION: 97 % | RESPIRATION RATE: 16 BRPM | HEIGHT: 59 IN | TEMPERATURE: 97.5 F | HEART RATE: 66 BPM | DIASTOLIC BLOOD PRESSURE: 72 MMHG | BODY MASS INDEX: 36.89 KG/M2 | WEIGHT: 183 LBS | SYSTOLIC BLOOD PRESSURE: 120 MMHG

## 2022-03-14 DIAGNOSIS — M54.16 LUMBAR RADICULOPATHY: ICD-10-CM

## 2022-03-14 DIAGNOSIS — M47.816 LUMBAR FACET ARTHROPATHY: ICD-10-CM

## 2022-03-14 DIAGNOSIS — M47.812 CERVICAL FACET JOINT SYNDROME: ICD-10-CM

## 2022-03-14 DIAGNOSIS — M54.12 CERVICAL RADICULOPATHY: ICD-10-CM

## 2022-03-14 DIAGNOSIS — G89.4 CHRONIC PAIN SYNDROME: ICD-10-CM

## 2022-03-14 PROCEDURE — G8417 CALC BMI ABV UP PARAM F/U: HCPCS | Performed by: NURSE PRACTITIONER

## 2022-03-14 PROCEDURE — 3017F COLORECTAL CA SCREEN DOC REV: CPT | Performed by: NURSE PRACTITIONER

## 2022-03-14 PROCEDURE — 99213 OFFICE O/P EST LOW 20 MIN: CPT | Performed by: NURSE PRACTITIONER

## 2022-03-14 PROCEDURE — 1036F TOBACCO NON-USER: CPT | Performed by: NURSE PRACTITIONER

## 2022-03-14 PROCEDURE — G8484 FLU IMMUNIZE NO ADMIN: HCPCS | Performed by: NURSE PRACTITIONER

## 2022-03-14 PROCEDURE — G8428 CUR MEDS NOT DOCUMENT: HCPCS | Performed by: NURSE PRACTITIONER

## 2022-03-14 RX ORDER — HYDROCODONE BITARTRATE AND ACETAMINOPHEN 5; 325 MG/1; MG/1
0.5 TABLET ORAL EVERY 8 HOURS PRN
Qty: 45 TABLET | Refills: 0 | Status: SHIPPED
Start: 2022-03-21 | End: 2022-04-14 | Stop reason: ALTCHOICE

## 2022-03-14 RX ORDER — BACLOFEN 10 MG/1
5 TABLET ORAL 2 TIMES DAILY
Qty: 30 TABLET | Refills: 1 | Status: SHIPPED
Start: 2022-03-14 | End: 2022-03-31

## 2022-03-14 RX ORDER — TIZANIDINE 2 MG/1
TABLET ORAL
Qty: 60 TABLET | Refills: 1 | Status: SHIPPED
Start: 2022-03-14 | End: 2022-03-14

## 2022-03-14 NOTE — PROGRESS NOTES
[facility], [address] [place] [address]  [phone]    Follow up Note      Marsha Cueto     Date of Visit:  3/14/2022    CC:  Patient presents for follow up neck and low back     HPI:  Pt here wth no acute changes. Pt now with rectal bleeding and scheduled for colonscopy next week. Appropriate analgesia with current medications regimen: somewhat  Change in quality of symptoms:yes  Medication side effects: tylenol arthritis heartburn   Recent diagnostic testing:none  Recent interventional procedures: none    She has been on anticoagulation medications to include ASA. The patient Rahat Rape not been on herbal supplements.  The patient is not diabetic.     Imaging:     Cervical spine MRI 04/2018  1. Multilevel degenerative disc disease extending from C3 through C7. Moderately severe spinal canal stenosis at C3-C4. Moderate spinal   canal stenosis at C4-C5. . Moderately severe spinal canal stenosis at   C5-C6. Abnormal signal intensity within the cervical spinal cord C3-C5   without evidence of enhancement. Findings are most likely reflective   of myelomalacia changes secondary to the underlying degree of cord   compression as described above. No active demyelination identified. 2. Slight loss of normally expected cervical lordosis C3-C6.      CT Lumbar spine 2017  1. Mild diffuse osteopenia, no compression fracture or   spondylolisthesis.       2. Mild multilevel degenerative disc disease and facet joint   arthropathy of the lower lumbar spine more pronounced at L4-L5.      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         Otherwise, no musculoskeletal pathology to explain patient's pain.      Previous treatments: Physical Therapy, Epidural Steroid Injection with  and medications. Levonne Jimy severe side effects, tylenol arthritis heartburn and gi distress,  Tizanidine helps but makes tired                                        Potential Aberrant Drug-Related Behavior:    No     Urine Drug Screening:  Saliva screen 08/2020 consistent for Ultram  11/2021: consistent for oxycodone    OARRS report:  3/2022 consistent.     Opioid agreement: 12/13/21         Past Medical History:   Diagnosis Date    Asthma     uses inhalers twice a day    Cerebral artery occlusion with cerebral infarction ([facility] [address]     2015-slight right sided weakness, ambulates normal    Cervical disc herniation     Cervical spinal stenosis     Chronic back pain     Chronic kidney disease     Depression     Disorder of thyroid gland 09/11/2012    no medication    GERD (gastroesophageal reflux disease)     Headache(784.0)     Hemorrhoids     History of colon polyps     Hypotension     Irritable bowel syndrome     Low back pain     Numbness and tingling in right hand 03/06/2020    Palpitations 07/24/2012    Scabies 05/18/2016    Seizure disorder ([facility] [address]     not on medications for this; last seizure 2018    Sleep apnea     Vitamin D deficiency 12/03/2014       Past Surgical History:   Procedure Laterality Date    COLONOSCOPY  03/09/2016    COLONOSCOPY  07/10/2017    HIP SURGERY Left 08/10/2020    LEFT GREATER TROCHANTERIC BURSAE INJECTION X-RAY (CPT 18618) performed by Gideon Ivan MD at [facility] N/A     epidural c6-c7    NERVE BLOCK Bilateral 8/5/2021    BILATERAL CERVICAL MEDIAL BRANCH FACET BLOCK C4 C5 C6 WITH SEDATION WITH LEFT TROCHANTERIC BURSA INJECTION(CPT 37123) performed by Gideon Ivan MD at [address] N/A 4/19/2021    CERVICAL EPIDURAL INTERLAMINAR STEROID INJECTION C6-C7 WITH SEDATION performed by Gideon Ivan MD at [address] Left 1/31/2022    CERVICAL EPIDURAL STEROID INJECTION LEFT PARAMEDIAN C6-C7 WITH SEDATION performed by Gideon Ivan MD at [address] [facility] GASTROINTESTINAL ENDOSCOPY  07/10/2017       Prior to Admission medications    Medication Sig Start Date End Date Taking? Authorizing Provider   tiZANidine (ZANAFLEX) 2 MG tablet TAKE 1 TABLET BY MOUTH TWICE DAILY AS NEEDED FOR SPASMS 3/14/22   [facility], APRN - CNP   baclofen (LIORESAL) 10 MG tablet Take 0.5 tablets by mouth 2 times daily 2/15/22 3/17/22  [facility], APRN - JELLY   HYDROcodone-acetaminophen (NORCO) 5-325 MG per tablet Take 0.5 tablets by mouth every 8 hours as needed for Pain for up to 30 days. Intended supply: 30 days. Remainder of month supply 2/14/22 3/16/22  [facility], APRN - CNP   Elastic Bandages & Supports (KNEE BRACE) MISC Soft neutral position left knee brace  Size to fit  Dx:  Knee pain 1/21/22   Abdi Kilpatrick,    albuterol sulfate  (90 Base) MCG/ACT inhaler INHALE 2 PUFFS INTO THE LUNGS EVERY 4 HOURS AS NEEDED FOR WHEEZING OR SHORTNESS OF BREATH  Patient taking differently: Inhale 2 puffs into the lungs every 4 hours as needed for Wheezing or Shortness of Breath  11/1/21   Kiran Gutierrez DO   vitamin D3 (CHOLECALCIFEROL) 25 MCG (1000 UT) TABS tablet Take 1 tablet by mouth daily 8/26/21   Kiran Gutierrez DO   Prenatal Vit-Fe Fumarate-FA (PRENATAL PLUS) 27-1 MG TABS 1 pill daily 8/26/21   Kiran Gutierrez DO   omeprazole (PRILOSEC) 20 MG delayed release capsule Take 1 capsule by mouth daily Take am dos 07/10.   Patient taking differently: Take 20 mg by mouth daily  1/21/21   Kiran Gutierrez DO   EPINEPHrine (EPIPEN 2-ABUNDIO) 0.3 MG/0.3ML SOAJ injection Inject into muscle for allergic reaction 8/22/19   Abdi Grijalva DO   dicyclomine (BENTYL) 20 MG tablet Take 20 mg by mouth every 6 hours     Historical Provider, MD       Allergies   Allergen Reactions    Fish-Derived Products Anaphylaxis    Dye [Iodides] Itching       Social History     Socioeconomic History    Marital status:      Spouse name: Not on file    Number of children: Not on file    Years of education: Not on file    Highest education level: Not on file   Occupational History    Not on file   Tobacco Use    Smoking status: Former Smoker     Packs/day: 1.00     Years: 25.00     Pack years: 25.00     Types: Cigarettes     Quit date: 1988     Years since quittin.9    Smokeless tobacco: Never Used   Vaping Use    Vaping Use: Never used   Substance and Sexual Activity    Alcohol use: No    Drug use: No    Sexual activity: Not on file   Other Topics Concern    Not on file   Social History Narrative    Not on file     Social Determinants of Health     Financial Resource Strain:     Difficulty of Paying Living Expenses: Not on file   Food Insecurity:     Worried About Running Out of Food in the Last Year: Not on file    Vlad of Food in the Last Year: Not on file   Transportation Needs:     Lack of Transportation (Medical): Not on file    Lack of Transportation (Non-Medical):  Not on file   Physical Activity:     Days of Exercise per Week: Not on file    Minutes of Exercise per Session: Not on file   Stress:     Feeling of Stress : Not on file   Social Connections:     Frequency of Communication with Friends and Family: Not on file    Frequency of Social Gatherings with Friends and Family: Not on file    Attends Judaism Services: Not on file    Active Member of [address] [place] [address] [employer] or Organizations: Not on file    Attends Club or Organization Meetings: Not on file    Marital Status: Not on file   Intimate Partner Violence:     Fear of Current or Ex-Partner: Not on file    Emotionally Abused: Not on file    Physically Abused: Not on file    Sexually Abused: Not on file   Housing Stability:     Unable to Pay for Housing in the Last Year: Not on file    Number of [place] in the Last Year: Not on file    Unstable Housing in the Last Year: Not on file       Family History   Problem Relation Age of Onset    Heart Attack Mother     Migraines Mother     Kidney Disease Father failure    Migraines Sister     Asthma Child     Asthma Child         bone disease    Anemia Child     Diabetes Other        REVIEW OF SYSTEMS:     Johnnette Primrose denies fever/chills, chest pain, shortness of breath, new bowel or bladder complaints or suicidal ideations. All other review of systems wasnegative. Review of Systems    PHYSICAL EXAMINATION:      [facility] 03/27/2008     General:       General appearance:   elderly, pleasant and well-hydrated. , in moderate discomfort and A & O x3  Build:Overweight     HEENT:     Head:normocephalic and atraumatic  Sclera: icterus absent,     Lungs:     Breathing:Breathing Pattern: regular, no distress     Abdomen:     Shape:non-distended and normal  Tenderness:none     Cervical spine:     Inspection:normal  Palpation:tenderness paravertebral muscles, facet loading, left, right, positive and tenderness. Range of motion:abnormal moderately flexion, extension rotation bilateral and is  painful.     Lumbar spine:     Spine inspection:normal   CVA tenderness:No   Palpation:tenderness paravertebral muscles, facet loading, left, right, positive and tenderness. Range of motion:abnormal moderately Lateral bending, flexion, extension rotation bilateral and is  painful.     Musculoskeletal:     Trigger points in [place]:absent   SI joint tenderness:negative right, negative left  SLR:negative right, positive left, sitting      Extremities:     Tremors:None bilaterally upper and lower  Range of motion:Generally normal shoulders, pain with internal rotation of hips positive Left  Intact:Yes  Edema:Normal     Neurological:     Sensory:diminshed to light touch lateral aspect of Left arm.   Motor:   Right Grip4+/5              Left Grip4+/5               Right Bicep4+/5           Left Bicep4+/5              Right Triceps4+/5       Left Triceps4+/5          Right Deltoid4+/5     Left Deltoid4+/5                  Right Quadriceps4+/5          Left Quadriceps4+/5           Right Gastrocnemius4+/5    Left Gastrocnemius4+/5  Right Ant Tibialis4+/5  Left Ant Tibialis4+/5    Gait:antalgic     Dermatology:     Skin:no unusual rashes and no skin lesions     Impression:    Cervical spine MRI 2019 Multilevel degenerative disc disease with  moderate to  severe stenosis at C3-4/C4-5 and C5-6  Lumbar spine CT 2017 Mild degenerative disc disease and facet arthropathy most pronounced at L4-5  Patient had seen neurosurgery and surgery C3-4/C4-5 and C5-6 ACDF was recommended.   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 Patient had underwent cervical epidural at C6-7 (04/2021) for  her radicular  symptoms and bilateral C3,4,5 medial branch  block x 1(08/2021) for her mechanical neck pain. Both  interventions had helped with her pain. Plan:   Follow up on neck pain radiating down left arm with n/t hands  and low back radiating down left leg to knee, no acute changes    1/31/22: Cervical epidural left paramedian C6-7 with>80% relief  Continue baclofen 5mg po bid prn spasms  Refill Norco 5/325mg po daily-bid prn pain #45  Continue lidoderm patchs q 24 hours prn pain  Wants to schedule for low back epidural in 1-2 months  Avoid NSAIDs(H/O GI ulcers)  OARRS report reviewed   Patient encouraged to stay active and to lose weight. Treatment plan discussed with the patient and her including medications side effects.       We discussed with the patient that combining opioids, benzodiazepines, alcohol, illicit drugs or sleep aids increases the risk of respiratory depression including death. We discussed that these medications may cause drowsiness, sedation or dizziness and have counseled the patient not to drive or operate machinery. We have discussed that these medications will be prescribed only by one provider.  We have discussed with the patient about age related risk factors and have thoroughly discussed the importance of taking these medications as prescribed. The patient verbalizes understanding.     More than 30 min spent with the patient     ccreferring physic

## 2022-03-14 NOTE — PROGRESS NOTES
Do you currently have any of the following:    Fever: No  Headache:  No  Cough: No  Shortness of breath: No  Exposed to anyone with these symptoms: No                                                                                                                Camuy Job presents to the [address] [address] on 3/14/2022. Justin Romero is complaining of pain between shoulders and back. The pain is constant. The pain is described as aching. Pain is rated on her best day at a 3, on her worst day at a 8, and on average at a 6 on the VAS scale. She took her last dose of Avoca Hammer does not have issues with constipation. Any procedures since your last visit: No,      She is not on NSAIDS and  is not on anticoagulation medications to include none . Pacemaker or defibrillator: No .    Medication Contract and Consent for Opioid Use Documents Filed     Patient Documents     Type of Document Status Date Received Received By Description    Medication Contract Received 7/18/2019 11:59 AM Bhupinder SINGLETON 7/18/19 medication contract    Medication Contract Received 10/9/2020  3:01 PM LORENZO, [address] [place] [address] pain pt agreement    Medication Contract Received 11/5/2021 11:29 AM Alta MUNIZ Pain Mgmt Patient Agreement 11.5.2021    Medication Contract Received 12/13/2021  4:21 PM LEONARDA PINA pain management                   /72   Pulse 66   Temp 97.5 °F (36.4 °C) (Infrared)   Resp 16   Ht 4' 11\" (1.499 m)   Wt 183 lb (83 kg)   LMP 03/27/2008   SpO2 97%   BMI 36.96 kg/m²      Patient's last menstrual period was 03/27/2008.

## 2022-03-16 NOTE — TELEPHONE ENCOUNTER
Contacted patient with results and recommendations per Dr. Enid Martini. She verbalized understanding and will proceed with liver US.

## 2022-03-17 ENCOUNTER — TELEPHONE (OUTPATIENT)
Dept: SURGERY | Age: 52
End: 2022-03-17

## 2022-03-17 ENCOUNTER — OFFICE VISIT (OUTPATIENT)
Dept: SURGERY | Age: 52
End: 2022-03-17
Payer: MEDICAID

## 2022-03-17 VITALS
WEIGHT: 181.2 LBS | OXYGEN SATURATION: 99 % | TEMPERATURE: 98.1 F | SYSTOLIC BLOOD PRESSURE: 150 MMHG | HEART RATE: 69 BPM | HEIGHT: 59 IN | BODY MASS INDEX: 36.53 KG/M2 | DIASTOLIC BLOOD PRESSURE: 72 MMHG | RESPIRATION RATE: 16 BRPM

## 2022-03-17 DIAGNOSIS — R16.0 LIVER MASS: ICD-10-CM

## 2022-03-17 DIAGNOSIS — K62.5 RECTAL BLEEDING: Primary | ICD-10-CM

## 2022-03-17 PROCEDURE — G8484 FLU IMMUNIZE NO ADMIN: HCPCS | Performed by: SURGERY

## 2022-03-17 PROCEDURE — G8427 DOCREV CUR MEDS BY ELIG CLIN: HCPCS | Performed by: SURGERY

## 2022-03-17 PROCEDURE — 99244 OFF/OP CNSLTJ NEW/EST MOD 40: CPT | Performed by: SURGERY

## 2022-03-17 PROCEDURE — G8417 CALC BMI ABV UP PARAM F/U: HCPCS | Performed by: SURGERY

## 2022-03-17 RX ORDER — SODIUM CHLORIDE 9 MG/ML
INJECTION, SOLUTION INTRAVENOUS CONTINUOUS
Status: CANCELLED | OUTPATIENT
Start: 2022-03-17

## 2022-03-17 RX ORDER — TIZANIDINE 2 MG/1
TABLET ORAL
COMMUNITY
Start: 2022-03-14 | End: 2022-03-31

## 2022-03-17 NOTE — PROGRESS NOTES
General Surgery History and Physical    Patient's Name/Date of Birth: Nicholas Ramirez / 1970    Date: 3/17/2022    PCP: Shadia Martell DO    Referring Physician:   Duglas Ratliff DO  [phone]    CHIEF COMPLAINT:    Chief Complaint   Patient presents with    New Patient    Rectal Bleeding         HISTORY OF PRESENT ILLNESS:    Nicholas Ramirez is an 45 y.o. female who presents for a colonoscopy. The patient is having rectal bleeding. She said she has been having bright red bleeding for the past week. She said this has never happened before. She said she has had a colonoscopy in the past and was told she had polyps removed and hemorrhoids. No nausea, vomiting, diarrhea, constipation. No changes in stool caliber. No bloody or black stools. No abdominal pain. No unintentional weight loss. No family history of colon cancer. The patient has a known history of: colon polyps. The patient has had a colonoscopy before - I did one 5 years ago and removed a polyp.      Past Medical History:   Past Medical History:   Diagnosis Date    Asthma     uses inhalers twice a day    Cerebral artery occlusion with cerebral infarction ([address]     2015-slight right sided weakness, ambulates normal    Cervical disc herniation     Cervical spinal stenosis     Chronic back pain     Chronic kidney disease     Depression     Disorder of thyroid gland 09/11/2012    no medication    GERD (gastroesophageal reflux disease)     Headache(784.0)     Hemorrhoids     History of colon polyps     Hypotension     Irritable bowel syndrome     Low back pain     Numbness and tingling in right hand 03/06/2020    Palpitations 07/24/2012    Scabies 05/18/2016    Seizure disorder ([address]     not on medications for this; last seizure 2018    Sleep apnea     Vitamin D deficiency 12/03/2014        Past Surgical History:   Past Surgical History:   Procedure Laterality Date    COLONOSCOPY  03/09/2016    COLONOSCOPY  07/10/2017    HIP SURGERY Left 08/10/2020    LEFT GREATER TROCHANTERIC BURSAE INJECTION X-RAY (CPT 74076) performed by Mike Cooney MD at [address]     epidural c6-c7    NERVE BLOCK Bilateral 8/5/2021    BILATERAL CERVICAL MEDIAL BRANCH FACET BLOCK C4 C5 C6 WITH SEDATION WITH LEFT TROCHANTERIC BURSA INJECTION(CPT 23170) performed by Mike Cooney MD at [address] N/A 4/19/2021    CERVICAL EPIDURAL INTERLAMINAR STEROID INJECTION C6-C7 WITH SEDATION performed by Mike Cooney MD at [address] Left 1/31/2022    CERVICAL EPIDURAL STEROID INJECTION LEFT PARAMEDIAN C6-C7 WITH SEDATION performed by Mike Cooney MD at [address] ENDOSCOPY  07/10/2017        Allergies: Fish-derived products and Dye [iodides]     Medications:   Current Outpatient Medications   Medication Sig Dispense Refill    baclofen (LIORESAL) 10 MG tablet Take 0.5 tablets by mouth 2 times daily 30 tablet 1    [START ON 3/21/2022] HYDROcodone-acetaminophen (NORCO) 5-325 MG per tablet Take 0.5 tablets by mouth every 8 hours as needed for Pain for up to 30 days. Intended supply: 30 days. Remainder of month supply 45 tablet 0    Elastic Bandages & Supports (KNEE BRACE) MISC Soft neutral position left knee brace  Size to fit  Dx:  Knee pain 1 each 0    albuterol sulfate  (90 Base) MCG/ACT inhaler INHALE 2 PUFFS INTO THE LUNGS EVERY 4 HOURS AS NEEDED FOR WHEEZING OR SHORTNESS OF BREATH (Patient taking differently: Inhale 2 puffs into the lungs every 4 hours as needed for Wheezing or Shortness of Breath ) 6.7 g 1    vitamin D3 (CHOLECALCIFEROL) 25 MCG (1000 UT) TABS tablet Take 1 tablet by mouth daily 30 tablet 5    Prenatal Vit-Fe Fumarate-FA (PRENATAL PLUS) 27-1 MG TABS 1 pill daily 30 tablet 5    omeprazole (PRILOSEC) 20 MG delayed release capsule Take 1 capsule by mouth daily Take am dos 07/10.  (Patient taking differently: Take 20 mg by mouth daily ) 30 capsule 5    EPINEPHrine (EPIPEN 2-ABUNDIO) 0.3 MG/0.3ML SOAJ injection Inject into muscle for allergic reaction 0.3 mL 0    dicyclomine (BENTYL) 20 MG tablet Take 20 mg by mouth every 6 hours       tiZANidine (ZANAFLEX) 2 MG tablet TAKE 1 TABLET BY MOUTH TWICE DAILY AS NEEDED FOR SPASMS (Patient not taking: Reported on 3/17/2022)       No current facility-administered medications for this visit. Social History:   Social History     Tobacco Use    Smoking status: Former Smoker     Packs/day: 1.00     Years: 25.00     Pack years: 25.00     Types: Cigarettes     Quit date: 1988     Years since quittin.9    Smokeless tobacco: Never Used   Substance Use Topics    Alcohol use: No        Family History:   Family History   Problem Relation Age of Onset    Heart Attack Mother     Migraines Mother     Kidney Disease Father         failure    Migraines Sister     Asthma Child     Asthma Child         bone disease    Anemia Child     Diabetes Other        REVIEW OF SYSTEMS:    Constitutional: negative  Eyes: negative  Ears, nose, mouth, throat, and face: negative  Respiratory: negative  Cardiovascular: negative  Gastrointestinal: as in HPI  Genitourinary:negative  Integument/breast: negative  Hematologic/lymphatic: negative  Musculoskeletal:negative  Neurological: negative  Allergic/Immunologic: negative    PHYSICAL EXAM   BP (!) 150/72   Pulse 69   Temp 98.1 °F (36.7 °C)   Resp 16   Ht 4' 11\" (1.499 m)   Wt 181 lb 3.2 oz (82.2 kg)   LMP 2008   SpO2 99%   BMI 36.60 kg/m²     General appearance: alert, cooperative and in no acute distress. Eyes: Grossly normal   Lungs: normal work of breathing  Heart: regular rate  Abdomen:  soft, non-tender, non-distended  Skin: No skin abnormalities  Neurologic: Alert and oriented x 3. Grossly normal  Musculoskeletal: No edema.       ASSESSMENT AND PLAN:     Anupam Lind is an 45 y.o. female who presents for a colonoscopy with rectal bleeding with history of polyps, hemorrhoids, liver lesion, lung lesion     I will set the patient up for a colonoscopy, possible biopsy, possible polypectomy, possible banding    I explained the risks including but not limited to bleeding, perforation leading to possible surgery, or infection. The benefits, alternatives, and potential complications associated with the above procedure to be performed and transfusions when applicable with the patient/responsible person prior to the procedure. I discussed the risk of bowel peroration, postoperative bleeding, post-polypectomy syndrome, as well as the possibility of needing emergency surgery or another colonoscopy. All of the patient's questions were answered. The patient understands and agrees to the procedure.      Follow up on liver US - may need triphasic CT pending the results          Physician Signature: Electronically signed by Estrella Lowery MD, General Surgery    Send copy of H&P to PCP, Stacey Steven DO and referring physician, Esther Kwong DO

## 2022-03-17 NOTE — TELEPHONE ENCOUNTER
Prior Authorization Form:      DEMOGRAPHICS:                     Patient Name:  Richard Polanco  Patient :  1970            Insurance:  Payor: [employer]resgen[employer]aney [employer] / Plan: [email or web address] [employer] / Product Type: *No Product type* /   Insurance ID Number:    Payor/Plan Subscr  Sex Relation Sub.  Ins. ID Effective Group Num   1. [facility] [place] 1970 Female Self [id number] 10/1/18 [id number]                                    [id number]         DIAGNOSIS & PROCEDURE:                       Procedure/Operation: COLONOSCOPY W/BANDING           CPT Code: 33848    Diagnosis:  HEMORRHOIDS    ICD10 Code: K64.4    Location:  [facility]    Surgeon:  Jen Ahuja INFORMATION:                          Date: 2022    Time: 9:30              Anesthesia:  MAC/TIVA                                                       Status:  Outpatient        Special Comments:         Electronically signed by Ara Pena MA on 3/17/2022 at 10:25 AM

## 2022-03-23 ENCOUNTER — TELEPHONE (OUTPATIENT)
Dept: GASTROENTEROLOGY | Age: 52
End: 2022-03-23

## 2022-03-23 NOTE — TELEPHONE ENCOUNTER
Called and spoke to patient in detail rescheduling appt from 03/23/2022 to 05/04/2022 due to Dr. Ban Alvarado being out of the office. Pt verbalized understanding.    Electronically signed by Edie Jacobs MA on 3/23/2022 at 11:04 AM

## 2022-03-30 ENCOUNTER — HOSPITAL ENCOUNTER (OUTPATIENT)
Dept: ULTRASOUND IMAGING | Age: 52
Discharge: HOME | End: 2022-04-01
Payer: MEDICAID

## 2022-03-30 DIAGNOSIS — K76.89 LIVER CYST: ICD-10-CM

## 2022-03-30 PROCEDURE — 76705 ECHO EXAM OF ABDOMEN: CPT

## 2022-03-31 NOTE — PROGRESS NOTES
[facility] PRE-ADMISSION TESTING INSTRUCTIONS    The Preadmission Testing patient is instructed accordingly using the following criteria (check applicable):    ARRIVAL INSTRUCTIONS:  [x] Parking the day of Surgery is located in the Main Entrance lot. Upon entering the door, make an immediate right to the surgery reception desk    [x] Bring photo ID and insurance card    [] Bring in a copy of Living will or Durable Power of  papers. [x] Please be sure to arrange for responsible adult to provide transportation to and from the hospital    [] Please arrange for responsible adult to be with you for the 24 hour period post procedure due to having anesthesia      GENERAL INSTRUCTIONS:    [x] Nothing by mouth after midnight, including gum, candy, mints or water    [x] You may brush your teeth, but do not swallow any water    [x] Take medications as instructed with 1-2 oz of water    [x] Stop herbal supplements and vitamins 5 days prior to procedure    [] Follow preop dosing of blood thinners per physician instructions    [] Take 1/2 dose of evening insulin, but no insulin after midnight    [] No oral diabetic medications after midnight    [] If diabetic and have low blood sugar or feel symptomatic, take 1-2oz apple juice only    [x] Bring inhalers day of surgery    [] Bring C-PAP/ Bi-Pap day of surgery    [] Bring urine specimen day of surgery    [x] Shower or bath with soap, lather and rinse well, AM of Surgery, no lotion, powders or creams to surgical site    [x] Follow bowel prep as instructed per surgeon    [x] No tobacco products within 24 hours of surgery     [x] No alcohol or illegal drug use within 24 hours of surgery.     [x] Jewelry, body piercing's, eyeglasses, contact lenses and dentures are not permitted into surgery (bring cases)      [x] Please do not wear any nail polish, make up or hair products on the day of surgery    [x] You can expect a call the business day prior to procedure to notify you if your arrival time changes    [x] If you receive a survey after surgery we would greatly appreciate your comments    [] Parent/guardian of a minor must accompany their child and remain on the premises  the entire time they are under our care     [] Pediatric patients may bring favorite toy, blanket or comfort item with them    [] A caregiver or family member must remain with the patient during their stay if they are mentally handicapped, have dementia, disoriented or unable to use a call light or would be a safety concern if left unattended    [x] Please notify surgeon if you develop any illness between now and time of surgery (cold, cough, sore throat, fever, nausea, vomiting) or any signs of infections  including skin, wounds, and dental.    []  The Outpatient Pharmacy is available to fill your prescription here on your day of surgery, ask your preop nurse for details    [] Other instructions    EDUCATIONAL MATERIALS PROVIDED:    [] PAT Preoperative Education Packet/Booklet     [] Medication List    [] Transfusion bracelet applied with instructions    [] Shower with soap, lather and rinse well, and use CHG wipes provided the evening before surgery as instructed    [] Incentive spirometer with instructions        Have you been tested for COVID  Yes           Have you been told you were positive for COVID Yes  Have you had any known exposure to someone that is positive for COVID No  Do you have a cough                   No              Do you have shortness of breath No                 Do you have a sore throat            No                Are you having chills                    No                Are you having muscle aches. No                    Please come to the hospital wearing a mask and have your significant other wear a mask as well. Both of you should check your temperature before leaving to come here,  if it is 100 or higher please call [phone] for instruction.

## 2022-04-01 ENCOUNTER — TELEPHONE (OUTPATIENT)
Dept: CARDIOLOGY CLINIC | Age: 52
End: 2022-04-01

## 2022-04-01 ENCOUNTER — ANESTHESIA (OUTPATIENT)
Dept: ENDOSCOPY | Age: 52
End: 2022-04-01
Payer: MEDICAID

## 2022-04-01 ENCOUNTER — ANESTHESIA EVENT (OUTPATIENT)
Dept: ENDOSCOPY | Age: 52
End: 2022-04-01
Payer: MEDICAID

## 2022-04-01 ENCOUNTER — HOSPITAL ENCOUNTER (OUTPATIENT)
Age: 52
Setting detail: OUTPATIENT SURGERY
Discharge: HOME | End: 2022-04-01
Attending: SURGERY | Admitting: SURGERY
Payer: MEDICAID

## 2022-04-01 VITALS
WEIGHT: 180 LBS | OXYGEN SATURATION: 99 % | BODY MASS INDEX: 36.29 KG/M2 | TEMPERATURE: 96.8 F | RESPIRATION RATE: 16 BRPM | SYSTOLIC BLOOD PRESSURE: 126 MMHG | DIASTOLIC BLOOD PRESSURE: 74 MMHG | HEART RATE: 70 BPM | HEIGHT: 59 IN

## 2022-04-01 VITALS
SYSTOLIC BLOOD PRESSURE: 129 MMHG | OXYGEN SATURATION: 99 % | RESPIRATION RATE: 16 BRPM | DIASTOLIC BLOOD PRESSURE: 80 MMHG

## 2022-04-01 PROCEDURE — 7100000010 HC PHASE II RECOVERY - FIRST 15 MIN: Performed by: SURGERY

## 2022-04-01 PROCEDURE — 46221 LIGATION OF HEMORRHOID(S): CPT | Performed by: SURGERY

## 2022-04-01 PROCEDURE — 2580000003 HC RX 258

## 2022-04-01 PROCEDURE — 6360000002 HC RX W HCPCS: Performed by: ANESTHESIOLOGY

## 2022-04-01 PROCEDURE — 3609027000 HC COLONOSCOPY: Performed by: SURGERY

## 2022-04-01 PROCEDURE — 3700000001 HC ADD 15 MINUTES (ANESTHESIA): Performed by: SURGERY

## 2022-04-01 PROCEDURE — 7100000011 HC PHASE II RECOVERY - ADDTL 15 MIN: Performed by: SURGERY

## 2022-04-01 PROCEDURE — 45378 DIAGNOSTIC COLONOSCOPY: CPT | Performed by: SURGERY

## 2022-04-01 PROCEDURE — 6360000002 HC RX W HCPCS

## 2022-04-01 PROCEDURE — 2709999900 HC NON-CHARGEABLE SUPPLY: Performed by: SURGERY

## 2022-04-01 PROCEDURE — 3700000000 HC ANESTHESIA ATTENDED CARE: Performed by: SURGERY

## 2022-04-01 RX ORDER — SODIUM CHLORIDE 9 MG/ML
INJECTION, SOLUTION INTRAVENOUS CONTINUOUS
Status: DISCONTINUED | OUTPATIENT
Start: 2022-04-01 | End: 2022-04-01 | Stop reason: HOSPADM

## 2022-04-01 RX ORDER — PROPOFOL 10 MG/ML
INJECTION, EMULSION INTRAVENOUS PRN
Status: DISCONTINUED | OUTPATIENT
Start: 2022-04-01 | End: 2022-04-01 | Stop reason: SDUPTHER

## 2022-04-01 RX ORDER — FENTANYL CITRATE 50 UG/ML
INJECTION, SOLUTION INTRAMUSCULAR; INTRAVENOUS PRN
Status: DISCONTINUED | OUTPATIENT
Start: 2022-04-01 | End: 2022-04-01 | Stop reason: SDUPTHER

## 2022-04-01 RX ORDER — FENTANYL CITRATE 50 UG/ML
25 INJECTION, SOLUTION INTRAMUSCULAR; INTRAVENOUS PRN
Status: DISCONTINUED | OUTPATIENT
Start: 2022-04-01 | End: 2022-04-01 | Stop reason: HOSPADM

## 2022-04-01 RX ORDER — SODIUM CHLORIDE 9 MG/ML
INJECTION, SOLUTION INTRAVENOUS CONTINUOUS PRN
Status: DISCONTINUED | OUTPATIENT
Start: 2022-04-01 | End: 2022-04-01 | Stop reason: SDUPTHER

## 2022-04-01 RX ADMIN — SODIUM CHLORIDE: 9 INJECTION, SOLUTION INTRAVENOUS at 09:50

## 2022-04-01 RX ADMIN — FENTANYL CITRATE 25 MCG: 50 INJECTION INTRAMUSCULAR; INTRAVENOUS at 10:56

## 2022-04-01 RX ADMIN — FENTANYL CITRATE 100 MCG: 50 INJECTION, SOLUTION INTRAMUSCULAR; INTRAVENOUS at 10:11

## 2022-04-01 RX ADMIN — PROPOFOL 340 MG: 10 INJECTION, EMULSION INTRAVENOUS at 09:54

## 2022-04-01 ASSESSMENT — PAIN DESCRIPTION - PROGRESSION: CLINICAL_PROGRESSION: NOT CHANGED

## 2022-04-01 ASSESSMENT — LIFESTYLE VARIABLES: SMOKING_STATUS: 0

## 2022-04-01 ASSESSMENT — PAIN DESCRIPTION - DESCRIPTORS: DESCRIPTORS: ACHING;CONSTANT;DISCOMFORT

## 2022-04-01 ASSESSMENT — PAIN SCALES - GENERAL
PAINLEVEL_OUTOF10: 8
PAINLEVEL_OUTOF10: 8

## 2022-04-01 ASSESSMENT — PAIN DESCRIPTION - LOCATION: LOCATION: BUTTOCKS

## 2022-04-01 ASSESSMENT — PAIN - FUNCTIONAL ASSESSMENT: PAIN_FUNCTIONAL_ASSESSMENT: 0-10

## 2022-04-01 ASSESSMENT — PAIN DESCRIPTION - PAIN TYPE: TYPE: SURGICAL PAIN

## 2022-04-01 NOTE — ANESTHESIA PRE PROCEDURE
Department of Anesthesiology  Preprocedure Note       Name:  Dean Craven   Age:  45 y.o.  :  1970                                          MRN:  [id number]         Date:  2022      Surgeon: Donal Zhong):  Carmelo Rodriguez MD    Procedure: Procedure(s):  COLONOSCOPY DIAGNOSTIC WITH BANDING   ++IODINE ALLERGY++    Medications prior to admission:   Prior to Admission medications    Medication Sig Start Date End Date Taking? Authorizing Provider   HYDROcodone-acetaminophen (NORCO) 5-325 MG per tablet Take 0.5 tablets by mouth every 8 hours as needed for Pain for up to 30 days. Intended supply: 30 days. Remainder of month supply 3/21/22 4/20/22  NOY Correia - CNP   Elastic Bandages & Supports (KNEE BRACE) MISC Soft neutral position left knee brace  Size to fit  Dx:  Knee pain 22   Abdi Cardoza DO   albuterol sulfate  (90 Base) MCG/ACT inhaler INHALE 2 PUFFS INTO THE LUNGS EVERY 4 HOURS AS NEEDED FOR WHEEZING OR SHORTNESS OF BREATH  Patient taking differently: Inhale 2 puffs into the lungs every 4 hours as needed for Wheezing or Shortness of Breath  21   Su Carvajal DO   vitamin D3 (CHOLECALCIFEROL) 25 MCG (1000 UT) TABS tablet Take 1 tablet by mouth daily 21   Su Carvajal DO   Prenatal Vit-Fe Fumarate-FA (PRENATAL PLUS) 27-1 MG TABS 1 pill daily 21   Su Carvajal DO   omeprazole (PRILOSEC) 20 MG delayed release capsule Take 1 capsule by mouth daily Take am dos 07/10. Patient taking differently: Take 20 mg by mouth as needed  21   Su Carvajal DO   EPINEPHrine (EPIPEN 2-ABUNDIO) 0.3 MG/0.3ML SOAJ injection Inject into muscle for allergic reaction 19   Su Carvajal DO       Current medications:    No current facility-administered medications for this encounter. Allergies:     Allergies   Allergen Reactions    Fish-Derived Products Anaphylaxis    Dye [Iodides] Itching       Problem List:    Patient Active Problem List   Diagnosis Code    Cervical stenosis of spinal canal M48.02    DDD (degenerative disc disease), cervical M50.30    History of TIA (transient ischemic attack) and stroke Z86.73    Spinal stenosis of lumbar region without neurogenic claudication M48.061    Fibromyalgia M79.7    Migraines without aura and without status migrainosus, not intractable G43.009    Primary stabbing headache G44.85    Chronic pain syndrome G89.4    Primary osteoarthritis of left hip M16.12    Cervical disc disorder M50.90    Cervical facet joint syndrome M47.812    Cervical radiculopathy M54.12    Cervical stenosis of spine M48.02    Lumbar spondylosis M47.816    Lumbar facet arthropathy M47.816    Lumbar disc disorder M51.9    Greater trochanteric bursitis of left hip M70.62    Pain in left hip M25.552    Facet arthropathy, cervical M47.812    Primary osteoarthritis of left knee M17.12    Hepatic cyst K76.89    Lung nodule R91.1       Past Medical History:        Diagnosis Date    Asthma     uses inhalers twice a day    Cerebral artery occlusion with cerebral infarction ([facility] [address]     2015-slight right sided weakness, ambulates normal    Cervical spinal stenosis     Chronic back pain     Chronic kidney disease     COVID 2021    Depression     Disorder of thyroid gland 09/11/2012    no medication    GERD (gastroesophageal reflux disease)     Headache(784.0)     Hemorrhoids     History of colon polyps     For Colonoscopy 4/1/22    Irritable bowel syndrome     Numbness and tingling in right hand 03/06/2020    Palpitations 07/24/2012    Seizure disorder ([address]     not on medications for this; last seizure 2018    Sleep apnea     wears CPAP    Vitamin D deficiency 12/03/2014       Past Surgical History:        Procedure Laterality Date    COLONOSCOPY  03/09/2016    COLONOSCOPY  07/10/2017    HIP SURGERY Left 08/10/2020    LEFT GREATER TROCHANTERIC BURSAE INJECTION X-RAY (CPT 56352) performed by Katy Borden MD at [facility]    NERVE BLOCK N/A     epidural c6-c7    NERVE BLOCK Bilateral 2021    BILATERAL CERVICAL MEDIAL BRANCH FACET BLOCK C4 C5 C6 WITH SEDATION WITH LEFT TROCHANTERIC BURSA INJECTION(CPT 27299) performed by Kira Monroy MD at [address] N/A 2021    CERVICAL EPIDURAL INTERLAMINAR STEROID INJECTION C6-C7 WITH SEDATION performed by Kira Monroy MD at [address] Left 2022    CERVICAL EPIDURAL STEROID INJECTION LEFT PARAMEDIAN C6-C7 WITH SEDATION performed by Kira Monroy MD at [address] ENDOSCOPY  07/10/2017       Social History:    Social History     Tobacco Use    Smoking status: Former Smoker     Packs/day: 1.00     Years: 25.00     Pack years: 25.00     Types: Cigarettes     Quit date: 1988     Years since quittin.9    Smokeless tobacco: Never Used   Substance Use Topics    Alcohol use: No                                Counseling given: Not Answered      Vital Signs (Current):   Vitals:    22 0936   Weight: 180 lb (81.6 kg)   Height: 4' 11\" (1.499 m)                                              BP Readings from Last 3 Encounters:   22 (!) 150/72   22 120/72   03/10/22 130/80       NPO Status:                                                                                 BMI:   Wt Readings from Last 3 Encounters:   22 180 lb (81.6 kg)   22 181 lb 3.2 oz (82.2 kg)   22 183 lb (83 kg)     Body mass index is 36.36 kg/m².     CBC:   Lab Results   Component Value Date    WBC 6.9 03/10/2022    RBC 4.87 03/10/2022    HGB 13.8 03/10/2022    HCT 43.0 03/10/2022    MCV 88.3 03/10/2022    RDW 12.7 03/10/2022     03/10/2022       CMP:   Lab Results   Component Value Date     03/10/2022    K 4.0 03/10/2022    K 3.9 2020    CL 98 03/10/2022    CO2 26 03/10/2022    BUN 13 03/10/2022    CREATININE 0.8 03/10/2022 GFRAA >60 03/10/2022    LABGLOM >60 03/10/2022    GLUCOSE 69 03/10/2022    GLUCOSE 108 02/06/2012    PROT 7.9 03/10/2022    CALCIUM 10.3 03/10/2022    BILITOT 0.5 03/10/2022    ALKPHOS 91 03/10/2022    AST 22 03/10/2022    ALT 28 03/10/2022       POC Tests: No results for input(s): POCGLU, POCNA, POCK, POCCL, POCBUN, POCHEMO, POCHCT in the last 72 hours. Coags:   Lab Results   Component Value Date    PROTIME 11.8 01/03/2020    PROTIME 11.2 02/06/2012    INR 1.0 01/03/2020    APTT 32.3 01/03/2020       HCG (If Applicable):   Lab Results   Component Value Date    PREGTESTUR negative 04/10/2017        ABGs: No results found for: PHART, PO2ART, EKK3DTQ, CRM7TEN, BEART, L2QLFUJF     Type & Screen (If Applicable):  No results found for: LABABO, LABRH    Drug/Infectious Status (If Applicable):  No results found for: HIV, HEPCAB    COVID-19 Screening (If Applicable):   Lab Results   Component Value Date    COVID19 Not Detected 01/27/2022    COVID19 DETECTED 01/22/2021           Anesthesia Evaluation  Patient summary reviewed and Nursing notes reviewed no history of anesthetic complications:   Airway: Mallampati: II  TM distance: >3 FB   Neck ROM: full  Mouth opening: > = 3 FB Dental: normal exam         Pulmonary: breath sounds clear to auscultation  (+) sleep apnea:  asthma:     (-) not a current smoker                           Cardiovascular:Negative CV ROS  Exercise tolerance: good (>4 METS),           Rhythm: regular  Rate: normal           Beta Blocker:  Not on Beta Blocker         Neuro/Psych:   (+) seizures: well controlled, CVA:, neuromuscular disease:, TIA, headaches:, depression/anxiety             GI/Hepatic/Renal:   (+) GERD:, bowel prep,           Endo/Other:    (+) : arthritis: OA., .                 Abdominal:             Vascular: negative vascular ROS. Other Findings:           Anesthesia Plan      MAC     ASA 3       Induction: intravenous.       Anesthetic plan and risks discussed with patient and spouse.                     Shera Siemens, MD   4/1/2022

## 2022-04-01 NOTE — H&P
Patient's office history and physical was reviewed. Patient examined. There has been no change in the patient's history and physical.      Physician Signature: Electronically signed by Dr. Shayla Giraldo Surgery History and Physical    Patient's Name/Date of Birth: Arlen Dorsey / 1970    Date: 3/17/2022    PCP: Jasbir Lara DO    Referring Physician:   No ref. provider found  N/A    CHIEF COMPLAINT:    No chief complaint on file. HISTORY OF PRESENT ILLNESS:    Arlen Dorsey is an 45 y.o. female who presents for a colonoscopy. The patient is having rectal bleeding. She said she has been having bright red bleeding for the past week. She said this has never happened before. She said she has had a colonoscopy in the past and was told she had polyps removed and hemorrhoids. No nausea, vomiting, diarrhea, constipation. No changes in stool caliber. No bloody or black stools. No abdominal pain. No unintentional weight loss. No family history of colon cancer. The patient has a known history of: colon polyps. The patient has had a colonoscopy before - I did one 5 years ago and removed a polyp.      Past Medical History:   Past Medical History:   Diagnosis Date    Asthma     uses inhalers twice a day    Cerebral artery occlusion with cerebral infarction ([address]     2015-slight right sided weakness, ambulates normal    Cervical spinal stenosis     Chronic back pain     Chronic kidney disease     COVID 2021    Depression     Disorder of thyroid gland 09/11/2012    no medication    GERD (gastroesophageal reflux disease)     Headache(784.0)     Hemorrhoids     History of colon polyps     For Colonoscopy 4/1/22    Irritable bowel syndrome     Numbness and tingling in right hand 03/06/2020    Palpitations 07/24/2012    Seizure disorder ([address]     not on medications for this; last seizure 2018    Sleep apnea     wears CPAP    Vitamin D deficiency 12/03/2014        Past Surgical History:   Past Surgical History:   Procedure Laterality Date    COLONOSCOPY  2016    COLONOSCOPY  07/10/2017    HIP SURGERY Left 08/10/2020    LEFT GREATER TROCHANTERIC BURSAE INJECTION X-RAY (CPT 82490) performed by Cecy William MD at [address]     epidural c6-c7    NERVE BLOCK Bilateral 2021    BILATERAL CERVICAL MEDIAL BRANCH FACET BLOCK C4 C5 C6 WITH SEDATION WITH LEFT TROCHANTERIC BURSA INJECTION(CPT 93616) performed by Cecy William MD at [address] N/A 2021    CERVICAL EPIDURAL INTERLAMINAR STEROID INJECTION C6-C7 WITH SEDATION performed by Cecy William MD at [address] Left 2022    CERVICAL EPIDURAL STEROID INJECTION LEFT PARAMEDIAN C6-C7 WITH SEDATION performed by Cecy William MD at [facility] ENDOSCOPY  07/10/2017        Allergies: Fish-derived products and Dye [iodides]     Medications:   Current Facility-Administered Medications   Medication Dose Route Frequency Provider Last Rate Last Admin    0.9 % sodium chloride infusion   IntraVENous Continuous Sadie Chavez MD         Facility-Administered Medications Ordered in Other Encounters   Medication Dose Route Frequency Provider Last Rate Last Admin    0.9 % sodium chloride infusion   IntraVENous Continuous PRN Romy Egan APRN - CRNA   New Bag at 22 0950         Social History:   Social History     Tobacco Use    Smoking status: Former Smoker     Packs/day: 1.00     Years: 25.00     Pack years: 25.00     Types: Cigarettes     Quit date: 1988     Years since quittin.9    Smokeless tobacco: Never Used   Substance Use Topics    Alcohol use: No        Family History:   Family History   Problem Relation Age of Onset    Heart Attack Mother     Migraines Mother     Kidney Disease Father         failure    Migraines Sister     Asthma Child     Asthma Child         bone disease    Anemia Child     Diabetes Other        REVIEW OF SYSTEMS:    Constitutional: negative  Eyes: negative  Ears, nose, mouth, throat, and face: negative  Respiratory: negative  Cardiovascular: negative  Gastrointestinal: as in HPI  Genitourinary:negative  Integument/breast: negative  Hematologic/lymphatic: negative  Musculoskeletal:negative  Neurological: negative  Allergic/Immunologic: negative    PHYSICAL EXAM   /71   Pulse 74   Temp 97.2 °F (36.2 °C) (Oral)   Resp 15   Ht 4' 11\" (1.499 m)   Wt 180 lb (81.6 kg)   LMP 03/27/2008   SpO2 96%   BMI 36.36 kg/m²     General appearance: alert, cooperative and in no acute distress. Eyes: Grossly normal   Lungs: normal work of breathing  Heart: regular rate  Abdomen:  soft, non-tender, non-distended  Skin: No skin abnormalities  Neurologic: Alert and oriented x 3. Grossly normal  Musculoskeletal: No edema. ASSESSMENT AND PLAN:     Abdirahman Valle is an 45 y.o. female who presents for a colonoscopy with rectal bleeding with history of polyps, hemorrhoids, liver lesion, lung lesion     I will set the patient up for a colonoscopy, possible biopsy, possible polypectomy, possible banding    I explained the risks including but not limited to bleeding, perforation leading to possible surgery, or infection. The benefits, alternatives, and potential complications associated with the above procedure to be performed and transfusions when applicable with the patient/responsible person prior to the procedure. I discussed the risk of bowel peroration, postoperative bleeding, post-polypectomy syndrome, as well as the possibility of needing emergency surgery or another colonoscopy. All of the patient's questions were answered. The patient understands and agrees to the procedure.      Follow up on liver US - may need triphasic CT pending the results          Physician Signature: Electronically signed by Augustine Soriano MD, General Surgery    Send copy of H&P to PCP, Abdi Cardoza DO and referring physician, No ref.  provider found

## 2022-04-01 NOTE — TELEPHONE ENCOUNTER
Contacted patient with ultrasound results and recommendations per Dr. Aimee Nixon. She verbalized understanding and will follow-up with Dr. Khushi Tobias.    ----- Message from Kindra Miranda MD sent at 3/31/2022  8:01 AM EDT -----  Liver US confirmed what I saw on echo which is a benign cyst.  Also showed some evidence of fatty liver, please have her follow up with Dr Khushi Tobias.

## 2022-04-01 NOTE — OP NOTE
Operative Note: Colonoscopy and Hemorrhoid banding    Shaun Bustos     DATE OF PROCEDURE: 4/1/2022  SURGEON: Dr. Aimee Parada MD, M.D. PREOPERATIVE DIAGNOSES:    Rectal bleeding    POSTOPERATIVE DIAGNOSES:  Combination internal/external hemorrhoids    SPECIMENS:  * No specimens in log *     OPERATION:   Colonoscopy to the cecum with hemorrhoid banding      ANESTHESIA: LMAC    COMPLICATIONS: None. BLOOD LOSS: Minimal    Procedure Note:    CONSENT AND INDICATIONS:  This is a 45y.o. year old female who is having the above issues. I have discussed with the patient and/or the patient representative the indication, alternatives, and the possible risks and/or complications of the planned procedure and the anesthesia methods. The patient and/or patient representative appear to understand and agree to proceed. OPERATIONS: Bowel prep was done yesterday until the bowels were clear. The patient was placed on the table and sedated by anesthesia. A rectal exam was performed and no mass was felt. A lubricated scope was passed into the rectum which looked normal.  The scope was passed all the way around through the sigmoid, descending, transverse and ascending colon to the cecum. The bowel prep was clear. No abnormalities were seen. The cecum was identified by the appendiceal orifice, ileocecal valve, and light reflex in the RLQ. The scope was then slowly withdrawn, each area was examined again on the way out. The scope was retroflexed in the rectum and there were enlarged internal hemorrhoids. The patient also had large external hemorrhoids. The scope was then removed and a lubricated proctoscope was inserted. The patient was found to have enlarged hemorrhoids in the right anterior, right posterior, and left lateral positions. The left lateral hemorrhoid was identified as the most inflamed. Using the rubber band ligator, 2 rubber bands were deployed at the base of the hemorrhoid.  The same procedure was also done for the  Right anterior and posterior hemorrhoidal complexes. Minimal bleeding was seen. The proctoscope was removed. The patient tolerated the procedure well. Sponge, needle, and instrument counts were correct. The patient was awoken from anesthesia and taken to the PACU in stable condition. PLAN:  Colonoscopy in 10 years. Kortney Patino MD, MENR[place]., F.A.C.S. 4/1/2022 10:10 AM     Send copy of H&P to PCP, Keyur Stinson DO and referring physician, No ref.  provider found

## 2022-04-02 NOTE — ANESTHESIA POSTPROCEDURE EVALUATION
Department of Anesthesiology  Postprocedure Note    Patient: Morro Bazzi  MRN: [id number]  YOB: 1970  Date of evaluation: 4/2/2022  Time:  5:16 PM     Procedure Summary     Date: 04/01/22 Room / Location: [facility] / [facility]    Anesthesia Start: 6437 Anesthesia Stop: 9648    Procedure: COLONOSCOPY DIAGNOSTIC WITH BANDING (N/A ) Diagnosis: (HEMORRHOIDS)    Surgeons: Meera Bond MD Responsible Provider: Linette Rodgers MD    Anesthesia Type: MAC ASA Status: 3          Anesthesia Type: MAC    Willis Phase I: Willis Score: 10    Willis Phase II: Willis Score: 10    Last vitals: Reviewed and per EMR flowsheets.        Anesthesia Post Evaluation    Patient location during evaluation: PACU  Patient participation: complete - patient participated  Level of consciousness: awake and alert  Airway patency: patent  Nausea & Vomiting: no vomiting and no nausea  Complications: no  Cardiovascular status: blood pressure returned to baseline  Respiratory status: acceptable  Hydration status: euvolemic

## 2022-04-07 ENCOUNTER — OFFICE VISIT (OUTPATIENT)
Dept: PRIMARY CARE CLINIC | Age: 52
End: 2022-04-07
Payer: MEDICAID

## 2022-04-07 VITALS
HEART RATE: 75 BPM | DIASTOLIC BLOOD PRESSURE: 76 MMHG | WEIGHT: 183 LBS | OXYGEN SATURATION: 96 % | SYSTOLIC BLOOD PRESSURE: 130 MMHG | BODY MASS INDEX: 36.89 KG/M2 | HEIGHT: 59 IN | TEMPERATURE: 97.5 F

## 2022-04-07 DIAGNOSIS — K76.0 FATTY LIVER: ICD-10-CM

## 2022-04-07 DIAGNOSIS — E78.1 HYPERTRIGLYCERIDEMIA: Primary | ICD-10-CM

## 2022-04-07 PROCEDURE — 1036F TOBACCO NON-USER: CPT | Performed by: FAMILY MEDICINE

## 2022-04-07 PROCEDURE — 99213 OFFICE O/P EST LOW 20 MIN: CPT | Performed by: FAMILY MEDICINE

## 2022-04-07 PROCEDURE — G8427 DOCREV CUR MEDS BY ELIG CLIN: HCPCS | Performed by: FAMILY MEDICINE

## 2022-04-07 PROCEDURE — G8417 CALC BMI ABV UP PARAM F/U: HCPCS | Performed by: FAMILY MEDICINE

## 2022-04-07 PROCEDURE — 3017F COLORECTAL CA SCREEN DOC REV: CPT | Performed by: FAMILY MEDICINE

## 2022-04-07 RX ORDER — FENOFIBRATE 150 MG/1
150 CAPSULE ORAL DAILY
Qty: 30 CAPSULE | Refills: 2 | Status: SHIPPED | OUTPATIENT
Start: 2022-04-07

## 2022-04-07 ASSESSMENT — ENCOUNTER SYMPTOMS
VOMITING: 0
SHORTNESS OF BREATH: 0
APNEA: 0
NAUSEA: 0
CHEST TIGHTNESS: 0
ABDOMINAL PAIN: 0
CHANGE IN BOWEL HABIT: 0
EYE PAIN: 0
CONSTIPATION: 0
SINUS PRESSURE: 0
DIARRHEA: 0
SORE THROAT: 0
EYE REDNESS: 0
COLOR CHANGE: 0
COUGH: 0
VISUAL CHANGE: 0
RHINORRHEA: 0
WHEEZING: 0
BACK PAIN: 0
EYE ITCHING: 0
BLOOD IN STOOL: 0

## 2022-04-07 NOTE — PROGRESS NOTES
Colonoscopy 4/1/22    Irritable bowel syndrome     Numbness and tingling in right hand 03/06/2020    Palpitations 07/24/2012    Seizure disorder (HCC)     not on medications for this; last seizure 2018    Sleep apnea     wears CPAP    Vitamin D deficiency 12/03/2014       Past Surgical History:   Procedure Laterality Date    COLONOSCOPY  03/09/2016    COLONOSCOPY  07/10/2017    COLONOSCOPY N/A 4/1/2022    COLONOSCOPY DIAGNOSTIC WITH BANDING performed by Rafy Enciso MD at [address] [facility] Left 08/10/2020    LEFT GREATER TROCHANTERIC BURSAE INJECTION X-RAY (CPT 67608) performed by Mino Crowell MD at [facility] N/A     epidural c6-c7    NERVE BLOCK Bilateral 8/5/2021    BILATERAL CERVICAL MEDIAL BRANCH FACET BLOCK C4 C5 C6 WITH SEDATION WITH LEFT TROCHANTERIC BURSA INJECTION(CPT 83680) performed by Mino Crowell MD at [address] N/A 4/19/2021    CERVICAL EPIDURAL INTERLAMINAR STEROID INJECTION C6-C7 WITH SEDATION performed by Mino Crowell MD at [address] Left 1/31/2022    CERVICAL EPIDURAL STEROID INJECTION LEFT PARAMEDIAN C6-C7 WITH SEDATION performed by Mino Crowell MD at [address] ENDOSCOPY  07/10/2017       Current Outpatient Medications   Medication Sig Dispense Refill    fenofibrate (LIPOFEN) 150 MG CAPS capsule Take 1 capsule by mouth daily 30 capsule 2    HYDROcodone-acetaminophen (NORCO) 5-325 MG per tablet Take 0.5 tablets by mouth every 8 hours as needed for Pain for up to 30 days. Intended supply: 30 days. Remainder of month supply 45 tablet 0    Elastic Bandages & Supports (KNEE BRACE) MISC Soft neutral position left knee brace  Size to fit  Dx:  Knee pain 1 each 0    albuterol sulfate  (90 Base) MCG/ACT inhaler INHALE 2 PUFFS INTO THE LUNGS EVERY 4 HOURS AS NEEDED FOR WHEEZING OR SHORTNESS OF BREATH (Patient taking differently: Inhale 2 puffs into the lungs every 4 hours as needed for Wheezing or Shortness of Breath ) 6.7 g 1    vitamin D3 (CHOLECALCIFEROL) 25 MCG (1000 UT) TABS tablet Take 1 tablet by mouth daily 30 tablet 5    Prenatal Vit-Fe Fumarate-FA (PRENATAL PLUS) 27-1 MG TABS 1 pill daily 30 tablet 5    omeprazole (PRILOSEC) 20 MG delayed release capsule Take 1 capsule by mouth daily Take am dos 07/10. (Patient taking differently: Take 20 mg by mouth as needed ) 30 capsule 5    EPINEPHrine (EPIPEN 2-ABUNDIO) 0.3 MG/0.3ML SOAJ injection Inject into muscle for allergic reaction 0.3 mL 0     No current facility-administered medications for this visit. Allergies   Allergen Reactions    Fish-Derived Products Anaphylaxis    Dye [Iodides] Itching       Social History     Socioeconomic History    Marital status:      Spouse name: None    Number of children: None    Years of education: None    Highest education level: None   Occupational History    None   Tobacco Use    Smoking status: Former Smoker     Packs/day: 1.00     Years: 25.00     Pack years: 25.00     Types: Cigarettes     Quit date: 1988     Years since quittin.9    Smokeless tobacco: Never Used   Vaping Use    Vaping Use: Never used   Substance and Sexual Activity    Alcohol use: No    Drug use: No    Sexual activity: None   Other Topics Concern    None   Social History Narrative    None     Social Determinants of Health     Financial Resource Strain:     Difficulty of Paying Living Expenses: Not on file   Food Insecurity:     Worried About Running Out of Food in the Last Year: Not on file    Vlad of Food in the Last Year: Not on file   Transportation Needs:     Lack of Transportation (Medical): Not on file    Lack of Transportation (Non-Medical):  Not on file   Physical Activity:     Days of Exercise per Week: Not on file    Minutes of Exercise per Session: Not on file   Stress:     Feeling of Stress : Not on file   Social Connections:     Frequency of Communication with Friends and Family: Not on file    Frequency of Social Gatherings with Friends and Family: Not on file    Attends Anabaptism Services: Not on file    Active Member of Clubs or Organizations: Not on file    Attends Club or Organization Meetings: Not on file    Marital Status: Not on file   Intimate Partner Violence:     Fear of Current or Ex-Partner: Not on file    Emotionally Abused: Not on file    Physically Abused: Not on file    Sexually Abused: Not on file   Housing Stability:     Unable to Pay for Housing in the Last Year: Not on file    Number of [place] in the Last Year: Not on file    Unstable Housing in the Last Year: Not on file       Family History   Problem Relation Age of Onset    Heart Attack Mother     Migraines Mother     Kidney Disease Father         failure    Migraines Sister     Asthma Child     Asthma Child         bone disease    Anemia Child     Diabetes Other           Review of Systems   Constitutional: Negative for activity change, appetite change, chills, diaphoresis, fatigue and fever. HENT: Negative for congestion, ear pain, hearing loss, nosebleeds, rhinorrhea, sinus pressure and sore throat. Eyes: Negative for pain, redness, itching and visual disturbance. Respiratory: Negative for apnea, cough, chest tightness, shortness of breath and wheezing. Cardiovascular: Negative for chest pain, palpitations and leg swelling. Gastrointestinal: Negative for abdominal pain, blood in stool, change in bowel habit, constipation, diarrhea, nausea and vomiting. Endocrine: Negative. Genitourinary: Negative for decreased urine volume, difficulty urinating, dysuria, frequency, hematuria and urgency. Musculoskeletal: Negative for arthralgias, back pain, gait problem, joint swelling, myalgias and neck pain. Skin: Negative for color change and rash.    Allergic/Immunologic: Negative for environmental allergies and food allergies. Neurological: Negative for dizziness, vertigo, weakness, light-headedness, numbness and headaches. Hematological: Negative for adenopathy. Does not bruise/bleed easily. Psychiatric/Behavioral: Negative for behavioral problems, dysphoric mood and sleep disturbance. The patient is not nervous/anxious and is not hyperactive. All other systems reviewed and are negative. /76   Pulse 75   Temp 97.5 °F (36.4 °C)   Ht 4' 11\" (1.499 m)   Wt 183 lb (83 kg)   LMP 03/27/2008   SpO2 96%   BMI 36.96 kg/m²     Physical Exam  Vitals and nursing note reviewed. Constitutional:       General: She is not in acute distress. Appearance: Normal appearance. She is well-developed. HENT:      Head: Normocephalic and atraumatic. Right Ear: Hearing, tympanic membrane and external ear normal. No tenderness. No middle ear effusion. Left Ear: Hearing, tympanic membrane and external ear normal. No tenderness. No middle ear effusion. Nose: Nose normal. No congestion or rhinorrhea. Right Turbinates: Not enlarged. Left Turbinates: Not enlarged. Mouth/Throat:      Mouth: Mucous membranes are moist.      Tongue: No lesions. Pharynx: Oropharynx is clear. No oropharyngeal exudate or posterior oropharyngeal erythema. Eyes:      General: No scleral icterus. Conjunctiva/sclera: Conjunctivae normal.      Pupils: Pupils are equal, round, and reactive to light. Neck:      Thyroid: No thyromegaly. Cardiovascular:      Rate and Rhythm: Normal rate and regular rhythm. Heart sounds: Normal heart sounds. No murmur heard. Pulmonary:      Effort: Pulmonary effort is normal. No respiratory distress. Breath sounds: Normal breath sounds. No wheezing or rales. Abdominal:      General: Bowel sounds are normal. There is no distension. Palpations: Abdomen is soft. Tenderness: There is no abdominal tenderness.    Musculoskeletal: General: No tenderness. Normal range of motion. Cervical back: Normal range of motion and neck supple. No rigidity. No muscular tenderness. Lymphadenopathy:      Cervical: No cervical adenopathy. Skin:     General: Skin is warm and dry. Findings: No erythema or rash. Neurological:      General: No focal deficit present. Mental Status: She is alert and oriented to person, place, and time. Cranial Nerves: No cranial nerve deficit. Deep Tendon Reflexes: Reflexes are normal and symmetric. Reflexes normal.   Psychiatric:         Mood and Affect: Mood normal.                                 ASSESSMENT/PLAN:    Patient Active Problem List   Diagnosis    Cervical stenosis of spinal canal    DDD (degenerative disc disease), cervical    History of TIA (transient ischemic attack) and stroke    Spinal stenosis of lumbar region without neurogenic claudication    Fibromyalgia    Migraines without aura and without status migrainosus, not intractable    Primary stabbing headache    Chronic pain syndrome    Primary osteoarthritis of left hip    Cervical disc disorder    Cervical facet joint syndrome    Cervical radiculopathy    Cervical stenosis of spine    Lumbar spondylosis    Lumbar facet arthropathy    Lumbar disc disorder    Greater trochanteric bursitis of left hip    Pain in left hip    Facet arthropathy, cervical    Primary osteoarthritis of left knee    Hepatic cyst    Lung nodule       Blake Roberts was seen today for check-up and other. Diagnoses and all orders for this visit:    Hypertriglyceridemia  -     Comprehensive Metabolic Panel; Future  -     TSH; Future  -     Lipid Panel; Future    Fatty liver  -     Comprehensive Metabolic Panel; Future    Other orders  -     fenofibrate (LIPOFEN) 150 MG CAPS capsule; Take 1 capsule by mouth daily          Return in about 6 weeks (around 5/19/2022) for Recheck Meds, Recheck labs. I spent 20 minutes with this patient.   I spent greater than 50% of the time counseling this patient.         Su Carvajal DO  4/7/2022  1:34 PM

## 2022-04-14 ENCOUNTER — OFFICE VISIT (OUTPATIENT)
Dept: PAIN MANAGEMENT | Age: 52
End: 2022-04-14
Payer: MEDICAID

## 2022-04-14 VITALS
TEMPERATURE: 96.9 F | SYSTOLIC BLOOD PRESSURE: 106 MMHG | WEIGHT: 183 LBS | BODY MASS INDEX: 36.89 KG/M2 | RESPIRATION RATE: 16 BRPM | DIASTOLIC BLOOD PRESSURE: 70 MMHG | HEART RATE: 72 BPM | HEIGHT: 59 IN

## 2022-04-14 DIAGNOSIS — M47.812 CERVICAL FACET JOINT SYNDROME: Primary | ICD-10-CM

## 2022-04-14 DIAGNOSIS — G89.4 CHRONIC PAIN SYNDROME: ICD-10-CM

## 2022-04-14 DIAGNOSIS — M54.12 CERVICAL RADICULOPATHY: ICD-10-CM

## 2022-04-14 DIAGNOSIS — M54.16 LUMBAR RADICULOPATHY: ICD-10-CM

## 2022-04-14 DIAGNOSIS — M47.812 FACET ARTHROPATHY, CERVICAL: ICD-10-CM

## 2022-04-14 DIAGNOSIS — M47.816 LUMBAR FACET ARTHROPATHY: ICD-10-CM

## 2022-04-14 PROCEDURE — G8417 CALC BMI ABV UP PARAM F/U: HCPCS | Performed by: NURSE PRACTITIONER

## 2022-04-14 PROCEDURE — 3017F COLORECTAL CA SCREEN DOC REV: CPT | Performed by: NURSE PRACTITIONER

## 2022-04-14 PROCEDURE — G8427 DOCREV CUR MEDS BY ELIG CLIN: HCPCS | Performed by: NURSE PRACTITIONER

## 2022-04-14 PROCEDURE — 99213 OFFICE O/P EST LOW 20 MIN: CPT | Performed by: NURSE PRACTITIONER

## 2022-04-14 PROCEDURE — 1036F TOBACCO NON-USER: CPT | Performed by: NURSE PRACTITIONER

## 2022-04-14 RX ORDER — BUPRENORPHINE 5 UG/H
1 PATCH TRANSDERMAL WEEKLY
Qty: 4 PATCH | Refills: 0 | Status: SHIPPED
Start: 2022-04-14 | End: 2022-05-12 | Stop reason: ALTCHOICE

## 2022-04-14 NOTE — PROGRESS NOTES
[address] [place] [address], [address] [place] [address]  [phone]    Follow up Note      Dean Craven     Date of Visit:  4/14/2022    CC:  Patient presents for follow up neck and low back     HPI:  Pt here with improved gi pain. Pt had recent colonoscopy and had polyp removed that was causing rectal bleeding. Pt noted that she has a fatty liver with cyst.   Pt reports neck pain improved but low back pain persists. Appropriate analgesia with current medications regimen: somewhat  Change in quality of symptoms:yes  Medication side effects: none  Recent diagnostic testing:none  Recent interventional procedures: none    She has been on anticoagulation medications to include ASA. The patient Theresa Rivera not been on herbal supplements.  The patient is not diabetic.     Imaging:     Cervical spine MRI 04/2018  1. Multilevel degenerative disc disease extending from C3 through C7. Moderately severe spinal canal stenosis at C3-C4. Moderate spinal   canal stenosis at C4-C5. . Moderately severe spinal canal stenosis at   C5-C6. Abnormal signal intensity within the cervical spinal cord C3-C5   without evidence of enhancement. Findings are most likely reflective   of myelomalacia changes secondary to the underlying degree of cord   compression as described above. No active demyelination identified. 2. Slight loss of normally expected cervical lordosis C3-C6.      CT Lumbar spine 2017  1. Mild diffuse osteopenia, no compression fracture or   spondylolisthesis.       2. Mild multilevel degenerative disc disease and facet joint   arthropathy of the lower lumbar spine more pronounced at L4-L5.      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         Otherwise, no musculoskeletal pathology to explain patient's pain.      Previous treatments: Physical Therapy, Epidural Steroid Injection with  and medications. Burton Bamberger severe side effects, tylenol arthritis heartburn and gi distress,  Tizanidine helps but makes tired, tramadol caused hyperness                                        Potential Aberrant Drug-Related Behavior:    No     Urine Drug Screening:  Saliva screen 08/2020 consistent for Ultram  11/2021: consistent for oxycodone    OARRS report:  3/2022 consistent.     Opioid agreement: 12/13/21         Past Medical History:   Diagnosis Date    Asthma     uses inhalers twice a day    Cerebral artery occlusion with cerebral infarction ([facility] [address]     2015-slight right sided weakness, ambulates normal    Cervical spinal stenosis     Chronic back pain     Chronic kidney disease     COVID 2021    Depression     Disorder of thyroid gland 09/11/2012    no medication    GERD (gastroesophageal reflux disease)     Headache(784.0)     Hemorrhoids     History of colon polyps     For Colonoscopy 4/1/22    Irritable bowel syndrome     Numbness and tingling in right hand 03/06/2020    Palpitations 07/24/2012    Seizure disorder ([facility] [address]     not on medications for this; last seizure 2018    Sleep apnea     wears CPAP    Vitamin D deficiency 12/03/2014       Past Surgical History:   Procedure Laterality Date    COLONOSCOPY  03/09/2016    COLONOSCOPY  07/10/2017    COLONOSCOPY N/A 4/1/2022    COLONOSCOPY DIAGNOSTIC WITH BANDING performed by Brandon Lucero MD at [address] [facility] Left 08/10/2020    LEFT GREATER TROCHANTERIC BURSAE INJECTION X-RAY (CPT 55891) performed by Portillo East MD at [facility] N/A     epidural c6-c7    NERVE BLOCK Bilateral 8/5/2021    BILATERAL CERVICAL MEDIAL BRANCH FACET BLOCK C4 C5 C6 WITH SEDATION WITH LEFT TROCHANTERIC BURSA INJECTION(CPT 42621) performed by Portillo East MD at [address] [place] [address] N/A 4/19/2021    CERVICAL EPIDURAL INTERLAMINAR STEROID INJECTION C6-C7 WITH SEDATION performed by Portillo East MD at [address] [place] [address] MANAGEMENT PROCEDURE Left 1/31/2022    CERVICAL EPIDURAL STEROID INJECTION LEFT PARAMEDIAN C6-C7 WITH SEDATION performed by Kyleigh Montanez MD at [facility] ENDOSCOPY  07/10/2017       Prior to Admission medications    Medication Sig Start Date End Date Taking? Authorizing Provider   fenofibrate (LIPOFEN) 150 MG CAPS capsule Take 1 capsule by mouth daily 4/7/22  Yes Abdi Grijalva, DO   HYDROcodone-acetaminophen (NORCO) 5-325 MG per tablet Take 0.5 tablets by mouth every 8 hours as needed for Pain for up to 30 days. Intended supply: 30 days. Remainder of month supply 3/21/22 4/20/22 Yes Hilary Peck, APRN - CNP   Elastic Bandages & Supports (KNEE BRACE) MISC Soft neutral position left knee brace  Size to fit  Dx:  Knee pain 1/21/22  Yes Abdi Grijalva, DO   albuterol sulfate  (90 Base) MCG/ACT inhaler INHALE 2 PUFFS INTO THE LUNGS EVERY 4 HOURS AS NEEDED FOR WHEEZING OR SHORTNESS OF BREATH  Patient taking differently: Inhale 2 puffs into the lungs every 4 hours as needed for Wheezing or Shortness of Breath  11/1/21  Yes Abdi Goldberg, DO   vitamin D3 (CHOLECALCIFEROL) 25 MCG (1000 UT) TABS tablet Take 1 tablet by mouth daily 8/26/21  Yes Gerson Ruiz DO   Prenatal Vit-Fe Fumarate-FA (PRENATAL PLUS) 27-1 MG TABS 1 pill daily 8/26/21  Yes Abdi Grijalva DO   omeprazole (PRILOSEC) 20 MG delayed release capsule Take 1 capsule by mouth daily Take am dos 07/10.   Patient taking differently: Take 20 mg by mouth as needed  1/21/21  Yes Gerson Ruiz DO   EPINEPHrine (EPIPEN 2-ABUNDIO) 0.3 MG/0.3ML SOAJ injection Inject into muscle for allergic reaction 8/22/19  Yes Gerson Ruiz DO       Allergies   Allergen Reactions    Fish-Derived Products Anaphylaxis    Dye [Iodides] Itching       Social History     Socioeconomic History    Marital status:      Spouse name: Not on file    Number of children: Not on file    Years of education: Not on file Sister     Asthma Child     Asthma Child         bone disease    Anemia Child     Diabetes Other        REVIEW OF SYSTEMS:     Helga Bautista denies fever/chills, chest pain, shortness of breath, new bowel or bladder complaints or suicidal ideations. All other review of systems wasnegative. Review of Systems    PHYSICAL EXAMINATION:      /70   Pulse 72   Temp 96.9 °F (36.1 °C) (Infrared)   Resp 16   Ht 4' 11\" (1.499 m)   Wt 183 lb (83 kg)   LMP 03/27/2008   BMI 36.96 kg/m²     General:       General appearance:   elderly, pleasant and well-hydrated. , in moderate discomfort and A & O x3  Build:Overweight     HEENT:     Head:normocephalic and atraumatic  Sclera: icterus absent,     Lungs:     Breathing:Breathing Pattern: regular, no distress     Abdomen:     Shape:non-distended and normal  Tenderness:none     Cervical spine:     Inspection:normal  Palpation:tenderness paravertebral muscles, facet loading, left, right, positive and tenderness. Range of motion:abnormal moderately flexion, extension rotation bilateral and is  painful.     Lumbar spine:     Spine inspection:normal   CVA tenderness:No   Palpation:tenderness paravertebral muscles, facet loading, left, right, positive and tenderness. Range of motion:abnormal moderately Lateral bending, flexion, extension rotation bilateral and is  painful.     Musculoskeletal:     Trigger points in Paraveteral:absent   SI joint tenderness:negative right, negative left  SLR:negative right, positive left, sitting      Extremities:     Tremors:None bilaterally upper and lower  Range of motion:Generally normal shoulders, pain with internal rotation of hips positive Left  Intact:Yes  Edema:Normal     Neurological:     Sensory:diminshed to light touch lateral aspect of Left arm.   Motor:   Right Grip4+/5              Left Grip4+/5               Right Bicep4+/5           Left Bicep4+/5              Right Triceps4+/5       Left Triceps4+/5          Right Deltoid4+/5 Left Deltoid4+/5                  Right Quadriceps4+/5          Left Quadriceps4+/5           Right Gastrocnemius4+/5    Left Gastrocnemius4+/5  Right Ant Tibialis4+/5  Left Ant Tibialis4+/5    Gait:antalgic     Dermatology:     Skin:no unusual rashes and no skin lesions     Impression:    Cervical spine MRI 2019 Multilevel degenerative disc disease with  moderate to  severe stenosis at C3-4/C4-5 and C5-6  Lumbar spine CT 2017 Mild degenerative disc disease and facet arthropathy most pronounced at L4-5  Patient had seen neurosurgery and surgery C3-4/C4-5 and C5-6 ACDF was recommended.   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 Patient had underwent cervical epidural at C6-7 (04/2021) for  her radicular  symptoms and bilateral C3,4,5 medial branch  block x 1(08/2021) for her mechanical neck pain. Both  interventions had helped with her pain. Plan:   Follow up on neck pain radiating down left arm with n/t hands  and low back radiating down left leg to knee, no acute changes    1/31/22: Cervical epidural left paramedian C6-7 with>80% relief  Continue baclofen 5mg po bid prn spasms  DC norco with tylenol due to fatty liver  Trial Butrans patch 5mcg q week  Continue lidoderm patchs q 24 hours prn pain  Wants to schedule for low back epidural in 1-2 months  Avoid NSAIDs(H/O GI ulcers)  OARRS report reviewed   Patient encouraged to stay active and to lose weight. Treatment plan discussed with the patient and her including medications side effects.       We discussed with the patient that combining opioids, benzodiazepines, alcohol, illicit drugs or sleep aids increases the risk of respiratory depression including death. We discussed that these medications may cause drowsiness, sedation or dizziness and have counseled the patient not to drive or operate machinery.  We have discussed that these medications will be prescribed only by one provider. We have discussed with the patient about age related risk factors and have thoroughly discussed the importance of taking these medications as prescribed. The patient verbalizes understanding.       ccreferring physic

## 2022-04-14 NOTE — PROGRESS NOTES
Do you currently have any of the following:    Fever: No  Headache:  No  Cough: No  Shortness of breath: No  Exposed to anyone with these symptoms: No                                                                                                                Alma Velazquez presents to the [facility] on 4/14/2022. Tr Iraheta is complaining of pain in her low and middle back. The pain is intermittent. The pain is described as aching, colicky and throbbing. Pain is rated on her best day at a 4, on her worst day at a 6, and on average at a 5 on the VAS scale. She took her last dose of [place] 3 days ago. Tr Iraheta does not have issues with constipation. Any procedures since your last visit: No,    She is not on NSAIDS and  is not on anticoagulation medications to include none. Pacemaker or defibrillator: No.    Medication Contract and Consent for Opioid Use Documents Filed     Patient Documents     Type of Document Status Date Received Received By Description    Medication Contract Received 7/18/2019 11:59 AM Neftali SINGLETON 7/18/19 medication contract    Medication Contract Received 10/9/2020  3:01 PM LORENZO [address] [place] [address] pain pt agreement    Medication Contract Received 11/5/2021 11:29 AM Padmini MUNIZ Pain Mgmt Patient Agreement 11.5.2021    Medication Contract Received 12/13/2021  4:21 PM LEONARDA PINA pain management                   /70   Pulse 72   Temp 96.9 °F (36.1 °C) (Infrared)   Resp 16   Ht 4' 11\" (1.499 m)   Wt 183 lb (83 kg)   LMP 03/27/2008   BMI 36.96 kg/m²      Patient's last menstrual period was 03/27/2008.

## 2022-04-20 ENCOUNTER — TELEPHONE (OUTPATIENT)
Dept: PAIN MANAGEMENT | Age: 52
End: 2022-04-20

## 2022-04-23 NOTE — PROGRESS NOTES
[facility] Sleep [facility]    Patient Name: Trey Lewis  Age: 45 y.o.   : 1970    Date of Visit: 22    HPI   Trey Lewis is a 45 y.o. female with  has a past medical history of Asthma, Cerebral artery occlusion with cerebral infarction [facility]), Cervical spinal stenosis, Chronic back pain, Chronic kidney disease, COVID (), Depression, Disorder of thyroid gland (2012), GERD (gastroesophageal reflux disease), Headache(784.0), Hemorrhoids, History of colon polyps, Irritable bowel syndrome, Numbness and tingling in right hand (2020), Palpitations (2012), Seizure disorder [facility]), Sleep apnea, and Vitamin D deficiency (2014). She presents as a new patient to Sleep Clinic, referred by Dr. Alma Delia Guzman, for Sleep Apnea   . Patient presents to establish with sleep medicine regarding management of her mild obstructive sleep apnea that was first found on a sleep study on 2022. During the sleep study, AHI was 11 with increased severity in REM sleep with a REM RDI of 53. This was patient's first sleep study. She was ordered sleep study by her PCP for nocturnal witnessed apneas, nocturnal gasping, snoring, and morning headaches. Aside from these symptoms, patient also displays difficulty with memory, and mild daytime sleepiness. She feels drowsy when she drives at times but states she does not doze off or fall asleep at stop signs. Patient was prescribed auto-CPAP following sleep study at a pressure of 5 to 15 cm of water. Her [employer], provided her with a Nataliia CPAP machine. Patient is having difficulty adjusting to CPAP and feels that after about 2 hours of wearing it she feels the pressure is extremely high and will rip it off. She received CPAP 2 to 3 months ago and reports only using it 3-4 times. She is using a fullface mask and finds discomfort and leaking with the mask.   She finds that when she wakes from sleep, the tube is wrapped around her neck and she finds it difficult to sleep on her stomach, which is her preferred sleeping position. On average, patient tries to get around 8 hours of sleep at night. She does not have difficulty initiating sleep and does have multiple nocturnal awakenings through the night. She will usually use the restroom and walk around but can generally fall back asleep quickly. Patient typically does not drink caffeine through the day. She denies smoking or excessive alcohol. Patient also describes a tingling sensation in her legs that only happens in the evening since she had a stroke. Massage and movement help relieve the symptoms and she finds herself being kept awake from the symptoms. Her weight has fluctuated this past year, however, patient is attempting to lose weight. DME: [address]  Benefits: None noted      Sleep History    Bed time:  10-10:30 pm   Wake time:   9-10 am  Sleep Latency (min): A few minutes   Sleep Medications: None  Awakenings:   Multiple times   / bathroom/walk  / falls back asleep quickly   Estimated Sleep time (hours):  8  Daytime Naps: No  Sleep disturbances: None  Sleep Location: Bed  Sleep environment: Sleeps on spouse- on stomach  Occupation: Disabled    SLEEP HYGIENE     Activities before bed: TV  Caffeine: 0    Coffee    0   Soda    0   Tea  Alcohol: rare  Tobacco:N     Sleep ROS     Snoring: Y   Witnessed apneas: Y-   AM Headache: Y  Dry Mouth: Y  Daytime Sleepiness: Sometimes  Difficulty remembering things: Y  MVA  or near miss accident due to sleepiness in the past? Yawns when stops at night  Tonsillectomy? N  Have you lost or gained weight recently? Fluctuates       PARASOMNIAS/ NARCOLEPSY:  Hypnogogic/Hynopompic Hallucinations: Sometimes   Sleep paralysis: N  Nightmare: N   Sleep Walking: N  Sleep Talking: Y  RLS Symptoms: Y, tinging sensation in legs, only at night, massage helps, has been happening since her stroke in 2015, keeps her up at night.       Sleep Study History: 1/18/2022-PSG @ [facility]- wt 176 lbs, sleep efficiency 78%, REM sleep 16%, AHI 11, RDI 12.6, REM RDI 53, supine RDI 18, SPO2 mare 83%, T<88% 1.5% of TST.     Past Medical History:  Past Medical History:   Diagnosis Date    Asthma     uses inhalers twice a day    Cerebral artery occlusion with cerebral infarction ([facility] [address]     2015-slight right sided weakness, ambulates normal    Cervical spinal stenosis     Chronic back pain     Chronic kidney disease     COVID 2021    Depression     Disorder of thyroid gland 09/11/2012    no medication    GERD (gastroesophageal reflux disease)     Headache(784.0)     Hemorrhoids     History of colon polyps     For Colonoscopy 4/1/22    Irritable bowel syndrome     Numbness and tingling in right hand 03/06/2020    Palpitations 07/24/2012    Seizure disorder ([facility] [address]     not on medications for this; last seizure 2018    Sleep apnea     wears CPAP    Vitamin D deficiency 12/03/2014       Past Surgical History:        Procedure Laterality Date    COLONOSCOPY  03/09/2016    COLONOSCOPY  07/10/2017    COLONOSCOPY N/A 4/1/2022    COLONOSCOPY DIAGNOSTIC WITH BANDING performed by Christa Rivers MD at [address] Left 08/10/2020    LEFT GREATER TROCHANTERIC BURSAE INJECTION X-RAY (CPT 34566) performed by Rehan Corbin MD at [facility] N/A     epidural c6-c7    NERVE BLOCK Bilateral 8/5/2021    BILATERAL CERVICAL MEDIAL BRANCH FACET BLOCK C4 C5 C6 WITH SEDATION WITH LEFT TROCHANTERIC BURSA INJECTION(CPT 20534) performed by Rehan Corbin MD at [address] N/A 4/19/2021    CERVICAL EPIDURAL INTERLAMINAR STEROID INJECTION C6-C7 WITH SEDATION performed by Rehan Corbin MD at [address] Left 1/31/2022    CERVICAL EPIDURAL STEROID INJECTION LEFT PARAMEDIAN C6-C7 WITH SEDATION performed by Rehan Corbin MD at [address] [facility] [address] GASTROINTESTINAL ENDOSCOPY  07/10/2017       Allergies:  is allergic to fish-derived products and dye [iodides]. Social History:    Social History     Tobacco Use    Smoking status: Former Smoker     Packs/day: 1.00     Years: 25.00     Pack years: 25.00     Types: Cigarettes     Quit date: 1988     Years since quittin.0    Smokeless tobacco: Never Used   Vaping Use    Vaping Use: Never used   Substance Use Topics    Alcohol use: No    Drug use: No        Family History:       Problem Relation Age of Onset    Heart Attack Mother     Migraines Mother     Kidney Disease Father         failure    Migraines Sister     Asthma Child     Asthma Child         bone disease    Anemia Child     Diabetes Other        Current Medications:    Current Outpatient Medications:     buprenorphine (BUTRANS) 5 MCG/HR PTWK, Place 1 patch onto the skin once a week for 28 days. , Disp: 4 patch, Rfl: 0    fenofibrate (LIPOFEN) 150 MG CAPS capsule, Take 1 capsule by mouth daily, Disp: 30 capsule, Rfl: 2    Elastic Bandages & Supports (KNEE BRACE) MISC, Soft neutral position left knee brace Size to fit Dx:  Knee pain, Disp: 1 each, Rfl: 0    albuterol sulfate  (90 Base) MCG/ACT inhaler, INHALE 2 PUFFS INTO THE LUNGS EVERY 4 HOURS AS NEEDED FOR WHEEZING OR SHORTNESS OF BREATH (Patient taking differently: Inhale 2 puffs into the lungs every 4 hours as needed for Wheezing or Shortness of Breath ), Disp: 6.7 g, Rfl: 1    vitamin D3 (CHOLECALCIFEROL) 25 MCG (1000 UT) TABS tablet, Take 1 tablet by mouth daily, Disp: 30 tablet, Rfl: 5    Prenatal Vit-Fe Fumarate-FA (PRENATAL PLUS) 27-1 MG TABS, 1 pill daily, Disp: 30 tablet, Rfl: 5    omeprazole (PRILOSEC) 20 MG delayed release capsule, Take 1 capsule by mouth daily Take am dos 07/10.  (Patient taking differently: Take 20 mg by mouth as needed ), Disp: 30 capsule, Rfl: 5    EPINEPHrine (EPIPEN 2-ABUNDIO) 0.3 MG/0.3ML SOAJ injection, Inject into muscle for allergic reaction, Disp: 0.3 mL, Rfl: 0    Sleep Medicine 4/25/2022 1/19/2022   Sitting and reading 1 2   Watching TV 3 3   Sitting, inactive in a public place (e.g. a theatre or a meeting) 1 1   As a passenger in a car for an hour without a break 0 3   Lying down to rest in the afternoon when circumstances permit 3 0   Sitting and talking to someone 0 0   Sitting quietly after a lunch without alcohol 3 0   In a car, while stopped for a few minutes in traffic 0 0   Total score 11 9   Neck circumference (Inches) - 13.5       Review of Systems:    Constitutional: no chills, no fever   Eyes: no blurred vision . Cardiovascular: no chest pain,   Respiratory: no cough, no shortness of breath   Gastrointestinal:  no nausea,  no vomiting, no diarrhea. Neurological: no dizziness,  no headache, no memory changes  Endocrine: No chills    Objective:   PHYSICAL EXAM:    /68 (Site: Right Upper Arm, Position: Sitting, Cuff Size: Large Adult)   Pulse 75   Resp 14   Ht 4' 11\" (1.499 m)   Wt 179 lb (81.2 kg)   LMP 03/27/2008   SpO2 98%   BMI 36.15 kg/m²     Physical exam:  Gen: No acute distress. BMI of Body mass index is 36.15 kg/m². ENT: No nasal/oral discharge. Pharynx clear. Mallampati class- 3  Tonsils- 1  Neck: Trachea midline. No obvious mass. Neck circumference     Resp: No accessory muscle use. No crackles. No wheezes. No rhonchi. CV: Regular rate. Regular rhythm. No murmur or rub. Skin: Warm and dry. No bleeding observed   M/S: No cyanosis. No obvious joint deformity. Neuro: Awake. Alert. Moves all four extremities. Psych: Alert and oriented. No anxiety. CPAP Compliance Download --     Patient is using a Nataliia CPAP machine. No data download available today. Reported compliance: 3-4 times since receiving CPAP. Assessment:      Fran Mendez was seen today for sleep apnea.     Diagnoses and all orders for this visit:    Obstructive sleep apnea  -     DME Order for CPAP as OP  -     DME Order for CPAP as OP  -     Ambulatory Referral to Bariatric Surgery  -     DME Order for CPAP as OP    Obesity, unspecified classification, unspecified obesity type, unspecified whether serious comorbidity present  -     Ambulatory Referral to Bariatric Surgery    ·    Plan:     Danyel Vasques is a 45 y.o. female that  has a past medical history of Asthma, Cerebral artery occlusion with cerebral infarction [facility]), Cervical spinal stenosis, Chronic back pain, Chronic kidney disease, COVID (2021), Depression, Disorder of thyroid gland (09/11/2012), GERD (gastroesophageal reflux disease), Headache(784.0), Hemorrhoids, History of colon polyps, Irritable bowel syndrome, Numbness and tingling in right hand (03/06/2020), Palpitations (07/24/2012), Seizure disorder [facility]), Sleep apnea, and Vitamin D deficiency (12/03/2014). She presents as a new patient to Sleep Clinic for Sleep Apnea   . In addition, CPAP adherence and efficacy was reviewed. She states that her adherence is poor due to discomfort with the mask, and discomfort with pressures. We had a detailed discussion today about the significant risk posed by her obstructive sleep apnea being left untreated. We brainstormed ways to help optimize her usage including an in-home mask refitting as well as decreasing pressure. 1. Obstructive Sleep Apnea    -Based on sleep study results and discussion with patient, will continue auto-CPAP therapy at adjusted settings of 5-10 cm of water (previously 5-15), because patient feels that pressures are extremely uncomfortable and too high.  -Prescription will be sent to DME. DME will be asked to send data download of device since it is not able to be accessed via online databases. - DME is [facility].  - Patient is using a fullface mask. She finds this extremely uncomfortable and claustrophobic.  -Order placed to have an in-home mask refit.   She may benefit from nasal mask trial.  -Did discuss with patient that CPAP acclamation may take several weeks to months and to try to wear CPAP while awake to adjust to breathing. Consider future referral to sleep psychology for CPAP acclamation if symptoms persist.  -Discussed pathophysiology of CIERA and its impact on daily well-being, as well as cardiometabolic and neurocognitive health (particularly in moderate-severe cases). -Patient understands that CPAP should be worn every night for the duration of the night (in order to not miss therapy during early-morning REM period) for maximum benefit.   -Counseled on risks of driving while drowsy. Recommended a short, 10-15 min power nap (in a safe location, with car doors locked) as most effective tool if experiencing drowsiness while driving. 2. Obesity. Body mass index is 36.15 kg/m².     -Healthy weight loss advised  -Discussed impact of weight gain on CIERA severity. Patient understands that CIERA severity may improve with weight loss but no guarantee of cure can be made. -Referral to weight loss clinic placed, per patient preference. Follow up: Return in about 3 months (around 7/25/2022) for Follow up for sleep apnea.     Cathleen Fermin, NOY-CNP  [address] [facility]  P -[phone] option 2  f- [phone]

## 2022-04-25 ENCOUNTER — OFFICE VISIT (OUTPATIENT)
Dept: SLEEP MEDICINE | Age: 52
End: 2022-04-25
Payer: MEDICAID

## 2022-04-25 VITALS
SYSTOLIC BLOOD PRESSURE: 100 MMHG | RESPIRATION RATE: 14 BRPM | HEART RATE: 75 BPM | WEIGHT: 179 LBS | BODY MASS INDEX: 36.08 KG/M2 | OXYGEN SATURATION: 98 % | HEIGHT: 59 IN | DIASTOLIC BLOOD PRESSURE: 68 MMHG

## 2022-04-25 DIAGNOSIS — E66.9 OBESITY, UNSPECIFIED CLASSIFICATION, UNSPECIFIED OBESITY TYPE, UNSPECIFIED WHETHER SERIOUS COMORBIDITY PRESENT: ICD-10-CM

## 2022-04-25 DIAGNOSIS — G47.33 OBSTRUCTIVE SLEEP APNEA: Primary | ICD-10-CM

## 2022-04-25 PROCEDURE — G8427 DOCREV CUR MEDS BY ELIG CLIN: HCPCS | Performed by: NURSE PRACTITIONER

## 2022-04-25 PROCEDURE — 99203 OFFICE O/P NEW LOW 30 MIN: CPT | Performed by: NURSE PRACTITIONER

## 2022-04-25 PROCEDURE — G8417 CALC BMI ABV UP PARAM F/U: HCPCS | Performed by: NURSE PRACTITIONER

## 2022-04-25 ASSESSMENT — SLEEP AND FATIGUE QUESTIONNAIRES
HOW LIKELY ARE YOU TO NOD OFF OR FALL ASLEEP WHEN YOU ARE A PASSENGER IN A CAR FOR AN HOUR WITHOUT A BREAK: 0
HOW LIKELY ARE YOU TO NOD OFF OR FALL ASLEEP WHILE SITTING QUIETLY AFTER LUNCH WITHOUT ALCOHOL: 3
HOW LIKELY ARE YOU TO NOD OFF OR FALL ASLEEP WHILE SITTING AND READING: 1
HOW LIKELY ARE YOU TO NOD OFF OR FALL ASLEEP WHILE LYING DOWN TO REST IN THE AFTERNOON WHEN CIRCUMSTANCES PERMIT: 3
HOW LIKELY ARE YOU TO NOD OFF OR FALL ASLEEP WHILE WATCHING TV: 3
HOW LIKELY ARE YOU TO NOD OFF OR FALL ASLEEP IN A CAR, WHILE STOPPED FOR A FEW MINUTES IN TRAFFIC: 0
HOW LIKELY ARE YOU TO NOD OFF OR FALL ASLEEP WHILE SITTING AND TALKING TO SOMEONE: 0
HOW LIKELY ARE YOU TO NOD OFF OR FALL ASLEEP WHILE SITTING INACTIVE IN A PUBLIC PLACE: 1
ESS TOTAL SCORE: 11

## 2022-04-25 NOTE — PATIENT INSTRUCTIONS
It was a pleasure meeting you today August Caprice! Summary of Today's Visit             Please call our sleep nurses to schedule a follow up at - [phone] option 2              1. Continue using your machine nightly        -Request all data downloads be sent to my nurse        2. Contact your DME company about supplies or issues with your machine. As a general guideline, please replace your:  -CPAP mask every 3-6 months  -CPAP hose  every 3-6 months  -Filter every 2-4 weeks  -CPAP/BiPAP/ASV replacements every 5-7+ years     3. Continue healthy weight loss/stabilization, as this is recommended as a long-term intervention. 4. Please do not drive or operate machinery when you feel fatigued/sleepy/drowsy      5. Please try to sleep between 6-8 hours nightly with the CPAP mask    6. Please avoid sedatives, alcohol and caffeinated drinks at/near bed time. 7. Please call your supply (DME) company with any issues with your PAP device. Please call our office with any issues pertaining to the therapy.   ----Please note that complications of CIERA if not treated can increase risk for: systemic hypertension, pulmonary hypertension, cardiovascular morbidities (heart attack and stroke), car accidents and all cause mortality. 8. Please note that complications of CIERA if not treated can increase risk for: systemic hypertension , pulmonary hypertension (high blood pressure in the lungs), cardiovascular morbidities (heart attack and stroke), car accidents and all cause mortality (increased risk of death). For general questions or scheduling issues, call my nurse at [phone] option Britni Dagmar, larry [phone] option 2  F- [phone]         ----------------------------------------------------------    Common Issues and Solutions     Pillow is dislodging mask - Consider getting a CPAP pillow or switching to a mask with the hose at the top.      Dry Mouth - Adjust Humidity and/or try Biotene Gel. (Excessive leak can also cause this)     Leak - Please call your equipment provider  as they may need to adjust your mask. Difficulty tolerating the PAP mask - Contact your equipment company to try a different mask, we can order a \"mini sleep study\"with the sleep tech to try different masks/settings of pressure, also we have a sleep psychologist to help with anxiety related to wearing the PAP mask      ----------------------------------------------------------     For Questions and Concerns: In case of difficulty with your PAP device or mask interface, please FIRST contact your [facility] (The company who provides you the CPAP/BiPAP/ASV machine/supplies).      If you need the number for any other DME company, please call my Nurse at [phone] option 2     For questions concerning your [place] appointment: Call [phone] option 2  SLEEP LAB SCHEDULING:        ----------------------------------------------------------

## 2022-04-28 ENCOUNTER — TELEPHONE (OUTPATIENT)
Dept: SURGERY | Age: 52
End: 2022-04-28

## 2022-04-28 ENCOUNTER — OFFICE VISIT (OUTPATIENT)
Dept: SURGERY | Age: 52
End: 2022-04-28
Payer: MEDICAID

## 2022-04-28 VITALS
WEIGHT: 180 LBS | SYSTOLIC BLOOD PRESSURE: 105 MMHG | HEART RATE: 76 BPM | HEIGHT: 59 IN | DIASTOLIC BLOOD PRESSURE: 68 MMHG | BODY MASS INDEX: 36.29 KG/M2

## 2022-04-28 DIAGNOSIS — K80.20 GALLSTONES: Primary | ICD-10-CM

## 2022-04-28 DIAGNOSIS — K64.8 INTERNAL HEMORRHOIDS: ICD-10-CM

## 2022-04-28 DIAGNOSIS — K80.50 BILIARY COLIC: ICD-10-CM

## 2022-04-28 PROCEDURE — G8417 CALC BMI ABV UP PARAM F/U: HCPCS | Performed by: SURGERY

## 2022-04-28 PROCEDURE — 3017F COLORECTAL CA SCREEN DOC REV: CPT | Performed by: SURGERY

## 2022-04-28 PROCEDURE — 1036F TOBACCO NON-USER: CPT | Performed by: SURGERY

## 2022-04-28 PROCEDURE — 99214 OFFICE O/P EST MOD 30 MIN: CPT | Performed by: SURGERY

## 2022-04-28 PROCEDURE — G8427 DOCREV CUR MEDS BY ELIG CLIN: HCPCS | Performed by: SURGERY

## 2022-04-28 RX ORDER — SODIUM CHLORIDE 0.9 % (FLUSH) 0.9 %
5-40 SYRINGE (ML) INJECTION PRN
Status: CANCELLED | OUTPATIENT
Start: 2022-04-28

## 2022-04-28 RX ORDER — SODIUM CHLORIDE 0.9 % (FLUSH) 0.9 %
5-40 SYRINGE (ML) INJECTION EVERY 12 HOURS SCHEDULED
Status: CANCELLED | OUTPATIENT
Start: 2022-04-28

## 2022-04-28 RX ORDER — SODIUM CHLORIDE 9 MG/ML
INJECTION, SOLUTION INTRAVENOUS PRN
Status: CANCELLED | OUTPATIENT
Start: 2022-04-28

## 2022-04-28 RX ORDER — INDOCYANINE GREEN AND WATER 25 MG
2.5 KIT INJECTION ONCE
Status: CANCELLED | OUTPATIENT
Start: 2022-04-28 | End: 2022-04-28

## 2022-04-28 NOTE — PATIENT INSTRUCTIONS
Patient Education        Colecistectomía: Antes de la cirugía  Gallbladder Removal: Before Your Surgery  ¿Qué es mikel colecistectomía? La colecistectomía es un tipo de [place]. Sirve para extraer mikel vesículabiliar enferma. Esta operación se suele hacer rufino cirugía laparoscópica. El médico coloca un tubo con nisa y otros instrumentos quirúrgicos a través de pequeños hoskins (incisiones) en ruiz abdomen. El tubo se llama laparoscopio. Le permite a ruiz médico maría los [employer] con el fin de poder hacer la operación. Las incisionesdejan cicatrices que se vuelven menos visibles con el [place]. [place] de las personas regresan a ruiz hogar el mismo día. Es probable que cada día se sienta mejor. Después de 1 semana, la mayoría de [employer] tienen solo un poco dolor. Si usted trabaja en mikel oficina, es probable que pueda volver al trabajo en 1 o 2 semanas. Si en ruiz trabajo debe levantarobjetos pesados o tiene un trabajo muy [place], podría tardar hasta 4 semanas. En algunos casos, la cirugía abierta es la mejor opción. Ruiz médico podría optar por la cirugía abierta por adelantado. O podría optar por terrell en mitad de mikel cirugía laparoscópica. En la [place], el médico hace mikel incisión más dhaval en la parte superior del abdomen. Si usted tiene [place], es probable que pase de 2 a 4 días en el hospital. Y podría tardar de 4 a 6semanas en retomar ruiz rutina habitual.  La atención de seguimiento es mikel parte clave de ruiz tratamiento y seguridad. Asegúrese de hacer y acudir a todas las citas, y llame a ruiz médico si está teniendo problemas. También es mikel buena idea saber los [place] de susexámenes y mantener mikel lista de los medicamentos que frieda. ¿Qué ocurre antes de la cirugía? La cirugía puede ser estresante. Esta información le ayudará a entender quépuede esperar. Y le ayudará a prepararse de Dior bahena para ruiz cirugía.   Cómo prepararse para la cirugía     Entienda exactamente qué cirugía está planificada, junto con los [place], los beneficios y las otras [address]  Infórmeles a tootie médicos sobre [employer], las vitaminas, los suplementos y los keke herbarios que frieda. Algunos de estos pueden aumentar el riesgo de sangrados o interactuar con la anestesia.      Si frieda aspirina o cualquier otro medicamento que previene los coágulos de Jose C, pregúntele a lucas médico si debería dejar de tomarlo antes de lucas operación. Asegúrese de entender exactamente lo que lucas médico quiere que fran. Estos medicamentos aumentan el riesgo de sangrado.      Lucas médico le dirá qué medicamentos karla o dejar de karla antes de la cirugía. Es posible que deba dejar de karla ciertos medicamentos mikel semana o más antes de la [place], así que hable con lucas médico tan pronto rufino pueda.      Informe a lucas médico si tiene instrucciones médicas por anticipado. Estas pueden incluir un testamento vital y un poder legal permanente para la atención médica. Lleve consigo mikel copia cuando vaya al hospital. Si no las tiene, sería conveniente prepararlas. [place], [place] médico y seres queridos sabrán tootie deseos sobre la [facility]. Los médicos recomiendan que todas las personas preparen estos documentos antes de cualquier tipo de [place] o procedimiento.      Podría tener que vaciar el colon por medio de un enema o un laxante. Lucas médico le dirá cómo hacerlo. ¿Qué ocurre el día de la cirugía?  Siga en forma precisa las instrucciones sobre cuándo debe dejar de comer y beber. Si no lo hace, la cirugía podría ser cancelada. Si lucas médico le dijo que tome tootie medicamentos el día de la [place], tómelos con un solo sorbo de agua.      Báñese o dúchese antes de registrarse para la cirugía. No use lociones, perfumes, desodorantes ni esmalte de uñas.    No se afeite usted mismo la heather donde le harán la cirugía.      Quítese todas las joyas y los \"piercings\". Si lleva lentes de contacto, quíteselos también.    En el hospital o centro quirúrgico    Lleve un documento de identidad con foto.      A menudo, se marcará la heather en que se va a realizar la cirugía para asegurarse de que no haya errores.      El anestesista le hará sentir cómodo y seguro. Estará dormido priyank la operación.      La cirugía suele durar entre 1 y 2 horas. ¿Cuándo debe llamar al médico?    Tiene preguntas o inquietudes.      No entiende cómo debe prepararse para la [facility].      Se enferma antes de la cirugía (por ejemplo, tiene fiebre, un resfriado o gripe).      Necesita reprogramar la cirugía o cambió de opinión acerca de operarse. ¿Dónde puede encontrar más información en inglés? Bertha Tanvi a [email or web address] e ingrese a lucas cuenta de MyChart. Bev Signs T227 en el Rubi Bruoma \"Search Health Information\" para más información (en inglés) sobre \"Colecistectomía: Antes de [facility]. \"     Si no tiene mikel cuenta, fran francisco javier en el enlace \"Sign Up Now\". Revisado: 8 septiembre, 2021               Versión del contenido: 13.2  © 5254-7603 Healthwise, Incorporated. Las instrucciones de cuidado fueron adaptadas bajo licencia por [facility] ([facility]). Si usted tiene [place] afección médica o sobre estas instrucciones, siempre pregunte a lucas profesional de steffi. Healthwise, Incorporated niega toda garantía o responsabilidad por lucas uso de esta información.

## 2022-04-28 NOTE — TELEPHONE ENCOUNTER
Prior Authorization Form:      DEMOGRAPHICS:                     Patient Name:  Peg Quinones  Patient :  1970            Insurance:  Payor: [facility] PL / Plan: [facility] / Product Type: *No Product type* /   Insurance ID Number:    Payor/Plan Subscr  Sex Relation Sub.  Ins. ID Effective Group Num   1. [facility] Betty Metzger 1970 Female Self [id number] 10/1/18 [id number]                                    [address]         DIAGNOSIS & PROCEDURE:                       Procedure/Operation: ***           CPT Code: ***    Diagnosis:  ***    ICD10 Code: ***    Location:  ***    Surgeon:  Pamela Aguilera INFORMATION:                          Date: ***    Time: ***              Anesthesia:  {CHP ORTHO ANESTHESIA:[id number]}                                                       Status:  {OUTPATIENT/INPATIENT:[id number]}        Special Comments:  ***       Electronically signed by Chivo Garcia MA on 2022 at 1:54 PM

## 2022-04-28 NOTE — TELEPHONE ENCOUNTER
Prior Authorization Form:      DEMOGRAPHICS:                     Patient Name:  Zack [place]  Patient :  1970            Insurance:  Payor: [facility] PL / Plan: [facility] / Product Type: *No Product type* /   Insurance ID Number:    Payor/Plan Subscr  Sex Relation Sub.  Ins. ID Effective Group Num   1. [facility] Nick 1970 Female Self [id number] 10/1/18 [facility]                                   [address]         DIAGNOSIS & PROCEDURE:                       Procedure/Operation: Lap Robotic Assisted Cholecystectomy           CPT Code: 27758    Diagnosis:  Biliary Colic    EWN01 Code: Z90.5    Location:  [facility]    Surgeon:  Dr. Yesi Meeks INFORMATION:                          Date: 2022   Time: 9:30AM              Anesthesia:  General                                                       Status:  Outpatient        Special Comments:         Electronically signed by Tobin Avila MA on 2022 at 2:24 PM

## 2022-04-28 NOTE — PROGRESS NOTES
Progress Note - Follow up    Patient's Name/Date of Birth: Trey Lewis / 1970    Date: 4/28/2022    PCP: Max Martinez DO    Referring Physician:   Radha Chavira, [place]  [phone]    Chief Complaint   Patient presents with    Results     colonoscopy     Abdominal Pain     possible gallstones        HPI:    The patient said she no longer has any rectal bleeding. The patient has upper abdominal pain. She said it is worse with eating. She has associated nausea. She has vomited twice. This has been going on for 5 months now. She gets diarrhea as well. She has no pain between attacks. This is worse with greasy foods. She said her children all have gallbladder issues. Patient's medications, allergies, past medical, surgical, social and family histories were reviewed and updated as appropriate. Review of Systems  Constitutional: negative  Respiratory: negative  Cardiovascular: negative  Gastrointestinal: as in hpi  Genitourinary:negative  Integument/breast: negative    Physical Exam:  /68   Pulse 76   Ht 4' 11\" (1.499 m)   Wt 180 lb (81.6 kg)   LMP 03/27/2008   BMI 36.36 kg/m²   General appearance: alert, cooperative and in no acute distress. Lungs: normal work of breathing  Heart: regular rate  Abdomen: epigastric and LUQ tenderness, moderate, soft, nondistended  Musculoskeletal: symmetrical without edema. Skin: normal     Data Reviewed:   RUQ US:  Impression   1.  Echogenic liver parenchyma which may reflect changes related to an   underlying infiltrative pathology the most common of which is hepatic   steatosis.  Benign 3.4 cm hepatic cyst.       2.  Cholelithiasis without evidence of cholecystitis.  Normal appearance of   the extrahepatic common bile duct.          Impression/Plan:  45y.o. year old female with:    1) Internal/external hemorrhoids - s/p rubber band ligation    2) Gallstones, biliary colic    Robotic assisted Laparoscopic possible open cholecystectomy possible intraoperative cholangiogram  Risks of cholecystectomy were discussed with the patient. These include but are not limited to common bile duct injury, bile leak, liver injury, damage to blood vessels and bleeding, injury to bowel with port placement, as well as infection in the abdomen or of the incisions. I explained all other risks, benefits, alternatives, and potential complications associated with the procedure to be performed and transfusions when applicable with the patient/responsible person prior to the procedure. All of the patient's questions were answered. The patient understands and agrees to surgery. On this date 4/28/2022 I have spent 30 minutes reviewing previous notes, test results and face to face with the patient discussing the diagnosis and importance of compliance with the treatment plan as well as documenting on the day of the visit. Greater than 50% of the time was spent face-to-face with the patient. This time is exclusive of procedures performed. I answered all of  the patient's questions to his/her satisfaction.      Electronically by Tatianna Diane MD, General Surgery  on 4/28/2022 at 11:14 AM      Send copy of H&P to PCP, Todd Mckeon DO and referring physician, Selina Samaniego DO      4/28/2022

## 2022-05-10 RX ORDER — TIZANIDINE 2 MG/1
TABLET ORAL
Qty: 60 TABLET | Refills: 1 | OUTPATIENT
Start: 2022-05-10

## 2022-05-12 ENCOUNTER — OFFICE VISIT (OUTPATIENT)
Dept: PRIMARY CARE CLINIC | Age: 52
End: 2022-05-12
Payer: MEDICAID

## 2022-05-12 VITALS
WEIGHT: 180 LBS | HEART RATE: 81 BPM | RESPIRATION RATE: 16 BRPM | BODY MASS INDEX: 36.29 KG/M2 | HEIGHT: 59 IN | DIASTOLIC BLOOD PRESSURE: 70 MMHG | OXYGEN SATURATION: 98 % | SYSTOLIC BLOOD PRESSURE: 128 MMHG

## 2022-05-12 DIAGNOSIS — M54.6 CHRONIC BILATERAL THORACIC BACK PAIN: Primary | ICD-10-CM

## 2022-05-12 DIAGNOSIS — G89.29 CHRONIC BILATERAL THORACIC BACK PAIN: Primary | ICD-10-CM

## 2022-05-12 PROCEDURE — 96372 THER/PROPH/DIAG INJ SC/IM: CPT | Performed by: FAMILY MEDICINE

## 2022-05-12 PROCEDURE — G8417 CALC BMI ABV UP PARAM F/U: HCPCS | Performed by: FAMILY MEDICINE

## 2022-05-12 PROCEDURE — 1036F TOBACCO NON-USER: CPT | Performed by: FAMILY MEDICINE

## 2022-05-12 PROCEDURE — G8427 DOCREV CUR MEDS BY ELIG CLIN: HCPCS | Performed by: FAMILY MEDICINE

## 2022-05-12 PROCEDURE — 99213 OFFICE O/P EST LOW 20 MIN: CPT | Performed by: FAMILY MEDICINE

## 2022-05-12 PROCEDURE — 3017F COLORECTAL CA SCREEN DOC REV: CPT | Performed by: FAMILY MEDICINE

## 2022-05-12 RX ORDER — DEXAMETHASONE SODIUM PHOSPHATE 4 MG/ML
4 INJECTION, SOLUTION INTRA-ARTICULAR; INTRALESIONAL; INTRAMUSCULAR; INTRAVENOUS; SOFT TISSUE ONCE
Status: COMPLETED | OUTPATIENT
Start: 2022-05-12 | End: 2022-05-12

## 2022-05-12 RX ADMIN — DEXAMETHASONE SODIUM PHOSPHATE 4 MG: 4 INJECTION, SOLUTION INTRA-ARTICULAR; INTRALESIONAL; INTRAMUSCULAR; INTRAVENOUS; SOFT TISSUE at 13:22

## 2022-05-12 ASSESSMENT — ENCOUNTER SYMPTOMS
CHEST TIGHTNESS: 0
COUGH: 0
RHINORRHEA: 0
CONSTIPATION: 0
VOMITING: 0
CHANGE IN BOWEL HABIT: 0
ABDOMINAL PAIN: 0
NAUSEA: 0
SINUS PRESSURE: 0
EYE ITCHING: 0
SORE THROAT: 0
EYE PAIN: 0
EYE REDNESS: 0
COLOR CHANGE: 0
SHORTNESS OF BREATH: 0
WHEEZING: 0
BACK PAIN: 1
BLOOD IN STOOL: 0
DIARRHEA: 0
APNEA: 0

## 2022-05-12 NOTE — PROGRESS NOTES
[facility] PRE-ADMISSION TESTING INSTRUCTIONS    The Preadmission Testing patient is instructed accordingly using the following criteria (check applicable):    ARRIVAL INSTRUCTIONS:  [x] Parking the day of Surgery is located in the Main Entrance lot. Upon entering the door, make an immediate right to the surgery reception desk    [x] Bring photo ID and insurance card    [] Bring in a copy of Living will or Durable Power of  papers. [x] Please be sure to arrange for responsible adult to provide transportation to and from the hospital    [x] Please arrange for responsible adult to be with you for the 24 hour period post procedure due to having anesthesia      GENERAL INSTRUCTIONS:    [x] Nothing by mouth after midnight, including gum, candy, mints or water    [x] You may brush your teeth, but do not swallow any water    [x] Take medications as instructed with 1-2 oz of water    [] Stop herbal supplements and vitamins 5 days prior to procedure    [] Follow preop dosing of blood thinners per physician instructions    [] Take 1/2 dose of evening insulin, but no insulin after midnight    [] No oral diabetic medications after midnight    [] If diabetic and have low blood sugar or feel symptomatic, take 1-2oz apple juice only    [x] Bring inhalers day of surgery    [] Bring C-PAP/ Bi-Pap day of surgery    [] Bring urine specimen day of surgery    [x] Shower or bath with soap, lather and rinse well, AM of Surgery, no lotion, powders or creams to surgical site    [] Follow bowel prep as instructed per surgeon    [x] No tobacco products within 24 hours of surgery     [x] No alcohol or illegal drug use within 24 hours of surgery.     [x] Jewelry, body piercing's, eyeglasses, contact lenses and dentures are not permitted into surgery (bring cases)      [x] Please do not wear any nail polish, make up or hair products on the day of surgery    [x] You can expect a call the business day prior to procedure to notify you if your arrival time changes    [x] If you receive a survey after surgery we would greatly appreciate your comments    [] Parent/guardian of a minor must accompany their child and remain on the premises  the entire time they are under our care     [] Pediatric patients may bring favorite toy, blanket or comfort item with them    [] A caregiver or family member must remain with the patient during their stay if they are mentally handicapped, have dementia, disoriented or unable to use a call light or would be a safety concern if left unattended    [x] Please notify surgeon if you develop any illness between now and time of surgery (cold, cough, sore throat, fever, nausea, vomiting) or any signs of infections  including skin, wounds, and dental.    [x]  The Outpatient Pharmacy is available to fill your prescription here on your day of surgery, ask your preop nurse for details    [] Other instructions    EDUCATIONAL MATERIALS PROVIDED:    [] PAT Preoperative Education Packet/Booklet     [] Medication List    [] Transfusion bracelet applied with instructions    [] Shower with soap, lather and rinse well, and use CHG wipes provided the evening before surgery as instructed    [] Incentive spirometer with instructions

## 2022-05-12 NOTE — PROGRESS NOTES
Chief Complaint:     Chief Complaint   Patient presents with    Back Pain     x2 days         Back Pain  This is a recurrent problem. The current episode started more than 1 month ago. The problem occurs daily. The problem is unchanged. The pain is present in the lumbar spine. The quality of the pain is described as aching. The pain is moderate. The symptoms are aggravated by bending, position and twisting. Pertinent negatives include no abdominal pain, chest pain, dysuria, fever, headaches, numbness or weakness. She has tried nothing for the symptoms. The treatment provided no relief. Other  This is a new (go over CT results from ER) problem. The current episode started 1 to 4 weeks ago. The problem occurs daily. The problem has been unchanged. Associated symptoms include arthralgias and myalgias. Pertinent negatives include no abdominal pain, change in bowel habit, chest pain, chills, congestion, coughing, diaphoresis, fatigue, fever, headaches, joint swelling, nausea, neck pain, numbness, rash, sore throat, vomiting or weakness. Nothing aggravates the symptoms. She has tried nothing for the symptoms. The treatment provided no relief.        Patient Active Problem List   Diagnosis    Cervical stenosis of spinal canal    DDD (degenerative disc disease), cervical    History of TIA (transient ischemic attack) and stroke    Spinal stenosis of lumbar region without neurogenic claudication    Fibromyalgia    Migraines without aura and without status migrainosus, not intractable    Primary stabbing headache    Chronic pain syndrome    Primary osteoarthritis of left hip    Cervical disc disorder    Cervical facet joint syndrome    Cervical radiculopathy    Cervical stenosis of spine    Lumbar spondylosis    Lumbar facet arthropathy    Lumbar disc disorder    Greater trochanteric bursitis of left hip    Pain in left hip    Facet arthropathy, cervical    Primary osteoarthritis of left knee    Hepatic cyst    Lung nodule       Past Medical History:   Diagnosis Date    Asthma     uses inhalers twice a day    Cerebral artery occlusion with cerebral infarction ([facility] [address]     2015-slight right sided weakness, ambulates normal    Cervical spinal stenosis     Chronic back pain     Chronic kidney disease     COVID 2021    Depression     Disorder of thyroid gland 09/11/2012    no medication    GERD (gastroesophageal reflux disease)     Headache(784.0)     Hemorrhoids     History of colon polyps     For Colonoscopy 4/1/22    Irritable bowel syndrome     Numbness and tingling in right hand 03/06/2020    Palpitations 07/24/2012    Seizure disorder ([facility] [address]     not on medications for this; last seizure 2018    Sleep apnea     wears CPAP    Vitamin D deficiency 12/03/2014       Past Surgical History:   Procedure Laterality Date    COLONOSCOPY  03/09/2016    COLONOSCOPY  07/10/2017    COLONOSCOPY N/A 4/1/2022    COLONOSCOPY DIAGNOSTIC WITH BANDING performed by Meera Bond MD at [address] [facility] Left 08/10/2020    LEFT GREATER TROCHANTERIC BURSAE INJECTION X-RAY (CPT 66535) performed by Murali Ventura MD at [facility] N/A     epidural c6-c7    NERVE BLOCK Bilateral 8/5/2021    BILATERAL CERVICAL MEDIAL BRANCH FACET BLOCK C4 C5 C6 WITH SEDATION WITH LEFT TROCHANTERIC BURSA INJECTION(CPT 66865) performed by Murali Ventura MD at [address] N/A 4/19/2021    CERVICAL EPIDURAL INTERLAMINAR STEROID INJECTION C6-C7 WITH SEDATION performed by Murali Ventura MD at [address] Left 1/31/2022    CERVICAL EPIDURAL STEROID INJECTION LEFT PARAMEDIAN C6-C7 WITH SEDATION performed by Murali Ventura MD at [address] [facility] ENDOSCOPY  07/10/2017       Current Outpatient Medications   Medication Sig Dispense Refill    buprenorphine (BUTRANS) 5 MCG/HR PTWK Place 1 patch onto the skin once a week for 28 days. 4 patch 0    fenofibrate (LIPOFEN) 150 MG CAPS capsule Take 1 capsule by mouth daily 30 capsule 2    Elastic Bandages & Supports (KNEE BRACE) MISC Soft neutral position left knee brace  Size to fit  Dx:  Knee pain 1 each 0    albuterol sulfate  (90 Base) MCG/ACT inhaler INHALE 2 PUFFS INTO THE LUNGS EVERY 4 HOURS AS NEEDED FOR WHEEZING OR SHORTNESS OF BREATH (Patient taking differently: Inhale 2 puffs into the lungs every 4 hours as needed for Wheezing or Shortness of Breath ) 6.7 g 1    vitamin D3 (CHOLECALCIFEROL) 25 MCG (1000 UT) TABS tablet Take 1 tablet by mouth daily 30 tablet 5    Prenatal Vit-Fe Fumarate-FA (PRENATAL PLUS) 27-1 MG TABS 1 pill daily 30 tablet 5    omeprazole (PRILOSEC) 20 MG delayed release capsule Take 1 capsule by mouth daily Take am dos 07/10.  (Patient taking differently: Take 20 mg by mouth as needed ) 30 capsule 5    EPINEPHrine (EPIPEN 2-ABUNDIO) 0.3 MG/0.3ML SOAJ injection Inject into muscle for allergic reaction 0.3 mL 0     Current Facility-Administered Medications   Medication Dose Route Frequency Provider Last Rate Last Admin    dexamethasone (DECADRON) injection 4 mg  4 mg IntraMUSCular Once Wayna Push, DO           Allergies   Allergen Reactions    Fish-Derived Products Anaphylaxis    Dye [Iodides] Itching       Social History     Socioeconomic History    Marital status:      Spouse name: None    Number of children: None    Years of education: None    Highest education level: None   Occupational History    None   Tobacco Use    Smoking status: Former Smoker     Packs/day: 1.00     Years: 25.00     Pack years: 25.00     Types: Cigarettes     Quit date: 1988     Years since quittin.0    Smokeless tobacco: Never Used   Vaping Use    Vaping Use: Never used   Substance and Sexual Activity    Alcohol use: No    Drug use: No    Sexual activity: None   Other Topics Concern    None   Social History Narrative    None     Social Determinants of Health     Financial Resource Strain:     Difficulty of Paying Living Expenses: Not on file   Food Insecurity:     Worried About Running Out of Food in the Last Year: Not on file    Vlad of Food in the Last Year: Not on file   Transportation Needs:     Lack of Transportation (Medical): Not on file    Lack of Transportation (Non-Medical): Not on file   Physical Activity:     Days of Exercise per Week: Not on file    Minutes of Exercise per Session: Not on file   Stress:     Feeling of Stress : Not on file   Social Connections:     Frequency of Communication with Friends and Family: Not on file    Frequency of Social Gatherings with Friends and Family: Not on file    Attends Temple Services: Not on file    Active Member of [address] [place] [address] [employer] or Organizations: Not on file    Attends Club or Organization Meetings: Not on file    Marital Status: Not on file   Intimate Partner Violence:     Fear of Current or Ex-Partner: Not on file    Emotionally Abused: Not on file    Physically Abused: Not on file    Sexually Abused: Not on file   Housing Stability:     Unable to Pay for Housing in the Last Year: Not on file    Number of [place] in the Last Year: Not on file    Unstable Housing in the Last Year: Not on file       Family History   Problem Relation Age of Onset    Heart Attack Mother     Migraines Mother     Kidney Disease Father         failure    Migraines Sister     Asthma Child     Asthma Child         bone disease    Anemia Child     Diabetes Other           Review of Systems   Constitutional: Negative for activity change, appetite change, chills, diaphoresis, fatigue and fever. HENT: Negative for congestion, ear pain, hearing loss, nosebleeds, rhinorrhea, sinus pressure and sore throat. Eyes: Negative for pain, redness, itching and visual disturbance.    Respiratory: Negative for apnea, cough, chest tightness, shortness of breath and wheezing. Cardiovascular: Negative for chest pain, palpitations and leg swelling. Gastrointestinal: Negative for abdominal pain, blood in stool, change in bowel habit, constipation, diarrhea, nausea and vomiting. Endocrine: Negative. Genitourinary: Negative for decreased urine volume, difficulty urinating, dysuria, frequency, hematuria and urgency. Musculoskeletal: Positive for arthralgias, back pain and myalgias. Negative for gait problem, joint swelling and neck pain. Skin: Negative for color change and rash. Allergic/Immunologic: Negative for environmental allergies and food allergies. Neurological: Negative for dizziness, weakness, light-headedness, numbness and headaches. Hematological: Negative for adenopathy. Does not bruise/bleed easily. Psychiatric/Behavioral: Negative for behavioral problems, dysphoric mood and sleep disturbance. The patient is not nervous/anxious and is not hyperactive. All other systems reviewed and are negative. /70   Pulse 81   Resp 16   Ht 4' 11\" (1.499 m)   Wt 180 lb (81.6 kg)   LMP 03/27/2008   SpO2 98%   BMI 36.36 kg/m²     Physical Exam  Vitals and nursing note reviewed. Constitutional:       General: She is not in acute distress. Appearance: Normal appearance. She is well-developed. HENT:      Head: Normocephalic and atraumatic. Right Ear: Hearing, tympanic membrane and external ear normal. No tenderness. No middle ear effusion. Left Ear: Hearing, tympanic membrane and external ear normal. No tenderness. No middle ear effusion. Nose: Nose normal. No congestion or rhinorrhea. Right Turbinates: Not enlarged. Left Turbinates: Not enlarged. Mouth/Throat:      Mouth: Mucous membranes are moist.      Tongue: No lesions. Pharynx: Oropharynx is clear. No oropharyngeal exudate or posterior oropharyngeal erythema. Eyes:      General: No scleral icterus.      Conjunctiva/sclera: Conjunctivae normal. Pupils: Pupils are equal, round, and reactive to light. Neck:      Thyroid: No thyromegaly. Cardiovascular:      Rate and Rhythm: Normal rate and regular rhythm. Heart sounds: Normal heart sounds. No murmur heard. Pulmonary:      Effort: Pulmonary effort is normal. No respiratory distress. Breath sounds: Normal breath sounds. No wheezing or rales. Abdominal:      General: Bowel sounds are normal. There is no distension. Palpations: Abdomen is soft. Tenderness: There is no abdominal tenderness. Musculoskeletal:         General: No tenderness. Normal range of motion. Cervical back: Normal range of motion and neck supple. No rigidity. No muscular tenderness. Lymphadenopathy:      Cervical: No cervical adenopathy. Skin:     General: Skin is warm and dry. Findings: No erythema or rash. Neurological:      General: No focal deficit present. Mental Status: She is alert and oriented to person, place, and time. Cranial Nerves: No cranial nerve deficit. Deep Tendon Reflexes: Reflexes are normal and symmetric.  Reflexes normal.   Psychiatric:         Mood and Affect: Mood normal.                                 ASSESSMENT/PLAN:    Patient Active Problem List   Diagnosis    Cervical stenosis of spinal canal    DDD (degenerative disc disease), cervical    History of TIA (transient ischemic attack) and stroke    Spinal stenosis of lumbar region without neurogenic claudication    Fibromyalgia    Migraines without aura and without status migrainosus, not intractable    Primary stabbing headache    Chronic pain syndrome    Primary osteoarthritis of left hip    Cervical disc disorder    Cervical facet joint syndrome    Cervical radiculopathy    Cervical stenosis of spine    Lumbar spondylosis    Lumbar facet arthropathy    Lumbar disc disorder    Greater trochanteric bursitis of left hip    Pain in left hip    Facet arthropathy, cervical    Primary osteoarthritis of left knee    Hepatic cyst    Lung nodule       Paul Garzon was seen today for back pain. Diagnoses and all orders for this visit:    Chronic bilateral thoracic back pain  -     XR CHEST (2 VW); Future    Other orders  -     dexamethasone (DECADRON) injection 4 mg          Return if symptoms worsen or fail to improve. I spent 20 minutes with this patient. I spent greater than 50% of the time counseling this patient.         Carlota Mckeon, DO  5/12/2022  1:21 PM

## 2022-05-16 ENCOUNTER — OFFICE VISIT (OUTPATIENT)
Dept: PAIN MANAGEMENT | Age: 52
End: 2022-05-16
Payer: MEDICAID

## 2022-05-16 VITALS
HEIGHT: 59 IN | WEIGHT: 173 LBS | RESPIRATION RATE: 16 BRPM | SYSTOLIC BLOOD PRESSURE: 120 MMHG | HEART RATE: 75 BPM | TEMPERATURE: 97.3 F | DIASTOLIC BLOOD PRESSURE: 80 MMHG | BODY MASS INDEX: 34.88 KG/M2 | OXYGEN SATURATION: 98 %

## 2022-05-16 DIAGNOSIS — M47.812 CERVICAL FACET JOINT SYNDROME: ICD-10-CM

## 2022-05-16 DIAGNOSIS — G89.4 CHRONIC PAIN SYNDROME: ICD-10-CM

## 2022-05-16 DIAGNOSIS — M54.12 CERVICAL RADICULOPATHY: ICD-10-CM

## 2022-05-16 DIAGNOSIS — M47.816 LUMBAR FACET ARTHROPATHY: ICD-10-CM

## 2022-05-16 DIAGNOSIS — M47.812 FACET ARTHROPATHY, CERVICAL: ICD-10-CM

## 2022-05-16 DIAGNOSIS — M54.16 LUMBAR RADICULOPATHY: ICD-10-CM

## 2022-05-16 PROCEDURE — 99213 OFFICE O/P EST LOW 20 MIN: CPT | Performed by: NURSE PRACTITIONER

## 2022-05-16 PROCEDURE — G8427 DOCREV CUR MEDS BY ELIG CLIN: HCPCS | Performed by: NURSE PRACTITIONER

## 2022-05-16 PROCEDURE — G8417 CALC BMI ABV UP PARAM F/U: HCPCS | Performed by: NURSE PRACTITIONER

## 2022-05-16 PROCEDURE — 3017F COLORECTAL CA SCREEN DOC REV: CPT | Performed by: NURSE PRACTITIONER

## 2022-05-16 PROCEDURE — 1036F TOBACCO NON-USER: CPT | Performed by: NURSE PRACTITIONER

## 2022-05-16 RX ORDER — BACLOFEN 10 MG/1
5 TABLET ORAL 2 TIMES DAILY
Qty: 30 TABLET | Refills: 2 | Status: SHIPPED
Start: 2022-05-16 | End: 2022-07-15 | Stop reason: SDUPTHER

## 2022-05-16 RX ORDER — BACLOFEN 10 MG/1
TABLET ORAL
COMMUNITY
Start: 2022-05-11 | End: 2022-05-16 | Stop reason: SDUPTHER

## 2022-05-16 RX ORDER — OXYCODONE HYDROCHLORIDE 5 MG/1
5 TABLET ORAL DAILY
Qty: 30 TABLET | Refills: 0 | Status: SHIPPED
Start: 2022-05-16 | End: 2022-05-16 | Stop reason: CLARIF

## 2022-05-16 RX ORDER — BUPRENORPHINE 5 UG/H
1 PATCH TRANSDERMAL WEEKLY
Qty: 4 PATCH | Refills: 0 | Status: CANCELLED | OUTPATIENT
Start: 2022-05-18 | End: 2022-06-15

## 2022-05-16 RX ORDER — OXYCODONE HYDROCHLORIDE 5 MG/1
5 TABLET ORAL EVERY 12 HOURS PRN
Qty: 45 TABLET | Refills: 0 | Status: SHIPPED
Start: 2022-05-16 | End: 2022-06-15 | Stop reason: SDUPTHER

## 2022-05-16 NOTE — PROGRESS NOTES
[address] [facility], [address] [place] [id number]  [phone]    Follow up Note      Arnoldo Finch     Date of Visit:  5/16/2022    CC:  Patient presents for follow up neck and low back     HPI:  Pt here reports of diarrhea and dizziness with butrans patch and cannot tolerate. Pt also notes butrans patch not covered by her insurance. Pt scheduled tomorrow for Robotic assisted Laparoscopic possible open cholecystectomy possible intraoperative cholangiogram.     Appropriate analgesia with current medications regimen: somewhat  Change in quality of symptoms:yes  Medication side effects:  Yes with butrans patch   Recent diagnostic testing:none  Recent interventional procedures: none    She has been on anticoagulation medications to include ASA. The patient Ksenia Beam not been on herbal supplements.  The patient is not diabetic.     Imaging:     Cervical spine MRI 04/2018  1. Multilevel degenerative disc disease extending from C3 through C7. Moderately severe spinal canal stenosis at C3-C4. Moderate spinal   canal stenosis at C4-C5. . Moderately severe spinal canal stenosis at   C5-C6. Abnormal signal intensity within the cervical spinal cord C3-C5   without evidence of enhancement. Findings are most likely reflective   of myelomalacia changes secondary to the underlying degree of cord   compression as described above. No active demyelination identified. 2. Slight loss of normally expected cervical lordosis C3-C6.      CT Lumbar spine 2017  1.  Mild diffuse osteopenia, no compression fracture or   spondylolisthesis.       2. Mild multilevel degenerative disc disease and facet joint   arthropathy of the lower lumbar spine more pronounced at L4-L5.      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         Otherwise, no musculoskeletal pathology to explain patient's pain.      Previous treatments: Physical Therapy, Epidural Steroid Injection with  and medications. Maty Mindi severe side effects, tylenol arthritis heartburn and gi distress,  Tizanidine helps but makes tired, tramadol caused hyperness, butrans patch dizziness and diarrhea                                        Potential Aberrant Drug-Related Behavior:    No     Urine Drug Screening:  Saliva screen 08/2020 consistent for Ultram  11/2021: consistent for oxycodone     OARRS report:  5/2022 consistent.     Opioid agreement: 12/13/21         Past Medical History:   Diagnosis Date    Asthma     uses inhalers twice a day    Cerebral artery occlusion with cerebral infarction ([facility] [address]     2015-slight right sided weakness, ambulates normal    Cervical spinal stenosis     Chronic back pain     Chronic kidney disease     COVID 2021    Depression     Disorder of thyroid gland 09/11/2012    no medication    Gallstones     GERD (gastroesophageal reflux disease)     Headache(784.0)     Irritable bowel syndrome     Numbness and tingling in right hand 03/06/2020    CIERA on CPAP     Seizure disorder (HCC)     not on medications for this; last seizure 2018    Sleep apnea     wears CPAP    Vitamin D deficiency 12/03/2014       Past Surgical History:   Procedure Laterality Date    COLONOSCOPY  03/09/2016    COLONOSCOPY  07/10/2017    COLONOSCOPY N/A 4/1/2022    COLONOSCOPY DIAGNOSTIC WITH BANDING performed by Africa Maldonado MD at [address] [facility] Left 08/10/2020    LEFT GREATER TROCHANTERIC BURSAE INJECTION X-RAY (CPT 60479) performed by Miriam Lin MD at [facility] N/A     epidural c6-c7    NERVE BLOCK Bilateral 8/5/2021    BILATERAL CERVICAL MEDIAL BRANCH FACET BLOCK C4 C5 C6 WITH SEDATION WITH LEFT TROCHANTERIC BURSA INJECTION(CPT 98591) performed by Miriam Lin MD at [address] [place] [address] N/A 4/19/2021    CERVICAL EPIDURAL INTERLAMINAR STEROID INJECTION C6-C7 WITH SEDATION performed by Luke Dunlap MD at [address] [facility] [address] Left 1/31/2022    CERVICAL EPIDURAL STEROID INJECTION LEFT PARAMEDIAN C6-C7 WITH SEDATION performed by Luke Dunlap MD at [address] [facility] ENDOSCOPY  07/10/2017       Prior to Admission medications    Medication Sig Start Date End Date Taking? Authorizing Provider   fenofibrate (LIPOFEN) 150 MG CAPS capsule Take 1 capsule by mouth daily 4/7/22   Philippe Harder, DO   Elastic Bandages & Supports (KNEE BRACE) MISC Soft neutral position left knee brace  Size to fit  Dx:  Knee pain 1/21/22   Abdi Molina, DO   albuterol sulfate  (90 Base) MCG/ACT inhaler INHALE 2 PUFFS INTO THE LUNGS EVERY 4 HOURS AS NEEDED FOR WHEEZING OR SHORTNESS OF BREATH  Patient taking differently: Inhale 2 puffs into the lungs every 4 hours as needed for Wheezing or Shortness of Breath  11/1/21   Philippe Harder, DO   vitamin D3 (CHOLECALCIFEROL) 25 MCG (1000 UT) TABS tablet Take 1 tablet by mouth daily 8/26/21   Philippe Harder, DO   Prenatal Vit-Fe Fumarate-FA (PRENATAL PLUS) 27-1 MG TABS 1 pill daily 8/26/21   Philippe Harder, DO   omeprazole (PRILOSEC) 20 MG delayed release capsule Take 1 capsule by mouth daily Take am dos 07/10.   Patient taking differently: Take 20 mg by mouth as needed  1/21/21   Philippe Harder, DO   EPINEPHrine (EPIPEN 2-ABUNDIO) 0.3 MG/0.3ML SOAJ injection Inject into muscle for allergic reaction 8/22/19   Philippe Harder, DO       Allergies   Allergen Reactions    Fish-Derived Products Anaphylaxis    Dye [Iodides] Itching       Social History     Socioeconomic History    Marital status:      Spouse name: Not on file    Number of children: Not on file    Years of education: Not on file    Highest education level: Not on file   Occupational History    Not on file   Tobacco Use    Smoking status: Former Smoker     Packs/day: 1.00     Years: 25.00     Pack years: 25.00     Types: Cigarettes     Quit date: 1988     Years since quittin.0    Smokeless tobacco: Never Used   Vaping Use    Vaping Use: Never used   Substance and Sexual Activity    Alcohol use: No    Drug use: No    Sexual activity: Not on file   Other Topics Concern    Not on file   Social History Narrative    Not on file     Social Determinants of Health     Financial Resource Strain:     Difficulty of Paying Living Expenses: Not on file   Food Insecurity:     Worried About Running Out of Food in the Last Year: Not on file    Vlad of Food in the Last Year: Not on file   Transportation Needs:     Lack of Transportation (Medical): Not on file    Lack of Transportation (Non-Medical):  Not on file   Physical Activity:     Days of Exercise per Week: Not on file    Minutes of Exercise per Session: Not on file   Stress:     Feeling of Stress : Not on file   Social Connections:     Frequency of Communication with Friends and Family: Not on file    Frequency of Social Gatherings with Friends and Family: Not on file    Attends Shinto Services: Not on file    Active Member of [address] [place] [address] or Organizations: Not on file    Attends Club or Organization Meetings: Not on file    Marital Status: Not on file   Intimate Partner Violence:     Fear of Current or Ex-Partner: Not on file    Emotionally Abused: Not on file    Physically Abused: Not on file    Sexually Abused: Not on file   Housing Stability:     Unable to Pay for Housing in the Last Year: Not on file    Number of [place] in the Last Year: Not on file    Unstable Housing in the Last Year: Not on file       Family History   Problem Relation Age of Onset    Heart Attack Mother     Migraines Mother     Kidney Disease Father         failure    Migraines Sister     Asthma Child     Asthma Child         bone disease    Anemia Child     Diabetes Other        REVIEW OF SYSTEMS:     Tr Iraheta denies fever/chills, chest pain, shortness of breath, new bowel or bladder complaints or suicidal ideations. All other review of systems wasnegative. Review of Systems    PHYSICAL EXAMINATION:      [facility] 03/27/2008     General:       General appearance:   elderly, pleasant and well-hydrated. , in moderate discomfort and A & O x3  Build:Overweight     HEENT:     Head:normocephalic and atraumatic  Sclera: icterus absent,     Lungs:     Breathing:Breathing Pattern: regular, no distress     Abdomen:     Shape:non-distended and normal  Tenderness:none     Cervical spine:     Inspection:normal  Palpation:tenderness paravertebral muscles, facet loading, left, right, positive and tenderness. Range of motion:abnormal moderately flexion, extension rotation bilateral and is  painful.     Lumbar spine:     Spine inspection:normal   CVA tenderness:No   Palpation:tenderness paravertebral muscles, facet loading, left, right, positive and tenderness. Range of motion:abnormal moderately Lateral bending, flexion, extension rotation bilateral and is  painful.     Musculoskeletal:     Trigger points in Paraveteral:absent   SI joint tenderness:negative right, negative left  SLR:negative right, positive left, sitting      Extremities:     Tremors:None bilaterally upper and lower  Range of motion:Generally normal shoulders, pain with internal rotation of hips positive Left  Intact:Yes  Edema:Normal     Neurological:     Sensory:diminshed to light touch lateral aspect of Left arm.   Motor:   Right Grip4+/5              Left Grip4+/5               Right Bicep4+/5           Left Bicep4+/5              Right Triceps4+/5       Left Triceps4+/5          Right Deltoid4+/5     Left Deltoid4+/5                  Right Quadriceps4+/5          Left Quadriceps4+/5           Right Gastrocnemius4+/5    Left Gastrocnemius4+/5  Right Ant Tibialis4+/5  Left Ant Tibialis4+/5    Gait:antalgic     Dermatology:     Skin:no unusual rashes and no skin lesions     Impression:    Cervical spine MRI 2019 Multilevel degenerative disc disease with  moderate to  severe stenosis at C3-4/C4-5 and C5-6  Lumbar spine CT 2017 Mild degenerative disc disease and facet arthropathy most pronounced at L4-5  Patient had seen neurosurgery and surgery C3-4/C4-5 and C5-6 ACDF was recommended.   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 Patient had underwent cervical epidural at C6-7 (04/2021) for  her radicular  symptoms and bilateral C3,4,5 medial branch  block x 1(08/2021) for her mechanical neck pain. Both  interventions had helped with her pain. Plan:   Follow up on neck pain radiating down left arm with n/t hands  and low back radiating down left leg to knee, no acute changes    1/31/22: Cervical epidural left paramedian C6-7 with>80% relief  Continue baclofen 5mg po bid prn spasms  DC Butrans patch side effects   Trial Oxycodone 5mg po daily(cannot take Percocet with tylenol and liver disease) 45 tablets for surgery tomorrow Will reduce back to 30 pills next month to take daily  Continue lidoderm patchs q 24 hours prn pain  Wants to schedule for low back epidural in 1-2 months  Avoid NSAIDs(H/O GI ulcers)  OARRS report reviewed   Patient encouraged to stay active and to lose weight. Treatment plan discussed with the patient and her including medications side effects.       We discussed with the patient that combining opioids, benzodiazepines, alcohol, illicit drugs or sleep aids increases the risk of respiratory depression including death. We discussed that these medications may cause drowsiness, sedation or dizziness and have counseled the patient not to drive or operate machinery. We have discussed that these medications will be prescribed only by one provider. We have discussed with the patient about age related risk factors and have thoroughly discussed the importance of taking these medications as prescribed.  The patient verbalizes understanding.       ccreferring physic

## 2022-05-16 NOTE — PROGRESS NOTES
Do you currently have any of the following:    Fever: No  Headache:  No  Cough: No  Shortness of breath: No  Exposed to anyone with these symptoms: No                                                                                                                Dean Craven presents to the [facility] on 5/16/2022. France Garcia is complaining of pain in her neck and hip. . The pain is constant. The pain is described as aching, throbbing, shooting, stabbing and sharp. Pain is rated on her best day at a 5, on her worst day at a 10, and on average at a 7 on the VAS scale. She took her last dose of Butrans Patch . France Garcia does not have issues with constipation. Any procedures since your last visit: No    She is not on NSAIDS and  is not on anticoagulation medications to include none and is managed by NA. Pacemaker or defibrillator: No Physician managing device is NA. Medication Contract and Consent for Opioid Use Documents Filed     Patient Documents     Type of Document Status Date Received Received By Description    Medication Contract Received 7/18/2019 11:59 AM Theodora SINGLETON 7/18/19 medication contract    Medication Contract Received 10/9/2020  3:01 PM LORENZO [facility] [place] [place] [address] pain pt agreement    Medication Contract Received 11/5/2021 11:29 AM Killian MUNIZ Pain Mgmt Patient Agreement 11.5.2021    Medication Contract Received 12/13/2021  4:21 PM LEONARDA PINA pain management                   /80   Pulse 75   Temp 97.3 °F (36.3 °C) (Infrared)   Resp 16   Ht 4' 11\" (1.499 m)   Wt 173 lb (78.5 kg)   LMP 03/27/2008   SpO2 98%   BMI 34.94 kg/m²      Patient's last menstrual period was 03/27/2008.

## 2022-05-17 ENCOUNTER — ANESTHESIA (OUTPATIENT)
Dept: OPERATING ROOM | Age: 52
End: 2022-05-17
Payer: MEDICAID

## 2022-05-17 ENCOUNTER — TELEPHONE (OUTPATIENT)
Dept: PAIN MANAGEMENT | Age: 52
End: 2022-05-17

## 2022-05-17 ENCOUNTER — ANESTHESIA EVENT (OUTPATIENT)
Dept: OPERATING ROOM | Age: 52
End: 2022-05-17
Payer: MEDICAID

## 2022-05-17 ENCOUNTER — TELEPHONE (OUTPATIENT)
Dept: BARIATRICS/WEIGHT MGMT | Age: 52
End: 2022-05-17

## 2022-05-17 ENCOUNTER — HOSPITAL ENCOUNTER (OUTPATIENT)
Age: 52
Setting detail: OUTPATIENT SURGERY
Discharge: HOME | End: 2022-05-17
Attending: SURGERY | Admitting: SURGERY
Payer: MEDICAID

## 2022-05-17 VITALS
DIASTOLIC BLOOD PRESSURE: 70 MMHG | HEIGHT: 59 IN | WEIGHT: 176 LBS | TEMPERATURE: 97 F | SYSTOLIC BLOOD PRESSURE: 106 MMHG | HEART RATE: 70 BPM | BODY MASS INDEX: 35.48 KG/M2 | RESPIRATION RATE: 16 BRPM | OXYGEN SATURATION: 98 %

## 2022-05-17 DIAGNOSIS — G89.18 POSTOPERATIVE PAIN: Primary | ICD-10-CM

## 2022-05-17 LAB
ALBUMIN SERPL-MCNC: 4.4 G/DL (ref 3.5–5.2)
ALBUMIN SERPL-MCNC: 4.5 G/DL (ref 3.5–5.2)
ALP BLD-CCNC: 92 U/L (ref 35–104)
ALP BLD-CCNC: 93 U/L (ref 35–104)
ALT SERPL-CCNC: 27 U/L (ref 0–32)
ALT SERPL-CCNC: 27 U/L (ref 0–32)
ANION GAP SERPL CALCULATED.3IONS-SCNC: 9 MMOL/L (ref 7–16)
AST SERPL-CCNC: 40 U/L (ref 0–31)
AST SERPL-CCNC: 41 U/L (ref 0–31)
BILIRUB SERPL-MCNC: 0.5 MG/DL (ref 0–1.2)
BILIRUB SERPL-MCNC: 0.5 MG/DL (ref 0–1.2)
BILIRUBIN DIRECT: <0.2 MG/DL (ref 0–0.3)
BILIRUBIN, INDIRECT: ABNORMAL MG/DL (ref 0–1)
BUN BLDV-MCNC: 14 MG/DL (ref 6–20)
CALCIUM SERPL-MCNC: 9.7 MG/DL (ref 8.6–10.2)
CHLORIDE BLD-SCNC: 104 MMOL/L (ref 98–107)
CO2: 26 MMOL/L (ref 22–29)
CREAT SERPL-MCNC: 0.9 MG/DL (ref 0.5–1)
GFR AFRICAN AMERICAN: >60
GFR NON-AFRICAN AMERICAN: >60 ML/MIN/1.73
GLUCOSE BLD-MCNC: 110 MG/DL (ref 74–99)
POTASSIUM REFLEX MAGNESIUM: 5.5 MMOL/L (ref 3.5–5)
SODIUM BLD-SCNC: 139 MMOL/L (ref 132–146)
TOTAL PROTEIN: 7.9 G/DL (ref 6.4–8.3)
TOTAL PROTEIN: 8 G/DL (ref 6.4–8.3)

## 2022-05-17 PROCEDURE — 6360000002 HC RX W HCPCS: Performed by: ANESTHESIOLOGY

## 2022-05-17 PROCEDURE — 47562 LAPAROSCOPIC CHOLECYSTECTOMY: CPT | Performed by: SURGERY

## 2022-05-17 PROCEDURE — 3700000000 HC ANESTHESIA ATTENDED CARE: Performed by: SURGERY

## 2022-05-17 PROCEDURE — 7100000001 HC PACU RECOVERY - ADDTL 15 MIN: Performed by: SURGERY

## 2022-05-17 PROCEDURE — 3700000001 HC ADD 15 MINUTES (ANESTHESIA): Performed by: SURGERY

## 2022-05-17 PROCEDURE — 2500000003 HC RX 250 WO HCPCS: Performed by: SURGERY

## 2022-05-17 PROCEDURE — 2500000003 HC RX 250 WO HCPCS: Performed by: NURSE ANESTHETIST, CERTIFIED REGISTERED

## 2022-05-17 PROCEDURE — 6360000002 HC RX W HCPCS: Performed by: SURGERY

## 2022-05-17 PROCEDURE — 80076 HEPATIC FUNCTION PANEL: CPT

## 2022-05-17 PROCEDURE — 88304 TISSUE EXAM BY PATHOLOGIST: CPT

## 2022-05-17 PROCEDURE — 7100000011 HC PHASE II RECOVERY - ADDTL 15 MIN: Performed by: SURGERY

## 2022-05-17 PROCEDURE — 36415 COLL VENOUS BLD VENIPUNCTURE: CPT

## 2022-05-17 PROCEDURE — 3600000009 HC SURGERY ROBOT BASE: Performed by: SURGERY

## 2022-05-17 PROCEDURE — 80053 COMPREHEN METABOLIC PANEL: CPT

## 2022-05-17 PROCEDURE — 2709999900 HC NON-CHARGEABLE SUPPLY: Performed by: SURGERY

## 2022-05-17 PROCEDURE — 7100000010 HC PHASE II RECOVERY - FIRST 15 MIN: Performed by: SURGERY

## 2022-05-17 PROCEDURE — 7100000000 HC PACU RECOVERY - FIRST 15 MIN: Performed by: SURGERY

## 2022-05-17 PROCEDURE — 6370000000 HC RX 637 (ALT 250 FOR IP): Performed by: ANESTHESIOLOGY

## 2022-05-17 PROCEDURE — 2580000003 HC RX 258: Performed by: SURGERY

## 2022-05-17 PROCEDURE — S2900 ROBOTIC SURGICAL SYSTEM: HCPCS | Performed by: SURGERY

## 2022-05-17 PROCEDURE — 3600000019 HC SURGERY ROBOT ADDTL 15MIN: Performed by: SURGERY

## 2022-05-17 PROCEDURE — 6360000002 HC RX W HCPCS: Performed by: NURSE ANESTHETIST, CERTIFIED REGISTERED

## 2022-05-17 RX ORDER — PHENYLEPHRINE HCL IN 0.9% NACL 1 MG/10 ML
SYRINGE (ML) INTRAVENOUS PRN
Status: DISCONTINUED | OUTPATIENT
Start: 2022-05-17 | End: 2022-05-17 | Stop reason: SDUPTHER

## 2022-05-17 RX ORDER — SODIUM CHLORIDE 9 MG/ML
INJECTION, SOLUTION INTRAVENOUS PRN
Status: DISCONTINUED | OUTPATIENT
Start: 2022-05-17 | End: 2022-05-17 | Stop reason: HOSPADM

## 2022-05-17 RX ORDER — INDOCYANINE GREEN AND WATER 25 MG
2.5 KIT INJECTION ONCE
Status: COMPLETED | OUTPATIENT
Start: 2022-05-17 | End: 2022-05-17

## 2022-05-17 RX ORDER — LIDOCAINE HYDROCHLORIDE 20 MG/ML
INJECTION, SOLUTION EPIDURAL; INFILTRATION; INTRACAUDAL; PERINEURAL PRN
Status: DISCONTINUED | OUTPATIENT
Start: 2022-05-17 | End: 2022-05-17 | Stop reason: SDUPTHER

## 2022-05-17 RX ORDER — FENTANYL CITRATE 50 UG/ML
INJECTION, SOLUTION INTRAMUSCULAR; INTRAVENOUS PRN
Status: DISCONTINUED | OUTPATIENT
Start: 2022-05-17 | End: 2022-05-17 | Stop reason: SDUPTHER

## 2022-05-17 RX ORDER — GLYCOPYRROLATE 0.2 MG/ML
INJECTION INTRAMUSCULAR; INTRAVENOUS PRN
Status: DISCONTINUED | OUTPATIENT
Start: 2022-05-17 | End: 2022-05-17 | Stop reason: SDUPTHER

## 2022-05-17 RX ORDER — ONDANSETRON 2 MG/ML
INJECTION INTRAMUSCULAR; INTRAVENOUS PRN
Status: DISCONTINUED | OUTPATIENT
Start: 2022-05-17 | End: 2022-05-17 | Stop reason: SDUPTHER

## 2022-05-17 RX ORDER — ONDANSETRON 2 MG/ML
4 INJECTION INTRAMUSCULAR; INTRAVENOUS
Status: DISCONTINUED | OUTPATIENT
Start: 2022-05-17 | End: 2022-05-17 | Stop reason: HOSPADM

## 2022-05-17 RX ORDER — MEPERIDINE HYDROCHLORIDE 25 MG/ML
12.5 INJECTION INTRAMUSCULAR; INTRAVENOUS; SUBCUTANEOUS EVERY 5 MIN PRN
Status: DISCONTINUED | OUTPATIENT
Start: 2022-05-17 | End: 2022-05-17 | Stop reason: HOSPADM

## 2022-05-17 RX ORDER — PROPOFOL 10 MG/ML
INJECTION, EMULSION INTRAVENOUS PRN
Status: DISCONTINUED | OUTPATIENT
Start: 2022-05-17 | End: 2022-05-17 | Stop reason: SDUPTHER

## 2022-05-17 RX ORDER — SODIUM CHLORIDE 0.9 % (FLUSH) 0.9 %
5-40 SYRINGE (ML) INJECTION PRN
Status: DISCONTINUED | OUTPATIENT
Start: 2022-05-17 | End: 2022-05-17 | Stop reason: HOSPADM

## 2022-05-17 RX ORDER — MIDAZOLAM HYDROCHLORIDE 1 MG/ML
INJECTION INTRAMUSCULAR; INTRAVENOUS PRN
Status: DISCONTINUED | OUTPATIENT
Start: 2022-05-17 | End: 2022-05-17 | Stop reason: SDUPTHER

## 2022-05-17 RX ORDER — HYDROCODONE BITARTRATE AND ACETAMINOPHEN 5; 325 MG/1; MG/1
1 TABLET ORAL ONCE
Status: COMPLETED | OUTPATIENT
Start: 2022-05-17 | End: 2022-05-17

## 2022-05-17 RX ORDER — SODIUM CHLORIDE 0.9 % (FLUSH) 0.9 %
5-40 SYRINGE (ML) INJECTION EVERY 12 HOURS SCHEDULED
Status: DISCONTINUED | OUTPATIENT
Start: 2022-05-17 | End: 2022-05-17 | Stop reason: HOSPADM

## 2022-05-17 RX ORDER — DEXAMETHASONE SODIUM PHOSPHATE 4 MG/ML
INJECTION, SOLUTION INTRA-ARTICULAR; INTRALESIONAL; INTRAMUSCULAR; INTRAVENOUS; SOFT TISSUE PRN
Status: DISCONTINUED | OUTPATIENT
Start: 2022-05-17 | End: 2022-05-17 | Stop reason: SDUPTHER

## 2022-05-17 RX ORDER — ROCURONIUM BROMIDE 10 MG/ML
INJECTION, SOLUTION INTRAVENOUS PRN
Status: DISCONTINUED | OUTPATIENT
Start: 2022-05-17 | End: 2022-05-17 | Stop reason: SDUPTHER

## 2022-05-17 RX ORDER — HYDROCODONE BITARTRATE AND ACETAMINOPHEN 5; 325 MG/1; MG/1
1 TABLET ORAL EVERY 4 HOURS PRN
Qty: 18 TABLET | Refills: 0 | Status: SHIPPED | OUTPATIENT
Start: 2022-05-17 | End: 2022-05-20

## 2022-05-17 RX ADMIN — MIDAZOLAM 2 MG: 1 INJECTION INTRAMUSCULAR; INTRAVENOUS at 09:07

## 2022-05-17 RX ADMIN — HYDROCODONE BITARTRATE AND ACETAMINOPHEN 1 TABLET: 5; 325 TABLET ORAL at 11:31

## 2022-05-17 RX ADMIN — MEPERIDINE HYDROCHLORIDE 12.5 MG: 25 INJECTION, SOLUTION INTRAMUSCULAR; INTRAVENOUS; SUBCUTANEOUS at 10:51

## 2022-05-17 RX ADMIN — FENTANYL CITRATE 50 MCG: 50 INJECTION, SOLUTION INTRAMUSCULAR; INTRAVENOUS at 10:15

## 2022-05-17 RX ADMIN — INDOCYANINE GREEN AND WATER 2.5 MG: KIT at 07:03

## 2022-05-17 RX ADMIN — DEXAMETHASONE SODIUM PHOSPHATE 10 MG: 4 INJECTION, SOLUTION INTRAMUSCULAR; INTRAVENOUS at 09:15

## 2022-05-17 RX ADMIN — Medication 300 MCG: at 09:38

## 2022-05-17 RX ADMIN — MEPERIDINE HYDROCHLORIDE 12.5 MG: 25 INJECTION, SOLUTION INTRAMUSCULAR; INTRAVENOUS; SUBCUTANEOUS at 10:36

## 2022-05-17 RX ADMIN — LIDOCAINE HYDROCHLORIDE 100 MG: 20 INJECTION, SOLUTION EPIDURAL; INFILTRATION; INTRACAUDAL; PERINEURAL at 09:15

## 2022-05-17 RX ADMIN — FENTANYL CITRATE 50 MCG: 50 INJECTION, SOLUTION INTRAMUSCULAR; INTRAVENOUS at 10:11

## 2022-05-17 RX ADMIN — SODIUM CHLORIDE: 9 INJECTION, SOLUTION INTRAVENOUS at 09:04

## 2022-05-17 RX ADMIN — GLYCOPYRROLATE 0.2 MG: 0.2 INJECTION INTRAMUSCULAR; INTRAVENOUS at 09:39

## 2022-05-17 RX ADMIN — ROCURONIUM BROMIDE 50 MG: 10 INJECTION, SOLUTION INTRAVENOUS at 09:16

## 2022-05-17 RX ADMIN — Medication 2000 MG: at 09:15

## 2022-05-17 RX ADMIN — HYDROMORPHONE HYDROCHLORIDE 0.25 MG: 1 INJECTION, SOLUTION INTRAMUSCULAR; INTRAVENOUS; SUBCUTANEOUS at 10:27

## 2022-05-17 RX ADMIN — ONDANSETRON 4 MG: 2 INJECTION INTRAMUSCULAR; INTRAVENOUS at 09:15

## 2022-05-17 RX ADMIN — FENTANYL CITRATE 100 MCG: 50 INJECTION, SOLUTION INTRAMUSCULAR; INTRAVENOUS at 09:15

## 2022-05-17 RX ADMIN — MEPERIDINE HYDROCHLORIDE 12.5 MG: 25 INJECTION, SOLUTION INTRAMUSCULAR; INTRAVENOUS; SUBCUTANEOUS at 10:41

## 2022-05-17 RX ADMIN — PROPOFOL 200 MG: 10 INJECTION, EMULSION INTRAVENOUS at 09:15

## 2022-05-17 RX ADMIN — HYDROMORPHONE HYDROCHLORIDE 0.25 MG: 1 INJECTION, SOLUTION INTRAMUSCULAR; INTRAVENOUS; SUBCUTANEOUS at 10:22

## 2022-05-17 ASSESSMENT — PAIN DESCRIPTION - LOCATION
LOCATION: ABDOMEN

## 2022-05-17 ASSESSMENT — PAIN SCALES - GENERAL
PAINLEVEL_OUTOF10: 7
PAINLEVEL_OUTOF10: 10
PAINLEVEL_OUTOF10: 5
PAINLEVEL_OUTOF10: 8
PAINLEVEL_OUTOF10: 7

## 2022-05-17 ASSESSMENT — PAIN DESCRIPTION - PAIN TYPE: TYPE: SURGICAL PAIN

## 2022-05-17 ASSESSMENT — LIFESTYLE VARIABLES: SMOKING_STATUS: 0

## 2022-05-17 ASSESSMENT — PAIN - FUNCTIONAL ASSESSMENT: PAIN_FUNCTIONAL_ASSESSMENT: 0-10

## 2022-05-17 ASSESSMENT — PAIN DESCRIPTION - DESCRIPTORS
DESCRIPTORS: DISCOMFORT
DESCRIPTORS: DISCOMFORT
DESCRIPTORS: ACHING
DESCRIPTORS: DISCOMFORT
DESCRIPTORS: DISCOMFORT

## 2022-05-17 NOTE — H&P
Patient's office history and physical was reviewed. Patient examined. There has been no change in the patient's history and physical.      Physician Signature: Electronically signed by Dr. General Robles    Patient's Name/Date of Birth: Rossana Mckeon / 1970    Date: 4/28/2022    PCP: Everardo Seymour DO    Referring Physician:   No ref. provider found  N/A    No chief complaint on file. HPI:    The patient said she no longer has any rectal bleeding. The patient has upper abdominal pain. She said it is worse with eating. She has associated nausea. She has vomited twice. This has been going on for 5 months now. She gets diarrhea as well. She has no pain between attacks. This is worse with greasy foods. She said her children all have gallbladder issues. Patient's medications, allergies, past medical, surgical, social and family histories were reviewed and updated as appropriate. Review of Systems  Constitutional: negative  Respiratory: negative  Cardiovascular: negative  Gastrointestinal: as in hpi  Genitourinary:negative  Integument/breast: negative    Physical Exam:  /64   Pulse 76   Temp 97.4 °F (36.3 °C) (Temporal)   Resp 16   Ht 4' 11\" (1.499 m)   Wt 176 lb (79.8 kg)   LMP 03/27/2008   SpO2 97%   BMI 35.55 kg/m²   General appearance: alert, cooperative and in no acute distress. Lungs: normal work of breathing  Heart: regular rate  Abdomen: epigastric and LUQ tenderness, moderate, soft, nondistended  Musculoskeletal: symmetrical without edema. Skin: normal     Data Reviewed:   RUQ US:  Impression   1.  Echogenic liver parenchyma which may reflect changes related to an   underlying infiltrative pathology the most common of which is hepatic   steatosis.  Benign 3.4 cm hepatic cyst.       2.  Cholelithiasis without evidence of cholecystitis.  Normal appearance of   the extrahepatic common bile duct.          Impression/Plan:  45y.o. year old female with:    1) Internal/external hemorrhoids - s/p rubber band ligation    2) Gallstones, biliary colic    Robotic assisted Laparoscopic possible open cholecystectomy possible intraoperative cholangiogram  Risks of cholecystectomy were discussed with the patient. These include but are not limited to common bile duct injury, bile leak, liver injury, damage to blood vessels and bleeding, injury to bowel with port placement, as well as infection in the abdomen or of the incisions. I explained all other risks, benefits, alternatives, and potential complications associated with the procedure to be performed and transfusions when applicable with the patient/responsible person prior to the procedure. All of the patient's questions were answered. The patient understands and agrees to surgery. On this date 4/28/2022 I have spent 30 minutes reviewing previous notes, test results and face to face with the patient discussing the diagnosis and importance of compliance with the treatment plan as well as documenting on the day of the visit. Greater than 50% of the time was spent face-to-face with the patient. This time is exclusive of procedures performed. I answered all of  the patient's questions to his/her satisfaction. Electronically by Berna Lyn MD, General Surgery  on 5/17/2022 at 7:08 AM      Send copy of H&P to PCP, Gerson Ruiz DO and referring physician, No ref.  provider found      4/28/2022

## 2022-05-17 NOTE — OP NOTE
Operative Note    Christina Ahumada     DATE OF PROCEDURE: 5/17/2022    SURGEON: Surgeon(s):  Karely Salter MD    PREOPERATIVE DIAGNOSIS: Biliary colic, gallstones    POSTOPERATIVE DIAGNOSIS: Same    OPERATION: Robotic Assisted Laparoscopic cholecystectomy. ANESTHESIA: General endotracheal.     ESTIMATED BLOOD LOSS: minimal    SPECIMEN: Gallbladder    COMPLICATIONS: None. BRIEF HISTORY: Christina Ahumada is a 45 y.o.  female who presented with RUQ pain. I discussed the procedure with the patient, including the procedure, benefits, risks, and alternatives. She agreed. ICG was given in preoperative holding prior to the procedure. PROCEDURE: The patient was taken to the operative suite and was placed on the table in the supine position. General endotracheal anesthesia was administered. An orogastric tube was placed to decompress the stomach. The abdomen was then prepped with Chloraprep and draped in a sterile fashion. An 8 mm incision was made at Salmeron's point with an 11-blade scalpel, and then while tenting the abdominal wall upward, a Veress needle was easily inserted through the abdominal cavity. After confirmation of its placement using the saline drop test, a total of approximately 3 L of carbon dioxide was insufflated, creating a pneumoperitoneum. The Veress needle was removed, and an 8 mm trocar was inserted while tenting the abdominal wall upward. A prepared laparoscope was inserted, and the right upper quadrant was visualized. An 8-mm port was placed in the supraumbilical position, another two 8 mm ports were placed in the RLQ. There was no injury from port placement. The robot was then placed over the patient and the ports docked. I then broke scrub and began the case at the surgeon console. The robotic scope was introduced into the abdomen and two Cadiere graspers were placed in arms 1 and 2 under direct vision. A hook was then placed in arm 4 also under direct vision.      The gallbladder was grasped at the fundus and was inverted toward the left shoulder. The Cadiere was then used to grasp the infundibulum. Any adhesions between the duodenum and the surface of the gallbladder were dissected with the hook to expose the structures in the triangle of Calot and mobilize the duodenum away to protect it from injury. The peritoneum over the triangle of Calot was opened using hook cautery until all fat and fibrous tissue was cleared from the hepatocystic triangle. The cystic duct and artery were dissected free and identified. The lower 1/3 of the gallbladder was also  from the liver to expose the cystic plate and only two structures were seen entering the gallbladder. The critical view of safety was obtained at this point with the cystic duct and artery identified as well as the inferior edge of the liver. The anatomy was also verified using firefly. The cystic duct was then clipped proximally with three clips and singly clipped distally, and divided sharply with scissors between the clips. The cystic artery was identified immediately posteromedial to the duct and was singly clipped proximally, and divided with the hook cautery. The hook cautery was then used to excise the gallbladder from the liver surface all the way up to the fundus until it was completely removed from the liver bed. No bleeding was seen. The gallbladder was placed in an EndoCatch bag passed through the LUQ port, it was delivered through the incision and off the field. The right upper quadrant was visualized. There was good hemostasis of the liver bed. The LUQ port site was closed with a 2-0 Vicryl stitch using the [employer] device. All ports were then removed under direct visualization with no bleeding noted, and the pneumoperitoneum was allowed to escape. All skin incisions were irrigated with saline and dried, and any bleeding points were cauterized.  All skin incisions were closed with 4-0 Monocryl subcuticular sutures. Skin glue was placed over all incisions. The patient tolerated the procedure well. Sponge, needle, and instrument counts were correct. The orogastric tube was replaced, and the patient was extubated and sent to the postanesthesia care unit in stable condition. Kortney Patino MD, MD, FACS 5/17/2022 9:57 AM    Send copy of H&P to PCP, Keyur Stinson DO and referring physician, No ref.  provider found

## 2022-05-17 NOTE — ANESTHESIA PRE PROCEDURE
Current medications:    No current facility-administered medications for this visit. No current outpatient medications on file. Facility-Administered Medications Ordered in Other Visits   Medication Dose Route Frequency Provider Last Rate Last Admin    0.9 % sodium chloride infusion   IntraVENous PRN Kimberly Eagle MD        ceFAZolin (ANCEF) 2000 mg in sterile water 20 mL IV syringe  2,000 mg IntraVENous On Call to Tran Rowan MD        sodium chloride flush 0.9 % injection 5-40 mL  5-40 mL IntraVENous 2 times per day Kimberly Eagle MD        sodium chloride flush 0.9 % injection 5-40 mL  5-40 mL IntraVENous PRN Kimberly Eagle MD           Allergies:     Allergies   Allergen Reactions    Fish-Derived Products Anaphylaxis    Dye [Iodides] Itching       Problem List:    Patient Active Problem List   Diagnosis Code    Cervical stenosis of spinal canal M48.02    DDD (degenerative disc disease), cervical M50.30    History of TIA (transient ischemic attack) and stroke Z86.73    Spinal stenosis of lumbar region without neurogenic claudication M48.061    Fibromyalgia M79.7    Migraines without aura and without status migrainosus, not intractable G43.009    Primary stabbing headache G44.85    Chronic pain syndrome G89.4    Primary osteoarthritis of left hip M16.12    Cervical disc disorder M50.90    Cervical facet joint syndrome M47.812    Cervical radiculopathy M54.12    Cervical stenosis of spine M48.02    Lumbar spondylosis M47.816    Lumbar facet arthropathy M47.816    Lumbar disc disorder M51.9    Greater trochanteric bursitis of left hip M70.62    Pain in left hip M25.552    Facet arthropathy, cervical M47.812    Primary osteoarthritis of left knee M17.12    Hepatic cyst K76.89    Lung nodule R91.1       Past Medical History:        Diagnosis Date    Asthma     uses inhalers twice a day    Cerebral artery occlusion with cerebral infarction [facility])     2015-slight right sided weakness, ambulates normal    Cervical spinal stenosis     Chronic back pain     Chronic kidney disease     COVID     Depression     Disorder of thyroid gland 2012    no medication    Gallstones     GERD (gastroesophageal reflux disease)     Headache(784.0)     Irritable bowel syndrome     Numbness and tingling in right hand 2020    CIERA on CPAP     Seizure disorder (HCC)     not on medications for this; last seizure 2018    Sleep apnea     wears CPAP    Vitamin D deficiency 2014       Past Surgical History:        Procedure Laterality Date    COLONOSCOPY  2016    COLONOSCOPY  07/10/2017    COLONOSCOPY N/A 2022    COLONOSCOPY DIAGNOSTIC WITH BANDING performed by Neda Moulton MD at [address] [facility] Left 08/10/2020    LEFT GREATER TROCHANTERIC BURSAE INJECTION X-RAY (CPT 77189) performed by Christian Hyman MD at [facility] N/A     epidural c6-c7    NERVE BLOCK Bilateral 2021    BILATERAL CERVICAL MEDIAL BRANCH FACET BLOCK C4 C5 C6 WITH SEDATION WITH LEFT TROCHANTERIC BURSA INJECTION(CPT 17174) performed by Christian Hyman MD at [id number] [facility] [address] N/A 2021    CERVICAL EPIDURAL INTERLAMINAR STEROID INJECTION C6-C7 WITH SEDATION performed by Christian Hyman MD at [id number] [facility] [address] Left 2022    CERVICAL EPIDURAL STEROID INJECTION LEFT PARAMEDIAN C6-C7 WITH SEDATION performed by Christian Hyman MD at [address] [facility] ENDOSCOPY  07/10/2017       Social History:    Social History     Tobacco Use    Smoking status: Former Smoker     Packs/day: 1.00     Years: 25.00     Pack years: 25.00     Types: Cigarettes     Quit date: 1988     Years since quittin.0    Smokeless tobacco: Never Used   Substance Use Topics    Alcohol use:  No Counseling given: Not Answered      Vital Signs (Current): There were no vitals filed for this visit. BP Readings from Last 3 Encounters:   05/17/22 111/64   05/16/22 120/80   05/12/22 128/70       NPO Status:                                                                                 BMI:   Wt Readings from Last 3 Encounters:   05/17/22 176 lb (79.8 kg)   05/16/22 173 lb (78.5 kg)   05/12/22 180 lb (81.6 kg)     There is no height or weight on file to calculate BMI.    CBC:   Lab Results   Component Value Date    WBC 6.9 03/10/2022    RBC 4.87 03/10/2022    HGB 13.8 03/10/2022    HCT 43.0 03/10/2022    MCV 88.3 03/10/2022    RDW 12.7 03/10/2022     03/10/2022       CMP:   Lab Results   Component Value Date     03/10/2022    K 4.0 03/10/2022    K 3.9 01/05/2020    CL 98 03/10/2022    CO2 26 03/10/2022    BUN 13 03/10/2022    CREATININE 0.8 03/10/2022    GFRAA >60 03/10/2022    LABGLOM >60 03/10/2022    GLUCOSE 69 03/10/2022    GLUCOSE 108 02/06/2012    PROT 7.9 03/10/2022    CALCIUM 10.3 03/10/2022    BILITOT 0.5 03/10/2022    ALKPHOS 91 03/10/2022    AST 22 03/10/2022    ALT 28 03/10/2022       POC Tests: No results for input(s): POCGLU, POCNA, POCK, POCCL, POCBUN, POCHEMO, POCHCT in the last 72 hours.     Coags:   Lab Results   Component Value Date    PROTIME 11.8 01/03/2020    PROTIME 11.2 02/06/2012    INR 1.0 01/03/2020    APTT 32.3 01/03/2020       HCG (If Applicable):   Lab Results   Component Value Date    PREGTESTUR negative 04/10/2017        ABGs: No results found for: PHART, PO2ART, PZC4UIW, IWT6PTK, BEART, N8REJJYN     Type & Screen (If Applicable):  No results found for: LABABO, LABRH    Drug/Infectious Status (If Applicable):  No results found for: HIV, HEPCAB    COVID-19 Screening (If Applicable):   Lab Results   Component Value Date    COVID19 Not Detected 01/27/2022    COVID19 DETECTED 01/22/2021           Anesthesia Evaluation  Patient summary reviewed and Nursing notes reviewed no history of anesthetic complications:   Airway: Mallampati: II  TM distance: >3 FB   Neck ROM: full  Mouth opening: > = 3 FB Dental: normal exam         Pulmonary: breath sounds clear to auscultation  (+) sleep apnea: on CPAP,  asthma:     (-) not a current smoker                          ROS comment: Former smoker   Cardiovascular:  Exercise tolerance: good (>4 METS),       (-) past MI and CAD    ECG reviewed  Rhythm: regular  Rate: normal  Echocardiogram reviewed         Beta Blocker:  Not on Beta Blocker         Neuro/Psych:   (+) seizures: well controlled, neuromuscular disease:, TIA, headaches:, psychiatric history:depression/anxiety    (-) CVA           GI/Hepatic/Renal:   (+) GERD:, liver disease (Hepatic cyst):, morbid obesity     (-) bowel prep       Endo/Other:    (+) : arthritis: OA., .                 Abdominal:   (+) obese,           Vascular:   + PVD, aortic or cerebral, . Other Findings:               Anesthesia Plan      general     ASA 3       Induction: intravenous. MIPS: Postoperative opioids intended and Prophylactic antiemetics administered. Anesthetic plan and risks discussed with patient and spouse. Plan discussed with CRNA. Xin Duron MD   5/17/2022    Chart reviewed, patient seen and interviewed. Agree with note above.   NOY Aceves - MIGUEL A

## 2022-05-17 NOTE — ANESTHESIA POSTPROCEDURE EVALUATION
Department of Anesthesiology  Postprocedure Note    Patient: Zack Ashkum  MRN: [id number]  YOB: 1970  Date of evaluation: 2022  Time:  2:57 PM     Procedure Summary     Date: 22 Room / Location: [address]    Anesthesia Start: 5831 Anesthesia Stop:     Procedure: [address] (N/A Abdomen) Diagnosis: (BILIARY COLIC)    Surgeons: Rafy Enciso MD Responsible Provider: Enio Myles MD    Anesthesia Type: general ASA Status: 3          Anesthesia Type: No value filed. Willis Phase I: Willis Score: 10    Willis Phase II: Willis Score: 10    Last vitals: Reviewed and per EMR flowsheets.        Anesthesia Post Evaluation    Patient location during evaluation: PACU  Patient participation: complete - patient participated  Level of consciousness: awake and alert  Airway patency: patent  Nausea & Vomiting: no vomiting and no nausea  Complications: no  Cardiovascular status: hemodynamically stable  Respiratory status: acceptable  Hydration status: stable

## 2022-06-08 ENCOUNTER — INITIAL CONSULT (OUTPATIENT)
Dept: GASTROENTEROLOGY | Age: 52
End: 2022-06-08
Payer: MEDICAID

## 2022-06-08 VITALS
HEIGHT: 59 IN | BODY MASS INDEX: 36.69 KG/M2 | SYSTOLIC BLOOD PRESSURE: 102 MMHG | DIASTOLIC BLOOD PRESSURE: 66 MMHG | WEIGHT: 182 LBS | TEMPERATURE: 97.5 F | RESPIRATION RATE: 18 BRPM | OXYGEN SATURATION: 97 % | HEART RATE: 66 BPM

## 2022-06-08 DIAGNOSIS — K58.9 IRRITABLE BOWEL SYNDROME, UNSPECIFIED TYPE: ICD-10-CM

## 2022-06-08 DIAGNOSIS — K76.0 HEPATIC STEATOSIS: Primary | ICD-10-CM

## 2022-06-08 DIAGNOSIS — K76.0 HEPATIC STEATOSIS: ICD-10-CM

## 2022-06-08 DIAGNOSIS — R79.89 ELEVATED LFTS: ICD-10-CM

## 2022-06-08 DIAGNOSIS — Z12.11 COLON CANCER SCREENING: ICD-10-CM

## 2022-06-08 DIAGNOSIS — K76.89 HEPATIC CYST: ICD-10-CM

## 2022-06-08 LAB
FERRITIN: 239 NG/ML
IRON SATURATION: 25 % (ref 15–50)
IRON: 62 MCG/DL (ref 37–145)
TOTAL IRON BINDING CAPACITY: 253 MCG/DL (ref 250–450)

## 2022-06-08 PROCEDURE — 99203 OFFICE O/P NEW LOW 30 MIN: CPT | Performed by: STUDENT IN AN ORGANIZED HEALTH CARE EDUCATION/TRAINING PROGRAM

## 2022-06-08 PROCEDURE — 3017F COLORECTAL CA SCREEN DOC REV: CPT | Performed by: STUDENT IN AN ORGANIZED HEALTH CARE EDUCATION/TRAINING PROGRAM

## 2022-06-08 PROCEDURE — G8427 DOCREV CUR MEDS BY ELIG CLIN: HCPCS | Performed by: STUDENT IN AN ORGANIZED HEALTH CARE EDUCATION/TRAINING PROGRAM

## 2022-06-08 PROCEDURE — G8417 CALC BMI ABV UP PARAM F/U: HCPCS | Performed by: STUDENT IN AN ORGANIZED HEALTH CARE EDUCATION/TRAINING PROGRAM

## 2022-06-08 PROCEDURE — 1036F TOBACCO NON-USER: CPT | Performed by: STUDENT IN AN ORGANIZED HEALTH CARE EDUCATION/TRAINING PROGRAM

## 2022-06-08 RX ORDER — POLYETHYLENE GLYCOL 3350 17 G/17G
17 POWDER, FOR SOLUTION ORAL DAILY
Qty: 1530 G | Refills: 5 | Status: SHIPPED | OUTPATIENT
Start: 2022-06-08 | End: 2022-07-08

## 2022-06-08 RX ORDER — OMEPRAZOLE 40 MG/1
40 CAPSULE, DELAYED RELEASE ORAL
Qty: 60 CAPSULE | Refills: 5 | Status: SHIPPED | OUTPATIENT
Start: 2022-06-08

## 2022-06-08 RX ORDER — SENNA PLUS 8.6 MG/1
1 TABLET ORAL 2 TIMES DAILY
Qty: 60 TABLET | Refills: 11 | Status: SHIPPED | OUTPATIENT
Start: 2022-06-08 | End: 2023-06-08

## 2022-06-08 NOTE — PROGRESS NOTES
[facility] ([facility])   Gastroenterology, Hepatology, &  Advanced Endoscopy    Consult       ASSESSMENT AND PLAN:    51y/F presents for evaluation of GERD, Hepatic Steatosis, and IBS. PLAN:    1. PPI BID 40mg and EGD    2. Senna/Miralax and KUB    3. Serologic liver panel and Elastography    F/u 3 months    Thank you for including us in the care of this patient. Please do not hesitate to contact us with any additional questions or concerns. Shira Grider MD  Gastroenterology/Hepatology  Advanced Endoscopy          HISTORY OF PRESENT ILLNESS:      Ms. Basilia Arguello is a 51y/F who presents to clinic today for evaluation of elevated LFTs. The patient has had a mildly elevated AST and recent US showed mild hepatic steatosis. She reports a significant drinking history in the past but has not had EtOH in 23 years. She has never had any signs/symptoms of hepatic decompensation. She describes a longstanding history of GERD. She has been on PPI therapy for 6 years at 20mg daily. She had an EGD 6- years ago but does not recall the findings. She continues to have regular symptoms of GERD. She is s/p CCY and her symptoms remain. She also has a history of IBS w/ bloating and irregular stools. She has thin solid stools intermixed with overflow diarrhea. She had a recent colonoscopy with hemorrhoids banding. No polyps or lesions were identified.        Past Medical History:        Diagnosis Date    Arthritis     Asthma     uses inhalers twice a day    Cerebral artery occlusion with cerebral infarction ([facility] [address]     2015-slight right sided weakness, ambulates normal    Cervical spinal stenosis     Chronic back pain     Chronic kidney disease     Class 2 severe obesity due to excess calories with serious comorbidity and body mass index (BMI) of 37.0 to 37.9 in adult [facility])     COVID 2021    vomiting, diarrhea, body aches, fever, cough- coulc not confirm month    Disorder of thyroid gland 09/11/2012    no medication    GERD (gastroesophageal reflux disease)     Headache(784.0)     Irritable bowel syndrome     Numbness and tingling in right hand 03/06/2020    CIERA on CPAP     uses machine    Seizure disorder ([address]     not on medications for this; last seizure 2018; etiology? Vitamin D deficiency 12/03/2014     Past Surgical History:        Procedure Laterality Date    CHOLECYSTECTOMY, LAPAROSCOPIC N/A 5/17/2022    LAPAROSCOPIC ROBOTIC XI ASSISTED CHOLECYSTECTOMY performed by Barrett Schmidt MD at [address] [facility]  03/09/2016    COLONOSCOPY  07/10/2017    COLONOSCOPY N/A 4/1/2022    COLONOSCOPY DIAGNOSTIC WITH BANDING performed by Barrett Schmidt MD at [address] [facility] [address] Left 08/10/2020    LEFT GREATER TROCHANTERIC BURSAE INJECTION X-RAY (CPT 47432) performed by Joshua Ramirez MD at [facility]. 47     epidural c6-c7    NERVE BLOCK Bilateral 8/5/2021    BILATERAL CERVICAL MEDIAL BRANCH FACET BLOCK C4 C5 C6 WITH SEDATION WITH LEFT TROCHANTERIC BURSA INJECTION(CPT 87266) performed by Joshua Ramirez MD at [address] N/A 4/19/2021    CERVICAL EPIDURAL INTERLAMINAR STEROID INJECTION C6-C7 WITH SEDATION performed by Joshua Ramirez MD at [address] Left 1/31/2022    CERVICAL EPIDURAL STEROID INJECTION LEFT PARAMEDIAN C6-C7 WITH SEDATION performed by Joshua Ramirez MD at [facility]  07/10/2017     Social History:    TOBACCO:   reports that she quit smoking about 34 years ago. Her smoking use included cigarettes. She has a 25.00 pack-year smoking history. She has never used smokeless tobacco.  ETOH:   reports no history of alcohol use. DRUGS:   reports no history of drug use.   Family History:       Problem Relation Age of Onset    Heart Attack Mother     Migraines Mother     Kidney Disease Father         failure    Migraines Sister     Asthma Child     Asthma Child bone disease    Anemia Child     Diabetes Other        No current outpatient medications on file. Allergies:  Fish-derived products and Dye [iodides]    ROS:  General: Patient denies n/v/f/c or weight loss. HEENT: Patient denies persistent postnasal drip, scleral icterus, drooling, persistent bleeding from nose/mouth. Resp: Patient denies SOB, wheezing, productive cough. Cards: Patient denies CP, palpitations, significant edema  GI: As above. Derm: Patient denies jaundice/rashes. Musc: Patient denies diffuse/irregular joint swelling or myalgias. PHYSICAL EXAM:  /66 (Site: Left Upper Arm, Position: Sitting, Cuff Size: Medium Adult)   Pulse 66   Temp 97.5 °F (36.4 °C) (Infrared)   Resp 18   Ht 4' 11\" (1.499 m)   Wt 182 lb (82.6 kg)   LMP 03/27/2008   SpO2 97%   BMI 36.76 kg/m²   Physical Exam:  General: Overall well-appearing, NAD  HEENT: PERRLA, EOMI, Anicteric sclera, MMM, no rhinorrhea  Cards: RRR, no LE edema  Resp: Breathing comfortably on room air, good air movement, no use of accessory muscles, no audible wheezing  Abdomen: soft, NT, ND. Extremities: Moves all extremities, no effusions or bruising.   Skin: No rashes or jaundice  Neuro: A&O x 3, CN grossly intact, non-focal exam

## 2022-06-09 LAB
ANTI-MITOCHONDRIAL AB, IFA: NEGATIVE
HBV SURFACE AB TITR SER: NORMAL {TITER}
HEPATITIS B CORE TOTAL ANTIBODY: NONREACTIVE
HEPATITIS B SURFACE ANTIGEN INTERPRETATION: NORMAL
HEPATITIS C ANTIBODY INTERPRETATION: NORMAL
SMOOTH MUSCLE ANTIBODY: NEGATIVE

## 2022-06-10 LAB
CERULOPLASMIN: 26 MG/DL (ref 16–45)
HAV AB SERPL IA-ACNC: POSITIVE

## 2022-06-15 ENCOUNTER — OFFICE VISIT (OUTPATIENT)
Dept: PAIN MANAGEMENT | Age: 52
End: 2022-06-15
Payer: MEDICAID

## 2022-06-15 VITALS
TEMPERATURE: 96.9 F | DIASTOLIC BLOOD PRESSURE: 68 MMHG | HEART RATE: 68 BPM | HEIGHT: 59 IN | RESPIRATION RATE: 16 BRPM | BODY MASS INDEX: 36.89 KG/M2 | SYSTOLIC BLOOD PRESSURE: 118 MMHG | OXYGEN SATURATION: 98 % | WEIGHT: 183 LBS

## 2022-06-15 DIAGNOSIS — M47.812 CERVICAL FACET JOINT SYNDROME: ICD-10-CM

## 2022-06-15 DIAGNOSIS — M54.12 CERVICAL RADICULOPATHY: ICD-10-CM

## 2022-06-15 DIAGNOSIS — M70.62 GREATER TROCHANTERIC BURSITIS OF LEFT HIP: Primary | ICD-10-CM

## 2022-06-15 DIAGNOSIS — M79.7 FIBROMYALGIA: ICD-10-CM

## 2022-06-15 DIAGNOSIS — G89.4 CHRONIC PAIN SYNDROME: ICD-10-CM

## 2022-06-15 DIAGNOSIS — M54.16 LUMBAR RADICULOPATHY: ICD-10-CM

## 2022-06-15 DIAGNOSIS — M47.812 FACET ARTHROPATHY, CERVICAL: ICD-10-CM

## 2022-06-15 DIAGNOSIS — M47.816 LUMBAR FACET ARTHROPATHY: ICD-10-CM

## 2022-06-15 PROCEDURE — G8417 CALC BMI ABV UP PARAM F/U: HCPCS | Performed by: NURSE PRACTITIONER

## 2022-06-15 PROCEDURE — 99213 OFFICE O/P EST LOW 20 MIN: CPT | Performed by: NURSE PRACTITIONER

## 2022-06-15 PROCEDURE — 3017F COLORECTAL CA SCREEN DOC REV: CPT | Performed by: NURSE PRACTITIONER

## 2022-06-15 PROCEDURE — 99213 OFFICE O/P EST LOW 20 MIN: CPT

## 2022-06-15 PROCEDURE — G8428 CUR MEDS NOT DOCUMENT: HCPCS | Performed by: NURSE PRACTITIONER

## 2022-06-15 PROCEDURE — 1036F TOBACCO NON-USER: CPT | Performed by: NURSE PRACTITIONER

## 2022-06-15 RX ORDER — SODIUM CHLORIDE 0.9 % (FLUSH) 0.9 %
5-40 SYRINGE (ML) INJECTION PRN
Status: CANCELLED | OUTPATIENT
Start: 2022-06-15

## 2022-06-15 RX ORDER — SODIUM CHLORIDE 0.9 % (FLUSH) 0.9 %
5-40 SYRINGE (ML) INJECTION EVERY 12 HOURS SCHEDULED
Status: CANCELLED | OUTPATIENT
Start: 2022-06-15

## 2022-06-15 RX ORDER — OXYCODONE HYDROCHLORIDE 5 MG/1
5 TABLET ORAL EVERY 12 HOURS PRN
Qty: 45 TABLET | Refills: 0 | Status: SHIPPED
Start: 2022-06-15 | End: 2022-07-15 | Stop reason: SDUPTHER

## 2022-06-15 RX ORDER — SODIUM CHLORIDE 9 MG/ML
INJECTION, SOLUTION INTRAVENOUS PRN
Status: CANCELLED | OUTPATIENT
Start: 2022-06-15

## 2022-06-15 NOTE — PROGRESS NOTES
Do you currently have any of the following:    Fever: No  Headache:  No  Cough: No  Shortness of breath: No  Exposed to anyone with these symptoms: No                                                                                                                Gigi Pena presents to the [facility] on 6/15/2022. Marlin Dasilva is complaining of pain back and left hip, neck. The pain is constant. The pain is described as throbbing, tender, burning and numb. Pain is rated on her best day at a 3, on her worst day at a 10, and on average at a 4 on the VAS scale. She took her last dose of oxycodone . Marlin Dasilva does have issues with constipation. Any procedures since your last visit: No,     She is not on NSAIDS and  is not on anticoagulation medications to include none . Pacemaker or defibrillator: no.    Medication Contract and Consent for Opioid Use Documents Filed     Patient Documents     Type of Document Status Date Received Received By Description    Medication Contract Received 7/18/2019 11:59 AM Taty SINGLETON 7/18/19 medication contract    Medication Contract Received 10/9/2020  3:01 PM LORENZO, [address] [place] [address] pain pt agreement    Medication Contract Received 11/5/2021 11:29 AM Demetria MUNIZ Pain Mgmt Patient Agreement 11.5.2021    Medication Contract Received 12/13/2021  4:21 PM LEONARDA PINA pain management                   Temp 96.9 °F (36.1 °C) (Infrared)   Resp 16   Ht 4' 11\" (1.499 m)   Wt 183 lb (83 kg)   LMP 03/27/2008   BMI 36.96 kg/m²      Patient's last menstrual period was 03/27/2008.

## 2022-06-15 NOTE — PROGRESS NOTES
[address] [facility], [address] [place], [place] [id number]  [phone]    Follow up Note      Boubacar Noguera     Date of Visit:  6/15/2022    CC:  Patient presents for follow up neck and low back     HPI:  Pt here s/p Robotic assisted Laparoscopic cholecystectomy and here with abdominal soreness post operative. Pt here today with left trochanter bursitis and very tender to touch. Appropriate analgesia with current medications regimen: somewhat  Change in quality of symptoms:yes  Medication side effects: none  Recent diagnostic testing:none  Recent interventional procedures: s/p Lap Sarah on 5/17/22. She has been on anticoagulation medications to include ASA. The patient Estephanie Tiana not been on herbal supplements.  The patient is not diabetic.     Imaging:     Cervical spine MRI 04/2018  1. Multilevel degenerative disc disease extending from C3 through C7. Moderately severe spinal canal stenosis at C3-C4. Moderate spinal   canal stenosis at C4-C5. . Moderately severe spinal canal stenosis at   C5-C6. Abnormal signal intensity within the cervical spinal cord C3-C5   without evidence of enhancement. Findings are most likely reflective   of myelomalacia changes secondary to the underlying degree of cord   compression as described above. No active demyelination identified. 2. Slight loss of normally expected cervical lordosis C3-C6.      CT Lumbar spine 2017  1. Mild diffuse osteopenia, no compression fracture or   spondylolisthesis.       2. Mild multilevel degenerative disc disease and facet joint   arthropathy of the lower lumbar spine more pronounced at L4-L5.      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         Otherwise, no musculoskeletal pathology to explain patient's pain.      Previous treatments: Physical Therapy, Epidural Steroid Injection with  and medications. .  Cymbalta severe side effects, tylenol arthritis heartburn and gi distress,  Tizanidine helps but makes tired, tramadol caused hyperness, butrans patch dizziness and diarrhea                                        Potential Aberrant Drug-Related Behavior:    No     Urine Drug Screening:  Saliva screen 08/2020 consistent for Ultram  11/2021: consistent for oxycodone     OARRS report:  5/2022 consistent.     Opioid agreement: 12/13/21         Past Medical History:   Diagnosis Date    Asthma     uses inhalers twice a day    Cerebral artery occlusion with cerebral infarction ([facility] [address]     2015-slight right sided weakness, ambulates normal    Cervical spinal stenosis     Chronic back pain     Chronic kidney disease     COVID 2021    Depression     Disorder of thyroid gland 09/11/2012    no medication    Gallstones     GERD (gastroesophageal reflux disease)     Headache(784.0)     Irritable bowel syndrome     Numbness and tingling in right hand 03/06/2020    CIERA on CPAP     Seizure disorder (HCC)     not on medications for this; last seizure 2018    Sleep apnea     wears CPAP    Vitamin D deficiency 12/03/2014       Past Surgical History:   Procedure Laterality Date    CHOLECYSTECTOMY, LAPAROSCOPIC N/A 5/17/2022    LAPAROSCOPIC ROBOTIC XI ASSISTED CHOLECYSTECTOMY performed by Bernardo Pham MD at [place] [address] COLONOSCOPY  03/09/2016    COLONOSCOPY  07/10/2017    COLONOSCOPY N/A 4/1/2022    COLONOSCOPY DIAGNOSTIC WITH BANDING performed by Bernardo Pham MD at [address] [facility] [place] Left 08/10/2020    LEFT GREATER TROCHANTERIC BURSAE INJECTION X-RAY (CPT 38880) performed by Agustin Anderson MD at [facility] N/A     epidural c6-c7    NERVE BLOCK Bilateral 8/5/2021    BILATERAL CERVICAL MEDIAL BRANCH FACET BLOCK C4 C5 C6 WITH SEDATION WITH LEFT TROCHANTERIC BURSA INJECTION(CPT 43937) performed by Agustin Anderson MD at [address] [place] [address] N/A 4/19/2021    CERVICAL EPIDURAL INTERLAMINAR STEROID INJECTION C6-C7 WITH SEDATION performed by Rehan Corbin MD at [address] Left 1/31/2022    CERVICAL EPIDURAL STEROID INJECTION LEFT PARAMEDIAN C6-C7 WITH SEDATION performed by Rehan Corbin MD at [address] [facility] ENDOSCOPY  07/10/2017       Prior to Admission medications    Medication Sig Start Date End Date Taking? Authorizing Provider   omeprazole (PRILOSEC) 40 MG delayed release capsule Take 1 capsule by mouth 2 times daily (before meals) 6/8/22   Tima Goodson MD   senna (SENOKOT) 8.6 MG tablet Take 1 tablet by mouth 2 times daily 6/8/22 6/8/23  Tima Goodson MD   polyethylene glycol [facility]) 17 GM/SCOOP powder Take 17 g by mouth daily Take as much as needed to have a formed, relieving bowel movement daily. 6/8/22 7/8/22  Tima Goodson MD   baclofen (LIORESAL) 10 MG tablet Take 0.5 tablets by mouth 2 times daily 5/16/22 6/15/22  NOY De La Garza CNP   oxyCODONE (ROXICODONE) 5 MG immediate release tablet Take 1 tablet by mouth every 12 hours as needed for Pain for up to 30 days.  Intended supply: 30 days 5/16/22 6/15/22  NOY De La Garza CNP   fenofibrate (LIPOFEN) 150 MG CAPS capsule Take 1 capsule by mouth daily 4/7/22   Cipriano Delgado, DO   Elastic Bandages & Supports (KNEE BRACE) MISC Soft neutral position left knee brace  Size to fit  Dx:  Knee pain 1/21/22   Abdi Black, DO   albuterol sulfate  (90 Base) MCG/ACT inhaler INHALE 2 PUFFS INTO THE LUNGS EVERY 4 HOURS AS NEEDED FOR WHEEZING OR SHORTNESS OF BREATH  Patient taking differently: Inhale 2 puffs into the lungs every 4 hours as needed for Wheezing or Shortness of Breath  11/1/21   Cipriano Delgado, DO   vitamin D3 (CHOLECALCIFEROL) 25 MCG (1000 UT) TABS tablet Take 1 tablet by mouth daily 8/26/21   Cipriano Delgado, DO   Prenatal Vit-Fe Fumarate-FA (PRENATAL PLUS) 27-1 MG TABS 1 pill daily 8/26/21   Cipriano Delgado, DO EPINEPHrine (EPIPEN 2-ABUNDIO) 0.3 MG/0.3ML SOAJ injection Inject into muscle for allergic reaction 19   Abdi Marshall, DO       Allergies   Allergen Reactions    Fish-Derived Products Anaphylaxis    Dye [Iodides] Itching       Social History     Socioeconomic History    Marital status:      Spouse name: Not on file    Number of children: Not on file    Years of education: Not on file    Highest education level: Not on file   Occupational History    Not on file   Tobacco Use    Smoking status: Former Smoker     Packs/day: 1.00     Years: 25.00     Pack years: 25.00     Types: Cigarettes     Quit date: 1988     Years since quittin.1    Smokeless tobacco: Never Used   Vaping Use    Vaping Use: Never used   Substance and Sexual Activity    Alcohol use: No    Drug use: No    Sexual activity: Not on file   Other Topics Concern    Not on file   Social History Narrative    Not on file     Social Determinants of Health     Financial Resource Strain:     Difficulty of Paying Living Expenses: Not on file   Food Insecurity:     Worried About Running Out of Food in the Last Year: Not on file    Vlad of Food in the Last Year: Not on file   Transportation Needs:     Lack of Transportation (Medical): Not on file    Lack of Transportation (Non-Medical):  Not on file   Physical Activity:     Days of Exercise per Week: Not on file    Minutes of Exercise per Session: Not on file   Stress:     Feeling of Stress : Not on file   Social Connections:     Frequency of Communication with Friends and Family: Not on file    Frequency of Social Gatherings with Friends and Family: Not on file    Attends [employer] Services: Not on file    Active Member of Clubs or Organizations: Not on file    Attends Club or Organization Meetings: Not on file    Marital Status: Not on file   Intimate Partner Violence:     Fear of Current or Ex-Partner: Not on file    Emotionally Abused: Not on file   Deanna Hurtado Physically Abused: Not on file    Sexually Abused: Not on file   Housing Stability:     Unable to Pay for Housing in the Last Year: Not on file    Number of Places Lived in the Last Year: Not on file    Unstable Housing in the Last Year: Not on file       Family History   Problem Relation Age of Onset    Heart Attack Mother     Migraines Mother     Kidney Disease Father         failure    Migraines Sister     Asthma Child     Asthma Child         bone disease    Anemia Child     Diabetes Other        REVIEW OF SYSTEMS:     Jeremiah German denies fever/chills, chest pain, shortness of breath, new bowel or bladder complaints or suicidal ideations. All other review of systems wasnegative. Review of Systems    PHYSICAL EXAMINATION:      [facility] 03/27/2008     General:       General appearance:   elderly, pleasant and well-hydrated. , in moderate discomfort and A & O x3  Build:Overweight     HEENT:     Head:normocephalic and atraumatic  Sclera: icterus absent,     Lungs:     Breathing:Breathing Pattern: regular, no distress     Abdomen:     Shape:non-distended and normal  Tenderness:none     Cervical spine:     Inspection:normal  Palpation:tenderness paravertebral muscles, facet loading, left, right, positive and tenderness. Range of motion:abnormal moderately flexion, extension rotation bilateral and is  painful.     Lumbar spine:     Spine inspection:normal   CVA tenderness:No   Palpation:tenderness paravertebral muscles, facet loading, left, right, positive and tenderness.   Range of motion:abnormal moderately Lateral bending, flexion, extension rotation bilateral and is  painful.     Musculoskeletal:     Trigger points in Paraveteral:absent   SI joint tenderness:negative right, negative left  SLR:negative right, positive left, sitting   Left trochanter tenderness consistent with bursitis     Extremities:     Tremors:None bilaterally upper and lower  Range of motion:Generally normal shoulders, pain with internal rotation of hips positive Left  Intact:Yes  Edema: slight to recent abdominal incisions      Neurological:     Sensory:diminshed to light touch lateral aspect of Left arm. Motor:   Right Grip4+/5              Left Grip4+/5               Right Bicep4+/5           Left Bicep4+/5              Right Triceps4+/5       Left Triceps4+/5          Right Deltoid4+/5     Left Deltoid4+/5                  Right Quadriceps4+/5          Left Quadriceps4+/5           Right Gastrocnemius4+/5    Left Gastrocnemius4+/5  Right Ant Tibialis4+/5  Left Ant Tibialis4+/5    Gait:antalgic     Dermatology:     Skin:no unusual rashes and no skin lesions, incisions to LLQ healed      Impression:    Cervical spine MRI 2019 Multilevel degenerative disc disease  with moderate to severe stenosis at C3-4/C4-5 and C5-6  Lumbar spine CT 2017 Mild degenerative disc disease and facet arthropathy most pronounced at L4-5  Patient had seen neurosurgery and surgery C3-4/C4-5 and C5-6 ACDF was recommended.   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 Patient had underwent cervical epidural at C6-7 (04/2021) for  her radicular  symptoms and bilateral C3,4,5 medial branch  block x 1(08/2021) for her mechanical neck pain. Both  interventions had helped with her pain. Plan:   Follow up on neck pain radiating down left arm with n/t hands  and low back radiating down left leg to knee, acute left troch  bursitis today    1/31/22: Cervical epidural left paramedian C6-7 with>80% relief   Schedule for LEFT GREATER TROCHANTERIC BURSAE  INJECTION X-RAY with sedation per pt request  Continue baclofen 5mg po bid prn spasms  Refill Oxycodone 5mg po daily(cannot take Percocet with tylenol and liver disease)   Continue lidoderm patchs q 24 hours prn pain  Avoid NSAIDs(H/O GI ulcers)  OARRS report reviewed   Patient encouraged to stay active and to lose weight.   Treatment plan discussed with the patient and her including medications side effects.       We discussed with the patient that combining opioids, benzodiazepines, alcohol, illicit drugs or sleep aids increases the risk of respiratory depression including death. We discussed that these medications may cause drowsiness, sedation or dizziness and have counseled the patient not to drive or operate machinery. We have discussed that these medications will be prescribed only by one provider. We have discussed with the patient about age related risk factors and have thoroughly discussed the importance of taking these medications as prescribed. The patient verbalizes understanding.       ccreferring physic

## 2022-06-22 LAB
ALPHA-1 ANTITRYPSIN PHENOTYPE: NORMAL
ALPHA-1 ANTITRYPSIN: 139 MG/DL (ref 90–200)

## 2022-06-23 ENCOUNTER — HOSPITAL ENCOUNTER (OUTPATIENT)
Dept: ULTRASOUND IMAGING | Age: 52
Discharge: HOME | End: 2022-06-25
Payer: MEDICAID

## 2022-06-23 ENCOUNTER — TELEPHONE (OUTPATIENT)
Dept: PAIN MANAGEMENT | Age: 52
End: 2022-06-23

## 2022-06-23 DIAGNOSIS — K76.0 HEPATIC STEATOSIS: ICD-10-CM

## 2022-06-23 DIAGNOSIS — R79.89 ELEVATED LFTS: ICD-10-CM

## 2022-06-23 PROCEDURE — 76981 USE PARENCHYMA: CPT

## 2022-06-23 NOTE — TELEPHONE ENCOUNTER
Call to Johnathan Portillo that procedure was approved for 7/11/2022 and that the surgery center should call her a few days before for the pre op call and after 3:00 PM the business day before with the arrival time. Instructed to call office back if any questions. Ashlee verbalized understanding.     Luis M Ortiz RN  Pain Management

## 2022-07-05 ENCOUNTER — OFFICE VISIT (OUTPATIENT)
Dept: PULMONOLOGY | Age: 52
End: 2022-07-05
Payer: MEDICAID

## 2022-07-05 VITALS
RESPIRATION RATE: 18 BRPM | OXYGEN SATURATION: 97 % | HEART RATE: 77 BPM | TEMPERATURE: 97.5 F | BODY MASS INDEX: 36.49 KG/M2 | WEIGHT: 181 LBS | SYSTOLIC BLOOD PRESSURE: 105 MMHG | DIASTOLIC BLOOD PRESSURE: 66 MMHG | HEIGHT: 59 IN

## 2022-07-05 DIAGNOSIS — J45.40 MODERATE PERSISTENT ASTHMA, UNSPECIFIED WHETHER COMPLICATED: ICD-10-CM

## 2022-07-05 DIAGNOSIS — R91.1 LUNG NODULE: Primary | ICD-10-CM

## 2022-07-05 DIAGNOSIS — J45.909 ASTHMA ACTION PLAN DECLINED: ICD-10-CM

## 2022-07-05 LAB — FENO: 26 PPB

## 2022-07-05 PROCEDURE — 99205 OFFICE O/P NEW HI 60 MIN: CPT | Performed by: INTERNAL MEDICINE

## 2022-07-05 PROCEDURE — G8417 CALC BMI ABV UP PARAM F/U: HCPCS | Performed by: INTERNAL MEDICINE

## 2022-07-05 PROCEDURE — 95012 NITRIC OXIDE EXP GAS DETER: CPT | Performed by: INTERNAL MEDICINE

## 2022-07-05 PROCEDURE — 1036F TOBACCO NON-USER: CPT | Performed by: INTERNAL MEDICINE

## 2022-07-05 PROCEDURE — G8427 DOCREV CUR MEDS BY ELIG CLIN: HCPCS | Performed by: INTERNAL MEDICINE

## 2022-07-05 PROCEDURE — 94060 EVALUATION OF WHEEZING: CPT | Performed by: INTERNAL MEDICINE

## 2022-07-05 PROCEDURE — 3017F COLORECTAL CA SCREEN DOC REV: CPT | Performed by: INTERNAL MEDICINE

## 2022-07-05 RX ORDER — MONTELUKAST SODIUM 10 MG/1
10 TABLET ORAL NIGHTLY
Qty: 30 TABLET | Refills: 12 | Status: SHIPPED | OUTPATIENT
Start: 2022-07-05 | End: 2022-08-22

## 2022-07-05 RX ORDER — FLUTICASONE PROPIONATE AND SALMETEROL 500; 50 UG/1; UG/1
1 POWDER RESPIRATORY (INHALATION) EVERY 12 HOURS
Qty: 60 EACH | Refills: 12 | Status: SHIPPED
Start: 2022-07-05 | End: 2022-09-22 | Stop reason: SDUPTHER

## 2022-07-05 ASSESSMENT — PULMONARY FUNCTION TESTS: FENO: 26

## 2022-07-05 NOTE — PROGRESS NOTES
[facility]  Department of Internal Medicine   Division of Pulmonary, Critical Care and Sleep Medicine  Pulmonary [facility] [phone], Fax [phone]    Leesa Kent DO, [place], [place], [address] [place] [address], MD, [facility]  Noemy Allen DO, [facility]      Dear Mark Maravilla DO    Visit was performed with her daughter present in the use of an     We had the pleasure of seeing Vannesa Lopes, in the [address] at [facility] regarding her Left lung GGO and asthma. HISTORY OF PRESENT ILLNESS:    Vannesa Lopes is a 45 y.o. female with a past medical history of arthritis, chronic back pain, chronic kidney disease, asthma since childhood, depression, recent gallstone surgery, reflux disease, history of COVID, vitamin D deficiency who presents for evaluation of 2 pulmonary problems. To note she is a ex-smoker of at least 20 pack years smoked up until 19 years ago and quit because difficulty breathing. Of note she has 2 dogs 19-20 chickens and a few ducks as well. Her travel history is extensive she has lived in [facility] for 12 years 9 years prior to that she was in [place] and states she was told she had lung problems, she is also traveled to [place] and [place] to visit family. 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  No asbestos exposure noted no silica dust exposure noted. Other toxic chemicals unknown. Her hobbies include playing with her grandchildren and getting eggs from the chicken coop. Family history is really not significant other mother had some breathing problems and  in her late [de-identified] from Alzheimer's disease. She did not know her father because he  and she was very young. She states she is short of breath most of the time and only takes albuterol as needed.   She is never had formal breathing test please done or available in epic. Uses albuterol 4-6 times a day. She also has sleep apnea but does not wear machine previous testing site is unknown. CT scan of her chest 1 in follow-up from 2019 showed a left lingular segment ground glass opacity that was reported to change in size. She has no constitutional symptoms. No hemoptysis noted no leg swelling noted. ALLERGIES:    Allergies   Allergen Reactions    Fish-Derived Products Anaphylaxis    Dye [Iodides] Itching       PAST MEDICAL HISTORY:       Diagnosis Date    Asthma     uses inhalers twice a day    Cerebral artery occlusion with cerebral infarction ([facility] [address]     2015-slight right sided weakness, ambulates normal    Cervical spinal stenosis     Chronic back pain     Chronic kidney disease     COVID 2021    vomiting, diarrhea, body aches, fever, cough- coulc not confirm month    Depression     Disorder of thyroid gland 09/11/2012    no medication    Gallstones     GERD (gastroesophageal reflux disease)     Headache(784.0)     Irritable bowel syndrome     Numbness and tingling in right hand 03/06/2020    CIERA on CPAP     Seizure disorder (HCC)     not on medications for this; last seizure 2018; etiology?  Sleep apnea     wears CPAP    Vitamin D deficiency 12/03/2014        MEDICATIONS:   Current Outpatient Medications   Medication Sig Dispense Refill    oxyCODONE (ROXICODONE) 5 MG immediate release tablet Take 1 tablet by mouth every 12 hours as needed for Pain for up to 30 days. Intended supply: 30 days 45 tablet 0    omeprazole (PRILOSEC) 40 MG delayed release capsule Take 1 capsule by mouth 2 times daily (before meals) 60 capsule 5    senna (SENOKOT) 8.6 MG tablet Take 1 tablet by mouth 2 times daily 60 tablet 11    polyethylene glycol (GLYCOLAX) 17 GM/SCOOP powder Take 17 g by mouth daily Take as much as needed to have a formed, relieving bowel movement daily.  1530 g 5    fenofibrate (LIPOFEN) 150 MG CAPS capsule Take 1 capsule by mouth daily (Patient not taking: Reported on 2022) 30 capsule 2    Elastic Bandages & Supports (KNEE BRACE) MISC Soft neutral position left knee brace  Size to fit  Dx:  Knee pain 1 each 0    albuterol sulfate  (90 Base) MCG/ACT inhaler INHALE 2 PUFFS INTO THE LUNGS EVERY 4 HOURS AS NEEDED FOR WHEEZING OR SHORTNESS OF BREATH (Patient taking differently: Inhale 2 puffs into the lungs every 4 hours as needed for Wheezing or Shortness of Breath ) 6.7 g 1    vitamin D3 (CHOLECALCIFEROL) 25 MCG (1000 UT) TABS tablet Take 1 tablet by mouth daily 30 tablet 5    Prenatal Vit-Fe Fumarate-FA (PRENATAL PLUS) 27-1 MG TABS 1 pill daily 30 tablet 5    EPINEPHrine (EPIPEN 2-ABUNDIO) 0.3 MG/0.3ML SOAJ injection Inject into muscle for allergic reaction 0.3 mL 0     No current facility-administered medications for this visit. SOCIAL AND OCCUPATIONAL HEALTH:  The patient quit smoking 19 years ago smoked 1 pack/day for about 25 years. There is no history of TB or TB exposure. There is no asbestos or silica dust exposure. The patient reports no coal, foundry, quarry or [employer] exposure. There is no recent travel history noted. The patient denies a history of recreational or IV drug use. No hot tub exposure. The patient has dogs (2). She has 18 chickens and 4 dogs as well. Hobbies include playing with her grandchildren and feeding her animals. The patient denies excessive alcohol intake.      SOCIAL HISTORY: Age-appropriate past and current activities are:  Social History     Tobacco Use    Smoking status: Former Smoker     Packs/day: 1.00     Years: 25.00     Pack years: 25.00     Types: Cigarettes     Quit date: 1988     Years since quittin.2    Smokeless tobacco: Never Used   Vaping Use    Vaping Use: Never used   Substance Use Topics    Alcohol use: No    Drug use: No       SURGICAL HISTORY:   Past Surgical History:   Procedure Laterality Date    CHOLECYSTECTOMY, LAPAROSCOPIC N/A 2022    LAPAROSCOPIC ROBOTIC XI ASSISTED CHOLECYSTECTOMY performed by Aleida Tidwell MD at [facility] COLONOSCOPY  2016    COLONOSCOPY  07/10/2017    COLONOSCOPY N/A 2022    COLONOSCOPY DIAGNOSTIC WITH BANDING [facility] [address] [facility] [place] Left 08/10/2020    LEFT GREATER TROCHANTERIC BURSAE INJECTION X-RAY (CPT 78317) performed by Stephanie Braxton MD at [address]     epidural c6-c7    NERVE BLOCK Bilateral 2021    BILATERAL CERVICAL MEDIAL BRANCH FACET BLOCK C4 C5 C6 WITH SEDATION WITH LEFT TROCHANTERIC BURSA INJECTION(CPT 67475) performed by Stephanie Braxton MD at [address] N/A 2021    CERVICAL EPIDURAL INTERLAMINAR STEROID INJECTION C6-C7 WITH SEDATION performed by Stephanie Braxton MD at [address] Left 2022    CERVICAL EPIDURAL STEROID INJECTION LEFT PARAMEDIAN C6-C7 WITH SEDATION performed by Stephanie Braxton MD at [address] [facility] ENDOSCOPY  07/10/2017       FAMILY HISTORY: A review of medical events in the patient's family, including disease which may be hereditary or place the patient at risk were reviewed. Family History   Problem Relation Age of Onset    Heart Attack Mother    Isidra Se Migraines Mother     Kidney Disease Father         failure    Migraines Sister     Asthma Child     Asthma Child         bone disease    Anemia Child     Diabetes Other       Family Status   Relation Name Status    Mother     Isidra Se Father      Sister  (Not Specified)    Child  Alive    Child  Alive    Child  Alive    Other sibling Alive        REVIEW OF SYSTEMS: The patients health assessment form was reviewed. Constitutional: General health fair . The patient denies weight changes. The patient denies any recent fevers or weakness.    Head: Patient denies any history of trauma, convulsive disorder or syncope. Skin:  Patient denies history of changes in pigmentation, eruptions or pruritus. Eyes: Patient denies any history of color blindness, photophobia, diplopia, inflammation,. She  denies cataracts. Denies glaucoma. Ears: Patient denies history of deafness, tinnitus, pain, discharge or recurrent infections. Nose/Throat: Patient denies history of rhinitis, chronic nasal discharge, drainage, epistaxis, obstruction or nasal polyps. Patient denies history of soreness of mouth or tongue. Patient denies history of hoarseness, voice changes, sore throats or recurrent tonsillitis. Lymphatic:  Patient denies history of enlargement, inflammation, pain or suppuration. She denies history of lymphoma. Cardiovascular:  Patient admits to history of palpations. She denies orthopnea, rheumatic fever, scarlet fever, cold extremities. She admits to edema. Pulmonary:  Patient admits to wheezing, dyspnea, exertional dyspnea, nocturnal dyspnea. She denies cough. She denies hemoptysis or pleurisy. She complains of sleep disorder. She reports symptoms of sleep apnea. Gastrointestinal: Patient denies changes in appetite. She denies dysphagia, odynophagia or abdominal pain. She denies hematochezia, melena, bowel habit changes or hemorrhoids. Allergy/Immunology: The patient denies itching, hives, or angioedema. The patient reports a problem with seasonal/environmental allergies. Genitourinary:  The patient reports a history of stones or UTI. Vascular: No history of peripheral vascular disease, carotid occlusive disease or aneurysms. Musculoskeletal: The patient reports a history of arthritis & joint pains. She denies loss of strength. Breasts: She denies history of masses, lumps, pain, or nipple changes. The patient denies a history of breast cancer. Neurological: Patient denies vertigo. She denies twitching, convulsions, loss of consciousness.  No memory problems. Psychological: Patient admits to moodiness, depression or anxiety. She denies obsessions, delusions, illusions or hallucinations. Cancer: No history of cancer reported. Endocrine: No history of goiter. No history of thyroid disorders. She denies diabetes. Hematopoietic:  She denies history of bleeding disorders or easy bruising. She denies hypercoagulable disorder. Integumentory: No skin lesion, rashes, venous stasis or varicose veins. They denies any report of skin cancer. Rheumatic:  The patient denies polyarthritis or inflammatory joint disease. PHYSICAL EXAMINATION:   Vitals:    07/05/22 1611   BP: 105/66   Pulse: 77   Resp: 18   Temp: 97.5 °F (36.4 °C)   TempSrc: Infrared   SpO2: 97%   Weight: 181 lb (82.1 kg)   Height: 4' 11\" (1.499 m)     Constitutional: A  45 y.o. female who is alert, oriented, cooperative and in no apparent distress. Head was normocephalic and atraumatic. EENT: EOMI SHAUNA. MMM. No icterus. No conjunctival injections. External canals are patent and no discharge. Septum was midline, mucosa was without erythema, exudates or cobblestoning. No thrush. Neck: Supple without thyromegaly. No elevated JVP. Trachea was midline. No carotid bruits. Respiratory: Chest was symmetrical without dullness to percussion. Breath sounds bilaterally were clear to auscultation. No wheezes, rhonchi or rales. No intercostal retraction or use of accessory muscles. No egophony noted. Cardiovascular: Nonpalpable PMI. Regular without murmur, clicks, gallops or rubs. No left or right ventricular heave. Pulses:  Carotid, radial and femoral pulses were equal bilaterally. Abdomen: Obese, rounded and soft without organomegaly. No rebound, rigidity. Lymphatic: No lymphadenopathy. Musculoskeletal: Ambulates without assistance. Normal curvature of the spine. No gross muscle weakness. Muscle size, tone and strength are normal. No involuntary movements.     Extremities: exposure and I am not forgetting way for removing her from her pets but told her she should not try to obtain more. We went ahead and did allergy testing to see whether or not she is exposed any wheezing animals including hypersensitivity and histoplasmosis testing. I will see her back in 6 weeks for reassessment. Reassessment is also to  the new asthma therapy of Advair 500 mg twice a day with proper instruction and rinsing her mouth after each use. Her abuse of albuterol needs to be contained and because of her asthma and high nitric oxide testing she should be on maintenance inhaled corticosteroid therapy. All questions were answered. We hope this updates you on our evaluation and clinical thinking. Thank you for entrusting us to participate in Vannesa Lopes care. If you have any questions, please don't hesitate to call us at the [employer]. Sincerely,      Rody Fox D.O., MPH, Nelida Gold  Professor of Internal Medicine  Director of       During today's visit  Vannesa Lopes is a 45 y.o. female was seen, examined and discussed. This is confirmation that I have personally seen and examined the patient and that the key elements of the encounter were performed by me (> 85 % time). The medications & laboratory data was discussed and adjusted where necessary. The radiographic images were reviewed or with radiologist or consultant if felt dis-concordant with the exam or history. The above findings were corroborated, plans confirmed and changes made if needed. Family is updated at the bedside as available. Key issues of the case were discussed among consultants. Critical Care time is documented if appropriate.

## 2022-07-05 NOTE — PROGRESS NOTES
Patient to follow up with physician in 6 months with follow up CT Chest.  Patient was ordered blood work and be scheduled for full PFTs. Patient will be ordered Advair to be used daily.

## 2022-07-05 NOTE — PATIENT INSTRUCTIONS
Patient Education        Asma en adultos: Instrucciones de cuidado  Asthma in Adults: Care Instructions  Instrucciones de cuidado     Priyank un ataque de asma, las vías respiratorias se hinchan y se estrechan rufino reacción a ciertos factores (desencadenantes). Merkel dificulta larespiración. Es posible que pueda prevenir los ataques de asma si ignacio las cosas que desencadenan los síntomas del asma. Mantener el asma bajo control y tratar lossíntomas antes de que empeoren puede ayudarle a evitar los ataques graves. Si puede controlar el asma, es posible que pueda hacer todas tootie actividadesdiarias normales. También podría evitar ataques de asma y visitas al hospital.  Ana María Gian de seguimiento es mikel parte clave de lucas tratamiento y seguridad. Asegúrese de hacer y acudir a todas las citas, y llame a lucas médico si está teniendo problemas. También es mikel buena idea saber los [place] de susexámenes y mantener mikel lista de los medicamentos que frieda. ¿Cómo puede cuidarse en el hogar?  Siga lucas plan de acción para el asma para manejar los síntomas en el hogar. Un plan de acción para el asma le ayudará a prevenir y controlar las reacciones de las vías respiratorias, y le dirá qué hacer priyank un ataque de asma. Si no tiene un plan de acción para el asma, colabore con lucas médico para elaborar doroteo.  [place] tootie medicamentos para el asma exactamente rufino se los recetaron. Los medicamentos desempeñan un papel importante en el control del asma. Hable con lucas médico de inmediato si tiene [place] pregunta sobre qué karla y cómo tomarlo. ? Utilice tootie medicamentos de alivio rápido cuando tenga síntomas de un ataque de asma. Algunas personas tienen que usar medicamentos de alivio rápido antes de hacer ejercicio para prevenir los síntomas del asma. El albuterol es un medicamento de alivio rápido que se [place] con frecuencia. En algunos casos, un cierto tipo de [address] de control se [place] rufino medicamento de [place]efty[place]laurence rápido.  Pregúntele a lucas médico qué debe usar para el alivio rápido. ? [place] lucas medicamento de control. Si tiene síntomas con frecuencia, probablemente tenga que tomarlo todos los GRASSE. El medicamento de control suele incluir un corticoesteroide inhalado. El objetivo es prevenir los problemas antes de que ocurran. ? Si el médico le recetó pastillas de corticoesteroides para usar priyank un ataque, tómelas rufino se las recetaron. Pueden tardar horas en surtir efecto, jaimie pueden acortar el ataque y ayudarle a respirar mejor. ? Tenga consigo lucas medicamento de alivio rápido en todo momento.  Hable con lucas médico antes de karla otros medicamentos. Algunos medicamentos, rufino la aspirina, pueden causar ataques de asma en [employer].  Compruebe si tiene síntomas de asma para saber qué paso de lucas plan de acción debe seguir. Esté atento a cosas rufino falta de aire, opresión en el pecho, tos y respiración sibilante (con silbidos). Preste también atención a si tiene síntomas que lo despiertan por la noche o a si se cansa pronto cuando hace ejercicio.  Si tiene un medidor de flujo ky, úselo para revisar lo vicky que está respirando. [place] puede ayudarle a predecir cuándo va a ocurrir un ataque de asma. Entonces puede karla [employer] para prevenir el ataque o hacerlo menos grave.  Visite a lucas médico con regularidad. Estas visitas le ayudarán a aprender [employer] asma y qué puede hacer para controlarla. Lucas médico vigilará lucas tratamiento para asegurarse de [employer] lo estén ayudando.  Lleve un registro de tootie ataques de asma y de [place]. Después de un ataque, escriba el desencadenante, qué le ayudó a aliviarlo y cualquier inquietud que tenga sobre el plan de acción para el asma. Lleve lucas diario cuando olive a lucas médico. Así podrán revisar el plan de acción para el asma y decidir si está funcionando.  No fume ni permita que otros lo keira cerca de usted. Evite los lugares con humo. Fumar empeora el asma.  Si necesita ayuda para dejar de fumar, hable con lucas médico sobre programas y medicamentos para dejar de fumar. Estos pueden aumentar maral probabilidades de dejar el hábito para siempre.  [place] qué desencadena un ataque de asma en lucsa krystina y evite los desencadenantes cuando pueda. Los desencadenantes comunes incluyen resfriados, humo, contaminación atmosférica, polvo, polen, moho, mascotas, cucarachas, estrés y aire frío.  Evite los resfriados y la gripe. Consulte con lucas médico acerca de ponerse la vacuna antineumocócica. Si ya le sargent puesto mikel antes, pregúntele a lucas médico si necesita mikel segunda dosis. Vacúnese contra la gripe todos los otoños. Si tiene que estar cerca de personas con resfriados o gripe, lávese las twila con frecuencia. ¿Cuándo debe pedir ayuda? Llame al 911 en cualquier momento que considere que necesita atención de [place]. Por ejemplo, llame si:     Tiene graves dificultades para respirar. Llame a lucas médico ahora mismo o busque atención médica inmediata si:     Maral síntomas no mejoran después de seguir el plan de acción del asma.      Al toser elimina mucosidad (esputo) de color amarillento, marrón oscuro o sanguinolenta (con ana). Preste especial atención a los cambios en lucas steffi y asegúrese de comunicarsecon lucas médico si:     La tos y la respiración sibilante (con silbidos) empeoran.      Tiene que usar medicamentos de alivio rápido [place] de 2 días a la semana en un mes (a menos que sea solo para hacer ejercicio).      Necesita ayuda para averiguar qué desencadena los ataques de asma. ¿Dónde puede encontrar más información en inglés? Jp Shannon a [email or web address] e ingrese a lucas cuenta de MyChart. Chung Harris P597 en el Zoey Rigoberto \"Search Health Information\" para más información (en inglés) Felicity Aguilar en adultos: Instrucciones de cuidado. \"     Si no tiene mikel cuenta, fran francisco javier en el enlace \"Sign Up Now\".   Revisado: 9 marzo, 2022               Versión del contenido: 13.3  © 2006-2022 Healthwise, Incorporated. Las instrucciones de cuidado fueron adaptadas bajo licencia por [facility]). Si usted tiene [place] afección médica o sobre estas instrucciones, siempre pregunte a lucas profesional de steffi. Healthwise, Incorporated niega toda garantía o responsabilidad por lucas uso de esta información. Patient Education        Control del asma: Instrucciones de cuidado  Controlling Your Asthma: Care Instructions  Instrucciones de cuidado     El asma es mikel enfermedad crónica (a keith plazo) que afecta la respiración. Hace que se inflamen las vías respiratorias que conducen a los pulmones. Wendy un ataque de asma, las vías respiratorias se hinchan y se estrechan. Ojus dificulta la respiración. Podría tener respiración sibilante (emitir silbidos al respirar) o toser. Si tiene un ataque grave, es posible que necesite atención de [place]. [facility] hacer para tratar el asma. · Controlar el asma a keith plazo. · Tratar los ataques cuando ocurran. Usted y lucas médico pueden hacer un plan de acción para el asma. Netta plan le indicará qué medicamentos necesita karla todos los días para controlar los síntomas del asma y qué hacer si tiene un ataque de asma. Lucas plan de acción para el asma puede ayudarle a prevenir y tratar los ataques. Cuando mantiene el asma bajo control, puede prevenir ataques graves y daños duraderos en las vías respiratorias. Necesita tratar el asma aun cuando no tenga síntomas. Si vicky el asma es mikel enfermedad de por alejandrina, con tratamiento se puede ayudar a controlarla y ayudar a que usted permanezca saludable. La atención de seguimiento es mikel parte clave de lucas tratamiento y seguridad. Asegúrese de hacer y acudir a todas las citas, y llame a lucas médico si está teniendo problemas. También es mikel buena idea saber los resultados de tootie exámenes y mantener mikel lista de los medicamentos que frieda.   ¿Cómo puede cuidarse en el hogar?  Para controlar el asma a keith plazo  Medicamentos   Los medicamentos controladores reducen la hinchazón de los pulmones. También previenen los ataques de asma. [place] tootie medicamentos controladores exactamente rufino le fueron recetados. Hable con lucas médico si tiene problemas con lucas medicamento. · El corticosteroide para inhalar es un medicamento común y eficaz para controlar el asma. Usarlo correctamente puede prevenir o disminuir la mayoría de los efectos secundarios. · [place] medicamento de control todos los días, no solo cuando tenga síntomas. Ayuda a [employer] [facility] antes de que [place]. · Es posible que lucas médico le recete otro medicamento para moy junto con el corticosteroide. Por lo general, se trata de un broncodilatador de acción prolongada. No tome eulalia medicamento solo. Moy un broncodilatador de acción prolongada por sí solo puede aumentar el riesgo de tener un ataque de asma grave o mortal.  · No tome corticosteroides inhalados ni broncodilatadores de acción prolongada para detener un ataque de asma que ya ha empezado. No actúan con la suficiente rapidez para ayudarle. · Hable con lucas médico antes de moy otros medicamentos. Algunos medicamentos, rufino la aspirina, pueden causar ataques de asma en [employer]. Educación   · [place] qué desencadena un ataque de asma. Evite estos desencadenantes cuando pueda. Los factores desencadenantes comunes [place] resfriados, el humo, la contaminación del aire, el polvo, el polen, el moho, las [place], las [place], el estrés y el aire frío. · Compruebe si tiene síntomas de asma para saber qué paso de lucas plan de acción debe seguir. Esté atento a cosas rufino falta de aire, opresión en el pecho, tos y respiración sibilante (con silbidos). Preste también atención a si tiene síntomas que lo despiertan por la noche o a si se cansa pronto cuando hace ejercicio.   · Si tiene un medidor de flujo ky, úselo para revisar lo [employer] respira. Puede ayudarle a saber cuándo va a ocurrir un ataque de asma. Entonces puede krala medicamentos para prevenir el ataque o hacerlo menos grave. · No fume ni permita que otros fumen cerca de usted. Evite los lugares con humo. Fumar empeora el asma. Si necesita ayuda para dejar de fumar, hable con lucas médico sobre programas y medicamentos para dejar de fumar. Estos pueden aumentar maral probabilidades de dejar el hábito para siempre. · Evite los resfriados y la gripe. Hágase poner la vacuna antineumocócica. Si ya le sargent puesto mikel antes, consulte a lucas médico para saber si necesita mikel segunda dosis. Hágase poner la vacuna contra la gripe cada año en cuanto esté disponible. Si tiene que estar cerca de personas con resfriados o gripe, lávese las twila con frecuencia. Para tratar los ataques cuando se presentan  Utilice lucas plan de acción para el asma cuando tenga un ataque. Maral medicamentos de alivio rápido detendrán un ataque de asma. Relajan los músculos que tensan la heather que rodea a las vías respiratorias. Si el médico le recetó corticosteroides en forma de pastilla para usarlos priyank un ataque, tómelos según las indicaciones. Podrían tardar horas en [place], jaimie pueden reducir la duración del ataque y ayudarle a respirar mejor. · El albuterol es un inhalador eficaz de [place] rápido. · [place] lucas medicamento de alivio rápido exactamente rufino le fue recetado. · Lleve siempre maral medicamentos para el asma cuando viaje. · Es posible que necesite usar un medicamento de alivio rápido antes de hacer ejercicio. · Llame a lucas médico si jean carlos estar teniendo problemas con lucas medicamento. ¿Cuándo debe pedir ayuda? Llame al 911 en cualquier momento que considere que necesita atención de [place]. Por ejemplo, llame si:    · Tiene graves dificultades para respirar.    Llame a lucas médico ahora mismo o busque atención médica inmediata si:    · Maral síntomas no mejoran después de seguir el plan de acción para el asma.     · Al toser elimina mucosidad (esputo) de color amarillo o marrón oscuro o con ana. Preste especial atención a los cambios en lucas steffi y asegúrese de comunicarse con lucas médico si:    · La tos y la respiración con silbidos empeoran.     · Tiene que usar lucas medicamento de alivio rápido [place] de 2 días a la semana en un mes (a menos que sea solo para hacer ejercicio).     · Necesita ayuda para averiguar qué desencadena tootie ataques de asma. ¿Dónde puede encontrar más información en inglés? Zoey Console a [email or web address] e ingrese a lucas cuenta de MyChart. Zoey Geronimo N018 en el [facility] \"Search Health Information\" para más información (en inglés) sobre \"Control del asma: Instrucciones de cuidado. \"     Si no tiene mikel cuenta, fran francisco javier en el enlace \"Sign Up Now\". Revisado: 26 octubre, 2020               Versión del contenido: 12.9  © 2006-2021 Healthwise, Incorporated. Las instrucciones de cuidado fueron adaptadas bajo licencia por [facility]. Si usted tiene [place] afección médica o sobre estas instrucciones, siempre pregunte a lucas profesional de steffi. Healthwise, Incorporated niega toda garantía o responsabilidad por lucas uso de esta información. Patient Education        fluticasone and salmeterol  Denise: Advair Diskus, Advair HFA, AirDuo RespiClick  ¿Cuál es la información más importante que erick saber sobre fluticasone and salmeterol? Fluticasone and salmeterol no es mikel medicina de rescate. Esta medicina no funcionará suficientemente rápido para tratar un ataque de asma o broncoespasmo. Busque atención médica si usted tiene problemas respiratorios que [place], osi piensa que tootie medicinas no están funcionando rufino antes. ¿Qué es fluticasone and salmeterol inhalation? Fluticasone and salmeterol inhalation es [place] de esteroide y broncodilatador que se [place] para prevenir un ataque de asma.  Esta medicina también se [place] para evitar exacerbaciones o empeoramiento de la enfermedad pulmonar obstructiva crónica (EPOC; COPD, por tootie siglas en inglés) asociadacon la bronquitis crónica o enfisema. En las personas con EPOC, fluticasone and salmeterol es para el tratamiento a keith plazo. En las personas con asma, esta medicina es para el tratamiento a corto plazo [employer] síntomasestén vicky controlados con otras medicinas. Advair Diskus se [place] en los adultos y los niños que tienen por lo menos 4 años de Ronald. Advair HFA y Rubina Argue son para uso en los adultos y los niños que tienen por lo menos 12 años deedad. Fluticasone and salmeterol puede también usarse para fines no mencionados enesta guía del medicamento. ¿Qué debería discutir con el profesional del cuidado de la steffi antes de usar fluticasone and salmeterol? Usted no debe usar [employer] si es alérgico a fluticasone o salmeterol, o:   si usted tiene [place] severa a las proteínas de la [place]; o   si está teniendo un ataque de asma o síntomas severos de EPOC. Fluticasone puede debilitar lucas sistema inmunológico, haciendo más fácil que tenga mikel infección o empeorando mikel infección que ya tenga o haya tenido recientemente. Dígale a lucas médico acerca de cualquier enfermedad o infección que usted haya tenido en las [place]. Dígale a lucas médico si Khanh Luz Maria brown tenido:   glaucoma o cataratas;   enfermedad del corazón o la presión arterial esdras;   mikel convulsión;   diabetes;   mikel alergia a comida o droga;   un sistema inmunológico débil;   cualquier tipo de infección (bacteriana, fúngica, viral, o parasítica);   osteoporosis;   un trastorno de la glándula tiroidea; o   enfermedad del hígado o [place]. El uso prolongado de esteroides puede ocasionar la pérdida del hueso (osteoporosis), especialmente si usted fuma, si no hace ejercicio, si no recibe suficiente cantidad de vitamina D o calcio en lucas dieta, o si usted tiene historialfamiliar de osteoporosis.  Hable con lucas médico acerca de lucas riesgo. Dígale a lucas médico si usted [employer]. No se conoce si esta medicina le causará daño al bebé mia. Sin embargo, sufrir de asma sin tratamiento o sin control priyank el embarazo puede causar complicaciones rufino peso bajo al nacer, parto prematuro, o eclampsia (presión arterial peligrosamente esdras que puede provocar problemas médicos en la madre y el bebé). El beneficio deprevenir el asma puede ser mayor que cualquier riesgo para el bebé. Es posible que no sea seguro amamantar mientras está usando esta [place]. Pregúntele a lucas médico sobre cualquier riesgo. No dé esta medicina a un lilly sin el consejo médico.  ¿Cómo erick usar fluticasone and salmeterol? Siga todas las instrucciones en la etiqueta de lucas prescripción y lashawn todas las guías del medicamento o las hojas de instrucción. Use la medicina exactamente rufino indicado. Usar [employer] de esta medicina puede causar efectos secundarios que [place]. Fluticasone and salmeterol no es [place] de rescate para los ataques de asma o broncoespasmo. Use solamente mikel medicina para inhalación de acción rápida para un ataque. Busque atención médica si tootie problemas respiratorios empeoran rápidamente, osi piensa que tootie medicinas para el asma no están funcionando rufino antes. Advair Diskus es la formulación en polvo de fluticasone and salmeterol que viene con un aparato inhalador especial pre-cargado con empaques metálicos que contienen las dosis medidas de la medicina. Advair HFA y AirDuo Respiclick cada mikel viene en un envase que se [place] con un aparato de inhalación conaccionador. Use la medicina a la misma hora cada día. Use solamente el aparato inhalador que viene con lucas medicamento. Agite el inhalador Advair HFA por un mínimo de 5 segundos antes de cada aerosol. No permita que un lilly pequeño use esta medicina sin la [place] de [address] Lashawn y siga con cuidado las Instrucciones de Uso que vienen con lucas medicina.  Pregúntele a lucas médico o farmacéutico si no entiende estas instrucciones. Enjuague lucas boca con agua sin tragar después de cada uso de lucas inhalador. La dosis que necesita puede cambiar debido a [place], enfermedad, estrés, o un ataque reciente de asma. No cambie lucas dosis o el horario de dosificación sin el consejo de lucas médico.  Si usted también [place] un medicamento esteroide oral, no debe dejar de usarlo de forma repentina. [address] [facility] de lucas médico de cómo disminuir lucas dosis de formagradual.  Si usted [place] un medidor de flujo ky en casa, dígale a lucas médico si susnúmeros están más bajos de lo normal.  Lucas visión y lucas densidad mineral ósea pueden necesitar ser chequeadas confrecuencia. Guarde la medicina a temperatura ambiente, fuera de la humedad, del calor, y bri solar. Evitar el calor intenso, rufino taylor abierto o en un ena en un día caluroso. Siga con cuidado todas las instrucciones de almacenamiento y recolección que vienen con lucas medicina. [facility]  que lucas asma está bajo control, lucas médico puede querer que usted deje de [employer]. No deje de Baljinder Carrillo a menos que lucas médico se lo indique. ¿Qué sucede si me chantal mikel dosis? Sáltese la dosis pérdida y use lucas próxima dosis en lucas horario regular. No use dos dosis a la vez. [facility] en mikel sobredosis? Busque atención médica de emergencia o llame a [facility] [address] [facility] [phone]. Los síntomas de mikel sobredosis pueden incluir dolor de pecho, latido cardíacorápido, y sentirse tembloroso o que le falta aire al respirar. El uso prolongado de un esteroide inhalado puede resultar en glaucoma, cataratas, adelgazamiento de la piel, cambios en la grasa del cuerpo (especialmente en lucas cielo, raúl, espalda, y cintura), aumento del acné o vello facial, problemas de la menstruación, impotencia, opérdida del interés en las [employer]. ¿Qué erick evitar mientras uso fluticasone and salmeterol?   Evite estar cerca a personas enfermas o que tengan infecciones. Llame a lucas médico para tratamiento preventivo si usted ha estado expuesto a la varicela o sarampión. Estas condiciones pueden ser graves o hasta fatales en las personasque están usando medicina esteroide rufino fluticasone. No use un rivka broncodilatador inhalado a menos que lucas médico se lo indique. [place] incluye formoterol (Perforomist, Symbicort, Bevespi, Butler), arformoterol (Brovana), indacaterol (Arcapta), olodaterol (Striverdi, Stiolto Respimat), salmeterol (Serevent), o vilanterol (Anoro Ellipta, Breo Ellipta, TrelegyEllipta). ¿Cuáles son los efectos secundarios posibles de fluticasone and salmeterol? Busque atención médica de emergencia si usted tiene signos de [place] reacción alérgica: ronchas; dificultad para respirar; hinchazón de lucas cielo, labios, lengua, ogarganta. Llame a lucas médico de inmediato si usted tiene:   sibilancias, ahogo, u otros problemas respiratorios después de usar [place];   fiebre, escalofríos, tos con moco, sensación de que le falta aire al respirar;   dolor de pecho, latidos cardíacos rápidos o irregulares, dolor de olya severo, palpitaciones en lucas raúl o en tootie oídos;   temblores, nerviosismo;   visión borrosa, visión de túnel, dolor de los ojos, o maría halos aldewayne Santos;   signos de candidiasis (mikel infección por hongos) --llagas o parches blancos dentro de lucas boca o garganta, dificultad al tragar;  Melony Miranda nivel alto de azúcar en la ana --aumento de sed, aumento de las ganas de [place], boca seca, aliento con Ho a fruta;   bajo nivel de potasio --[employer] piernas, estreñimiento, latidos cardíacos irregulares, aleteo cardíaco en lucas pecho, aumento de sed o de querer orinar, entumecimiento o hormigueo, debilidad muscular o sentir que cojea;   signos de un trastorno hormonal --cansancio o debilidad que empeora, sensación de desvanecimiento, náusea, vómito. Fluticasone puede afectar el crecimiento en los niños.  Hable con lucas médico si piensa que lucas lilly no está creciendo a paso normal [employer] estamedicina. Efectos secundarios comunes pueden incluir:   dolor de [place], dolor muscular, dolor de los [place], dolor de [address];   náusea, vómito;   candidiasis, irritación de la garganta;   tos continua, voz ronca o profunda;   síntomas de resfrío rufino nariz congestionada, estornudo, dolor de garganta; o   infección en el oído (en un lilly) --fiebre, dolor o sensación de que lucas oído está lleno, problemas de audición, drenaje del oído, inquietud en un lilly. Esta lista no menciona todos los efectos secundarios y puede ser que ocurran otros. Llame a lucas médico para consejos médicos relacionados a efectos secundarios. Usted puede reportar efectos secundarios llamando al FDA [email or web address]-FDA-1088. ¿Qué otras drogas afectarán a fluticasone and salmeterol? A veces no es seguro usar ciertos medicamentos al [facility]. Algunas drogas pueden afectar los niveles sanguíneos de otros medicamentos que usted tome, lo que puede aumentar los efectos secundarios o hacer que losmedicamentos niya menos eficaces. Dígale a lucas médico todas tootie otras medicinas, especialmente:   mikel medicina antifúngica; o   medicina para el tratamiento del Pratima [place]ic. Esta lista no está completa y muchas otras drogas pueden afectar a fluticasone and salmeterol. [place] incluye las medicinas que se obtienen con o sin receta, vitaminas, yproductos herbarios. No todas las interacciones posibles se enumeran aquí. ¿Dónde puedo obtener más información? Lucas farmacéutico le puede daniel más información acerca de fluticasone andsalmeterol inhalation. Recuerde, Rosalba y todas las otras medicinas fuera del alcance de los niños, no comparta nunca tootie medicinas con otros, y use [employer] solo para la condición por la que fue recetada.    Se brown hecho todo lo posible para que la información que proviene de [place] sea precisa, actual, y completa, jaimie no se hace garantía de merly. La información sobre el medicamento incluida aquí puede tener nuevas recomendaciones. La información preparada por [employer] se ha creado para uso del profesional de la steffi y para el consumidor en los [place] de [facility] ([place] y por lo cual [employer] no certifica que el uso fuera de los [place] sea apropiado, a menos que se mencione específicamente lo cual. La información de [employer] sobre drogas no sanciona drogas, ni diagnóstica al paciente o recomienda terapia. La información de [employer] sobre drogas sirve rufino mikel tan de información diseñada para la ayuda del profesional de la steffi licenciado en el cuidado de tootie pacientes y/o para servir al consumidor que reciba eulalia servicio rufino un suplemento a, y no rufino sustituto de la competencia, experiencia, conocimiento y opinión del profesional de la steffi. La ausencia en éste de mikel advertencia para mikel droga o combinación de drogas no debe, de ninguna forma, interpretarse rufino que la droga o la combinación de drogas niya seguras, [place], o apropiadas para cualquier paciente. [employer] no se responsabiliza por ningún aspecto del cuidado médico que reciba con la ayuda de la información que proviene de Nina mercado. La información incluida aquí no se ha creado con la intención de cubrir todos los usos posibles, instrucciones, precauciones, advertencias, interacciones con otras drogas, reacciones alérgicas, o efectos secundarios. Si usted tiene [employer] de lasdrogas que está tomando, consulte con lucas médico, [place], o farmacéutico.  Copyright 7275-1110 [employer] Version: 17.02. Revision date:12/30/2019. Las instrucciones de cuidado fueron adaptadas bajo licencia por [facility] ([facility]). Si usted tiene [place] afección médica o sobre estas instrucciones, siempre pregunte a lucas profesional de steffi. [facility] niega toda garantía o responsabilidad por lucas uso de esta información. Patient Education         Asma: Chapito Stewart de los medicamentos de control (00:27)  Asthma: The Importance of Controller Medicines  Lucas profesional de la steffi recomienda que yvonne eulalia breve video de steffi enlínea. Sepa por qué los medicamentos de control son tan importantes. Purpose:  Stresses the use of controller medications to keep down inflammation and reduceuse of quick-relief inhalers. Goal:  The user will learn that controller medicines help control inflammation, soquick-relief medicine isn't needed as often. Cómo mirar el video    Escanee el código QR   o visite el [employer] web    [email or web address]   Revisado: 9 marzo, 2022               Versión del contenido: 13.3  © 2006-2022 Healthwise, Incorporated. Las instrucciones de cuidado fueron adaptadas bajo licencia por [facility] ([facility]). Si usted tiene [place] afección médica o sobre estas instrucciones, siempre pregunte a lucas profesional de steffi. Healthwise, Incorporated niega toda garantía o responsabilidad por lucas uso de esta información. Patient Education         Asma: Chapito Stewart de los medicamentos de control (00:27)  Asthma: The Importance of Controller Medicines  Lucas profesional de la steffi recomienda que yvonne eulalia breve video de steffi enlínea. Sepa por qué los medicamentos de control son tan importantes. Purpose:  Stresses the use of controller medications to keep down inflammation and reduceuse of quick-relief inhalers. Goal:  The user will learn that controller medicines help control inflammation, soquick-relief medicine isn't needed as often. Cómo mirar el video    Escanee el código QR   o visite el [facility] web    [email or web address]   Revisado: 9 marzo, 2022               Versión del contenido: 13.3  © 2006-2022 Healthwise, Incorporated. Las instrucciones de cuidado fueron adaptadas bajo licencia por [facility] ([facility]).  Si usted tiene [place] afección médica o sobre estas instrucciones, siempre pregunte a lucas profesional de steffi. [facility] niega toda garantía o responsabilidad por lucas uso de esta información.

## 2022-07-06 ENCOUNTER — TELEPHONE (OUTPATIENT)
Dept: GASTROENTEROLOGY | Age: 52
End: 2022-07-06

## 2022-07-07 DIAGNOSIS — J45.40 MODERATE PERSISTENT ASTHMA, UNSPECIFIED WHETHER COMPLICATED: Primary | ICD-10-CM

## 2022-07-15 ENCOUNTER — OFFICE VISIT (OUTPATIENT)
Dept: PAIN MANAGEMENT | Age: 52
End: 2022-07-15
Payer: MEDICAID

## 2022-07-15 VITALS
DIASTOLIC BLOOD PRESSURE: 60 MMHG | BODY MASS INDEX: 36.89 KG/M2 | OXYGEN SATURATION: 95 % | RESPIRATION RATE: 18 BRPM | SYSTOLIC BLOOD PRESSURE: 102 MMHG | HEIGHT: 59 IN | TEMPERATURE: 97.3 F | WEIGHT: 183 LBS | HEART RATE: 76 BPM

## 2022-07-15 DIAGNOSIS — M54.16 LUMBAR RADICULOPATHY: ICD-10-CM

## 2022-07-15 DIAGNOSIS — M47.812 FACET ARTHROPATHY, CERVICAL: ICD-10-CM

## 2022-07-15 DIAGNOSIS — M47.816 LUMBAR FACET ARTHROPATHY: ICD-10-CM

## 2022-07-15 DIAGNOSIS — M70.62 GREATER TROCHANTERIC BURSITIS OF LEFT HIP: Primary | ICD-10-CM

## 2022-07-15 DIAGNOSIS — M54.12 CERVICAL RADICULOPATHY: ICD-10-CM

## 2022-07-15 DIAGNOSIS — G89.4 CHRONIC PAIN SYNDROME: ICD-10-CM

## 2022-07-15 DIAGNOSIS — M47.812 CERVICAL FACET JOINT SYNDROME: ICD-10-CM

## 2022-07-15 PROCEDURE — G8417 CALC BMI ABV UP PARAM F/U: HCPCS | Performed by: NURSE PRACTITIONER

## 2022-07-15 PROCEDURE — 3017F COLORECTAL CA SCREEN DOC REV: CPT | Performed by: NURSE PRACTITIONER

## 2022-07-15 PROCEDURE — G8427 DOCREV CUR MEDS BY ELIG CLIN: HCPCS | Performed by: NURSE PRACTITIONER

## 2022-07-15 PROCEDURE — 99213 OFFICE O/P EST LOW 20 MIN: CPT | Performed by: NURSE PRACTITIONER

## 2022-07-15 PROCEDURE — 1036F TOBACCO NON-USER: CPT | Performed by: NURSE PRACTITIONER

## 2022-07-15 RX ORDER — BACLOFEN 10 MG/1
5 TABLET ORAL 2 TIMES DAILY
Qty: 30 TABLET | Refills: 2 | Status: SHIPPED | OUTPATIENT
Start: 2022-07-15 | End: 2022-08-14

## 2022-07-15 RX ORDER — BACLOFEN 10 MG/1
TABLET ORAL
COMMUNITY
Start: 2022-07-06 | End: 2022-07-15 | Stop reason: SDUPTHER

## 2022-07-15 RX ORDER — OXYCODONE HYDROCHLORIDE 5 MG/1
5 TABLET ORAL EVERY 12 HOURS PRN
Qty: 45 TABLET | Refills: 0 | Status: SHIPPED | OUTPATIENT
Start: 2022-07-15 | End: 2022-08-15 | Stop reason: SDUPTHER

## 2022-07-15 NOTE — PROGRESS NOTES
[facility], [address] [place] [id number]  [phone]    Follow up Note      Hever Daly     Date of Visit:  7/15/2022    CC:  Patient presents for follow up neck and low back     HPI:  Pt had to cancel her left troch bursa injection due to family emergency. Pt very tearful during visit today. Appropriate analgesia with current medications regimen: somewhat  Change in quality of symptoms:yes  Medication side effects: none  Recent diagnostic testing:none  Recent interventional procedures: none    She has been on anticoagulation medications to include ASA. The patient  has not been on herbal supplements. The patient is not diabetic. Imaging:     Cervical spine MRI 04/2018  1. Multilevel degenerative disc disease extending from C3 through C7. Moderately severe spinal canal stenosis at C3-C4. Moderate spinal   canal stenosis at C4-C5. . Moderately severe spinal canal stenosis at   C5-C6. Abnormal signal intensity within the cervical spinal cord C3-C5   without evidence of enhancement. Findings are most likely reflective   of myelomalacia changes secondary to the underlying degree of cord   compression as described above. No active demyelination identified. 2. Slight loss of normally expected cervical lordosis C3-C6. CT Lumbar spine 2017  1. Mild diffuse osteopenia, no compression fracture or   spondylolisthesis. 2. Mild multilevel degenerative disc disease and facet joint   arthropathy of the lower lumbar spine more pronounced at L4-L5. 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 Otherwise, no musculoskeletal pathology to explain patient's pain. Previous treatments: Physical Therapy, Epidural Steroid Injection with  and medications. .  Cymbalta severe side effects, tylenol arthritis heartburn and gi distress,  Tizanidine helps but makes tired, tramadol caused hyperness, butrans patch dizziness and diarrhea                                        Potential Aberrant Drug-Related Behavior:    No     Urine Drug Screening:  Saliva screen 08/2020 consistent for Ultram  11/2021: consistent for oxycodone     OARRS report:  7/2022 consistent. Opioid agreement: 12/13/21         Past Medical History:   Diagnosis Date    Asthma     uses inhalers twice a day    Cerebral artery occlusion with cerebral infarction ([facility]     2015-slight right sided weakness, ambulates normal    Cervical spinal stenosis     Chronic back pain     Chronic kidney disease     COVID 2021    vomiting, diarrhea, body aches, fever, cough- coulc not confirm month    Depression     Disorder of thyroid gland 09/11/2012    no medication    Gallstones     GERD (gastroesophageal reflux disease)     Headache(784.0)     Irritable bowel syndrome     Numbness and tingling in right hand 03/06/2020    CIERA on CPAP     Seizure disorder ([facility]     not on medications for this; last seizure 2018; etiology?     Sleep apnea     wears CPAP    Vitamin D deficiency 12/03/2014       Past Surgical History:   Procedure Laterality Date    CHOLECYSTECTOMY, LAPAROSCOPIC N/A 5/17/2022    LAPAROSCOPIC ROBOTIC XI ASSISTED CHOLECYSTECTOMY performed by Jevon Mcdaniels MD at [address] [facility] [address]  03/09/2016    COLONOSCOPY  07/10/2017    COLONOSCOPY N/A 4/1/2022    COLONOSCOPY DIAGNOSTIC WITH BANDING performed by Jevon Mcdaniels MD at [address] [facility] [address] Left 08/10/2020    LEFT GREATER TROCHANTERIC BURSAE INJECTION X-RAY (CPT 04994) performed by Julieth Oquendo MD at [facility].      epidural c6-c7    NERVE BLOCK Bilateral 8/5/2021    BILATERAL CERVICAL MEDIAL BRANCH FACET BLOCK C4 C5 C6 WITH SEDATION WITH LEFT TROCHANTERIC BURSA INJECTION(CPT 77156) performed by Julieth Oquendo MD at [address] [facility] [address] N/A 4/19/2021    CERVICAL EPIDURAL INTERLAMINAR STEROID INJECTION C6-C7 WITH SEDATION performed by Adam Mccracken MD at [address] [facility] Left 1/31/2022    CERVICAL EPIDURAL STEROID INJECTION LEFT PARAMEDIAN C6-C7 WITH SEDATION performed by Adam Mccracken MD at [facility]  07/10/2017       Prior to Admission medications    Medication Sig Start Date End Date Taking? Authorizing Provider   baclofen (LIORESAL) 10 MG tablet TAKE 1/2 TABLET BY MOUTH TWICE DAILY 7/6/22  Yes Historical Provider, MD   fluticasone-salmeterol (ADVAIR DISKUS) 500-50 MCG/ACT AEPB diskus inhaler Inhale 1 puff into the lungs every 12 hours 7/5/22  Yes Mary Fountain, DO   montelukast (SINGULAIR) 10 MG tablet Take 1 tablet by mouth nightly 7/5/22 8/4/22 Yes Danilo Fountain, DO   oxyCODONE (ROXICODONE) 5 MG immediate release tablet Take 1 tablet by mouth every 12 hours as needed for Pain for up to 30 days.  Intended supply: 30 days 6/15/22 7/15/22 Yes NOY De León CNP   omeprazole (PRILOSEC) 40 MG delayed release capsule Take 1 capsule by mouth 2 times daily (before meals) 6/8/22  Yes Vivian Ovalles MD   senna (SENOKOT) 8.6 MG tablet Take 1 tablet by mouth 2 times daily 6/8/22 6/8/23 Yes Vivian Ovalles MD   fenofibrate (LIPOFEN) 150 MG CAPS capsule Take 1 capsule by mouth daily 4/7/22  Yes Luciano Moise, DO   vitamin D3 (CHOLECALCIFEROL) 25 MCG (1000 UT) TABS tablet Take 1 tablet by mouth daily 8/26/21  Yes Abdi Carrion, DO   Prenatal Vit-Fe Fumarate-FA (PRENATAL PLUS) 27-1 MG TABS 1 pill daily 8/26/21  Yes Abdi Galvez, DO   Elastic Bandages & Supports (KNEE BRACE) MISC Soft neutral position left knee brace  Size to fit  Dx:  Knee pain 1/21/22   Abdi Galvez, DO   albuterol sulfate  (90 Base) MCG/ACT inhaler INHALE 2 PUFFS INTO THE LUNGS EVERY 4 HOURS AS NEEDED FOR WHEEZING OR SHORTNESS OF BREATH  Patient not taking: Reported on 7/15/2022 11/1/21   Abdi Grijalva,    EPINEPHrine (EPIPEN 2-ABUNDIO) 0.3 MG/0.3ML SOAJ injection Inject into muscle for allergic reaction 19   [facility], DO       Allergies   Allergen Reactions    Fish-Derived Products Anaphylaxis    Dye [Iodides] Itching       Social History     Socioeconomic History    Marital status:      Spouse name: Not on file    Number of children: Not on file    Years of education: Not on file    Highest education level: Not on file   Occupational History    Not on file   Tobacco Use    Smoking status: Former     Packs/day: 1.00     Years: 25.00     Pack years: 25.00     Types: Cigarettes     Quit date: 1988     Years since quittin.2    Smokeless tobacco: Never   Vaping Use    Vaping Use: Never used   Substance and Sexual Activity    Alcohol use: No    Drug use: No    Sexual activity: Not on file   Other Topics Concern    Not on file   Social History Narrative    Not on file     Social Determinants of Health     Financial Resource Strain: Not on file   Food Insecurity: Not on file   Transportation Needs: Not on file   Physical Activity: Not on file   Stress: Not on file   Social Connections: Not on file   Intimate Partner Violence: Not on file   Housing Stability: Not on file       Family History   Problem Relation Age of Onset    Heart Attack Mother     Migraines Mother     Kidney Disease Father         failure    Migraines Sister     Asthma Child     Asthma Child         bone disease    Anemia Child     Diabetes Other        REVIEW OF SYSTEMS:     Johnna Ripper denies fever/chills, chest pain, shortness of breath, new bowel or bladder complaints or suicidal ideations. All other review of systems wasnegative. Review of Systems    PHYSICAL EXAMINATION:      /60   Pulse 76   Temp 97.3 °F (36.3 °C)   Resp 18   Ht 4' 11\" (1.499 m)   Wt 183 lb (83 kg)   LMP 2008   SpO2 95%   BMI 36.96 kg/m²     General:       General appearance:   elderly, pleasant and well-hydrated.    , in moderate discomfort and A & O pronounced at L4-5  Patient had seen neurosurgery and surgery C3-4/C4-5 and C5-6 ACDF was recommended. Patient failed Gabapentin/zanaflex/flexeril/Norco/Lyrica  Patient had underwent cervical epidural at C6-7 (04/2021) for her radicular symptoms and bilateral C3,4,5 medial branch block x 1(08/2021) for her mechanical neck pain. Both interventions had helped with her pain. Plan:  Follow up on neck pain radiating down left arm with n/t hands  and low back radiating down left leg to knee, acute left troch bursitis    1/31/22: Cervical epidural left paramedian C6-7 with>80% relief  On hold LEFT GREATER TROCHANTERIC BURSAE INJECTION X-RAY with sedation per pt request  Refill baclofen 5mg po bid prn spasms  Refill Oxycodone 5mg po daily(cannot take Percocet with tylenol and liver disease)   Continue lidoderm patchs q 24 hours prn pain  Avoid NSAIDs(H/O GI ulcers)  OARRS report reviewed   Patient encouraged to stay active and to lose weight. Treatment plan discussed with the patient and her including medications side effects. We discussed with the patient that combining opioids, benzodiazepines, alcohol, illicit drugs or sleep aids increases the risk of respiratory depression including death. We discussed that these medications may cause drowsiness, sedation or dizziness and have counseled the patient not to drive or operate machinery. We have discussed that these medications will be prescribed only by one provider. We have discussed with the patient about age related risk factors and have thoroughly discussed the importance of taking these medications as prescribed. The patient verbalizes understanding.       ccreferring physic

## 2022-07-15 NOTE — PROGRESS NOTES
Do you currently have any of the following:    Fever: No  Headache:  No  Cough: No  Shortness of breath: No  Exposed to anyone with these symptoms: No                                                                                                                Cal Walker [facility] the [facility]15[facility]. Angelo Goodwin is complaining of pain neck. The pain is intermittent. The pain is described as sqeezing. Pain is rated on her best day at a 3, on her worst day at a 10, and on average at a 6 on the VAS scale. She took her last dose of  roxicodone  today at [address] [place] [address] does have issues with constipation. Any procedures since your last visit: No, with na % relief. She is on NSAIDS(Aspirin) and  is on anticoagulation medications to include ASA and is managed by family doctor. Pacemaker or defibrillator: No Physician managing device is na. Medication Contract and Consent for Opioid Use Documents Filed       Patient Documents       Type of Document Status Date Received Received By Description    Medication Contract Received 7/18/2019 11:59 AM Nia SINGLETON 7/18/19 medication contract    Medication Contract Received 10/9/2020  3:01 PM Liya VENTURA [address] [place] [address] pain pt agreement    Medication Contract Received 11/5/2021 11:29 AM Alessandro MUNIZ Pain Mgmt Patient Agreement 11.5.2021    Medication Contract Received 12/13/2021  4:21 PM LEONARDA PINA pain management                       /60   Pulse 76   Temp 97.3 °F (36.3 °C)   Resp 18   Ht 4' 11\" (1.499 m)   Wt 183 lb (83 kg)   LMP 03/27/2008   SpO2 95%   BMI 36.96 kg/m²      Patient's last menstrual period was 03/27/2008.

## 2022-07-20 ENCOUNTER — OFFICE VISIT (OUTPATIENT)
Dept: BARIATRICS/WEIGHT MGMT | Age: 52
End: 2022-07-20
Payer: MEDICAID

## 2022-07-20 VITALS
DIASTOLIC BLOOD PRESSURE: 66 MMHG | BODY MASS INDEX: 38.12 KG/M2 | HEART RATE: 73 BPM | WEIGHT: 181.6 LBS | SYSTOLIC BLOOD PRESSURE: 113 MMHG | HEIGHT: 58 IN | TEMPERATURE: 97.2 F

## 2022-07-20 DIAGNOSIS — G47.33 OSA ON CPAP: Primary | ICD-10-CM

## 2022-07-20 DIAGNOSIS — E66.01 CLASS 2 SEVERE OBESITY DUE TO EXCESS CALORIES WITH SERIOUS COMORBIDITY AND BODY MASS INDEX (BMI) OF 37.0 TO 37.9 IN ADULT (HCC): ICD-10-CM

## 2022-07-20 DIAGNOSIS — Z99.89 OSA ON CPAP: Primary | ICD-10-CM

## 2022-07-20 PROBLEM — E66.812 CLASS 2 SEVERE OBESITY DUE TO EXCESS CALORIES WITH SERIOUS COMORBIDITY AND BODY MASS INDEX (BMI) OF 37.0 TO 37.9 IN ADULT: Status: ACTIVE | Noted: 2022-07-20

## 2022-07-20 PROCEDURE — 3017F COLORECTAL CA SCREEN DOC REV: CPT | Performed by: INTERNAL MEDICINE

## 2022-07-20 PROCEDURE — 99202 OFFICE O/P NEW SF 15 MIN: CPT

## 2022-07-20 PROCEDURE — G8428 CUR MEDS NOT DOCUMENT: HCPCS | Performed by: INTERNAL MEDICINE

## 2022-07-20 PROCEDURE — 99205 OFFICE O/P NEW HI 60 MIN: CPT | Performed by: INTERNAL MEDICINE

## 2022-07-20 PROCEDURE — G8417 CALC BMI ABV UP PARAM F/U: HCPCS | Performed by: INTERNAL MEDICINE

## 2022-07-20 PROCEDURE — 1036F TOBACCO NON-USER: CPT | Performed by: INTERNAL MEDICINE

## 2022-07-20 PROCEDURE — G2212 PROLONG OUTPT/OFFICE VIS: HCPCS | Performed by: INTERNAL MEDICINE

## 2022-07-20 NOTE — PROGRESS NOTES
CC -    Accompanied by her daughter, Parth Hamilton from [address] provided interpretive services  CIERA, Obesity    BACKGROUND -   First visit: 07/20/22    Obesity   Initial BMI 37.6, Wt 181.6 lbs, Ht 4' 10.25\"  HS Grad wt 105 lbs at age 12, then began to have children and without substantial time between pregnancies  Lowest   wt 130 lbs   Highest  wt 189 lbs  Pattern of wt gain: grad  Wt change past yr: + 6 lbs  Most wt lost: 10 lbs (eating less and right)  Other diets attempted: an OTC supplement    Desire to lose weight: 10/10  Problem posed by appetite: 10/10    Initial Diet:    Number of meals per day - 5    Number of snacks per day - 0    Meal volume - 12\" plate, usually seconds    Fast food/convenience store - 0x/week    Restaurants (not fast food) - 0-1x/week (3x/mo)   Sweets - 4d/week (1-2 quarts/wk - stopped one month ago; 1 frozen 4oz juice cup)   Chips - 7d/week (family size bag over two days)   Crackers/pretzels - 7d/week (4-5 soda crackers 2x/wk; 6 PB crackers 7d/wk)   Nuts - 3-4d/week (1/4-1/2 cup)   Peanut Butter - 1d/week (2 Tbsp of nutella of the spoon, as a meal)   Popcorn - 2d/week (shares a large MW bad with grandchildren)   Dried fruit - 0d/week   Whole fruit - 3d/week (2-3 servings; this does not include the fruit in the smoothies)   Breakfast cereal - 2d/week (one bowl of Special K)   Granola/Protein/Energy bar - 0d/week   Sugar sweetened beverages - large fruit smoothie (fruit, water, milk, yogurt) 2-3x/wk   Protein - No supplements   Fiber - No supplements     Exercise:    Gym membership - none    Walking - 30 min 1-2x/wk    Running - none    Resistance - none    Aerobic class - none    ______________________    [facility] -  Past Medical History:   Diagnosis Date    Asthma     uses inhalers twice a day    Cerebral artery occlusion with cerebral infarction ([facility] [address]     2015-slight right sided weakness, ambulates normal    Cervical spinal stenosis     Chronic back pain     Chronic kidney disease Class 2 severe obesity due to excess calories with serious comorbidity and body mass index (BMI) of 37.0 to 37.9 in adult [facility])     COVID 2021    vomiting, diarrhea, body aches, fever, cough- coulc not confirm month    Disorder of thyroid gland 09/11/2012    no medication    GERD (gastroesophageal reflux disease)     Headache(784.0)     Irritable bowel syndrome     Numbness and tingling in right hand 03/06/2020    CIERA on CPAP     Seizure disorder ([address]     not on medications for this; last seizure 2018; etiology? Vitamin D deficiency 12/03/2014     Current Outpatient Medications   Medication Sig Dispense Refill    oxyCODONE (ROXICODONE) 5 MG immediate release tablet Take 1 tablet by mouth every 12 hours as needed for Pain for up to 30 days. Intended supply: 30 days 45 tablet 0    baclofen (LIORESAL) 10 MG tablet Take 0.5 tablets by mouth in the morning and 0.5 tablets before bedtime.  30 tablet 2    fluticasone-salmeterol (ADVAIR DISKUS) 500-50 MCG/ACT AEPB diskus inhaler Inhale 1 puff into the lungs every 12 hours 60 each 12    montelukast (SINGULAIR) 10 MG tablet Take 1 tablet by mouth nightly 30 tablet 12    omeprazole (PRILOSEC) 40 MG delayed release capsule Take 1 capsule by mouth 2 times daily (before meals) 60 capsule 5    senna (SENOKOT) 8.6 MG tablet Take 1 tablet by mouth 2 times daily 60 tablet 11    fenofibrate (LIPOFEN) 150 MG CAPS capsule Take 1 capsule by mouth daily 30 capsule 2    Elastic Bandages & Supports (KNEE BRACE) MISC Soft neutral position left knee brace  Size to fit  Dx:  Knee pain 1 each 0    albuterol sulfate  (90 Base) MCG/ACT inhaler INHALE 2 PUFFS INTO THE LUNGS EVERY 4 HOURS AS NEEDED FOR WHEEZING OR SHORTNESS OF BREATH (Patient not taking: Reported on 7/15/2022) 6.7 g 1    vitamin D3 (CHOLECALCIFEROL) 25 MCG (1000 UT) TABS tablet Take 1 tablet by mouth daily 30 tablet 5    Prenatal Vit-Fe Fumarate-FA (PRENATAL PLUS) 27-1 MG TABS 1 pill daily 30 tablet 5    EPINEPHrine (EPIPEN 2-ABUNDIO) 0.3 MG/0.3ML SOAJ injection Inject into muscle for allergic reaction 0.3 mL 0     No current facility-administered medications for this visit. ROS -  Card - no CP  GI - no N/V/D/C    PE -  Gen : /66 (Site: Left Upper Arm, Position: Sitting, Cuff Size: Large Adult)   Pulse 73   Temp 97.2 °F (36.2 °C) (Temporal)   Ht 4' 10.25\" (1.48 m)   Wt 181 lb 9.6 oz (82.4 kg)   LMP 03/27/2008   BMI 37.63 kg/m²    WN, WD, NAD  Lung: Nml resp effort  Psych: Normal mood   Full affect  Neuro: Moves all ext well  ______________________    HISTORY & ASSESSMENT/PLAN -     Problem 1 - CIERA   HPI -  Dx'd 2020 01/2022 sleep study AHI 19    Severity of daytime drowsiness this past week: 9/10   Does not wear CPAP b/c of discomfort (feels like it is suffocating her)   Assessment - Uncontrolled; excess wt is increasing the degree of the underlying airway obstruction and decreasing oxygenation  Plan -   Wt loss per the plan below    Problem 2 - Obesity    HPI -  See above Background for description  Weight  Date   181.6 lbs 07/20/22  DEN = 1720 giuseppe/d  Chief issue is her extremely high intake of HR foods. Her report is likely erroneously higher than it actually is since her weight gain is a small fraction of what would be expected if it were accurate. However, even if the true amount is only one-half of the report, it is still very high. Any intervention that yields long-term control must have control of these as its foundation. Therefore will focus of her HR foods first and include calorie counting.   Initial diet plan: Calorie counting + targeted elimination/restriction  Initial appetite suppressant:  Assessment - Uncontrolled   Plan -   Rules:  Count every calorie every day  Limit sweets to one day per month  Limit chips/crackers/pretzels/nuts/popcorn/Nutella to 100 giuseppe/day (only as part of a meal)  Eliminate all sugar sweetened beverages (including fruit smoothies or fruit juice)    Requirements:  Make sure protein intake is at least 55 grams per day (do not count protein every day; instead spot check your intake every 2-3 weeks and make sure what you think you are getting is close to accurate; consider using a protein shake if needed; these are in the pharmacy section of the stores, not the grocery section; Premier, Pure Protein and Fairlife are relatively inexpensive and taste good to most patients; other options are Nectar, Boost Max, Ensure Max, BeneProtein and GNC lean (which is lactose-free); Nectar fruit, Premier Protein Clear, IsoPure Protein Drink, and Protein 2 O are water-based options; Quest (or Cosco, which is cheaper and is ordered on [address] [facility] [address]) and the [place] 1 protein bars can also be used, but have less protein in them )  (Disclaimer: Dietary supplements rarely have their listed ingredients and the amount of each verified by a third party other. Sometimes they give verification for their claims to be GMO and gluten free and to be organic. However, even such verifications as these may still be untrustworthy.)  Make sure that fiber intake is at least 22 grams per day. Do this by either eating 12 tablespoons of the original, plain Fiber One cereal every day or 4 tablespoons of wheat dextrin powder (Benefiber or a generic brand) every day. Work up to this amount slowly by starting with only one-eighth to one-fourth of the target amount and then adding another one-eighth to one-fourth every one or two weeks until reaching the target. Take one multivitamin every day  Drink at least 64 oz of water    Targets:  Limit calorie intake to 1200 calories/day  Walk 30 minutes daily  Avoid eating 4 hours within bedtime. Avoid drinking 2 hours within bedtime (sips are allowed)    Tips:  Do not eat outside of the dining room or the kitchen  Do not eat while watching TV, videos, working on the computer or using a smart phone  Do not eat food out of a multi-serving bag or container.   Establish 6 hours of food-free \"time-out\" periods (times you don't eat) each day. No period can be less than 1 hour long. The periods need to be the same every day for days that are the same (for example, workdays would have one set of food free periods and weekends would have another set of days). These six hours are in addition to the two hours before bedtime and the time spent sleeping. Return to see me in one month    Other - we discussed her GERD; it is poorly controlled; has regurgitated food in the midnight hours on occasion; eats and drinks late in the evening; does not follow lifestyle interventions; has episodes of diaphoresis (not post-menopausal vasomotor - states this resolved several years ago) while sleeping; this is consistent with uncontrolled GERD (and/or CIERA); I reviewed the importance of not eating within 4 hours of bed and not drinking (other than sips) within two hours of bed.  I recommended that she elevated the legs of the head of her bed    Total time spent on encounter:  90 min    Walter Devi MD  Endocrinology/Obesity  7/20/22

## 2022-07-20 NOTE — PATIENT INSTRUCTIONS
Rules:  Count every calorie every day  Limit sweets to one day per month  Limit chips/crackers/pretzels/nuts/popcorn/Nutella to 100 giuseppe/day (only as part of a meal)  Eliminate all sugar sweetened beverages (including fruit smoothies or fruit juice)    Requirements:  Make sure protein intake is at least 55 grams per day (do not count protein every day; instead spot check your intake every 2-3 weeks and make sure what you think you are getting is close to accurate; consider using a protein shake if needed; these are in the pharmacy section of the stores, not the grocery section; Premier, Pure Protein and Fairlife are relatively inexpensive and taste good to most patients; other options are Nectar, Boost Max, Ensure Max, BeneProtein and GNC lean (which is lactose-free); Nectar fruit, Premier Protein Clear, IsoPure Protein Drink, and Protein 2 O are water-based options; Quest (or Cosco, which is cheaper and is ordered on [employer]) and the [place] 1 protein bars can also be used, but have less protein in them )  (Disclaimer: Dietary supplements rarely have their listed ingredients and the amount of each verified by a third party other. Sometimes they give verification for their claims to be GMO and gluten free and to be organic. However, even such verifications as these may still be untrustworthy.)  Make sure that fiber intake is at least 22 grams per day. Do this by either eating 12 tablespoons of the original, plain Fiber One cereal every day or 4 tablespoons of wheat dextrin powder (Benefiber or a generic brand) every day. Work up to this amount slowly by starting with only one-eighth to one-fourth of the target amount and then adding another one-eighth to one-fourth every one or two weeks until reaching the target. Take one multivitamin every day  Drink at least 64 oz of water    Targets:  Limit calorie intake to 1200 calories/day  Walk 30 minutes daily  Avoid eating 4 hours within bedtime.    Avoid drinking 2 hours within bedtime (sips are allowed)    Tips:  Do not eat outside of the dining room or the kitchen  Do not eat while watching TV, videos, working on the computer or using a smart phone  Do not eat food out of a multi-serving bag or container. Establish 6 hours of food-free \"time-out\" periods (times you don't eat) each day. No period can be less than 1 hour long. The periods need to be the same every day for days that are the same (for example, workdays would have one set of food free periods and weekends would have another set of days). These six hours are in addition to the two hours before bedtime and the time spent sleeping.     Return to see me in one month

## 2022-08-15 ENCOUNTER — PREP FOR PROCEDURE (OUTPATIENT)
Dept: PAIN MANAGEMENT | Age: 52
End: 2022-08-15

## 2022-08-15 ENCOUNTER — OFFICE VISIT (OUTPATIENT)
Dept: PAIN MANAGEMENT | Age: 52
End: 2022-08-15
Payer: MEDICAID

## 2022-08-15 VITALS
SYSTOLIC BLOOD PRESSURE: 110 MMHG | HEART RATE: 62 BPM | RESPIRATION RATE: 16 BRPM | BODY MASS INDEX: 36.49 KG/M2 | WEIGHT: 181 LBS | OXYGEN SATURATION: 97 % | DIASTOLIC BLOOD PRESSURE: 62 MMHG | TEMPERATURE: 97.8 F | HEIGHT: 59 IN

## 2022-08-15 DIAGNOSIS — M70.62 GREATER TROCHANTERIC BURSITIS OF LEFT HIP: ICD-10-CM

## 2022-08-15 DIAGNOSIS — M54.16 LUMBAR RADICULOPATHY: ICD-10-CM

## 2022-08-15 DIAGNOSIS — M47.812 CERVICAL FACET JOINT SYNDROME: ICD-10-CM

## 2022-08-15 DIAGNOSIS — M47.816 LUMBAR FACET ARTHROPATHY: ICD-10-CM

## 2022-08-15 DIAGNOSIS — M79.7 FIBROMYALGIA: ICD-10-CM

## 2022-08-15 DIAGNOSIS — M51.9 LUMBAR DISC DISORDER: ICD-10-CM

## 2022-08-15 DIAGNOSIS — M50.30 DDD (DEGENERATIVE DISC DISEASE), CERVICAL: ICD-10-CM

## 2022-08-15 DIAGNOSIS — G89.4 CHRONIC PAIN SYNDROME: Primary | ICD-10-CM

## 2022-08-15 DIAGNOSIS — M54.12 CERVICAL RADICULOPATHY: ICD-10-CM

## 2022-08-15 DIAGNOSIS — M47.816 LUMBAR SPONDYLOSIS: ICD-10-CM

## 2022-08-15 DIAGNOSIS — M47.812 FACET ARTHROPATHY, CERVICAL: ICD-10-CM

## 2022-08-15 PROCEDURE — 1036F TOBACCO NON-USER: CPT | Performed by: PHYSICIAN ASSISTANT

## 2022-08-15 PROCEDURE — G8427 DOCREV CUR MEDS BY ELIG CLIN: HCPCS | Performed by: PHYSICIAN ASSISTANT

## 2022-08-15 PROCEDURE — 99213 OFFICE O/P EST LOW 20 MIN: CPT | Performed by: PHYSICIAN ASSISTANT

## 2022-08-15 PROCEDURE — G8417 CALC BMI ABV UP PARAM F/U: HCPCS | Performed by: PHYSICIAN ASSISTANT

## 2022-08-15 PROCEDURE — 3017F COLORECTAL CA SCREEN DOC REV: CPT | Performed by: PHYSICIAN ASSISTANT

## 2022-08-15 RX ORDER — OXYCODONE HYDROCHLORIDE 5 MG/1
5 TABLET ORAL EVERY 12 HOURS PRN
Qty: 45 TABLET | Refills: 0 | Status: SHIPPED
Start: 2022-08-15 | End: 2022-09-15 | Stop reason: SDUPTHER

## 2022-08-15 NOTE — PROGRESS NOTES
[address]  [facility], [address] [place] [address] Matheus  [phone]    Follow up Note      Theoplis Bone     Date of Visit:  8/15/2022    CC:  Patient presents for follow up   Chief Complaint   Patient presents with    Follow-up     Back pain also has left hip pain. HPI:    Pain is worse to left hip. Appropriate analgesia with current medications regimen: yes. Change in quality of symptoms:no. Medication side effects:none. Recent diagnostic testing:none. Recent interventional procedures:none. .    She has been on anticoagulation medications to include ASA. The patient  has not been on herbal supplements. The patient is not diabetic. Imaging:     Cervical spine MRI 04/2018  1. Multilevel degenerative disc disease extending from C3 through C7. Moderately severe spinal canal stenosis at C3-C4. Moderate spinal   canal stenosis at C4-C5. . Moderately severe spinal canal stenosis at   C5-C6. Abnormal signal intensity within the cervical spinal cord C3-C5   without evidence of enhancement. Findings are most likely reflective   of myelomalacia changes secondary to the underlying degree of cord   compression as described above. No active demyelination identified. 2. Slight loss of normally expected cervical lordosis C3-C6. CT Lumbar spine 2017  1. Mild diffuse osteopenia, no compression fracture or   spondylolisthesis. 2. Mild multilevel degenerative disc disease and facet joint   arthropathy of the lower lumbar spine more pronounced at L4-L5. 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 Otherwise, no musculoskeletal pathology to explain patient's pain. Previous treatments: Physical Therapy, Epidural Steroid Injection with  and medications. .  Cymbalta severe side effects, tylenol arthritis heartburn and gi distress,  Tizanidine helps but makes tired, tramadol caused hyperness, butrans patch dizziness and diarrhea                                        Potential Aberrant Drug-Related Behavior:    No     Urine Drug Screening:  Saliva screen 08/2020 consistent for Ultram  11/2021: consistent for oxycodone     OARRS report:  7/2022 consistent to 08/2022 consistent. Opioid agreement: 12/13/21         Past Medical History:   Diagnosis Date    Asthma     uses inhalers twice a day    Cerebral artery occlusion with cerebral infarction ([facility] [address]     2015-slight right sided weakness, ambulates normal    Cervical spinal stenosis     Chronic back pain     Chronic kidney disease     Class 2 severe obesity due to excess calories with serious comorbidity and body mass index (BMI) of 37.0 to 37.9 in adult [facility])     COVID 2021    vomiting, diarrhea, body aches, fever, cough- coulc not confirm month    Disorder of thyroid gland 09/11/2012    no medication    GERD (gastroesophageal reflux disease)     Headache(784.0)     Irritable bowel syndrome     Numbness and tingling in right hand 03/06/2020    CIERA on CPAP     Seizure disorder ([facility] [address]     not on medications for this; last seizure 2018; etiology?     Vitamin D deficiency 12/03/2014       Past Surgical History:   Procedure Laterality Date    CHOLECYSTECTOMY, LAPAROSCOPIC N/A 5/17/2022    LAPAROSCOPIC ROBOTIC XI ASSISTED CHOLECYSTECTOMY performed by Jer Myers MD at [address]  03/09/2016    COLONOSCOPY  07/10/2017    COLONOSCOPY N/A 4/1/2022    COLONOSCOPY DIAGNOSTIC WITH BANDING performed by Jer Myers MD at [address] [place] [address] Rd Left 08/10/2020    LEFT GREATER TROCHANTERIC BURSAE INJECTION X-RAY (CPT 22123) performed by Narda Hu MD at [facility].      epidural c6-c7    NERVE BLOCK Bilateral 8/5/2021    BILATERAL CERVICAL MEDIAL BRANCH FACET BLOCK C4 C5 C6 WITH SEDATION WITH LEFT TROCHANTERIC BURSA INJECTION(CPT 25593) performed by Narda Hu MD at [address] [place] [address] PAIN MANAGEMENT PROCEDURE N/A 4/19/2021    CERVICAL EPIDURAL INTERLAMINAR STEROID INJECTION C6-C7 WITH SEDATION performed by Mary Mcnulty MD at [facility] [address] Left 1/31/2022    CERVICAL EPIDURAL STEROID INJECTION LEFT PARAMEDIAN C6-C7 WITH SEDATION performed by Mary Mcnulty MD at [facility]  07/10/2017       Prior to Admission medications    Medication Sig Start Date End Date Taking?  Authorizing Provider   fluticasone-salmeterol (ADVAIR DISKUS) 500-50 MCG/ACT AEPB diskus inhaler Inhale 1 puff into the lungs every 12 hours 7/5/22  Yes Jose Fountain, DO   omeprazole (PRILOSEC) 40 MG delayed release capsule Take 1 capsule by mouth 2 times daily (before meals) 6/8/22  Yes Tom Ng MD   senna (SENOKOT) 8.6 MG tablet Take 1 tablet by mouth 2 times daily 6/8/22 6/8/23 Yes Tom Ng MD   fenofibrate (LIPOFEN) 150 MG CAPS capsule Take 1 capsule by mouth daily 4/7/22  Yes Abdi Graham, DO   Elastic Bandages & Supports (KNEE BRACE) MISC Soft neutral position left knee brace  Size to fit  Dx:  Knee pain 1/21/22  Yes Abdi Grijalva, DO   albuterol sulfate  (90 Base) MCG/ACT inhaler INHALE 2 PUFFS INTO THE LUNGS EVERY 4 HOURS AS NEEDED FOR WHEEZING OR SHORTNESS OF BREATH 11/1/21  Yes Abdi Rabago, DO   vitamin D3 (CHOLECALCIFEROL) 25 MCG (1000 UT) TABS tablet Take 1 tablet by mouth daily 8/26/21  Yes Debe Sites, DO   Prenatal Vit-Fe Fumarate-FA (PRENATAL PLUS) 27-1 MG TABS 1 pill daily 8/26/21  Yes Debe Sites, DO   EPINEPHrine (EPIPEN 2-ABUNDIO) 0.3 MG/0.3ML SOAJ injection Inject into muscle for allergic reaction 8/22/19  Yes Abdi Grijalva, DO   montelukast (SINGULAIR) 10 MG tablet Take 1 tablet by mouth nightly 7/5/22 8/4/22  Roque Sanchez, DO       Allergies   Allergen Reactions    Fish-Derived Products Anaphylaxis    Dye [Iodides] Itching       Social History     Socioeconomic History Marital status:      Spouse name: Not on file    Number of children: Not on file    Years of education: Not on file    Highest education level: Not on file   Occupational History    Not on file   Tobacco Use    Smoking status: Former     Packs/day: 1.00     Years: 25.00     Pack years: 25.00     Types: Cigarettes     Quit date: 1988     Years since quittin.3    Smokeless tobacco: Never   Vaping Use    Vaping Use: Never used   Substance and Sexual Activity    Alcohol use: No    Drug use: No    Sexual activity: Not on file   Other Topics Concern    Not on file   Social History Narrative    Not on file     Social Determinants of Health     Financial Resource Strain: Not on file   Food Insecurity: Not on file   Transportation Needs: Not on file   Physical Activity: Not on file   Stress: Not on file   Social Connections: Not on file   Intimate Partner Violence: Not on file   Housing Stability: Not on file       Family History   Problem Relation Age of Onset    Heart Attack Mother     Migraines Mother     Kidney Disease Father         failure    Migraines Sister     Asthma Child     Asthma Child         bone disease    Anemia Child     Diabetes Other        REVIEW OF SYSTEMS:     Bill Phy denies fever/chills, chest pain, shortness of breath, new bowel or bladder complaints. All other review of systems was negative. PHYSICAL EXAMINATION:      /62   Pulse 62   Temp 97.8 °F (36.6 °C) (Infrared)   Resp 16   Ht 4' 11\" (1.499 m)   Wt 181 lb (82.1 kg)   LMP 2008   SpO2 97%   BMI 36.56 kg/m²     General:      General appearance:   pleasant and well-hydrated. , in mild discomfort and A & O x3  Build:Overweight    HEENT:    Head:normocephalic and atraumatic  Sclera: icterus absent,    Lungs:    Breathing:Normal expansion. No wheezing.     Abdomen:    Shape:non-distended and normal      Extremities:    Tremors:None bilaterally upper and lower  Range of motion:Generally normal shoulders, pain with internal rotation of hips not done. Intact:Yes  Edema:Normal  Left hip GT + TTP    Neurological:    [address]    Dermatology:    Skin:no unusual rashes, no skin lesions, no palpable subcutaneous nodules, and good skin turgor    Impression:    Cervical spine MRI 2019 Multilevel degenerative disc disease with moderate to severe stenosis at C3-4/C4-5 C5-6  Lumbar spine CT 2017 Mild degenerative disc disease and facet arthropathy most pronounced at L4-5  Patient had seen neurosurgery and surgery C3-4/C4-5 and C5-6 ACDF was recommended. Patient failed Gabapentin/zanaflex/flexeril/Norco/Lyrica  Patient had underwent cervical epidural at C6-7 (04/2021) for her radicular symptoms and bilateral C3,4,5 medial branch block x 1(08/2021) for her mechanical neck pain. Both interventions had helped with her pain. Plan:  Follow up on neck pain radiating down left arm with n/t hands         and low back radiating down left leg to knee, acute left troch bursitis    1/31/22: Cervical epidural left paramedian C6-7 with>80% relief  LEFT GREATER TROCHANTERIC BURSAE INJECTION X-RAY with sedation per pt request - rescheduled today. baclofen 5mg po bid prn spasms - no RF needed. Refill Oxycodone 5mg po daily(cannot take Percocet with tylenol and liver disease)  Continue lidoderm patchs q 24 hours prn pain  Avoid NSAIDs(H/O GI ulcers)  UDS next visit  OARRS report reviewed  Patient encouraged to stay active and to lose weight. Treatment plan discussed with the patient and her including medications side effects. Controlled Substance Monitoring:    Acute and Chronic Pain Monitoring:   RX Monitoring 8/15/2022   Attestation -   Periodic Controlled Substance Monitoring Possible medication side effects, risk of tolerance/dependence & alternative treatments discussed. ;No signs of potential drug abuse or diversion identified. ;Assessed functional status. ;Obtaining appropriate analgesic effect of treatment. We discussed with the patient that combining opioids, benzodiazepines, alcohol, illicit drugs or sleep aids increases the risk of respiratory depression including death. We discussed that these medications may cause drowsiness, sedation or dizziness and have counseled the patient not to drive or operate machinery. We have discussed that these medications will be prescribed only by one provider. We have discussed with the patient about age related risk factors and have thoroughly discussed the importance of taking these medications as prescribed. The patient verbalizes understanding.     ccreferring physic

## 2022-08-15 NOTE — PROGRESS NOTES
Do you currently have any of the following:    Fever: No  Headache:  No  Cough: No  Shortness of breath: No  Exposed to anyone with these symptoms: No                                                                                                                Gomez Shin presents to the [facility] on 8/15/2022. Kathleen Langford is complaining of pain in her left hip. The pain is constant. The pain is described as stabbing. Pain is rated on her best day at a 6, on her worst day at a 10, and on average at a 1 on the VAS scale. She took her last dose of Norco 10 am . Kathleen Langford does not have issues with constipation. Any procedures since your last visit: No  She is not on NSAIDS and  is not on anticoagulation medications     Pacemaker or defibrillator: No     Medication Contract and Consent for Opioid Use Documents Filed       Patient Documents       Type of Document Status Date Received Received By Description    Medication Contract Received 7/18/2019 11:59 AM Rossi SINGLETON 7/18/19 medication contract    Medication Contract Received 10/9/2020  3:01 PM LORENZO, [facility] [address] [place] [address] pain pt agreement    Medication Contract Received 11/5/2021 11:29 AM David MUNIZ Pain Mgmt Patient Agreement 11.5.2021    Medication Contract Received 12/13/2021  4:21 PM LEONARDA PINA pain management                       LMP 03/27/2008      Patient's last menstrual period was 03/27/2008.

## 2022-08-17 ENCOUNTER — HOSPITAL ENCOUNTER (OUTPATIENT)
Age: 52
Discharge: HOME | End: 2022-08-17
Payer: MEDICAID

## 2022-08-17 DIAGNOSIS — R91.1 LUNG NODULE: ICD-10-CM

## 2022-08-17 DIAGNOSIS — J45.909 ASTHMA ACTION PLAN DECLINED: ICD-10-CM

## 2022-08-17 DIAGNOSIS — J45.40 MODERATE PERSISTENT ASTHMA, UNSPECIFIED WHETHER COMPLICATED: ICD-10-CM

## 2022-08-17 LAB
BASOPHILS ABSOLUTE: 0.03 E9/L (ref 0–0.2)
BASOPHILS RELATIVE PERCENT: 0.5 % (ref 0–2)
EOSINOPHILS ABSOLUTE: 0.07 E9/L (ref 0.05–0.5)
EOSINOPHILS RELATIVE PERCENT: 1.2 % (ref 0–6)
HCT VFR BLD CALC: 41.5 % (ref 34–48)
HEMOGLOBIN: 13.9 G/DL (ref 11.5–15.5)
IMMATURE GRANULOCYTES #: 0.01 E9/L
IMMATURE GRANULOCYTES %: 0.2 % (ref 0–5)
LYMPHOCYTES ABSOLUTE: 1.99 E9/L (ref 1.5–4)
LYMPHOCYTES RELATIVE PERCENT: 34.2 % (ref 20–42)
MCH RBC QN AUTO: 27.8 PG (ref 26–35)
MCHC RBC AUTO-ENTMCNC: 33.5 % (ref 32–34.5)
MCV RBC AUTO: 83 FL (ref 80–99.9)
MONOCYTES ABSOLUTE: 0.42 E9/L (ref 0.1–0.95)
MONOCYTES RELATIVE PERCENT: 7.2 % (ref 2–12)
NEUTROPHILS ABSOLUTE: 3.3 E9/L (ref 1.8–7.3)
NEUTROPHILS RELATIVE PERCENT: 56.7 % (ref 43–80)
PDW BLD-RTO: 12.8 FL (ref 11.5–15)
PLATELET # BLD: 230 E9/L (ref 130–450)
PMV BLD AUTO: 9.9 FL (ref 7–12)
RBC # BLD: 5 E12/L (ref 3.5–5.5)
WBC # BLD: 5.8 E9/L (ref 4.5–11.5)

## 2022-08-17 PROCEDURE — 36415 COLL VENOUS BLD VENIPUNCTURE: CPT

## 2022-08-17 PROCEDURE — 85025 COMPLETE CBC W/AUTO DIFF WBC: CPT

## 2022-08-17 PROCEDURE — 86003 ALLG SPEC IGE CRUDE XTRC EA: CPT

## 2022-08-17 PROCEDURE — 82785 ASSAY OF IGE: CPT

## 2022-08-17 PROCEDURE — 86698 HISTOPLASMA ANTIBODY: CPT

## 2022-08-20 LAB — IGE: 1901 KU/L

## 2022-08-21 LAB
2000687N OAK TREE IGE: 2.41 KU/L (ref 0–0.34)
ALLERGEN BERMUDA GRASS IGE: 8.68 KU/L (ref 0–0.34)
ALLERGEN BIRCH IGE: 14 KU/L (ref 0–0.34)
ALLERGEN DOG DANDER IGE: 2.23 KU/L (ref 0–0.34)
ALLERGEN GERMAN COCKROACH IGE: 1.4 KU/L (ref 0–0.34)
ALLERGEN HORMODENDRUM IGE: 0.11 KUL/L (ref 0–0.34)
ALLERGEN MOUSE EPITHELIA IGE: <0.1 KU/L (ref 0–0.34)
ALLERGEN PECAN TREE IGE: 85.6 KU/L (ref 0–0.34)
ALLERGEN PIGWEED ROUGH IGE: 1.17 KU/L (ref 0–0.34)
ALLERGEN SHEEP SORREL (W18) IGE: 1.29 KU/L (ref 0–0.34)
ALLERGEN TREE SYCAMORE: 1.38 KU/L (ref 0–0.34)
ALLERGEN WALNUT TREE IGE: 55 KU/L (ref 0–0.34)
ALLERGEN WHITE MULBERRY TREE, IGE: 1.08 KU/L (ref 0–0.34)
ALLERGEN, TREE, WHITE ASH IGE: 11.3 KU/L (ref 0–0.34)
ALTERNARIA ALTERNATA: <0.1 KU/L (ref 0–0.34)
ASPERGILLUS FUMIGATUS: <0.1 KU/L (ref 0–0.34)
CAT DANDER ANTIBODY: 3.44 KU/L (ref 0–0.34)
COTTONWOOD TREE: 1.68 KU/L (ref 0–0.34)
D. FARINAE: 34.4 KU/L (ref 0–0.34)
D. PTERONYSSINUS: 34.2 KU/L (ref 0–0.34)
ELM TREE: 2.95 KU/L (ref 0–0.34)
IGE: 2146 IU/ML
MAPLE/BOXELDER TREE: 5.48 KU/L (ref 0–0.34)
MOUNTAIN CEDAR TREE: 1.21 KU/L (ref 0–0.34)
MUCOR RACEMOSUS: 0.2 KU/L (ref 0–0.34)
P. NOTATUM: 0.35 KU/L (ref 0–0.34)
RUSSIAN THISTLE: 2.69 KU/L (ref 0–0.34)
SHORT RAGWD(A ARTEMIS.) IGE: 3.75 KU/L (ref 0–0.34)
TIMOTHY GRASS: >100 KU/L (ref 0–0.34)

## 2022-08-21 NOTE — PROGRESS NOTES
[facility] Sleep Medicine    Patient Name: Kirby Zimmerman  Age: 45 y.o.   : 1970    Date of Visit: 22        Review of Last Visit Summary:    The patient was last seen on 2022 for Obstructive Sleep Apnea. Interim History:     Kirby Zimmerman is a 45 y.o. female that  has a past medical history of Asthma, Cerebral artery occlusion with cerebral infarction [facility]), Cervical spinal stenosis, Chronic back pain, Chronic kidney disease, Class 2 severe obesity due to excess calories with serious comorbidity and body mass index (BMI) of 37.0 to 37.9 in adult [facility]), COVID (), Disorder of thyroid gland (2012), GERD (gastroesophageal reflux disease), Headache(784.0), Irritable bowel syndrome, Numbness and tingling in right hand (2020), CIERA on CPAP, Seizure disorder ([address] [address] and Vitamin D deficiency (2014). She presents with her daughter in follow up to Sleep Clinic to review CPAP adherence and efficacy. Interval Events:    - Reports continued nightly CPAP usage, but not receiving benefit with use. She does not usually feel rested upon awakening and reports still snoring with the CPAP. -Using an iBreeze device, no active data download available. -Per patient, Christiano Encarnacion was supposed to come to her house to help her with device, but she will now have a video visit on 22 for a new mask. -Using a full face mask, but would like to switch to a nasal mask.  -Following with pulmonary for asthma and ground glass lung nodules.  -She does feel that pressure is inadequate at times. DME:[facility]    Sleep Study History: 2022- [facility]- 176 lbs, sleep efficiency 78%, REM 16%, AHI 11, REM RDI 53, Supine RDI 18, Spo2 mare 83%, T<88% was 1.5% of TST. Sleep History:      During the sleep study, AHI was 11 with increased severity in REM sleep with a REM RDI of 53. This was patient's first sleep study.   She was ordered sleep study by her PCP for nocturnal witnessed apneas, nocturnal gasping, snoring, and morning headaches. Aside from these symptoms, patient also displays difficulty with memory, and mild daytime sleepiness. She feels drowsy when she drives at times but states she does not doze off or fall asleep at stop signs. Patient was prescribed auto-CPAP following sleep study at a pressure of 5 to 15 cm of water. Her [employer] company, [email or web address], provided her with a Nataliia CPAP machine. Patient is having difficulty adjusting to CPAP and feels that after about 2 hours of wearing it she feels the pressure is extremely high and will rip it off. She received CPAP 2 to 3 months ago and reports only using it 3-4 times. She is using a fullface mask and finds discomfort and leaking with the mask. She finds that when she wakes from sleep, the tube is wrapped around her neck and she finds it difficult to sleep on her stomach, which is her preferred sleeping position. On average, patient tries to get around 8 hours of sleep at night. She does not have difficulty initiating sleep and does have multiple nocturnal awakenings through the night. She will usually use the restroom and walk around but can generally fall back asleep quickly. Patient typically does not drink caffeine through the day. She denies smoking or excessive alcohol. Patient also describes a tingling sensation in her legs that only happens in the evening since she had a stroke. Massage and movement help relieve the symptoms and she finds herself being kept awake from the symptoms. Her weight has fluctuated this past year, however, patient is attempting to lose weight. Sleep History     Bed time:  10-10:30 pm   Wake time:   9-10 am  Sleep Latency (min):  A few minutes   Sleep Medications: None  Awakenings:   Multiple times   / bathroom/walk  / falls back asleep quickly   Estimated Sleep time (hours):  8  Daytime Naps: No  Sleep disturbances: None  Sleep Location: Bed  Sleep environment: Sleeps on spouse- on stomach  Occupation: Disabled     SLEEP HYGIENE     Activities before bed: TV  Caffeine: 0    Coffee    0   Soda    0   Tea  Alcohol: rare  Tobacco:N     Sleep ROS      Snoring: Y   Witnessed apneas: Y-   AM Headache: Y  Dry Mouth: Y  Daytime Sleepiness: Sometimes  Difficulty remembering things: Y  MVA  or near miss accident due to sleepiness in the past? Yawns when stops at night  Tonsillectomy? N  Have you lost or gained weight recently? Fluctuates    Past Medical History:  Past Medical History:   Diagnosis Date    Asthma     uses inhalers twice a day    Cerebral artery occlusion with cerebral infarction ([address]     2015-slight right sided weakness, ambulates normal    Cervical spinal stenosis     Chronic back pain     Chronic kidney disease     Class 2 severe obesity due to excess calories with serious comorbidity and body mass index (BMI) of 37.0 to 37.9 in adult [facility])     COVID 2021    vomiting, diarrhea, body aches, fever, cough- coulc not confirm month    Disorder of thyroid gland 09/11/2012    no medication    GERD (gastroesophageal reflux disease)     Headache(784.0)     Irritable bowel syndrome     Numbness and tingling in right hand 03/06/2020    CIERA on CPAP     Seizure disorder ([facility] [address]     not on medications for this; last seizure 2018; etiology?     Vitamin D deficiency 12/03/2014       Past Surgical History:        Procedure Laterality Date    CHOLECYSTECTOMY, LAPAROSCOPIC N/A 5/17/2022    LAPAROSCOPIC ROBOTIC XI ASSISTED CHOLECYSTECTOMY performed by Carmen Santillan MD at [address] [place] [address]  03/09/2016    COLONOSCOPY  07/10/2017    COLONOSCOPY N/A 4/1/2022    COLONOSCOPY DIAGNOSTIC WITH BANDING performed by Carmen Santillan MD at [address] [place] [address] Rd Left 08/10/2020    LEFT GREATER TROCHANTERIC BURSAE INJECTION X-RAY (CPT 58190) performed by Savanna Gamino MD at [facility]. 47     epidural c6-c7    NERVE BLOCK Bilateral 8/5/2021    BILATERAL CERVICAL MEDIAL BRANCH FACET BLOCK C4 C5 C6 WITH SEDATION WITH LEFT TROCHANTERIC BURSA INJECTION(CPT 69803) performed by Mathieu Mancilla MD at [address] N/A 2021    CERVICAL EPIDURAL INTERLAMINAR STEROID INJECTION C6-C7 WITH SEDATION performed by Mathieu Mancilla MD at [address] Left 2022    CERVICAL EPIDURAL STEROID INJECTION LEFT PARAMEDIAN C6-C7 WITH SEDATION performed by Mathieu Mancilla MD at [facility]  07/10/2017       Allergies:  is allergic to fish-derived products and dye [iodides]. Social History:    Social History     Tobacco Use    Smoking status: Former     Packs/day: 1.00     Years: 25.00     Pack years: 25.00     Types: Cigarettes     Quit date: 1988     Years since quittin.3    Smokeless tobacco: Never   Vaping Use    Vaping Use: Never used   Substance Use Topics    Alcohol use: No    Drug use: No        Family History:       Problem Relation Age of Onset    Heart Attack Mother     Migraines Mother     Kidney Disease Father         failure    Migraines Sister     Asthma Child     Asthma Child         bone disease    Anemia Child     Diabetes Other        Current Medications:    Current Outpatient Medications:     oxyCODONE (ROXICODONE) 5 MG immediate release tablet, Take 1 tablet by mouth every 12 hours as needed for Pain for up to 30 days.  Intended supply: 30 days, Disp: 45 tablet, Rfl: 0    fluticasone-salmeterol (ADVAIR DISKUS) 500-50 MCG/ACT AEPB diskus inhaler, Inhale 1 puff into the lungs every 12 hours, Disp: 60 each, Rfl: 12    montelukast (SINGULAIR) 10 MG tablet, Take 1 tablet by mouth nightly, Disp: 30 tablet, Rfl: 12    omeprazole (PRILOSEC) 40 MG delayed release capsule, Take 1 capsule by mouth 2 times daily (before meals), Disp: 60 capsule, Rfl: 5    senna (SENOKOT) 8.6 MG tablet, Take 1 tablet by mouth 2 times daily, Disp: 60 tablet, Rfl: 11 fenofibrate (LIPOFEN) 150 MG CAPS capsule, Take 1 capsule by mouth daily, Disp: 30 capsule, Rfl: 2    Elastic Bandages & Supports (KNEE BRACE) MISC, Soft neutral position left knee brace Size to fit Dx:  Knee pain, Disp: 1 each, Rfl: 0    albuterol sulfate  (90 Base) MCG/ACT inhaler, INHALE 2 PUFFS INTO THE LUNGS EVERY 4 HOURS AS NEEDED FOR WHEEZING OR SHORTNESS OF BREATH, Disp: 6.7 g, Rfl: 1    vitamin D3 (CHOLECALCIFEROL) 25 MCG (1000 UT) TABS tablet, Take 1 tablet by mouth daily, Disp: 30 tablet, Rfl: 5    Prenatal Vit-Fe Fumarate-FA (PRENATAL PLUS) 27-1 MG TABS, 1 pill daily, Disp: 30 tablet, Rfl: 5    EPINEPHrine (EPIPEN 2-ABUNDIO) 0.3 MG/0.3ML SOAJ injection, Inject into muscle for allergic reaction, Disp: 0.3 mL, Rfl: 0    Sleep Medicine 8/22/2022 4/25/2022 1/19/2022   Sitting and reading 2 1 2   Watching TV 2 3 3   Sitting, inactive in a public place (e.g. a theatre or a meeting) 2 1 1   As a passenger in a car for an hour without a break 3 0 3   Lying down to rest in the afternoon when circumstances permit 0 3 0   Sitting and talking to someone 2 0 0   Sitting quietly after a lunch without alcohol 3 3 0   In a car, while stopped for a few minutes in traffic 2 0 0   Total score 16 11 9   Neck circumference (Inches) - - 13.5       Review of Systems:    Constitutional: no chills, no fever   Eyes: no blurred vision   Cardiovascular: no chest pain,   Respiratory: no cough, no shortness of breath   Gastrointestinal:  no nausea,  no vomiting, no diarrhea. Musculoskeletal: no arthralgias, no back pain and no myalgias. Neurological:  no dizziness,  no headache, no memory changes. Endocrine: No chills    Objective:   PHYSICAL EXAM:    /74 (Site: Left Upper Arm, Position: Sitting, Cuff Size: Medium Adult)   Pulse 71   Resp 16   Ht 4' 10\" (1.473 m)   Wt 177 lb 9.6 oz (80.6 kg)   LMP 03/27/2008   SpO2 98%   BMI 37.12 kg/m²     Physical exam:  Gen: No acute distress.   BMI of Body mass index is 37.12 discussion with patient, will continue auto-CPAP therapy at settings of 5-20 cm of water. She feels pressure is inadequate, however, will wait until data download is obtained to adjust pressure.  - DME is [facility].  - Patient is using a full face mask. She would like to use nasal mask.  -Consider titration study or  switch to RESMED CPAP if symptoms are not controlled following data download review.   -Previously discussed pathophysiology of CIERA and its impact on daily well-being, as well as cardiometabolic and neurocognitive health (particularly in moderate-severe cases). -Patient understands that CPAP should be worn every night for the duration of the night (in order to not miss therapy during early-morning REM period) for maximum benefit. 2. Excessive Daytime Sleepiness     -Elevated [place] 11/24.   -Counseled on risks of driving while drowsy.  -Will obtain data download following DME apt for set up/mask on 8/31. If data download looks as if events are controlled, will consider switching to RESMED device or titration study (continues to snore). 3. Obesity. Body mass index is 37.12 kg/m². -Previously discussed impact of weight gain on CIERA severity. Patient understands that CIERA severity may improve with weight loss but no guarantee of cure can be made.  -Following with Dr. Geovanny Campoverde. Follow up: Return in about 4 months (around 12/22/2022) for Follow up for sleep apnea.     Marina Fontana, NOY-[facility]  [address] [facility]  P -[phone] option 2  - [phone]

## 2022-08-22 ENCOUNTER — OFFICE VISIT (OUTPATIENT)
Dept: SLEEP MEDICINE | Age: 52
End: 2022-08-22
Payer: MEDICAID

## 2022-08-22 VITALS
DIASTOLIC BLOOD PRESSURE: 74 MMHG | WEIGHT: 177.6 LBS | HEIGHT: 58 IN | HEART RATE: 71 BPM | SYSTOLIC BLOOD PRESSURE: 122 MMHG | RESPIRATION RATE: 16 BRPM | BODY MASS INDEX: 37.28 KG/M2 | OXYGEN SATURATION: 98 %

## 2022-08-22 DIAGNOSIS — G47.19 EXCESSIVE DAYTIME SLEEPINESS: ICD-10-CM

## 2022-08-22 DIAGNOSIS — G47.33 OBSTRUCTIVE SLEEP APNEA: Primary | ICD-10-CM

## 2022-08-22 DIAGNOSIS — E66.9 OBESITY, UNSPECIFIED CLASSIFICATION, UNSPECIFIED OBESITY TYPE, UNSPECIFIED WHETHER SERIOUS COMORBIDITY PRESENT: ICD-10-CM

## 2022-08-22 LAB — HISTOPLASMA ABS, ID: NOT DETECTED

## 2022-08-22 PROCEDURE — G8417 CALC BMI ABV UP PARAM F/U: HCPCS | Performed by: NURSE PRACTITIONER

## 2022-08-22 PROCEDURE — 1036F TOBACCO NON-USER: CPT | Performed by: NURSE PRACTITIONER

## 2022-08-22 PROCEDURE — 3017F COLORECTAL CA SCREEN DOC REV: CPT | Performed by: NURSE PRACTITIONER

## 2022-08-22 PROCEDURE — G8427 DOCREV CUR MEDS BY ELIG CLIN: HCPCS | Performed by: NURSE PRACTITIONER

## 2022-08-22 PROCEDURE — 99213 OFFICE O/P EST LOW 20 MIN: CPT | Performed by: NURSE PRACTITIONER

## 2022-08-22 ASSESSMENT — SLEEP AND FATIGUE QUESTIONNAIRES
ESS TOTAL SCORE: 16
HOW LIKELY ARE YOU TO NOD OFF OR FALL ASLEEP WHEN YOU ARE A PASSENGER IN A CAR FOR AN HOUR WITHOUT A BREAK: 3
HOW LIKELY ARE YOU TO NOD OFF OR FALL ASLEEP WHILE SITTING AND READING: 2
HOW LIKELY ARE YOU TO NOD OFF OR FALL ASLEEP IN A CAR, WHILE STOPPED FOR A FEW MINUTES IN TRAFFIC: 2
HOW LIKELY ARE YOU TO NOD OFF OR FALL ASLEEP WHILE LYING DOWN TO REST IN THE AFTERNOON WHEN CIRCUMSTANCES PERMIT: 0
HOW LIKELY ARE YOU TO NOD OFF OR FALL ASLEEP WHILE SITTING INACTIVE IN A PUBLIC PLACE: 2
HOW LIKELY ARE YOU TO NOD OFF OR FALL ASLEEP WHILE SITTING QUIETLY AFTER LUNCH WITHOUT ALCOHOL: 3
HOW LIKELY ARE YOU TO NOD OFF OR FALL ASLEEP WHILE WATCHING TV: 2
HOW LIKELY ARE YOU TO NOD OFF OR FALL ASLEEP WHILE SITTING AND TALKING TO SOMEONE: 2

## 2022-08-23 ENCOUNTER — TELEPHONE (OUTPATIENT)
Dept: PAIN MANAGEMENT | Age: 52
End: 2022-08-23

## 2022-08-23 NOTE — TELEPHONE ENCOUNTER
Call to Rinaldo Alpers that procedure was approved for 8/25/2022 and that the surgery center should call her a few days before for the pre op call and after 3:00 PM the business day before with the arrival time. Instructed Ashlee to hold ibuprofen for 24 hours, naprosyn for 4 days and any aspirin containing products or fish oil for 7 days. Instructed to call office back if any questions. Ashlee verbalized understanding. Called using an  Anca Zarate # [id number].     Lowell Blood RN  Pain Management

## 2022-08-24 DIAGNOSIS — J45.40 MODERATE PERSISTENT ASTHMA, UNSPECIFIED WHETHER COMPLICATED: Primary | ICD-10-CM

## 2022-08-29 ENCOUNTER — HOSPITAL ENCOUNTER (OUTPATIENT)
Age: 52
Discharge: HOME | End: 2022-08-29
Payer: MEDICAID

## 2022-08-29 DIAGNOSIS — J45.40 MODERATE PERSISTENT ASTHMA, UNSPECIFIED WHETHER COMPLICATED: ICD-10-CM

## 2022-08-29 PROCEDURE — 83516 IMMUNOASSAY NONANTIBODY: CPT

## 2022-08-30 ENCOUNTER — HOSPITAL ENCOUNTER (OUTPATIENT)
Age: 52
Discharge: HOME | End: 2022-08-30
Payer: MEDICAID

## 2022-08-30 PROCEDURE — 86606 ASPERGILLUS ANTIBODY: CPT

## 2022-09-02 ENCOUNTER — TELEPHONE (OUTPATIENT)
Dept: PAIN MANAGEMENT | Age: 52
End: 2022-09-02

## 2022-09-02 LAB
MYELOPEROXIDASE AB: 0 AU/ML (ref 0–19)
SERINE PROTEASE 3 AB: 2 AU/ML (ref 0–19)

## 2022-09-02 NOTE — TELEPHONE ENCOUNTER
Call to [employer] that procedure was approved for 9/12/2022 and that the surgery center should call her a few days before for the pre op call and after 3:00 PM the business day before with the arrival time. Instructed to call office back if any questions. Ashlee verbalized understanding.      Rebecca Melchor RN  Pain Management

## 2022-09-07 LAB
Lab: NORMAL
REPORT: NORMAL
THIS TEST SENT TO: NORMAL

## 2022-09-08 ENCOUNTER — ANESTHESIA EVENT (OUTPATIENT)
Dept: OPERATING ROOM | Age: 52
End: 2022-09-08
Payer: MEDICAID

## 2022-09-08 ASSESSMENT — LIFESTYLE VARIABLES: SMOKING_STATUS: 0

## 2022-09-08 NOTE — ANESTHESIA PRE PROCEDURE
Department of Anesthesiology  Preprocedure Note       Name:  Kirk Nixon   Age:  45 y.o.  :  1970                                          MRN:  [id number]         Date:  2022      Surgeon: Zechariah Alcocer):  Royal Shine MD    Procedure: Procedure(s):  LEFT GREATER TROCHANTERIC BURSAE INJECTION UNDER XRAY WITH SEDATION (CPT 54030)    Medications prior to admission:   Prior to Admission medications    Medication Sig Start Date End Date Taking? Authorizing Provider   oxyCODONE (ROXICODONE) 5 MG immediate release tablet Take 1 tablet by mouth every 12 hours as needed for Pain for up to 30 days.  Intended supply: 30 days 8/15/22 9/14/22  GARCIA Cuevas   fluticasone-salmeterol (ADVAIR DISKUS) 500-50 MCG/ACT AEPB diskus inhaler Inhale 1 puff into the lungs every 12 hours 22   Walnut Covephilipp Dillard DO   montelukast (SINGULAIR) 10 MG tablet Take 1 tablet by mouth nightly 22  Angel Dillard DO   omeprazole (PRILOSEC) 40 MG delayed release capsule Take 1 capsule by mouth 2 times daily (before meals) 22   Ronda Kelly MD   senna (SENOKOT) 8.6 MG tablet Take 1 tablet by mouth 2 times daily 22  Ronda Kelly MD   fenofibrate (LIPOFEN) 150 MG CAPS capsule Take 1 capsule by mouth daily 22   Lorin Patient, DO   Elastic Bandages & Supports (KNEE BRACE) MISC Soft neutral position left knee brace  Size to fit  Dx:  Knee pain 22   Lorin Patient, DO   vitamin D3 (CHOLECALCIFEROL) 25 MCG (1000 UT) TABS tablet Take 1 tablet by mouth daily 21   Lorin Patient, DO   Prenatal Vit-Fe Fumarate-FA (PRENATAL PLUS) 27-1 MG TABS 1 pill daily 21   Lorin Patient, DO   EPINEPHrine (EPIPEN 2-ABUNDIO) 0.3 MG/0.3ML SOAJ injection Inject into muscle for allergic reaction 19   Lorin Patient, DO       Current medications:    Current Outpatient Medications   Medication Sig Dispense Refill    oxyCODONE (ROXICODONE) 5 MG immediate release tablet Take 1 tablet by mouth every 12 hours as needed for Pain for up to 30 days. Intended supply: 30 days 45 tablet 0    fluticasone-salmeterol (ADVAIR DISKUS) 500-50 MCG/ACT AEPB diskus inhaler Inhale 1 puff into the lungs every 12 hours 60 each 12    montelukast (SINGULAIR) 10 MG tablet Take 1 tablet by mouth nightly 30 tablet 12    omeprazole (PRILOSEC) 40 MG delayed release capsule Take 1 capsule by mouth 2 times daily (before meals) 60 capsule 5    senna (SENOKOT) 8.6 MG tablet Take 1 tablet by mouth 2 times daily 60 tablet 11    fenofibrate (LIPOFEN) 150 MG CAPS capsule Take 1 capsule by mouth daily 30 capsule 2    Elastic Bandages & Supports (KNEE BRACE) MISC Soft neutral position left knee brace  Size to fit  Dx:  Knee pain 1 each 0    vitamin D3 (CHOLECALCIFEROL) 25 MCG (1000 UT) TABS tablet Take 1 tablet by mouth daily 30 tablet 5    Prenatal Vit-Fe Fumarate-FA (PRENATAL PLUS) 27-1 MG TABS 1 pill daily 30 tablet 5    EPINEPHrine (EPIPEN 2-ABUNDIO) 0.3 MG/0.3ML SOAJ injection Inject into muscle for allergic reaction 0.3 mL 0     No current facility-administered medications for this visit. Allergies:     Allergies   Allergen Reactions    Fish-Derived Products Anaphylaxis    Dye [Iodides] Itching       Problem List:    Patient Active Problem List   Diagnosis Code    Cervical stenosis of spinal canal M48.02    DDD (degenerative disc disease), cervical M50.30    Lumbar radiculopathy M54.16    History of TIA (transient ischemic attack) and stroke Z86.73    Spinal stenosis of lumbar region without neurogenic claudication M48.061    Fibromyalgia M79.7    Migraines without aura and without status migrainosus, not intractable G43.009    Primary stabbing headache G44.85    Chronic pain syndrome G89.4    Primary osteoarthritis of left hip M16.12    Cervical disc disorder M50.90    Cervical facet joint syndrome M47.812    Cervical radiculopathy M54.12    Cervical stenosis of spine M48.02    Lumbar spondylosis M47.816    Lumbar facet arthropathy M47.816    Lumbar disc disorder M51.9    Greater trochanteric bursitis of left hip M70.62    Pain in left hip M25.552    Facet arthropathy, cervical M47.812    Primary osteoarthritis of left knee M17.12    Hepatic cyst K76.89    Lung nodule R91.1    Irritable bowel syndrome K58.9    Hepatic steatosis K76.0    Class 2 severe obesity due to excess calories with serious comorbidity and body mass index (BMI) of 37.0 to 37.9 in adult ([address] E66.01, Z68.37    CIERA on CPAP G47.33, Z99.89       Past Medical History:        Diagnosis Date    Arthritis     Asthma     uses inhalers twice a day    Cerebral artery occlusion with cerebral infarction ([address]     2015-slight right sided weakness, ambulates normal    Cervical spinal stenosis     Chronic back pain     Chronic kidney disease     Class 2 severe obesity due to excess calories with serious comorbidity and body mass index (BMI) of 37.0 to 37.9 in adult [facility])     COVID 2021    vomiting, diarrhea, body aches, fever, cough- coulc not confirm month    Disorder of thyroid gland 09/11/2012    no medication    GERD (gastroesophageal reflux disease)     Headache(784.0)     Irritable bowel syndrome     Numbness and tingling in right hand 03/06/2020    CIREA on CPAP     uses machine    Seizure disorder ([address]     not on medications for this; last seizure 2018; etiology?     Vitamin D deficiency 12/03/2014       Past Surgical History:        Procedure Laterality Date    CHOLECYSTECTOMY, LAPAROSCOPIC N/A 5/17/2022    LAPAROSCOPIC ROBOTIC XI ASSISTED CHOLECYSTECTOMY performed by Julissa Marie MD at [place] [address] COLONOSCOPY  03/09/2016    COLONOSCOPY  07/10/2017    COLONOSCOPY N/A 4/1/2022    COLONOSCOPY DIAGNOSTIC WITH BANDING performed by Julissa Marie MD at [address] [facility] [place] Left 08/10/2020    LEFT GREATER TROCHANTERIC BURSAE INJECTION X-RAY (CPT 17225) performed by Daphney Wu MD at [facility] BLOCK N/A     epidural c6-c7    NERVE BLOCK Bilateral 2021    BILATERAL CERVICAL MEDIAL BRANCH FACET BLOCK C4 C5 C6 WITH SEDATION WITH LEFT TROCHANTERIC BURSA INJECTION(CPT 93645) performed by Emelyn Abbott MD at [address] N/A 2021    CERVICAL EPIDURAL INTERLAMINAR STEROID INJECTION C6-C7 WITH SEDATION performed by Emelyn Abbott MD at [address] [place] [address] Left 2022    CERVICAL EPIDURAL STEROID INJECTION LEFT PARAMEDIAN C6-C7 WITH SEDATION performed by Emelyn Abbott MD at [facility] ENDOSCOPY  07/10/2017       Social History:    Social History     Tobacco Use    Smoking status: Former     Packs/day: 1.00     Years: 25.00     Pack years: 25.00     Types: Cigarettes     Quit date: 1988     Years since quittin.4    Smokeless tobacco: Never   Substance Use Topics    Alcohol use: No                                Counseling given: Not Answered      Vital Signs (Current): There were no vitals filed for this visit.                                            BP Readings from Last 3 Encounters:   22 122/74   08/15/22 110/62   22 113/66       NPO Status:                                                                                 BMI:   Wt Readings from Last 3 Encounters:   22 177 lb 9.6 oz (80.6 kg)   08/15/22 181 lb (82.1 kg)   22 181 lb 9.6 oz (82.4 kg)     There is no height or weight on file to calculate BMI.    CBC:   Lab Results   Component Value Date/Time    WBC 5.8 2022 02:29 PM    RBC 5.00 2022 02:29 PM    HGB 13.9 2022 02:29 PM    HCT 41.5 2022 02:29 PM    MCV 83.0 2022 02:29 PM    RDW 12.8 2022 02:29 PM     2022 02:29 PM       CMP:   Lab Results   Component Value Date/Time     2022 06:54 AM    K 5.5 2022 06:54 AM     2022 06:54 AM    CO2 26 2022 06:54 AM    BUN 14 2022 06:54 AM    CREATININE 0.9 2022 06:54 AM    GFRAA >60 2022 06:54 AM    LABGLOM >60 2022 06:54 AM    GLUCOSE 110 2022 06:54 AM    GLUCOSE 108 2012 01:50 AM    PROT 8.0 2022 06:54 AM    PROT 7.9 2022 06:54 AM    CALCIUM 9.7 2022 06:54 AM    BILITOT 0.5 2022 06:54 AM    BILITOT 0.5 2022 06:54 AM    ALKPHOS 92 2022 06:54 AM    ALKPHOS 93 2022 06:54 AM    AST 41 2022 06:54 AM    AST 40 2022 06:54 AM    ALT 27 2022 06:54 AM    ALT 27 2022 06:54 AM       POC Tests: No results for input(s): POCGLU, POCNA, POCK, POCCL, POCBUN, POCHEMO, POCHCT in the last 72 hours.     Coags:   Lab Results   Component Value Date/Time    PROTIME 11.8 2020 04:17 PM    PROTIME 11.2 2012 01:35 AM    INR 1.0 2020 04:17 PM    APTT 32.3 2020 04:17 PM       HCG (If Applicable):   Lab Results   Component Value Date    PREGTESTUR negative 04/10/2017        ABGs: No results found for: PHART, PO2ART, EVI8USU, OFZ1UFI, BEART, D7MJNLUP     Type & Screen (If Applicable):  No results found for: LABABO, LABRH    Drug/Infectious Status (If Applicable):  No results found for: HIV, HEPCAB    COVID-19 Screening (If Applicable):   Lab Results   Component Value Date/Time    COVID19 Not Detected 2022 12:00 PM    COVID19 DETECTED 2021 12:00 PM           Anesthesia Evaluation  Patient summary reviewed no history of anesthetic complications:   Airway: Mallampati: II  TM distance: >3 FB   Neck ROM: full  Mouth opening: > = 3 FB   Dental: normal exam         Pulmonary: breath sounds clear to auscultation  (+) sleep apnea: on CPAP,  asthma:     (-) not a current smoker                          ROS comment: Former - 25 pack years  Quit Smokin88    Lung nodule   Cardiovascular:  Exercise tolerance: good (>4 METS),       (-) past MI and CAD    ECG reviewed  Rhythm: regular  Rate: normal  Echocardiogram reviewed         Beta Blocker:  Not on Beta Blocker      ROS comment: EKG:  Sinus  Rhythm 70, RSR(V1) -nondiagnostic. Low voltage -possible pulmonary disease. ABNORMAL     ECHO:    Left Ventricle   Normal left ventricular size and systolic function. Ejection fraction is visually estimated at 60-65%. Normal diastolic function. No regional wall motion abnormalities seen. Normal left ventricular wall thickness. Right Ventricle   Normal right ventricular size and function. Left Atrium   Normal sized left atrium. The interatrial septum appears intact. Right Atrium   Normal right atrial size. Mitral Valve   Structurally normal mitral valve. No evidence of mitral valve stenosis. Physiologic mitral regurgitation. Tricuspid Valve   The tricuspid valve appears structurally normal.   Physiologic and/or trace tricuspid regurgitation. RVSP is 23 mmHg. Aortic Valve   Structurally normal aortic valve. The aortic valve is trileaflet. No hemodynamically significant aortic stenosis is present. No evidence of aortic valve regurgitation. Pulmonic Valve   Pulmonic valve is structurally normal.   No evidence of pulmonic valve stenosis. No evidence of any pulmonic regurgitation. Pericardial Effusion   No evidence of pericardial effusion. Aorta   Aortic root dimension within normal limits. Miscellaneous   Normal Inferior Vena Cava diameter and respiratory variation. Abnormal appears cystic structure in the liver. Conclusions      Summary   Normal left ventricular size and systolic function. Ejection fraction is visually estimated at 60-65%. Normal diastolic function. No regional wall motion abnormalities seen. Normal left ventricular wall thickness. Normal right ventricular size and function. No significant valvular abnormalities.       Signature      ----------------------------------------------------------------   Electronically signed by Ellie Cummings MD(Interpreting   physician) on 02/23/2022 05:56 PM     Neuro/Psych:   (+) seizures: well controlled, neuromuscular disease ( Spinal stenosis of lumbar region without neurogenic claudication):, TIA, headaches:, psychiatric history:depression/anxiety    (-) CVA           GI/Hepatic/Renal:   (+) GERD:, liver disease (Hepatic cyst, Hepatic steatosis ):, morbid obesity     (-) bowel prep       Endo/Other:    (+) : arthritis ( Greater trochanteric bursitis of left hip): OA., .                 Abdominal:   (+) obese ( Morbidly obese BMI: 36.78),           Vascular:   + PVD, aortic or cerebral, . Other Findings:             Anesthesia Plan      MAC     ASA 3       Induction: intravenous. continuous noninvasive hemodynamic monitor    Anesthetic plan and risks discussed with patient. Plan discussed with CRNA.                   Kuldip Olea MD  [phone]

## 2022-09-09 ENCOUNTER — TELEPHONE (OUTPATIENT)
Dept: GASTROENTEROLOGY | Age: 52
End: 2022-09-09

## 2022-09-09 NOTE — ED NOTES
Instructions provided and questions answered. Prescriptions verified with patient. Crutches and immobilizer placed per ECA.  Crutches provided as well as ordered     Charles Bradley RN  02/14/21 2004
Therapist

## 2022-09-09 NOTE — TELEPHONE ENCOUNTER
Left message for patients  stating that dr Farooq Rae will be out of  the office I also gave the option of scheduling with royal if she would like

## 2022-09-12 ENCOUNTER — HOSPITAL ENCOUNTER (OUTPATIENT)
Age: 52
Setting detail: OUTPATIENT SURGERY
Discharge: HOME | End: 2022-09-12
Attending: PAIN MEDICINE | Admitting: PAIN MEDICINE
Payer: MEDICAID

## 2022-09-12 ENCOUNTER — ANESTHESIA (OUTPATIENT)
Dept: OPERATING ROOM | Age: 52
End: 2022-09-12
Payer: MEDICAID

## 2022-09-12 ENCOUNTER — HOSPITAL ENCOUNTER (OUTPATIENT)
Dept: OPERATING ROOM | Age: 52
Setting detail: OUTPATIENT SURGERY
Discharge: HOME | End: 2022-09-12
Attending: PAIN MEDICINE
Payer: MEDICAID

## 2022-09-12 VITALS
WEIGHT: 173 LBS | RESPIRATION RATE: 16 BRPM | BODY MASS INDEX: 36.31 KG/M2 | SYSTOLIC BLOOD PRESSURE: 102 MMHG | HEART RATE: 76 BPM | OXYGEN SATURATION: 97 % | DIASTOLIC BLOOD PRESSURE: 70 MMHG | TEMPERATURE: 97.9 F | HEIGHT: 58 IN

## 2022-09-12 DIAGNOSIS — M70.62 TROCHANTERIC BURSITIS OF LEFT HIP: ICD-10-CM

## 2022-09-12 PROCEDURE — 7100000010 HC PHASE II RECOVERY - FIRST 15 MIN: Performed by: PAIN MEDICINE

## 2022-09-12 PROCEDURE — 7100000011 HC PHASE II RECOVERY - ADDTL 15 MIN: Performed by: PAIN MEDICINE

## 2022-09-12 PROCEDURE — 2580000003 HC RX 258: Performed by: ANESTHESIOLOGY

## 2022-09-12 PROCEDURE — 2709999900 HC NON-CHARGEABLE SUPPLY: Performed by: PAIN MEDICINE

## 2022-09-12 PROCEDURE — 3700000000 HC ANESTHESIA ATTENDED CARE: Performed by: PAIN MEDICINE

## 2022-09-12 PROCEDURE — 6360000002 HC RX W HCPCS: Performed by: NURSE ANESTHETIST, CERTIFIED REGISTERED

## 2022-09-12 PROCEDURE — A9579 GAD-BASE MR CONTRAST NOS,1ML: HCPCS | Performed by: PAIN MEDICINE

## 2022-09-12 PROCEDURE — 20610 DRAIN/INJ JOINT/BURSA W/O US: CPT | Performed by: PAIN MEDICINE

## 2022-09-12 PROCEDURE — 6360000002 HC RX W HCPCS: Performed by: PAIN MEDICINE

## 2022-09-12 PROCEDURE — 3600000005 HC SURGERY LEVEL 5 BASE: Performed by: PAIN MEDICINE

## 2022-09-12 PROCEDURE — 6360000004 HC RX CONTRAST MEDICATION: Performed by: PAIN MEDICINE

## 2022-09-12 PROCEDURE — 2500000003 HC RX 250 WO HCPCS: Performed by: PAIN MEDICINE

## 2022-09-12 PROCEDURE — 3209999900 FLUORO FOR SURGICAL PROCEDURES

## 2022-09-12 PROCEDURE — 77002 NEEDLE LOCALIZATION BY XRAY: CPT | Performed by: PAIN MEDICINE

## 2022-09-12 RX ORDER — LIDOCAINE HYDROCHLORIDE 5 MG/ML
INJECTION, SOLUTION INFILTRATION; INTRAVENOUS PRN
Status: DISCONTINUED | OUTPATIENT
Start: 2022-09-12 | End: 2022-09-12 | Stop reason: ALTCHOICE

## 2022-09-12 RX ORDER — PROPOFOL 10 MG/ML
INJECTION, EMULSION INTRAVENOUS PRN
Status: DISCONTINUED | OUTPATIENT
Start: 2022-09-12 | End: 2022-09-12 | Stop reason: SDUPTHER

## 2022-09-12 RX ORDER — SODIUM CHLORIDE 0.9 % (FLUSH) 0.9 %
5-40 SYRINGE (ML) INJECTION PRN
Status: DISCONTINUED | OUTPATIENT
Start: 2022-09-12 | End: 2022-09-12 | Stop reason: HOSPADM

## 2022-09-12 RX ORDER — MIDAZOLAM HYDROCHLORIDE 1 MG/ML
INJECTION INTRAMUSCULAR; INTRAVENOUS PRN
Status: DISCONTINUED | OUTPATIENT
Start: 2022-09-12 | End: 2022-09-12 | Stop reason: SDUPTHER

## 2022-09-12 RX ORDER — FENTANYL CITRATE 50 UG/ML
INJECTION, SOLUTION INTRAMUSCULAR; INTRAVENOUS PRN
Status: DISCONTINUED | OUTPATIENT
Start: 2022-09-12 | End: 2022-09-12 | Stop reason: SDUPTHER

## 2022-09-12 RX ORDER — SODIUM CHLORIDE 9 MG/ML
INJECTION, SOLUTION INTRAVENOUS PRN
Status: DISCONTINUED | OUTPATIENT
Start: 2022-09-12 | End: 2022-09-12 | Stop reason: HOSPADM

## 2022-09-12 RX ORDER — SODIUM CHLORIDE, SODIUM LACTATE, POTASSIUM CHLORIDE, CALCIUM CHLORIDE 600; 310; 30; 20 MG/100ML; MG/100ML; MG/100ML; MG/100ML
INJECTION, SOLUTION INTRAVENOUS CONTINUOUS
Status: DISCONTINUED | OUTPATIENT
Start: 2022-09-12 | End: 2022-09-12 | Stop reason: HOSPADM

## 2022-09-12 RX ORDER — SODIUM CHLORIDE 0.9 % (FLUSH) 0.9 %
5-40 SYRINGE (ML) INJECTION EVERY 12 HOURS SCHEDULED
Status: DISCONTINUED | OUTPATIENT
Start: 2022-09-12 | End: 2022-09-12 | Stop reason: HOSPADM

## 2022-09-12 RX ADMIN — PROPOFOL 10 MG: 10 INJECTION, EMULSION INTRAVENOUS at 09:45

## 2022-09-12 RX ADMIN — SODIUM CHLORIDE, POTASSIUM CHLORIDE, SODIUM LACTATE AND CALCIUM CHLORIDE: 600; 310; 30; 20 INJECTION, SOLUTION INTRAVENOUS at 08:59

## 2022-09-12 RX ADMIN — PROPOFOL 10 MG: 10 INJECTION, EMULSION INTRAVENOUS at 09:42

## 2022-09-12 RX ADMIN — FENTANYL CITRATE 50 MCG: 50 INJECTION INTRAMUSCULAR; INTRAVENOUS at 09:41

## 2022-09-12 RX ADMIN — MIDAZOLAM 2 MG: 1 INJECTION INTRAMUSCULAR; INTRAVENOUS at 09:41

## 2022-09-12 ASSESSMENT — PAIN - FUNCTIONAL ASSESSMENT: PAIN_FUNCTIONAL_ASSESSMENT: 0-10

## 2022-09-12 NOTE — OP NOTE
2022    Patient: Yessenia Gage  :  1970  Age:  45 y.o. Sex:  female     PRE-OPERATIVE DIAGNOSIS:Left   Greater trochanter bursitis. POST-OPERATIVE DIAGNOSIS: Same. PROCEDURE PERFORMED: Left  Greater trochanter bursea steroid injection under fluoroscopic guidance. SURGEON: AILEEN Rust M.D. ANESTHESIA: MAC    ESTIMATED BLOOD LOSS: None. BRIEF HISTORY: Yessenia Gage comes in today for Left greater trochanter bursea steroid injection under fluoroscopic guidance . After discussing the potential risks and benefits of the procedure with the patient  Arpscott Rob did request that we proceed. Ashlee's complete History & Physical examination were reviewed in depth, a copy of which is in the chart. DESCRIPTION OF PROCEDURE:      After confirming written and informed consent, a time-out was performed and [place] name and date of birth, the procedure to be performed as well as the plan for the location of the needle insertion were confirmed. Patient was brought into the procedure room and was placed in the lateral decubitus position on a fluoroscopy table. Standard monitors were placed and vital signs were observed throughout the procedure. The area of the Left hip was prepped with chloraprep and draped in a sterile manner. The overlying skin and subcutaneous tissues were anesthetized with 0.5% Lidocaine. At this time, a 22 gauge spinal 3 1/2 inch spinal needle was advanced in AP view until greater trochanter was contacted. After negative aspiration, 0.5 cc of 240 omnipaque was injected with appropiate contrast spread under live fluoroscopy. A solution of 0.25 % marcaine 4 cc and 40 mg DepoMedrol was injected easily after negative aspiration without complications. The needle was then removed and Band-Aid applied. Disposition the patient tolerated the procedure well and there were no complications . Vital signs remained stable throughout the procedure.  The patient was escorted to the recovery area where they remained until discharge and written discharge instructions for the procedure were given. Plan: Dakota Curiel will return to our pain management center as scheduled.      Junior Farrar MD

## 2022-09-12 NOTE — H&P
[address]  [facility], [address] [place] [address]  [phone]    Procedure History & Physical      Hermann Sampson     HPI:    Patient  is here for Left hip pain for Left greater trochanteric bursae injection. Labs/imaging studies reviewed   All question and concerns addressed including R/B/A associated with the procedure    Past Medical History:   Diagnosis Date    Arthritis     Asthma     uses inhalers twice a day    Cerebral artery occlusion with cerebral infarction [address]     2015-slight right sided weakness, ambulates normal    Cervical spinal stenosis     Chronic back pain     Chronic kidney disease     Class 2 severe obesity due to excess calories with serious comorbidity and body mass index (BMI) of 37.0 to 37.9 in adult [facility])     COVID 2021    vomiting, diarrhea, body aches, fever, cough- coulc not confirm month    Disorder of thyroid gland 09/11/2012    no medication    GERD (gastroesophageal reflux disease)     Headache(784.0)     Irritable bowel syndrome     Numbness and tingling in right hand 03/06/2020    CIERA on CPAP     uses machine    Seizure disorder ([facility] [address]     not on medications for this; last seizure 2018; etiology?     Vitamin D deficiency 12/03/2014       Past Surgical History:   Procedure Laterality Date    CHOLECYSTECTOMY, LAPAROSCOPIC N/A 5/17/2022    LAPAROSCOPIC ROBOTIC XI ASSISTED CHOLECYSTECTOMY performed by Annabelle Eugene MD at [address] [place] [address]  03/09/2016    COLONOSCOPY  07/10/2017    COLONOSCOPY N/A 4/1/2022    COLONOSCOPY DIAGNOSTIC WITH BANDING performed by Annabelle Eugene MD at [address] [place] [address] Rd Left 08/10/2020    LEFT GREATER TROCHANTERIC BURSAE INJECTION X-RAY (CPT 92500) performed by Scooby Dumas MD at [facility]. 47     epidural c6-c7    NERVE BLOCK Bilateral 8/5/2021    BILATERAL CERVICAL MEDIAL BRANCH FACET BLOCK C4 C5 C6 WITH SEDATION WITH LEFT TROCHANTERIC BURSA INJECTION(CPT 45742) performed by Messi Mccann MD at [address] [place] [address] N/A 4/19/2021    CERVICAL EPIDURAL INTERLAMINAR STEROID INJECTION C6-C7 WITH SEDATION performed by Messi Mccann MD at [address] [place] [address] Left 1/31/2022    CERVICAL EPIDURAL STEROID INJECTION LEFT PARAMEDIAN C6-C7 WITH SEDATION performed by Messi Mccann MD at [facility]  07/10/2017       Prior to Admission medications    Medication Sig Start Date End Date Taking? Authorizing Provider   oxyCODONE (ROXICODONE) 5 MG immediate release tablet Take 1 tablet by mouth every 12 hours as needed for Pain for up to 30 days.  Intended supply: 30 days 8/15/22 9/14/22  GARCIA Crooks   fluticasone-salmeterol (ADVAIR DISKUS) 500-50 MCG/ACT AEPB diskus inhaler Inhale 1 puff into the lungs every 12 hours 7/5/22   Srinivasa Ledbetter DO   montelukast (SINGULAIR) 10 MG tablet Take 1 tablet by mouth nightly 7/5/22 8/22/22  Srinivasa Ledbetter DO   omeprazole (PRILOSEC) 40 MG delayed release capsule Take 1 capsule by mouth 2 times daily (before meals) 6/8/22   Francisco Phillips MD   senna (SENOKOT) 8.6 MG tablet Take 1 tablet by mouth 2 times daily 6/8/22 6/8/23  Francisco Phillips MD   fenofibrate (LIPOFEN) 150 MG CAPS capsule Take 1 capsule by mouth daily 4/7/22   Jermaine Bourne DO   Elastic Bandages & Supports (KNEE BRACE) MISC Soft neutral position left knee brace  Size to fit  Dx:  Knee pain 1/21/22   Jermaine Bourne DO   vitamin D3 (CHOLECALCIFEROL) 25 MCG (1000 UT) TABS tablet Take 1 tablet by mouth daily 8/26/21   Jermaine Bourne DO   Prenatal Vit-Fe Fumarate-FA (PRENATAL PLUS) 27-1 MG TABS 1 pill daily 8/26/21   Jermaine Bourne DO   EPINEPHrine (EPIPEN 2-ABUNDIO) 0.3 MG/0.3ML SOAJ injection Inject into muscle for allergic reaction 8/22/19   Jermaien Bourne DO       Allergies   Allergen Reactions    Fish-Derived Products Anaphylaxis    Dye [Iodides] Itching Social History     Socioeconomic History    Marital status:      Spouse name: Not on file    Number of children: Not on file    Years of education: Not on file    Highest education level: Not on file   Occupational History    Not on file   Tobacco Use    Smoking status: Former     Packs/day: 1.00     Years: 25.00     Pack years: 25.00     Types: Cigarettes     Quit date: 1988     Years since quittin.4    Smokeless tobacco: Never   Vaping Use    Vaping Use: Never used   Substance and Sexual Activity    Alcohol use: No    Drug use: No    Sexual activity: Not on file   Other Topics Concern    Not on file   Social History Narrative    Not on file     Social Determinants of Health     Financial Resource Strain: Not on file   Food Insecurity: Not on file   Transportation Needs: Not on file   Physical Activity: Not on file   Stress: Not on file   Social Connections: Not on file   Intimate Partner Violence: Not on file   Housing Stability: Not on file       Family History   Problem Relation Age of Onset    Heart Attack Mother     Migraines Mother     Kidney Disease Father         failure    Migraines Sister     Asthma Child     Asthma Child         bone disease    Anemia Child     Diabetes Other          REVIEW OF SYSTEMS:    CONSTITUTIONAL:  negative for  fevers, chills, sweats and fatigue    RESPIRATORY:  negative for  dry cough, cough with sputum, dyspnea, wheezing and chest pain    CARDIOVASCULAR:  negative for chest pain, dyspnea, palpitations, syncope    GASTROINTESTINAL:  negative for nausea, vomiting, change in bowel habits, diarrhea, constipation and abdominal pain    MUSCULOSKELETAL: negative for muscle weakness    SKIN: negative for itching or rashes.     BEHAVIOR/PSYCH:  negative for poor appetite, increased appetite, decreased sleep and poor concentration    All other systems negative      PHYSICAL EXAM:    VITALS:  /71   Pulse 79   Temp 97.9 °F (36.6 °C) (Skin)   Resp 16   Ht 4' 10\" (1.473 m)   Wt 173 lb (78.5 kg)   LMP 03/27/2008   SpO2 96%   BMI 36.16 kg/m²     CONSTITUTIONAL:  awake, alert, cooperative, no apparent distress, and appears stated age    EYES: PERRLA, EOMI    LUNGS:  No increased work of breathing, no audible wheezing    CARDIOVASCULAR:  regular rate and rhythm    ABDOMEN:  Soft non tender non distended     EXTREMITIES: no signs of clubbing or cyanosis. MUSCULOSKELETAL: negative for flaccid muscle tone or spastic movements. SKIN: gross examination reveals no signs of rashes, or diaphoresis. NEURO: Cranial nerves II-XII grossly intact. No signs of agitated mood. Assessment/Plan:    Left hip pain for Left greater trochanteric bursae injection.

## 2022-09-12 NOTE — DISCHARGE INSTRUCTIONS
[facility]) Pain Management Department  [phone]   Post-Pain Block/ Radiofrequency Home Going Instructions    1-Go home, rest for the remainder of the day  2-Please do not lift over 20 pounds the day of the injection  3-If you received sedation No: alcohol, driving, operating lawn mowers, plows, tractors or other dangerous equipment until next morning. Do not make important decisions or sign legal documents for 24 hours. You may experience light headedness, dizziness, nausea or sleepiness after sedation. Do not stay alone. A responsible adult must be with you for 24 hours. You could be nauseated from the medications you have received. Your IV site may be sore and bruised. 4-No dietary restrictions     5-Resume all medications the same day, blood thinners to be resumed 24 hours after injection    6-Keep the surgical site clean and dry, you may shower the next morning and remove the      dressing. 7- No sitz baths, tub baths or hot tubs/swimming for 24 hours. 8- If you have any pain at the injection site(s), application of an ice pack to the area should be       helpful, 20 minutes on/20 minutes off for next 48 hours. 9- [facility] at if you develop.   Fever greater than 100.4 F  Have bleeding or drainage from the puncture site  Have progressive Leg/arm numbness and or weakness  Loss of control of bowel and or bladder (wet/soil yourself)  Severe headache with inability to lift head  10-You may return to work the next day

## 2022-09-12 NOTE — ANESTHESIA POSTPROCEDURE EVALUATION
Department of Anesthesiology  Postprocedure Note    Patient: Arn Nyhan  MRN: [id number]  YOB: 1970  Date of evaluation: 9/12/2022      Procedure Summary     Date: 09/12/22 Room / Location: [address] [place] [address] 04 / [facility]    Anesthesia Start: 5010 Anesthesia Stop: 2033    Procedure: LEFT GREATER TROCHANTERIC BURSAE INJECTION UNDER XRAY WITH SEDATION (CPT 48930) (Left: Hip) Diagnosis:       Osteoarthritis of lower legs, bilateral      (Osteoarthritis of lower legs, bilateral [M17.0])    Surgeons: Tommy Koch MD Responsible Provider: Nolvia Scott MD    Anesthesia Type: MAC ASA Status: 3          Anesthesia Type: MAC    Willis Phase I: Willis Score: 10    Willis Phase II: Willis Score: 10      Anesthesia Post Evaluation    Patient location during evaluation: bedside  Patient participation: complete - patient participated  Level of consciousness: awake  Pain score: 0  Airway patency: patent  Nausea & Vomiting: no nausea and no vomiting  Complications: no  Cardiovascular status: hemodynamically stable  Respiratory status: acceptable  Hydration status: euvolemic

## 2022-09-15 ENCOUNTER — OFFICE VISIT (OUTPATIENT)
Dept: PAIN MANAGEMENT | Age: 52
End: 2022-09-15
Payer: MEDICAID

## 2022-09-15 VITALS
HEART RATE: 85 BPM | OXYGEN SATURATION: 97 % | TEMPERATURE: 96.8 F | WEIGHT: 173 LBS | HEIGHT: 58 IN | BODY MASS INDEX: 36.31 KG/M2 | DIASTOLIC BLOOD PRESSURE: 62 MMHG | SYSTOLIC BLOOD PRESSURE: 102 MMHG | RESPIRATION RATE: 16 BRPM

## 2022-09-15 DIAGNOSIS — M47.812 FACET ARTHROPATHY, CERVICAL: ICD-10-CM

## 2022-09-15 DIAGNOSIS — M70.62 GREATER TROCHANTERIC BURSITIS OF LEFT HIP: ICD-10-CM

## 2022-09-15 DIAGNOSIS — M51.9 LUMBAR DISC DISORDER: ICD-10-CM

## 2022-09-15 DIAGNOSIS — M47.816 LUMBAR FACET ARTHROPATHY: ICD-10-CM

## 2022-09-15 DIAGNOSIS — M47.816 LUMBAR SPONDYLOSIS: ICD-10-CM

## 2022-09-15 DIAGNOSIS — M54.16 LUMBAR RADICULOPATHY: ICD-10-CM

## 2022-09-15 DIAGNOSIS — M79.7 FIBROMYALGIA: ICD-10-CM

## 2022-09-15 DIAGNOSIS — M54.12 CERVICAL RADICULOPATHY: ICD-10-CM

## 2022-09-15 DIAGNOSIS — M47.812 CERVICAL FACET JOINT SYNDROME: Primary | ICD-10-CM

## 2022-09-15 DIAGNOSIS — G89.4 CHRONIC PAIN SYNDROME: ICD-10-CM

## 2022-09-15 DIAGNOSIS — M50.30 DDD (DEGENERATIVE DISC DISEASE), CERVICAL: ICD-10-CM

## 2022-09-15 PROCEDURE — G8417 CALC BMI ABV UP PARAM F/U: HCPCS | Performed by: PHYSICIAN ASSISTANT

## 2022-09-15 PROCEDURE — 99213 OFFICE O/P EST LOW 20 MIN: CPT | Performed by: PHYSICIAN ASSISTANT

## 2022-09-15 PROCEDURE — G8427 DOCREV CUR MEDS BY ELIG CLIN: HCPCS | Performed by: PHYSICIAN ASSISTANT

## 2022-09-15 PROCEDURE — 1036F TOBACCO NON-USER: CPT | Performed by: PHYSICIAN ASSISTANT

## 2022-09-15 PROCEDURE — 3017F COLORECTAL CA SCREEN DOC REV: CPT | Performed by: PHYSICIAN ASSISTANT

## 2022-09-15 RX ORDER — BACLOFEN 10 MG/1
TABLET ORAL
COMMUNITY
Start: 2022-09-03 | End: 2022-09-15

## 2022-09-15 RX ORDER — OXYCODONE HYDROCHLORIDE 5 MG/1
5 TABLET ORAL EVERY 12 HOURS PRN
Qty: 45 TABLET | Refills: 0 | Status: SHIPPED
Start: 2022-09-15 | End: 2022-10-13 | Stop reason: SDUPTHER

## 2022-09-15 RX ORDER — LIDOCAINE 50 MG/G
1 PATCH TOPICAL DAILY
Qty: 10 PATCH | Refills: 3 | Status: SHIPPED | OUTPATIENT
Start: 2022-09-15 | End: 2022-10-15

## 2022-09-15 NOTE — PROGRESS NOTES
[address]  [address] [facility], [address] [place] [address] Mathe[place]  [phone]    Follow up Note      Ivett Hebert     Date of Visit:  9/15/2022    CC:  Patient presents for follow up   Chief Complaint   Patient presents with    Follow Up After Procedure     Left  Greater trochanter bursea steroid injection under fluoroscopic guidance. HPI:    Pain is worse to left hip and left lower back. Appropriate analgesia with current medications regimen: yes. Change in quality of symptoms:no. Medication side effects:none. Recent diagnostic testing:none. Recent interventional procedures:n 09/12/2022    PROCEDURE PERFORMED: Left  Greater trochanter bursea steroid injection under fluoroscopic guidance - feels worse. .       She has been on anticoagulation medications to include ASA. The patient  has not been on herbal supplements. The patient is not diabetic. Imaging:     Cervical spine MRI 04/2018  1. Multilevel degenerative disc disease extending from C3 through C7. Moderately severe spinal canal stenosis at C3-C4. Moderate spinal   canal stenosis at C4-C5. . Moderately severe spinal canal stenosis at   C5-C6. Abnormal signal intensity within the cervical spinal cord C3-C5   without evidence of enhancement. Findings are most likely reflective   of myelomalacia changes secondary to the underlying degree of cord   compression as described above. No active demyelination identified. 2. Slight loss of normally expected cervical lordosis C3-C6. CT Lumbar spine 2017  1. Mild diffuse osteopenia, no compression fracture or   spondylolisthesis. 2. Mild multilevel degenerative disc disease and facet joint   arthropathy of the lower lumbar spine more pronounced at L4-L5. 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 Otherwise, no musculoskeletal pathology to explain patient's pain.       Previous treatments: Physical Therapy, Epidural Steroid Injection with  and medications. .  Cymbalta severe side effects, tylenol arthritis heartburn and gi distress,  Tizanidine helps but makes tired, tramadol caused hyperness, butrans patch dizziness and diarrhea                                        Potential Aberrant Drug-Related Behavior:    No     Urine Drug Screening:  Saliva screen 08/2020 consistent for Ultram  11/2021: consistent for oxycodone     OARRS report:  7/2022 consistent to 09/2022 consistent. Opioid agreement: 12/13/21         Past Medical History:   Diagnosis Date    Arthritis     Asthma     uses inhalers twice a day    Cerebral artery occlusion with cerebral infarction ([facility] [address]     2015-slight right sided weakness, ambulates normal    Cervical spinal stenosis     Chronic back pain     Chronic kidney disease     Class 2 severe obesity due to excess calories with serious comorbidity and body mass index (BMI) of 37.0 to 37.9 in adult [facility])     COVID 2021    vomiting, diarrhea, body aches, fever, cough- coulc not confirm month    Disorder of thyroid gland 09/11/2012    no medication    GERD (gastroesophageal reflux disease)     Headache(784.0)     Irritable bowel syndrome     Numbness and tingling in right hand 03/06/2020    CIERA on CPAP     uses machine    Seizure disorder ([facility] [address]     not on medications for this; last seizure 2018; etiology?     Vitamin D deficiency 12/03/2014       Past Surgical History:   Procedure Laterality Date    CHOLECYSTECTOMY, LAPAROSCOPIC N/A 5/17/2022    LAPAROSCOPIC ROBOTIC XI ASSISTED CHOLECYSTECTOMY performed by John Jj MD at [place] [address]  03/09/2016    COLONOSCOPY  07/10/2017    COLONOSCOPY N/A 4/1/2022    COLONOSCOPY DIAGNOSTIC WITH BANDING performed by John Jj MD at [address] [place] [address] Rd Left 08/10/2020    LEFT GREATER TROCHANTERIC BURSAE INJECTION X-RAY (CPT 80749) performed by Edilberto Johnson MD at [facility] Left 9/12/2022    LEFT GREATER TROCHANTERIC BURSAE INJECTION UNDER XRAY WITH SEDATION (CPT 21378) performed by Daphney Wu MD at [facility]. 47     epidural c6-c7    NERVE BLOCK Bilateral 8/5/2021    BILATERAL CERVICAL MEDIAL BRANCH FACET BLOCK C4 C5 C6 WITH SEDATION WITH LEFT TROCHANTERIC BURSA INJECTION(CPT 97244) performed by Daphney Wu MD at [address] [place] [address] N/A 4/19/2021    CERVICAL EPIDURAL INTERLAMINAR STEROID INJECTION C6-C7 WITH SEDATION performed by Daphney Wu MD at [facility] Left 1/31/2022    CERVICAL EPIDURAL STEROID INJECTION LEFT PARAMEDIAN C6-C7 WITH SEDATION performed by Daphney Wu MD at [address] [place] [address]  07/10/2017       Prior to Admission medications    Medication Sig Start Date End Date Taking?  Authorizing Provider   baclofen (LIORESAL) 10 MG tablet  9/3/22  Yes Historical Provider, MD   fluticasone-salmeterol (ADVAIR DISKUS) 500-50 MCG/ACT AEPB diskus inhaler Inhale 1 puff into the lungs every 12 hours 7/5/22  Yes Estefania Fountain, DO   omeprazole (PRILOSEC) 40 MG delayed release capsule Take 1 capsule by mouth 2 times daily (before meals) 6/8/22  Yes Beverly Laguerre MD   senna (SENOKOT) 8.6 MG tablet Take 1 tablet by mouth 2 times daily 6/8/22 6/8/23 Yes Beverly Laguerre MD   fenofibrate (LIPOFEN) 150 MG CAPS capsule Take 1 capsule by mouth daily 4/7/22  Yes Abdi Juárez DO   Elastic Bandages & Supports (KNEE BRACE) MISC Soft neutral position left knee brace  Size to fit  Dx:  Knee pain 1/21/22  Yes Jj Ruiz, DO   vitamin D3 (CHOLECALCIFEROL) 25 MCG (1000 UT) TABS tablet Take 1 tablet by mouth daily 8/26/21  Yes Jj Ruiz DO   Prenatal Vit-Fe Fumarate-FA (PRENATAL PLUS) 27-1 MG TABS 1 pill daily 8/26/21  Yes Jj Ruiz, DO   EPINEPHrine (EPIPEN 2-ABUNDIO) 0.3 MG/0.3ML SOAJ injection Inject into muscle for allergic reaction 19  Yes Abdi Grijalva DO   montelukast (SINGULAIR) 10 MG tablet Take 1 tablet by mouth nightly 22  Clotilde Reyes DO       Allergies   Allergen Reactions    Fish-Derived Products Anaphylaxis    Dye [Iodides] Itching       Social History     Socioeconomic History    Marital status:      Spouse name: Not on file    Number of children: Not on file    Years of education: Not on file    Highest education level: Not on file   Occupational History    Not on file   Tobacco Use    Smoking status: Former     Packs/day: 1.00     Years: 25.00     Pack years: 25.00     Types: Cigarettes     Quit date: 1988     Years since quittin.4    Smokeless tobacco: Never   Vaping Use    Vaping Use: Never used   Substance and Sexual Activity    Alcohol use: No    Drug use: No    Sexual activity: Not on file   Other Topics Concern    Not on file   Social History Narrative    Not on file     Social Determinants of Health     Financial Resource Strain: Not on file   Food Insecurity: Not on file   Transportation Needs: Not on file   Physical Activity: Not on file   Stress: Not on file   Social Connections: Not on file   Intimate Partner Violence: Not on file   Housing Stability: Not on file       Family History   Problem Relation Age of Onset    Heart Attack Mother     Migraines Mother     Kidney Disease Father         failure    Migraines Sister     Asthma Child     Asthma Child         bone disease    Anemia Child     Diabetes Other        REVIEW OF SYSTEMS:     Angelo Cooler denies fever/chills, chest pain, shortness of breath, new bowel or bladder complaints. All other review of systems was negative. PHYSICAL EXAMINATION:      /62   Pulse 85   Temp 96.8 °F (36 °C) (Infrared)   Resp 16   Ht 4' 10\" (1.473 m)   Wt 173 lb (78.5 kg)   LMP 2008   SpO2 97%   BMI 36.16 kg/m²     General:      General appearance:   pleasant and well-hydrated.    , in mild discomfort and A & O x3  Build:Overweight    HEENT:    Head:normocephalic and atraumatic  Sclera: icterus absent,    Lungs:    Breathing:Normal expansion. No wheezing. Abdomen:    Shape:non-distended and normal    Lumbar spine:  + midline and left sided paraspinal TTP  + SLR  + hyperalgesia      Extremities:    Tremors:None bilaterally upper and lower  Range of motion:Generally normal shoulders, pain with internal rotation of hips not done. Intact:Yes  Edema:Normal  Left hip GT + TTP    Neurological:    [address]    Dermatology:    Skin:no unusual rashes, no skin lesions, no palpable subcutaneous nodules, and good skin turgor    Impression:    Cervical spine MRI 2019 Multilevel degenerative disc disease with moderate to severe stenosis at C3-4/C4-5 C5-6  Lumbar spine CT 2017 Mild degenerative disc disease and facet arthropathy most pronounced at L4-5  Patient had seen neurosurgery and surgery C3-4/C4-5 and C5-6 ACDF was recommended. Patient failed Gabapentin/zanaflex/flexeril/Norco/Lyrica  Patient had underwent cervical epidural at C6-7 (04/2021) for her radicular symptoms and bilateral C3,4,5 medial branch block x 1(08/2021) for her mechanical neck pain. Both interventions had helped with her pain. Plan:  Follow up on neck pain radiating down left arm with n/t hands         and low back radiating down left leg to knee, acute left troch bursitis    1/31/22: Cervical epidural left paramedian C6-7 with>80% relief  LEFT GREATER TROCHANTERIC BURSAE INJECTION X-RAY on 09/12/2022 - feels worse. MRI lumbar spine ordered today for worsening lower back pain that radiates to her left lateral hip and groin  baclofen 5mg po bid prn spasms - she has discontinued. Causes stomach discomfort/nausea. Refill Oxycodone 5mg po daily(cannot take Percocet with tylenol and liver disease)  Continue lidoderm patchs q 24 hours prn pain. RF today.   Avoid NSAIDs(H/O GI ulcers)  Buccal screen ordered today  OARRS report reviewed  Patient encouraged to stay active and to lose weight. Treatment plan discussed with the patient and her including medications side effects. Controlled Substance Monitoring:    Acute and Chronic Pain Monitoring:   RX Monitoring 9/15/2022   Attestation -   Periodic Controlled Substance Monitoring Possible medication side effects, risk of tolerance/dependence & alternative treatments discussed. ;No signs of potential drug abuse or diversion identified. ;Assessed functional status. ;Obtaining appropriate analgesic effect of treatment. ;Random urine drug screen sent today. We discussed with the patient that combining opioids, benzodiazepines, alcohol, illicit drugs or sleep aids increases the risk of respiratory depression including death. We discussed that these medications may cause drowsiness, sedation or dizziness and have counseled the patient not to drive or operate machinery. We have discussed that these medications will be prescribed only by one provider. We have discussed with the patient about age related risk factors and have thoroughly discussed the importance of taking these medications as prescribed. The patient verbalizes understanding.     ccreferring physic

## 2022-09-15 NOTE — PROGRESS NOTES
Do you currently have any of the following:    Fever: No  Headache:  No  Cough: No  Shortness of breath: No  Exposed to anyone with these symptoms: No                                                                                                                Cal Walker presents to the [address] [address] on 9/15/2022. Angelo Goodwin is complaining of pain in her left groin area, left leg feel weak and tingling. . The pain is constant. The pain is described as aching, throbbing, and tingling, weak. . Pain is rated on her best day at a 5, on her worst day at a 10, and on average at a 7 on the VAS scale. She took her last dose of percocet and baclofen. Cisco Langford does not have issues with constipation. Any procedures since your last visit: Yes, the procedure was just done on Monday, she is not feeling any better yet, but having some left groin pain. She is not on NSAIDS and  is not on anticoagulation medications to include none and is managed by NA. Pacemaker or defibrillator: No Physician managing device is NA. Medication Contract and Consent for Opioid Use Documents Filed       Patient Documents       Type of Document Status Date Received Received By Description    Medication Contract Received 7/18/2019 11:59 AM Nia SINGLETON 7/18/19 medication contract    Medication Contract Received 10/9/2020  3:01 PM LORENZO, [facility] [address] [place] [address] pain pt agreement    Medication Contract Received 11/5/2021 11:29 AM Alessandro MUNIZ Pain Mgmt Patient Agreement 11.5.2021    Medication Contract Received 12/13/2021  4:21 PM LEONARDA PINA pain management                       /62   Pulse 85   Temp 96.8 °F (36 °C) (Infrared)   Resp 16   Ht 4' 10\" (1.473 m)   Wt 173 lb (78.5 kg)   LMP 03/27/2008   SpO2 97%   BMI 36.16 kg/m²      Patient's last menstrual period was 03/27/2008.

## 2022-09-22 ENCOUNTER — OFFICE VISIT (OUTPATIENT)
Dept: PRIMARY CARE CLINIC | Age: 52
End: 2022-09-22
Payer: MEDICAID

## 2022-09-22 VITALS
BODY MASS INDEX: 36.31 KG/M2 | HEART RATE: 72 BPM | WEIGHT: 173 LBS | OXYGEN SATURATION: 98 % | HEIGHT: 58 IN | RESPIRATION RATE: 16 BRPM | DIASTOLIC BLOOD PRESSURE: 74 MMHG | SYSTOLIC BLOOD PRESSURE: 126 MMHG

## 2022-09-22 DIAGNOSIS — Z12.31 SCREENING MAMMOGRAM FOR BREAST CANCER: ICD-10-CM

## 2022-09-22 DIAGNOSIS — G89.29 CHRONIC BILATERAL THORACIC BACK PAIN: Primary | ICD-10-CM

## 2022-09-22 DIAGNOSIS — F32.0 CURRENT MILD EPISODE OF MAJOR DEPRESSIVE DISORDER WITHOUT PRIOR EPISODE (HCC): ICD-10-CM

## 2022-09-22 DIAGNOSIS — K76.0 FATTY LIVER: ICD-10-CM

## 2022-09-22 DIAGNOSIS — M54.6 CHRONIC BILATERAL THORACIC BACK PAIN: Primary | ICD-10-CM

## 2022-09-22 DIAGNOSIS — E78.1 HYPERTRIGLYCERIDEMIA: ICD-10-CM

## 2022-09-22 DIAGNOSIS — J45.40 MODERATE PERSISTENT ASTHMA WITHOUT COMPLICATION: ICD-10-CM

## 2022-09-22 DIAGNOSIS — R53.83 FATIGUE, UNSPECIFIED TYPE: ICD-10-CM

## 2022-09-22 LAB
ALBUMIN SERPL-MCNC: 4.4 G/DL (ref 3.5–5.2)
ALP BLD-CCNC: 115 U/L (ref 35–104)
ALT SERPL-CCNC: 71 U/L (ref 0–32)
ANION GAP SERPL CALCULATED.3IONS-SCNC: 12 MMOL/L (ref 7–16)
AST SERPL-CCNC: 24 U/L (ref 0–31)
BILIRUB SERPL-MCNC: 0.6 MG/DL (ref 0–1.2)
BUN BLDV-MCNC: 12 MG/DL (ref 6–20)
CALCIUM SERPL-MCNC: 10.2 MG/DL (ref 8.6–10.2)
CHLORIDE BLD-SCNC: 100 MMOL/L (ref 98–107)
CHOLESTEROL, TOTAL: 163 MG/DL (ref 0–199)
CO2: 25 MMOL/L (ref 22–29)
CREAT SERPL-MCNC: 0.9 MG/DL (ref 0.5–1)
GFR AFRICAN AMERICAN: >60
GFR NON-AFRICAN AMERICAN: >60 ML/MIN/1.73
GLUCOSE BLD-MCNC: 77 MG/DL (ref 74–99)
HDLC SERPL-MCNC: 61 MG/DL
LDL CHOLESTEROL CALCULATED: 84 MG/DL (ref 0–99)
POTASSIUM SERPL-SCNC: 3.8 MMOL/L (ref 3.5–5)
SODIUM BLD-SCNC: 137 MMOL/L (ref 132–146)
TOTAL PROTEIN: 7.9 G/DL (ref 6.4–8.3)
TRIGL SERPL-MCNC: 91 MG/DL (ref 0–149)
VLDLC SERPL CALC-MCNC: 18 MG/DL

## 2022-09-22 PROCEDURE — 99214 OFFICE O/P EST MOD 30 MIN: CPT | Performed by: FAMILY MEDICINE

## 2022-09-22 PROCEDURE — 3017F COLORECTAL CA SCREEN DOC REV: CPT | Performed by: FAMILY MEDICINE

## 2022-09-22 PROCEDURE — 1036F TOBACCO NON-USER: CPT | Performed by: FAMILY MEDICINE

## 2022-09-22 PROCEDURE — G8417 CALC BMI ABV UP PARAM F/U: HCPCS | Performed by: FAMILY MEDICINE

## 2022-09-22 PROCEDURE — G8427 DOCREV CUR MEDS BY ELIG CLIN: HCPCS | Performed by: FAMILY MEDICINE

## 2022-09-22 RX ORDER — MELATONIN
1 DAILY
Qty: 30 TABLET | Refills: 5 | Status: SHIPPED | OUTPATIENT
Start: 2022-09-22

## 2022-09-22 RX ORDER — VITAMIN A ACETATE, BETA CAROTENE, ASCORBIC ACID, CHOLECALCIFEROL, .ALPHA.-TOCOPHEROL ACETATE, DL-, THIAMINE MONONITRATE, RIBOFLAVIN, NIACINAMIDE, PYRIDOXINE HYDROCHLORIDE, FOLIC ACID, CYANOCOBALAMIN, CALCIUM CARBONATE, FERROUS FUMARATE, ZINC OXIDE, CUPRIC OXIDE 3080; 12; 120; 400; 1; 1.84; 3; 20; 22; 920; 25; 200; 27; 10; 2 [IU]/1; UG/1; MG/1; [IU]/1; MG/1; MG/1; MG/1; MG/1; MG/1; [IU]/1; MG/1; MG/1; MG/1; MG/1; MG/1
TABLET, FILM COATED ORAL
Qty: 30 TABLET | Refills: 5 | Status: SHIPPED | OUTPATIENT
Start: 2022-09-22

## 2022-09-22 RX ORDER — FLUTICASONE PROPIONATE AND SALMETEROL 500; 50 UG/1; UG/1
1 POWDER RESPIRATORY (INHALATION) EVERY 12 HOURS
Qty: 60 EACH | Refills: 12 | Status: SHIPPED | OUTPATIENT
Start: 2022-09-22

## 2022-09-22 RX ORDER — BUPROPION HYDROCHLORIDE 150 MG/1
150 TABLET ORAL EVERY MORNING
Qty: 30 TABLET | Refills: 3 | Status: SHIPPED | OUTPATIENT
Start: 2022-09-22

## 2022-09-22 ASSESSMENT — ENCOUNTER SYMPTOMS
EYE REDNESS: 0
DIARRHEA: 0
SORE THROAT: 0
CHANGE IN BOWEL HABIT: 0
RHINORRHEA: 0
CONSTIPATION: 0
COUGH: 0
SHORTNESS OF BREATH: 0
VOMITING: 0
APNEA: 0
CHEST TIGHTNESS: 0
ABDOMINAL PAIN: 0
BLOOD IN STOOL: 0
SINUS PRESSURE: 0
EYE ITCHING: 0
EYE PAIN: 0
WHEEZING: 0
BACK PAIN: 1
NAUSEA: 0
COLOR CHANGE: 0

## 2022-09-22 NOTE — PROGRESS NOTES
Chief Complaint:     Chief Complaint   Patient presents with    Results     Discuss test results         Back Pain  This is a recurrent problem. The current episode started more than 1 month ago. The problem occurs daily. The problem is unchanged. The pain is present in the lumbar spine. The quality of the pain is described as aching. The pain is moderate. The symptoms are aggravated by bending, position and twisting. Pertinent negatives include no abdominal pain, chest pain, dysuria, fever, headaches, numbness or weakness. She has tried nothing for the symptoms. The treatment provided no relief. Other  This is a new (go over CT results from ER) problem. The current episode started 1 to 4 weeks ago. The problem occurs daily. The problem has been unchanged. Associated symptoms include arthralgias and myalgias. Pertinent negatives include no abdominal pain, change in bowel habit, chest pain, chills, congestion, coughing, diaphoresis, fatigue, fever, headaches, joint swelling, nausea, neck pain, numbness, rash, sore throat, vomiting or weakness. Nothing aggravates the symptoms. She has tried nothing for the symptoms. The treatment provided no relief.      Patient Active Problem List   Diagnosis    Cervical stenosis of spinal canal    DDD (degenerative disc disease), cervical    Lumbar radiculopathy    History of TIA (transient ischemic attack) and stroke    Spinal stenosis of lumbar region without neurogenic claudication    Fibromyalgia    Migraines without aura and without status migrainosus, not intractable    Primary stabbing headache    Chronic pain syndrome    Primary osteoarthritis of left hip    Cervical disc disorder    Cervical facet joint syndrome    Cervical radiculopathy    Cervical stenosis of spine    Lumbar spondylosis    Lumbar facet arthropathy    Lumbar disc disorder    Greater trochanteric bursitis of left hip    Pain in left hip    Facet arthropathy, cervical    Primary osteoarthritis of left knee Hepatic cyst    Lung nodule    Irritable bowel syndrome    Hepatic steatosis    Class 2 severe obesity due to excess calories with serious comorbidity and body mass index (BMI) of 37.0 to 37.9 in adult (HCC)    CIERA on CPAP       Past Medical History:   Diagnosis Date    Arthritis     Asthma     uses inhalers twice a day    Cerebral artery occlusion with cerebral infarction ([facility] [address]     2015-slight right sided weakness, ambulates normal    Cervical spinal stenosis     Chronic back pain     Chronic kidney disease     Class 2 severe obesity due to excess calories with serious comorbidity and body mass index (BMI) of 37.0 to 37.9 in adult [facility])     COVID 2021    vomiting, diarrhea, body aches, fever, cough- coulc not confirm month    Disorder of thyroid gland 09/11/2012    no medication    GERD (gastroesophageal reflux disease)     Headache(784.0)     Irritable bowel syndrome     Numbness and tingling in right hand 03/06/2020    CIERA on CPAP     uses machine    Seizure disorder ([facility] [address]     not on medications for this; last seizure 2018; etiology?     Vitamin D deficiency 12/03/2014       Past Surgical History:   Procedure Laterality Date    CHOLECYSTECTOMY, LAPAROSCOPIC N/A 5/17/2022    LAPAROSCOPIC ROBOTIC XI ASSISTED CHOLECYSTECTOMY performed by Caryle Breath, MD at [place] [address]  03/09/2016    COLONOSCOPY  07/10/2017    COLONOSCOPY N/A 4/1/2022    COLONOSCOPY DIAGNOSTIC WITH BANDING performed by Caryle Breath, MD at [address] [place] [address] Rd Left 08/10/2020    LEFT GREATER TROCHANTERIC BURSAE INJECTION X-RAY (CPT 35273) performed by Rika Su MD at [address] [place] [address] Dr Left 9/12/2022    LEFT GREATER TROCHANTERIC BURSAE INJECTION UNDER XRAY WITH SEDATION (CPT [id number]) performed by Rika Su MD at [facility]. 47     epidural c6-c7    NERVE BLOCK Bilateral 8/5/2021    BILATERAL CERVICAL MEDIAL BRANCH FACET BLOCK C4 C5 C6 WITH SEDATION WITH LEFT TROCHANTERIC BURSA INJECTION(CPT [id number]) performed by Amada Langford MD at [address] [place] [address] N/A 4/19/2021    CERVICAL EPIDURAL INTERLAMINAR STEROID INJECTION C6-C7 WITH SEDATION performed by Amada Langford MD at [address] [place] [address] Left 1/31/2022    CERVICAL EPIDURAL STEROID INJECTION LEFT PARAMEDIAN C6-C7 WITH SEDATION performed by Amada Langford MD at [facility]  07/10/2017       Current Outpatient Medications   Medication Sig Dispense Refill    fluticasone-salmeterol (ADVAIR DISKUS) 500-50 MCG/ACT AEPB diskus inhaler Inhale 1 puff into the lungs in the morning and 1 puff in the evening. 60 each 12    vitamin D3 (CHOLECALCIFEROL) 25 MCG (1000 UT) TABS tablet Take 1 tablet by mouth daily 30 tablet 5    Prenatal Vit-Fe Fumarate-FA (PRENATAL PLUS) 27-1 MG TABS 1 pill daily 30 tablet 5    buPROPion (WELLBUTRIN XL) 150 MG extended release tablet Take 1 tablet by mouth every morning 30 tablet 3    oxyCODONE (ROXICODONE) 5 MG immediate release tablet Take 1 tablet by mouth every 12 hours as needed for Pain for up to 30 days. Intended supply: 30 days 45 tablet 0    lidocaine (LIDODERM) 5 % Place 1 patch onto the skin daily 12 hours on, 12 hours off.  10 patch 3    omeprazole (PRILOSEC) 40 MG delayed release capsule Take 1 capsule by mouth 2 times daily (before meals) 60 capsule 5    senna (SENOKOT) 8.6 MG tablet Take 1 tablet by mouth 2 times daily 60 tablet 11    fenofibrate (LIPOFEN) 150 MG CAPS capsule Take 1 capsule by mouth daily 30 capsule 2    Elastic Bandages & Supports (KNEE BRACE) MISC Soft neutral position left knee brace  Size to fit  Dx:  Knee pain 1 each 0    EPINEPHrine (EPIPEN 2-ABUNDIO) 0.3 MG/0.3ML SOAJ injection Inject into muscle for allergic reaction 0.3 mL 0    montelukast (SINGULAIR) 10 MG tablet Take 1 tablet by mouth nightly 30 tablet 12     No current facility-administered medications for this visit. Allergies   Allergen Reactions    Fish-Derived Products Anaphylaxis    Dye [Iodides] Itching       Social History     Socioeconomic History    Marital status:      Spouse name: None    Number of children: None    Years of education: None    Highest education level: None   Tobacco Use    Smoking status: Former     Packs/day: 1.00     Years: 25.00     Pack years: 25.00     Types: Cigarettes     Quit date: 1988     Years since quittin.4    Smokeless tobacco: Never   Vaping Use    Vaping Use: Never used   Substance and Sexual Activity    Alcohol use: No    Drug use: No       Family History   Problem Relation Age of Onset    Heart Attack Mother     Migraines Mother     Kidney Disease Father         failure    Migraines Sister     Asthma Child     Asthma Child         bone disease    Anemia Child     Diabetes Other           Review of Systems   Constitutional:  Negative for activity change, appetite change, chills, diaphoresis, fatigue and fever. HENT:  Negative for congestion, ear pain, hearing loss, nosebleeds, rhinorrhea, sinus pressure and sore throat. Eyes:  Negative for pain, redness, itching and visual disturbance. Respiratory:  Negative for apnea, cough, chest tightness, shortness of breath and wheezing. Cardiovascular:  Negative for chest pain, palpitations and leg swelling. Gastrointestinal:  Negative for abdominal pain, blood in stool, change in bowel habit, constipation, diarrhea, nausea and vomiting. Endocrine: Negative. Genitourinary:  Negative for decreased urine volume, difficulty urinating, dysuria, frequency, hematuria and urgency. Musculoskeletal:  Positive for arthralgias, back pain and myalgias. Negative for gait problem, joint swelling and neck pain. Skin:  Negative for color change and rash. Allergic/Immunologic: Negative for environmental allergies and food allergies.    Neurological:  Negative for dizziness, weakness, light-headedness, numbness and headaches. Hematological:  Negative for adenopathy. Does not bruise/bleed easily. Psychiatric/Behavioral:  Negative for behavioral problems, dysphoric mood and sleep disturbance. The patient is not nervous/anxious and is not hyperactive. All other systems reviewed and are negative. /74   Pulse 72   Resp 16   Ht 4' 10\" (1.473 m)   Wt 173 lb (78.5 kg)   LMP 03/27/2008   SpO2 98%   BMI 36.16 kg/m²     Physical Exam  Vitals and nursing note reviewed. Constitutional:       General: She is not in acute distress. Appearance: Normal appearance. She is well-developed. HENT:      Head: Normocephalic and atraumatic. Right Ear: Hearing, tympanic membrane and external ear normal. No tenderness. No middle ear effusion. Left Ear: Hearing, tympanic membrane and external ear normal. No tenderness. No middle ear effusion. Nose: Nose normal. No congestion or rhinorrhea. Right Turbinates: Not enlarged. Left Turbinates: Not enlarged. Mouth/Throat:      Mouth: Mucous membranes are moist.      Tongue: No lesions. Pharynx: Oropharynx is clear. No oropharyngeal exudate or posterior oropharyngeal erythema. Eyes:      General: No scleral icterus. Conjunctiva/sclera: Conjunctivae normal.      Pupils: Pupils are equal, round, and reactive to light. Neck:      Thyroid: No thyromegaly. Cardiovascular:      Rate and Rhythm: Normal rate and regular rhythm. Heart sounds: Normal heart sounds. No murmur heard. Pulmonary:      Effort: Pulmonary effort is normal. No respiratory distress. Breath sounds: Normal breath sounds. No wheezing or rales. Abdominal:      General: Bowel sounds are normal. There is no distension. Palpations: Abdomen is soft. Tenderness: There is no abdominal tenderness. Musculoskeletal:         General: No tenderness. Normal range of motion. Cervical back: Normal range of motion and neck supple. No rigidity.  No muscular tenderness. Lymphadenopathy:      Cervical: No cervical adenopathy. Skin:     General: Skin is warm and dry. Findings: No erythema or rash. Neurological:      General: No focal deficit present. Mental Status: She is alert and oriented to person, place, and time. Cranial Nerves: No cranial nerve deficit. Deep Tendon Reflexes: Reflexes are normal and symmetric. Reflexes normal.   Psychiatric:         Mood and Affect: Mood normal.                               ASSESSMENT/PLAN:    Patient Active Problem List   Diagnosis    Cervical stenosis of spinal canal    DDD (degenerative disc disease), cervical    Lumbar radiculopathy    History of TIA (transient ischemic attack) and stroke    Spinal stenosis of lumbar region without neurogenic claudication    Fibromyalgia    Migraines without aura and without status migrainosus, not intractable    Primary stabbing headache    Chronic pain syndrome    Primary osteoarthritis of left hip    Cervical disc disorder    Cervical facet joint syndrome    Cervical radiculopathy    Cervical stenosis of spine    Lumbar spondylosis    Lumbar facet arthropathy    Lumbar disc disorder    Greater trochanteric bursitis of left hip    Pain in left hip    Facet arthropathy, cervical    Primary osteoarthritis of left knee    Hepatic cyst    Lung nodule    Irritable bowel syndrome    Hepatic steatosis    Class 2 severe obesity due to excess calories with serious comorbidity and body mass index (BMI) of 37.0 to 37.9 in adult [facility]    CIERA on CPAP       Ricki Lopez was seen today for results. Diagnoses and all orders for this visit:    Chronic bilateral thoracic back pain    Hypertriglyceridemia  -     Comprehensive Metabolic Panel; Future  -     Lipid Panel;  Future    Fatty liver    Fatigue, unspecified type    Current mild episode of major depressive disorder without prior episode (HCC)    Moderate persistent asthma without complication    Screening mammogram for breast cancer  -     DORINDA DIGITAL SCREEN W OR WO CAD BILATERAL; Future    Other orders  -     fluticasone-salmeterol (ADVAIR DISKUS) 500-50 MCG/ACT AEPB diskus inhaler; Inhale 1 puff into the lungs in the morning and 1 puff in the evening.  -     vitamin D3 (CHOLECALCIFEROL) 25 MCG (1000 UT) TABS tablet; Take 1 tablet by mouth daily  -     Prenatal Vit-Fe Fumarate-FA (PRENATAL PLUS) 27-1 MG TABS; 1 pill daily  -     buPROPion (WELLBUTRIN XL) 150 MG extended release tablet; Take 1 tablet by mouth every morning    Labs reviewed    Return if symptoms worsen or fail to improve. I spent 30 minutes with this patient. I spent greater than 50% of the time counseling this patient.         Rina Fletcher DO  9/22/2022  2:52 PM

## 2022-09-23 DIAGNOSIS — R79.89 ELEVATED LFTS: Primary | ICD-10-CM

## 2022-10-03 ENCOUNTER — HOSPITAL ENCOUNTER (OUTPATIENT)
Dept: MRI IMAGING | Age: 52
Discharge: HOME | End: 2022-10-05
Payer: MEDICAID

## 2022-10-03 DIAGNOSIS — M47.816 LUMBAR FACET ARTHROPATHY: ICD-10-CM

## 2022-10-03 DIAGNOSIS — M54.16 LUMBAR RADICULOPATHY: ICD-10-CM

## 2022-10-03 PROCEDURE — 72148 MRI LUMBAR SPINE W/O DYE: CPT

## 2022-10-12 NOTE — PROGRESS NOTES
disease extending from C3 through C7. Moderately severe spinal canal stenosis at C3-C4. Moderate spinal   canal stenosis at C4-C5. . Moderately severe spinal canal stenosis at   C5-C6. Abnormal signal intensity within the cervical spinal cord C3-C5   without evidence of enhancement. Findings are most likely reflective   of myelomalacia changes secondary to the underlying degree of cord   compression as described above. No active demyelination identified. 2. Slight loss of normally expected cervical lordosis C3-C6. CT Lumbar spine 2017  1. Mild diffuse osteopenia, no compression fracture or   spondylolisthesis. 2. Mild multilevel degenerative disc disease and facet joint   arthropathy of the lower lumbar spine more pronounced at L4-L5. 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 Otherwise, no musculoskeletal pathology to explain patient's pain. Previous treatments: Physical Therapy, Epidural Steroid Injection with  and medications. .  Cymbalta severe side effects, tylenol arthritis heartburn and gi distress,  Tizanidine helps but makes tired, tramadol caused hyperness, butrans patch dizziness and diarrhea                                        Potential Aberrant Drug-Related Behavior:    No     Urine Drug Screening:  Saliva screen 08/2020 consistent for Ultram  11/2021: consistent for oxycodone  09/2022 consistent     OARRS report:  7/2022 consistent to 10/2022 consistent.      Opioid agreement: 12/13/21         Past Medical History:   Diagnosis Date    Arthritis     Asthma     uses inhalers twice a day    Cerebral artery occlusion with cerebral infarction ([facility] [address]     2015-slight right sided weakness, ambulates normal    Cervical spinal stenosis     Chronic back pain     Chronic kidney disease     Class 2 severe obesity due to excess calories with serious comorbidity and body mass index (BMI) of 37.0 to 37.9 in adult [facility]) COVID 2021    vomiting, diarrhea, body aches, fever, cough- coulc not confirm month    Disorder of thyroid gland 09/11/2012    no medication    GERD (gastroesophageal reflux disease)     Headache(784.0)     Irritable bowel syndrome     Numbness and tingling in right hand 03/06/2020    CIERA on CPAP     uses machine    Seizure disorder ([address]     not on medications for this; last seizure 2018; etiology? Vitamin D deficiency 12/03/2014       Past Surgical History:   Procedure Laterality Date    CHOLECYSTECTOMY, LAPAROSCOPIC N/A 5/17/2022    LAPAROSCOPIC ROBOTIC XI ASSISTED CHOLECYSTECTOMY performed by Karyn Macias MD at [address] [place] [address]  03/09/2016    COLONOSCOPY  07/10/2017    COLONOSCOPY N/A 4/1/2022    COLONOSCOPY DIAGNOSTIC WITH BANDING performed by Karyn Macias MD at [address] [place] [address] 08/10/2020    LEFT GREATER TROCHANTERIC BURSAE INJECTION X-RAY (CPT 51904) performed by Chris Shafer MD at [address] [place] [address] 9/12/2022    LEFT GREATER TROCHANTERIC BURSAE INJECTION UNDER XRAY WITH SEDATION (CPT 90590) performed by Chris Shafer MD at [facility].      epidural c6-c7    NERVE BLOCK Bilateral 8/5/2021    BILATERAL CERVICAL MEDIAL BRANCH FACET BLOCK C4 C5 C6 WITH SEDATION WITH LEFT TROCHANTERIC BURSA INJECTION(CPT 26684) performed by Chris Shafer MD at [address] [place] [address] N/A 4/19/2021    CERVICAL EPIDURAL INTERLAMINAR STEROID INJECTION C6-C7 WITH SEDATION performed by Chris Shafer MD at [address] [place] [address] 1/31/2022    CERVICAL EPIDURAL STEROID INJECTION LEFT PARAMEDIAN C6-C7 WITH SEDATION performed by Chris Shafer MD at [facility]  07/10/2017       Prior to Admission medications    Medication Sig Start Date End Date Taking?  Authorizing Provider   oxyCODONE (ROXICODONE) 5 MG immediate release tablet Take 1 tablet by mouth every 12 hours as needed for Pain for up to 30 days.  Intended supply: 30 days 10/15/22 11/14/22 Yes GARCIA Jay   methocarbamol (ROBAXIN) 500 MG tablet Take 0.5 tablets by mouth 2 times daily as needed (spasms) 1/2 tab BID prn 10/13/22 10/20/22 Yes GARCIA Jay   fluticasone-salmeterol (ADVAIR DISKUS) 500-50 MCG/ACT AEPB diskus inhaler Inhale 1 puff into the lungs in the morning and 1 puff in the evening. 9/22/22  Yes Abdi Hayes,    vitamin D3 (CHOLECALCIFEROL) 25 MCG (1000 UT) TABS tablet Take 1 tablet by mouth daily 9/22/22  Yes Joaquin Romero DO   Prenatal Vit-Fe Fumarate-FA (PRENATAL PLUS) 27-1 MG TABS 1 pill daily 9/22/22  Yes Abdi Grijalva DO   lidocaine (LIDODERM) 5 % Place 1 patch onto the skin daily 12 hours on, 12 hours off. 9/15/22 10/15/22 Yes GARCIA Jay   omeprazole (PRILOSEC) 40 MG delayed release capsule Take 1 capsule by mouth 2 times daily (before meals) 6/8/22  Yes Nettie Iqbal MD   senna (SENOKOT) 8.6 MG tablet Take 1 tablet by mouth 2 times daily 6/8/22 6/8/23 Yes Nettie Iqbal MD   fenofibrate (LIPOFEN) 150 MG CAPS capsule Take 1 capsule by mouth daily 4/7/22  Yes Abdi Barker,    Elastic Bandages & Supports (KNEE BRACE) MISC Soft neutral position left knee brace  Size to fit  Dx:  Knee pain 1/21/22  Yes Joaquin Romero DO   EPINEPHrine (EPIPEN 2-ABUNDIO) 0.3 MG/0.3ML SOAJ injection Inject into muscle for allergic reaction 8/22/19  Yes Joaquin Romero DO   buPROPion (WELLBUTRIN XL) 150 MG extended release tablet Take 1 tablet by mouth every morning  Patient not taking: Reported on 10/13/2022 9/22/22   Joaquin Romero DO   montelukast (SINGULAIR) 10 MG tablet Take 1 tablet by mouth nightly 7/5/22 8/22/22  Green Fee, DO       Allergies   Allergen Reactions    Fish-Derived Products Anaphylaxis    Dye [Iodides] Itching       Social History     Socioeconomic History    Marital status:      Spouse name: Not on file Number of children: Not on file    Years of education: Not on file    Highest education level: Not on file   Occupational History    Not on file   Tobacco Use    Smoking status: Former     Packs/day: 1.00     Years: 25.00     Pack years: 25.00     Types: Cigarettes     Quit date: 1988     Years since quittin.5    Smokeless tobacco: Never   Vaping Use    Vaping Use: Never used   Substance and Sexual Activity    Alcohol use: No    Drug use: No    Sexual activity: Not on file   Other Topics Concern    Not on file   Social History Narrative    Not on file     Social Determinants of Health     Financial Resource Strain: Not on file   Food Insecurity: Not on file   Transportation Needs: Not on file   Physical Activity: Not on file   Stress: Not on file   Social Connections: Not on file   Intimate Partner Violence: Not on file   Housing Stability: Not on file       Family History   Problem Relation Age of Onset    Heart Attack Mother     Migraines Mother     Kidney Disease Father         failure    Migraines Sister     Asthma Child     Asthma Child         bone disease    Anemia Child     Diabetes Other        REVIEW OF SYSTEMS:     Sita Avina denies fever/chills, chest pain, shortness of breath, new bowel or bladder complaints. All other review of systems was negative. PHYSICAL EXAMINATION:      /74   Pulse 74   Temp 96.9 °F (36.1 °C) (Infrared)   Resp 16   Ht 4' 10\" (1.473 m)   Wt 173 lb (78.5 kg)   LMP 2008   SpO2 98%   BMI 36.16 kg/m²     General:      General appearance:   pleasant and well-hydrated. , in mild discomfort and A & O x3  Build:Overweight    HEENT:    Head:normocephalic and atraumatic  Sclera: icterus absent,    Lungs:    Breathing:Normal expansion. No wheezing.     Abdomen:    Shape:non-distended and normal    Lumbar spine:  + midline and left sided paraspinal TTP  + SLR  + hyperalgesia      Extremities:    Tremors:None bilaterally upper and lower  Range of motion:Generally normal shoulders, pain with internal rotation of hips not done. Intact:Yes  Edema:Normal    Neurological:    [address]    Dermatology:    Skin:no unusual rashes, no skin lesions, no palpable subcutaneous nodules, and good skin turgor    Impression:    Cervical spine MRI 2019 Multilevel degenerative disc disease with moderate to severe stenosis at C3-4/C4-5 C5-6  Lumbar spine CT 2017 Mild degenerative disc disease and facet arthropathy most pronounced at L4-5  Patient had seen neurosurgery and surgery C3-4/C4-5 and C5-6 ACDF was recommended. Patient failed Gabapentin/zanaflex/flexeril/Norco/Lyrica  Patient had underwent cervical epidural at C6-7 (04/2021) for her radicular symptoms and bilateral C3,4,5 medial branch block x 1(08/2021) for her mechanical neck pain. Both interventions had helped with her pain. Plan:  Follow up on neck pain radiating down left arm with n/t hands         and low back radiating down left leg to knee, acute left troch bursitis    1/31/22: Cervical epidural left paramedian C6-7 with>80% relief  LEFT GREATER TROCHANTERIC BURSAE INJECTION X-RAY on 09/12/2022 - feels worse. MRI lumbar spine reviewedtoday. Trial robaxin 250 mg BID prn. Has failed all other muscle relaxers. Refill Oxycodone 5mg po daily(cannot take Percocet with tylenol and liver disease)  Aquatherapy ordered to strengthen legs and core. Continue lidoderm patchs q 24 hours prn pain. Avoid NSAIDs(H/O GI ulcers)  Buccal screen reviewed and is consistent  OARRS report reviewed  Patient encouraged to stay active and to lose weight. Treatment plan discussed with the patient and her including medications side effects. Controlled Substance Monitoring:    Acute and Chronic Pain Monitoring:   RX Monitoring 10/13/2022   Attestation -   Periodic Controlled Substance Monitoring Possible medication side effects, risk of tolerance/dependence & alternative treatments discussed. ;No signs of potential drug abuse or diversion identified. ;Assessed functional status. ;Obtaining appropriate analgesic effect of treatment. We discussed with the patient that combining opioids, benzodiazepines, alcohol, illicit drugs or sleep aids increases the risk of respiratory depression including death. We discussed that these medications may cause drowsiness, sedation or dizziness and have counseled the patient not to drive or operate machinery. We have discussed that these medications will be prescribed only by one provider. We have discussed with the patient about age related risk factors and have thoroughly discussed the importance of taking these medications as prescribed. The patient verbalizes understanding.     ccreferring physic

## 2022-10-13 ENCOUNTER — OFFICE VISIT (OUTPATIENT)
Dept: PAIN MANAGEMENT | Age: 52
End: 2022-10-13
Payer: MEDICAID

## 2022-10-13 VITALS
WEIGHT: 173 LBS | OXYGEN SATURATION: 98 % | BODY MASS INDEX: 36.31 KG/M2 | RESPIRATION RATE: 16 BRPM | TEMPERATURE: 96.9 F | HEIGHT: 58 IN | SYSTOLIC BLOOD PRESSURE: 116 MMHG | DIASTOLIC BLOOD PRESSURE: 74 MMHG | HEART RATE: 74 BPM

## 2022-10-13 DIAGNOSIS — M47.812 CERVICAL FACET JOINT SYNDROME: ICD-10-CM

## 2022-10-13 DIAGNOSIS — M51.9 LUMBAR DISC DISORDER: ICD-10-CM

## 2022-10-13 DIAGNOSIS — M47.812 FACET ARTHROPATHY, CERVICAL: ICD-10-CM

## 2022-10-13 DIAGNOSIS — M47.816 LUMBAR FACET ARTHROPATHY: ICD-10-CM

## 2022-10-13 DIAGNOSIS — M54.16 LUMBAR RADICULOPATHY: ICD-10-CM

## 2022-10-13 DIAGNOSIS — G89.4 CHRONIC PAIN SYNDROME: Primary | ICD-10-CM

## 2022-10-13 DIAGNOSIS — M79.7 FIBROMYALGIA: ICD-10-CM

## 2022-10-13 DIAGNOSIS — M50.30 DDD (DEGENERATIVE DISC DISEASE), CERVICAL: ICD-10-CM

## 2022-10-13 DIAGNOSIS — M47.816 LUMBAR SPONDYLOSIS: ICD-10-CM

## 2022-10-13 DIAGNOSIS — M54.12 CERVICAL RADICULOPATHY: ICD-10-CM

## 2022-10-13 DIAGNOSIS — M70.62 GREATER TROCHANTERIC BURSITIS OF LEFT HIP: ICD-10-CM

## 2022-10-13 PROCEDURE — 1036F TOBACCO NON-USER: CPT | Performed by: PHYSICIAN ASSISTANT

## 2022-10-13 PROCEDURE — 99213 OFFICE O/P EST LOW 20 MIN: CPT | Performed by: PHYSICIAN ASSISTANT

## 2022-10-13 PROCEDURE — 3017F COLORECTAL CA SCREEN DOC REV: CPT | Performed by: PHYSICIAN ASSISTANT

## 2022-10-13 PROCEDURE — G8484 FLU IMMUNIZE NO ADMIN: HCPCS | Performed by: PHYSICIAN ASSISTANT

## 2022-10-13 PROCEDURE — G8427 DOCREV CUR MEDS BY ELIG CLIN: HCPCS | Performed by: PHYSICIAN ASSISTANT

## 2022-10-13 PROCEDURE — G8417 CALC BMI ABV UP PARAM F/U: HCPCS | Performed by: PHYSICIAN ASSISTANT

## 2022-10-13 RX ORDER — OXYCODONE HYDROCHLORIDE 5 MG/1
5 TABLET ORAL EVERY 12 HOURS PRN
Qty: 45 TABLET | Refills: 0 | Status: SHIPPED | OUTPATIENT
Start: 2022-10-15 | End: 2022-11-14

## 2022-10-13 RX ORDER — METHOCARBAMOL 500 MG/1
250 TABLET, FILM COATED ORAL 2 TIMES DAILY PRN
Qty: 7 TABLET | Refills: 0 | Status: SHIPPED | OUTPATIENT
Start: 2022-10-13 | End: 2022-10-20

## 2022-10-13 NOTE — PROGRESS NOTES
Do you currently have any of the following:    Fever: No  Headache:  No  Cough: No  Shortness of breath: No  Exposed to anyone with these symptoms: No                                                                                                                Reza Chew presents to the [address] [address] on 10/13/2022. Remi Baptiste is complaining of pain in her lower back and hips. . The pain is constant. The pain is described as aching, throbbing, shooting, stabbing, and sharp. Pain is rated on her best day at a 4, on her worst day at a 10, and on average at a 7 on the VAS scale. She took her last dose of Percocet and lidocaine patches. Flor Hoffman does not have issues with constipation. Any procedures since your last visit: No,  . She is not on NSAIDS and  is not on anticoagulation medications to include none and is managed by NA. Pacemaker or defibrillator: No Physician managing device is NA. Medication Contract and Consent for Opioid Use Documents Filed       Patient Documents       Type of Document Status Date Received Received By Description    Medication Contract Received 7/18/2019 11:59 AM Saturnino SINGLETON 7/18/19 medication contract    Medication Contract Received 10/9/2020  3:01 PM LORENZO, [facility] [address] [place] [address] pain pt agreement    Medication Contract Received 11/5/2021 11:29 AM Edyta MUNIZ Pain Mgmt Patient Agreement 11.5.2021    Medication Contract Received 12/13/2021  4:21 PM LEONARDA PINA pain management                       /74   Pulse 74   Temp 96.9 °F (36.1 °C) (Infrared)   Resp 16   Ht 4' 10\" (1.473 m)   Wt 173 lb (78.5 kg)   LMP 03/27/2008   SpO2 98%   BMI 36.16 kg/m²      Patient's last menstrual period was 03/27/2008.

## 2022-11-07 ENCOUNTER — HOSPITAL ENCOUNTER (OUTPATIENT)
Dept: PHYSICAL THERAPY | Age: 52
Setting detail: THERAPIES SERIES
Discharge: HOME | End: 2022-11-07
Payer: MEDICAID

## 2022-11-07 PROCEDURE — 97161 PT EVAL LOW COMPLEX 20 MIN: CPT | Performed by: PHYSICAL THERAPIST

## 2022-11-07 ASSESSMENT — PAIN DESCRIPTION - ORIENTATION: ORIENTATION: LEFT

## 2022-11-07 ASSESSMENT — PAIN DESCRIPTION - DESCRIPTORS: DESCRIPTORS: BURNING;NUMBNESS

## 2022-11-07 ASSESSMENT — PAIN SCALES - GENERAL: PAINLEVEL_OUTOF10: 10

## 2022-11-07 ASSESSMENT — PAIN DESCRIPTION - LOCATION: LOCATION: HIP;NECK

## 2022-11-07 ASSESSMENT — PAIN DESCRIPTION - FREQUENCY: FREQUENCY: CONTINUOUS

## 2022-11-07 NOTE — PROGRESS NOTES
Physical Therapy    Physical Therapy: Initial Evaluation    Patient: Patrick Harden (6 y.o. female)   Examination Date:   Plan of Care Certification Period: 2022 to        :  1970 ;    Confirmed: Yes MRN: [id number]  CSN: [id number]   Insurance: Payor: [email or web address] / Plan: [employer] / Product Type: *No Product type* /   Insurance ID: [id number] - (Medicaid Managed) Secondary Insurance (if applicable):    Referring Physician: Lawanda Crigler, PA     PCP: Saundra Mccoy DO Visits to Date/Visits Approved: 1 /      No Show/Cancelled Appts:   /       Medical Diagnosis: Chronic pain syndrome [G89.4]  Facet arthropathy, cervical [L28.740]  DDD (degenerative disc disease), cervical [M50.30]  Cervical radiculopathy [M54.12]  Lumbar spondylosis [M47.816]  Lumbar facet arthropathy [M47.816]  Fibromyalgia [M79.7]  Greater trochanteric bursitis of left hip [M70.62]  Lumbar radiculopathy [M54.16]  Lumbar disc disorder [M51.9]  Cervical facet joint syndrome [M47.812] G89.4 (ICD-10-CM) - Chronic pain syndrome  Treatment Diagnosis:       PERTINENT MEDICAL HISTORY           Medical History: Chart Reviewed: Yes   Past Medical History:   Diagnosis Date    Arthritis     Asthma     uses inhalers twice a day    Cerebral artery occlusion with cerebral infarction ([facility] [address]     -slight right sided weakness, ambulates normal    Cervical spinal stenosis     Chronic back pain     Chronic kidney disease     Class 2 severe obesity due to excess calories with serious comorbidity and body mass index (BMI) of 37.0 to 37.9 in adult [facility])     COVID     vomiting, diarrhea, body aches, fever, cough- coulc not confirm month    Disorder of thyroid gland 2012    no medication    GERD (gastroesophageal reflux disease)     Headache(784.0)     Irritable bowel syndrome     Numbness and tingling in right hand 2020    [employer] on CPAP     uses machine    Seizure disorder ([facility] [address] not on medications for this; last seizure 2018; etiology? Vitamin D deficiency 12/03/2014     Surgical History:   Past Surgical History:   Procedure Laterality Date    CHOLECYSTECTOMY, LAPAROSCOPIC N/A 5/17/2022    LAPAROSCOPIC ROBOTIC XI ASSISTED CHOLECYSTECTOMY performed by Alejandra Jeffrey MD at [place] [address]  03/09/2016    COLONOSCOPY  07/10/2017    COLONOSCOPY N/A 4/1/2022    COLONOSCOPY DIAGNOSTIC WITH BANDING performed by Alejandra Jeffrey MD at [address] Left 08/10/2020    LEFT GREATER TROCHANTERIC BURSAE INJECTION X-RAY (CPT 20827) performed by Ran Wheately MD at [address] Left 9/12/2022    LEFT GREATER TROCHANTERIC BURSAE INJECTION UNDER XRAY WITH SEDATION (CPT 32985) performed by Ran Wheatley MD at [facility]. 47     epidural c6-c7    NERVE BLOCK Bilateral 8/5/2021    BILATERAL CERVICAL MEDIAL BRANCH FACET BLOCK C4 C5 C6 WITH SEDATION WITH LEFT TROCHANTERIC BURSA INJECTION(CPT 14022) performed by Ran Wheatley MD at [address] [place] [address] N/A 4/19/2021    CERVICAL EPIDURAL INTERLAMINAR STEROID INJECTION C6-C7 WITH SEDATION performed by Ran Wheatley MD at [address] [place] [address] Left 1/31/2022    CERVICAL EPIDURAL STEROID INJECTION LEFT PARAMEDIAN C6-C7 WITH SEDATION performed by Ran Wheatley MD at [facility]  07/10/2017       Medications:   Current Outpatient Medications:     oxyCODONE (ROXICODONE) 5 MG immediate release tablet, Take 1 tablet by mouth every 12 hours as needed for Pain for up to 30 days.  Intended supply: 30 days, Disp: 45 tablet, Rfl: 0    fluticasone-salmeterol (ADVAIR DISKUS) 500-50 MCG/ACT AEPB diskus inhaler, Inhale 1 puff into the lungs in the morning and 1 puff in the evening., Disp: 60 each, Rfl: 12    vitamin D3 (CHOLECALCIFEROL) 25 MCG (1000 UT) TABS tablet, Take 1 tablet by mouth daily, Disp: 30 tablet, Rfl: 5    Prenatal Vit-Fe Fumarate-FA (PRENATAL PLUS) 27-1 MG TABS, 1 pill daily, Disp: 30 tablet, Rfl: 5    buPROPion (WELLBUTRIN XL) 150 MG extended release tablet, Take 1 tablet by mouth every morning (Patient not taking: Reported on 10/13/2022), Disp: 30 tablet, Rfl: 3    montelukast (SINGULAIR) 10 MG tablet, Take 1 tablet by mouth nightly, Disp: 30 tablet, Rfl: 12    omeprazole (PRILOSEC) 40 MG delayed release capsule, Take 1 capsule by mouth 2 times daily (before meals), Disp: 60 capsule, Rfl: 5    senna (SENOKOT) 8.6 MG tablet, Take 1 tablet by mouth 2 times daily, Disp: 60 tablet, Rfl: 11    fenofibrate (LIPOFEN) 150 MG CAPS capsule, Take 1 capsule by mouth daily, Disp: 30 capsule, Rfl: 2    Elastic Bandages & Supports (KNEE BRACE) MISC, Soft neutral position left knee brace Size to fit Dx:  Knee pain, Disp: 1 each, Rfl: 0    EPINEPHrine (EPIPEN 2-ABUNDIO) 0.3 MG/0.3ML SOAJ injection, Inject into muscle for allergic reaction, Disp: 0.3 mL, Rfl: 0  Allergies: Fish-derived products and Dye [iodides]      SUBJECTIVE EXAMINATION      ,           Subjective History:    Subjective: Patient presents to PT with c/o pain across L cervical region and across L hip region . States cervical pain due to herniated discs across cervical spine. L hip pain occurred due to fall on stairs resulting in injury of hip. Did have therapy in past following injury however limited relief. Patient also reports having a CVA approx 7 years ago with L sided weakness. States weakness noticeable across both the LUE/LLE. She uses a cane prn due to weakness. She recently had an injection to L hip due the pain. Patient also takes norco prn for pain relief.   Additional Pertinent Hx (if applicable):     Previous treatments prior to current episode?: Injections, Outpatient PT      Learning/Language: Learning  Does the patient/guardian have any barriers to learning?: No barriers  What is the preferred language of the patient/guardian?: Mauritanian, English     Pain Screening    Pain Screening  Patient Currently in Pain: Yes  Pain Assessment: 0-10  Pain Level: 10  Best Pain Level: 5  Worst Pain Level: 10  Pain Location: Hip, Neck  Pain Orientation: Left  Pain Descriptors: Burning, Numbness  Pain Frequency: Continuous    Functional Status    Dominant Hand: : Right    Social History:         Occupation/Interests:        Prior Level of Function:              Current Level of Function:                  OBJECTIVE EXAMINATION   Restrictions:              Review of Systems:       VBI Screening / Lumbar Screening:         Regional Screen:         Observations:        Palpation:   Cervical Spine Palpation: pain noted across L cervical paraspinals/upper trap  Right Hip Palpation: no pain with palpation  Left Hip Palpation: severe pain across L lateral hip/thigh region    Ambulation/Gait (if applicable):   Ambulation  Surface: Level tile  Device: No Device  Assistance: Independent  Quality of Gait: pt ambulated with L flat foot contact with med/lat trunk deviation    Balance Screen:   Balance  Posture: Fair (rounded shoulder posturing)  Sitting - Static: Good  Sitting - Dynamic: Good  Standing - Static: Good  Standing - Dynamic: Good    Neuro Screen:   Sensation      Sensation  Overall Sensation Status: WFL     Left AROM  Right AROM         AROM LUE (degrees)  L Shoulder Flexion 0-180: 120*  L Shoulder ABduction 0-180: 90*  L Elbow Flexion 0-145: WFL  L Elbow Extension 145-0: WFL    LLE- WFL    AROM RUE (degrees)  R Shoulder Flexion 0-180: 120*  R Shoulder ABduction 0-180: 90*  R Elbow Flexion 0-145: WFL  R Elbow Extension 145-0: WFL    RLE- WFL        Left Strength  Right Strength         L UE Strength Comment: L shoulder/elbow- grossly 4-/5 WARs  L UE Strength Comment: L shoulder/elbow- grossly 4-/5 WARs  Strength LLE  L Hip Flexion: 4-/5  L Hip ABduction: 4-/5  L Hip ADduction: 4+/5  L Knee Flexion: 4+/5  L Knee Extension: 4+/5  L Ankle Dorsiflexion: 4+/5    R UE Strength Comment: WFL  R UE Strength Comment: WFL  Strength RLE  R Hip Flexion: 4+/5  R Hip ABduction: 4+/5  R Hip ADduction: 4+/5  R Knee Flexion: 4+/5  R Knee Extension: 4+/5  R Ankle Dorsiflexion: 4+/5     Cervical Assessment     AROM Cervical Spine   Cervical spine general AROM: limited 50% L SB/rot, all other motions are [facility]  Cervical strength- grossly 4-/5 all planes               ASSESSMENT     Impression: Assessment: Patient presents to PT wiht c/o cervcial and hip pain. Limited ROM/strength across regions with hindered posturing and functional gait. Body Structures, Functions, Activity Limitations Requiring Skilled Therapeutic Intervention: Decreased functional mobility , Decreased ROM, Decreased strength, Decreased posture, Increased pain    Statement of Medical Necessity: Physical Therapy is both indicated and medically necessary as outlined in the POC to increase the likelihood of meeting the functionally related goals stated below. Patient's Activity Tolerance:        Patient's rehabilitation potential/prognosis is considered to be:  Fair    Factors which may impact rehabilitation potential include:          GOALS   Patient Goal(s):  to reduce pain   Short Term Goals Completed by  3 weeks  Goal Status    Increase AROM L cervical SB/rot to within 75% functional limits      Increase strength of cervical region to grossly 4/5 improving upright posture to FAIR+      Increase strength of L hip/knee to grossly 4/5 to improve functional gait       Decrease pain to < 5/10 across regions with activity                Long Term Goals Completed by  6 weeks  Goal Status    Increase AROM L cervical SB/rot to within 90% functional limits      Increase strength of cervical region to grossly 4+/5 improving upright posture to GOOD-      Increase strength of L hip/knee to grossly 4+/5 to improve functional gait       Decrease pain to < 3/10 across regions with activity       Pt

## 2022-11-11 ENCOUNTER — OFFICE VISIT (OUTPATIENT)
Dept: PULMONOLOGY | Age: 52
End: 2022-11-11
Payer: MEDICAID

## 2022-11-11 ENCOUNTER — OFFICE VISIT (OUTPATIENT)
Dept: PAIN MANAGEMENT | Age: 52
End: 2022-11-11
Payer: MEDICAID

## 2022-11-11 VITALS
HEART RATE: 90 BPM | TEMPERATURE: 97.5 F | RESPIRATION RATE: 16 BRPM | HEIGHT: 58 IN | BODY MASS INDEX: 36.31 KG/M2 | DIASTOLIC BLOOD PRESSURE: 78 MMHG | OXYGEN SATURATION: 97 % | SYSTOLIC BLOOD PRESSURE: 128 MMHG | WEIGHT: 173 LBS

## 2022-11-11 DIAGNOSIS — M47.812 FACET ARTHROPATHY, CERVICAL: ICD-10-CM

## 2022-11-11 DIAGNOSIS — R91.1 LUNG NODULE: Primary | ICD-10-CM

## 2022-11-11 DIAGNOSIS — M54.12 CERVICAL RADICULOPATHY: ICD-10-CM

## 2022-11-11 DIAGNOSIS — M54.16 LUMBAR RADICULOPATHY: ICD-10-CM

## 2022-11-11 DIAGNOSIS — M47.816 LUMBAR FACET ARTHROPATHY: ICD-10-CM

## 2022-11-11 DIAGNOSIS — J45.50 SEVERE PERSISTENT ASTHMA WITHOUT COMPLICATION: ICD-10-CM

## 2022-11-11 DIAGNOSIS — M47.812 CERVICAL FACET JOINT SYNDROME: ICD-10-CM

## 2022-11-11 DIAGNOSIS — G89.4 CHRONIC PAIN SYNDROME: Primary | ICD-10-CM

## 2022-11-11 DIAGNOSIS — R76.8 ELEVATED IGE LEVEL: ICD-10-CM

## 2022-11-11 LAB
EXPIRATORY TIME: 1.25 SEC
FEF 25-75% %PRED-PRE: 178 L/SEC
FEF 25-75% PRED: 2.25 L/SEC
FEF 25-75%-PRE: 4.02 L/SEC
FEV1 %PRED-PRE: 98 %
FEV1 PRED: 2.18 L
FEV1/FVC %PRED-PRE: 121 %
FEV1/FVC PRED: 81 %
FEV1/FVC: 98 %
FEV1: 2.15 L
FVC %PRED-PRE: 81 %
FVC PRED: 2.7 L
FVC: 2.19 L
PEF %PRED-PRE: NORMAL
PEF PRED: NORMAL
PEF-PRE: NORMAL

## 2022-11-11 PROCEDURE — G8417 CALC BMI ABV UP PARAM F/U: HCPCS | Performed by: PAIN MEDICINE

## 2022-11-11 PROCEDURE — 99214 OFFICE O/P EST MOD 30 MIN: CPT | Performed by: INTERNAL MEDICINE

## 2022-11-11 PROCEDURE — G8427 DOCREV CUR MEDS BY ELIG CLIN: HCPCS | Performed by: INTERNAL MEDICINE

## 2022-11-11 PROCEDURE — 99214 OFFICE O/P EST MOD 30 MIN: CPT | Performed by: PAIN MEDICINE

## 2022-11-11 PROCEDURE — 99213 OFFICE O/P EST LOW 20 MIN: CPT | Performed by: PAIN MEDICINE

## 2022-11-11 PROCEDURE — G8427 DOCREV CUR MEDS BY ELIG CLIN: HCPCS | Performed by: PAIN MEDICINE

## 2022-11-11 PROCEDURE — 3017F COLORECTAL CA SCREEN DOC REV: CPT | Performed by: PAIN MEDICINE

## 2022-11-11 PROCEDURE — 1036F TOBACCO NON-USER: CPT | Performed by: PAIN MEDICINE

## 2022-11-11 PROCEDURE — G8417 CALC BMI ABV UP PARAM F/U: HCPCS | Performed by: INTERNAL MEDICINE

## 2022-11-11 PROCEDURE — G8484 FLU IMMUNIZE NO ADMIN: HCPCS | Performed by: INTERNAL MEDICINE

## 2022-11-11 PROCEDURE — 94010 BREATHING CAPACITY TEST: CPT | Performed by: INTERNAL MEDICINE

## 2022-11-11 PROCEDURE — 3017F COLORECTAL CA SCREEN DOC REV: CPT | Performed by: INTERNAL MEDICINE

## 2022-11-11 PROCEDURE — 1036F TOBACCO NON-USER: CPT | Performed by: INTERNAL MEDICINE

## 2022-11-11 PROCEDURE — G8484 FLU IMMUNIZE NO ADMIN: HCPCS | Performed by: PAIN MEDICINE

## 2022-11-11 PROCEDURE — 99213 OFFICE O/P EST LOW 20 MIN: CPT | Performed by: INTERNAL MEDICINE

## 2022-11-11 RX ORDER — OXYCODONE HYDROCHLORIDE 5 MG/1
5 TABLET ORAL DAILY PRN
Qty: 30 TABLET | Refills: 0 | Status: SHIPPED | OUTPATIENT
Start: 2022-11-11 | End: 2022-12-11

## 2022-11-11 ASSESSMENT — PULMONARY FUNCTION TESTS
FEV1/FVC_PERCENT_PREDICTED_PRE: 121
FEV1_PREDICTED: 2.18
FEV1_PERCENT_PREDICTED_PRE: 98
FVC_PREDICTED: 2.70
FEV1: 2.15
FVC: 2.19
FEV1/FVC: 98
FVC_PERCENT_PREDICTED_PRE: 81
FEV1/FVC_PREDICTED: 81

## 2022-11-11 NOTE — PROGRESS NOTES
[place]  Department of Internal Medicine   Division of Pulmonary, Critical Care and Sleep Medicine  Pulmonary [facility] [phone], Fax [phone]    Jaquelin Navarrete DO, [facility], [place], [address] [place] [address] MD Mandi, [facility]  Noemy Allen DO, [facility]      Dear Keon Hutton DO    Visit was performed with her daughter present in the use of an     We had the pleasure of seeing Candice Isaac, in the [address] at [facility] regarding her Left lung GGO and asthma. HISTORY OF PRESENT ILLNESS:    Candice Isaac is a 47 y.o. female with a past medical history of arthritis, chronic back pain, chronic kidney disease, asthma since childhood, depression, recent gallstone surgery, reflux disease, history of COVID, vitamin D deficiency who presents for evaluation of 2 pulmonary problems. To note she is a ex-smoker of at least 20 pack years smoked up until 19 years ago and quit because difficulty breathing. Of note she has 2 dogs 19-20 chickens and a few ducks as well. Her travel history is extensive she has lived in [place] for 12 years 9 years prior to that she was in [place] and states she was told she had lung problems, she is also traveled to [place] and [place] to visit family. 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  No asbestos exposure noted no silica dust exposure noted. Other toxic chemicals unknown. Her hobbies include playing with her grandchildren and getting eggs from the chicken coop. Family history is really not significant other mother had some breathing problems and  in her late [de-identified] from Alzheimer's disease. She did not know her father because he  and she was very young. She states she is short of breath most of the time and only takes albuterol as needed.   She is never had formal breathing test please done or available in epic. Uses albuterol 4-6 times a day. She also has sleep apnea but does not wear machine previous testing site is unknown. CT scan of her chest 1 in follow-up from 2019 showed a left lingular segment ground glass opacity that was reported to change in size. She has no constitutional symptoms. No hemoptysis noted no leg swelling noted. 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 She underwent diagnostic testing which showed a very elevated IgE greater than thousand but vasculitis panel, hypersensitivity panel, Aspergillus IgG was all negative. Allergies were positive x1000 and we discussed placing her on Xolair or biologic for her asthma care. Avoidance therapy was discussed. The good part is she feels well with stable breathing test and FEV1 of 2.51 L 98% of predicted up from 67% predicted. ALLERGIES:    Allergies   Allergen Reactions    Fish-Derived Products Anaphylaxis    Robaxin [Methocarbamol] Other (See Comments)     Very tired, felt like she couldn't even function with her daily activities.     Dye [Iodides] Itching       PAST MEDICAL HISTORY:       Diagnosis Date    Arthritis     Asthma     uses inhalers twice a day    Cerebral artery occlusion with cerebral infarction ([address]     2015-slight right sided weakness, ambulates normal    Cervical spinal stenosis     Chronic back pain     Chronic kidney disease     Class 2 severe obesity due to excess calories with serious comorbidity and body mass index (BMI) of 37.0 to 37.9 in adult [facility])     COVID 2021    vomiting, diarrhea, body aches, fever, cough- coulc not confirm month    Disorder of thyroid gland 09/11/2012    no medication    GERD (gastroesophageal reflux disease)     Headache(784.0)     Irritable bowel syndrome     Numbness and tingling in right hand 03/06/2020    CIERA on CPAP     uses machine    Seizure disorder ([address]     not on medications for this; last seizure 2018; etiology? Vitamin D deficiency 12/03/2014        MEDICATIONS:   Current Outpatient Medications   Medication Sig Dispense Refill    oxyCODONE (ROXICODONE) 5 MG immediate release tablet Take 1 tablet by mouth daily as needed for Pain for up to 30 days. Intended supply: 30 days 30 tablet 0    fluticasone-salmeterol (ADVAIR DISKUS) 500-50 MCG/ACT AEPB diskus inhaler Inhale 1 puff into the lungs in the morning and 1 puff in the evening. 60 each 12    vitamin D3 (CHOLECALCIFEROL) 25 MCG (1000 UT) TABS tablet Take 1 tablet by mouth daily 30 tablet 5    Prenatal Vit-Fe Fumarate-FA (PRENATAL PLUS) 27-1 MG TABS 1 pill daily 30 tablet 5    buPROPion (WELLBUTRIN XL) 150 MG extended release tablet Take 1 tablet by mouth every morning 30 tablet 3    montelukast (SINGULAIR) 10 MG tablet Take 1 tablet by mouth nightly 30 tablet 12    omeprazole (PRILOSEC) 40 MG delayed release capsule Take 1 capsule by mouth 2 times daily (before meals) 60 capsule 5    senna (SENOKOT) 8.6 MG tablet Take 1 tablet by mouth 2 times daily 60 tablet 11    fenofibrate (LIPOFEN) 150 MG CAPS capsule Take 1 capsule by mouth daily 30 capsule 2    Elastic Bandages & Supports (KNEE BRACE) MISC Soft neutral position left knee brace  Size to fit  Dx:  Knee pain 1 each 0    EPINEPHrine (EPIPEN 2-ABUNDIO) 0.3 MG/0.3ML SOAJ injection Inject into muscle for allergic reaction 0.3 mL 0     No current facility-administered medications for this visit. SOCIAL AND OCCUPATIONAL HEALTH:  The patient quit smoking 19 years ago smoked 1 pack/day for about 25 years. There is no history of TB or TB exposure. There is no asbestos or silica dust exposure. The patient reports no coal, foundry, quarry or [employer] exposure. There is no recent travel history noted. The patient denies a history of recreational or IV drug use. No hot tub exposure. The patient has dogs (2). She has 18 chickens and 4 dogs as well.   Hobbies include playing with her grandchildren and feeding her animals. The patient denies excessive alcohol intake.      SOCIAL HISTORY: Age-appropriate past and current activities are:  Social History     Tobacco Use    Smoking status: Former     Packs/day: 1.00     Years: 25.00     Pack years: 25.00     Types: Cigarettes     Quit date: 1988     Years since quittin.5    Smokeless tobacco: Never   Vaping Use    Vaping Use: Never used   Substance Use Topics    Alcohol use: No    Drug use: No       SURGICAL HISTORY:   Past Surgical History:   Procedure Laterality Date    CHOLECYSTECTOMY, LAPAROSCOPIC N/A 2022    LAPAROSCOPIC ROBOTIC XI ASSISTED CHOLECYSTECTOMY performed by Sim South MD at [facility]    COLONOSCOPY  2016    COLONOSCOPY  07/10/2017    COLONOSCOPY N/A 2022    COLONOSCOPY DIAGNOSTIC WITH BANDING performed by Sim South MD at [address] [place] [address] 08/10/2020    LEFT GREATER TROCHANTERIC BURSAE INJECTION X-RAY (CPT 57661) performed by Moe Wall MD at [address] [place] [address] 2022    LEFT GREATER TROCHANTERIC BURSAE INJECTION UNDER XRAY WITH SEDATION (CPT 69992) performed by Moe Wall MD at [facility].      epidural c6-c7    NERVE BLOCK Bilateral 2021    BILATERAL CERVICAL MEDIAL BRANCH FACET BLOCK C4 C5 C6 WITH SEDATION WITH LEFT TROCHANTERIC BURSA INJECTION(CPT 12119) performed by Moe Wall MD at [address] [place] [address] N/A 2021    CERVICAL EPIDURAL INTERLAMINAR STEROID INJECTION C6-C7 WITH SEDATION performed by Moe Wall MD at [address] [place] [address] 2022    CERVICAL EPIDURAL STEROID INJECTION LEFT PARAMEDIAN C6-C7 WITH SEDATION performed by Moe Wall MD at [facility]  07/10/2017       FAMILY HISTORY: A review of medical events in the patient's family, including disease which may be hereditary or place the patient at risk were reviewed. Family History   Problem Relation Age of Onset    Heart Attack Mother     Migraines Mother     Kidney Disease Father         failure    Migraines Sister     Asthma Child     Asthma Child         bone disease    Anemia Child     Diabetes Other       Family Status   Relation Name Status    Mother      Father      Sister  (Not Specified)    Child  Alive    Child  Alive    Child  Alive    Other sibling Alive        REVIEW OF SYSTEMS: The patients health assessment form was reviewed. Constitutional: General health fair . The patient denies weight changes. The patient denies any recent fevers or weakness. Head: Patient denies any history of trauma, convulsive disorder or syncope. Skin:  Patient denies history of changes in pigmentation, eruptions or pruritus. Eyes: Patient denies any history of color blindness, photophobia, diplopia, inflammation,. She  denies cataracts. Denies glaucoma. Ears: Patient denies history of deafness, tinnitus, pain, discharge or recurrent infections. Nose/Throat: Patient denies history of rhinitis, chronic nasal discharge, drainage, epistaxis, obstruction or nasal polyps. Patient denies history of soreness of mouth or tongue. Patient denies history of hoarseness, voice changes, sore throats or recurrent tonsillitis. Lymphatic:  Patient denies history of enlargement, inflammation, pain or suppuration. She denies history of lymphoma. Cardiovascular:  Patient admits to history of palpations. She denies orthopnea, rheumatic fever, scarlet fever, cold extremities. She admits to edema. Pulmonary:  Patient admits to wheezing, dyspnea, exertional dyspnea, nocturnal dyspnea. She denies cough. She denies hemoptysis or pleurisy. She complains of sleep disorder. She reports symptoms of sleep apnea. Gastrointestinal: Patient denies changes in appetite. She denies dysphagia, odynophagia or abdominal pain.  She denies hematochezia, melena, bowel habit changes or hemorrhoids. Allergy/Immunology: The patient denies itching, hives, or angioedema. The patient reports a problem with seasonal/environmental allergies. Genitourinary:  The patient reports a history of stones or UTI. Vascular: No history of peripheral vascular disease, carotid occlusive disease or aneurysms. Musculoskeletal: The patient reports a history of arthritis & joint pains. She denies loss of strength. Breasts: She denies history of masses, lumps, pain, or nipple changes. The patient denies a history of breast cancer. Neurological: Patient denies vertigo. She denies twitching, convulsions, loss of consciousness. No memory problems. Psychological: Patient admits to moodiness, depression or anxiety. She denies obsessions, delusions, illusions or hallucinations. Cancer: No history of cancer reported. Endocrine: No history of goiter. No history of thyroid disorders. She denies diabetes. Hematopoietic:  She denies history of bleeding disorders or easy bruising. She denies hypercoagulable disorder. Integumentory: No skin lesion, rashes, venous stasis or varicose veins. They denies any report of skin cancer. Rheumatic:  The patient denies polyarthritis or inflammatory joint disease. PHYSICAL EXAMINATION:   Vitals:    11/11/22 1601   BP: (P) 120/77   Pulse: (P) 97   Resp: (P) 18   Temp: (P) 97.3 °F (36.3 °C)   SpO2: (P) 98%   Weight: (P) 179 lb (81.2 kg)   Height: (P) 4' 10\" (1.473 m)     Constitutional: A  47 y.o. female who is alert, oriented, cooperative and in no apparent distress. Head was normocephalic and atraumatic. EENT: EOMI SHAUNA. MMM. No icterus. No conjunctival injections. External canals are patent and no discharge. Septum was midline, mucosa was without erythema, exudates or cobblestoning. No thrush. Neck: Supple without thyromegaly. No elevated JVP. Trachea was midline. No carotid bruits.     Respiratory: Chest was symmetrical without dullness to percussion. Breath sounds bilaterally were clear to auscultation. No wheezes, rhonchi or rales. No intercostal retraction or use of accessory muscles. No egophony noted. Cardiovascular: Nonpalpable PMI. Regular without murmur, clicks, gallops or rubs. No left or right ventricular heave. Pulses:  Carotid, radial and femoral pulses were equal bilaterally. Abdomen: Obese, rounded and soft without organomegaly. No rebound, rigidity. Lymphatic: No lymphadenopathy. Musculoskeletal: Ambulates without assistance. Normal curvature of the spine. No gross muscle weakness. Muscle size, tone and strength are normal. No involuntary movements. Extremities:  TRace lower extremity edema. No ulcerations, varicosities or erythema. Coordination appears adequate. Sensory function appears intact. Deep tendon reflexes are normal.   Skin:  Warm and dry. Examination of color, turgor and pigmentation was relatively normal. No bruises or skin rashes. Old surgical scars. Neurological/Psychiatric: General behavior, level of consciousness, thought content is normal. Emotional status is normal.  Cranial nerves II-XII are intact. DATA:   The data collected below information that was obtained, reviewed, analyzed and interpreted today. [facility] Spirometry was compared to previous test if available and demonstrates an FVC of 2.19 liters which is 81 % of predicted with an FEV1 of 2.15 liters which is 98 % of predicted. Up for 68% of predicted. FEV1/FVC ratio is 98 %. Mid expiratory flow rates are 178 % of predicted. Maximum voluntary ventilation is 81 liters per minute or 98 % of predicted. Flow volume loop shows no signs of intrathoracic or extrathoracic process. Impression:      CT SCAN CHEST  2022 Compared to the one in 2019. Impression: The heart and the great vessels are normal.  Trachea and major bronchi are patent. There are no enlarged mediastinal lymph nodes.   There is mild emphysema. A previously noted nearly 5 x 7 mm ground-glass nodule in the left lower lobe is noted which may be marginally increased since the previous examination. There is no focal consolidation. Liver is fatty infiltrated with persistent 3 cm low-attenuation lesion in the dome of the liver. CT SCAN CHEST (2019)         IMPRESSION:      Sadaf Aguirre is a 47 y.o. female with a history of asthma with a stable ground glass nodule in the lingular lobe since 2019. With this in mind, we would like to proceed with the following;                      PLAN:    As far as the ground glass opacity recommend a follow-up CT scan in 1 year. In addition it certainly possible that her environmental exposures including chickens and ducks may be contributing to the ground glass abnormality. For more important issue is her underlying asthma. To determine her underlying phenotype of asthma as for diagnostic work-up should be started. We will check total IgE = 1901 kU/L, ANCA = PR3 MPO = negative, Allergies = + x 1000! Tony Monroe Aspergillous Ig G, = normal. I told her still not ideal her environmental exposure and I am not forgetting way for removing her from her pets but told her she should not try to obtain more. We went ahead and did allergy testing to see whether or not she is exposed any wheezing animals including hypersensitivity panel = normal and histoplasmosis testing= not detected. She is to remain on Advair 500 mg twice a day with proper instruction and rinsing her mouth after each use. She was told again only to use albuterol as needed. All questions were answered. We hope this updates you on our evaluation and clinical thinking. Thank you for entrusting us to participate in Sadaf Aguirre care. If you have any questions, please don't hesitate to call us at the [address].          Sincerely,      Mitzi Finch D.O., MPH, [facility]  Professor of Internal Medicine  Director of Pulmonary [facility]      During today's visit  Marisel Hernadez is a 47 y.o. female was seen, examined and discussed. This is confirmation that I have personally seen and examined the patient and that the key elements of the encounter were performed by me (> 85 % time). The medications & laboratory data was discussed and adjusted where necessary. The radiographic images were reviewed or with radiologist or consultant if felt dis-concordant with the exam or history. The above findings were corroborated, plans confirmed and changes made if needed. Family is updated at the bedside as available. Key issues of the case were discussed among consultants. Critical Care time is documented if appropriate.

## 2022-11-11 NOTE — PROGRESS NOTES
Do you currently have any of the following:    Fever: No  Headache:  No  Cough: No  Shortness of breath: No  Exposed to anyone with these symptoms: No                                                                                                                Jovanny Morales presents to the [address] [address] on 11/11/2022. Tu Cazares is complaining of pain in her neck, left side and down her arm. . The pain is constant. The pain is described as aching, throbbing, shooting, and sharp. Pain is rated on her best day at a 8, on her worst day at a 10, and on average at a 9 on the VAS scale. She took her last dose of Percocet . Tu Cazares does not have issues with constipation. Any procedures since your last visit: No,     She is not on NSAIDS and  is not on anticoagulation medications to include none and is managed by NA. Pacemaker or defibrillator: No Physician managing device is NA. Medication Contract and Consent for Opioid Use Documents Filed       Patient Documents       Type of Document Status Date Received Received By Description    Medication Contract Received 7/18/2019 11:59 AM Edmond SINGLETON 7/18/19 medication contract    Medication Contract Received 10/9/2020  3:01 PM LORENZO [address] [place] [address] pain pt agreement    Medication Contract Received 11/5/2021 11:29 AM Jose MUNIZ Pain Mgmt Patient Agreement 11.5.2021    Medication Contract Received 12/13/2021  4:21 PM LEONARDA PINA pain management                       /78   Pulse 90   Temp 97.5 °F (36.4 °C) (Infrared)   Resp 16   Ht 4' 10\" (1.473 m)   Wt 173 lb (78.5 kg)   LMP 03/27/2008   SpO2 97%   BMI 36.16 kg/m²      Patient's last menstrual period was 03/27/2008.

## 2022-11-11 NOTE — PROGRESS NOTES
[address]  [address] [facility], [address] [place] [address] Mathe[place]  [phone]    Follow up Note      Sadaf Aguirre     Date of Visit:  11/11/2022    CC:  Patient presents for follow up   Chief Complaint   Patient presents with    Neck Pain     Neck pain with radiation down left arm. HPI:  Office extension for her neck/low back and Left hip pain, last seen 10/2021  Appropriate analgesia with current medications regimen: No  Change in quality of symptoms:no. Medication side effects:No  Recent diagnostic testing:none  Recent interventional procedures:no     She has been on anticoagulation medications to include ASA. The patient  has not been on herbal supplements. The patient is not diabetic. Imaging:   Cervical spine MRI 04/2018  1. Multilevel degenerative disc disease extending from C3 through C7. Moderately severe spinal canal stenosis at C3-C4. Moderate spinal   canal stenosis at C4-C5. . Moderately severe spinal canal stenosis at   C5-C6. Abnormal signal intensity within the cervical spinal cord C3-C5   without evidence of enhancement. Findings are most likely reflective   of myelomalacia changes secondary to the underlying degree of cord   compression as described above. No active demyelination identified. 2. Slight loss of normally expected cervical lordosis C3-C6. Lumbar spine MRI 2022:  1. Mild bilateral neural foraminal narrowing at L3/L4 more significant on the   right. No central canal stenosis. 2. Moderate bilateral facet hypertrophy at L4/L5. 3. No fracture or malalignment. CT Lumbar spine 2017  1. Mild diffuse osteopenia, no compression fracture or   spondylolisthesis. 2. Mild multilevel degenerative disc disease and facet joint   arthropathy of the lower lumbar spine more pronounced at L4-L5. 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          Otherwise, no musculoskeletal pathology to explain patient's pain. Previous treatments: Physical Therapy, Epidural Steroid Injection with  and medications. .    Patient had tried and failed Gabapentin/zanaflex/flexeril/Norco/Lyrica    Patient had underwent cervical epidural at C6-7 (04/2021) for her radicular symptoms and bilateral C3,4,5 medial branch block x 1(08/2021) for her mechanical neck pain. Both interventions had helped with her pain. Potential Aberrant Drug-Related Behavior:    No     Urine Drug Screening:  Saliva screen 08/2020 consistent for Ultram  11/2021: consistent for oxycodone  09/2022 consistent    OARRS report:  11/2021 consistent. Opioid agreement:  Renewal 12/13/2022    Past Medical History:   Diagnosis Date    Arthritis     Asthma     uses inhalers twice a day    Cerebral artery occlusion with cerebral infarction ([facility] [address]     2015-slight right sided weakness, ambulates normal    Cervical spinal stenosis     Chronic back pain     Chronic kidney disease     Class 2 severe obesity due to excess calories with serious comorbidity and body mass index (BMI) of 37.0 to 37.9 in adult [facility])     COVID 2021    vomiting, diarrhea, body aches, fever, cough- coulc not confirm month    Disorder of thyroid gland 09/11/2012    no medication    GERD (gastroesophageal reflux disease)     Headache(784.0)     Irritable bowel syndrome     Numbness and tingling in right hand 03/06/2020    CIERA on CPAP     uses machine    Seizure disorder ([facility] [address]     not on medications for this; last seizure 2018; etiology?     Vitamin D deficiency 12/03/2014     Past Surgical History:   Procedure Laterality Date    CHOLECYSTECTOMY, LAPAROSCOPIC N/A 5/17/2022    LAPAROSCOPIC ROBOTIC XI ASSISTED CHOLECYSTECTOMY performed by Cece Rodriguez MD at [address] [place] [address]  03/09/2016    COLONOSCOPY  07/10/2017    COLONOSCOPY N/A 4/1/2022    COLONOSCOPY DIAGNOSTIC WITH BANDING performed by Cece Rodriguez MD at [facility]    HIP SURGERY Left 08/10/2020    LEFT GREATER TROCHANTERIC BURSAE INJECTION X-RAY (CPT 65376) performed by Chris Shafer MD at [facility] 9/12/2022    LEFT GREATER TROCHANTERIC BURSAE INJECTION UNDER XRAY WITH SEDATION (CPT 01874) performed by Chris Shafer MD at [facility]. 47     epidural c6-c7    NERVE BLOCK Bilateral 8/5/2021    BILATERAL CERVICAL MEDIAL BRANCH FACET BLOCK C4 C5 C6 WITH SEDATION WITH LEFT TROCHANTERIC BURSA INJECTION(CPT 75951) performed by Chris Shafer MD at [address] [place] [address] N/A 4/19/2021    CERVICAL EPIDURAL INTERLAMINAR STEROID INJECTION C6-C7 WITH SEDATION performed by Chris Shafer MD at [address] [place] [address] Left 1/31/2022    CERVICAL EPIDURAL STEROID INJECTION LEFT PARAMEDIAN C6-C7 WITH SEDATION performed by Chris Shafer MD at [facility]  07/10/2017     Prior to Admission medications    Medication Sig Start Date End Date Taking? Authorizing Provider   oxyCODONE (ROXICODONE) 5 MG immediate release tablet Take 1 tablet by mouth every 12 hours as needed for Pain for up to 30 days.  Intended supply: 30 days 10/15/22 11/14/22 Yes GARCIA Jay   fluticasone-salmeterol (ADVAIR DISKUS) 500-50 MCG/ACT AEPB diskus inhaler Inhale 1 puff into the lungs in the morning and 1 puff in the evening. 9/22/22  Yes Abdi Hayes, DO   vitamin D3 (CHOLECALCIFEROL) 25 MCG (1000 UT) TABS tablet Take 1 tablet by mouth daily 9/22/22  Yes Joaquin Romero, DO   Prenatal Vit-Fe Fumarate-FA (PRENATAL PLUS) 27-1 MG TABS 1 pill daily 9/22/22  Yes Abdi Grijalva, DO   buPROPion (WELLBUTRIN XL) 150 MG extended release tablet Take 1 tablet by mouth every morning 9/22/22  Yes Joaquin Romero,    omeprazole (PRILOSEC) 40 MG delayed release capsule Take 1 capsule by mouth 2 times daily (before meals) 6/8/22  Yes Nettie Iqbal MD senna (SENOKOT) 8.6 MG tablet Take 1 tablet by mouth 2 times daily 22 Yes Zenaida Alves MD   fenofibrate (LIPOFEN) 150 MG CAPS capsule Take 1 capsule by mouth daily 22  Yes Abdi Quick,    Elastic Bandages & Supports (KNEE BRACE) MISC Soft neutral position left knee brace  Size to fit  Dx:  Knee pain 22  Yes Keon Hutton, DO   EPINEPHrine (EPIPEN 2-ABUNDIO) 0.3 MG/0.3ML SOAJ injection Inject into muscle for allergic reaction 19  Yes Abdi Grijalva,    montelukast (SINGULAIR) 10 MG tablet Take 1 tablet by mouth nightly 22  Jaquelin Navarrete, DO     Allergies   Allergen Reactions    Fish-Derived Products Anaphylaxis    Robaxin [Methocarbamol] Other (See Comments)     Very tired, felt like she couldn't even function with her daily activities.     Dye [Iodides] Itching     Social History     Socioeconomic History    Marital status:      Spouse name: Not on file    Number of children: Not on file    Years of education: Not on file    Highest education level: Not on file   Occupational History    Not on file   Tobacco Use    Smoking status: Former     Packs/day: 1.00     Years: 25.00     Pack years: 25.00     Types: Cigarettes     Quit date: 1988     Years since quittin.5    Smokeless tobacco: Never   Vaping Use    Vaping Use: Never used   Substance and Sexual Activity    Alcohol use: No    Drug use: No    Sexual activity: Not on file   Other Topics Concern    Not on file   Social History Narrative    Not on file     Social Determinants of Health     Financial Resource Strain: Not on file   Food Insecurity: Not on file   Transportation Needs: Not on file   Physical Activity: Not on file   Stress: Not on file   Social Connections: Not on file   Intimate Partner Violence: Not on file   Housing Stability: Not on file     Family History   Problem Relation Age of Onset    Heart Attack Mother     Migraines Mother     Kidney Disease Father         failure Migraines Sister     Asthma Child     Asthma Child         bone disease    Anemia Child     Diabetes Other      REVIEW OF SYSTEMS:     Shereen Dempsey denies fever/chills, chest pain, shortness of breath, new bowel or bladder complaints. All other review of systems was negative. PHYSICAL EXAMINATION:      /78   Pulse 90   Temp 97.5 °F (36.4 °C) (Infrared)   Resp 16   Ht 4' 10\" (1.473 m)   Wt 173 lb (78.5 kg)   LMP 03/27/2008   SpO2 97%   BMI 36.16 kg/m²     General:      General appearance:   elderly, pleasant and well-hydrated. , in moderate discomfort and A & O x3  Build:Overweight    HEENT:    Head:normocephalic and atraumatic  Sclera: icterus absent,    Lungs:    Breathing:Breathing Pattern: regular, no distress    Abdomen:    Shape:non-distended and normal  Tenderness:none    Cervical spine:    Inspection:normal  Palpation:tenderness paravertebral muscles, facet loading, left, right, positive and tenderness. Range of motion:abnormal moderately flexion, extension rotation bilateral and is  painful. Lumbar spine:    Spine inspection:normal   CVA tenderness:No   Palpation:tenderness paravertebral muscles, facet loading, left, right, positive and tenderness. Range of motion:abnormal moderately Lateral bending, flexion, extension rotation bilateral and is  painful. Musculoskeletal:    Trigger points in Paraveteral:absent   SI joint tenderness:negative right, negative left  SLR:negative right, negative left, sitting     Extremities:    Tremors:None bilaterally upper and lower  Range of motion:Generally normal shoulders, pain with internal rotation of hips positive Left  Intact:Yes  Edema:Normal    Neurological:    Sensory:diminshed to light touch lateral aspect of Left arm.   Motor:   Right Grip4+/5              Left Grip4+/5               Right Bicep4+/5           Left Bicep4+/5              Right Triceps4+/5       Left Triceps4+/5          Right Deltoid4+/5     Left Deltoid4+/5 Right Quadriceps4+/5          Left Quadriceps4+/5           Right Gastrocnemius4+/5    Left Gastrocnemius4+/5  Right Ant Tibialis4+/5  Left Ant Tibialis4+/5  Reflexes:    Right Brachioradialis reflex2+  Left Brachioradialis reflex2+  Right Biceps reflex2+  Left Biceps reflex2+  Right Triceps reflex2+  Left Triceps reflex2+  Right Quadriceps reflex2+  Left Quadriceps reflex2+  Right Achilles reflex2+  Left Achilles reflex2+  Gait:antalgic    Dermatology:    Skin:no unusual rashes and no skin lesions    Impression:    Neck and low back pain with radiation to the Left upper and lower extremity  Cervical spine MRI 2019 Multilevel degenerative disc disease with moderate to severe stenosis at C3-4/C4-5 and C5-6  Lumbar spine CT 2017 Mild degenerative disc disease and facet arthropathy most pronounced at L4-5  Patient had seen neurosurgery and surgery C3-4/C4-5 and C5-6 ACDF was recommended. Plan:  Office extension for neck/low back and Left hip pain, last seen by me 10/2021. Reviewed NP notes/updated imaging studies/urine screen. Discontinue Robaxin side effects. Contonue with Oxycodone 5 mg QD PRN.(Maintaining daily activities with no signs of abuse or diversion). Continue with Lidocaine patches  Avoid NSAIDs(H/O GI ulcers)  OARRS report reviewed 11/2022. Patient encouraged to stay active and to lose weight. Currently in aquatjherapy. Treatment plan discussed with the patient and her including medications side effects. We discussed with the patient that combining opioids, benzodiazepines, alcohol, illicit drugs or sleep aids increases the risk of respiratory depression including death. We discussed that these medications may cause drowsiness, sedation or dizziness and have counseled the patient not to drive or operate machinery. We have discussed that these medications will be prescribed only by one provider.  We have discussed with the patient about age related risk factors and have thoroughly discussed the importance of taking these medications as prescribed. The patient verbalizes understanding. heather Pedersen M.D.

## 2022-11-17 ENCOUNTER — HOSPITAL ENCOUNTER (OUTPATIENT)
Dept: PHYSICAL THERAPY | Age: 52
Setting detail: THERAPIES SERIES
Discharge: HOME | End: 2022-11-17
Payer: MEDICAID

## 2022-11-25 ENCOUNTER — OFFICE VISIT (OUTPATIENT)
Dept: FAMILY MEDICINE CLINIC | Age: 52
End: 2022-11-25
Payer: MEDICAID

## 2022-11-25 ENCOUNTER — HOSPITAL ENCOUNTER (EMERGENCY)
Age: 52
Discharge: LEFT BEFORE DISCHARGE | End: 2022-11-25
Attending: EMERGENCY MEDICINE
Payer: MEDICAID

## 2022-11-25 VITALS
WEIGHT: 177 LBS | BODY MASS INDEX: 37.16 KG/M2 | OXYGEN SATURATION: 96 % | TEMPERATURE: 97.4 F | HEART RATE: 71 BPM | HEIGHT: 58 IN | DIASTOLIC BLOOD PRESSURE: 74 MMHG | SYSTOLIC BLOOD PRESSURE: 126 MMHG

## 2022-11-25 VITALS
BODY MASS INDEX: 37.16 KG/M2 | SYSTOLIC BLOOD PRESSURE: 114 MMHG | DIASTOLIC BLOOD PRESSURE: 84 MMHG | HEIGHT: 58 IN | WEIGHT: 177 LBS | RESPIRATION RATE: 16 BRPM | TEMPERATURE: 97.9 F | HEART RATE: 74 BPM | OXYGEN SATURATION: 100 %

## 2022-11-25 DIAGNOSIS — K62.5 RECTAL BLEEDING: Primary | ICD-10-CM

## 2022-11-25 DIAGNOSIS — Z53.21 ELOPED FROM EMERGENCY DEPARTMENT: Primary | ICD-10-CM

## 2022-11-25 DIAGNOSIS — J45.50 SEVERE PERSISTENT ASTHMA, UNSPECIFIED WHETHER COMPLICATED: ICD-10-CM

## 2022-11-25 DIAGNOSIS — R10.84 GENERALIZED ABDOMINAL PAIN: ICD-10-CM

## 2022-11-25 PROCEDURE — G8427 DOCREV CUR MEDS BY ELIG CLIN: HCPCS | Performed by: NURSE PRACTITIONER

## 2022-11-25 PROCEDURE — G8484 FLU IMMUNIZE NO ADMIN: HCPCS | Performed by: NURSE PRACTITIONER

## 2022-11-25 PROCEDURE — 99214 OFFICE O/P EST MOD 30 MIN: CPT | Performed by: NURSE PRACTITIONER

## 2022-11-25 PROCEDURE — 1036F TOBACCO NON-USER: CPT | Performed by: NURSE PRACTITIONER

## 2022-11-25 PROCEDURE — 99281 EMR DPT VST MAYX REQ PHY/QHP: CPT

## 2022-11-25 PROCEDURE — 3017F COLORECTAL CA SCREEN DOC REV: CPT | Performed by: NURSE PRACTITIONER

## 2022-11-25 PROCEDURE — G8417 CALC BMI ABV UP PARAM F/U: HCPCS | Performed by: NURSE PRACTITIONER

## 2022-11-25 RX ORDER — EPINEPHRINE 1 MG/ML
0.3 INJECTION, SOLUTION, CONCENTRATE INTRAVENOUS PRN
OUTPATIENT
Start: 2022-11-25

## 2022-11-25 RX ORDER — ONDANSETRON 2 MG/ML
8 INJECTION INTRAMUSCULAR; INTRAVENOUS
OUTPATIENT
Start: 2022-11-25

## 2022-11-25 RX ORDER — ACETAMINOPHEN 325 MG/1
650 TABLET ORAL
OUTPATIENT
Start: 2022-11-25

## 2022-11-25 RX ORDER — ALBUTEROL SULFATE 90 UG/1
4 AEROSOL, METERED RESPIRATORY (INHALATION) PRN
OUTPATIENT
Start: 2022-11-25

## 2022-11-25 RX ORDER — DIPHENHYDRAMINE HYDROCHLORIDE 50 MG/ML
50 INJECTION INTRAMUSCULAR; INTRAVENOUS
OUTPATIENT
Start: 2022-11-25

## 2022-11-25 RX ORDER — SODIUM CHLORIDE 9 MG/ML
INJECTION, SOLUTION INTRAVENOUS CONTINUOUS
OUTPATIENT
Start: 2022-11-25

## 2022-11-25 ASSESSMENT — LIFESTYLE VARIABLES
HOW OFTEN DO YOU HAVE A DRINK CONTAINING ALCOHOL: NEVER
HOW MANY STANDARD DRINKS CONTAINING ALCOHOL DO YOU HAVE ON A TYPICAL DAY: PATIENT DOES NOT DRINK

## 2022-11-25 NOTE — PROGRESS NOTES
Chief Complaint:       Diarrhea (When wiping there is blood. Started Tuesday. ), Gastroesophageal Reflux, Abdominal Pain, Nausea & Vomiting, and Lower Back Pain    History of Present Illness   Source of history provided by:  patient. Zina Sanders is a 47 y.o. old female who presents to walk-in for complaints of sudden onset stabbing pain in the lower abdomen with radiation to the back which began 4 day(s) prior to arrival.   There has been no similar episodes in the past.  Since onset the symptoms have been persistent. The pain is associated with blood in the stool, nausea, vomiting and diarrhea. The pain is aggravated by eating and relieved by nothing. There has been NO chills, cloudy urine, constipation, dysuria, headache, hematuria, or urinary frequency. Reports that she had a colonoscopy in April and they found polyps. ROS    Unless otherwise stated in this report or unable to obtain because of the patient's clinical or mental status as evidenced by the medical record, this patients's positive and negative responses for Review of Systems, constitutional, psych, eyes, ENT, cardiovascular, respiratory, gastrointestinal, neurological, genitourinary, musculoskeletal, integument systems and systems related to the presenting problem are either stated in the preceding or were not pertinent or were negative for the symptoms and/or complaints related to the medical problem.     Past Medical History:  has a past medical history of Arthritis, Asthma, Cerebral artery occlusion with cerebral infarction [facility]), Cervical spinal stenosis, Chronic back pain, Chronic kidney disease, Class 2 severe obesity due to excess calories with serious comorbidity and body mass index (BMI) of 37.0 to 37.9 in adult [facility]), COVID, Disorder of thyroid gland, GERD (gastroesophageal reflux disease), Headache(784.0), Irritable bowel syndrome, Numbness and tingling in right hand, CIERA on CPAP, Seizure disorder ([facility] [address] and Vitamin D deficiency. Past Surgical History:  has a past surgical history that includes Tubal ligation; Colonoscopy (03/09/2016); Colonoscopy (07/10/2017); Upper gastrointestinal endoscopy (07/10/2017); hip surgery (Left, 08/10/2020); Nerve Block (N/A); Pain management procedure (N/A, 4/19/2021); Nerve Block (Bilateral, 8/5/2021); Pain management procedure (Left, 1/31/2022); Colonoscopy (N/A, 4/1/2022); Cholecystectomy, laparoscopic (N/A, 5/17/2022); and hip surgery (Left, 9/12/2022). Social History:  reports that she quit smoking about 34 years ago. Her smoking use included cigarettes. She has a 25.00 pack-year smoking history. She has never used smokeless tobacco. She reports that she does not drink alcohol and does not use drugs. Family History: family history includes Anemia in her child; Asthma in her child and child; Diabetes in an other family member; Heart Attack in her mother; Kidney Disease in her father; Migraines in her mother and sister. Allergies: Fish-derived products, Robaxin [methocarbamol], and Dye [iodides]    Physical Exam   Vital Signs:  /74   Pulse 71   Temp 97.4 °F (36.3 °C)   Ht 4' 10\" (1.473 m)   Wt 177 lb (80.3 kg)   LMP 03/27/2008   SpO2 96%   BMI 36.99 kg/m²    Oxygen Saturation Interpretation: Normal.    General Appearance/Constitutional:  Alert, development consistent with age, NAD. Lungs: CTAB without wheezing, rales, or rhonchi. Heart:  RRR, no murmurs, rubs, or gallops. Abdomen:         General Appearance: No obvious trauma or bruising. No rashes or lesions. Bowel sounds: Present in all quadrants       Distension:  No distension. Tenderness: There is significant abdominal pain to entire abdomen, greater in LUQ and RLQ. Liver/Spleen: Non-tender and no hepatosplenomegaly. Pulsatile Mass: None noted. Back: CVA Tenderness: No bilateral tenderness or bruising. Skin:  Normal turgor.   Warm, dry, without visible rash, unless noted elsewhere. Neurological:  Orientation age-appropriate. Motor functions intact. Lab / Imaging Results   (All laboratory and radiology results have been personally reviewed by myself)  Labs:  No results found for this visit on 11/25/22. Imaging: All Radiology results interpreted by Radiologist unless otherwise noted. No results found. Assessment / Plan   Impression(s):  Homer Schirmer was seen today for diarrhea, gastroesophageal reflux, abdominal pain, nausea & vomiting and lower back pain. Diagnoses and all orders for this visit:    Rectal bleeding    Generalized abdominal pain    Pt advised that she needs a comprehensive GI workup including STAT labs and imaging that is unable to be performed in an urgent care setting. EMS offered to patient but refused at this time. Pt advised to go straight to the ED for further evaluation and management. Pt agreed with this care plan and agreed to go immediately. Pt was transported to the ED by private vehicle. Pt left our office in stable condition. Further disposition to follow. All questions answered. Electronically signed by NOY Jay CNP   DD: 11/25/22    **This report was transcribed using voice recognition software. Every effort was made to ensure accuracy; however, inadvertent computerized transcription errors may be present.

## 2022-11-28 RX ORDER — MEPERIDINE HYDROCHLORIDE 25 MG/ML
25 INJECTION INTRAMUSCULAR; INTRAVENOUS; SUBCUTANEOUS ONCE
Start: 2022-11-28 | End: 2022-11-28

## 2022-11-28 RX ORDER — ACETAMINOPHEN 325 MG/1
650 TABLET ORAL ONCE
Start: 2022-11-28 | End: 2022-11-28

## 2022-11-28 NOTE — ED PROVIDER NOTES
This patient had eloped from the emergency department and was not evaluated. I did not evaluate this patient.      Abhijeet Ly, DO  11/28/22 [id number]  [facility] Enrrique, DO  11/28/22 [id number]

## 2022-11-29 ENCOUNTER — HOSPITAL ENCOUNTER (OUTPATIENT)
Dept: PHYSICAL THERAPY | Age: 52
Setting detail: THERAPIES SERIES
Discharge: HOME | End: 2022-11-29
Payer: MEDICAID

## 2022-12-01 ENCOUNTER — HOSPITAL ENCOUNTER (OUTPATIENT)
Dept: PHYSICAL THERAPY | Age: 52
Setting detail: THERAPIES SERIES
Discharge: HOME | End: 2022-12-01

## 2022-12-02 ENCOUNTER — TELEPHONE (OUTPATIENT)
Dept: PULMONOLOGY | Age: 52
End: 2022-12-02

## 2022-12-02 NOTE — TELEPHONE ENCOUNTER
Mailed a letter to patient informing her that her PFT is scheduled for 1-16-23 at 1:00 pm at the [facility]. She must arrive by 12:30 pm. She is to have no caffeine for 24 hours prior to test and no resp meds for at least 4 hours prior to test

## 2022-12-06 ENCOUNTER — APPOINTMENT (OUTPATIENT)
Dept: PHYSICAL THERAPY | Age: 52
End: 2022-12-06
Payer: MEDICAID

## 2022-12-08 ENCOUNTER — APPOINTMENT (OUTPATIENT)
Dept: PHYSICAL THERAPY | Age: 52
End: 2022-12-08
Payer: MEDICAID

## 2022-12-12 ENCOUNTER — OFFICE VISIT (OUTPATIENT)
Dept: PAIN MANAGEMENT | Age: 52
End: 2022-12-12
Payer: MEDICAID

## 2022-12-12 VITALS
OXYGEN SATURATION: 95 % | WEIGHT: 177 LBS | TEMPERATURE: 97.5 F | RESPIRATION RATE: 16 BRPM | HEIGHT: 58 IN | SYSTOLIC BLOOD PRESSURE: 120 MMHG | DIASTOLIC BLOOD PRESSURE: 74 MMHG | HEART RATE: 87 BPM | BODY MASS INDEX: 37.16 KG/M2

## 2022-12-12 DIAGNOSIS — M47.816 LUMBAR SPONDYLOSIS: ICD-10-CM

## 2022-12-12 DIAGNOSIS — M47.812 FACET ARTHROPATHY, CERVICAL: ICD-10-CM

## 2022-12-12 DIAGNOSIS — M50.30 DDD (DEGENERATIVE DISC DISEASE), CERVICAL: ICD-10-CM

## 2022-12-12 DIAGNOSIS — M54.16 LUMBAR RADICULOPATHY: ICD-10-CM

## 2022-12-12 DIAGNOSIS — M47.812 CERVICAL FACET JOINT SYNDROME: ICD-10-CM

## 2022-12-12 DIAGNOSIS — M51.9 LUMBAR DISC DISORDER: ICD-10-CM

## 2022-12-12 DIAGNOSIS — M54.12 CERVICAL RADICULOPATHY: ICD-10-CM

## 2022-12-12 DIAGNOSIS — M47.816 LUMBAR FACET ARTHROPATHY: ICD-10-CM

## 2022-12-12 DIAGNOSIS — M79.7 FIBROMYALGIA: ICD-10-CM

## 2022-12-12 DIAGNOSIS — G89.4 CHRONIC PAIN SYNDROME: Primary | ICD-10-CM

## 2022-12-12 PROCEDURE — 1036F TOBACCO NON-USER: CPT | Performed by: PHYSICIAN ASSISTANT

## 2022-12-12 PROCEDURE — G8484 FLU IMMUNIZE NO ADMIN: HCPCS | Performed by: PHYSICIAN ASSISTANT

## 2022-12-12 PROCEDURE — 99213 OFFICE O/P EST LOW 20 MIN: CPT | Performed by: PHYSICIAN ASSISTANT

## 2022-12-12 PROCEDURE — G8427 DOCREV CUR MEDS BY ELIG CLIN: HCPCS | Performed by: PHYSICIAN ASSISTANT

## 2022-12-12 PROCEDURE — G8417 CALC BMI ABV UP PARAM F/U: HCPCS | Performed by: PHYSICIAN ASSISTANT

## 2022-12-12 PROCEDURE — 3017F COLORECTAL CA SCREEN DOC REV: CPT | Performed by: PHYSICIAN ASSISTANT

## 2022-12-12 RX ORDER — MAG HYDROX/ALUMINUM HYD/SIMETH 400-400-40
SUSPENSION, ORAL (FINAL DOSE FORM) ORAL
COMMUNITY
Start: 2022-11-14

## 2022-12-12 RX ORDER — OXYCODONE HYDROCHLORIDE 5 MG/1
5 TABLET ORAL DAILY PRN
Qty: 30 TABLET | Refills: 0 | Status: SHIPPED | OUTPATIENT
Start: 2022-12-12 | End: 2023-01-11

## 2022-12-12 NOTE — PROGRESS NOTES
[facility], [address] [place] [address]  [phone]    Follow up Note      Reza Chew     Date of Visit:  2022    CC:  Patient presents for follow up   Chief Complaint   Patient presents with    Neck Pain         HPI:    Pain is well controlled at this time. Had the flu but doing better. Appropriate analgesia with current medications regimen: yes. Change in quality of symptoms:no. Medication side effects:none. Recent diagnostic testing:none. Recent interventional procedures: None. She has been on anticoagulation medications to include ASA. The patient  has not been on herbal supplements. The patient is not diabetic. Imagin/15/2022 MRI lumbar spine    FINDINGS:   No fracture or malalignment. Vertebral body height and interspaces are well   maintained. Conus medullaris is visualized and is unremarkable. No evidence   of epidural mass or hematoma. No prevertebral soft tissue edema. Note made   of a incidental intraosseous hemangioma located in T11 vertebral body. L1/L2: No central canal stenosis or neural foraminal narrowing. L2/L3: No central canal stenosis or neural foraminal narrowing. L3/L4: Mild facet hypertrophy with hypertrophy of ligamentum flavum. No   central canal stenosis. There is mild bilateral neural foraminal narrowing   more significant on the right. L4/L5: Moderate facet hypertrophy. No central canal stenosis. No   significant neural foraminal narrowing. L5/S1: No central canal stenosis. No significant neural foraminal narrowing. Impression   1. Mild bilateral neural foraminal narrowing at L3/L4 more significant on the   right. No central canal stenosis. 2. Moderate bilateral facet hypertrophy at L4/L5. 3. No fracture or malalignment. Cervical spine MRI 2018  1. Multilevel degenerative disc disease extending from C3 through C7.    Moderately severe spinal canal stenosis at C3-C4. Moderate spinal   canal stenosis at C4-C5. . Moderately severe spinal canal stenosis at   C5-C6. Abnormal signal intensity within the cervical spinal cord C3-C5   without evidence of enhancement. Findings are most likely reflective   of myelomalacia changes secondary to the underlying degree of cord   compression as described above. No active demyelination identified. 2. Slight loss of normally expected cervical lordosis C3-C6. CT Lumbar spine 2017  1. Mild diffuse osteopenia, no compression fracture or   spondylolisthesis. 2. Mild multilevel degenerative disc disease and facet joint   arthropathy of the lower lumbar spine more pronounced at L4-L5. 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 Otherwise, no musculoskeletal pathology to explain patient's pain. Previous treatments: Physical Therapy, Epidural Steroid Injection with  and medications. .  Cymbalta severe side effects, tylenol arthritis heartburn and gi distress,  Tizanidine helps but makes tired, tramadol caused hyperness, butrans patch dizziness and diarrhea                                        Potential Aberrant Drug-Related Behavior:    No     Urine Drug Screening:  Saliva screen 08/2020 consistent for Ultram  11/2021: consistent for oxycodone  09/2022 consistent     OARRS report:  7/2022 consistent to 12/2022 consistent. Opioid agreement: 12/13/21 - needs updated today.          Past Medical History:   Diagnosis Date    Arthritis     Asthma     uses inhalers twice a day    Cerebral artery occlusion with cerebral infarction ([facility] [address]     2015-slight right sided weakness, ambulates normal    Cervical spinal stenosis     Chronic back pain     Chronic kidney disease     Class 2 severe obesity due to excess calories with serious comorbidity and body mass index (BMI) of 37.0 to 37.9 in adult [facility])     COVID 2021    vomiting, diarrhea, body aches, fever, cough- coulc not confirm month    Disorder of thyroid gland 09/11/2012    no medication    GERD (gastroesophageal reflux disease)     Headache(784.0)     Irritable bowel syndrome     Numbness and tingling in right hand 03/06/2020    CIERA on CPAP     uses machine    Seizure disorder ([address]     not on medications for this; last seizure 2018; etiology? Vitamin D deficiency 12/03/2014       Past Surgical History:   Procedure Laterality Date    CHOLECYSTECTOMY, LAPAROSCOPIC N/A 5/17/2022    LAPAROSCOPIC ROBOTIC XI ASSISTED CHOLECYSTECTOMY performed by Milad Luna MD at [facility]  03/09/2016    COLONOSCOPY  07/10/2017    COLONOSCOPY N/A 4/1/2022    COLONOSCOPY DIAGNOSTIC WITH BANDING performed by Milad Luna MD at [address] [place] [address] 08/10/2020    LEFT GREATER TROCHANTERIC BURSAE INJECTION X-RAY (CPT 51898) performed by Jeannette Valdovinos MD at [address] [place] [address] 9/12/2022    LEFT GREATER TROCHANTERIC BURSAE INJECTION UNDER XRAY WITH SEDATION (CPT 79882) performed by Jeannette Valdovinos MD at [facility]. 47     epidural c6-c7    NERVE BLOCK Bilateral 8/5/2021    BILATERAL CERVICAL MEDIAL BRANCH FACET BLOCK C4 C5 C6 WITH SEDATION WITH LEFT TROCHANTERIC BURSA INJECTION(CPT 27923) performed by Jeannette Valdovinos MD at [address] [place] [address] N/A 4/19/2021    CERVICAL EPIDURAL INTERLAMINAR STEROID INJECTION C6-C7 WITH SEDATION performed by Jeannette Valdovinos MD at [address] [place] [address] 1/31/2022    CERVICAL EPIDURAL STEROID INJECTION LEFT PARAMEDIAN C6-C7 WITH SEDATION performed by Jeannette Valdovinos MD at [facility]  07/10/2017       Prior to Admission medications    Medication Sig Start Date End Date Taking?  Authorizing Provider   LUBRICANT EYE DROPS 0.4-0.3 % ophthalmic solution INSTILL 1 DROP IN BOTH EYES TWICE DAILY FOR 14 DAYS 22  Yes Historical Provider, MD   fluticasone-salmeterol (ADVAIR DISKUS) 500-50 MCG/ACT AEPB diskus inhaler Inhale 1 puff into the lungs in the morning and 1 puff in the evening. 22  Yes Abdi Tsang, DO   vitamin D3 (CHOLECALCIFEROL) 25 MCG (1000 UT) TABS tablet Take 1 tablet by mouth daily 22  Yes Kimberly Shaffer, DO   Prenatal Vit-Fe Fumarate-FA (PRENATAL PLUS) 27-1 MG TABS 1 pill daily 22  Yes Kimberly Shaffer, DO   omeprazole (PRILOSEC) 40 MG delayed release capsule Take 1 capsule by mouth 2 times daily (before meals) 22  Yes Remi Barrios MD   EPINEPHrine (EPIPEN 2-ABUNDIO) 0.3 MG/0.3ML SOAJ injection Inject into muscle for allergic reaction 19  Yes Kimberly Shaffer,    montelukast (SINGULAIR) 10 MG tablet Take 1 tablet by mouth nightly 22  Lisandra Morse, DO       Allergies   Allergen Reactions    Fish-Derived Products Anaphylaxis    Robaxin [Methocarbamol] Other (See Comments)     Very tired, felt like she couldn't even function with her daily activities.     Dye [Iodides] Itching       Social History     Socioeconomic History    Marital status:      Spouse name: Not on file    Number of children: Not on file    Years of education: Not on file    Highest education level: Not on file   Occupational History    Not on file   Tobacco Use    Smoking status: Former     Packs/day: 1.00     Years: 25.00     Pack years: 25.00     Types: Cigarettes     Quit date: 1988     Years since quittin.6    Smokeless tobacco: Never   Vaping Use    Vaping Use: Never used   Substance and Sexual Activity    Alcohol use: No    Drug use: No    Sexual activity: Not on file   Other Topics Concern    Not on file   Social History Narrative    Not on file     Social Determinants of Health     Financial Resource Strain: Not on file   Food Insecurity: Not on file   Transportation Needs: Not on file   Physical Activity: Not on file   Stress: Not on file   Social Connections: Not on file   Intimate Partner Violence: Not on file   Housing Stability: Not on file       Family History   Problem Relation Age of Onset    Heart Attack Mother     Migraines Mother     Kidney Disease Father         failure    Migraines Sister     Asthma Child     Asthma Child         bone disease    Anemia Child     Diabetes Other        REVIEW OF SYSTEMS:     [place] Schirmer denies fever/chills, chest pain, shortness of breath, new bowel or bladder complaints. All other review of systems was negative. PHYSICAL EXAMINATION:      /74   Pulse 87   Temp 97.5 °F (36.4 °C) (Infrared)   Resp 16   Ht 4' 10\" (1.473 m)   Wt 177 lb (80.3 kg)   LMP 03/27/2008   SpO2 95%   BMI 36.99 kg/m²     General:      General appearance:   pleasant and well-hydrated. , in mild discomfort and A & O x3  Build:Overweight    HEENT:    Head:normocephalic and atraumatic  Sclera: icterus absent,    Lungs:    Breathing:Normal expansion. No wheezing. Abdomen:    Shape:non-distended and normal    Extremities:    Tremors:None bilaterally upper and lower  Range of motion:Generally normal shoulders, pain with internal rotation of hips not done. Intact:Yes  Edema:Normal    Neurological:    [address]    Dermatology:    Skin:no unusual rashes, no skin lesions, no palpable subcutaneous nodules, and good skin turgor    Impression:      Neck and low back pain with radiation to the Left upper and lower extremity  Cervical spine MRI 2019 Multilevel degenerative disc disease with moderate to severe stenosis at C3-4/C4-5 and C5-6  Lumbar spine CT 2017 Mild degenerative disc disease and facet arthropathy most pronounced at L4-5  Patient had seen neurosurgery and surgery C3-4/C4-5 and C5-6 ACDF was recommended. Plan:  Follo up for neck/low back and Left hip pain  Contonue with Oxycodone 5 mg QD PRN.(Maintaining daily activities with no signs of abuse or diversion).   Continue with Lidocaine patches  Avoid NSAIDs(H/O GI ulcers)  OARRS report reviewed 12/2022. Patient encouraged to stay active and to lose weight. Currently in aquatjherapy. Treatment plan discussed with the patient and her including medications side effects. Controlled Substance Monitoring:    Acute and Chronic Pain Monitoring:   RX Monitoring 12/12/2022   Attestation -   Periodic Controlled Substance Monitoring Possible medication side effects, risk of tolerance/dependence & alternative treatments discussed. ;No signs of potential drug abuse or diversion identified. ;Assessed functional status. ;Obtaining appropriate analgesic effect of treatment. We discussed with the patient that combining opioids, benzodiazepines, alcohol, illicit drugs or sleep aids increases the risk of respiratory depression including death. We discussed that these medications may cause drowsiness, sedation or dizziness and have counseled the patient not to drive or operate machinery. We have discussed that these medications will be prescribed only by one provider. We have discussed with the patient about age related risk factors and have thoroughly discussed the importance of taking these medications as prescribed. The patient verbalizes understanding.     ccreferring physic

## 2022-12-12 NOTE — PROGRESS NOTES
[facility]  Phone: [phone] Fax: [phone]        Physical Therapy Aquatic Flow Sheet   Date: 2022  Date:  2022    Patient Name:  Becky He    :  1970       Pain level: 10/10   Electronically signed by:  Donnie Ambrocio PTA  Time In:1000  Time Out:1100    Subjective:Patient presents to PT with c/o pain across L cervical region and across L hip region . States cervical pain due to herniated discs across cervical spine. L hip pain occurred due to fall on stairs resulting in injury of hip. Did have therapy in past following injury however limited relief. Patient also reports having a CVA approx 7 years ago with L sided weakness. States weakness noticeable across both the LUE/LLE. She uses a cane prn due to weakness. She recently had an injection to L hip due the pain. Patient also takes norco prn for pain relief.       Key  B= Belt DB= Dumbells T= Theratube   H= Hydrotone N= Noodles W= Weights   P= Paddles S= Speedo equipment K= Kickboard     Exercises/Activities   Warm-up/Amb  Minutes  Exercises  Minutes    Slow forward   5  HR/TR  5    Slow sideways  5  Marches  5    Slow backwards  5  Mini-squats  5    Medium forward    Forward backward kick  5x2    Medium sideways    Hip abduction  5x2    Small shuffle    Hamstring curls      Jog    Knee extension      Braiding    Pelvic tilts      Bicycling  5  Scap squeezes          Shoulder flex/ext      Functional    Shoulder abd/add      Step    Shoulder H. abd/add      Lifting    Shoulder IR/ER      Hand to opp knee    Rowing      Push down squat    Bilateral pull down      UE PNF    Push/pull      LE PNF    Push downs      Wall push ups    Arm circles      SLS    Elbow flex/ext          Chin tuck      Stretching    UT shrugs/rolls      Gastroc/Soleus    Rocking horse      Hamstring  5        SKTC    Other      Piriformis          Hip flexor          Ladder pull          Pec stretch          Post deltoid Timed Code Treatment Minutes:  60    Total Treatment Minutes:  60    Treatment/Activity Tolerance:  [] Patient tolerated treatment well [x] Patient limited by fatique  [x] Patient limited by pain [] Patient limited by other medical complications  [] Other:     Prognosis: [] Good [x] Fair  [] Poor    Patient Requires Follow-up: [x] Yes  [] No    Plan:   [x] Continue per plan of care [] Alter current plan (see comments)  [] Plan of care initiated [] Hold pending MD visit [] Discharge    Treatment Charges: Mins Units   Initial Evaluation     Re-Evaluation     Ther Exercise         TE     Aquatic Treatment 60 4   Ther Activities        TA     Gait Training          GT     Neuro Re-education NR     Modalities     Non-Billable Service Time     Other     Total Time/Units 60 4         Electronically signed by:  Silvano Cabezas PTA [id number]

## 2022-12-12 NOTE — PROGRESS NOTES
Do you currently have any of the following:    Fever: No  Headache:  No  Cough: No  Shortness of breath: No  Exposed to anyone with these symptoms: No                                                                                                                Duke Saravia presents to the [address] [address] on 12/12/2022. Camille Estes is complaining of pain in her neck . The pain is constant. The pain is described as aching, throbbing, shooting, stabbing, and sharp. Pain is rated on her best day at a 5, on her worst day at a 10, and on average at a 7 on the VAS scale. She took her last dose of [place] does not have issues with constipation. Any procedures since your last visit: No,     She is not on NSAIDS and  is not on anticoagulation medications to include none and is managed by NA. Pacemaker or defibrillator: No Physician managing device is NA. Medication Contract and Consent for Opioid Use Documents Filed       Patient Documents       Type of Document Status Date Received Received By Description    Medication Contract Received 7/18/2019 11:59 AM Patricia Lancaster MANI 7/18/19 medication contract    Medication Contract Received 10/9/2020  3:01 PM Liya VENTURA [address] [place] [address] pain pt agreement    Medication Contract Received 11/5/2021 11:29 AM Marian MUNIZ Pain Mgmt Patient Agreement 11.5.2021    Medication Contract Received 12/13/2021  4:21 PM LEONARDA PINA pain management                       /74   Pulse 87   Temp 97.5 °F (36.4 °C) (Infrared)   Resp 16   Ht 4' 10\" (1.473 m)   Wt 177 lb (80.3 kg)   LMP 03/27/2008   SpO2 95%   BMI 36.99 kg/m²      Patient's last menstrual period was 03/27/2008.

## 2022-12-19 NOTE — PROGRESS NOTES
[facility]  Phone: [phone] Fax: [phone]        Physical Therapy Aquatic Flow Sheet   Date: 2022  Date:  2022    Patient Name:  Lillian Miller    :  1970       Pain level: 10/10   Electronically signed by:  Millie Patino PTA  Time In:1000  Time Out:1100    Subjective:Patient presents to PT with c/o pain across L cervical region and across L hip region . States cervical pain due to herniated discs across cervical spine. L hip pain occurred due to fall on stairs resulting in injury of hip. Did have therapy in past following injury however limited relief. Patient also reports having a CVA approx 7 years ago with L sided weakness. States weakness noticeable across both the LUE/LLE. She uses a cane prn due to weakness. She recently had an injection to L hip due the pain. Patient also takes norco prn for pain relief.       Key  B= Belt DB= Dumbells T= Theratube   H= Hydrotone N= Noodles W= Weights   P= Paddles S= Speedo equipment K= Kickboard     Exercises/Activities   Warm-up/Amb  Minutes  Exercises  Minutes    Slow forward   5  HR/TR  5    Slow sideways  5  Marches  5    Slow backwards  5  Mini-squats  5    Medium forward    Forward backward kick  5x2    Medium sideways    Hip abduction  5x2    Small shuffle    Hamstring curls      Jog    Knee extension      Braiding    Pelvic tilts      Bicycling  5  Scap squeezes          Shoulder flex/ext      Functional    Shoulder abd/add      Step    Shoulder H. abd/add      Lifting    Shoulder IR/ER      Hand to opp knee    Rowing      Push down squat    Bilateral pull down      UE PNF    Push/pull      LE PNF    Push downs      Wall push ups    Arm circles      SLS    Elbow flex/ext          Chin tuck      Stretching    UT shrugs/rolls      Gastroc/Soleus    Rocking horse      Hamstring  5        SKTC    Other      Piriformis          Hip flexor          Ladder pull          Pec stretch          Post deltoid Timed Code Treatment Minutes:  60    Total Treatment Minutes:  60    Treatment/Activity Tolerance:  [] Patient tolerated treatment well [x] Patient limited by fatique  [x] Patient limited by pain [] Patient limited by other medical complications  [x] Other: Functional deficits: Pain hinders pt's ability to to perform shopping and household chores. Pain limits how long pt stands and how far he can walk before sitting down. Prognosis: [] Good [x] Fair  [] Poor    Patient Requires Follow-up: [x] Yes  [] No    Plan: Aquatic therapy to Increase functional mobility ; Increase ROM; Increase strength; Increase endurance; Improve posture;Decrease pain   [x] Continue per plan of care [] Alter current plan (see comments)  [] Plan of care initiated [] Hold pending MD visit [] Discharge    Treatment Charges: Mins Units   Initial Evaluation     Re-Evaluation     Ther Exercise         TE     Aquatic Treatment 60 4   Ther Activities        TA     Gait Training          GT     Neuro Re-education NR     Modalities     Non-Billable Service Time     Other     Total Time/Units 60 4         Electronically signed by:  Cullen Watts PTA [id number]

## 2022-12-22 NOTE — PROGRESS NOTES
[facility]  Phone: [phone] Fax: [phone]        Physical Therapy Aquatic Flow Sheet   Date: 2022  Date:  2022    Patient Name:  Vinita Hi    :  1970       Pain level: 10/10   Electronically signed by:  Dillan Prescott PTA  Time In:1000  Time Out:1100      Key  B= Belt DB= Dumbells T= Theratube   H= Hydrotone N= Noodles W= Weights   P= Paddles S= Speedo equipment K= Kickboard     Exercises/Activities   Warm-up/Amb  Minutes  Exercises  Minutes    Slow forward   5  HR/TR  5    Slow sideways  5  Marches  5    Slow backwards  5  Mini-squats  5    Medium forward    Forward backward kick  5x2    Medium sideways    Hip abduction  5x2    Small shuffle    Hamstring curls      Jog    Knee extension      Braiding    Pelvic tilts      Bicycling  5  Scap squeezes          Shoulder flex/ext      Functional    Shoulder abd/add      Step    Shoulder H. abd/add      Lifting    Shoulder IR/ER      Hand to opp knee    Rowing      Push down squat    Bilateral pull down      UE PNF    Push/pull      LE PNF    Push downs      Wall push ups    Arm circles      SLS    Elbow flex/ext          Chin tuck      Stretching    UT shrugs/rolls      Gastroc/Soleus    Rocking horse      Hamstring  5        SKTC    Other      Piriformis          Hip flexor          Ladder pull          Pec stretch          Post deltoid           Timed Code Treatment Minutes:  60    Total Treatment Minutes:  60    Treatment/Activity Tolerance:  [] Patient tolerated treatment well [x] Patient limited by fatique  [x] Patient limited by pain [] Patient limited by other medical complications  [x] Other: Functional deficits: Pain hinders pt's ability to to perform shopping and household chores. Pain limits how long pt stands and how far he can walk before sitting down.      Prognosis: [] Good [x] Fair  [] Poor    Patient Requires Follow-up: [x] Yes  [] No    Plan: Aquatic therapy to Increase functional mobility ;Increase ROM; Increase strength; Increase endurance; Improve posture;Decrease pain   [x] Continue per plan of care [] Alter current plan (see comments)  [] Plan of care initiated [] Hold pending MD visit [] Discharge    Treatment Charges: Mins Units   Initial Evaluation     Re-Evaluation     Ther Exercise         TE     Aquatic Treatment 60 4   Ther Activities        TA     Gait Training          GT     Neuro Re-education NR     Modalities     Non-Billable Service Time     Other     Total Time/Units 60 4         Electronically signed by:  Deena Lam PTA [id number]

## 2023-01-03 ENCOUNTER — COMMUNITY OUTREACH (OUTPATIENT)
Dept: PRIMARY CARE CLINIC | Age: 53
End: 2023-01-03

## 2023-01-03 NOTE — PROGRESS NOTES
Patient's HM shows they are overdue for Mammogram.  Care Everywhere and  files searched. No results to attach to order nor HM updated. Patient is current for Colorectal Cancer Screen and Cervical Cancer Screen.

## 2023-01-05 ENCOUNTER — HOSPITAL ENCOUNTER (OUTPATIENT)
Dept: PHYSICAL THERAPY | Age: 53
Setting detail: THERAPIES SERIES
Discharge: HOME | End: 2023-01-05

## 2023-01-10 ENCOUNTER — HOSPITAL ENCOUNTER (OUTPATIENT)
Dept: PHYSICAL THERAPY | Age: 53
Setting detail: THERAPIES SERIES
Discharge: HOME | End: 2023-01-10
Payer: MEDICAID

## 2023-01-10 PROCEDURE — 97113 AQUATIC THERAPY/EXERCISES: CPT

## 2023-01-10 NOTE — PROGRESS NOTES
[facility]  Phone: [phone] Fax: [phone]          Date: 2023  Date:  1/10/2023    Patient Name:  Khadra East    :  1970       Pain level: 10/10   Electronically signed by:  Yadi Staples PTA  Time In:1000  Time Out:1100      Key  B= Belt DB= Dumbells T= Theratube   H= Hydrotone N= Noodles W= Weights   P= Paddles S= Speedo equipment K= Kickboard     Exercises/Activities   Warm-up/Amb  Minutes  Exercises  Minutes    Slow forward   5  HR/TR  5    Slow sideways  5  Marches  5    Slow backwards  5  Mini-squats  5    Medium forward    Forward backward kick  5x2    Medium sideways    Hip abduction  5x2    Small shuffle    Hamstring curls      Jog    Knee extension      Braiding    Pelvic tilts      Bicycling  5  Scap squeezes          Shoulder flex/ext      Functional    Shoulder abd/add      Step    Shoulder H. abd/add      Lifting    Shoulder IR/ER      Hand to opp knee    Rowing      Push down squat    Bilateral pull down      UE PNF    Push/pull      LE PNF    Push downs      Wall push ups    Arm circles      SLS    Elbow flex/ext          Chin tuck      Stretching    UT shrugs/rolls      Gastroc/Soleus    Rocking horse      Hamstring  5        SKTC    Other      Piriformis          Hip flexor          Ladder pull          Pec stretch          Post deltoid           Timed Code Treatment Minutes:  60    Total Treatment Minutes:  60    Treatment/Activity Tolerance:  [] Patient tolerated treatment well [x] Patient limited by fatique  [x] Patient limited by pain [] Patient limited by other medical complications  [x] Other: Deshawn Alfaro presents to PT wiht c/o cervcial and hip pain. Limited ROM/strength across regions with hindered posturing and functional gait. unctional deficits: Pain hinders pt's ability to to perform shopping and household chores. Pain limits how long pt stands and how far he can walk before sitting down.      Prognosis: [] Good [x] Fair  [] Poor    Patient Requires Follow-up: [x] Yes  [] No    Plan: Aquatic therapy to Increase functional mobility ; Increase ROM; Increase strength; Increase endurance; Improve posture;Decrease pain   [x] Continue per plan of care [] Alter current plan (see comments)  [] Plan of care initiated [] Hold pending MD visit [] Discharge    Treatment Charges: Mins Units   Initial Evaluation     Re-Evaluation     Ther Exercise         TE     Aquatic Treatment 60 4   Ther Activities        TA     Gait Training          GT     Neuro Re-education NR     Modalities     Non-Billable Service Time     Other     Total Time/Units 60 4         Electronically signed by:  Jennifer Last PTA [id number]

## 2023-01-12 ENCOUNTER — OFFICE VISIT (OUTPATIENT)
Dept: PAIN MANAGEMENT | Age: 53
End: 2023-01-12
Payer: MEDICAID

## 2023-01-12 ENCOUNTER — HOSPITAL ENCOUNTER (OUTPATIENT)
Dept: PHYSICAL THERAPY | Age: 53
Setting detail: THERAPIES SERIES
Discharge: HOME | End: 2023-01-12
Payer: MEDICAID

## 2023-01-12 VITALS
BODY MASS INDEX: 37.16 KG/M2 | WEIGHT: 177 LBS | SYSTOLIC BLOOD PRESSURE: 100 MMHG | TEMPERATURE: 97.5 F | RESPIRATION RATE: 18 BRPM | HEIGHT: 58 IN | DIASTOLIC BLOOD PRESSURE: 70 MMHG

## 2023-01-12 DIAGNOSIS — M47.816 LUMBAR FACET ARTHROPATHY: ICD-10-CM

## 2023-01-12 DIAGNOSIS — M79.7 FIBROMYALGIA: ICD-10-CM

## 2023-01-12 DIAGNOSIS — M70.62 GREATER TROCHANTERIC BURSITIS OF LEFT HIP: ICD-10-CM

## 2023-01-12 DIAGNOSIS — M51.9 LUMBAR DISC DISORDER: ICD-10-CM

## 2023-01-12 DIAGNOSIS — M47.812 CERVICAL FACET JOINT SYNDROME: ICD-10-CM

## 2023-01-12 DIAGNOSIS — M47.812 FACET ARTHROPATHY, CERVICAL: Primary | ICD-10-CM

## 2023-01-12 DIAGNOSIS — G89.4 CHRONIC PAIN SYNDROME: ICD-10-CM

## 2023-01-12 DIAGNOSIS — M54.16 LUMBAR RADICULOPATHY: ICD-10-CM

## 2023-01-12 DIAGNOSIS — M50.30 DDD (DEGENERATIVE DISC DISEASE), CERVICAL: ICD-10-CM

## 2023-01-12 DIAGNOSIS — M47.816 LUMBAR SPONDYLOSIS: ICD-10-CM

## 2023-01-12 DIAGNOSIS — M54.12 CERVICAL RADICULOPATHY: ICD-10-CM

## 2023-01-12 PROCEDURE — 3017F COLORECTAL CA SCREEN DOC REV: CPT | Performed by: PHYSICIAN ASSISTANT

## 2023-01-12 PROCEDURE — 99213 OFFICE O/P EST LOW 20 MIN: CPT | Performed by: PHYSICIAN ASSISTANT

## 2023-01-12 PROCEDURE — G8417 CALC BMI ABV UP PARAM F/U: HCPCS | Performed by: PHYSICIAN ASSISTANT

## 2023-01-12 PROCEDURE — 1036F TOBACCO NON-USER: CPT | Performed by: PHYSICIAN ASSISTANT

## 2023-01-12 PROCEDURE — G8427 DOCREV CUR MEDS BY ELIG CLIN: HCPCS | Performed by: PHYSICIAN ASSISTANT

## 2023-01-12 PROCEDURE — G8484 FLU IMMUNIZE NO ADMIN: HCPCS | Performed by: PHYSICIAN ASSISTANT

## 2023-01-12 RX ORDER — BUPROPION HYDROCHLORIDE 150 MG/1
TABLET ORAL
COMMUNITY
Start: 2023-01-05

## 2023-01-12 RX ORDER — OXYCODONE HYDROCHLORIDE 5 MG/1
5 TABLET ORAL DAILY PRN
Qty: 30 TABLET | Refills: 0 | Status: SHIPPED | OUTPATIENT
Start: 2023-01-12 | End: 2023-02-11

## 2023-01-12 RX ORDER — ASPIRIN 81 MG/1
81 TABLET ORAL DAILY
COMMUNITY

## 2023-01-12 NOTE — PROGRESS NOTES
Do you currently have any of the following:    Fever: No  Headache:  No  Cough: No  Shortness of breath: No  Exposed to anyone with these symptoms: No                                                                                                                Jovanny Morales presents to the [address] [address] on 1/12/2023. Tu Cazares is complaining of pain in her back, neck (that radiates to left upper extremity) and left hip. The pain is intermittent. The pain is described as sharp and numb. Pain is rated on her best day at a 8, on her worst day at a 10, and on average at a 8 on the VAS scale. She took her last dose of tylenol,Duragesic Patch and oxycodone yesterday. Tu Cazares does not have issues with constipation. Any procedures since your last visit: No,       She is  on NSAIDS and  is  on anticoagulation medications to include ASA and is managed by Candy Mistry DO  . Pacemaker or defibrillator: No Physician managing device is NA. Medication Contract and Consent for Opioid Use Documents Filed       Patient Documents       Type of Document Status Date Received Received By Description    Medication Contract Received 7/18/2019 11:59 AM Edmond SINGLETON 7/18/19 medication contract    Medication Contract Received 10/9/2020  3:01 PM LROENZO, [facility] [address] [place] [address] pain pt agreement    Medication Contract Received 11/5/2021 11:29 AM Jose MUNIZ Pain Mgmt Patient Agreement 11.5.2021    Medication Contract Received 12/13/2021  4:21 PM LEONARDA PINA pain management    Consent Opioid Use Received 12/12/2022 12:23 PM ESTEFANY ARAMBULA pt agreement 12/12/22                       /70   Temp 97.5 °F (36.4 °C) (Infrared)   Resp 18   Ht 4' 10\" (1.473 m)   Wt 177 lb (80.3 kg)   LMP 03/27/2008   BMI 36.99 kg/m²      Patient's last menstrual period was 03/27/2008.

## 2023-01-12 NOTE — PROGRESS NOTES
[address]  [address] [facility], [address] [place] [address] Matheus  [phone]    Follow up Note      Gail Hewitt     Date of Visit:  2023    CC:  Patient presents for follow up   Chief Complaint   Patient presents with    Back Pain    Hip Pain     left         HPI:    Pain is unchanged. No new changes. Appropriate analgesia with current medications regimen: yes. Change in quality of symptoms:no. Medication side effects:none. Recent diagnostic testing:none. Recent interventional procedures: None. She has been on anticoagulation medications to include ASA. The patient  has not been on herbal supplements. The patient is not diabetic. Imagin/15/2022 MRI lumbar spine    FINDINGS:   No fracture or malalignment. Vertebral body height and interspaces are well   maintained. Conus medullaris is visualized and is unremarkable. No evidence   of epidural mass or hematoma. No prevertebral soft tissue edema. Note made   of a incidental intraosseous hemangioma located in T11 vertebral body. L1/L2: No central canal stenosis or neural foraminal narrowing. L2/L3: No central canal stenosis or neural foraminal narrowing. L3/L4: Mild facet hypertrophy with hypertrophy of ligamentum flavum. No   central canal stenosis. There is mild bilateral neural foraminal narrowing   more significant on the right. L4/L5: Moderate facet hypertrophy. No central canal stenosis. No   significant neural foraminal narrowing. L5/S1: No central canal stenosis. No significant neural foraminal narrowing. Impression   1. Mild bilateral neural foraminal narrowing at L3/L4 more significant on the   right. No central canal stenosis. 2. Moderate bilateral facet hypertrophy at L4/L5. 3. No fracture or malalignment. Cervical spine MRI 2018  1. Multilevel degenerative disc disease extending from C3 through C7.    Moderately severe spinal canal stenosis at C3-C4. Moderate spinal   canal stenosis at C4-C5. . Moderately severe spinal canal stenosis at   C5-C6. Abnormal signal intensity within the cervical spinal cord C3-C5   without evidence of enhancement. Findings are most likely reflective   of myelomalacia changes secondary to the underlying degree of cord   compression as described above. No active demyelination identified. 2. Slight loss of normally expected cervical lordosis C3-C6. CT Lumbar spine 2017  1. Mild diffuse osteopenia, no compression fracture or   spondylolisthesis. 2. Mild multilevel degenerative disc disease and facet joint   arthropathy of the lower lumbar spine more pronounced at L4-L5. 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 Otherwise, no musculoskeletal pathology to explain patient's pain. Previous treatments: Physical Therapy, Epidural Steroid Injection with  and medications. .  Cymbalta severe side effects, tylenol arthritis heartburn and gi distress,  Tizanidine helps but makes tired, tramadol caused hyperness, butrans patch dizziness and diarrhea                                        Potential Aberrant Drug-Related Behavior:    No     Urine Drug Screening:  Saliva screen 08/2020 consistent for Ultram  11/2021: consistent for oxycodone  09/2022 consistent     OARRS report:  7/2022 consistent to 01/2023 consistent.      Opioid agreement: 12/13/21  Updated:  12/12/2022         Past Medical History:   Diagnosis Date    Arthritis     Asthma     uses inhalers twice a day    Cerebral artery occlusion with cerebral infarction ([facility] [address]     2015-slight right sided weakness, ambulates normal    Cervical spinal stenosis     Chronic back pain     Chronic kidney disease     Class 2 severe obesity due to excess calories with serious comorbidity and body mass index (BMI) of 37.0 to 37.9 in adult [facility])     COVID 2021    vomiting, diarrhea, body aches, fever, cough- coulc not confirm month    Disorder of thyroid gland 09/11/2012    no medication    GERD (gastroesophageal reflux disease)     Headache(784.0)     Irritable bowel syndrome     Numbness and tingling in right hand 03/06/2020    CIERA on CPAP     uses machine    Seizure disorder ([address]     not on medications for this; last seizure 2018; etiology? Vitamin D deficiency 12/03/2014       Past Surgical History:   Procedure Laterality Date    CHOLECYSTECTOMY, LAPAROSCOPIC N/A 5/17/2022    LAPAROSCOPIC ROBOTIC XI ASSISTED CHOLECYSTECTOMY performed by Cece Rodriguez MD at [place] [address]  03/09/2016    COLONOSCOPY  07/10/2017    COLONOSCOPY N/A 4/1/2022    COLONOSCOPY DIAGNOSTIC WITH BANDING performed by Cece Rodriguez MD at [address] [place] [address] Left 08/10/2020    LEFT GREATER TROCHANTERIC BURSAE INJECTION X-RAY (CPT 67784) performed by Tatianna Aragon MD at [address] [place] [address] 9/12/2022    LEFT GREATER TROCHANTERIC BURSAE INJECTION UNDER XRAY WITH SEDATION (CPT 50730) performed by Tatianna Aragon MD at [facility]. 47     epidural c6-c7    NERVE BLOCK Bilateral 8/5/2021    BILATERAL CERVICAL MEDIAL BRANCH FACET BLOCK C4 C5 C6 WITH SEDATION WITH LEFT TROCHANTERIC BURSA INJECTION(CPT 15736) performed by Tatianna Aragon MD at [address] [place] [address] N/A 4/19/2021    CERVICAL EPIDURAL INTERLAMINAR STEROID INJECTION C6-C7 WITH SEDATION performed by Tatianna Aragon MD at [address] [place] [address] 1/31/2022    CERVICAL EPIDURAL STEROID INJECTION LEFT PARAMEDIAN C6-C7 WITH SEDATION performed by Tatianna Aragon MD at [facility]  07/10/2017       Prior to Admission medications    Medication Sig Start Date End Date Taking?  Authorizing Provider   aspirin 81 MG EC tablet Take 81 mg by mouth daily   Yes Historical Provider, MD   oxyCODONE ([address]) 5 MG immediate release tablet Take 1 tablet by mouth daily as needed for Pain for up to 30 days. Intended supply: 30 days 23 Yes GARCIA Lewis   LUBRICANT EYE DROPS 0.4-0.3 % ophthalmic solution INSTILL 1 DROP IN BOTH EYES TWICE DAILY FOR 14 DAYS 22  Yes Historical Provider, MD   fluticasone-salmeterol (ADVAIR DISKUS) 500-50 MCG/ACT AEPB diskus inhaler Inhale 1 puff into the lungs in the morning and 1 puff in the evening. 22  Yes Abdi Ricci, DO   vitamin D3 (CHOLECALCIFEROL) 25 MCG (1000 UT) TABS tablet Take 1 tablet by mouth daily 22  Yes Toni Heart DO   Prenatal Vit-Fe Fumarate-FA (PRENATAL PLUS) 27-1 MG TABS 1 pill daily 22  Yes Toni Heart DO   omeprazole (PRILOSEC) 40 MG delayed release capsule Take 1 capsule by mouth 2 times daily (before meals) 22  Yes Tawnya Ambrocio MD   EPINEPHrine (EPIPEN 2-ABUNDIO) 0.3 MG/0.3ML SOAJ injection Inject into muscle for allergic reaction 19  Yes Toni Heart,    buPROPion (WELLBUTRIN XL) 150 MG extended release tablet  23   Historical Provider, MD   montelukast (SINGULAIR) 10 MG tablet Take 1 tablet by mouth nightly 22  Mittie Celestine, DO       Allergies   Allergen Reactions    Fish-Derived Products Anaphylaxis    Robaxin [Methocarbamol] Other (See Comments)     Very tired, felt like she couldn't even function with her daily activities.     Dye [Iodides] Itching       Social History     Socioeconomic History    Marital status:      Spouse name: Not on file    Number of children: Not on file    Years of education: Not on file    Highest education level: Not on file   Occupational History    Not on file   Tobacco Use    Smoking status: Former     Packs/day: 1.00     Years: 25.00     Pack years: 25.00     Types: Cigarettes     Quit date: 1988     Years since quittin.7    Smokeless tobacco: Never   Vaping Use    Vaping Use: Never used   Substance and Sexual Activity    Alcohol use: No    Drug use: No    Sexual activity: Not on file   Other Topics Concern    Not on file   Social History Narrative    Not on file     Social Determinants of Health     Financial Resource Strain: Not on file   Food Insecurity: Not on file   Transportation Needs: Not on file   Physical Activity: Not on file   Stress: Not on file   Social Connections: Not on file   Intimate Partner Violence: Not on file   Housing Stability: Not on file       Family History   Problem Relation Age of Onset    Heart Attack Mother     Migraines Mother     Kidney Disease Father         failure    Migraines Sister     Asthma Child     Asthma Child         bone disease    Anemia Child     Diabetes Other        REVIEW OF SYSTEMS:     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 All other review of systems was negative. PHYSICAL EXAMINATION:      /70   Temp 97.5 °F (36.4 °C) (Infrared)   Resp 18   Ht 4' 10\" (1.473 m)   Wt 177 lb (80.3 kg)   LMP 03/27/2008   BMI 36.99 kg/m²     General:      General appearance:   pleasant and well-hydrated. , in mild discomfort and A & O x3  Build:Overweight    HEENT:    Head:normocephalic and atraumatic  Sclera: icterus absent,    Lungs:    Breathing:Normal expansion. No wheezing. Abdomen:    Shape:non-distended and normal    Extremities:    Tremors:None bilaterally upper and lower  Range of motion:Generally normal shoulders, pain with internal rotation of hips not done.   Intact:Yes  Edema:Normal    Neurological:    [address]    Dermatology:    Skin:no unusual rashes, no skin lesions, no palpable subcutaneous nodules, and good skin turgor    Impression:      Neck and low back pain with radiation to the Left upper and lower extremity  Cervical spine MRI 2019 Multilevel degenerative disc disease with moderate to severe stenosis at C3-4/C4-5 and C5-6  Lumbar spine CT 2017 Mild degenerative disc disease and facet arthropathy most pronounced at L4-5  Patient had seen neurosurgery and surgery C3-4/C4-5 and C5-6 ACDF was recommended. Plan:  Follo up for neck/low back and Left hip pain  Contonue with Oxycodone 5 mg QD PRN.(Maintaining daily activities with no signs of abuse or diversion). Continue with Lidocaine patches  Avoid NSAIDs(H/O GI ulcers)  Aquatherapy ordered today. She is already in therapy but it will be ending. She will call if she wants a repeat NANCI. OARRS report reviewed   Patient encouraged to stay active and to lose weight. Currently in aquatjherapy. Treatment plan discussed with the patient and her including medications side effects. Controlled Substance Monitoring:    Acute and Chronic Pain Monitoring:   RX Monitoring 1/12/2023   Attestation -   Periodic Controlled Substance Monitoring Possible medication side effects, risk of tolerance/dependence & alternative treatments discussed. ;No signs of potential drug abuse or diversion identified. ;Assessed functional status. ;Obtaining appropriate analgesic effect of treatment. We discussed with the patient that combining opioids, benzodiazepines, alcohol, illicit drugs or sleep aids increases the risk of respiratory depression including death. We discussed that these medications may cause drowsiness, sedation or dizziness and have counseled the patient not to drive or operate machinery. We have discussed that these medications will be prescribed only by one provider. We have discussed with the patient about age related risk factors and have thoroughly discussed the importance of taking these medications as prescribed. The patient verbalizes understanding.     ccreferring physic

## 2023-01-14 NOTE — PROGRESS NOTES
[facility]  Phone: [phone] Fax: [phone]          Date: 1/10/2023  Date:  2023  Insurance: Payor: Sruthi Villagomez / Plan: Sruthi Villagomez / Product Type: *No Product type* /   Insurance ID: [id number] - (Medicaid Managed)  Patient Name:  Keith Edge    :  1970   Medical Diagnosis: Chronic pain syndrome [G89.4]  Facet arthropathy, cervical [M47.812]  DDD (degenerative disc disease), cervical [M50.30]  Cervical radiculopathy [M54.12]  Lumbar spondylosis [M47.816]  Lumbar facet arthropathy [M47.816]  Fibromyalgia [M79.7]  Greater trochanteric bursitis of left hip [M70.62]  Lumbar radiculopathy [M54.16]  Lumbar disc disorder [M51.9]  Cervical facet joint syndrome [M47.812] G89.4 (ICD-10-CM) - Chronic pain syndrome    Pain level: 10/10   Electronically signed by:  Saintclair Chandler, PTA  Time In:1000  Time Out:1100      Key  B= Belt DB= Dumbells T= Theratube   H= Hydrotone N= Noodles W= Weights   P= Paddles S= Speedo equipment K= Kickboard     Exercises/Activities   Warm-up/Amb  Minutes  Exercises  Minutes    Slow forward   5  HR/TR  5    Slow sideways  5  Marches  5    Slow backwards  5  Mini-squats  5    Medium forward    Forward backward kick  5x2    Medium sideways    Hip abduction  5x2    Small shuffle    Hamstring curls      Jog    Knee extension      Braiding    Pelvic tilts      Bicycling  5  Scap squeezes          Shoulder flex/ext      Functional    Shoulder abd/add      Step    Shoulder H. abd/add      Lifting    Shoulder IR/ER      Hand to opp knee    Rowing      Push down squat    Bilateral pull down      UE PNF    Push/pull      LE PNF    Push downs      Wall push ups    Arm circles      SLS    Elbow flex/ext          Chin tuck      Stretching    UT shrugs/rolls      Gastroc/Soleus    Rocking horse      Hamstring  5        SKTC    Other      Piriformis          Hip flexor          Ladder pull          Pec stretch          Post deltoid           Timed Code Treatment Minutes:  60    Total Treatment Minutes:  60    Treatment/Activity Tolerance:  [] Patient tolerated treatment well [x] Patient limited by fatique  [x] Patient limited by pain [] Patient limited by other medical complications  [x] Other: Patient education on energy conservation. Instruct pt to alternate seated and standing activities-Pt receptive. Patient presents to PT wiht c/o cervcial and hip pain. Limited ROM/strength across regions with hindered posturing and functional gait. unctional deficits: Pain hinders pt's ability to to perform shopping and household chores. Pain limits how long pt stands and how far he can walk before sitting down. Prognosis: [] Good [x] Fair  [] Poor    Patient Requires Follow-up: [x] Yes  [] No    Plan: Aquatic therapy to Increase functional mobility ; Increase ROM; Increase strength; Increase endurance; Improve posture;Decrease pain   [x] Continue per plan of care [] Alter current plan (see comments)  [] Plan of care initiated [] Hold pending MD visit [] Discharge    Treatment Charges: Mins Units   Initial Evaluation     Re-Evaluation     Ther Exercise         TE     Aquatic Treatment 60 4   Ther Activities        TA     Gait Training          GT     Neuro Re-education NR     Modalities     Non-Billable Service Time     Other     Total Time/Units 60 4         Electronically signed by:  Radha Sullivan PTA [id number]

## 2023-01-16 ENCOUNTER — HOSPITAL ENCOUNTER (OUTPATIENT)
Dept: PULMONOLOGY | Age: 53
Discharge: HOME | End: 2023-01-16
Payer: MEDICAID

## 2023-01-16 DIAGNOSIS — J45.909 ASTHMA ACTION PLAN DECLINED: ICD-10-CM

## 2023-01-16 DIAGNOSIS — R91.1 LUNG NODULE: ICD-10-CM

## 2023-01-16 DIAGNOSIS — J45.40 MODERATE PERSISTENT ASTHMA, UNSPECIFIED WHETHER COMPLICATED: ICD-10-CM

## 2023-01-16 PROCEDURE — 94729 DIFFUSING CAPACITY: CPT

## 2023-01-16 PROCEDURE — 94726 PLETHYSMOGRAPHY LUNG VOLUMES: CPT

## 2023-01-16 PROCEDURE — 94060 EVALUATION OF WHEEZING: CPT

## 2023-01-17 ENCOUNTER — HOSPITAL ENCOUNTER (OUTPATIENT)
Dept: PHYSICAL THERAPY | Age: 53
Setting detail: THERAPIES SERIES
End: 2023-01-17
Payer: MEDICAID

## 2023-01-19 ENCOUNTER — APPOINTMENT (OUTPATIENT)
Dept: PHYSICAL THERAPY | Age: 53
End: 2023-01-19
Payer: MEDICAID

## 2023-01-20 ENCOUNTER — TELEPHONE (OUTPATIENT)
Dept: PULMONOLOGY | Age: 53
End: 2023-01-20

## 2023-01-24 ENCOUNTER — APPOINTMENT (OUTPATIENT)
Dept: PHYSICAL THERAPY | Age: 53
End: 2023-01-24
Payer: MEDICAID

## 2023-02-08 NOTE — PROGRESS NOTES
[address]  [facility], [address] [place] [address] Matheus  [phone]    Follow up Note      Peter Caceres     Date of Visit:  2023    CC:  Patient presents for follow up   Chief Complaint   Patient presents with    Neck Pain           HPI:    Pain is worse to her left neck area after picking up her dog yesterday. Appropriate analgesia with current medications regimen: yes. Change in quality of symptoms:no. Medication side effects:none. Recent diagnostic testing:none. Recent interventional procedures: None. She has been on anticoagulation medications to include ASA. The patient  has not been on herbal supplements. The patient is not diabetic. Imagin/15/2022 MRI lumbar spine    FINDINGS:   No fracture or malalignment. Vertebral body height and interspaces are well   maintained. Conus medullaris is visualized and is unremarkable. No evidence   of epidural mass or hematoma. No prevertebral soft tissue edema. Note made   of a incidental intraosseous hemangioma located in T11 vertebral body. L1/L2: No central canal stenosis or neural foraminal narrowing. L2/L3: No central canal stenosis or neural foraminal narrowing. L3/L4: Mild facet hypertrophy with hypertrophy of ligamentum flavum. No   central canal stenosis. There is mild bilateral neural foraminal narrowing   more significant on the right. L4/L5: Moderate facet hypertrophy. No central canal stenosis. No   significant neural foraminal narrowing. L5/S1: No central canal stenosis. No significant neural foraminal narrowing. Impression   1. Mild bilateral neural foraminal narrowing at L3/L4 more significant on the   right. No central canal stenosis. 2. Moderate bilateral facet hypertrophy at L4/L5. 3. No fracture or malalignment. Cervical spine MRI 2018  1. Multilevel degenerative disc disease extending from C3 through C7.    Moderately severe spinal canal stenosis at C3-C4. Moderate spinal   canal stenosis at C4-C5. . Moderately severe spinal canal stenosis at   C5-C6. Abnormal signal intensity within the cervical spinal cord C3-C5   without evidence of enhancement. Findings are most likely reflective   of myelomalacia changes secondary to the underlying degree of cord   compression as described above. No active demyelination identified. 2. Slight loss of normally expected cervical lordosis C3-C6. CT Lumbar spine 2017  1. Mild diffuse osteopenia, no compression fracture or   spondylolisthesis. 2. Mild multilevel degenerative disc disease and facet joint   arthropathy of the lower lumbar spine more pronounced at L4-L5. 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 Otherwise, no musculoskeletal pathology to explain patient's pain. Previous treatments: Physical Therapy, Epidural Steroid Injection with  and medications. .  Cymbalta severe side effects, tylenol arthritis heartburn and gi distress,  Tizanidine helps but makes tired, tramadol caused hyperness, butrans patch dizziness and diarrhea                                        Potential Aberrant Drug-Related Behavior:    No     Urine Drug Screening:  Saliva screen 08/2020 consistent for Ultram  11/2021: consistent for oxycodone  09/2022 consistent     OARRS report:  7/2022 consistent to 02/2023 consistent.      Opioid agreement: 12/13/21  Updated:  12/12/2022         Past Medical History:   Diagnosis Date    Arthritis     Asthma     uses inhalers twice a day    Cerebral artery occlusion with cerebral infarction ([facility] [address]     2015-slight right sided weakness, ambulates normal    Cervical spinal stenosis     Chronic back pain     Chronic kidney disease     Class 2 severe obesity due to excess calories with serious comorbidity and body mass index (BMI) of 37.0 to 37.9 in adult [facility])     COVID 2021    vomiting, diarrhea, body aches, fever, cough- coulc not confirm month    Disorder of thyroid gland 09/11/2012    no medication    GERD (gastroesophageal reflux disease)     Headache(784.0)     Irritable bowel syndrome     Numbness and tingling in right hand 03/06/2020    CIERA on CPAP     uses machine    Seizure disorder ([address]     not on medications for this; last seizure 2018; etiology? Vitamin D deficiency 12/03/2014       Past Surgical History:   Procedure Laterality Date    CHOLECYSTECTOMY, LAPAROSCOPIC N/A 5/17/2022    LAPAROSCOPIC ROBOTIC XI ASSISTED CHOLECYSTECTOMY performed by Yvonne Julian MD at [facility]  03/09/2016    COLONOSCOPY  07/10/2017    COLONOSCOPY N/A 4/1/2022    COLONOSCOPY DIAGNOSTIC WITH BANDING performed by Yvonne Julian MD at [address] [place] [address] Left 08/10/2020    LEFT GREATER TROCHANTERIC BURSAE INJECTION X-RAY (CPT 65563) performed by Jazlyn Pennington MD at [facility] Left 9/12/2022    LEFT GREATER TROCHANTERIC BURSAE INJECTION UNDER XRAY WITH SEDATION (CPT 29726) performed by Jazlyn Pennington MD at [facility]. 47     epidural c6-c7    NERVE BLOCK Bilateral 8/5/2021    BILATERAL CERVICAL MEDIAL BRANCH FACET BLOCK C4 C5 C6 WITH SEDATION WITH LEFT TROCHANTERIC BURSA INJECTION(CPT 75041) performed by Jazlyn Pennington MD at [address] N/A 4/19/2021    CERVICAL EPIDURAL INTERLAMINAR STEROID INJECTION C6-C7 WITH SEDATION performed by Jazlyn Pennington MD at [address] Left 1/31/2022    CERVICAL EPIDURAL STEROID INJECTION LEFT PARAMEDIAN C6-C7 WITH SEDATION performed by Jazlyn Pennington MD at [facility]  07/10/2017       Prior to Admission medications    Medication Sig Start Date End Date Taking?  Authorizing Provider   buPROPion (WELLBUTRIN XL) 150 MG extended release tablet  1/5/23  Yes Historical Provider, MD   aspirin 81 MG EC tablet Take 81 mg by mouth daily   Yes Historical Provider, MD   oxyCODONE (ROXICODONE) 5 MG immediate release tablet Take 1 tablet by mouth daily as needed for Pain for up to 30 days. Intended supply: 30 days 23 Yes GARCIA Galindo   LUBRICANT EYE DROPS 0.4-0.3 % ophthalmic solution INSTILL 1 DROP IN BOTH EYES TWICE DAILY FOR 14 DAYS 22  Yes Historical Provider, MD   fluticasone-salmeterol (ADVAIR DISKUS) 500-50 MCG/ACT AEPB diskus inhaler Inhale 1 puff into the lungs in the morning and 1 puff in the evening. 22  Yes Abdi Mccain, DO   vitamin D3 (CHOLECALCIFEROL) 25 MCG (1000 UT) TABS tablet Take 1 tablet by mouth daily 22  Yes Sonali Whitley, DO   Prenatal Vit-Fe Fumarate-FA (PRENATAL PLUS) 27-1 MG TABS 1 pill daily 22  Yes Abdi Grijalva, DO   montelukast (SINGULAIR) 10 MG tablet Take 1 tablet by mouth nightly 22 Yes Danilo Fountain, DO   omeprazole (PRILOSEC) 40 MG delayed release capsule Take 1 capsule by mouth 2 times daily (before meals) 22  Yes Johana Sanchez MD   EPINEPHrine (EPIPEN 2-ABUNDIO) 0.3 MG/0.3ML SOAJ injection Inject into muscle for allergic reaction 19  Yes Abdi Grijalva, DO       Allergies   Allergen Reactions    Fish-Derived Products Anaphylaxis    Robaxin [Methocarbamol] Other (See Comments)     Very tired, felt like she couldn't even function with her daily activities.     Dye [Iodides] Itching       Social History     Socioeconomic History    Marital status:      Spouse name: Not on file    Number of children: Not on file    Years of education: Not on file    Highest education level: Not on file   Occupational History    Not on file   Tobacco Use    Smoking status: Former     Packs/day: 1.00     Years: 25.00     Pack years: 25.00     Types: Cigarettes     Quit date: 1988     Years since quittin.8    Smokeless tobacco: Never   Vaping Use    Vaping Use: Never used   Substance and Sexual Activity    Alcohol use: No    Drug use: No    Sexual activity: Not on file   Other Topics Concern    Not on file   Social History Narrative    Not on file     Social Determinants of Health     Financial Resource Strain: Not on file   Food Insecurity: Not on file   Transportation Needs: Not on file   Physical Activity: Not on file   Stress: Not on file   Social Connections: Not on file   Intimate Partner Violence: Not on file   Housing Stability: Not on file       Family History   Problem Relation Age of Onset    Heart Attack Mother     Migraines Mother     Kidney Disease Father         failure    Migraines Sister     Asthma Child     Asthma Child         bone disease    Anemia Child     Diabetes Other        REVIEW OF SYSTEMS:     Jacquelynne Lundborg denies fever/chills, chest pain, shortness of breath, new bowel or bladder complaints. All other review of systems was negative. PHYSICAL EXAMINATION:      /70   Pulse 80   Temp 96.9 °F (36.1 °C) (Infrared)   Ht 4' 10\" (1.473 m)   Wt 177 lb (80.3 kg)   LMP 03/27/2008   SpO2 96%   BMI 36.99 kg/m²     General:      General appearance:   pleasant and well-hydrated. , in mild discomfort and A & O x3  Build:Overweight    HEENT:    Head:normocephalic and atraumatic  Sclera: icterus absent,    Lungs:    Breathing:Normal expansion. No wheezing. Abdomen:    Shape:non-distended and normal    Cervical spine:  + left paraspinal TTP throughout. Extremities:    Tremors:None bilaterally upper and lower  Range of motion:Generally normal shoulders, pain with internal rotation of hips not done.   Intact:Yes  Edema:Normal    Neurological:    [address]    Dermatology:    Skin:no unusual rashes, no skin lesions, no palpable subcutaneous nodules, and good skin turgor    Impression:      Neck and low back pain with radiation to the Left upper and lower extremity  Cervical spine MRI 2019 Multilevel degenerative disc disease with moderate to severe stenosis at C3-4/C4-5 and C5-6  Lumbar spine CT 2017 Mild degenerative disc disease and facet arthropathy most pronounced at L4-5  Patient had seen neurosurgery and surgery C3-4/C4-5 and C5-6 ACDF was recommended. Plan:  Follo up for neck/low back and Left hip pain  Contonue with Oxycodone 5 mg QD PRN.(Maintaining daily activities with no signs of abuse or diversion). Continue with Lidocaine patches  Avoid NSAIDs(H/O GI ulcers)  Aquatherapy ordered last visit. Has not started yet. She will call if she wants left sided cervical TPIs. OARRS report reviewed   Patient encouraged to stay active and to lose weight. Currently in aquatjherapy. Treatment plan discussed with the patient and her including medications side effects. Controlled Substance Monitoring:    Acute and Chronic Pain Monitoring:   RX Monitoring 2/9/2023   Attestation -   Periodic Controlled Substance Monitoring Possible medication side effects, risk of tolerance/dependence & alternative treatments discussed. ;No signs of potential drug abuse or diversion identified. ;Assessed functional status. ;Obtaining appropriate analgesic effect of treatment. We discussed with the patient that combining opioids, benzodiazepines, alcohol, illicit drugs or sleep aids increases the risk of respiratory depression including death. We discussed that these medications may cause drowsiness, sedation or dizziness and have counseled the patient not to drive or operate machinery. We have discussed that these medications will be prescribed only by one provider. We have discussed with the patient about age related risk factors and have thoroughly discussed the importance of taking these medications as prescribed. The patient verbalizes understanding.     ccreferring physic

## 2023-02-09 ENCOUNTER — OFFICE VISIT (OUTPATIENT)
Dept: PAIN MANAGEMENT | Age: 53
End: 2023-02-09
Payer: MEDICAID

## 2023-02-09 VITALS
HEART RATE: 80 BPM | DIASTOLIC BLOOD PRESSURE: 70 MMHG | TEMPERATURE: 96.9 F | BODY MASS INDEX: 37.16 KG/M2 | WEIGHT: 177 LBS | HEIGHT: 58 IN | OXYGEN SATURATION: 96 % | SYSTOLIC BLOOD PRESSURE: 112 MMHG

## 2023-02-09 DIAGNOSIS — G89.4 CHRONIC PAIN SYNDROME: Primary | ICD-10-CM

## 2023-02-09 DIAGNOSIS — M51.9 LUMBAR DISC DISORDER: ICD-10-CM

## 2023-02-09 DIAGNOSIS — M47.812 CERVICAL FACET JOINT SYNDROME: ICD-10-CM

## 2023-02-09 DIAGNOSIS — M47.816 LUMBAR SPONDYLOSIS: ICD-10-CM

## 2023-02-09 DIAGNOSIS — G89.4 CHRONIC PAIN SYNDROME: ICD-10-CM

## 2023-02-09 DIAGNOSIS — M54.16 LUMBAR RADICULOPATHY: ICD-10-CM

## 2023-02-09 DIAGNOSIS — M50.30 DDD (DEGENERATIVE DISC DISEASE), CERVICAL: ICD-10-CM

## 2023-02-09 DIAGNOSIS — M47.816 LUMBAR FACET ARTHROPATHY: ICD-10-CM

## 2023-02-09 DIAGNOSIS — M47.812 FACET ARTHROPATHY, CERVICAL: ICD-10-CM

## 2023-02-09 DIAGNOSIS — M54.12 CERVICAL RADICULOPATHY: ICD-10-CM

## 2023-02-09 DIAGNOSIS — M79.7 FIBROMYALGIA: ICD-10-CM

## 2023-02-09 PROCEDURE — 99213 OFFICE O/P EST LOW 20 MIN: CPT | Performed by: PHYSICIAN ASSISTANT

## 2023-02-09 PROCEDURE — G8484 FLU IMMUNIZE NO ADMIN: HCPCS | Performed by: PHYSICIAN ASSISTANT

## 2023-02-09 PROCEDURE — 3017F COLORECTAL CA SCREEN DOC REV: CPT | Performed by: PHYSICIAN ASSISTANT

## 2023-02-09 PROCEDURE — 1036F TOBACCO NON-USER: CPT | Performed by: PHYSICIAN ASSISTANT

## 2023-02-09 PROCEDURE — G8427 DOCREV CUR MEDS BY ELIG CLIN: HCPCS | Performed by: PHYSICIAN ASSISTANT

## 2023-02-09 PROCEDURE — G8417 CALC BMI ABV UP PARAM F/U: HCPCS | Performed by: PHYSICIAN ASSISTANT

## 2023-02-09 RX ORDER — LIDOCAINE 50 MG/G
1 PATCH TOPICAL DAILY
Qty: 30 PATCH | Refills: 3 | Status: SHIPPED | OUTPATIENT
Start: 2023-02-09 | End: 2023-03-11

## 2023-02-09 RX ORDER — OXYCODONE HYDROCHLORIDE 5 MG/1
5 TABLET ORAL DAILY PRN
Qty: 30 TABLET | Refills: 0 | Status: SHIPPED | OUTPATIENT
Start: 2023-02-11 | End: 2023-03-13

## 2023-02-09 NOTE — PROGRESS NOTES
Do you currently have any of the following:    Fever: No  Headache:  No  Cough: No  Shortness of breath: No  Exposed to anyone with these symptoms: No                                                                                                                Kelin Yeboah presents to the [facility] on 2/9/2023. Marty Guerrero is complaining of pain in neck. The pain is constant. The pain is described as sharp. Pain is rated on her best day at a 8, on her worst day at a 9, and on average at a 8 on the VAS scale. She took her last dose of oxycodone today. Marty Guerrero does not have issues with constipation. Any procedures since your last visit: No.    She is  on NSAIDS and  is  on anticoagulation medications to include ASA and is managed by Portillo Lopez DO  . Pacemaker or defibrillator: No.    Medication Contract and Consent for Opioid Use Documents Filed       Patient Documents       Type of Document Status Date Received Received By Description    Medication Contract Received 7/18/2019 11:59 AM Chauncey SINGLETON 7/18/19 medication contract    Medication Contract Received 10/9/2020  3:01 PM LORENZO, [facility] [address] [place] [address] pain pt agreement    Medication Contract Received 11/5/2021 11:29 AM Chapo MUNIZ Pain Mgmt Patient Agreement 11.5.2021    Medication Contract Received 12/13/2021  4:21 PM LEONARDA PINA pain management    Consent Opioid Use Received 12/12/2022 12:23 PM ESTEFANY ARAMBULA pt agreement 12/12/22                       LMP 03/27/2008      Patient's last menstrual period was 03/27/2008.

## 2023-03-02 ENCOUNTER — OFFICE VISIT (OUTPATIENT)
Dept: PULMONOLOGY | Age: 53
End: 2023-03-02
Payer: MEDICAID

## 2023-03-02 VITALS
RESPIRATION RATE: 16 BRPM | HEART RATE: 74 BPM | SYSTOLIC BLOOD PRESSURE: 101 MMHG | HEIGHT: 58 IN | DIASTOLIC BLOOD PRESSURE: 65 MMHG | WEIGHT: 181.6 LBS | TEMPERATURE: 97.7 F | BODY MASS INDEX: 38.12 KG/M2 | OXYGEN SATURATION: 97 %

## 2023-03-02 DIAGNOSIS — R76.8 ELEVATED IGE LEVEL: ICD-10-CM

## 2023-03-02 DIAGNOSIS — J45.50 SEVERE PERSISTENT ASTHMA WITHOUT COMPLICATION: ICD-10-CM

## 2023-03-02 DIAGNOSIS — G47.33 OSA ON CPAP: Primary | ICD-10-CM

## 2023-03-02 DIAGNOSIS — Z99.89 OSA ON CPAP: Primary | ICD-10-CM

## 2023-03-02 LAB
EXPIRATORY TIME: 7.74 SEC
FEF 25-75% %PRED-PRE: 41 L/SEC
FEF 25-75% PRED: 2.24 L/SEC
FEF 25-75%-PRE: 0.94 L/SEC
FEV1 %PRED-PRE: 65 %
FEV1 PRED: 2.17 L
FEV1/FVC %PRED-PRE: 78 %
FEV1/FVC PRED: 81 %
FEV1/FVC: 64 %
FEV1: 1.41 L
FVC %PRED-PRE: 82 %
FVC PRED: 2.69 L
FVC: 2.22 L
PEF %PRED-PRE: 37 L/SEC
PEF PRED: 5.44 L/SEC
PEF-PRE: 2.04 L/SEC

## 2023-03-02 PROCEDURE — 99213 OFFICE O/P EST LOW 20 MIN: CPT | Performed by: INTERNAL MEDICINE

## 2023-03-02 PROCEDURE — 99214 OFFICE O/P EST MOD 30 MIN: CPT | Performed by: INTERNAL MEDICINE

## 2023-03-02 PROCEDURE — G8484 FLU IMMUNIZE NO ADMIN: HCPCS | Performed by: INTERNAL MEDICINE

## 2023-03-02 PROCEDURE — G8427 DOCREV CUR MEDS BY ELIG CLIN: HCPCS | Performed by: INTERNAL MEDICINE

## 2023-03-02 PROCEDURE — 94010 BREATHING CAPACITY TEST: CPT | Performed by: INTERNAL MEDICINE

## 2023-03-02 PROCEDURE — 94726 PLETHYSMOGRAPHY LUNG VOLUMES: CPT | Performed by: INTERNAL MEDICINE

## 2023-03-02 PROCEDURE — 1036F TOBACCO NON-USER: CPT | Performed by: INTERNAL MEDICINE

## 2023-03-02 PROCEDURE — G8417 CALC BMI ABV UP PARAM F/U: HCPCS | Performed by: INTERNAL MEDICINE

## 2023-03-02 PROCEDURE — 3017F COLORECTAL CA SCREEN DOC REV: CPT | Performed by: INTERNAL MEDICINE

## 2023-03-02 RX ORDER — PREDNISONE 10 MG/1
10 TABLET ORAL DAILY
Qty: 30 TABLET | Refills: 0 | Status: SHIPPED | OUTPATIENT
Start: 2023-03-02 | End: 2023-04-01

## 2023-03-02 ASSESSMENT — PULMONARY FUNCTION TESTS
FEV1/FVC: 64
FEV1/FVC_PERCENT_PREDICTED_PRE: 78
FEV1: 1.41
FVC_PREDICTED: 2.69
FEV1_PERCENT_PREDICTED_PRE: 65
FVC: 2.22
FVC_PERCENT_PREDICTED_PRE: 82
FEV1_PREDICTED: 2.17
FEV1/FVC_PREDICTED: 81

## 2023-03-02 NOTE — PROGRESS NOTES
Patient to follow up with physician in 2 months. Patient to be authorized for Tezspire and will be given to her by her daughter. Patient will have a script sent to her pharmacy for prednisone.

## 2023-03-02 NOTE — PROGRESS NOTES
[facility]  Department of Internal Medicine   Division of Pulmonary, Critical Care and Sleep Medicine  Pulmonary [facility] [phone], Fax [phone]    Rhianna Smith DO, [place] [address], [place], [facility]  Noemy Allen DO, [facility]      Dear Ryan Porras DO    Visit was performed with her daughter present in the use of an     We had the pleasure of seeing Justin Malone, in the [address] at [facility] regarding her Left lung GGO and asthma. HISTORY OF PRESENT ILLNESS:    Justin Malone is a 47 y.o. female with a past medical history of arthritis, chronic back pain, chronic kidney disease, asthma since childhood, depression, recent gallstone surgery, reflux disease, history of COVID, vitamin D deficiency who presents for evaluation of 2 pulmonary problems. To note she is a ex-smoker of at least 20 pack years smoked up until 19 years ago and quit because difficulty breathing. Of note she has 2 dogs 19-20 chickens and a few ducks as well. Her travel history is extensive she has lived in [facility] for 12 years 9 years prior to that she was in [place] and states she was told she had lung problems, she is also traveled to [place] and [place] to visit family. 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  No asbestos exposure noted no silica dust exposure noted. Other toxic chemicals unknown. Her hobbies include playing with her grandchildren and getting eggs from the chicken coop. Family history is really not significant other mother had some breathing problems and  in her late [de-identified] from Alzheimer's disease. She did not know her father because he  and she was very young. She states she is short of breath most of the time and only takes albuterol as needed.   She is never had formal breathing test please done or available in epic. Uses albuterol 4-6 times a day. She also has sleep apnea but does not wear machine previous testing site is unknown. CT scan of her chest 1 in follow-up from 2019 showed a left lingular segment ground glass opacity that was reported to change in size. She has no constitutional symptoms. No hemoptysis noted no leg swelling noted. Since being last seen Zaina Mcclellan is about the same using Advair and Singulair for her asthma. She underwent diagnostic testing which showed a very elevated IgE greater than thousand but vasculitis panel, hypersensitivity panel, Aspergillus IgG was all negative. Allergies were positive x1000 and we discussed placing her on Xolair or biologic for her asthma care. Avoidance therapy was discussed. The good part is she feels well with stable breathing test and FEV1 of 2.51 L 98% of predicted up from 67% predicted. Zaina Mcclellan is seen today back in the office with her daughter. Work-up included multiple allergies and high IgE. I plan on placing her on a biologic at the last office visit but she never followed through with the evaluation. I spoke in detail with her daughter regarding this concern. Still remains around the exposures. So I will not change her inhaler give her prednisone to use for a emergent flare and discussed rescue therapy. The daughter will perform home injections she reviewed the Biologics in detail. Did a routine oxygen assessment and ambulatory assessment and given her normal diffusing capacity as we expected she does not qualify for oxygen. ALLERGIES:    Allergies   Allergen Reactions    Fish-Derived Products Anaphylaxis    Robaxin [Methocarbamol] Other (See Comments)     Very tired, felt like she couldn't even function with her daily activities.     Dye [Iodides] Itching       PAST MEDICAL HISTORY:       Diagnosis Date    Arthritis     Asthma     uses inhalers twice a day    Cerebral artery occlusion with cerebral infarction ([facility]     2015-slight right sided weakness, ambulates normal    Cervical spinal stenosis     Chronic back pain     Chronic kidney disease     Class 2 severe obesity due to excess calories with serious comorbidity and body mass index (BMI) of 37.0 to 37.9 in adult [facility])     COVID 2021    vomiting, diarrhea, body aches, fever, cough- coulc not confirm month    Disorder of thyroid gland 09/11/2012    no medication    GERD (gastroesophageal reflux disease)     Headache(784.0)     Irritable bowel syndrome     Numbness and tingling in right hand 03/06/2020    CIERA on CPAP     uses machine    Seizure disorder ([facility]     not on medications for this; last seizure 2018; etiology? Vitamin D deficiency 12/03/2014        MEDICATIONS:   Current Outpatient Medications   Medication Sig Dispense Refill    oxyCODONE (ROXICODONE) 5 MG immediate release tablet Take 1 tablet by mouth daily as needed for Pain for up to 30 days. Intended supply: 30 days 30 tablet 0    lidocaine (LIDODERM) 5 % Place 1 patch onto the skin daily 12 hours on, 12 hours off. 30 patch 3    buPROPion (WELLBUTRIN XL) 150 MG extended release tablet       aspirin 81 MG EC tablet Take 81 mg by mouth daily      LUBRICANT EYE DROPS 0.4-0.3 % ophthalmic solution INSTILL 1 DROP IN BOTH EYES TWICE DAILY FOR 14 DAYS      fluticasone-salmeterol (ADVAIR DISKUS) 500-50 MCG/ACT AEPB diskus inhaler Inhale 1 puff into the lungs in the morning and 1 puff in the evening.  60 each 12    vitamin D3 (CHOLECALCIFEROL) 25 MCG (1000 UT) TABS tablet Take 1 tablet by mouth daily 30 tablet 5    Prenatal Vit-Fe Fumarate-FA (PRENATAL PLUS) 27-1 MG TABS 1 pill daily 30 tablet 5    montelukast (SINGULAIR) 10 MG tablet Take 1 tablet by mouth nightly 30 tablet 12    omeprazole (PRILOSEC) 40 MG delayed release capsule Take 1 capsule by mouth 2 times daily (before meals) 60 capsule 5    EPINEPHrine (EPIPEN 2-ABUNDIO) 0.3 MG/0.3ML SOAJ injection Inject into muscle for allergic reaction 0.3 mL 0     No current facility-administered medications for this visit. SOCIAL AND OCCUPATIONAL HEALTH:  The patient quit smoking 19 years ago smoked 1 pack/day for about 25 years. There is no history of TB or TB exposure. There is no asbestos or silica dust exposure. The patient reports no coal, foundry, quarry or [employer] exposure. There is no recent travel history noted. The patient denies a history of recreational or IV drug use. No hot tub exposure. The patient has dogs (2). She has 18 chickens and 4 dogs as well. Hobbies include playing with her grandchildren and feeding her animals. The patient denies excessive alcohol intake.      SOCIAL HISTORY: Age-appropriate past and current activities are:  Social History     Tobacco Use    Smoking status: Former     Packs/day: 1.00     Years: 25.00     Pack years: 25.00     Types: Cigarettes     Quit date: 1988     Years since quittin.8    Smokeless tobacco: Never   Vaping Use    Vaping Use: Never used   Substance Use Topics    Alcohol use: No    Drug use: No       SURGICAL HISTORY:   Past Surgical History:   Procedure Laterality Date    CHOLECYSTECTOMY, LAPAROSCOPIC N/A 2022    LAPAROSCOPIC ROBOTIC XI ASSISTED CHOLECYSTECTOMY performed by Kaitlin Zarate MD at [address] [place] [address]    COLONOSCOPY  2016    COLONOSCOPY  07/10/2017    COLONOSCOPY N/A 2022    COLONOSCOPY DIAGNOSTIC WITH BANDING performed by Kaitlin Zarate MD at [address] [place] [address] Rd Left 08/10/2020    LEFT GREATER TROCHANTERIC BURSAE INJECTION X-RAY (CPT 29341) performed by Damaris Mccullough MD at [address] [place] [address] Dr Left 2022    LEFT GREATER TROCHANTERIC BURSAE INJECTION UNDER XRAY WITH SEDATION (CPT [id number]) performed by Damaris Mccullough MD at [facility]. 47     epidural c6-c7    NERVE BLOCK Bilateral 2021    BILATERAL CERVICAL MEDIAL BRANCH FACET BLOCK C4 C5 C6 WITH SEDATION WITH LEFT TROCHANTERIC BURSA INJECTION(CPT [id number]) performed by Spenser Oakley MD at [address] [facility] N/A 2021    CERVICAL EPIDURAL INTERLAMINAR STEROID INJECTION C6-C7 WITH SEDATION performed by Spenser Oakley MD at [facility] Left 2022    CERVICAL EPIDURAL STEROID INJECTION LEFT PARAMEDIAN C6-C7 WITH SEDATION performed by Spenser Oakley MD at [address] ENDOSCOPY  07/10/2017       FAMILY HISTORY: A review of medical events in the patient's family, including disease which may be hereditary or place the patient at risk were reviewed. Family History   Problem Relation Age of Onset    Heart Attack Mother     Migraines Mother     Kidney Disease Father         failure    Migraines Sister     Asthma Child     Asthma Child         bone disease    Anemia Child     Diabetes Other       Family Status   Relation Name Status    Mother      Father      Sister  (Not Specified)    Child  Alive    Child  Alive    Child  Alive    Other sibling Alive        REVIEW OF SYSTEMS: The patients health assessment form was reviewed. Constitutional: General health fair . The patient denies weight changes. The patient denies any recent fevers or weakness. Head: Patient denies any history of trauma, convulsive disorder or syncope. Skin:  Patient denies history of changes in pigmentation, eruptions or pruritus. Eyes: Patient denies any history of color blindness, photophobia, diplopia, inflammation,. She  denies cataracts. Denies glaucoma. Ears: Patient denies history of deafness, tinnitus, pain, discharge or recurrent infections. Nose/Throat: Patient denies history of rhinitis, chronic nasal discharge, drainage, epistaxis, obstruction or nasal polyps. Patient denies history of soreness of mouth or tongue. Patient denies history of hoarseness, voice changes, sore throats or recurrent tonsillitis.    Lymphatic:  Patient denies history of enlargement, inflammation, pain or suppuration. She denies history of lymphoma. Cardiovascular:  Patient admits to history of palpations. She denies orthopnea, rheumatic fever, scarlet fever, cold extremities. She admits to edema. Pulmonary:  Patient admits to wheezing, dyspnea, exertional dyspnea, nocturnal dyspnea. She denies cough. She denies hemoptysis or pleurisy. She complains of sleep disorder. She reports symptoms of sleep apnea. Gastrointestinal: Patient denies changes in appetite. She denies dysphagia, odynophagia or abdominal pain. She denies hematochezia, melena, bowel habit changes or hemorrhoids. Allergy/Immunology: The patient denies itching, hives, or angioedema. The patient reports a problem with seasonal/environmental allergies. Genitourinary:  The patient reports a history of stones or UTI. Vascular: No history of peripheral vascular disease, carotid occlusive disease or aneurysms. Musculoskeletal: The patient reports a history of arthritis & joint pains. She denies loss of strength. Breasts: She denies history of masses, lumps, pain, or nipple changes. The patient denies a history of breast cancer. Neurological: Patient denies vertigo. She denies twitching, convulsions, loss of consciousness. No memory problems. Psychological: Patient admits to moodiness, depression or anxiety. She denies obsessions, delusions, illusions or hallucinations. Cancer: No history of cancer reported. Endocrine: No history of goiter. No history of thyroid disorders. She denies diabetes. Hematopoietic:  She denies history of bleeding disorders or easy bruising. She denies hypercoagulable disorder. Integumentory: No skin lesion, rashes, venous stasis or varicose veins. They denies any report of skin cancer. Rheumatic:  The patient denies polyarthritis or inflammatory joint disease.       PHYSICAL EXAMINATION:   Vitals:    03/02/23 1027   BP: 101/65   Pulse: 74   Resp: 16 Temp: 97.7 °F (36.5 °C)   SpO2: 97%   Weight: 181 lb 9.6 oz (82.4 kg)   Height: 4' 10\" (1.473 m)     Constitutional: A  47 y.o. female who is alert, oriented, cooperative and in no apparent distress. Head was normocephalic and atraumatic. EENT: EOMI SHAUNA. MMM. No icterus. No conjunctival injections. External canals are patent and no discharge. Septum was midline, mucosa was without erythema, exudates or cobblestoning. No thrush. Neck: Supple without thyromegaly. No elevated JVP. Trachea was midline. No carotid bruits. Respiratory: Chest was symmetrical without dullness to percussion. Breath sounds bilaterally were clear to auscultation. No wheezes, rhonchi or rales. No intercostal retraction or use of accessory muscles. No egophony noted. Cardiovascular: Nonpalpable PMI. Regular without murmur, clicks, gallops or rubs. No left or right ventricular heave. Pulses:  Carotid, radial and femoral pulses were equal bilaterally. Abdomen: Obese, rounded and soft without organomegaly. No rebound, rigidity. Lymphatic: No lymphadenopathy. Musculoskeletal: Ambulates without assistance. Normal curvature of the spine. No gross muscle weakness. Muscle size, tone and strength are normal. No involuntary movements. Extremities:  TRace lower extremity edema. No ulcerations, varicosities or erythema. Coordination appears adequate. Sensory function appears intact. Deep tendon reflexes are normal.   Skin:  Warm and dry. Examination of color, turgor and pigmentation was relatively normal. No bruises or skin rashes. Old surgical scars. Neurological/Psychiatric: General behavior, level of consciousness, thought content is normal. Emotional status is normal.  Cranial nerves II-XII are intact. DATA:   The data collected below information that was obtained, reviewed, analyzed and interpreted today.      Todays Office Spirometry was compared to previous test if available and demonstrates an FVC of 2.22 liters which is 82 % of predicted with an FEV1 of 1.41 liters which is 65 % of predicted. FEV1/FVC ratio is 64 %. Mid expiratory flow rates are 178 % of predicted. Maximum voluntary ventilation is 81 liters per minute or 98 % of predicted. Flow volume loop shows no signs of intrathoracic or extrathoracic process. Impression: Moderate Airflow Unchanged      CT SCAN CHEST  2022 Compared to the one in 2019. Impression: The heart and the great vessels are normal.  Trachea and major bronchi are patent. There are no enlarged mediastinal lymph nodes. There is mild emphysema. A previously noted nearly 5 x 7 mm ground-glass nodule in the left lower lobe is noted which may be marginally increased since the previous examination. There is no focal consolidation. Liver is fatty infiltrated with persistent 3 cm low-attenuation lesion in the dome of the liver. CT SCAN CHEST (2019)         IMPRESSION:      Derrick Bentley is a 47 y.o. female with a history of asthma with a stable ground glass nodule in the lingular lobe since 2019. With this in mind, we would like to proceed with the following;                      PLAN:    Michelle Wang was seen with her daughter today in the office regarding her allergic asthma. Her lung function is the same at 65% of predicted and she remains in the same environmental exposure issues. Because of her high IgE and exposures and normal diffusing capacity I recommend she go on Biologics as I did the last time and we started the pre-CERT with home self injections to be given by her daughter. As far as her inhalers concerned no change will be made made prednisone prescription was provided in case of emergency we also over went and exact detail what I wanted to do if she gets in trouble or has an allergic flare at home with use of her albuterol rescue inhaler. As far as the ground glass opacity recommend a follow-up CT scan in 1 year.   In addition it certainly possible that her environmental exposures including chickens and ducks may be contributing to the ground glass abnormality. For more important issue is her underlying asthma. To determine her underlying phenotype of asthma as for diagnostic work-up should be started. We will check total IgE = 1901 kU/L, ANCA = PR3 MPO = negative, Allergies = + x 1000! Jah Khan Aspergillous Ig G, = normal. I told her still not ideal her environmental exposure and I am not forgetting way for removing her from her pets but told her she should not try to obtain more. We went ahead and did allergy testing to see whether or not she is exposed any wheezing animals including hypersensitivity panel = normal and histoplasmosis testing= not detected. She is to remain on Advair 500 mg twice a day with proper instruction and rinsing her mouth after each use. She was told again only to use albuterol as needed. All questions were answered. He did not qualify for oxygen. We hope this updates you on our evaluation and clinical thinking. Thank you for entrusting us to participate in Annika Hoang care. If you have any questions, please don't hesitate to call us at the [facility]. Sincerely,      Jose Luis Gilbert D.O., MPH, Yahaira Jones  Professor of Internal Medicine  Director of [facility]      During today's visit  Annika Hoang is a 47 y.o. female was seen, examined and discussed. This is confirmation that I have personally seen and examined the patient and that the key elements of the encounter were performed by me (> 85 % time). The medications & laboratory data was discussed and adjusted where necessary. The radiographic images were reviewed or with radiologist or consultant if felt dis-concordant with the exam or history. The above findings were corroborated, plans confirmed and changes made if needed. Family is updated at the bedside as available.  Key issues of the case were discussed among consultants. Critical Care time is documented if appropriate.

## 2023-03-09 ENCOUNTER — OFFICE VISIT (OUTPATIENT)
Dept: PAIN MANAGEMENT | Age: 53
End: 2023-03-09
Payer: MEDICAID

## 2023-03-09 VITALS
RESPIRATION RATE: 18 BRPM | OXYGEN SATURATION: 93 % | HEIGHT: 59 IN | BODY MASS INDEX: 36.49 KG/M2 | HEART RATE: 75 BPM | SYSTOLIC BLOOD PRESSURE: 112 MMHG | WEIGHT: 181 LBS | DIASTOLIC BLOOD PRESSURE: 72 MMHG | TEMPERATURE: 97.1 F

## 2023-03-09 DIAGNOSIS — M16.12 PRIMARY OSTEOARTHRITIS OF LEFT HIP: ICD-10-CM

## 2023-03-09 DIAGNOSIS — G89.4 CHRONIC PAIN SYNDROME: ICD-10-CM

## 2023-03-09 DIAGNOSIS — M47.816 LUMBAR FACET ARTHROPATHY: ICD-10-CM

## 2023-03-09 DIAGNOSIS — M47.812 CERVICAL FACET JOINT SYNDROME: Primary | ICD-10-CM

## 2023-03-09 DIAGNOSIS — M70.62 TROCHANTERIC BURSITIS OF LEFT HIP: ICD-10-CM

## 2023-03-09 DIAGNOSIS — M51.9 LUMBAR DISC DISORDER: ICD-10-CM

## 2023-03-09 DIAGNOSIS — G89.29 CHRONIC LEFT SHOULDER PAIN: ICD-10-CM

## 2023-03-09 DIAGNOSIS — M79.7 FIBROMYALGIA: ICD-10-CM

## 2023-03-09 DIAGNOSIS — M54.12 CERVICAL RADICULOPATHY: ICD-10-CM

## 2023-03-09 DIAGNOSIS — M54.16 LUMBAR RADICULOPATHY: ICD-10-CM

## 2023-03-09 DIAGNOSIS — M50.30 DDD (DEGENERATIVE DISC DISEASE), CERVICAL: ICD-10-CM

## 2023-03-09 DIAGNOSIS — M47.816 LUMBAR SPONDYLOSIS: ICD-10-CM

## 2023-03-09 DIAGNOSIS — M25.512 CHRONIC LEFT SHOULDER PAIN: ICD-10-CM

## 2023-03-09 DIAGNOSIS — M47.812 FACET ARTHROPATHY, CERVICAL: ICD-10-CM

## 2023-03-09 PROCEDURE — 1036F TOBACCO NON-USER: CPT | Performed by: PHYSICIAN ASSISTANT

## 2023-03-09 PROCEDURE — G8484 FLU IMMUNIZE NO ADMIN: HCPCS | Performed by: PHYSICIAN ASSISTANT

## 2023-03-09 PROCEDURE — 3017F COLORECTAL CA SCREEN DOC REV: CPT | Performed by: PHYSICIAN ASSISTANT

## 2023-03-09 PROCEDURE — G8427 DOCREV CUR MEDS BY ELIG CLIN: HCPCS | Performed by: PHYSICIAN ASSISTANT

## 2023-03-09 PROCEDURE — 99213 OFFICE O/P EST LOW 20 MIN: CPT | Performed by: PHYSICIAN ASSISTANT

## 2023-03-09 PROCEDURE — G8417 CALC BMI ABV UP PARAM F/U: HCPCS | Performed by: PHYSICIAN ASSISTANT

## 2023-03-09 RX ORDER — OXYCODONE HYDROCHLORIDE 5 MG/1
5 TABLET ORAL DAILY PRN
Qty: 30 TABLET | Refills: 0 | Status: SHIPPED | OUTPATIENT
Start: 2023-03-13 | End: 2023-04-12

## 2023-03-09 NOTE — PROGRESS NOTES
Do you currently have any of the following:    Fever: No  Headache:  No  Cough: No  Shortness of breath: No  Exposed to anyone with these symptoms: No                                                                                                                Hever Daly presents to the [facility] on 3/9/2023. Blanka Rocha is complaining of pain in her back and left hip. The pain is constant. The pain is described as aching and sharp. Pain is rated on her best day at a 6, on her worst day at a 10, and on average at a 7 on the VAS scale. She took her last dose of oxycodone and lidocaine patches  yesterday. Blanka Rocha does not have issues with constipation. Any procedures since your last visit: No,     She is  on NSAIDS and  is not on anticoagulation medications to include NA and is managed by NA. Pacemaker or defibrillator: No Physician managing device is NA. Medication Contract and Consent for Opioid Use Documents Filed       Patient Documents       Type of Document Status Date Received Received By Description    Medication Contract Received 7/18/2019 11:59 AM Ellen SINGLETON 7/18/19 medication contract    Medication Contract Received 10/9/2020  3:01 PM LORENZO, [address] [place] [address] pain pt agreement    Medication Contract Received 11/5/2021 11:29 AM Lelia MUNIZ [facility] Patient Agreement 11.5.2021    Medication Contract Received 12/13/2021  4:21 PM LEONARDA PINA pain management    Consent Opioid Use Received 12/12/2022 12:23 PM ESTEFANY ARAMBULA pt agreement 12/12/22                       Temp 97.1 °F (36.2 °C) (Infrared)   Resp 18   Ht 4' 11\" (1.499 m)   Wt 181 lb (82.1 kg)   LMP 03/27/2008   BMI 36.56 kg/m²      Patient's last menstrual period was 03/27/2008.

## 2023-03-09 NOTE — PROGRESS NOTES
[address]  [facility], [address] [place] [address] Matheus  [phone]    Follow up Note      Génesis Lyn     Date of Visit:  3/9/2023    CC:  Patient presents for follow up   Chief Complaint   Patient presents with    Back Pain    Hip Pain     left             HPI:    Pain is unchanged to left lateral hip and left shoulder. Appropriate analgesia with current medications regimen: yes. Change in quality of symptoms:no. Medication side effects:none. Recent diagnostic testing:none. Recent interventional procedures: None. She has been on anticoagulation medications to include ASA. The patient  has not been on herbal supplements. The patient is not diabetic. Imagin/15/2022 MRI lumbar spine    FINDINGS:   No fracture or malalignment. Vertebral body height and interspaces are well   maintained. Conus medullaris is visualized and is unremarkable. No evidence   of epidural mass or hematoma. No prevertebral soft tissue edema. Note made   of a incidental intraosseous hemangioma located in T11 vertebral body. L1/L2: No central canal stenosis or neural foraminal narrowing. L2/L3: No central canal stenosis or neural foraminal narrowing. L3/L4: Mild facet hypertrophy with hypertrophy of ligamentum flavum. No   central canal stenosis. There is mild bilateral neural foraminal narrowing   more significant on the right. L4/L5: Moderate facet hypertrophy. No central canal stenosis. No   significant neural foraminal narrowing. L5/S1: No central canal stenosis. No significant neural foraminal narrowing. Impression   1. Mild bilateral neural foraminal narrowing at L3/L4 more significant on the   right. No central canal stenosis. 2. Moderate bilateral facet hypertrophy at L4/L5. 3. No fracture or malalignment. Cervical spine MRI 2018  1. Multilevel degenerative disc disease extending from C3 through C7. Moderately severe spinal canal stenosis at C3-C4. Moderate spinal   canal stenosis at C4-C5. . Moderately severe spinal canal stenosis at   C5-C6. Abnormal signal intensity within the cervical spinal cord C3-C5   without evidence of enhancement. Findings are most likely reflective   of myelomalacia changes secondary to the underlying degree of cord   compression as described above. No active demyelination identified. 2. Slight loss of normally expected cervical lordosis C3-C6. CT Lumbar spine 2017  1. Mild diffuse osteopenia, no compression fracture or   spondylolisthesis. 2. Mild multilevel degenerative disc disease and facet joint   arthropathy of the lower lumbar spine more pronounced at L4-L5. 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 Otherwise, no musculoskeletal pathology to explain patient's pain. Previous treatments: Physical Therapy, Epidural Steroid Injection with  and medications. .  Cymbalta severe side effects, tylenol arthritis heartburn and gi distress,  Tizanidine helps but makes tired, tramadol caused hyperness, butrans patch dizziness and diarrhea                                        Potential Aberrant Drug-Related Behavior:    No     Urine Drug Screening:  Saliva screen 08/2020 consistent for Ultram  11/2021: consistent for oxycodone  09/2022 consistent     OARRS report:  7/2022 consistent to 03/2023 consistent.      Opioid agreement: 12/13/21  Updated:  12/12/2022         Past Medical History:   Diagnosis Date    Arthritis     Asthma     uses inhalers twice a day    Cerebral artery occlusion with cerebral infarction ([facility] [address]     2015-slight right sided weakness, ambulates normal    Cervical spinal stenosis     Chronic back pain     Chronic kidney disease     Class 2 severe obesity due to excess calories with serious comorbidity and body mass index (BMI) of 37.0 to 37.9 in adult [facility])     COVID 2021 vomiting, diarrhea, body aches, fever, cough- coulc not confirm month    Disorder of thyroid gland 09/11/2012    no medication    GERD (gastroesophageal reflux disease)     Headache(784.0)     Irritable bowel syndrome     Numbness and tingling in right hand 03/06/2020    CIERA on CPAP     uses machine    Seizure disorder ([facility] [address]     not on medications for this; last seizure 2018; etiology? Vitamin D deficiency 12/03/2014       Past Surgical History:   Procedure Laterality Date    CHOLECYSTECTOMY, LAPAROSCOPIC N/A 5/17/2022    LAPAROSCOPIC ROBOTIC XI ASSISTED CHOLECYSTECTOMY performed by Ann Marie Cherry MD at [address] [place] [address]  03/09/2016    COLONOSCOPY  07/10/2017    COLONOSCOPY N/A 4/1/2022    COLONOSCOPY DIAGNOSTIC WITH BANDING performed by Ann Marie Cherry MD at [address] [place] [address] Left 08/10/2020    LEFT GREATER TROCHANTERIC BURSAE INJECTION X-RAY (CPT 66442) performed by Francisco Closs, MD at [address] [place] [address] 9/12/2022    LEFT GREATER TROCHANTERIC BURSAE INJECTION UNDER XRAY WITH SEDATION (CPT 86107) performed by Francisco Closs, MD at [facility]. 47     epidural c6-c7    NERVE BLOCK Bilateral 8/5/2021    BILATERAL CERVICAL MEDIAL BRANCH FACET BLOCK C4 C5 C6 WITH SEDATION WITH LEFT TROCHANTERIC BURSA INJECTION(CPT 37313) performed by Francisco Closs, MD at [address] [place] [address] N/A 4/19/2021    CERVICAL EPIDURAL INTERLAMINAR STEROID INJECTION C6-C7 WITH SEDATION performed by Francisco Closs, MD at [address] [place] [address] 1/31/2022    CERVICAL EPIDURAL STEROID INJECTION LEFT PARAMEDIAN C6-C7 WITH SEDATION performed by Francisco Closs, MD at [facility]  07/10/2017       Prior to Admission medications    Medication Sig Start Date End Date Taking?  Authorizing Provider   oxyCODONE (ROXICODONE) 5 MG immediate release tablet Take 1 tablet by mouth daily as needed for Pain for up to 30 days. Intended supply: 30 days 3/13/23 4/12/23 Yes GACRIA Pandya   predniSONE (DELTASONE) 10 MG tablet Take 1 tablet by mouth daily Take 4 tabs x 3 days, 3 tabs x 3 days, 2 tabs x 3 days, 1 tab x 3 days, stop. 3/2/23 4/1/23 Yes Danilo Fountain, DO   lidocaine (LIDODERM) 5 % Place 1 patch onto the skin daily 12 hours on, 12 hours off. 2/9/23 3/11/23 Yes GARCIA Pandya   buPROPion (WELLBUTRIN XL) 150 MG extended release tablet  1/5/23  Yes Historical Provider, MD   LUBRICANT EYE DROPS 0.4-0.3 % ophthalmic solution INSTILL 1 DROP IN BOTH EYES TWICE DAILY FOR 14 DAYS 11/14/22  Yes Historical Provider, MD   fluticasone-salmeterol (ADVAIR DISKUS) 500-50 MCG/ACT AEPB diskus inhaler Inhale 1 puff into the lungs in the morning and 1 puff in the evening. 9/22/22  Yes Abdi Tejada, DO   vitamin D3 (CHOLECALCIFEROL) 25 MCG (1000 UT) TABS tablet Take 1 tablet by mouth daily 9/22/22  Yes Hi Jain DO   Prenatal Vit-Fe Fumarate-FA (PRENATAL PLUS) 27-1 MG TABS 1 pill daily 9/22/22  Yes Abdi Grijalva,    omeprazole (PRILOSEC) 40 MG delayed release capsule Take 1 capsule by mouth 2 times daily (before meals) 6/8/22  Yes Shi White MD   EPINEPHrine (EPIPEN 2-ABUNDIO) 0.3 MG/0.3ML SOAJ injection Inject into muscle for allergic reaction 8/22/19  Yes Hi Jain DO   aspirin 81 MG EC tablet Take 81 mg by mouth daily  Patient not taking: Reported on 3/9/2023    Historical Provider, MD   montelukast (SINGULAIR) 10 MG tablet Take 1 tablet by mouth nightly 7/5/22 2/9/23  Olive Hinds, DO       Allergies   Allergen Reactions    Fish-Derived Products Anaphylaxis    Robaxin [Methocarbamol] Other (See Comments)     Very tired, felt like she couldn't even function with her daily activities.     Dye [Iodides] Itching       Social History     Socioeconomic History    Marital status:      Spouse name: Not on file    Number of children: Not on file    Years of education: Not on file    Highest education level: Not on file   Occupational History    Not on file   Tobacco Use    Smoking status: Former     Packs/day: 1.00     Years: 25.00     Pack years: 25.00     Types: Cigarettes     Quit date: 1988     Years since quittin.9    Smokeless tobacco: Never   Vaping Use    Vaping Use: Never used   Substance and Sexual Activity    Alcohol use: No    Drug use: No    Sexual activity: Not on file   Other Topics Concern    Not on file   Social History Narrative    Not on file     Social Determinants of Health     Financial Resource Strain: Not on file   Food Insecurity: Not on file   Transportation Needs: Not on file   Physical Activity: Not on file   Stress: Not on file   Social Connections: Not on file   Intimate Partner Violence: Not on file   Housing Stability: Not on file       Family History   Problem Relation Age of Onset    Heart Attack Mother     Migraines Mother     Kidney Disease Father         failure    Migraines Sister     Asthma Child     Asthma Child         bone disease    Anemia Child     Diabetes Other        REVIEW OF SYSTEMS:     Ann Roblero denies fever/chills, chest pain, shortness of breath, new bowel or bladder complaints. All other review of systems was negative. PHYSICAL EXAMINATION:      /72   Pulse 75   Temp 97.1 °F (36.2 °C) (Infrared)   Resp 18   Ht 4' 11\" (1.499 m)   Wt 181 lb (82.1 kg)   LMP 2008   SpO2 93%   BMI 36.56 kg/m²     General:      General appearance:   pleasant and well-hydrated. , in mild discomfort and A & O x3  Build:Overweight    HEENT:    Head:normocephalic and atraumatic  Sclera: icterus absent,    Lungs:    Breathing:Normal expansion. No wheezing.     Abdomen:    Shape:non-distended and normal      Extremities:    Tremors:None bilaterally upper and lower  Range of motion:Generally normal shoulders, pain with internal rotation of hips not done.  Intact:Yes  Edema:Normal    Neurological:    Gait:antalgic    Dermatology:    Skin:no unusual rashes, no skin lesions, no palpable subcutaneous nodules, and good skin turgor    Impression:      Neck and low back pain with radiation to the Left upper and lower extremity  Cervical spine MRI 2019 Multilevel degenerative disc disease with moderate to severe stenosis at C3-4/C4-5 and C5-6  Lumbar spine CT 2017 Mild degenerative disc disease and facet arthropathy most pronounced at L4-5  Patient had seen neurosurgery and surgery C3-4/C4-5 and C5-6 ACDF was recommended.      Plan:  Follo up for neck/low back and Left hip pain  Contonue with Oxycodone 5 mg QD PRN.(Maintaining daily activities with no signs of abuse or diversion).  Continue with Lidocaine patches  Avoid NSAIDs (H/O GI ulcers)              Aquatherapy ordered at prior visit - start.               Last hip bursa injection made her feel worse.  She would like to proceed with an ortho referral.                Left shoulder x-ray ordered for left shoulder pain.  Poor motion.    OARRS report reviewed   Patient encouraged to stay active and to lose weight. Currently in aquatjherapy.  Treatment plan discussed with the patient and her including medications side effects.        Controlled Substance Monitoring:    Acute and Chronic Pain Monitoring:   RX Monitoring 3/9/2023   Attestation -   Periodic Controlled Substance Monitoring Possible medication side effects, risk of tolerance/dependence & alternative treatments discussed.;No signs of potential drug abuse or diversion identified.;Assessed functional status.;Obtaining appropriate analgesic effect of treatment.             We discussed with the patient that combining opioids, benzodiazepines, alcohol, illicit drugs or sleep aids increases the risk of respiratory depression including death. We discussed that these medications may cause drowsiness, sedation or dizziness and have counseled the patient not to  drive or operate machinery. We have discussed that these medications will be prescribed only by one provider. We have discussed with the patient about age related risk factors and have thoroughly discussed the importance of taking these medications as prescribed. The patient verbalizes understanding.     ccreferring physic

## 2023-03-20 DIAGNOSIS — M16.12 PRIMARY OSTEOARTHRITIS OF LEFT HIP: Primary | ICD-10-CM

## 2023-03-21 ENCOUNTER — HOSPITAL ENCOUNTER (OUTPATIENT)
Age: 53
Discharge: HOME | End: 2023-03-23
Payer: MEDICAID

## 2023-03-21 ENCOUNTER — HOSPITAL ENCOUNTER (OUTPATIENT)
Dept: GENERAL RADIOLOGY | Age: 53
Discharge: HOME | End: 2023-03-23
Payer: MEDICAID

## 2023-03-21 DIAGNOSIS — M25.512 CHRONIC LEFT SHOULDER PAIN: ICD-10-CM

## 2023-03-21 DIAGNOSIS — G89.29 CHRONIC LEFT SHOULDER PAIN: ICD-10-CM

## 2023-03-21 PROCEDURE — 73030 X-RAY EXAM OF SHOULDER: CPT

## 2023-03-28 ENCOUNTER — OFFICE VISIT (OUTPATIENT)
Dept: PRIMARY CARE CLINIC | Age: 53
End: 2023-03-28
Payer: MEDICAID

## 2023-03-28 ENCOUNTER — HOSPITAL ENCOUNTER (OUTPATIENT)
Dept: CT IMAGING | Age: 53
Discharge: HOME | End: 2023-03-30
Payer: MEDICAID

## 2023-03-28 VITALS
HEIGHT: 59 IN | BODY MASS INDEX: 37.09 KG/M2 | OXYGEN SATURATION: 97 % | WEIGHT: 184 LBS | SYSTOLIC BLOOD PRESSURE: 120 MMHG | HEART RATE: 81 BPM | TEMPERATURE: 98.4 F | DIASTOLIC BLOOD PRESSURE: 80 MMHG

## 2023-03-28 DIAGNOSIS — Z87.442 HISTORY OF KIDNEY STONES: ICD-10-CM

## 2023-03-28 DIAGNOSIS — M47.816 LUMBAR SPONDYLOSIS: ICD-10-CM

## 2023-03-28 DIAGNOSIS — R10.9 LEFT FLANK PAIN: Primary | ICD-10-CM

## 2023-03-28 DIAGNOSIS — M47.816 LUMBAR FACET ARTHROPATHY: ICD-10-CM

## 2023-03-28 DIAGNOSIS — R10.9 LEFT FLANK PAIN: ICD-10-CM

## 2023-03-28 LAB
ALBUMIN SERPL-MCNC: 4.6 G/DL (ref 3.5–5.2)
ALP SERPL-CCNC: 91 U/L (ref 35–104)
ALT SERPL-CCNC: 27 U/L (ref 0–32)
ANION GAP SERPL CALCULATED.3IONS-SCNC: 12 MMOL/L (ref 7–16)
AST SERPL-CCNC: 24 U/L (ref 0–31)
BILIRUB SERPL-MCNC: 0.6 MG/DL (ref 0–1.2)
BILIRUB UR QL STRIP: NEGATIVE
BUN SERPL-MCNC: 9 MG/DL (ref 6–20)
CALCIUM SERPL-MCNC: 9.8 MG/DL (ref 8.6–10.2)
CHLORIDE SERPL-SCNC: 102 MMOL/L (ref 98–107)
CLARITY UR: CLEAR
CO2 SERPL-SCNC: 25 MMOL/L (ref 22–29)
COLOR UR: YELLOW
CREAT SERPL-MCNC: 0.8 MG/DL (ref 0.5–1)
ERYTHROCYTE [DISTWIDTH] IN BLOOD BY AUTOMATED COUNT: 12.9 FL (ref 11.5–15)
GLUCOSE SERPL-MCNC: 101 MG/DL (ref 74–99)
GLUCOSE UR STRIP-MCNC: NEGATIVE MG/DL
HCT VFR BLD AUTO: 47.9 % (ref 34–48)
HGB BLD-MCNC: 14.9 G/DL (ref 11.5–15.5)
HGB UR QL STRIP: NEGATIVE
KETONES UR STRIP-MCNC: NEGATIVE MG/DL
LEUKOCYTE ESTERASE UR QL STRIP: NEGATIVE
MCH RBC QN AUTO: 27.2 PG (ref 26–35)
MCHC RBC AUTO-ENTMCNC: 31.1 % (ref 32–34.5)
MCV RBC AUTO: 87.6 FL (ref 80–99.9)
NITRITE UR QL STRIP: NEGATIVE
PH UR STRIP: 6 [PH] (ref 5–9)
PLATELET # BLD AUTO: 280 E9/L (ref 130–450)
PMV BLD AUTO: 10.5 FL (ref 7–12)
POTASSIUM SERPL-SCNC: 3.8 MMOL/L (ref 3.5–5)
PROT SERPL-MCNC: 7.9 G/DL (ref 6.4–8.3)
PROT UR STRIP-MCNC: NEGATIVE MG/DL
RBC # BLD AUTO: 5.47 E12/L (ref 3.5–5.5)
SODIUM SERPL-SCNC: 139 MMOL/L (ref 132–146)
SP GR UR STRIP: <=1.005 (ref 1–1.03)
UROBILINOGEN UR STRIP-ACNC: 0.2 E.U./DL
WBC # BLD: 6.2 E9/L (ref 4.5–11.5)

## 2023-03-28 PROCEDURE — G8427 DOCREV CUR MEDS BY ELIG CLIN: HCPCS | Performed by: FAMILY MEDICINE

## 2023-03-28 PROCEDURE — 74176 CT ABD & PELVIS W/O CONTRAST: CPT

## 2023-03-28 PROCEDURE — 3017F COLORECTAL CA SCREEN DOC REV: CPT | Performed by: FAMILY MEDICINE

## 2023-03-28 PROCEDURE — G8484 FLU IMMUNIZE NO ADMIN: HCPCS | Performed by: FAMILY MEDICINE

## 2023-03-28 PROCEDURE — 99214 OFFICE O/P EST MOD 30 MIN: CPT | Performed by: FAMILY MEDICINE

## 2023-03-28 PROCEDURE — 1036F TOBACCO NON-USER: CPT | Performed by: FAMILY MEDICINE

## 2023-03-28 PROCEDURE — 96372 THER/PROPH/DIAG INJ SC/IM: CPT | Performed by: FAMILY MEDICINE

## 2023-03-28 PROCEDURE — G8417 CALC BMI ABV UP PARAM F/U: HCPCS | Performed by: FAMILY MEDICINE

## 2023-03-28 RX ORDER — KETOROLAC TROMETHAMINE 30 MG/ML
30 INJECTION, SOLUTION INTRAMUSCULAR; INTRAVENOUS ONCE
Status: COMPLETED | OUTPATIENT
Start: 2023-03-28 | End: 2023-03-28

## 2023-03-28 RX ADMIN — KETOROLAC TROMETHAMINE 30 MG: 30 INJECTION, SOLUTION INTRAMUSCULAR; INTRAVENOUS at 11:18

## 2023-03-28 ASSESSMENT — PATIENT HEALTH QUESTIONNAIRE - PHQ9
6. FEELING BAD ABOUT YOURSELF - OR THAT YOU ARE A FAILURE OR HAVE LET YOURSELF OR YOUR FAMILY DOWN: 0
10. IF YOU CHECKED OFF ANY PROBLEMS, HOW DIFFICULT HAVE THESE PROBLEMS MADE IT FOR YOU TO DO YOUR WORK, TAKE CARE OF THINGS AT HOME, OR GET ALONG WITH OTHER PEOPLE: 0
1. LITTLE INTEREST OR PLEASURE IN DOING THINGS: 0
SUM OF ALL RESPONSES TO PHQ9 QUESTIONS 1 & 2: 0
8. MOVING OR SPEAKING SO SLOWLY THAT OTHER PEOPLE COULD HAVE NOTICED. OR THE OPPOSITE, BEING SO FIGETY OR RESTLESS THAT YOU HAVE BEEN MOVING AROUND A LOT MORE THAN USUAL: 0
7. TROUBLE CONCENTRATING ON THINGS, SUCH AS READING THE NEWSPAPER OR WATCHING TELEVISION: 0
SUM OF ALL RESPONSES TO PHQ QUESTIONS 1-9: 0
SUM OF ALL RESPONSES TO PHQ QUESTIONS 1-9: 0
2. FEELING DOWN, DEPRESSED OR HOPELESS: 0
5. POOR APPETITE OR OVEREATING: 0
9. THOUGHTS THAT YOU WOULD BE BETTER OFF DEAD, OR OF HURTING YOURSELF: 0
SUM OF ALL RESPONSES TO PHQ QUESTIONS 1-9: 0
3. TROUBLE FALLING OR STAYING ASLEEP: 0
SUM OF ALL RESPONSES TO PHQ QUESTIONS 1-9: 0
4. FEELING TIRED OR HAVING LITTLE ENERGY: 0

## 2023-03-28 ASSESSMENT — ENCOUNTER SYMPTOMS
COUGH: 0
VOMITING: 0
CHEST TIGHTNESS: 0
ABDOMINAL PAIN: 1
WHEEZING: 0
COLOR CHANGE: 0
DIARRHEA: 0
APNEA: 0
EYE REDNESS: 0
SHORTNESS OF BREATH: 0
EYE ITCHING: 0
RHINORRHEA: 0
EYE PAIN: 0
BACK PAIN: 1
SINUS PRESSURE: 0
NAUSEA: 0
BLOOD IN STOOL: 0
SORE THROAT: 0
CONSTIPATION: 0

## 2023-03-28 NOTE — PROGRESS NOTES
Rate Last Admin    ketorolac (TORADOL) injection 30 mg  30 mg IntraMUSCular Once Abdi F Valentine, DO           Allergies   Allergen Reactions    Fish-Derived Products Anaphylaxis    Robaxin [Methocarbamol] Other (See Comments)     Very tired, felt like she couldn't even function with her daily activities. Dye [Iodides] Itching       Social History     Socioeconomic History    Marital status:      Spouse name: None    Number of children: None    Years of education: None    Highest education level: None   Tobacco Use    Smoking status: Former     Packs/day: 1.00     Years: 25.00     Pack years: 25.00     Types: Cigarettes     Quit date: 1988     Years since quittin.9    Smokeless tobacco: Never   Vaping Use    Vaping Use: Never used   Substance and Sexual Activity    Alcohol use: No    Drug use: No       Family History   Problem Relation Age of Onset    Heart Attack Mother     Migraines Mother     Kidney Disease Father         failure    Migraines Sister     Asthma Child     Asthma Child         bone disease    Anemia Child     Diabetes Other           Review of Systems   Constitutional:  Negative for activity change, appetite change, fatigue, fever and weight loss. HENT:  Negative for congestion, ear pain, hearing loss, nosebleeds, rhinorrhea, sinus pressure and sore throat. Eyes:  Negative for pain, redness, itching and visual disturbance. Respiratory:  Negative for apnea, cough, chest tightness, shortness of breath and wheezing. Cardiovascular:  Negative for chest pain, palpitations and leg swelling. Gastrointestinal:  Positive for abdominal pain. Negative for blood in stool, constipation, diarrhea, nausea and vomiting. Endocrine: Negative. Genitourinary:  Negative for decreased urine volume, difficulty urinating, dysuria, frequency, hematuria and urgency. Musculoskeletal:  Positive for back pain. Negative for arthralgias, gait problem, myalgias and neck pain.    Skin:

## 2023-03-29 NOTE — PROGRESS NOTES
[address]  [facility], [address] [place] [address]  [phone]    Follow up Note      Cisco Benavidez     Date of Visit:  2023    CC:  Patient presents for follow up   Chief Complaint   Patient presents with    Neck Pain     Neck and left hip               HPI:    Pain is unchanged. No new changes. Appropriate analgesia with current medications regimen: yes. Change in quality of symptoms:no. Medication side effects:none. Recent diagnostic testing:none. Recent interventional procedures: None. She has been on anticoagulation medications to include ASA. The patient  has not been on herbal supplements. The patient is not diabetic. Imagin/15/2022 MRI lumbar spine    FINDINGS:   No fracture or malalignment. Vertebral body height and interspaces are well   maintained. Conus medullaris is visualized and is unremarkable. No evidence   of epidural mass or hematoma. No prevertebral soft tissue edema. Note made   of a incidental intraosseous hemangioma located in T11 vertebral body. L1/L2: No central canal stenosis or neural foraminal narrowing. L2/L3: No central canal stenosis or neural foraminal narrowing. L3/L4: Mild facet hypertrophy with hypertrophy of ligamentum flavum. No   central canal stenosis. There is mild bilateral neural foraminal narrowing   more significant on the right. L4/L5: Moderate facet hypertrophy. No central canal stenosis. No   significant neural foraminal narrowing. L5/S1: No central canal stenosis. No significant neural foraminal narrowing. Impression   1. Mild bilateral neural foraminal narrowing at L3/L4 more significant on the   right. No central canal stenosis. 2. Moderate bilateral facet hypertrophy at L4/L5. 3. No fracture or malalignment. Cervical spine MRI 2018  1. Multilevel degenerative disc disease extending from C3 through C7.    Moderately severe

## 2023-03-31 LAB — BACTERIA UR CULT: NORMAL

## 2023-04-05 ENCOUNTER — OFFICE VISIT (OUTPATIENT)
Dept: PAIN MANAGEMENT | Age: 53
End: 2023-04-05
Payer: MEDICAID

## 2023-04-05 VITALS
DIASTOLIC BLOOD PRESSURE: 80 MMHG | SYSTOLIC BLOOD PRESSURE: 130 MMHG | BODY MASS INDEX: 37.99 KG/M2 | WEIGHT: 181 LBS | TEMPERATURE: 97.8 F | HEART RATE: 73 BPM | OXYGEN SATURATION: 95 % | RESPIRATION RATE: 18 BRPM | HEIGHT: 58 IN

## 2023-04-05 DIAGNOSIS — G89.4 CHRONIC PAIN SYNDROME: ICD-10-CM

## 2023-04-05 DIAGNOSIS — M51.9 LUMBAR DISC DISORDER: ICD-10-CM

## 2023-04-05 DIAGNOSIS — M79.7 FIBROMYALGIA: ICD-10-CM

## 2023-04-05 DIAGNOSIS — M47.816 LUMBAR SPONDYLOSIS: ICD-10-CM

## 2023-04-05 DIAGNOSIS — M50.30 DDD (DEGENERATIVE DISC DISEASE), CERVICAL: ICD-10-CM

## 2023-04-05 DIAGNOSIS — M54.12 CERVICAL RADICULOPATHY: ICD-10-CM

## 2023-04-05 DIAGNOSIS — M54.16 LUMBAR RADICULOPATHY: Primary | ICD-10-CM

## 2023-04-05 DIAGNOSIS — M16.12 PRIMARY OSTEOARTHRITIS OF LEFT HIP: ICD-10-CM

## 2023-04-05 DIAGNOSIS — M47.812 FACET ARTHROPATHY, CERVICAL: ICD-10-CM

## 2023-04-05 DIAGNOSIS — M47.816 LUMBAR FACET ARTHROPATHY: ICD-10-CM

## 2023-04-05 DIAGNOSIS — M47.812 CERVICAL FACET JOINT SYNDROME: ICD-10-CM

## 2023-04-05 PROCEDURE — G8417 CALC BMI ABV UP PARAM F/U: HCPCS | Performed by: PHYSICIAN ASSISTANT

## 2023-04-05 PROCEDURE — 3017F COLORECTAL CA SCREEN DOC REV: CPT | Performed by: PHYSICIAN ASSISTANT

## 2023-04-05 PROCEDURE — 99213 OFFICE O/P EST LOW 20 MIN: CPT | Performed by: PHYSICIAN ASSISTANT

## 2023-04-05 PROCEDURE — G8427 DOCREV CUR MEDS BY ELIG CLIN: HCPCS | Performed by: PHYSICIAN ASSISTANT

## 2023-04-05 PROCEDURE — 1036F TOBACCO NON-USER: CPT | Performed by: PHYSICIAN ASSISTANT

## 2023-04-05 RX ORDER — OXYCODONE HYDROCHLORIDE 5 MG/1
5 TABLET ORAL DAILY PRN
Qty: 30 TABLET | Refills: 0 | Status: SHIPPED | OUTPATIENT
Start: 2023-04-12 | End: 2023-05-12

## 2023-04-05 NOTE — PROGRESS NOTES
Richar Garcia presents to the [facility] on 4/5/2023. Sita Avina is complaining of pain neck/left hip. The pain is constant. The pain is described as aching and sharp. Pain is rated on her best day at a 5, on her worst day at a 10, and on average at a 6 on the VAS scale. She took her last dose of oxycodone today. Sita Avina does not have issues with constipation. Any procedures since your last visit: No  She is not on NSAIDS and  is  on anticoagulation medications to include ASA and is managed by PCP. Pacemaker or defibrillator: No.  Medication Contract and Consent for Opioid Use Documents Filed       Patient Documents       Type of Document Status Date Received Received By Description    Medication Contract Received 7/18/2019 11:59 AM Mick SINGLETON 7/18/19 medication contract    Medication Contract Received 10/9/2020  3:01 PM LOREN[facility] [address] [place] [address] pain pt agreement    Medication Contract Received 11/5/2021 11:29 AM Ayleen MUNIZ Pain Mgmt Patient Agreement 11.5.2021    Medication Contract Received 12/13/2021  4:21 PM LEONARDA PINA pain management    Consent Opioid Use Received 12/12/2022 12:23 PM ESTEFANY ARAMBULA pt agreement 12/12/22                       /80   Pulse 73   Temp 97.8 °F (36.6 °C) (Infrared)   Resp 18   Ht 4' 10\" (1.473 m)   Wt 181 lb (82.1 kg)   LMP 03/27/2008   SpO2 95%   BMI 37.83 kg/m²      Patient's last menstrual period was 03/27/2008.

## 2023-04-21 ENCOUNTER — TELEPHONE (OUTPATIENT)
Dept: PULMONOLOGY | Age: 53
End: 2023-04-21

## 2023-04-21 DIAGNOSIS — J45.50 SEVERE PERSISTENT ASTHMA, UNSPECIFIED WHETHER COMPLICATED: Primary | ICD-10-CM

## 2023-04-21 NOTE — TELEPHONE ENCOUNTER
Patient's insurance was called to obtain an authorization for the medication Tezspire. A medical authorization was obtained with the number of [id number]  dates:  4/1/23 to 7/20/23. An order was sent to the infusion center to start scheduling the patient for her shots.

## 2023-04-28 RX ORDER — HEPARIN SODIUM (PORCINE) LOCK FLUSH IV SOLN 100 UNIT/ML 100 UNIT/ML
500 SOLUTION INTRAVENOUS PRN
Start: 2023-04-28

## 2023-04-28 RX ORDER — DIPHENHYDRAMINE HYDROCHLORIDE 50 MG/ML
50 INJECTION INTRAMUSCULAR; INTRAVENOUS
OUTPATIENT
Start: 2023-04-28

## 2023-04-28 RX ORDER — ONDANSETRON 2 MG/ML
8 INJECTION INTRAMUSCULAR; INTRAVENOUS
OUTPATIENT
Start: 2023-04-28

## 2023-04-28 RX ORDER — ALBUTEROL SULFATE 90 UG/1
4 AEROSOL, METERED RESPIRATORY (INHALATION) PRN
OUTPATIENT
Start: 2023-04-28

## 2023-04-28 RX ORDER — SODIUM CHLORIDE 0.9 % (FLUSH) 0.9 %
5-40 SYRINGE (ML) INJECTION PRN
Start: 2023-04-28

## 2023-04-28 RX ORDER — SODIUM CHLORIDE 9 MG/ML
INJECTION, SOLUTION INTRAVENOUS CONTINUOUS
OUTPATIENT
Start: 2023-04-28

## 2023-04-28 RX ORDER — MEPERIDINE HYDROCHLORIDE 25 MG/ML
25 INJECTION INTRAMUSCULAR; INTRAVENOUS; SUBCUTANEOUS ONCE
Start: 2023-04-28 | End: 2023-04-28

## 2023-04-28 RX ORDER — ACETAMINOPHEN 325 MG/1
650 TABLET ORAL
OUTPATIENT
Start: 2023-04-28

## 2023-04-28 RX ORDER — EPINEPHRINE 1 MG/ML
0.3 INJECTION, SOLUTION, CONCENTRATE INTRAVENOUS PRN
OUTPATIENT
Start: 2023-04-28

## 2023-05-03 ENCOUNTER — OFFICE VISIT (OUTPATIENT)
Dept: PAIN MANAGEMENT | Age: 53
End: 2023-05-03
Payer: MEDICAID

## 2023-05-03 VITALS
TEMPERATURE: 97.3 F | HEART RATE: 73 BPM | RESPIRATION RATE: 18 BRPM | WEIGHT: 181 LBS | BODY MASS INDEX: 36.49 KG/M2 | DIASTOLIC BLOOD PRESSURE: 70 MMHG | HEIGHT: 59 IN | SYSTOLIC BLOOD PRESSURE: 114 MMHG | OXYGEN SATURATION: 95 %

## 2023-05-03 DIAGNOSIS — M50.30 DDD (DEGENERATIVE DISC DISEASE), CERVICAL: ICD-10-CM

## 2023-05-03 DIAGNOSIS — M54.16 LUMBAR RADICULOPATHY: ICD-10-CM

## 2023-05-03 DIAGNOSIS — M47.816 LUMBAR SPONDYLOSIS: ICD-10-CM

## 2023-05-03 DIAGNOSIS — M47.812 CERVICAL FACET JOINT SYNDROME: ICD-10-CM

## 2023-05-03 DIAGNOSIS — M47.816 LUMBAR FACET ARTHROPATHY: ICD-10-CM

## 2023-05-03 DIAGNOSIS — M79.7 FIBROMYALGIA: ICD-10-CM

## 2023-05-03 DIAGNOSIS — M16.12 PRIMARY OSTEOARTHRITIS OF LEFT HIP: ICD-10-CM

## 2023-05-03 DIAGNOSIS — G89.4 CHRONIC PAIN SYNDROME: Primary | ICD-10-CM

## 2023-05-03 DIAGNOSIS — M54.12 CERVICAL RADICULOPATHY: ICD-10-CM

## 2023-05-03 DIAGNOSIS — M47.812 FACET ARTHROPATHY, CERVICAL: ICD-10-CM

## 2023-05-03 DIAGNOSIS — M51.9 LUMBAR DISC DISORDER: ICD-10-CM

## 2023-05-03 PROCEDURE — G8417 CALC BMI ABV UP PARAM F/U: HCPCS | Performed by: PHYSICIAN ASSISTANT

## 2023-05-03 PROCEDURE — 3017F COLORECTAL CA SCREEN DOC REV: CPT | Performed by: PHYSICIAN ASSISTANT

## 2023-05-03 PROCEDURE — 99213 OFFICE O/P EST LOW 20 MIN: CPT | Performed by: PHYSICIAN ASSISTANT

## 2023-05-03 PROCEDURE — G8427 DOCREV CUR MEDS BY ELIG CLIN: HCPCS | Performed by: PHYSICIAN ASSISTANT

## 2023-05-03 PROCEDURE — 1036F TOBACCO NON-USER: CPT | Performed by: PHYSICIAN ASSISTANT

## 2023-05-03 RX ORDER — OXYCODONE HYDROCHLORIDE 5 MG/1
5 TABLET ORAL DAILY PRN
Qty: 30 TABLET | Refills: 0 | Status: SHIPPED | OUTPATIENT
Start: 2023-05-12 | End: 2023-06-11

## 2023-05-03 NOTE — PROGRESS NOTES
[address]  [address] [facility], [address] [place] [address]  [phone]    Follow up Note      Jena Acharya     Date of Visit:  5/3/2023    CC:  Patient presents for follow up   Chief Complaint   Patient presents with    Back Pain               HPI:    Pain is unchanged. No new changes. Appropriate analgesia with current medications regimen: yes. Change in quality of symptoms:no. Medication side effects:none. Recent diagnostic testing:none. Recent interventional procedures: None. She has been on anticoagulation medications to include ASA. The patient  has not been on herbal supplements. The patient is not diabetic. Imagin/15/2022 MRI lumbar spine    FINDINGS:   No fracture or malalignment. Vertebral body height and interspaces are well   maintained. Conus medullaris is visualized and is unremarkable. No evidence   of epidural mass or hematoma. No prevertebral soft tissue edema. Note made   of a incidental intraosseous hemangioma located in T11 vertebral body. L1/L2: No central canal stenosis or neural foraminal narrowing. L2/L3: No central canal stenosis or neural foraminal narrowing. L3/L4: Mild facet hypertrophy with hypertrophy of ligamentum flavum. No   central canal stenosis. There is mild bilateral neural foraminal narrowing   more significant on the right. L4/L5: Moderate facet hypertrophy. No central canal stenosis. No   significant neural foraminal narrowing. L5/S1: No central canal stenosis. No significant neural foraminal narrowing. Impression   1. Mild bilateral neural foraminal narrowing at L3/L4 more significant on the   right. No central canal stenosis. 2. Moderate bilateral facet hypertrophy at L4/L5. 3. No fracture or malalignment. Cervical spine MRI 2018  1. Multilevel degenerative disc disease extending from C3 through C7.    Moderately severe spinal canal stenosis

## 2023-05-03 NOTE — PROGRESS NOTES
Domo Rodriguez presents to the [facility] on 5/3/2023. Car Tsang is complaining of pain back. The pain is constant. The pain is described as stabbing and sharp. Pain is rated on her best day at a 4, on her worst day at a 10, and on average at a 4 on the VAS scale. She took her last dose of oxycodone today. Car Tsang does not have issues with constipation. Any procedures since your last visit: No  She is not on NSAIDS and  is not on anticoagulation medications. Pacemaker or defibrillator: No   Medication Contract and Consent for Opioid Use Documents Filed       Patient Documents       Type of Document Status Date Received Received By Description    Medication Contract Received 7/18/2019 11:59 AM Armen SINGLETON 7/18/19 medication contract    Medication Contract Received 10/9/2020  3:01 PM LORENZO [facility] [address] [place] [address] pain pt agreement    Medication Contract Received 11/5/2021 11:29 AM Pb MUNIZ Pain Mgmt Patient Agreement 11.5.2021    Medication Contract Received 12/13/2021  4:21 PM LEONARDA PINA pain management    Consent Opioid Use Received 12/12/2022 12:23 PM ESTEFANY ARAMBULA pt agreement 12/12/22                       /70   Pulse 73   Temp 97.3 °F (36.3 °C) (Infrared)   Resp 18   Ht 4' 11\" (1.499 m)   Wt 181 lb (82.1 kg)   LMP 03/27/2008   SpO2 95%   BMI 36.56 kg/m²      Patient's last menstrual period was 03/27/2008.

## 2023-05-04 ENCOUNTER — HOSPITAL ENCOUNTER (OUTPATIENT)
Dept: INFUSION THERAPY | Age: 53
Setting detail: INFUSION SERIES
Discharge: HOME | End: 2023-05-04

## 2023-05-04 DIAGNOSIS — J45.50 SEVERE PERSISTENT ASTHMA, UNSPECIFIED WHETHER COMPLICATED: Primary | ICD-10-CM

## 2023-05-04 RX ORDER — EPINEPHRINE 1 MG/ML
0.3 INJECTION, SOLUTION, CONCENTRATE INTRAVENOUS PRN
OUTPATIENT
Start: 2023-06-01

## 2023-05-04 RX ORDER — HEPARIN SODIUM (PORCINE) LOCK FLUSH IV SOLN 100 UNIT/ML 100 UNIT/ML
500 SOLUTION INTRAVENOUS PRN
Start: 2023-06-01

## 2023-05-04 RX ORDER — DIPHENHYDRAMINE HYDROCHLORIDE 50 MG/ML
50 INJECTION INTRAMUSCULAR; INTRAVENOUS
OUTPATIENT
Start: 2023-06-01

## 2023-05-04 RX ORDER — MEPERIDINE HYDROCHLORIDE 25 MG/ML
25 INJECTION INTRAMUSCULAR; INTRAVENOUS; SUBCUTANEOUS ONCE
Start: 2023-06-01 | End: 2023-06-01

## 2023-05-04 RX ORDER — SODIUM CHLORIDE 9 MG/ML
INJECTION, SOLUTION INTRAVENOUS CONTINUOUS
OUTPATIENT
Start: 2023-06-01

## 2023-05-04 RX ORDER — ONDANSETRON 2 MG/ML
8 INJECTION INTRAMUSCULAR; INTRAVENOUS
OUTPATIENT
Start: 2023-06-01

## 2023-05-04 RX ORDER — ALBUTEROL SULFATE 90 UG/1
4 AEROSOL, METERED RESPIRATORY (INHALATION) PRN
OUTPATIENT
Start: 2023-06-01

## 2023-05-04 RX ORDER — SODIUM CHLORIDE 0.9 % (FLUSH) 0.9 %
5-40 SYRINGE (ML) INJECTION PRN
Start: 2023-06-01

## 2023-05-04 RX ORDER — ACETAMINOPHEN 325 MG/1
650 TABLET ORAL
OUTPATIENT
Start: 2023-06-01

## 2023-05-17 ENCOUNTER — HOSPITAL ENCOUNTER (OUTPATIENT)
Dept: INFUSION THERAPY | Age: 53
Setting detail: INFUSION SERIES
Discharge: HOME | End: 2023-05-17
Payer: MEDICAID

## 2023-05-17 VITALS
RESPIRATION RATE: 12 BRPM | TEMPERATURE: 97.8 F | DIASTOLIC BLOOD PRESSURE: 81 MMHG | HEART RATE: 64 BPM | OXYGEN SATURATION: 98 % | SYSTOLIC BLOOD PRESSURE: 116 MMHG

## 2023-05-17 DIAGNOSIS — J45.50 SEVERE PERSISTENT ASTHMA, UNSPECIFIED WHETHER COMPLICATED: Primary | ICD-10-CM

## 2023-05-17 PROCEDURE — 6360000002 HC RX W HCPCS: Performed by: INTERNAL MEDICINE

## 2023-05-17 PROCEDURE — 96372 THER/PROPH/DIAG INJ SC/IM: CPT

## 2023-05-17 RX ORDER — MEPERIDINE HYDROCHLORIDE 25 MG/ML
25 INJECTION INTRAMUSCULAR; INTRAVENOUS; SUBCUTANEOUS ONCE
Start: 2023-05-31 | End: 2023-05-31

## 2023-05-17 RX ORDER — ONDANSETRON 2 MG/ML
8 INJECTION INTRAMUSCULAR; INTRAVENOUS
OUTPATIENT
Start: 2023-05-31

## 2023-05-17 RX ORDER — SODIUM CHLORIDE 0.9 % (FLUSH) 0.9 %
5-40 SYRINGE (ML) INJECTION PRN
Start: 2023-05-31

## 2023-05-17 RX ORDER — ALBUTEROL SULFATE 90 UG/1
4 AEROSOL, METERED RESPIRATORY (INHALATION) PRN
OUTPATIENT
Start: 2023-05-31

## 2023-05-17 RX ORDER — SODIUM CHLORIDE 9 MG/ML
INJECTION, SOLUTION INTRAVENOUS CONTINUOUS
OUTPATIENT
Start: 2023-05-31

## 2023-05-17 RX ORDER — HEPARIN SODIUM (PORCINE) LOCK FLUSH IV SOLN 100 UNIT/ML 100 UNIT/ML
500 SOLUTION INTRAVENOUS PRN
Start: 2023-05-31

## 2023-05-17 RX ORDER — EPINEPHRINE 1 MG/ML
0.3 INJECTION, SOLUTION, CONCENTRATE INTRAVENOUS PRN
OUTPATIENT
Start: 2023-05-31

## 2023-05-17 RX ORDER — DIPHENHYDRAMINE HYDROCHLORIDE 50 MG/ML
50 INJECTION INTRAMUSCULAR; INTRAVENOUS
OUTPATIENT
Start: 2023-05-31

## 2023-05-17 RX ORDER — ACETAMINOPHEN 325 MG/1
650 TABLET ORAL
OUTPATIENT
Start: 2023-05-31

## 2023-05-17 RX ADMIN — TEZEPELUMAB-EKKO 210 MG: 210 INJECTION, SOLUTION SUBCUTANEOUS at 10:47

## 2023-05-17 ASSESSMENT — PAIN - FUNCTIONAL ASSESSMENT: PAIN_FUNCTIONAL_ASSESSMENT: PREVENTS OR INTERFERES WITH MANY ACTIVE NOT PASSIVE ACTIVITIES

## 2023-05-17 ASSESSMENT — PAIN DESCRIPTION - DESCRIPTORS: DESCRIPTORS: SHARP;BURNING;STABBING

## 2023-05-17 ASSESSMENT — PAIN DESCRIPTION - LOCATION: LOCATION: BACK;NECK

## 2023-05-17 ASSESSMENT — PAIN DESCRIPTION - ORIENTATION: ORIENTATION: MID

## 2023-05-17 ASSESSMENT — PAIN SCALES - GENERAL: PAINLEVEL_OUTOF10: 6

## 2023-05-17 ASSESSMENT — PAIN DESCRIPTION - ONSET: ONSET: GRADUAL

## 2023-05-17 ASSESSMENT — PAIN DESCRIPTION - FREQUENCY: FREQUENCY: CONTINUOUS

## 2023-05-17 ASSESSMENT — PAIN DESCRIPTION - PAIN TYPE: TYPE: CHRONIC PAIN

## 2023-05-17 NOTE — PROGRESS NOTES
Patient here for her first Tezspire injection. She speaks very little English. Her daughter is with her.  contacted and all medical question, history, medication and teaching about Tezspire done. Next appointment made. All questions answered to the patients and daughters satisfaction.

## 2023-05-17 NOTE — FLOWSHEET NOTE
Patient tolerated dinjection well. Remained on unit for 60 minutes for observation after treatment. Patient alert and oriented x3. No distress noted. Vital signs stable. Patient denies any new or worsening pain. Educated patient on possible side effects and treatment of medication. Patient verbalized understanding. Offered patient education and/or discharge material. Patient received. Patient denies any needs. All questions answered. D/C in stable condition.

## 2023-06-14 ENCOUNTER — HOSPITAL ENCOUNTER (OUTPATIENT)
Dept: INFUSION THERAPY | Age: 53
Setting detail: INFUSION SERIES
Discharge: HOME | End: 2023-06-14
Payer: MEDICAID

## 2023-06-14 VITALS
SYSTOLIC BLOOD PRESSURE: 100 MMHG | HEART RATE: 65 BPM | RESPIRATION RATE: 12 BRPM | TEMPERATURE: 98 F | OXYGEN SATURATION: 98 % | DIASTOLIC BLOOD PRESSURE: 67 MMHG

## 2023-06-14 DIAGNOSIS — J45.50 SEVERE PERSISTENT ASTHMA, UNSPECIFIED WHETHER COMPLICATED: Primary | ICD-10-CM

## 2023-06-14 PROCEDURE — 6360000002 HC RX W HCPCS

## 2023-06-14 PROCEDURE — 96372 THER/PROPH/DIAG INJ SC/IM: CPT

## 2023-06-14 RX ORDER — ONDANSETRON 2 MG/ML
8 INJECTION INTRAMUSCULAR; INTRAVENOUS
OUTPATIENT
Start: 2023-07-12

## 2023-06-14 RX ORDER — ACETAMINOPHEN 325 MG/1
650 TABLET ORAL
OUTPATIENT
Start: 2023-07-12

## 2023-06-14 RX ORDER — EPINEPHRINE 1 MG/ML
0.3 INJECTION, SOLUTION, CONCENTRATE INTRAVENOUS PRN
OUTPATIENT
Start: 2023-07-12

## 2023-06-14 RX ORDER — HEPARIN SODIUM (PORCINE) LOCK FLUSH IV SOLN 100 UNIT/ML 100 UNIT/ML
500 SOLUTION INTRAVENOUS PRN
Start: 2023-07-12

## 2023-06-14 RX ORDER — DIPHENHYDRAMINE HYDROCHLORIDE 50 MG/ML
50 INJECTION INTRAMUSCULAR; INTRAVENOUS
OUTPATIENT
Start: 2023-07-12

## 2023-06-14 RX ORDER — MEPERIDINE HYDROCHLORIDE 25 MG/ML
25 INJECTION INTRAMUSCULAR; INTRAVENOUS; SUBCUTANEOUS ONCE
Start: 2023-07-12 | End: 2023-07-12

## 2023-06-14 RX ORDER — SODIUM CHLORIDE 9 MG/ML
INJECTION, SOLUTION INTRAVENOUS CONTINUOUS
OUTPATIENT
Start: 2023-07-12

## 2023-06-14 RX ORDER — ALBUTEROL SULFATE 90 UG/1
4 AEROSOL, METERED RESPIRATORY (INHALATION) PRN
OUTPATIENT
Start: 2023-07-12

## 2023-06-14 RX ORDER — SODIUM CHLORIDE 0.9 % (FLUSH) 0.9 %
5-40 SYRINGE (ML) INJECTION PRN
Start: 2023-07-12

## 2023-06-14 NOTE — PROGRESS NOTES
Patient here for her second Tezpire injection. Contacted  services for Bengali and patient states that she had a headache and some nausea with vomiting the following day of injection. She states it was mild and only lasted one day. She states she called her PCP but was told that was a side effect. Instructed patient that was not listed as side effect but that each person is different and she could be reacting to medicine in that way. She also states that she is breathing better and wants to take the injection today. She will see Dr. Estrella Payton in two days and will let him know about side effects. Via  all questions answered to the patients satisfaction and teaching on medication given along with Bengali discharge instructions. Injection given and patient here for 40 minutes of observation. vital signs stable and no complaints noted. Patient discharged in stable condition with family member.

## 2023-06-19 ENCOUNTER — TELEPHONE (OUTPATIENT)
Dept: PULMONOLOGY | Age: 53
End: 2023-06-19

## 2023-06-19 NOTE — TELEPHONE ENCOUNTER
Mailed a letter to patient informing her that her CT Chest is scheduled for 7-10-23 at 2:30 pm at the [facility]. She must arrive by 2:00 pm. There is no prep for this test

## 2023-06-26 ENCOUNTER — HOSPITAL ENCOUNTER (OUTPATIENT)
Dept: PHYSICAL THERAPY | Age: 53
Setting detail: THERAPIES SERIES
Discharge: HOME | End: 2023-06-26
Payer: MEDICAID

## 2023-06-26 ENCOUNTER — TELEPHONE (OUTPATIENT)
Dept: PAIN MANAGEMENT | Age: 53
End: 2023-06-26

## 2023-06-26 DIAGNOSIS — M47.812 FACET ARTHROPATHY, CERVICAL: ICD-10-CM

## 2023-06-26 DIAGNOSIS — M47.816 LUMBAR FACET ARTHROPATHY: ICD-10-CM

## 2023-06-26 DIAGNOSIS — M54.12 CERVICAL RADICULOPATHY: ICD-10-CM

## 2023-06-26 DIAGNOSIS — M54.16 LUMBAR RADICULOPATHY: ICD-10-CM

## 2023-06-26 DIAGNOSIS — G89.4 CHRONIC PAIN SYNDROME: ICD-10-CM

## 2023-06-26 DIAGNOSIS — M47.812 CERVICAL FACET JOINT SYNDROME: ICD-10-CM

## 2023-06-26 PROCEDURE — 97161 PT EVAL LOW COMPLEX 20 MIN: CPT | Performed by: PHYSICAL THERAPIST

## 2023-06-26 RX ORDER — OXYCODONE HYDROCHLORIDE 5 MG/1
5 TABLET ORAL DAILY PRN
Qty: 16 TABLET | Refills: 0 | Status: SHIPPED
Start: 2023-06-26 | End: 2023-07-12

## 2023-06-26 ASSESSMENT — PAIN SCALES - GENERAL: PAINLEVEL_OUTOF10: 5

## 2023-06-26 ASSESSMENT — PAIN DESCRIPTION - PAIN TYPE: TYPE: CHRONIC PAIN

## 2023-06-26 ASSESSMENT — PAIN DESCRIPTION - DESCRIPTORS: DESCRIPTORS: SHARP

## 2023-06-26 ASSESSMENT — PAIN DESCRIPTION - LOCATION: LOCATION: NECK;BACK;HIP

## 2023-07-10 ENCOUNTER — HOSPITAL ENCOUNTER (OUTPATIENT)
Dept: CT IMAGING | Age: 53
Discharge: HOME | End: 2023-07-12
Attending: INTERNAL MEDICINE
Payer: MEDICAID

## 2023-07-10 DIAGNOSIS — R91.1 LUNG NODULE: ICD-10-CM

## 2023-07-10 DIAGNOSIS — J45.50 SEVERE PERSISTENT ASTHMA, UNSPECIFIED WHETHER COMPLICATED: ICD-10-CM

## 2023-07-10 DIAGNOSIS — Z99.89 OSA ON CPAP: ICD-10-CM

## 2023-07-10 DIAGNOSIS — G47.33 OSA ON CPAP: ICD-10-CM

## 2023-07-10 PROCEDURE — 71271 CT THORAX LUNG CANCER SCR C-: CPT

## 2023-07-11 ENCOUNTER — HOSPITAL ENCOUNTER (OUTPATIENT)
Dept: PHYSICAL THERAPY | Age: 53
Setting detail: THERAPIES SERIES
Discharge: HOME | End: 2023-07-11
Payer: MEDICAID

## 2023-07-11 PROCEDURE — 97113 AQUATIC THERAPY/EXERCISES: CPT

## 2023-07-12 ENCOUNTER — OFFICE VISIT (OUTPATIENT)
Dept: PAIN MANAGEMENT | Age: 53
End: 2023-07-12
Payer: MEDICAID

## 2023-07-12 ENCOUNTER — HOSPITAL ENCOUNTER (OUTPATIENT)
Dept: INFUSION THERAPY | Age: 53
Setting detail: INFUSION SERIES
Discharge: HOME | End: 2023-07-12
Payer: MEDICAID

## 2023-07-12 ENCOUNTER — TELEPHONE (OUTPATIENT)
Dept: CASE MANAGEMENT | Age: 53
End: 2023-07-12

## 2023-07-12 VITALS
OXYGEN SATURATION: 97 % | BODY MASS INDEX: 35.48 KG/M2 | RESPIRATION RATE: 18 BRPM | WEIGHT: 176 LBS | HEART RATE: 67 BPM | HEIGHT: 59 IN | TEMPERATURE: 97.8 F | DIASTOLIC BLOOD PRESSURE: 80 MMHG | SYSTOLIC BLOOD PRESSURE: 140 MMHG

## 2023-07-12 VITALS
SYSTOLIC BLOOD PRESSURE: 120 MMHG | TEMPERATURE: 97.7 F | DIASTOLIC BLOOD PRESSURE: 73 MMHG | RESPIRATION RATE: 18 BRPM | OXYGEN SATURATION: 98 % | HEART RATE: 63 BPM

## 2023-07-12 DIAGNOSIS — M50.90 CERVICAL DISC DISORDER: ICD-10-CM

## 2023-07-12 DIAGNOSIS — M16.12 PRIMARY OSTEOARTHRITIS OF LEFT HIP: ICD-10-CM

## 2023-07-12 DIAGNOSIS — M47.812 FACET ARTHROPATHY, CERVICAL: ICD-10-CM

## 2023-07-12 DIAGNOSIS — M79.7 FIBROMYALGIA: ICD-10-CM

## 2023-07-12 DIAGNOSIS — M48.02 CERVICAL STENOSIS OF SPINAL CANAL: ICD-10-CM

## 2023-07-12 DIAGNOSIS — G89.4 CHRONIC PAIN SYNDROME: ICD-10-CM

## 2023-07-12 DIAGNOSIS — Z79.891 ENCOUNTER FOR LONG-TERM OPIATE ANALGESIC USE: Primary | ICD-10-CM

## 2023-07-12 DIAGNOSIS — M48.02 CERVICAL STENOSIS OF SPINE: ICD-10-CM

## 2023-07-12 DIAGNOSIS — J45.50 SEVERE PERSISTENT ASTHMA, UNSPECIFIED WHETHER COMPLICATED: Primary | ICD-10-CM

## 2023-07-12 DIAGNOSIS — M54.12 CERVICAL RADICULOPATHY: ICD-10-CM

## 2023-07-12 DIAGNOSIS — M54.16 LUMBAR RADICULOPATHY: ICD-10-CM

## 2023-07-12 DIAGNOSIS — M50.30 DDD (DEGENERATIVE DISC DISEASE), CERVICAL: ICD-10-CM

## 2023-07-12 DIAGNOSIS — M47.816 LUMBAR FACET ARTHROPATHY: ICD-10-CM

## 2023-07-12 DIAGNOSIS — M51.9 LUMBAR DISC DISORDER: ICD-10-CM

## 2023-07-12 DIAGNOSIS — M47.812 CERVICAL FACET JOINT SYNDROME: ICD-10-CM

## 2023-07-12 DIAGNOSIS — M47.816 LUMBAR SPONDYLOSIS: ICD-10-CM

## 2023-07-12 PROCEDURE — 6360000002 HC RX W HCPCS: Performed by: INTERNAL MEDICINE

## 2023-07-12 PROCEDURE — G8417 CALC BMI ABV UP PARAM F/U: HCPCS | Performed by: PHYSICIAN ASSISTANT

## 2023-07-12 PROCEDURE — 96361 HYDRATE IV INFUSION ADD-ON: CPT

## 2023-07-12 PROCEDURE — 99213 OFFICE O/P EST LOW 20 MIN: CPT | Performed by: PHYSICIAN ASSISTANT

## 2023-07-12 PROCEDURE — 96372 THER/PROPH/DIAG INJ SC/IM: CPT

## 2023-07-12 PROCEDURE — G8427 DOCREV CUR MEDS BY ELIG CLIN: HCPCS | Performed by: PHYSICIAN ASSISTANT

## 2023-07-12 PROCEDURE — 3017F COLORECTAL CA SCREEN DOC REV: CPT | Performed by: PHYSICIAN ASSISTANT

## 2023-07-12 PROCEDURE — 1036F TOBACCO NON-USER: CPT | Performed by: PHYSICIAN ASSISTANT

## 2023-07-12 PROCEDURE — 2580000003 HC RX 258: Performed by: INTERNAL MEDICINE

## 2023-07-12 RX ORDER — HEPARIN SODIUM (PORCINE) LOCK FLUSH IV SOLN 100 UNIT/ML 100 UNIT/ML
500 SOLUTION INTRAVENOUS PRN
Start: 2023-08-09

## 2023-07-12 RX ORDER — ALBUTEROL SULFATE 90 UG/1
4 AEROSOL, METERED RESPIRATORY (INHALATION) PRN
OUTPATIENT
Start: 2023-08-09

## 2023-07-12 RX ORDER — MEPERIDINE HYDROCHLORIDE 25 MG/ML
25 INJECTION INTRAMUSCULAR; INTRAVENOUS; SUBCUTANEOUS ONCE
Start: 2023-08-09 | End: 2023-08-09

## 2023-07-12 RX ORDER — ACETAMINOPHEN 325 MG/1
650 TABLET ORAL
OUTPATIENT
Start: 2023-08-09

## 2023-07-12 RX ORDER — ONDANSETRON 2 MG/ML
8 INJECTION INTRAMUSCULAR; INTRAVENOUS
OUTPATIENT
Start: 2023-08-09

## 2023-07-12 RX ORDER — SODIUM CHLORIDE 9 MG/ML
INJECTION, SOLUTION INTRAVENOUS CONTINUOUS
OUTPATIENT
Start: 2023-08-09

## 2023-07-12 RX ORDER — SODIUM CHLORIDE 0.9 % (FLUSH) 0.9 %
5-40 SYRINGE (ML) INJECTION PRN
Status: DISCONTINUED | OUTPATIENT
Start: 2023-07-12 | End: 2023-07-13 | Stop reason: HOSPADM

## 2023-07-12 RX ORDER — SODIUM CHLORIDE 0.9 % (FLUSH) 0.9 %
5-40 SYRINGE (ML) INJECTION PRN
Start: 2023-08-09

## 2023-07-12 RX ORDER — EPINEPHRINE 1 MG/ML
0.3 INJECTION, SOLUTION, CONCENTRATE INTRAVENOUS PRN
OUTPATIENT
Start: 2023-08-09

## 2023-07-12 RX ORDER — OXYCODONE HYDROCHLORIDE 5 MG/1
5 TABLET ORAL DAILY PRN
Qty: 30 TABLET | Refills: 0 | Status: SHIPPED | OUTPATIENT
Start: 2023-07-12 | End: 2023-08-11

## 2023-07-12 RX ORDER — 0.9 % SODIUM CHLORIDE 0.9 %
200 INTRAVENOUS SOLUTION INTRAVENOUS ONCE
Status: COMPLETED | OUTPATIENT
Start: 2023-07-12 | End: 2023-07-12

## 2023-07-12 RX ORDER — 0.9 % SODIUM CHLORIDE 0.9 %
200 INTRAVENOUS SOLUTION INTRAVENOUS ONCE
Start: 2023-08-09 | End: 2023-08-09

## 2023-07-12 RX ORDER — DIPHENHYDRAMINE HYDROCHLORIDE 50 MG/ML
50 INJECTION INTRAMUSCULAR; INTRAVENOUS
OUTPATIENT
Start: 2023-08-09

## 2023-07-12 RX ADMIN — SODIUM CHLORIDE, PRESERVATIVE FREE 10 ML: 5 INJECTION INTRAVENOUS at 11:15

## 2023-07-12 RX ADMIN — SODIUM CHLORIDE 200 ML: 9 INJECTION, SOLUTION INTRAVENOUS at 11:30

## 2023-07-12 RX ADMIN — TEZEPELUMAB-EKKO 210 MG: 210 INJECTION, SOLUTION SUBCUTANEOUS at 11:59

## 2023-07-12 ASSESSMENT — PAIN DESCRIPTION - ONSET: ONSET: GRADUAL

## 2023-07-12 ASSESSMENT — PAIN DESCRIPTION - ORIENTATION: ORIENTATION: LEFT;POSTERIOR

## 2023-07-12 ASSESSMENT — PAIN - FUNCTIONAL ASSESSMENT: PAIN_FUNCTIONAL_ASSESSMENT: PREVENTS OR INTERFERES SOME ACTIVE ACTIVITIES AND ADLS

## 2023-07-12 ASSESSMENT — PAIN SCALES - GENERAL: PAINLEVEL_OUTOF10: 2

## 2023-07-12 ASSESSMENT — PAIN DESCRIPTION - PAIN TYPE: TYPE: CHRONIC PAIN

## 2023-07-12 ASSESSMENT — PAIN DESCRIPTION - FREQUENCY: FREQUENCY: INTERMITTENT

## 2023-07-12 ASSESSMENT — PAIN DESCRIPTION - LOCATION: LOCATION: HEAD

## 2023-07-12 ASSESSMENT — PAIN DESCRIPTION - DESCRIPTORS: DESCRIPTORS: ACHING;DULL

## 2023-07-12 NOTE — TELEPHONE ENCOUNTER
No call, encounter opened to process CT Lung Screening. CT Lung Screen: 7/10/2023    IMPRESSION:  1. There is no pulmonary infiltrate, mass or suspicious pulmonary nodule. 2. Mild emphysematous changes     LUNG RADS:  Lung-RADS 1 - Negative ()     Management:  12 month screening LDCT     RECOMMENDATIONS:  If you would like to register your patient with the [facility], please contact the Nurse Navigator at  [phone]. Pack years: 22    Social History     Tobacco Use  Smoking Status: Former Smoker    Start Date:    Quit Date: 04/20/1988   Types: Cigarettes   Packs/Day: 1   Years: 25   Pack Years: 25   Smokeless Tobacco: Never         Results letter sent to patient via my chart or mailed.      [address]

## 2023-07-12 NOTE — FLOWSHEET NOTE
Patient tolerated hydration and injection well. Remained on unit for 30 minutes after treatment. Patient alert and oriented x3. No distress noted. Vital signs stable. Patient denies any new or worsening pain. Educated patient on possible side effects and treatment of medication. Patient and daughter instructed to note whether IV hydration will help with joint pain and headache. Patient verbalized understanding. Offered patient education and/or discharge material. Patient declined. Patient denies any needs. All questions answered. D/C in stable condition.

## 2023-07-12 NOTE — PROGRESS NOTES
[address], [address] [place] [address]  [phone]    Follow up Note      Adriana Temple     Date of Visit:  2023    CC:  Patient presents for follow up   Chief Complaint   Patient presents with    Back Pain               HPI:    Pain is unchanged. No new changes. Appropriate analgesia with current medications regimen: yes. Change in quality of symptoms:no. Medication side effects:none. Recent diagnostic testing:none. Recent interventional procedures: None. She has been on anticoagulation medications to include ASA. The patient  has not been on herbal supplements. The patient is not diabetic. Imagin/15/2022 MRI lumbar spine    FINDINGS:   No fracture or malalignment. Vertebral body height and interspaces are well   maintained. Conus medullaris is visualized and is unremarkable. No evidence   of epidural mass or hematoma. No prevertebral soft tissue edema. Note made   of a incidental intraosseous hemangioma located in T11 vertebral body. L1/L2: No central canal stenosis or neural foraminal narrowing. L2/L3: No central canal stenosis or neural foraminal narrowing. L3/L4: Mild facet hypertrophy with hypertrophy of ligamentum flavum. No   central canal stenosis. There is mild bilateral neural foraminal narrowing   more significant on the right. L4/L5: Moderate facet hypertrophy. No central canal stenosis. No   significant neural foraminal narrowing. L5/S1: No central canal stenosis. No significant neural foraminal narrowing. Impression   1. Mild bilateral neural foraminal narrowing at L3/L4 more significant on the   right. No central canal stenosis. 2. Moderate bilateral facet hypertrophy at L4/L5. 3. No fracture or malalignment. Cervical spine MRI 2018  1. Multilevel degenerative disc disease extending from C3 through C7.    Moderately severe spinal canal stenosis

## 2023-07-12 NOTE — PROGRESS NOTES
Patient here for her third Tezspire injection. Using the ASN interpretation line, patient states that she gets headache twice weekly since she has started these injections she has shared this with Dr. Stephane Iqbal at her last visit. She states she is to have hydration before her injection. We have no orders for this. Called office and spoke with Adonay Foy. Orders received for IV fluids before each injection. Patient understands and IV fluids started.

## 2023-07-13 ENCOUNTER — HOSPITAL ENCOUNTER (OUTPATIENT)
Dept: GENERAL RADIOLOGY | Age: 53
Discharge: HOME | End: 2023-07-15
Attending: FAMILY MEDICINE
Payer: MEDICAID

## 2023-07-13 ENCOUNTER — HOSPITAL ENCOUNTER (OUTPATIENT)
Dept: PHYSICAL THERAPY | Age: 53
Setting detail: THERAPIES SERIES
End: 2023-07-13
Payer: MEDICAID

## 2023-07-13 DIAGNOSIS — Z12.31 SCREENING MAMMOGRAM FOR BREAST CANCER: ICD-10-CM

## 2023-07-13 DIAGNOSIS — M54.16 LUMBAR RADICULOPATHY: ICD-10-CM

## 2023-07-13 DIAGNOSIS — G89.4 CHRONIC PAIN SYNDROME: ICD-10-CM

## 2023-07-13 DIAGNOSIS — M54.12 CERVICAL RADICULOPATHY: ICD-10-CM

## 2023-07-13 DIAGNOSIS — R92.8 ABNORMAL MAMMOGRAM OF LEFT BREAST: Primary | ICD-10-CM

## 2023-07-13 DIAGNOSIS — Z79.891 ENCOUNTER FOR LONG-TERM OPIATE ANALGESIC USE: ICD-10-CM

## 2023-07-13 PROCEDURE — 77063 BREAST TOMOSYNTHESIS BI: CPT

## 2023-07-14 LAB
Lab: NORMAL
THIS TEST SENT TO: NORMAL

## 2023-07-17 ENCOUNTER — OFFICE VISIT (OUTPATIENT)
Dept: CHIROPRACTIC MEDICINE | Age: 53
End: 2023-07-17
Payer: MEDICAID

## 2023-07-17 VITALS
WEIGHT: 176 LBS | TEMPERATURE: 97.9 F | HEIGHT: 59 IN | OXYGEN SATURATION: 97 % | BODY MASS INDEX: 35.48 KG/M2 | HEART RATE: 67 BPM

## 2023-07-17 DIAGNOSIS — M47.816 SPONDYLOSIS OF LUMBAR REGION WITHOUT MYELOPATHY OR RADICULOPATHY: ICD-10-CM

## 2023-07-17 DIAGNOSIS — M99.05 SEGMENTAL AND SOMATIC DYSFUNCTION OF PELVIC REGION: ICD-10-CM

## 2023-07-17 DIAGNOSIS — M99.03 SEGMENTAL AND SOMATIC DYSFUNCTION OF LUMBAR REGION: Primary | ICD-10-CM

## 2023-07-17 DIAGNOSIS — M53.3 SACROCOCCYGEAL DISORDERS, NOT ELSEWHERE CLASSIFIED: ICD-10-CM

## 2023-07-17 PROCEDURE — 98940 CHIROPRACT MANJ 1-2 REGIONS: CPT | Performed by: CHIROPRACTOR

## 2023-07-17 PROCEDURE — G8417 CALC BMI ABV UP PARAM F/U: HCPCS | Performed by: CHIROPRACTOR

## 2023-07-17 PROCEDURE — G8427 DOCREV CUR MEDS BY ELIG CLIN: HCPCS | Performed by: CHIROPRACTOR

## 2023-07-17 PROCEDURE — 99213 OFFICE O/P EST LOW 20 MIN: CPT | Performed by: CHIROPRACTOR

## 2023-07-17 PROCEDURE — 3017F COLORECTAL CA SCREEN DOC REV: CPT | Performed by: CHIROPRACTOR

## 2023-07-17 PROCEDURE — 1036F TOBACCO NON-USER: CPT | Performed by: CHIROPRACTOR

## 2023-07-17 NOTE — PROGRESS NOTES
Patient is here for lower back in left hip pain. Patient states no injury. Patient has done injections and different therapies.  Chathamcathryn Henriquez, DO  Electronically signed by Poornima Contreras LPN on 3/71/6551 at 2:12 PM

## 2023-07-18 ENCOUNTER — HOSPITAL ENCOUNTER (OUTPATIENT)
Dept: PHYSICAL THERAPY | Age: 53
Setting detail: THERAPIES SERIES
End: 2023-07-18
Payer: MEDICAID

## 2023-07-18 PROCEDURE — 97113 AQUATIC THERAPY/EXERCISES: CPT

## 2023-07-18 NOTE — PROGRESS NOTES
Shoulder H. abd/add      Lifting    Shoulder IR/ER      Hand to opp knee    Rowing      Push down squat    Bilateral pull down      UE PNF    Push/pull      LE PNF    Push downs      Wall push ups    Arm circles      SLS    Elbow flex/ext          Chin tuck      Stretching    UT shrugs/rolls      Gastroc/Soleus    Rocking horse      Hamstring          SKTC    Other      Piriformis          Hip flexor          Ladder pull          Pec stretch          Post deltoid           Timed Code Treatment Minutes:  60    Total Treatment Minutes:  60    Treatment/Activity Tolerance:  [] Patient tolerated treatment well [x] Patient limited by fatique  [x] Patient limited by pain [] Patient limited by other medical complications  [] Other:     Prognosis: [] Good [x] Fair  [] Poor    Patient Requires Follow-up: [x] Yes  [] No    Plan:   [x] Continue per plan of care [] Alter current plan (see comments)  [] Plan of care initiated [] Hold pending MD visit [] Discharge    Treatment Charges: Mins Units   Initial Evaluation     Re-Evaluation     Ther Exercise         TE     Aquatic Treatment 60 4   Ther Activities        TA     Gait Training          GT     Neuro Re-education NR     Modalities     Non-Billable Service Time     Other     Total Time/Units 60 4         Electronically signed by:  Juana Meraz PTA [id number]

## 2023-07-20 ENCOUNTER — OFFICE VISIT (OUTPATIENT)
Dept: ORTHOPEDIC SURGERY | Age: 53
End: 2023-07-20
Payer: MEDICAID

## 2023-07-20 ENCOUNTER — HOSPITAL ENCOUNTER (OUTPATIENT)
Dept: PHYSICAL THERAPY | Age: 53
Setting detail: THERAPIES SERIES
End: 2023-07-20
Payer: MEDICAID

## 2023-07-20 VITALS — TEMPERATURE: 98 F | HEIGHT: 59 IN | WEIGHT: 176 LBS | BODY MASS INDEX: 35.48 KG/M2

## 2023-07-20 DIAGNOSIS — M89.9 BONE LESION: ICD-10-CM

## 2023-07-20 DIAGNOSIS — S70.02XA CONTUSION OF LEFT HIP, INITIAL ENCOUNTER: Primary | ICD-10-CM

## 2023-07-20 PROCEDURE — 1036F TOBACCO NON-USER: CPT | Performed by: ORTHOPAEDIC SURGERY

## 2023-07-20 PROCEDURE — G8417 CALC BMI ABV UP PARAM F/U: HCPCS | Performed by: ORTHOPAEDIC SURGERY

## 2023-07-20 PROCEDURE — G8427 DOCREV CUR MEDS BY ELIG CLIN: HCPCS | Performed by: ORTHOPAEDIC SURGERY

## 2023-07-20 PROCEDURE — 99214 OFFICE O/P EST MOD 30 MIN: CPT | Performed by: ORTHOPAEDIC SURGERY

## 2023-07-20 PROCEDURE — 3017F COLORECTAL CA SCREEN DOC REV: CPT | Performed by: ORTHOPAEDIC SURGERY

## 2023-07-20 NOTE — PROGRESS NOTES
Chief Complaint   Patient presents with    Hip Pain     Left hip pain for the past 3 years, fell 3 years ago down the stairs         HPI:    Patient is 47 y.o. female complaining of atraumatic insidious of left hip pain for 5 to 6 years. Patient denies groin pain but also states it is located along the lateral side. Patient was time states there has been no numbness but has experienced some of this in the past.  Previous treatments include rest, ice, heat, NSAIDs, HEP without much relief. ROS:    Skin: (-) rash,(-) psoriasis,(-) eczema, (-)skin cancer. Neurologic: (-)numbness, (-)tingling, (-)headaches, (-) LOC. Cardiovascular: (-) Chest pain, (-) swelling in legs/feet, (-) SOB, (-) cramping in legs/feet with walking. All other review of systems negative except stated above or in HPI      Past Medical History:   Diagnosis Date    Arthritis     Asthma     uses inhalers twice a day    Cerebral artery occlusion with cerebral infarction ([address])     2015-slight right sided weakness, ambulates normal    Cervical spinal stenosis     Chronic back pain     Chronic kidney disease     Class 2 severe obesity due to excess calories with serious comorbidity and body mass index (BMI) of 37.0 to 37.9 in adult [facility])     COVID 2021    vomiting, diarrhea, body aches, fever, cough- coulc not confirm month    Disorder of thyroid gland 09/11/2012    no medication    GERD (gastroesophageal reflux disease)     Headache(784.0)     Irritable bowel syndrome     Numbness and tingling in right hand 03/06/2020    CIERA on CPAP     uses machine    Seizure disorder ([address])     not on medications for this; last seizure 2018; etiology?     Vitamin D deficiency 12/03/2014     Past Surgical History:   Procedure Laterality Date    CHOLECYSTECTOMY, LAPAROSCOPIC N/A 05/17/2022    LAPAROSCOPIC ROBOTIC XI ASSISTED CHOLECYSTECTOMY performed by Florence Valerio MD at [address] [place] [address]    COLONOSCOPY  03/09/2016    COLONOSCOPY  07/10/2017    COLONOSCOPY N/A

## 2023-07-21 ENCOUNTER — OFFICE VISIT (OUTPATIENT)
Dept: PRIMARY CARE CLINIC | Age: 53
End: 2023-07-21
Payer: MEDICAID

## 2023-07-21 ENCOUNTER — TELEPHONE (OUTPATIENT)
Dept: GENERAL RADIOLOGY | Age: 53
End: 2023-07-21

## 2023-07-21 VITALS
TEMPERATURE: 97.7 F | DIASTOLIC BLOOD PRESSURE: 74 MMHG | OXYGEN SATURATION: 96 % | HEART RATE: 69 BPM | WEIGHT: 176 LBS | SYSTOLIC BLOOD PRESSURE: 124 MMHG | BODY MASS INDEX: 35.48 KG/M2 | HEIGHT: 59 IN

## 2023-07-21 DIAGNOSIS — R92.8 ABNORMAL MAMMOGRAM OF LEFT BREAST: Primary | ICD-10-CM

## 2023-07-21 PROCEDURE — 1036F TOBACCO NON-USER: CPT | Performed by: FAMILY MEDICINE

## 2023-07-21 PROCEDURE — G8417 CALC BMI ABV UP PARAM F/U: HCPCS | Performed by: FAMILY MEDICINE

## 2023-07-21 PROCEDURE — 3017F COLORECTAL CA SCREEN DOC REV: CPT | Performed by: FAMILY MEDICINE

## 2023-07-21 PROCEDURE — G8427 DOCREV CUR MEDS BY ELIG CLIN: HCPCS | Performed by: FAMILY MEDICINE

## 2023-07-21 PROCEDURE — 99213 OFFICE O/P EST LOW 20 MIN: CPT | Performed by: FAMILY MEDICINE

## 2023-07-21 ASSESSMENT — ENCOUNTER SYMPTOMS
WHEEZING: 0
DIARRHEA: 0
EYE PAIN: 0
APNEA: 0
RHINORRHEA: 0
ABDOMINAL PAIN: 0
BACK PAIN: 0
COUGH: 0
SINUS PRESSURE: 0
BLOOD IN STOOL: 0
COLOR CHANGE: 0
CHEST TIGHTNESS: 0
SHORTNESS OF BREATH: 0
EYE REDNESS: 0
EYE ITCHING: 0
NAUSEA: 0
VOMITING: 0
CHANGE IN BOWEL HABIT: 0
SORE THROAT: 0
CONSTIPATION: 0

## 2023-07-21 NOTE — PROGRESS NOTES
Head: Normocephalic and atraumatic. Right Ear: Hearing, tympanic membrane and external ear normal. No tenderness. No middle ear effusion. Left Ear: Hearing, tympanic membrane and external ear normal. No tenderness. No middle ear effusion. Nose: Nose normal. No congestion or rhinorrhea. Right Turbinates: Not enlarged. Left Turbinates: Not enlarged. Mouth/Throat:      Mouth: Mucous membranes are moist.      Tongue: No lesions. Pharynx: Oropharynx is clear. No oropharyngeal exudate or posterior oropharyngeal erythema. Eyes:      General: No scleral icterus. Conjunctiva/sclera: Conjunctivae normal.      Pupils: Pupils are equal, round, and reactive to light. Neck:      Thyroid: No thyromegaly. Cardiovascular:      Rate and Rhythm: Normal rate and regular rhythm. Heart sounds: Normal heart sounds. No murmur heard. Pulmonary:      Effort: Pulmonary effort is normal. No respiratory distress. Breath sounds: Normal breath sounds. No wheezing or rales. Abdominal:      General: Bowel sounds are normal. There is no distension. Palpations: Abdomen is soft. Tenderness: There is no abdominal tenderness. Musculoskeletal:         General: No tenderness. Normal range of motion. Cervical back: Normal range of motion and neck supple. No rigidity. No muscular tenderness. Lymphadenopathy:      Cervical: No cervical adenopathy. Skin:     General: Skin is warm and dry. Findings: No erythema or rash. Neurological:      General: No focal deficit present. Mental Status: She is alert and oriented to person, place, and time. Cranial Nerves: No cranial nerve deficit. Deep Tendon Reflexes: Reflexes are normal and symmetric.  Reflexes normal.   Psychiatric:         Mood and Affect: Mood normal.                               ASSESSMENT/PLAN:    Patient Active Problem List   Diagnosis    Cervical stenosis of spinal canal    DDD (degenerative disc

## 2023-07-21 NOTE — TELEPHONE ENCOUNTER
Call to patient in reference to her mammogram performed at [facility] on July 13, 2023. Instructed patient that the radiologist has recommended some additional breast imaging, in order to make a final determination/result. Verbalizes understanding and is agreeable to proceed.

## 2023-07-24 ENCOUNTER — HOSPITAL ENCOUNTER (OUTPATIENT)
Dept: PHYSICAL THERAPY | Age: 53
Setting detail: THERAPIES SERIES
Discharge: HOME | End: 2023-07-24
Payer: MEDICAID

## 2023-07-24 LAB
Lab: NORMAL
REPORT: NORMAL
THIS TEST SENT TO: NORMAL

## 2023-07-24 PROCEDURE — 97113 AQUATIC THERAPY/EXERCISES: CPT

## 2023-07-25 NOTE — PROGRESS NOTES
Florentino Elizalde  Phone: [phone] Fax: [phone]        Physical Therapy Aquatic Flow Sheet   Date       Date:  2023    Patient Name:  Enrique Canada    :  1970  Restrictions/Precautions:    Diagnosis:   Chronic pain syndrome [G89.4]  Lumbar radiculopathy [M54.16]  Cervical radiculopathy [M54.12]  Lumbar disc disorder [M51.9]  Lumbar facet arthropathy [M47.816]  Primary osteoarthritis of left hip [M16.12]  Fibromyalgia [M79.7]  Lumbar spondylosis [M47.816]  Treatment Diagnosis:    Insurance/Certification information:  [employer] / Plan: [employer] [facility] PLAN [place] / Product Type: *No Product type* /   Insurance ID: [id number] - (Medicaid Managed)  Referring Physician:  GARICA Lorenzo     PCP: Michael Garland DO  Plan of care signed (Y/N):    Visit# / total visits:    Pain level: 10/10   Electronically signed by:  Abdiel Agee PTA  Time In:1030  Time Out:1130    Subjective:Patient presents to PT with c/o left hip pain and upper back/neck pain. states symptoms have been present for approx 8 years. Reports hip pain occurred at that time due to fall down stairs. Pt ambulates without AD. No recent falls. Weakness reported across left knee at times. Pain remains across back/neck region at all times. She places pain patch and uses heating pad for pain relief.       Key  B= Belt DB= Dumbells T= Theratube   H= Hydrotone N= Noodles W= Weights   P= Paddles S= Speedo equipment K= Kickboard     Exercises/Activities   Warm-up/Amb  Minutes  Exercises  Minutes    Slow forward   5  HR/TR  5    Slow sideways  5  Marches  5    Slow backwards  5  Mini-squats  5    Medium forward    4-way SLR  5    Medium sideways    Hip circles/fig 8  5    Small shuffle    Hamstring curls  5    Jog    Knee extension  5    Braiding    Pelvic tilts  5    Bicycling  5  Scap squeezes          Shoulder flex/ext      Functional    Shoulder abd/add      Step

## 2023-07-26 ENCOUNTER — HOSPITAL ENCOUNTER (OUTPATIENT)
Dept: PHYSICAL THERAPY | Age: 53
Setting detail: THERAPIES SERIES
Discharge: HOME | End: 2023-07-26
Payer: MEDICAID

## 2023-07-27 ENCOUNTER — OFFICE VISIT (OUTPATIENT)
Dept: CHIROPRACTIC MEDICINE | Age: 53
End: 2023-07-27

## 2023-07-27 VITALS
TEMPERATURE: 97.6 F | WEIGHT: 176 LBS | BODY MASS INDEX: 35.48 KG/M2 | HEIGHT: 59 IN | OXYGEN SATURATION: 94 % | HEART RATE: 74 BPM

## 2023-07-27 DIAGNOSIS — M53.3 SACROCOCCYGEAL DISORDERS, NOT ELSEWHERE CLASSIFIED: ICD-10-CM

## 2023-07-27 DIAGNOSIS — M99.05 SEGMENTAL AND SOMATIC DYSFUNCTION OF PELVIC REGION: ICD-10-CM

## 2023-07-27 DIAGNOSIS — M99.03 SEGMENTAL AND SOMATIC DYSFUNCTION OF LUMBAR REGION: Primary | ICD-10-CM

## 2023-07-27 DIAGNOSIS — M47.816 SPONDYLOSIS OF LUMBAR REGION WITHOUT MYELOPATHY OR RADICULOPATHY: ICD-10-CM

## 2023-07-27 NOTE — PROGRESS NOTES
Patient is here for lower back pain. Patient states pain is the same. Patient has been doing pool PT and walking.  Sammi Gresham DO  Electronically signed by Caridad Gabriel LPN on 3/59/3253 at 03:60 AM

## 2023-07-28 ENCOUNTER — HOSPITAL ENCOUNTER (OUTPATIENT)
Dept: GENERAL RADIOLOGY | Age: 53
End: 2023-07-28
Attending: FAMILY MEDICINE
Payer: MEDICAID

## 2023-07-28 VITALS — WEIGHT: 179 LBS | HEIGHT: 59 IN | BODY MASS INDEX: 36.08 KG/M2

## 2023-07-28 DIAGNOSIS — R92.8 ABNORMAL MAMMOGRAM OF LEFT BREAST: ICD-10-CM

## 2023-07-28 PROCEDURE — G0279 TOMOSYNTHESIS, MAMMO: HCPCS

## 2023-07-28 PROCEDURE — 77065 DX MAMMO INCL CAD UNI: CPT

## 2023-08-03 ENCOUNTER — TELEPHONE (OUTPATIENT)
Dept: ADMINISTRATIVE | Age: 53
End: 2023-08-03

## 2023-08-07 ENCOUNTER — HOSPITAL ENCOUNTER (OUTPATIENT)
Dept: MRI IMAGING | Age: 53
Discharge: HOME | End: 2023-08-09
Attending: ORTHOPAEDIC SURGERY
Payer: MEDICAID

## 2023-08-07 ENCOUNTER — TELEPHONE (OUTPATIENT)
Dept: PULMONOLOGY | Age: 53
End: 2023-08-07

## 2023-08-07 DIAGNOSIS — R91.1 LUNG NODULE: Primary | ICD-10-CM

## 2023-08-07 DIAGNOSIS — S70.02XA CONTUSION OF LEFT HIP, INITIAL ENCOUNTER: ICD-10-CM

## 2023-08-07 PROCEDURE — 73721 MRI JNT OF LWR EXTRE W/O DYE: CPT

## 2023-08-07 NOTE — TELEPHONE ENCOUNTER
[address] [facility]  Department of Pulmonary, Critical Care and Sleep Medicine  [address] [place] [address]  Department of Internal Medicine  Office [phone]    Jolene Aburto, MPH, Adams Rosenbaum MD, MS  Mason Petersen, APRN-CNP    Today's Date:  2023  Patient Name:  Kishor Loo  Patient's :  47 y.o., 1970    Phone Message Documentaton      Dear Enma Nunez, DO    We reached out to your patient Kishor Loo 47 y.o. female was called to follow up on her CT scan. Per the last visit her pulmonary function is up to 71% of predicted previously noted to be 67%. She is however not using her PAP equipment nightly. Multiple reasons for this but none related to the machine most of it is due to poor sleep hygiene which we discussed today. We also length this poor sleep hygiene to associated asthmatic symptoms. She is now on Xolair but complains of some muscle pain postinjection so we will try prehydration to help resolve that issue. Overall she remains improved trying to do avoidance therapy with continued monitoring needed. She underwent a follow up CT scan for GGO nodules. Her CT scan on 7/10/2023 reveals  Lungs/Pleura: There are mild emphysematous changes. There is no pulmonary infiltrate, mass or suspicious pulmonary nodule. There is no pleural thickening or pleural effusion. Upper Abdomen:   Limited images of the upper abdomen demonstrate no acute abnormality. There is a benign cyst seen within the right hepatic lobe which measures 2.8 cm. Soft Tissues/Bones:  Age related degenerative changes of the visualized osseous structures without focal destructive lesion. Follow up:  [x] Treatment Plan: Keep follow up. Contineu Asthma treatment. Call office so we have numbers to reach her in an emergency.  Email was sent to patient    Latrell Polanco  Professor of Internal

## 2023-08-09 ENCOUNTER — HOSPITAL ENCOUNTER (OUTPATIENT)
Dept: INFUSION THERAPY | Age: 53
Setting detail: INFUSION SERIES
Discharge: HOME | End: 2023-08-09
Payer: MEDICAID

## 2023-08-09 VITALS
SYSTOLIC BLOOD PRESSURE: 93 MMHG | OXYGEN SATURATION: 98 % | DIASTOLIC BLOOD PRESSURE: 63 MMHG | HEART RATE: 71 BPM | TEMPERATURE: 97.5 F | RESPIRATION RATE: 16 BRPM

## 2023-08-09 DIAGNOSIS — J45.50 SEVERE PERSISTENT ASTHMA, UNSPECIFIED WHETHER COMPLICATED: Primary | ICD-10-CM

## 2023-08-09 PROCEDURE — 96372 THER/PROPH/DIAG INJ SC/IM: CPT

## 2023-08-09 PROCEDURE — 6360000002 HC RX W HCPCS: Performed by: INTERNAL MEDICINE

## 2023-08-09 PROCEDURE — 96360 HYDRATION IV INFUSION INIT: CPT

## 2023-08-09 PROCEDURE — 2580000003 HC RX 258: Performed by: INTERNAL MEDICINE

## 2023-08-09 RX ORDER — SODIUM CHLORIDE 9 MG/ML
INJECTION, SOLUTION INTRAVENOUS CONTINUOUS
OUTPATIENT
Start: 2023-09-06

## 2023-08-09 RX ORDER — 0.9 % SODIUM CHLORIDE 0.9 %
200 INTRAVENOUS SOLUTION INTRAVENOUS ONCE
Status: COMPLETED | OUTPATIENT
Start: 2023-08-09 | End: 2023-08-09

## 2023-08-09 RX ORDER — 0.9 % SODIUM CHLORIDE 0.9 %
200 INTRAVENOUS SOLUTION INTRAVENOUS ONCE
Start: 2023-09-06 | End: 2023-09-06

## 2023-08-09 RX ORDER — ONDANSETRON 2 MG/ML
8 INJECTION INTRAMUSCULAR; INTRAVENOUS
OUTPATIENT
Start: 2023-09-06

## 2023-08-09 RX ORDER — SODIUM CHLORIDE 0.9 % (FLUSH) 0.9 %
5-40 SYRINGE (ML) INJECTION PRN
Status: DISCONTINUED | OUTPATIENT
Start: 2023-08-09 | End: 2023-08-10 | Stop reason: HOSPADM

## 2023-08-09 RX ORDER — ACETAMINOPHEN 325 MG/1
650 TABLET ORAL
OUTPATIENT
Start: 2023-09-06

## 2023-08-09 RX ORDER — MEPERIDINE HYDROCHLORIDE 25 MG/ML
25 INJECTION INTRAMUSCULAR; INTRAVENOUS; SUBCUTANEOUS ONCE
Start: 2023-09-06 | End: 2023-09-06

## 2023-08-09 RX ORDER — ALBUTEROL SULFATE 90 UG/1
4 AEROSOL, METERED RESPIRATORY (INHALATION) PRN
OUTPATIENT
Start: 2023-09-06

## 2023-08-09 RX ORDER — HEPARIN SODIUM (PORCINE) LOCK FLUSH IV SOLN 100 UNIT/ML 100 UNIT/ML
500 SOLUTION INTRAVENOUS PRN
Start: 2023-09-06

## 2023-08-09 RX ORDER — HEPARIN SODIUM (PORCINE) LOCK FLUSH IV SOLN 100 UNIT/ML 100 UNIT/ML
500 SOLUTION INTRAVENOUS PRN
Status: DISCONTINUED | OUTPATIENT
Start: 2023-08-09 | End: 2023-08-10 | Stop reason: HOSPADM

## 2023-08-09 RX ORDER — EPINEPHRINE 1 MG/ML
0.3 INJECTION, SOLUTION, CONCENTRATE INTRAVENOUS PRN
OUTPATIENT
Start: 2023-09-06

## 2023-08-09 RX ORDER — DIPHENHYDRAMINE HYDROCHLORIDE 50 MG/ML
50 INJECTION INTRAMUSCULAR; INTRAVENOUS
OUTPATIENT
Start: 2023-09-06

## 2023-08-09 RX ORDER — SODIUM CHLORIDE 0.9 % (FLUSH) 0.9 %
5-40 SYRINGE (ML) INJECTION PRN
Start: 2023-09-06

## 2023-08-09 RX ADMIN — SODIUM CHLORIDE, PRESERVATIVE FREE 10 ML: 5 INJECTION INTRAVENOUS at 11:23

## 2023-08-09 RX ADMIN — TEZEPELUMAB-EKKO 210 MG: 210 INJECTION, SOLUTION SUBCUTANEOUS at 11:58

## 2023-08-09 RX ADMIN — SODIUM CHLORIDE 200 ML: 9 INJECTION, SOLUTION INTRAVENOUS at 11:25

## 2023-08-09 RX ADMIN — SODIUM CHLORIDE, PRESERVATIVE FREE 10 ML: 5 INJECTION INTRAVENOUS at 11:21

## 2023-08-09 ASSESSMENT — PAIN DESCRIPTION - ORIENTATION: ORIENTATION: RIGHT;LEFT

## 2023-08-09 ASSESSMENT — PAIN DESCRIPTION - PAIN TYPE: TYPE: ACUTE PAIN

## 2023-08-09 ASSESSMENT — PAIN DESCRIPTION - ONSET: ONSET: ON-GOING

## 2023-08-09 ASSESSMENT — PAIN DESCRIPTION - FREQUENCY: FREQUENCY: CONTINUOUS

## 2023-08-09 ASSESSMENT — PAIN DESCRIPTION - DESCRIPTORS: DESCRIPTORS: ACHING;PRESSURE

## 2023-08-09 ASSESSMENT — PAIN - FUNCTIONAL ASSESSMENT: PAIN_FUNCTIONAL_ASSESSMENT: ACTIVITIES ARE NOT PREVENTED

## 2023-08-09 ASSESSMENT — PAIN SCALES - GENERAL: PAINLEVEL_OUTOF10: 3

## 2023-08-09 ASSESSMENT — PAIN DESCRIPTION - LOCATION: LOCATION: HEAD

## 2023-08-09 NOTE — PROGRESS NOTES
Patient tolerated normal saline infusion and tezspire injection well. Remained on unit for 25 minutes after treatment. Patient alert and oriented x3. No distress noted. Vital signs stable. Patient denies any new or worsening pain. Offered patient education and/or discharge material. Patient declined. Patient denies any needs. All questions answered. D/C in stable condition.

## 2023-08-14 ENCOUNTER — OFFICE VISIT (OUTPATIENT)
Dept: PAIN MANAGEMENT | Age: 53
End: 2023-08-14
Payer: MEDICAID

## 2023-08-14 VITALS
SYSTOLIC BLOOD PRESSURE: 110 MMHG | DIASTOLIC BLOOD PRESSURE: 70 MMHG | WEIGHT: 179 LBS | HEART RATE: 75 BPM | BODY MASS INDEX: 36.08 KG/M2 | OXYGEN SATURATION: 96 % | HEIGHT: 59 IN | RESPIRATION RATE: 18 BRPM | TEMPERATURE: 97.7 F

## 2023-08-14 DIAGNOSIS — Z79.891 ENCOUNTER FOR LONG-TERM OPIATE ANALGESIC USE: ICD-10-CM

## 2023-08-14 DIAGNOSIS — M54.16 LUMBAR RADICULOPATHY: ICD-10-CM

## 2023-08-14 DIAGNOSIS — M47.816 LUMBAR FACET ARTHROPATHY: ICD-10-CM

## 2023-08-14 DIAGNOSIS — M50.30 DDD (DEGENERATIVE DISC DISEASE), CERVICAL: ICD-10-CM

## 2023-08-14 DIAGNOSIS — M79.7 FIBROMYALGIA: ICD-10-CM

## 2023-08-14 DIAGNOSIS — M47.812 CERVICAL FACET JOINT SYNDROME: ICD-10-CM

## 2023-08-14 DIAGNOSIS — M51.9 LUMBAR DISC DISORDER: ICD-10-CM

## 2023-08-14 DIAGNOSIS — M16.12 PRIMARY OSTEOARTHRITIS OF LEFT HIP: ICD-10-CM

## 2023-08-14 DIAGNOSIS — G89.4 CHRONIC PAIN SYNDROME: Primary | ICD-10-CM

## 2023-08-14 DIAGNOSIS — M47.812 FACET ARTHROPATHY, CERVICAL: ICD-10-CM

## 2023-08-14 DIAGNOSIS — M47.816 LUMBAR SPONDYLOSIS: ICD-10-CM

## 2023-08-14 DIAGNOSIS — M54.12 CERVICAL RADICULOPATHY: ICD-10-CM

## 2023-08-14 PROCEDURE — 1036F TOBACCO NON-USER: CPT | Performed by: PHYSICIAN ASSISTANT

## 2023-08-14 PROCEDURE — 3017F COLORECTAL CA SCREEN DOC REV: CPT | Performed by: PHYSICIAN ASSISTANT

## 2023-08-14 PROCEDURE — 99213 OFFICE O/P EST LOW 20 MIN: CPT | Performed by: PHYSICIAN ASSISTANT

## 2023-08-14 PROCEDURE — G8417 CALC BMI ABV UP PARAM F/U: HCPCS | Performed by: PHYSICIAN ASSISTANT

## 2023-08-14 PROCEDURE — G8427 DOCREV CUR MEDS BY ELIG CLIN: HCPCS | Performed by: PHYSICIAN ASSISTANT

## 2023-08-14 RX ORDER — LIDOCAINE 50 MG/G
1 PATCH TOPICAL DAILY
Qty: 30 PATCH | Refills: 0 | Status: SHIPPED | OUTPATIENT
Start: 2023-08-14 | End: 2023-09-13

## 2023-08-14 RX ORDER — OXYCODONE HYDROCHLORIDE 5 MG/1
5 TABLET ORAL DAILY PRN
Qty: 30 TABLET | Refills: 0 | Status: SHIPPED | OUTPATIENT
Start: 2023-08-14 | End: 2023-09-13

## 2023-08-14 NOTE — PROGRESS NOTES
[address], [address] [place] [address]  [phone]    Follow up Note      Levorn Levo     Date of Visit:  2023    CC:  Patient presents for follow up   Chief Complaint   Patient presents with    Back Pain               HPI:    Pain is unchanged. No new changes. Appropriate analgesia with current medications regimen: yes. Change in quality of symptoms:no. Medication side effects:none. Recent diagnostic testing:none. Recent interventional procedures: None. She has been on anticoagulation medications to include ASA. The patient  has not been on herbal supplements. The patient is not diabetic. Imagin/15/2022 MRI lumbar spine    FINDINGS:   No fracture or malalignment. Vertebral body height and interspaces are well   maintained. Conus medullaris is visualized and is unremarkable. No evidence   of epidural mass or hematoma. No prevertebral soft tissue edema. Note made   of a incidental intraosseous hemangioma located in T11 vertebral body. L1/L2: No central canal stenosis or neural foraminal narrowing. L2/L3: No central canal stenosis or neural foraminal narrowing. L3/L4: Mild facet hypertrophy with hypertrophy of ligamentum flavum. No   central canal stenosis. There is mild bilateral neural foraminal narrowing   more significant on the right. L4/L5: Moderate facet hypertrophy. No central canal stenosis. No   significant neural foraminal narrowing. L5/S1: No central canal stenosis. No significant neural foraminal narrowing. Impression   1. Mild bilateral neural foraminal narrowing at L3/L4 more significant on the   right. No central canal stenosis. 2. Moderate bilateral facet hypertrophy at L4/L5. 3. No fracture or malalignment. Cervical spine MRI 2018  1. Multilevel degenerative disc disease extending from C3 through C7.    Moderately severe spinal canal stenosis

## 2023-08-24 LAB — TEST NAME: NORMAL

## 2023-08-28 ENCOUNTER — OFFICE VISIT (OUTPATIENT)
Dept: FAMILY MEDICINE CLINIC | Age: 53
End: 2023-08-28
Payer: MEDICAID

## 2023-08-28 VITALS
HEART RATE: 79 BPM | DIASTOLIC BLOOD PRESSURE: 78 MMHG | OXYGEN SATURATION: 98 % | BODY MASS INDEX: 35.14 KG/M2 | SYSTOLIC BLOOD PRESSURE: 120 MMHG | TEMPERATURE: 97.3 F | WEIGHT: 174 LBS

## 2023-08-28 DIAGNOSIS — J04.0 ACUTE LARYNGITIS: ICD-10-CM

## 2023-08-28 DIAGNOSIS — J02.0 STREP PHARYNGITIS: Primary | ICD-10-CM

## 2023-08-28 DIAGNOSIS — R09.81 NASAL CONGESTION: ICD-10-CM

## 2023-08-28 DIAGNOSIS — R07.0 THROAT PAIN: ICD-10-CM

## 2023-08-28 LAB — S PYO AG THROAT QL: POSITIVE

## 2023-08-28 PROCEDURE — G8427 DOCREV CUR MEDS BY ELIG CLIN: HCPCS | Performed by: PHYSICIAN ASSISTANT

## 2023-08-28 PROCEDURE — G8417 CALC BMI ABV UP PARAM F/U: HCPCS | Performed by: PHYSICIAN ASSISTANT

## 2023-08-28 PROCEDURE — 3017F COLORECTAL CA SCREEN DOC REV: CPT | Performed by: PHYSICIAN ASSISTANT

## 2023-08-28 PROCEDURE — 99213 OFFICE O/P EST LOW 20 MIN: CPT | Performed by: PHYSICIAN ASSISTANT

## 2023-08-28 PROCEDURE — 1036F TOBACCO NON-USER: CPT | Performed by: PHYSICIAN ASSISTANT

## 2023-08-28 PROCEDURE — 87880 STREP A ASSAY W/OPTIC: CPT | Performed by: PHYSICIAN ASSISTANT

## 2023-08-28 RX ORDER — PREDNISONE 10 MG/1
TABLET ORAL
Qty: 18 TABLET | Refills: 0 | Status: SHIPPED | OUTPATIENT
Start: 2023-08-28

## 2023-08-28 RX ORDER — AMOXICILLIN AND CLAVULANATE POTASSIUM 875; 125 MG/1; MG/1
1 TABLET, FILM COATED ORAL 2 TIMES DAILY
Qty: 20 TABLET | Refills: 0 | Status: SHIPPED | OUTPATIENT
Start: 2023-08-28 | End: 2023-09-07

## 2023-08-28 NOTE — PROGRESS NOTES
23  Larry Morris : 1970 Sex: female  Age 47 y.o. Subjective:  Chief Complaint   Patient presents with    Otalgia     Left ear         HPI:   Larry Morris , 47 y.o. female presents to express care for evaluation of left ear pain, throat pain, swollen glands, hoarse voice    HPI  50-year-old female presents to express care for evaluation of left ear pain, throat pain, swollen glands, hoarse voice. Patient said the symptoms ongoing for 2 days. No fever, chills. No chest pain, shortness of breath. The patient is not currently on any antibiotics. The patient is not having any lightheadedness, dizziness. The patient is not currently on any antibiotics. The patient did start to lose her voice. She denies any difficulty swallowing. No sick contacts around. She is not coughing. ROS:   Unless otherwise stated in this report the patient's positive and negative responses for review of systems for constitutional, eyes, ENT, cardiovascular, respiratory, gastrointestinal, neurological, , musculoskeletal, and integument systems and related systems to the presenting problem are either stated in the history of present illness or were not pertinent or were negative for the symptoms and/or complaints related to the presenting medical problem. Positives and pertinent negatives as per HPI. All others reviewed and are negative.       PMH:     Past Medical History:   Diagnosis Date    Arthritis     Asthma     uses inhalers twice a day    Cerebral artery occlusion with cerebral infarction ([address])     -slight right sided weakness, ambulates normal    Cervical spinal stenosis     Chronic back pain     Chronic kidney disease     Class 2 severe obesity due to excess calories with serious comorbidity and body mass index (BMI) of 37.0 to 37.9 in adult [facility])     COVID     vomiting, diarrhea, body aches, fever, cough- coulc not confirm month    Disorder of thyroid gland 2012    no medication

## 2023-09-07 ENCOUNTER — HOSPITAL ENCOUNTER (OUTPATIENT)
Dept: INFUSION THERAPY | Age: 53
Setting detail: INFUSION SERIES
Discharge: HOME | End: 2023-09-07
Payer: MEDICAID

## 2023-09-07 VITALS
SYSTOLIC BLOOD PRESSURE: 103 MMHG | RESPIRATION RATE: 16 BRPM | TEMPERATURE: 97.4 F | DIASTOLIC BLOOD PRESSURE: 66 MMHG | HEART RATE: 80 BPM | OXYGEN SATURATION: 98 %

## 2023-09-07 DIAGNOSIS — J45.50 SEVERE PERSISTENT ASTHMA, UNSPECIFIED WHETHER COMPLICATED: Primary | ICD-10-CM

## 2023-09-07 PROCEDURE — 96372 THER/PROPH/DIAG INJ SC/IM: CPT

## 2023-09-07 PROCEDURE — 2580000003 HC RX 258: Performed by: INTERNAL MEDICINE

## 2023-09-07 PROCEDURE — 96375 TX/PRO/DX INJ NEW DRUG ADDON: CPT

## 2023-09-07 PROCEDURE — 6360000002 HC RX W HCPCS: Performed by: INTERNAL MEDICINE

## 2023-09-07 PROCEDURE — 96360 HYDRATION IV INFUSION INIT: CPT

## 2023-09-07 RX ORDER — SODIUM CHLORIDE 9 MG/ML
INJECTION, SOLUTION INTRAVENOUS CONTINUOUS
OUTPATIENT
Start: 2023-10-05

## 2023-09-07 RX ORDER — SODIUM CHLORIDE 0.9 % (FLUSH) 0.9 %
5-40 SYRINGE (ML) INJECTION PRN
Start: 2023-10-05

## 2023-09-07 RX ORDER — SODIUM CHLORIDE 0.9 % (FLUSH) 0.9 %
5-40 SYRINGE (ML) INJECTION PRN
Status: DISCONTINUED | OUTPATIENT
Start: 2023-09-07 | End: 2023-09-08 | Stop reason: HOSPADM

## 2023-09-07 RX ORDER — 0.9 % SODIUM CHLORIDE 0.9 %
200 INTRAVENOUS SOLUTION INTRAVENOUS ONCE
Start: 2023-10-05 | End: 2023-10-05

## 2023-09-07 RX ORDER — MEPERIDINE HYDROCHLORIDE 25 MG/ML
25 INJECTION INTRAMUSCULAR; INTRAVENOUS; SUBCUTANEOUS ONCE
Start: 2023-10-05 | End: 2023-10-05

## 2023-09-07 RX ORDER — ACETAMINOPHEN 325 MG/1
650 TABLET ORAL
OUTPATIENT
Start: 2023-10-05

## 2023-09-07 RX ORDER — ONDANSETRON 2 MG/ML
8 INJECTION INTRAMUSCULAR; INTRAVENOUS
OUTPATIENT
Start: 2023-10-05

## 2023-09-07 RX ORDER — HEPARIN SODIUM (PORCINE) LOCK FLUSH IV SOLN 100 UNIT/ML 100 UNIT/ML
500 SOLUTION INTRAVENOUS PRN
Start: 2023-10-05

## 2023-09-07 RX ORDER — 0.9 % SODIUM CHLORIDE 0.9 %
200 INTRAVENOUS SOLUTION INTRAVENOUS ONCE
Status: COMPLETED | OUTPATIENT
Start: 2023-09-07 | End: 2023-09-07

## 2023-09-07 RX ORDER — ALBUTEROL SULFATE 90 UG/1
4 AEROSOL, METERED RESPIRATORY (INHALATION) PRN
OUTPATIENT
Start: 2023-10-05

## 2023-09-07 RX ORDER — DIPHENHYDRAMINE HYDROCHLORIDE 50 MG/ML
50 INJECTION INTRAMUSCULAR; INTRAVENOUS
OUTPATIENT
Start: 2023-10-05

## 2023-09-07 RX ORDER — EPINEPHRINE 1 MG/ML
0.3 INJECTION, SOLUTION, CONCENTRATE INTRAVENOUS PRN
OUTPATIENT
Start: 2023-10-05

## 2023-09-07 RX ADMIN — TEZEPELUMAB-EKKO 210 MG: 210 INJECTION, SOLUTION SUBCUTANEOUS at 12:02

## 2023-09-07 RX ADMIN — SODIUM CHLORIDE 200 ML: 9 INJECTION, SOLUTION INTRAVENOUS at 11:21

## 2023-09-07 RX ADMIN — SODIUM CHLORIDE, PRESERVATIVE FREE 10 ML: 5 INJECTION INTRAVENOUS at 11:19

## 2023-09-07 ASSESSMENT — PAIN DESCRIPTION - ONSET: ONSET: ON-GOING

## 2023-09-07 ASSESSMENT — PAIN DESCRIPTION - PAIN TYPE: TYPE: ACUTE PAIN

## 2023-09-07 ASSESSMENT — PAIN DESCRIPTION - LOCATION: LOCATION: WRIST;ANKLE

## 2023-09-07 ASSESSMENT — PAIN DESCRIPTION - DESCRIPTORS: DESCRIPTORS: SPASM

## 2023-09-07 ASSESSMENT — PAIN SCALES - GENERAL: PAINLEVEL_OUTOF10: 6

## 2023-09-07 ASSESSMENT — PAIN DESCRIPTION - ORIENTATION: ORIENTATION: LEFT

## 2023-09-07 ASSESSMENT — PAIN DESCRIPTION - FREQUENCY: FREQUENCY: CONTINUOUS

## 2023-09-07 ASSESSMENT — PAIN - FUNCTIONAL ASSESSMENT: PAIN_FUNCTIONAL_ASSESSMENT: ACTIVITIES ARE NOT PREVENTED

## 2023-09-07 NOTE — FLOWSHEET NOTE
Patient tolerated infusion well. Remained on unit for 20 minutes after transfusion. Patient alert and oriented x3. No distress noted. Vital signs stable. Patient denies any new or worsening pain. Educated patient on possible side effects and treatment of medication. Patient verbalized understanding. Offered patient education and/or discharge material. Patient  declined. Patient denies any needs. All questions answered. D/C in stable condition.

## 2023-09-12 ENCOUNTER — OFFICE VISIT (OUTPATIENT)
Dept: PAIN MANAGEMENT | Age: 53
End: 2023-09-12
Payer: MEDICAID

## 2023-09-12 VITALS
WEIGHT: 174 LBS | DIASTOLIC BLOOD PRESSURE: 84 MMHG | RESPIRATION RATE: 18 BRPM | OXYGEN SATURATION: 94 % | HEART RATE: 76 BPM | BODY MASS INDEX: 35.08 KG/M2 | HEIGHT: 59 IN | SYSTOLIC BLOOD PRESSURE: 120 MMHG | TEMPERATURE: 97.5 F

## 2023-09-12 DIAGNOSIS — M47.812 FACET ARTHROPATHY, CERVICAL: ICD-10-CM

## 2023-09-12 DIAGNOSIS — M47.816 LUMBAR FACET ARTHROPATHY: ICD-10-CM

## 2023-09-12 DIAGNOSIS — M79.7 FIBROMYALGIA: ICD-10-CM

## 2023-09-12 DIAGNOSIS — M47.812 CERVICAL FACET JOINT SYNDROME: ICD-10-CM

## 2023-09-12 DIAGNOSIS — M51.9 LUMBAR DISC DISORDER: ICD-10-CM

## 2023-09-12 DIAGNOSIS — M54.12 CERVICAL RADICULOPATHY: ICD-10-CM

## 2023-09-12 DIAGNOSIS — M16.12 PRIMARY OSTEOARTHRITIS OF LEFT HIP: ICD-10-CM

## 2023-09-12 DIAGNOSIS — M54.16 LUMBAR RADICULOPATHY: ICD-10-CM

## 2023-09-12 DIAGNOSIS — M47.816 LUMBAR SPONDYLOSIS: ICD-10-CM

## 2023-09-12 DIAGNOSIS — M50.30 DDD (DEGENERATIVE DISC DISEASE), CERVICAL: ICD-10-CM

## 2023-09-12 DIAGNOSIS — G89.4 CHRONIC PAIN SYNDROME: Primary | ICD-10-CM

## 2023-09-12 PROCEDURE — 99213 OFFICE O/P EST LOW 20 MIN: CPT | Performed by: PHYSICIAN ASSISTANT

## 2023-09-12 RX ORDER — OXYCODONE HYDROCHLORIDE 5 MG/1
5 TABLET ORAL DAILY PRN
Qty: 30 TABLET | Refills: 0 | Status: SHIPPED | OUTPATIENT
Start: 2023-09-13 | End: 2023-10-13

## 2023-09-19 ENCOUNTER — OFFICE VISIT (OUTPATIENT)
Dept: PRIMARY CARE CLINIC | Age: 53
End: 2023-09-19
Payer: MEDICAID

## 2023-09-19 VITALS
BODY MASS INDEX: 35.88 KG/M2 | TEMPERATURE: 97.8 F | HEART RATE: 77 BPM | DIASTOLIC BLOOD PRESSURE: 76 MMHG | WEIGHT: 178 LBS | OXYGEN SATURATION: 97 % | SYSTOLIC BLOOD PRESSURE: 130 MMHG | HEIGHT: 59 IN

## 2023-09-19 DIAGNOSIS — K62.5 RECTAL BLEED: ICD-10-CM

## 2023-09-19 DIAGNOSIS — R10.84 GENERALIZED ABDOMINAL PAIN: ICD-10-CM

## 2023-09-19 DIAGNOSIS — K59.00 CONSTIPATION, UNSPECIFIED CONSTIPATION TYPE: ICD-10-CM

## 2023-09-19 DIAGNOSIS — K62.5 RECTAL BLEED: Primary | ICD-10-CM

## 2023-09-19 DIAGNOSIS — K64.9 HEMORRHOIDS, UNSPECIFIED HEMORRHOID TYPE: ICD-10-CM

## 2023-09-19 LAB
ALBUMIN SERPL-MCNC: 4.7 G/DL (ref 3.5–5.2)
ALP BLD-CCNC: 103 U/L (ref 35–104)
ALT SERPL-CCNC: 63 U/L (ref 0–32)
ANION GAP SERPL CALCULATED.3IONS-SCNC: 15 MMOL/L (ref 7–16)
AST SERPL-CCNC: 30 U/L (ref 0–31)
BILIRUB SERPL-MCNC: 0.7 MG/DL (ref 0–1.2)
BUN BLDV-MCNC: 14 MG/DL (ref 6–20)
CALCIUM SERPL-MCNC: 10.1 MG/DL (ref 8.6–10.2)
CHLORIDE BLD-SCNC: 101 MMOL/L (ref 98–107)
CO2: 24 MMOL/L (ref 22–29)
CREAT SERPL-MCNC: 0.9 MG/DL (ref 0.5–1)
FERRITIN: 277 NG/ML
GFR SERPL CREATININE-BSD FRML MDRD: >60 ML/MIN/1.73M2
GLUCOSE BLD-MCNC: 100 MG/DL (ref 74–99)
HCT VFR BLD CALC: 47.4 % (ref 34–48)
HEMOGLOBIN: 14.9 G/DL (ref 11.5–15.5)
IRON SATURATION: 25 % (ref 15–50)
IRON: 75 UG/DL (ref 37–145)
LIPASE: 30 U/L (ref 13–60)
MCH RBC QN AUTO: 28 PG (ref 26–35)
MCHC RBC AUTO-ENTMCNC: 31.4 G/DL (ref 32–34.5)
MCV RBC AUTO: 89.1 FL (ref 80–99.9)
PDW BLD-RTO: 13.1 % (ref 11.5–15)
PLATELET # BLD: 267 K/UL (ref 130–450)
PMV BLD AUTO: 10.7 FL (ref 7–12)
POTASSIUM SERPL-SCNC: 4.7 MMOL/L (ref 3.5–5)
RBC # BLD: 5.32 M/UL (ref 3.5–5.5)
SODIUM BLD-SCNC: 140 MMOL/L (ref 132–146)
TOTAL IRON BINDING CAPACITY: 306 UG/DL (ref 250–450)
TOTAL PROTEIN: 7.8 G/DL (ref 6.4–8.3)
TSH SERPL DL<=0.05 MIU/L-ACNC: 0.62 UIU/ML (ref 0.27–4.2)
WBC # BLD: 5.8 K/UL (ref 4.5–11.5)

## 2023-09-19 PROCEDURE — G8417 CALC BMI ABV UP PARAM F/U: HCPCS | Performed by: FAMILY MEDICINE

## 2023-09-19 PROCEDURE — 1036F TOBACCO NON-USER: CPT | Performed by: FAMILY MEDICINE

## 2023-09-19 PROCEDURE — G8427 DOCREV CUR MEDS BY ELIG CLIN: HCPCS | Performed by: FAMILY MEDICINE

## 2023-09-19 PROCEDURE — 3017F COLORECTAL CA SCREEN DOC REV: CPT | Performed by: FAMILY MEDICINE

## 2023-09-19 PROCEDURE — 99214 OFFICE O/P EST MOD 30 MIN: CPT | Performed by: FAMILY MEDICINE

## 2023-09-19 RX ORDER — MELATONIN
1 DAILY
Qty: 30 TABLET | Refills: 5 | Status: SHIPPED | OUTPATIENT
Start: 2023-09-19

## 2023-09-19 RX ORDER — POLYETHYLENE GLYCOL 3350 17 G/17G
17 POWDER, FOR SOLUTION ORAL DAILY
Qty: 1530 G | Refills: 1 | Status: SHIPPED | OUTPATIENT
Start: 2023-09-19

## 2023-09-19 RX ORDER — DOCUSATE SODIUM 100 MG/1
100 CAPSULE, LIQUID FILLED ORAL 2 TIMES DAILY
Qty: 60 CAPSULE | Refills: 0 | Status: SHIPPED | OUTPATIENT
Start: 2023-09-19 | End: 2023-10-19

## 2023-09-19 RX ORDER — VITAMIN A ACETATE, BETA CAROTENE, ASCORBIC ACID, CHOLECALCIFEROL, .ALPHA.-TOCOPHEROL ACETATE, DL-, THIAMINE MONONITRATE, RIBOFLAVIN, NIACINAMIDE, PYRIDOXINE HYDROCHLORIDE, FOLIC ACID, CYANOCOBALAMIN, CALCIUM CARBONATE, FERROUS FUMARATE, ZINC OXIDE, CUPRIC OXIDE 3080; 12; 120; 400; 1; 1.84; 3; 20; 22; 920; 25; 200; 27; 10; 2 [IU]/1; UG/1; MG/1; [IU]/1; MG/1; MG/1; MG/1; MG/1; MG/1; [IU]/1; MG/1; MG/1; MG/1; MG/1; MG/1
TABLET, FILM COATED ORAL
Qty: 30 TABLET | Refills: 5 | Status: SHIPPED | OUTPATIENT
Start: 2023-09-19

## 2023-09-19 SDOH — ECONOMIC STABILITY: FOOD INSECURITY: WITHIN THE PAST 12 MONTHS, YOU WORRIED THAT YOUR FOOD WOULD RUN OUT BEFORE YOU GOT MONEY TO BUY MORE.: NEVER TRUE

## 2023-09-19 SDOH — ECONOMIC STABILITY: HOUSING INSECURITY
IN THE LAST 12 MONTHS, WAS THERE A TIME WHEN YOU DID NOT HAVE A STEADY PLACE TO SLEEP OR SLEPT IN A SHELTER (INCLUDING NOW)?: NO

## 2023-09-19 SDOH — ECONOMIC STABILITY: INCOME INSECURITY: HOW HARD IS IT FOR YOU TO PAY FOR THE VERY BASICS LIKE FOOD, HOUSING, MEDICAL CARE, AND HEATING?: NOT HARD AT ALL

## 2023-09-19 SDOH — ECONOMIC STABILITY: FOOD INSECURITY: WITHIN THE PAST 12 MONTHS, THE FOOD YOU BOUGHT JUST DIDN'T LAST AND YOU DIDN'T HAVE MONEY TO GET MORE.: NEVER TRUE

## 2023-09-19 ASSESSMENT — ENCOUNTER SYMPTOMS
HEMATOCHEZIA: 1
RHINORRHEA: 0
ABDOMINAL PAIN: 1
BLOOD IN STOOL: 0
NAUSEA: 0
EYE REDNESS: 0
SORE THROAT: 0
APNEA: 0
HEMATEMESIS: 0
CONSTIPATION: 1
RECTAL PAIN: 0
SHORTNESS OF BREATH: 0
COUGH: 0
BACK PAIN: 0
WHEEZING: 0
COLOR CHANGE: 0
EYE PAIN: 0
DIARRHEA: 0
EYE ITCHING: 0
VOMITING: 0
CHEST TIGHTNESS: 0
SINUS PRESSURE: 0

## 2023-09-19 NOTE — PROGRESS NOTES
Chief Complaint:     Chief Complaint   Patient presents with    Abdominal Pain     Right side abdominal pain started three days ago, with abnormal bowel movements. Patient reports bright red blood in stool two days ago. Constipation         Abdominal Pain  This is a recurrent problem. The current episode started 1 to 4 weeks ago. The problem occurs intermittently. The problem has been waxing and waning. The pain is located in the RLQ and RUQ. The pain is mild. The quality of the pain is colicky. Associated symptoms include constipation and hematochezia. Pertinent negatives include no arthralgias, diarrhea, dysuria, fever, frequency, headaches, hematuria, myalgias, nausea or vomiting. Nothing aggravates the pain. The pain is relieved by Nothing. She has tried nothing for the symptoms. The treatment provided no relief. Prior diagnostic workup includes GI consult and surgery. Constipation  This is a recurrent problem. The current episode started more than 1 month ago. The problem has been waxing and waning since onset. Her stool frequency is 4 to 5 times per week. The stool is described as firm, pencil thin and formed. The patient is on a high fiber diet. She Does not exercise regularly. There has Not been adequate water intake. Associated symptoms include abdominal pain and hematochezia. Pertinent negatives include no back pain, diarrhea, difficulty urinating, fever, nausea, rectal pain or vomiting. She has tried nothing for the symptoms. The treatment provided no relief. Rectal Bleeding   The current episode started 3 to 5 days ago. The problem occurs occasionally. The problem has been unchanged. The pain is mild. The stool is described as soft, bloody and mixed with blood. There was no prior successful therapy. There was no prior unsuccessful therapy. Associated symptoms include abdominal pain.  Pertinent negatives include no fever, no diarrhea, no hematemesis, no nausea, no rectal pain, no vomiting, no

## 2023-09-27 ENCOUNTER — HOSPITAL ENCOUNTER (OUTPATIENT)
Dept: CT IMAGING | Age: 53
Discharge: HOME | End: 2023-09-29
Attending: FAMILY MEDICINE
Payer: MEDICAID

## 2023-09-27 DIAGNOSIS — R10.84 GENERALIZED ABDOMINAL PAIN: Primary | ICD-10-CM

## 2023-09-27 DIAGNOSIS — K62.5 RECTAL BLEED: ICD-10-CM

## 2023-09-27 DIAGNOSIS — R10.84 GENERALIZED ABDOMINAL PAIN: ICD-10-CM

## 2023-09-27 PROCEDURE — 74176 CT ABD & PELVIS W/O CONTRAST: CPT

## 2023-10-02 ENCOUNTER — OFFICE VISIT (OUTPATIENT)
Dept: SURGERY | Age: 53
End: 2023-10-02
Payer: MEDICAID

## 2023-10-02 VITALS
SYSTOLIC BLOOD PRESSURE: 98 MMHG | TEMPERATURE: 97.8 F | WEIGHT: 179 LBS | HEART RATE: 70 BPM | HEIGHT: 59 IN | BODY MASS INDEX: 36.08 KG/M2 | DIASTOLIC BLOOD PRESSURE: 69 MMHG

## 2023-10-02 DIAGNOSIS — K58.9 IRRITABLE BOWEL SYNDROME, UNSPECIFIED TYPE: Primary | ICD-10-CM

## 2023-10-02 DIAGNOSIS — K64.4 EXTERNAL HEMORRHOIDS: ICD-10-CM

## 2023-10-02 PROCEDURE — 99213 OFFICE O/P EST LOW 20 MIN: CPT | Performed by: SURGERY

## 2023-10-02 PROCEDURE — 1036F TOBACCO NON-USER: CPT | Performed by: SURGERY

## 2023-10-02 PROCEDURE — G8417 CALC BMI ABV UP PARAM F/U: HCPCS | Performed by: SURGERY

## 2023-10-02 PROCEDURE — G8427 DOCREV CUR MEDS BY ELIG CLIN: HCPCS | Performed by: SURGERY

## 2023-10-02 PROCEDURE — G8484 FLU IMMUNIZE NO ADMIN: HCPCS | Performed by: SURGERY

## 2023-10-02 PROCEDURE — 3017F COLORECTAL CA SCREEN DOC REV: CPT | Performed by: SURGERY

## 2023-10-02 RX ORDER — HYDROCORTISONE ACETATE, PRAMOXINE HCL 2.5; 1 G/100G; G/100G
CREAM TOPICAL 2 TIMES DAILY
Qty: 28.4 G | Refills: 3 | Status: SHIPPED | OUTPATIENT
Start: 2023-10-02

## 2023-10-02 NOTE — PATIENT INSTRUCTIONS
High fiber diet - 20-25 grams of fiber a day  Fiber supplement  Tucks wipes  Anal hygiene  Preparation H/anal cream  Sitz baths as needed  Bidet attachment for toilet

## 2023-10-05 ENCOUNTER — HOSPITAL ENCOUNTER (OUTPATIENT)
Dept: INFUSION THERAPY | Age: 53
Setting detail: INFUSION SERIES
Discharge: HOME | End: 2023-10-05

## 2023-10-05 DIAGNOSIS — J45.50 SEVERE PERSISTENT ASTHMA, UNSPECIFIED WHETHER COMPLICATED: Primary | ICD-10-CM

## 2023-10-05 RX ORDER — MEPERIDINE HYDROCHLORIDE 25 MG/ML
25 INJECTION INTRAMUSCULAR; INTRAVENOUS; SUBCUTANEOUS ONCE
Status: CANCELLED
Start: 2023-11-02 | End: 2023-11-02

## 2023-10-05 RX ORDER — ONDANSETRON 2 MG/ML
8 INJECTION INTRAMUSCULAR; INTRAVENOUS
Status: CANCELLED | OUTPATIENT
Start: 2023-11-02

## 2023-10-05 RX ORDER — ACETAMINOPHEN 325 MG/1
650 TABLET ORAL
Status: CANCELLED | OUTPATIENT
Start: 2023-11-02

## 2023-10-05 RX ORDER — ALBUTEROL SULFATE 90 UG/1
4 AEROSOL, METERED RESPIRATORY (INHALATION) PRN
Status: CANCELLED | OUTPATIENT
Start: 2023-11-02

## 2023-10-05 RX ORDER — EPINEPHRINE 1 MG/ML
0.3 INJECTION, SOLUTION, CONCENTRATE INTRAVENOUS PRN
Status: CANCELLED | OUTPATIENT
Start: 2023-11-02

## 2023-10-05 RX ORDER — SODIUM CHLORIDE 9 MG/ML
INJECTION, SOLUTION INTRAVENOUS CONTINUOUS
Status: CANCELLED | OUTPATIENT
Start: 2023-11-02

## 2023-10-05 RX ORDER — 0.9 % SODIUM CHLORIDE 0.9 %
200 INTRAVENOUS SOLUTION INTRAVENOUS ONCE
Status: CANCELLED
Start: 2023-11-02 | End: 2023-11-02

## 2023-10-05 RX ORDER — DIPHENHYDRAMINE HYDROCHLORIDE 50 MG/ML
50 INJECTION INTRAMUSCULAR; INTRAVENOUS
Status: CANCELLED | OUTPATIENT
Start: 2023-11-02

## 2023-10-05 RX ORDER — SODIUM CHLORIDE 0.9 % (FLUSH) 0.9 %
5-40 SYRINGE (ML) INJECTION PRN
Status: CANCELLED
Start: 2023-11-02

## 2023-10-05 RX ORDER — HEPARIN SODIUM (PORCINE) LOCK FLUSH IV SOLN 100 UNIT/ML 100 UNIT/ML
500 SOLUTION INTRAVENOUS PRN
Status: CANCELLED
Start: 2023-11-02

## 2023-10-05 RX ORDER — 0.9 % SODIUM CHLORIDE 0.9 %
200 INTRAVENOUS SOLUTION INTRAVENOUS ONCE
Status: DISCONTINUED | OUTPATIENT
Start: 2023-10-05 | End: 2023-10-05

## 2023-10-05 RX ORDER — SODIUM CHLORIDE 0.9 % (FLUSH) 0.9 %
5-40 SYRINGE (ML) INJECTION PRN
Status: DISCONTINUED | OUTPATIENT
Start: 2023-10-05 | End: 2023-10-05

## 2023-10-10 ENCOUNTER — OFFICE VISIT (OUTPATIENT)
Dept: PAIN MANAGEMENT | Age: 53
End: 2023-10-10
Payer: MEDICAID

## 2023-10-10 VITALS
OXYGEN SATURATION: 96 % | RESPIRATION RATE: 18 BRPM | SYSTOLIC BLOOD PRESSURE: 110 MMHG | DIASTOLIC BLOOD PRESSURE: 70 MMHG | BODY MASS INDEX: 36.08 KG/M2 | TEMPERATURE: 97.5 F | HEART RATE: 66 BPM | WEIGHT: 179 LBS | HEIGHT: 59 IN

## 2023-10-10 DIAGNOSIS — M54.16 LUMBAR RADICULOPATHY: ICD-10-CM

## 2023-10-10 DIAGNOSIS — M47.812 FACET ARTHROPATHY, CERVICAL: ICD-10-CM

## 2023-10-10 DIAGNOSIS — M47.816 LUMBAR SPONDYLOSIS: ICD-10-CM

## 2023-10-10 DIAGNOSIS — M16.12 PRIMARY OSTEOARTHRITIS OF LEFT HIP: ICD-10-CM

## 2023-10-10 DIAGNOSIS — M47.816 LUMBAR FACET ARTHROPATHY: ICD-10-CM

## 2023-10-10 DIAGNOSIS — G89.4 CHRONIC PAIN SYNDROME: ICD-10-CM

## 2023-10-10 DIAGNOSIS — M51.9 LUMBAR DISC DISORDER: ICD-10-CM

## 2023-10-10 DIAGNOSIS — M79.7 FIBROMYALGIA: ICD-10-CM

## 2023-10-10 DIAGNOSIS — M54.12 CERVICAL RADICULOPATHY: Primary | ICD-10-CM

## 2023-10-10 DIAGNOSIS — M47.812 CERVICAL FACET JOINT SYNDROME: ICD-10-CM

## 2023-10-10 DIAGNOSIS — M50.30 DDD (DEGENERATIVE DISC DISEASE), CERVICAL: ICD-10-CM

## 2023-10-10 PROCEDURE — 1036F TOBACCO NON-USER: CPT | Performed by: PHYSICIAN ASSISTANT

## 2023-10-10 PROCEDURE — G8484 FLU IMMUNIZE NO ADMIN: HCPCS | Performed by: PHYSICIAN ASSISTANT

## 2023-10-10 PROCEDURE — 99213 OFFICE O/P EST LOW 20 MIN: CPT | Performed by: PHYSICIAN ASSISTANT

## 2023-10-10 PROCEDURE — G8427 DOCREV CUR MEDS BY ELIG CLIN: HCPCS | Performed by: PHYSICIAN ASSISTANT

## 2023-10-10 PROCEDURE — G8417 CALC BMI ABV UP PARAM F/U: HCPCS | Performed by: PHYSICIAN ASSISTANT

## 2023-10-10 PROCEDURE — 3017F COLORECTAL CA SCREEN DOC REV: CPT | Performed by: PHYSICIAN ASSISTANT

## 2023-10-10 RX ORDER — OXYCODONE HYDROCHLORIDE 5 MG/1
5 TABLET ORAL DAILY PRN
Qty: 30 TABLET | Refills: 0 | Status: SHIPPED | OUTPATIENT
Start: 2023-10-13 | End: 2023-11-12

## 2023-10-12 ENCOUNTER — HOSPITAL ENCOUNTER (OUTPATIENT)
Dept: INFUSION THERAPY | Age: 53
Setting detail: INFUSION SERIES
Discharge: HOME | End: 2023-10-12
Payer: MEDICAID

## 2023-10-12 VITALS
RESPIRATION RATE: 16 BRPM | SYSTOLIC BLOOD PRESSURE: 101 MMHG | HEART RATE: 65 BPM | OXYGEN SATURATION: 99 % | DIASTOLIC BLOOD PRESSURE: 87 MMHG | TEMPERATURE: 97.7 F

## 2023-10-12 DIAGNOSIS — J45.50 SEVERE PERSISTENT ASTHMA, UNSPECIFIED WHETHER COMPLICATED: Primary | ICD-10-CM

## 2023-10-12 PROCEDURE — 2580000003 HC RX 258: Performed by: INTERNAL MEDICINE

## 2023-10-12 PROCEDURE — 96372 THER/PROPH/DIAG INJ SC/IM: CPT

## 2023-10-12 PROCEDURE — 96360 HYDRATION IV INFUSION INIT: CPT

## 2023-10-12 PROCEDURE — 6360000002 HC RX W HCPCS: Performed by: INTERNAL MEDICINE

## 2023-10-12 PROCEDURE — 2580000003 HC RX 258

## 2023-10-12 RX ORDER — EPINEPHRINE 1 MG/ML
0.3 INJECTION, SOLUTION, CONCENTRATE INTRAVENOUS PRN
OUTPATIENT
Start: 2023-11-02

## 2023-10-12 RX ORDER — HEPARIN SODIUM (PORCINE) LOCK FLUSH IV SOLN 100 UNIT/ML 100 UNIT/ML
500 SOLUTION INTRAVENOUS PRN
Start: 2023-11-02

## 2023-10-12 RX ORDER — DIPHENHYDRAMINE HYDROCHLORIDE 50 MG/ML
50 INJECTION INTRAMUSCULAR; INTRAVENOUS
OUTPATIENT
Start: 2023-11-02

## 2023-10-12 RX ORDER — ACETAMINOPHEN 325 MG/1
650 TABLET ORAL
OUTPATIENT
Start: 2023-11-02

## 2023-10-12 RX ORDER — 0.9 % SODIUM CHLORIDE 0.9 %
200 INTRAVENOUS SOLUTION INTRAVENOUS ONCE
Status: COMPLETED | OUTPATIENT
Start: 2023-10-12 | End: 2023-10-12

## 2023-10-12 RX ORDER — SODIUM CHLORIDE 0.9 % (FLUSH) 0.9 %
SYRINGE (ML) INJECTION
Status: COMPLETED
Start: 2023-10-12 | End: 2023-10-12

## 2023-10-12 RX ORDER — ALBUTEROL SULFATE 90 UG/1
4 AEROSOL, METERED RESPIRATORY (INHALATION) PRN
OUTPATIENT
Start: 2023-11-02

## 2023-10-12 RX ORDER — SODIUM CHLORIDE 9 MG/ML
INJECTION, SOLUTION INTRAVENOUS CONTINUOUS
OUTPATIENT
Start: 2023-11-02

## 2023-10-12 RX ORDER — SODIUM CHLORIDE 0.9 % (FLUSH) 0.9 %
5-40 SYRINGE (ML) INJECTION PRN
Start: 2023-11-02

## 2023-10-12 RX ORDER — 0.9 % SODIUM CHLORIDE 0.9 %
200 INTRAVENOUS SOLUTION INTRAVENOUS ONCE
Start: 2023-11-02 | End: 2023-11-02

## 2023-10-12 RX ORDER — MEPERIDINE HYDROCHLORIDE 25 MG/ML
25 INJECTION INTRAMUSCULAR; INTRAVENOUS; SUBCUTANEOUS ONCE
Start: 2023-11-02 | End: 2023-11-02

## 2023-10-12 RX ORDER — ONDANSETRON 2 MG/ML
8 INJECTION INTRAMUSCULAR; INTRAVENOUS
OUTPATIENT
Start: 2023-11-02

## 2023-10-12 RX ADMIN — SODIUM CHLORIDE, PRESERVATIVE FREE 10 ML: 5 INJECTION INTRAVENOUS at 11:49

## 2023-10-12 RX ADMIN — SODIUM CHLORIDE 200 ML: 9 INJECTION, SOLUTION INTRAVENOUS at 11:49

## 2023-10-12 RX ADMIN — TEZEPELUMAB-EKKO 210 MG: 210 INJECTION, SOLUTION SUBCUTANEOUS at 12:22

## 2023-10-12 NOTE — PROGRESS NOTES
Patient tolerated tezspire injection and normal saline infusion well. Remained on unit for 10 minutes after treatment-she has not had any issues with last several injections and did not feel she needed to stay any longer for observation. Patient alert and oriented x3. No distress noted. Vital signs stable. Patient denies any new or worsening pain. Offered patient education and/or discharge material. Patient provided d/c instructions and education handout on tezspire. Patient denies any needs. All questions answered. D/C in stable condition with her daughter.

## 2023-10-13 ENCOUNTER — HOSPITAL ENCOUNTER (EMERGENCY)
Age: 53
Discharge: HOME | End: 2023-10-13
Payer: MEDICAID

## 2023-10-13 VITALS
BODY MASS INDEX: 35.88 KG/M2 | TEMPERATURE: 98.4 F | WEIGHT: 178 LBS | HEIGHT: 59 IN | OXYGEN SATURATION: 99 % | HEART RATE: 75 BPM | SYSTOLIC BLOOD PRESSURE: 121 MMHG | DIASTOLIC BLOOD PRESSURE: 86 MMHG | RESPIRATION RATE: 16 BRPM

## 2023-10-13 DIAGNOSIS — R22.0 SWOLLEN GUMS: ICD-10-CM

## 2023-10-13 DIAGNOSIS — K08.89 PAIN, DENTAL: Primary | ICD-10-CM

## 2023-10-13 PROCEDURE — 99284 EMERGENCY DEPT VISIT MOD MDM: CPT

## 2023-10-13 PROCEDURE — 6360000002 HC RX W HCPCS

## 2023-10-13 PROCEDURE — 6370000000 HC RX 637 (ALT 250 FOR IP)

## 2023-10-13 PROCEDURE — 96372 THER/PROPH/DIAG INJ SC/IM: CPT

## 2023-10-13 RX ORDER — AMOXICILLIN AND CLAVULANATE POTASSIUM 875; 125 MG/1; MG/1
1 TABLET, FILM COATED ORAL 2 TIMES DAILY
Qty: 14 TABLET | Refills: 0 | Status: SHIPPED | OUTPATIENT
Start: 2023-10-13 | End: 2023-10-20

## 2023-10-13 RX ORDER — LIDOCAINE HYDROCHLORIDE 20 MG/ML
15 SOLUTION OROPHARYNGEAL PRN
Qty: 100 ML | Refills: 0 | Status: SHIPPED | OUTPATIENT
Start: 2023-10-13

## 2023-10-13 RX ORDER — AMOXICILLIN AND CLAVULANATE POTASSIUM 875; 125 MG/1; MG/1
1 TABLET, FILM COATED ORAL ONCE
Status: COMPLETED | OUTPATIENT
Start: 2023-10-13 | End: 2023-10-13

## 2023-10-13 RX ORDER — KETOROLAC TROMETHAMINE 30 MG/ML
30 INJECTION, SOLUTION INTRAMUSCULAR; INTRAVENOUS ONCE
Status: COMPLETED | OUTPATIENT
Start: 2023-10-13 | End: 2023-10-13

## 2023-10-13 RX ADMIN — KETOROLAC TROMETHAMINE 30 MG: 30 INJECTION, SOLUTION INTRAMUSCULAR at 18:43

## 2023-10-13 RX ADMIN — AMOXICILLIN AND CLAVULANATE POTASSIUM 1 TABLET: 875; 125 TABLET, FILM COATED ORAL at 18:42

## 2023-10-13 ASSESSMENT — PAIN SCALES - GENERAL: PAINLEVEL_OUTOF10: 9

## 2023-10-13 ASSESSMENT — PAIN - FUNCTIONAL ASSESSMENT: PAIN_FUNCTIONAL_ASSESSMENT: 0-10

## 2023-10-13 ASSESSMENT — PAIN DESCRIPTION - LOCATION: LOCATION: FACE

## 2023-10-13 ASSESSMENT — PAIN DESCRIPTION - ORIENTATION: ORIENTATION: RIGHT

## 2023-10-13 NOTE — ED PROVIDER NOTES
Independent [facility] Visit. [facility]  Department of Emergency Medicine   ED  Encounter Note  Admit Date/RoomTime: No admission date for patient encounter. ED Room: Room/bed info not found    NAME: Marshall Julian  : 1970  MRN: [id number]     Chief Complaint:  Facial Pain (Right sided, onset this AM.  Now radiates into neck)    History of Present Illness        Marshall Julian is a 46 y.o. old female who presents to the emergency department by private vehicle, for non-traumatic right lower tooth number 30 pain, which occured several hour(s) prior to arrival.  Since onset the symptoms have been persistent and mild-moderate in severity. Worsened by  chewing and improved by nothing. Associated Signs & Symptoms:  facial pain right. Patient states that this morning she woke up with right-sided dental pain that radiates up the side of her face. States she took one of her oxycodone at home with no relief. States pain is worse when she is chewing or palpates the area. She denies any sore throat, cough, chest pain, shortness of breath, fever or chills. Denies any injury or trauma to the area. No other complaints or concerns at this time. Onset:       Spontaneous:   yes. Following Trauma:   no.     Previous Caries:   yes. Recent Dental Procedure:   no.     ROS   Pertinent positives and negatives are stated within HPI, all other systems reviewed and are negative.     Past Medical History:  has a past medical history of Arthritis, Asthma, Cerebral artery occlusion with cerebral infarction [facility]), Cervical spinal stenosis, Chronic back pain, Chronic kidney disease, Class 2 severe obesity due to excess calories with serious comorbidity and body mass index (BMI) of 37.0 to 37.9 in adult [facility]), COVID, Disorder of thyroid gland, GERD (gastroesophageal reflux disease), Headache(784.0), Irritable bowel syndrome, Numbness and tingling in right hand, CIERA on CPAP, Seizure disorder ([address]),

## 2023-10-17 ENCOUNTER — OFFICE VISIT (OUTPATIENT)
Dept: PULMONOLOGY | Age: 53
End: 2023-10-17
Payer: MEDICAID

## 2023-10-17 VITALS
BODY MASS INDEX: 35.88 KG/M2 | WEIGHT: 178 LBS | OXYGEN SATURATION: 96 % | DIASTOLIC BLOOD PRESSURE: 68 MMHG | HEIGHT: 59 IN | TEMPERATURE: 97.4 F | RESPIRATION RATE: 18 BRPM | SYSTOLIC BLOOD PRESSURE: 118 MMHG | HEART RATE: 71 BPM

## 2023-10-17 DIAGNOSIS — J45.50 SEVERE PERSISTENT ASTHMA, UNSPECIFIED WHETHER COMPLICATED: ICD-10-CM

## 2023-10-17 DIAGNOSIS — G47.33 OSA ON CPAP: Primary | ICD-10-CM

## 2023-10-17 DIAGNOSIS — R91.1 LUNG NODULE: ICD-10-CM

## 2023-10-17 LAB
EXPIRATORY TIME-POST: NORMAL
EXPIRATORY TIME: NORMAL
FEF 25-75% %CHNG: NORMAL
FEF 25-75% %PRED-POST: NORMAL
FEF 25-75% %PRED-PRE: NORMAL
FEF 25-75% PRED: NORMAL
FEF 25-75%-POST: NORMAL
FEF 25-75%-PRE: NORMAL
FEV1 %PRED-POST: NORMAL
FEV1 %PRED-PRE: 81 %
FEV1 PRED: 71 L
FEV1-POST: 87 L
FEV1-PRE: 69 L
FEV1/FVC %PRED-POST: NORMAL
FEV1/FVC %PRED-PRE: NORMAL
FEV1/FVC PRED: NORMAL
FEV1/FVC-POST: NORMAL
FEV1/FVC-PRE: NORMAL
FVC %PRED-POST: 2.24 L
FVC %PRED-PRE: 78 %
FVC PRED: 2.78 L
FVC-POST: 1.55 L
FVC-PRE: 2.19 L
PEF %PRED-POST: NORMAL
PEF %PRED-PRE: NORMAL
PEF PRED: NORMAL
PEF%CHNG: NORMAL
PEF-POST: NORMAL
PEF-PRE: NORMAL

## 2023-10-17 PROCEDURE — 94060 EVALUATION OF WHEEZING: CPT | Performed by: INTERNAL MEDICINE

## 2023-10-17 RX ORDER — DIAPER,BRIEF,INFANT-TODD,DISP
EACH MISCELLANEOUS
Qty: 30 G | Refills: 1 | Status: SHIPPED | OUTPATIENT
Start: 2023-10-17 | End: 2023-10-24

## 2023-10-17 ASSESSMENT — PULMONARY FUNCTION TESTS
FEV1_POST: 87
FVC_PERCENT_PREDICTED_PRE: 78
FVC_PRE: 2.19
FEV1_PERCENT_PREDICTED_PRE: 81
FVC_PERCENT_PREDICTED_POST: 2.24
FVC_POST: 1.55
FVC_PREDICTED: 2.78
FEV1_PRE: 69
FEV1_PREDICTED: 71

## 2023-10-17 NOTE — PROGRESS NOTES
Patient to follow up with physician in 3 months. Patient will be premedicated with 50mg of Benadryl IV, 10mg of Atarax po, 250ml of NSS IV before Tezspire. Patient  to get shingles vaccine at next office visit. Patient also to get CT lung screen prior to next office visit.

## 2023-10-17 NOTE — PROGRESS NOTES
[address] [facility]  Department of Internal Medicine   Division of Pulmonary, Critical Care and Sleep Medicine  Pulmonary [address] [address] [phone], Fax [phone]    Clint Suarez MD, [facility]  Noemy Allen DO, [facility]      Dear Mirian Sanchez DO    Visit was performed with her  present. She refused     We had the pleasure of seeing Yoli Ruiz, in the [address] [place] [address] at [facility] regarding her left lung GGO and severe persistent asthma. HISTORY OF PRESENT ILLNESS:    Yoli Ruiz is a 46 y.o. female with a past medical history of arthritis, chronic back pain, chronic kidney disease, asthma since childhood, depression, recent gallstone surgery, reflux disease, history of COVID, vitamin D deficiency who presents for evaluation of 2 pulmonary problems. To note she is a ex-smoker of at least 20 pack years smoked up until 19 years ago and quit because difficulty breathing. Of note she has 2 dogs 19-20 chickens and a few ducks as well. Her travel history is extensive she has lived in [facility] for 12 years 9 years prior to that she was in [place] and states she was told she had lung problems, she is also traveled to [place] and [place] to visit family. 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  No asbestos exposure noted no silica dust exposure noted. Other toxic chemicals unknown. Her hobbies include playing with her grandchildren and getting eggs from the chicken coop. Family history is really not significant other mother had some breathing problems and  in her late 80's from Alzheimer's disease. She did not know her father because he  and she was very young. She states she is short of breath most of the time and only takes albuterol as needed.   She is never had

## 2023-10-18 DIAGNOSIS — R91.1 LUNG NODULE: Primary | ICD-10-CM

## 2023-10-24 RX ORDER — ONDANSETRON 2 MG/ML
8 INJECTION INTRAMUSCULAR; INTRAVENOUS
OUTPATIENT
Start: 2023-10-26

## 2023-10-24 RX ORDER — ACETAMINOPHEN 325 MG/1
650 TABLET ORAL
OUTPATIENT
Start: 2023-10-26

## 2023-10-24 RX ORDER — MEPERIDINE HYDROCHLORIDE 25 MG/ML
25 INJECTION INTRAMUSCULAR; INTRAVENOUS; SUBCUTANEOUS ONCE
Start: 2023-10-26 | End: 2023-10-26

## 2023-10-24 RX ORDER — SODIUM CHLORIDE 9 MG/ML
INJECTION, SOLUTION INTRAVENOUS CONTINUOUS
OUTPATIENT
Start: 2023-10-26

## 2023-10-24 RX ORDER — DIPHENHYDRAMINE HYDROCHLORIDE 50 MG/ML
50 INJECTION INTRAMUSCULAR; INTRAVENOUS
OUTPATIENT
Start: 2023-10-26

## 2023-10-24 RX ORDER — HEPARIN SODIUM (PORCINE) LOCK FLUSH IV SOLN 100 UNIT/ML 100 UNIT/ML
500 SOLUTION INTRAVENOUS PRN
Start: 2023-10-26

## 2023-10-24 RX ORDER — HYDROXYZINE HYDROCHLORIDE 10 MG/1
10 TABLET, FILM COATED ORAL ONCE
Start: 2023-10-26 | End: 2023-10-26

## 2023-10-24 RX ORDER — ALBUTEROL SULFATE 90 UG/1
4 AEROSOL, METERED RESPIRATORY (INHALATION) PRN
OUTPATIENT
Start: 2023-10-26

## 2023-10-24 RX ORDER — SODIUM CHLORIDE 0.9 % (FLUSH) 0.9 %
5-40 SYRINGE (ML) INJECTION PRN
Start: 2023-10-26

## 2023-10-24 RX ORDER — 0.9 % SODIUM CHLORIDE 0.9 %
250 INTRAVENOUS SOLUTION INTRAVENOUS ONCE
Start: 2023-10-26 | End: 2023-10-26

## 2023-10-24 RX ORDER — DIPHENHYDRAMINE HYDROCHLORIDE 50 MG/ML
50 INJECTION INTRAMUSCULAR; INTRAVENOUS ONCE
Start: 2023-10-26 | End: 2023-10-26

## 2023-10-24 RX ORDER — EPINEPHRINE 1 MG/ML
0.3 INJECTION, SOLUTION, CONCENTRATE INTRAVENOUS PRN
OUTPATIENT
Start: 2023-10-26

## 2023-11-07 ENCOUNTER — PREP FOR PROCEDURE (OUTPATIENT)
Dept: PAIN MANAGEMENT | Age: 53
End: 2023-11-07

## 2023-11-07 ENCOUNTER — OFFICE VISIT (OUTPATIENT)
Dept: PAIN MANAGEMENT | Age: 53
End: 2023-11-07
Payer: MEDICAID

## 2023-11-07 VITALS
SYSTOLIC BLOOD PRESSURE: 110 MMHG | OXYGEN SATURATION: 97 % | HEART RATE: 77 BPM | HEIGHT: 59 IN | DIASTOLIC BLOOD PRESSURE: 80 MMHG | WEIGHT: 178 LBS | BODY MASS INDEX: 35.88 KG/M2 | RESPIRATION RATE: 18 BRPM | TEMPERATURE: 97.5 F

## 2023-11-07 DIAGNOSIS — G89.4 CHRONIC PAIN SYNDROME: ICD-10-CM

## 2023-11-07 DIAGNOSIS — M79.7 FIBROMYALGIA: ICD-10-CM

## 2023-11-07 DIAGNOSIS — M54.16 LUMBAR RADICULOPATHY: ICD-10-CM

## 2023-11-07 DIAGNOSIS — M54.12 CERVICAL RADICULOPATHY: ICD-10-CM

## 2023-11-07 DIAGNOSIS — M16.12 PRIMARY OSTEOARTHRITIS OF LEFT HIP: ICD-10-CM

## 2023-11-07 DIAGNOSIS — M51.9 LUMBAR DISC DISORDER: ICD-10-CM

## 2023-11-07 DIAGNOSIS — M47.816 LUMBAR FACET ARTHROPATHY: ICD-10-CM

## 2023-11-07 DIAGNOSIS — M50.30 DDD (DEGENERATIVE DISC DISEASE), CERVICAL: Primary | ICD-10-CM

## 2023-11-07 DIAGNOSIS — M47.812 FACET ARTHROPATHY, CERVICAL: ICD-10-CM

## 2023-11-07 DIAGNOSIS — M47.812 CERVICAL FACET JOINT SYNDROME: ICD-10-CM

## 2023-11-07 DIAGNOSIS — M47.816 LUMBAR SPONDYLOSIS: ICD-10-CM

## 2023-11-07 PROCEDURE — G8427 DOCREV CUR MEDS BY ELIG CLIN: HCPCS | Performed by: PHYSICIAN ASSISTANT

## 2023-11-07 PROCEDURE — 1036F TOBACCO NON-USER: CPT | Performed by: PHYSICIAN ASSISTANT

## 2023-11-07 PROCEDURE — 99213 OFFICE O/P EST LOW 20 MIN: CPT | Performed by: PHYSICIAN ASSISTANT

## 2023-11-07 PROCEDURE — G8484 FLU IMMUNIZE NO ADMIN: HCPCS | Performed by: PHYSICIAN ASSISTANT

## 2023-11-07 PROCEDURE — G8417 CALC BMI ABV UP PARAM F/U: HCPCS | Performed by: PHYSICIAN ASSISTANT

## 2023-11-07 PROCEDURE — 3017F COLORECTAL CA SCREEN DOC REV: CPT | Performed by: PHYSICIAN ASSISTANT

## 2023-11-07 RX ORDER — OXYCODONE HYDROCHLORIDE 5 MG/1
5 TABLET ORAL DAILY PRN
Qty: 30 TABLET | Refills: 0 | Status: SHIPPED | OUTPATIENT
Start: 2023-11-12 | End: 2023-12-12

## 2023-11-07 RX ORDER — LIDOCAINE 50 MG/G
1 PATCH TOPICAL DAILY
Qty: 30 PATCH | Refills: 0 | Status: SHIPPED | OUTPATIENT
Start: 2023-11-07 | End: 2023-12-07

## 2023-11-07 NOTE — PROGRESS NOTES
[address], [address] [place] [address]  [phone]    Follow up Note      Caridad Childers     Date of Visit:  2023    CC:  Patient presents for follow up   Chief Complaint   Patient presents with    Back Pain    Neck Pain               HPI:    Pain is worse to her left lower back x several days. Appropriate analgesia with current medications regimen: yes. Change in quality of symptoms:no. Medication side effects:none. Recent diagnostic testing:none. Recent interventional procedures: None. She has been on anticoagulation medications to include ASA. The patient  has not been on herbal supplements. The patient is not diabetic. Imagin/15/2022 MRI lumbar spine    FINDINGS:   No fracture or malalignment. Vertebral body height and interspaces are well   maintained. Conus medullaris is visualized and is unremarkable. No evidence   of epidural mass or hematoma. No prevertebral soft tissue edema. Note made   of a incidental intraosseous hemangioma located in T11 vertebral body. L1/L2: No central canal stenosis or neural foraminal narrowing. L2/L3: No central canal stenosis or neural foraminal narrowing. L3/L4: Mild facet hypertrophy with hypertrophy of ligamentum flavum. No   central canal stenosis. There is mild bilateral neural foraminal narrowing   more significant on the right. L4/L5: Moderate facet hypertrophy. No central canal stenosis. No   significant neural foraminal narrowing. L5/S1: No central canal stenosis. No significant neural foraminal narrowing. Impression   1. Mild bilateral neural foraminal narrowing at L3/L4 more significant on the   right. No central canal stenosis. 2. Moderate bilateral facet hypertrophy at L4/L5. 3. No fracture or malalignment. Cervical spine MRI 2018  1. Multilevel degenerative disc disease extending from C3 through C7.

## 2023-11-09 ENCOUNTER — HOSPITAL ENCOUNTER (OUTPATIENT)
Dept: INFUSION THERAPY | Age: 53
Setting detail: INFUSION SERIES
Discharge: HOME | End: 2023-11-09
Payer: MEDICAID

## 2023-11-09 VITALS
SYSTOLIC BLOOD PRESSURE: 108 MMHG | DIASTOLIC BLOOD PRESSURE: 64 MMHG | HEART RATE: 66 BPM | TEMPERATURE: 97.8 F | OXYGEN SATURATION: 98 % | RESPIRATION RATE: 16 BRPM

## 2023-11-09 DIAGNOSIS — J45.50 SEVERE PERSISTENT ASTHMA, UNSPECIFIED WHETHER COMPLICATED: Primary | ICD-10-CM

## 2023-11-09 PROCEDURE — 96375 TX/PRO/DX INJ NEW DRUG ADDON: CPT

## 2023-11-09 PROCEDURE — 6370000000 HC RX 637 (ALT 250 FOR IP): Performed by: INTERNAL MEDICINE

## 2023-11-09 PROCEDURE — 96374 THER/PROPH/DIAG INJ IV PUSH: CPT

## 2023-11-09 PROCEDURE — 96372 THER/PROPH/DIAG INJ SC/IM: CPT

## 2023-11-09 PROCEDURE — 96361 HYDRATE IV INFUSION ADD-ON: CPT

## 2023-11-09 PROCEDURE — 2580000003 HC RX 258: Performed by: INTERNAL MEDICINE

## 2023-11-09 PROCEDURE — 6360000002 HC RX W HCPCS: Performed by: INTERNAL MEDICINE

## 2023-11-09 RX ORDER — DIPHENHYDRAMINE HYDROCHLORIDE 50 MG/ML
50 INJECTION INTRAMUSCULAR; INTRAVENOUS ONCE
Start: 2023-11-23 | End: 2023-11-23

## 2023-11-09 RX ORDER — ONDANSETRON 2 MG/ML
8 INJECTION INTRAMUSCULAR; INTRAVENOUS
OUTPATIENT
Start: 2023-11-23

## 2023-11-09 RX ORDER — 0.9 % SODIUM CHLORIDE 0.9 %
250 INTRAVENOUS SOLUTION INTRAVENOUS ONCE
Start: 2023-11-23 | End: 2023-11-23

## 2023-11-09 RX ORDER — SODIUM CHLORIDE 0.9 % (FLUSH) 0.9 %
5-40 SYRINGE (ML) INJECTION PRN
Status: DISCONTINUED | OUTPATIENT
Start: 2023-11-09 | End: 2023-11-10 | Stop reason: HOSPADM

## 2023-11-09 RX ORDER — DIPHENHYDRAMINE HYDROCHLORIDE 50 MG/ML
50 INJECTION INTRAMUSCULAR; INTRAVENOUS
OUTPATIENT
Start: 2023-11-23

## 2023-11-09 RX ORDER — SODIUM CHLORIDE 0.9 % (FLUSH) 0.9 %
5-40 SYRINGE (ML) INJECTION PRN
Start: 2023-11-23

## 2023-11-09 RX ORDER — ALBUTEROL SULFATE 90 UG/1
4 AEROSOL, METERED RESPIRATORY (INHALATION) PRN
OUTPATIENT
Start: 2023-11-23

## 2023-11-09 RX ORDER — HEPARIN SODIUM (PORCINE) LOCK FLUSH IV SOLN 100 UNIT/ML 100 UNIT/ML
500 SOLUTION INTRAVENOUS PRN
Start: 2023-11-23

## 2023-11-09 RX ORDER — EPINEPHRINE 1 MG/ML
0.3 INJECTION, SOLUTION, CONCENTRATE INTRAVENOUS PRN
OUTPATIENT
Start: 2023-11-23

## 2023-11-09 RX ORDER — HYDROXYZINE HYDROCHLORIDE 10 MG/1
10 TABLET, FILM COATED ORAL ONCE
Status: COMPLETED | OUTPATIENT
Start: 2023-11-09 | End: 2023-11-09

## 2023-11-09 RX ORDER — HYDROXYZINE HYDROCHLORIDE 10 MG/1
10 TABLET, FILM COATED ORAL ONCE
Start: 2023-11-23 | End: 2023-11-23

## 2023-11-09 RX ORDER — ACETAMINOPHEN 325 MG/1
650 TABLET ORAL
OUTPATIENT
Start: 2023-11-23

## 2023-11-09 RX ORDER — SODIUM CHLORIDE 9 MG/ML
INJECTION, SOLUTION INTRAVENOUS CONTINUOUS
OUTPATIENT
Start: 2023-11-23

## 2023-11-09 RX ORDER — MEPERIDINE HYDROCHLORIDE 25 MG/ML
25 INJECTION INTRAMUSCULAR; INTRAVENOUS; SUBCUTANEOUS ONCE
Start: 2023-11-23 | End: 2023-11-23

## 2023-11-09 RX ORDER — 0.9 % SODIUM CHLORIDE 0.9 %
250 INTRAVENOUS SOLUTION INTRAVENOUS ONCE
Status: COMPLETED | OUTPATIENT
Start: 2023-11-09 | End: 2023-11-09

## 2023-11-09 RX ORDER — DIPHENHYDRAMINE HYDROCHLORIDE 50 MG/ML
50 INJECTION INTRAMUSCULAR; INTRAVENOUS ONCE
Status: COMPLETED | OUTPATIENT
Start: 2023-11-09 | End: 2023-11-09

## 2023-11-09 RX ADMIN — HYDROXYZINE HYDROCHLORIDE 10 MG: 10 TABLET ORAL at 11:37

## 2023-11-09 RX ADMIN — SODIUM CHLORIDE 250 ML: 9 INJECTION, SOLUTION INTRAVENOUS at 11:52

## 2023-11-09 RX ADMIN — DIPHENHYDRAMINE HYDROCHLORIDE 25 MG: 50 INJECTION, SOLUTION INTRAMUSCULAR; INTRAVENOUS at 11:44

## 2023-11-09 RX ADMIN — SODIUM CHLORIDE, PRESERVATIVE FREE 10 ML: 5 INJECTION INTRAVENOUS at 11:10

## 2023-11-09 RX ADMIN — TEZEPELUMAB-EKKO 210 MG: 210 INJECTION, SOLUTION SUBCUTANEOUS at 12:15

## 2023-11-09 NOTE — PROGRESS NOTES
Patient excessively sleepy from Benadryl IV pre medication. She only wanted 25 mg given and still did not like how drowsy she became. She is requesting not to have IV BENADRYL again and  would only like oral version of benadryl.  Vital signs are stable and patient discharged in stable condition with family after ambulating in unit and up to bathroom

## 2023-11-10 DIAGNOSIS — J45.50 SEVERE PERSISTENT ASTHMA, UNSPECIFIED WHETHER COMPLICATED: Primary | ICD-10-CM

## 2023-11-10 NOTE — PROGRESS NOTES
Called Monalisa Payton  on November 10 at 1000 am and notified that patient does not want IV benadryl moving forward. She was too drowsy with only 25 g IV. Gerri Harper will send order for oral benadryl. oral

## 2023-11-13 ENCOUNTER — OFFICE VISIT (OUTPATIENT)
Dept: SURGERY | Age: 53
End: 2023-11-13
Payer: MEDICAID

## 2023-11-13 VITALS
HEIGHT: 59 IN | SYSTOLIC BLOOD PRESSURE: 100 MMHG | TEMPERATURE: 97.9 F | OXYGEN SATURATION: 95 % | HEART RATE: 74 BPM | DIASTOLIC BLOOD PRESSURE: 63 MMHG | WEIGHT: 178 LBS | BODY MASS INDEX: 35.88 KG/M2

## 2023-11-13 DIAGNOSIS — K60.2 ANAL FISSURE: Primary | ICD-10-CM

## 2023-11-13 DIAGNOSIS — Z01.818 PRE-OPERATIVE CLEARANCE: Primary | ICD-10-CM

## 2023-11-13 PROCEDURE — G8417 CALC BMI ABV UP PARAM F/U: HCPCS | Performed by: SURGERY

## 2023-11-13 PROCEDURE — 3017F COLORECTAL CA SCREEN DOC REV: CPT | Performed by: SURGERY

## 2023-11-13 PROCEDURE — G8484 FLU IMMUNIZE NO ADMIN: HCPCS | Performed by: SURGERY

## 2023-11-13 PROCEDURE — 99213 OFFICE O/P EST LOW 20 MIN: CPT | Performed by: SURGERY

## 2023-11-13 PROCEDURE — G8427 DOCREV CUR MEDS BY ELIG CLIN: HCPCS | Performed by: SURGERY

## 2023-11-13 PROCEDURE — 1036F TOBACCO NON-USER: CPT | Performed by: SURGERY

## 2023-11-13 RX ORDER — DIPHENHYDRAMINE HCL 25 MG
25 TABLET ORAL ONCE
Start: 2023-11-16 | End: 2023-11-16

## 2023-11-13 RX ORDER — DIPHENHYDRAMINE HCL 25 MG
25 TABLET ORAL ONCE
Start: 2023-11-13 | End: 2023-11-13

## 2023-11-13 RX ORDER — SODIUM CHLORIDE 9 MG/ML
INJECTION, SOLUTION INTRAVENOUS CONTINUOUS
OUTPATIENT
Start: 2023-11-13

## 2023-12-07 ENCOUNTER — HOSPITAL ENCOUNTER (OUTPATIENT)
Dept: INFUSION THERAPY | Age: 53
Setting detail: INFUSION SERIES
Discharge: HOME | End: 2023-12-07

## 2023-12-07 DIAGNOSIS — J45.50 SEVERE PERSISTENT ASTHMA, UNSPECIFIED WHETHER COMPLICATED: Primary | ICD-10-CM

## 2023-12-07 RX ORDER — SODIUM CHLORIDE 0.9 % (FLUSH) 0.9 %
5-40 SYRINGE (ML) INJECTION PRN
Status: DISCONTINUED | OUTPATIENT
Start: 2023-12-07 | End: 2023-12-10 | Stop reason: HOSPADM

## 2023-12-07 RX ORDER — HEPARIN SODIUM (PORCINE) LOCK FLUSH IV SOLN 100 UNIT/ML 100 UNIT/ML
500 SOLUTION INTRAVENOUS PRN
Start: 2023-12-14

## 2023-12-07 RX ORDER — ACETAMINOPHEN 325 MG/1
650 TABLET ORAL
OUTPATIENT
Start: 2023-12-14

## 2023-12-07 RX ORDER — HYDROXYZINE HYDROCHLORIDE 10 MG/1
10 TABLET, FILM COATED ORAL ONCE
Start: 2023-12-14 | End: 2023-12-14

## 2023-12-07 RX ORDER — DIPHENHYDRAMINE HYDROCHLORIDE 50 MG/ML
50 INJECTION INTRAMUSCULAR; INTRAVENOUS
OUTPATIENT
Start: 2023-12-14

## 2023-12-07 RX ORDER — DIPHENHYDRAMINE HCL 25 MG
25 TABLET ORAL ONCE
Status: DISCONTINUED | OUTPATIENT
Start: 2023-12-07 | End: 2023-12-10 | Stop reason: HOSPADM

## 2023-12-07 RX ORDER — 0.9 % SODIUM CHLORIDE 0.9 %
250 INTRAVENOUS SOLUTION INTRAVENOUS ONCE
Status: DISCONTINUED | OUTPATIENT
Start: 2023-12-07 | End: 2023-12-10 | Stop reason: HOSPADM

## 2023-12-07 RX ORDER — HYDROXYZINE HYDROCHLORIDE 10 MG/1
10 TABLET, FILM COATED ORAL ONCE
Status: DISCONTINUED | OUTPATIENT
Start: 2023-12-07 | End: 2023-12-10 | Stop reason: HOSPADM

## 2023-12-07 RX ORDER — MEPERIDINE HYDROCHLORIDE 25 MG/ML
25 INJECTION INTRAMUSCULAR; INTRAVENOUS; SUBCUTANEOUS ONCE
Start: 2023-12-14 | End: 2023-12-14

## 2023-12-07 RX ORDER — DIPHENHYDRAMINE HCL 25 MG
25 TABLET ORAL ONCE
Start: 2023-12-14 | End: 2023-12-14

## 2023-12-07 RX ORDER — ALBUTEROL SULFATE 90 UG/1
4 AEROSOL, METERED RESPIRATORY (INHALATION) PRN
OUTPATIENT
Start: 2023-12-14

## 2023-12-07 RX ORDER — ONDANSETRON 2 MG/ML
8 INJECTION INTRAMUSCULAR; INTRAVENOUS
OUTPATIENT
Start: 2023-12-14

## 2023-12-07 RX ORDER — EPINEPHRINE 1 MG/ML
0.3 INJECTION, SOLUTION, CONCENTRATE INTRAVENOUS PRN
OUTPATIENT
Start: 2023-12-14

## 2023-12-07 RX ORDER — SODIUM CHLORIDE 9 MG/ML
INJECTION, SOLUTION INTRAVENOUS CONTINUOUS
OUTPATIENT
Start: 2023-12-14

## 2023-12-07 RX ORDER — SODIUM CHLORIDE 0.9 % (FLUSH) 0.9 %
5-40 SYRINGE (ML) INJECTION PRN
Start: 2023-12-14

## 2023-12-07 RX ORDER — 0.9 % SODIUM CHLORIDE 0.9 %
250 INTRAVENOUS SOLUTION INTRAVENOUS ONCE
Start: 2023-12-14 | End: 2023-12-14

## 2023-12-11 ENCOUNTER — HOSPITAL ENCOUNTER (OUTPATIENT)
Dept: INFUSION THERAPY | Age: 53
Setting detail: INFUSION SERIES
Discharge: HOME | End: 2023-12-11
Payer: MEDICAID

## 2023-12-11 VITALS
RESPIRATION RATE: 16 BRPM | HEART RATE: 71 BPM | DIASTOLIC BLOOD PRESSURE: 69 MMHG | SYSTOLIC BLOOD PRESSURE: 115 MMHG | OXYGEN SATURATION: 100 % | TEMPERATURE: 97.5 F

## 2023-12-11 DIAGNOSIS — J45.50 SEVERE PERSISTENT ASTHMA, UNSPECIFIED WHETHER COMPLICATED: Primary | ICD-10-CM

## 2023-12-11 PROCEDURE — 6360000002 HC RX W HCPCS: Performed by: INTERNAL MEDICINE

## 2023-12-11 PROCEDURE — 2580000003 HC RX 258: Performed by: INTERNAL MEDICINE

## 2023-12-11 PROCEDURE — 96361 HYDRATE IV INFUSION ADD-ON: CPT

## 2023-12-11 PROCEDURE — 6370000000 HC RX 637 (ALT 250 FOR IP): Performed by: INTERNAL MEDICINE

## 2023-12-11 PROCEDURE — 96372 THER/PROPH/DIAG INJ SC/IM: CPT

## 2023-12-11 PROCEDURE — 96360 HYDRATION IV INFUSION INIT: CPT

## 2023-12-11 PROCEDURE — 2580000003 HC RX 258

## 2023-12-11 RX ORDER — SODIUM CHLORIDE 0.9 % (FLUSH) 0.9 %
SYRINGE (ML) INJECTION
Status: COMPLETED
Start: 2023-12-11 | End: 2023-12-11

## 2023-12-11 RX ORDER — 0.9 % SODIUM CHLORIDE 0.9 %
250 INTRAVENOUS SOLUTION INTRAVENOUS ONCE
Status: COMPLETED | OUTPATIENT
Start: 2023-12-11 | End: 2023-12-11

## 2023-12-11 RX ORDER — HYDROXYZINE HYDROCHLORIDE 10 MG/1
10 TABLET, FILM COATED ORAL ONCE
Start: 2024-01-01 | End: 2024-01-01

## 2023-12-11 RX ORDER — HEPARIN SODIUM (PORCINE) LOCK FLUSH IV SOLN 100 UNIT/ML 100 UNIT/ML
500 SOLUTION INTRAVENOUS PRN
Start: 2024-01-01

## 2023-12-11 RX ORDER — EPINEPHRINE 1 MG/ML
0.3 INJECTION, SOLUTION, CONCENTRATE INTRAVENOUS PRN
OUTPATIENT
Start: 2024-01-01

## 2023-12-11 RX ORDER — SODIUM CHLORIDE 0.9 % (FLUSH) 0.9 %
5-40 SYRINGE (ML) INJECTION PRN
Start: 2024-01-01

## 2023-12-11 RX ORDER — DIPHENHYDRAMINE HYDROCHLORIDE 50 MG/ML
50 INJECTION INTRAMUSCULAR; INTRAVENOUS
OUTPATIENT
Start: 2024-01-01

## 2023-12-11 RX ORDER — 0.9 % SODIUM CHLORIDE 0.9 %
250 INTRAVENOUS SOLUTION INTRAVENOUS ONCE
Start: 2024-01-01 | End: 2024-01-01

## 2023-12-11 RX ORDER — DIPHENHYDRAMINE HCL 25 MG
25 TABLET ORAL ONCE
Start: 2024-01-01 | End: 2024-01-01

## 2023-12-11 RX ORDER — ONDANSETRON 2 MG/ML
8 INJECTION INTRAMUSCULAR; INTRAVENOUS
OUTPATIENT
Start: 2024-01-01

## 2023-12-11 RX ORDER — DIPHENHYDRAMINE HCL 25 MG
25 TABLET ORAL ONCE
Status: COMPLETED | OUTPATIENT
Start: 2023-12-11 | End: 2023-12-11

## 2023-12-11 RX ORDER — HYDROXYZINE HYDROCHLORIDE 10 MG/1
10 TABLET, FILM COATED ORAL ONCE
Status: COMPLETED | OUTPATIENT
Start: 2023-12-11 | End: 2023-12-11

## 2023-12-11 RX ORDER — MEPERIDINE HYDROCHLORIDE 25 MG/ML
25 INJECTION INTRAMUSCULAR; INTRAVENOUS; SUBCUTANEOUS ONCE
Start: 2024-01-01 | End: 2024-01-01

## 2023-12-11 RX ORDER — SODIUM CHLORIDE 9 MG/ML
INJECTION, SOLUTION INTRAVENOUS CONTINUOUS
OUTPATIENT
Start: 2024-01-01

## 2023-12-11 RX ORDER — ALBUTEROL SULFATE 90 UG/1
4 AEROSOL, METERED RESPIRATORY (INHALATION) PRN
OUTPATIENT
Start: 2024-01-01

## 2023-12-11 RX ORDER — ACETAMINOPHEN 325 MG/1
650 TABLET ORAL
OUTPATIENT
Start: 2024-01-01

## 2023-12-11 RX ADMIN — TEZEPELUMAB-EKKO 210 MG: 210 INJECTION, SOLUTION SUBCUTANEOUS at 09:59

## 2023-12-11 RX ADMIN — HYDROXYZINE HYDROCHLORIDE 10 MG: 10 TABLET ORAL at 09:21

## 2023-12-11 RX ADMIN — DIPHENHYDRAMINE HYDROCHLORIDE 25 MG: 25 TABLET ORAL at 09:21

## 2023-12-11 RX ADMIN — SODIUM CHLORIDE 250 ML: 9 INJECTION, SOLUTION INTRAVENOUS at 09:24

## 2023-12-11 RX ADMIN — SODIUM CHLORIDE, PRESERVATIVE FREE 10 ML: 5 INJECTION INTRAVENOUS at 09:19

## 2023-12-11 NOTE — PROGRESS NOTES
Patient tolerated IV saline bolus and tezspire injection well. Remained on unit for 45 minutes after treatment as she was waiting on her  to pick her back up. Patient alert and oriented x3. No distress noted. Vital signs stable. Patient denies any new or worsening pain. Offered patient education and/or discharge material. Patient declined. Patient denies any needs. All questions answered. D/C in stable condition.

## 2023-12-13 ENCOUNTER — OFFICE VISIT (OUTPATIENT)
Dept: PAIN MANAGEMENT | Age: 53
End: 2023-12-13
Payer: MEDICAID

## 2023-12-13 VITALS
DIASTOLIC BLOOD PRESSURE: 60 MMHG | HEIGHT: 59 IN | HEART RATE: 81 BPM | OXYGEN SATURATION: 95 % | RESPIRATION RATE: 18 BRPM | BODY MASS INDEX: 35.88 KG/M2 | WEIGHT: 178 LBS | SYSTOLIC BLOOD PRESSURE: 96 MMHG

## 2023-12-13 DIAGNOSIS — M54.16 LUMBAR RADICULOPATHY: ICD-10-CM

## 2023-12-13 DIAGNOSIS — G89.4 CHRONIC PAIN SYNDROME: Primary | ICD-10-CM

## 2023-12-13 DIAGNOSIS — M47.816 LUMBAR SPONDYLOSIS: ICD-10-CM

## 2023-12-13 DIAGNOSIS — M51.9 LUMBAR DISC DISORDER: ICD-10-CM

## 2023-12-13 DIAGNOSIS — M50.30 DDD (DEGENERATIVE DISC DISEASE), CERVICAL: ICD-10-CM

## 2023-12-13 DIAGNOSIS — M47.812 FACET ARTHROPATHY, CERVICAL: ICD-10-CM

## 2023-12-13 DIAGNOSIS — M47.812 CERVICAL FACET JOINT SYNDROME: ICD-10-CM

## 2023-12-13 DIAGNOSIS — M16.12 PRIMARY OSTEOARTHRITIS OF LEFT HIP: ICD-10-CM

## 2023-12-13 DIAGNOSIS — M79.7 FIBROMYALGIA: ICD-10-CM

## 2023-12-13 DIAGNOSIS — M47.816 LUMBAR FACET ARTHROPATHY: ICD-10-CM

## 2023-12-13 DIAGNOSIS — M54.12 CERVICAL RADICULOPATHY: ICD-10-CM

## 2023-12-13 PROCEDURE — 99213 OFFICE O/P EST LOW 20 MIN: CPT | Performed by: PHYSICIAN ASSISTANT

## 2023-12-13 PROCEDURE — G8484 FLU IMMUNIZE NO ADMIN: HCPCS | Performed by: PHYSICIAN ASSISTANT

## 2023-12-13 PROCEDURE — G8417 CALC BMI ABV UP PARAM F/U: HCPCS | Performed by: PHYSICIAN ASSISTANT

## 2023-12-13 PROCEDURE — 1036F TOBACCO NON-USER: CPT | Performed by: PHYSICIAN ASSISTANT

## 2023-12-13 PROCEDURE — G8427 DOCREV CUR MEDS BY ELIG CLIN: HCPCS | Performed by: PHYSICIAN ASSISTANT

## 2023-12-13 PROCEDURE — 3017F COLORECTAL CA SCREEN DOC REV: CPT | Performed by: PHYSICIAN ASSISTANT

## 2023-12-13 RX ORDER — OXYCODONE HYDROCHLORIDE 5 MG/1
5 TABLET ORAL DAILY PRN
Qty: 30 TABLET | Refills: 0 | Status: SHIPPED | OUTPATIENT
Start: 2023-12-13 | End: 2024-01-12

## 2023-12-13 NOTE — PROGRESS NOTES
Derrick George presents to the [address] [place] [address] on 12/13/2023. Roopa Smith is complaining of pain neck and back. The pain is constant. The pain is described as sharp and penetrating. Pain is rated on her best day at a 3, on her worst day at a 8, and on average at a 5 on the VAS scale. She took her last dose of Motrin, Tylenol, and oxycodone today. Any procedures since your last visit: No    She is  on NSAIDS and  is  on anticoagulation medications to include none   Pacemaker or defibrillator: No  Medication Contract and Consent for Opioid Use Documents Filed       Patient Documents       Type of Document Status Date Received Received By Description    Medication Contract Received 7/18/2019 11:59 AM Kim SINGLETON 7/18/19 medication contract    Medication Contract Received 10/9/2020  3:01 PM LORENZO [address] pain pt agreement    Medication Contract Received 11/5/2021 11:29 AM Sarah MUNIZ Pain Mgmt Patient Agreement 11.5.2021    Medication Contract Received 12/13/2021  4:21 PM LEONARDA PINA pain management    Consent Opioid Use Received 12/12/2022 12:23 PM ESTEFANY ARAMBULA pt agreement 12/12/22                       LMP 03/27/2008      Patient's last menstrual period was 03/27/2008.
subcutaneous nodules, and good skin turgor    Impression:      Neck and low back pain with radiation to the Left upper and lower extremity  Cervical spine MRI 2019 Multilevel degenerative disc disease with moderate to severe stenosis at C3-4/C4-5 and C5-6  Lumbar spine CT 2017 Mild degenerative disc disease and facet arthropathy most pronounced at L4-5  Patient had seen neurosurgery and surgery C3-4/C4-5 and C5-6 ACDF was recommended. Patient will call to be set up for a left lumbar TFESI L4 and L5. She is currently on antibiotics for an infected tooth and having an extraction next week. Will need to be deemed infection free before we can proceed. Today - she states that rheum wants her to hold off for right now. Plan:  Follo up for neck/low back and Left hip pain  Continue with Oxycodone 5 mg QD PRN  Continue with Lidocaine patches  Avoid NSAIDs (H/O GI ulcers)              Aquatherapy - finished. Helped. Roque Chawla regarding her hip              Zynex TENs unit - cost prohibitive  OARRS report reviewed   Patient encouraged to stay active and to lose weight. Currently in [facility]. Treatment plan discussed with the patient and her including medications side effects. Controlled Substance Monitoring:    Acute and Chronic Pain Monitoring:   RX Monitoring Periodic Controlled Substance Monitoring   12/13/2023   3:30 PM Possible medication side effects, risk of tolerance/dependence & alternative treatments discussed. ;No signs of potential drug abuse or diversion identified. ;Assessed functional status (ability to engage in work or other purposeful activities, the pain intensity and its interference with activities of daily living, quality of family life and social activities, and the physical activity); Obtaining appropriate analgesic effect of treatment.                We discussed with the patient that combining opioids, benzodiazepines,

## 2023-12-14 ENCOUNTER — TELEPHONE (OUTPATIENT)
Dept: PAIN MANAGEMENT | Age: 53
End: 2023-12-14

## 2023-12-14 NOTE — TELEPHONE ENCOUNTER
Called patient. Discussed case with Dr. Nelly Leyva who does not recommend increase in her pain medication at this time. She understands.

## 2024-01-10 ENCOUNTER — TELEPHONE (OUTPATIENT)
Dept: SURGERY | Age: 54
End: 2024-01-10

## 2024-01-10 ENCOUNTER — OFFICE VISIT (OUTPATIENT)
Dept: PAIN MANAGEMENT | Age: 54
End: 2024-01-10
Payer: MEDICAID

## 2024-01-10 ENCOUNTER — PREP FOR PROCEDURE (OUTPATIENT)
Dept: PAIN MANAGEMENT | Age: 54
End: 2024-01-10

## 2024-01-10 VITALS
RESPIRATION RATE: 16 BRPM | SYSTOLIC BLOOD PRESSURE: 112 MMHG | DIASTOLIC BLOOD PRESSURE: 68 MMHG | TEMPERATURE: 96.9 F | OXYGEN SATURATION: 98 % | HEART RATE: 76 BPM

## 2024-01-10 DIAGNOSIS — M54.16 LUMBAR RADICULOPATHY: ICD-10-CM

## 2024-01-10 DIAGNOSIS — G89.4 CHRONIC PAIN SYNDROME: ICD-10-CM

## 2024-01-10 DIAGNOSIS — M47.812 FACET ARTHROPATHY, CERVICAL: ICD-10-CM

## 2024-01-10 DIAGNOSIS — G89.4 CHRONIC PAIN SYNDROME: Primary | ICD-10-CM

## 2024-01-10 DIAGNOSIS — K60.0 ACUTE ANAL FISSURE: ICD-10-CM

## 2024-01-10 DIAGNOSIS — M47.816 LUMBAR SPONDYLOSIS: ICD-10-CM

## 2024-01-10 DIAGNOSIS — M51.9 LUMBAR DISC DISORDER: ICD-10-CM

## 2024-01-10 DIAGNOSIS — M47.812 CERVICAL FACET JOINT SYNDROME: ICD-10-CM

## 2024-01-10 DIAGNOSIS — M50.30 DDD (DEGENERATIVE DISC DISEASE), CERVICAL: ICD-10-CM

## 2024-01-10 DIAGNOSIS — M79.7 FIBROMYALGIA: ICD-10-CM

## 2024-01-10 DIAGNOSIS — M16.12 PRIMARY OSTEOARTHRITIS OF LEFT HIP: ICD-10-CM

## 2024-01-10 DIAGNOSIS — M54.12 CERVICAL RADICULOPATHY: ICD-10-CM

## 2024-01-10 DIAGNOSIS — M47.816 LUMBAR FACET ARTHROPATHY: ICD-10-CM

## 2024-01-10 PROCEDURE — G8427 DOCREV CUR MEDS BY ELIG CLIN: HCPCS | Performed by: PHYSICIAN ASSISTANT

## 2024-01-10 PROCEDURE — G8417 CALC BMI ABV UP PARAM F/U: HCPCS | Performed by: PHYSICIAN ASSISTANT

## 2024-01-10 PROCEDURE — 1036F TOBACCO NON-USER: CPT | Performed by: PHYSICIAN ASSISTANT

## 2024-01-10 PROCEDURE — 3017F COLORECTAL CA SCREEN DOC REV: CPT | Performed by: PHYSICIAN ASSISTANT

## 2024-01-10 PROCEDURE — 99213 OFFICE O/P EST LOW 20 MIN: CPT | Performed by: PHYSICIAN ASSISTANT

## 2024-01-10 PROCEDURE — G8484 FLU IMMUNIZE NO ADMIN: HCPCS | Performed by: PHYSICIAN ASSISTANT

## 2024-01-10 PROCEDURE — 99213 OFFICE O/P EST LOW 20 MIN: CPT

## 2024-01-10 RX ORDER — OXYCODONE HYDROCHLORIDE 5 MG/1
5 TABLET ORAL DAILY PRN
Qty: 30 TABLET | Refills: 0 | Status: SHIPPED | OUTPATIENT
Start: 2024-01-12 | End: 2024-02-11

## 2024-01-10 RX ORDER — ASPIRIN 81 MG/1
81 TABLET ORAL PRN
COMMUNITY

## 2024-01-10 NOTE — PROGRESS NOTES
Ashlee Bustos presents to the [facility] on 1/10/2024. Ashlee is complaining of pain in her neck and lower back. The pain is constant. The pain is described as squeezing and pressure. Pain is rated on her best day at a 4, on her worst day at a 10, and on average at a 7 on the VAS scale. She took her last dose of oxycodone today.      Any procedures since your last visit: No.    She is not on NSAIDS and  is  on anticoagulation medications to include ASA and is managed by Abdi Grijalva DO.     Pacemaker or defibrillator: No Physician managing device is n/a.    Medication Contract and Consent for Opioid Use Documents Filed       Patient Documents       Type of Document Status Date Received Received By Description    Medication Contract Received 7/18/2019 11:59 AM DIANA KWON 7/18/19 medication contract    Medication Contract Received 10/9/2020  3:01 PM ALEXEI VENTURA pain pt agreement    Medication Contract Received 11/5/2021 11:29 AM SNOW MUNIZ Mgmt Patient Agreement 11.5.2021    Medication Contract Received 12/13/2021  4:21 PM LEONARDA PINA pain management    Consent Opioid Use Received 12/12/2022 12:23 PM ESTEFANY ARAMBULA pt agreement 12/12/22                       /68   Pulse 76   Temp 96.9 °F (36.1 °C) (Infrared)   Resp 16   LMP 03/27/2008   SpO2 98%      Patient's last menstrual period was 03/27/2008.    
adult (HCC)     COVID 2021    vomiting, diarrhea, body aches, fever, cough- coulc not confirm month    Disorder of thyroid gland 09/11/2012    no medication    GERD (gastroesophageal reflux disease)     Headache(784.0)     Irritable bowel syndrome     Numbness and tingling in right hand 03/06/2020    CIERA on CPAP     uses machine    Seizure disorder (HCC)     not on medications for this; last seizure 2018; etiology?    Vitamin D deficiency 12/03/2014       Past Surgical History:   Procedure Laterality Date    CHOLECYSTECTOMY, LAPAROSCOPIC N/A 05/17/2022    LAPAROSCOPIC ROBOTIC XI ASSISTED CHOLECYSTECTOMY performed by Saima Milner MD at [facility] OR    COLONOSCOPY  03/09/2016    COLONOSCOPY  07/10/2017    COLONOSCOPY N/A 04/01/2022    COLONOSCOPY DIAGNOSTIC WITH BANDING performed by Saima Milner MD at [facility] ENDOSCOPY    ENDOSCOPY, COLON, DIAGNOSTIC      HIP SURGERY Left 08/10/2020    LEFT GREATER TROCHANTERIC BURSAE INJECTION X-RAY (CPT 44558) performed by James Steele MD at [facility] OR    HIP SURGERY Left 09/12/2022    LEFT GREATER TROCHANTERIC BURSAE INJECTION UNDER XRAY WITH SEDATION (CPT 17626) performed by James Steele MD at [facility] OR    NERVE BLOCK N/A     epidural c6-c7    NERVE BLOCK Bilateral 08/05/2021    BILATERAL CERVICAL MEDIAL BRANCH FACET BLOCK C4 C5 C6 WITH SEDATION WITH LEFT TROCHANTERIC BURSA INJECTION(CPT 31759) performed by James Steele MD at [facility] OR    PAIN MANAGEMENT PROCEDURE N/A 04/19/2021    CERVICAL EPIDURAL INTERLAMINAR STEROID INJECTION C6-C7 WITH SEDATION performed by James Steele MD at [facility] OR    PAIN MANAGEMENT PROCEDURE Left 01/31/2022    CERVICAL EPIDURAL STEROID INJECTION LEFT PARAMEDIAN C6-C7 WITH SEDATION performed by James Steele MD at [facility] OR    TUBAL LIGATION      UPPER GASTROINTESTINAL ENDOSCOPY  07/10/2017       Prior to Admission medications    Medication Sig Start Date End Date Taking? Authorizing Provider   aspirin

## 2024-01-10 NOTE — TELEPHONE ENCOUNTER
Prior Authorization Form:      DEMOGRAPHICS:                     Patient Name:  Ashlee Deal  Patient :  1970            Insurance:  Payor: TRAFFIQ PL / Plan: TRAFFIQ [place]AN [place] / Product Type: *No Product type* /   Insurance ID Number:    Payer/Plan Subscr  Sex Relation Sub. Ins. ID Effective Group Num   1. [facility]* ASHLEE DEAL 1970 Female Self [id number]                                    [id number]         DIAGNOSIS & PROCEDURE:                       Procedure/Operation: sigmoid with botox           CPT Code: 33945    Diagnosis:  anal fissure    ICD10 Code: k60.0    Location:  [place]    Surgeon:  artem griffin    SCHEDULING INFORMATION:                          Date: 2024    Time: 9:00              Anesthesia:  MAC/TIVA                                                       Status:  Outpatient        Special Comments:         Electronically signed by Janice Butcher MA on 1/10/2024 at 2:46 PM

## 2024-01-11 ENCOUNTER — HOSPITAL ENCOUNTER (OUTPATIENT)
Dept: INFUSION THERAPY | Age: 54
Setting detail: INFUSION SERIES
Discharge: HOME | End: 2024-01-11
Payer: MEDICAID

## 2024-01-11 VITALS
OXYGEN SATURATION: 98 % | TEMPERATURE: 97.3 F | DIASTOLIC BLOOD PRESSURE: 74 MMHG | SYSTOLIC BLOOD PRESSURE: 109 MMHG | HEART RATE: 66 BPM | RESPIRATION RATE: 20 BRPM

## 2024-01-11 DIAGNOSIS — J45.50 SEVERE PERSISTENT ASTHMA, UNSPECIFIED WHETHER COMPLICATED: Primary | ICD-10-CM

## 2024-01-11 PROCEDURE — 96361 HYDRATE IV INFUSION ADD-ON: CPT

## 2024-01-11 PROCEDURE — 6370000000 HC RX 637 (ALT 250 FOR IP): Performed by: INTERNAL MEDICINE

## 2024-01-11 PROCEDURE — 6360000002 HC RX W HCPCS: Performed by: INTERNAL MEDICINE

## 2024-01-11 PROCEDURE — 96360 HYDRATION IV INFUSION INIT: CPT

## 2024-01-11 PROCEDURE — 2580000003 HC RX 258: Performed by: INTERNAL MEDICINE

## 2024-01-11 PROCEDURE — 96372 THER/PROPH/DIAG INJ SC/IM: CPT

## 2024-01-11 RX ORDER — HYDROXYZINE HYDROCHLORIDE 10 MG/1
10 TABLET, FILM COATED ORAL ONCE
Start: 2024-02-08 | End: 2024-02-08

## 2024-01-11 RX ORDER — 0.9 % SODIUM CHLORIDE 0.9 %
250 INTRAVENOUS SOLUTION INTRAVENOUS ONCE
Start: 2024-02-08 | End: 2024-02-08

## 2024-01-11 RX ORDER — ACETAMINOPHEN 325 MG/1
650 TABLET ORAL
OUTPATIENT
Start: 2024-02-08

## 2024-01-11 RX ORDER — SODIUM CHLORIDE 9 MG/ML
INJECTION, SOLUTION INTRAVENOUS CONTINUOUS
OUTPATIENT
Start: 2024-02-08

## 2024-01-11 RX ORDER — TEZEPELUMAB-EKKO 210 MG/1.9ML
210 INJECTION, SOLUTION SUBCUTANEOUS
COMMUNITY

## 2024-01-11 RX ORDER — ONDANSETRON 2 MG/ML
8 INJECTION INTRAMUSCULAR; INTRAVENOUS
OUTPATIENT
Start: 2024-02-08

## 2024-01-11 RX ORDER — HYDROXYZINE HYDROCHLORIDE 10 MG/1
10 TABLET, FILM COATED ORAL ONCE
Status: COMPLETED | OUTPATIENT
Start: 2024-01-11 | End: 2024-01-11

## 2024-01-11 RX ORDER — ALBUTEROL SULFATE 90 UG/1
4 AEROSOL, METERED RESPIRATORY (INHALATION) PRN
OUTPATIENT
Start: 2024-02-08

## 2024-01-11 RX ORDER — DIPHENHYDRAMINE HYDROCHLORIDE 50 MG/ML
50 INJECTION INTRAMUSCULAR; INTRAVENOUS
OUTPATIENT
Start: 2024-02-08

## 2024-01-11 RX ORDER — DIPHENHYDRAMINE HCL 25 MG
25 TABLET ORAL ONCE
Status: COMPLETED | OUTPATIENT
Start: 2024-01-11 | End: 2024-01-11

## 2024-01-11 RX ORDER — MEPERIDINE HYDROCHLORIDE 25 MG/ML
25 INJECTION INTRAMUSCULAR; INTRAVENOUS; SUBCUTANEOUS ONCE
Start: 2024-02-08 | End: 2024-02-08

## 2024-01-11 RX ORDER — 0.9 % SODIUM CHLORIDE 0.9 %
250 INTRAVENOUS SOLUTION INTRAVENOUS ONCE
Status: COMPLETED | OUTPATIENT
Start: 2024-01-11 | End: 2024-01-11

## 2024-01-11 RX ORDER — DIPHENHYDRAMINE HCL 25 MG
25 TABLET ORAL ONCE
Start: 2024-02-08 | End: 2024-02-08

## 2024-01-11 RX ORDER — HEPARIN SODIUM (PORCINE) LOCK FLUSH IV SOLN 100 UNIT/ML 100 UNIT/ML
500 SOLUTION INTRAVENOUS PRN
Start: 2024-02-08

## 2024-01-11 RX ORDER — SODIUM CHLORIDE 0.9 % (FLUSH) 0.9 %
5-40 SYRINGE (ML) INJECTION PRN
Start: 2024-02-08

## 2024-01-11 RX ORDER — EPINEPHRINE 1 MG/ML
0.3 INJECTION, SOLUTION, CONCENTRATE INTRAVENOUS PRN
OUTPATIENT
Start: 2024-02-08

## 2024-01-11 RX ORDER — SODIUM CHLORIDE 0.9 % (FLUSH) 0.9 %
5-40 SYRINGE (ML) INJECTION PRN
Status: DISCONTINUED | OUTPATIENT
Start: 2024-01-11 | End: 2024-01-12 | Stop reason: HOSPADM

## 2024-01-11 RX ADMIN — HYDROXYZINE HYDROCHLORIDE 10 MG: 10 TABLET ORAL at 14:23

## 2024-01-11 RX ADMIN — SODIUM CHLORIDE 250 ML: 9 INJECTION, SOLUTION INTRAVENOUS at 14:37

## 2024-01-11 RX ADMIN — TEZEPELUMAB-EKKO 210 MG: 210 INJECTION, SOLUTION SUBCUTANEOUS at 15:10

## 2024-01-11 RX ADMIN — DIPHENHYDRAMINE HYDROCHLORIDE 25 MG: 25 TABLET ORAL at 14:23

## 2024-01-11 RX ADMIN — SODIUM CHLORIDE, PRESERVATIVE FREE 10 ML: 5 INJECTION INTRAVENOUS at 14:36

## 2024-01-11 NOTE — PROGRESS NOTES
Patient tolerated tezspire injection and normal saline infusion well. She has had no issues and does not feel she needs to stay on unit for any observation following injection and wanted to leave following injection. Patient alert and oriented x3. No distress noted. Vital signs stable. Patient denies any new or worsening pain. Offered patient education and/or discharge material. Patient declined. Patient denies any needs. All questions answered. D/C in stable condition with her .

## 2024-02-07 ENCOUNTER — OFFICE VISIT (OUTPATIENT)
Dept: PAIN MANAGEMENT | Age: 54
End: 2024-02-07
Payer: MEDICAID

## 2024-02-07 VITALS
OXYGEN SATURATION: 95 % | HEIGHT: 57 IN | DIASTOLIC BLOOD PRESSURE: 70 MMHG | HEART RATE: 77 BPM | BODY MASS INDEX: 33.87 KG/M2 | WEIGHT: 157 LBS | TEMPERATURE: 97.3 F | SYSTOLIC BLOOD PRESSURE: 120 MMHG | RESPIRATION RATE: 18 BRPM

## 2024-02-07 DIAGNOSIS — M47.812 CERVICAL FACET JOINT SYNDROME: ICD-10-CM

## 2024-02-07 DIAGNOSIS — M16.12 PRIMARY OSTEOARTHRITIS OF LEFT HIP: ICD-10-CM

## 2024-02-07 DIAGNOSIS — M51.9 LUMBAR DISC DISORDER: ICD-10-CM

## 2024-02-07 DIAGNOSIS — G89.4 CHRONIC PAIN SYNDROME: Primary | ICD-10-CM

## 2024-02-07 DIAGNOSIS — M47.816 LUMBAR SPONDYLOSIS: ICD-10-CM

## 2024-02-07 DIAGNOSIS — M47.812 FACET ARTHROPATHY, CERVICAL: ICD-10-CM

## 2024-02-07 DIAGNOSIS — M47.816 LUMBAR FACET ARTHROPATHY: ICD-10-CM

## 2024-02-07 DIAGNOSIS — M50.30 DDD (DEGENERATIVE DISC DISEASE), CERVICAL: ICD-10-CM

## 2024-02-07 DIAGNOSIS — M54.16 LUMBAR RADICULOPATHY: ICD-10-CM

## 2024-02-07 DIAGNOSIS — M54.12 CERVICAL RADICULOPATHY: ICD-10-CM

## 2024-02-07 DIAGNOSIS — M79.7 FIBROMYALGIA: ICD-10-CM

## 2024-02-07 PROCEDURE — 1036F TOBACCO NON-USER: CPT | Performed by: PHYSICIAN ASSISTANT

## 2024-02-07 PROCEDURE — 3017F COLORECTAL CA SCREEN DOC REV: CPT | Performed by: PHYSICIAN ASSISTANT

## 2024-02-07 PROCEDURE — G8427 DOCREV CUR MEDS BY ELIG CLIN: HCPCS | Performed by: PHYSICIAN ASSISTANT

## 2024-02-07 PROCEDURE — 99213 OFFICE O/P EST LOW 20 MIN: CPT | Performed by: PHYSICIAN ASSISTANT

## 2024-02-07 PROCEDURE — G8417 CALC BMI ABV UP PARAM F/U: HCPCS | Performed by: PHYSICIAN ASSISTANT

## 2024-02-07 PROCEDURE — G8484 FLU IMMUNIZE NO ADMIN: HCPCS | Performed by: PHYSICIAN ASSISTANT

## 2024-02-07 RX ORDER — OXYCODONE HYDROCHLORIDE 5 MG/1
5 TABLET ORAL DAILY PRN
Qty: 30 TABLET | Refills: 0 | Status: SHIPPED | OUTPATIENT
Start: 2024-02-15 | End: 2024-03-16

## 2024-02-07 RX ORDER — LIDOCAINE 50 MG/G
1 PATCH TOPICAL DAILY
Qty: 30 PATCH | Refills: 0 | Status: SHIPPED | OUTPATIENT
Start: 2024-02-07 | End: 2024-03-08

## 2024-02-07 NOTE — PROGRESS NOTES
Ashlee Bustos presents to the [facility] on 2/7/2024. Ashlee is complaining of pain neck and low back. The pain is constant. The pain is described as squeezing and pressure. Pain is rated on her best day at a 3, on her worst day at a 8, and on average at a 4 on the VAS scale. She took her last dose of oxycodone today.      Any procedures since your last visit: No  She is not on NSAIDS and  is  on anticoagulation medications to include ASA and is managed by PCP.     Pacemaker or defibrillator: No  Medication Contract and Consent for Opioid Use Documents Filed       Patient Documents       Type of Document Status Date Received Received By Description    Medication Contract Received 7/18/2019 11:59 AM DIANA KWON 7/18/19 medication contract    Medication Contract Received 10/9/2020  3:01 PM ALEXEI VENTURA pain pt agreement    Medication Contract Received 11/5/2021 11:29 AM SNOW MUNIZ Pain Mgmt Patient Agreement 11.5.2021    Medication Contract Received 12/13/2021  4:21 PM LEONARDA PINA pain management    Consent Opioid Use Received 12/12/2022 12:23 PM ESTEFANY ARAMBULA pt agreement 12/12/22    Consent Opioid Use Received 1/10/2024  4:08 PM ESTEFANY ARAMBULA Consent Opioid Use                       LMP 03/27/2008      Patient's last menstrual period was 03/27/2008.    
of daily living, quality of family life and social activities, and the physical activity);Obtaining appropriate analgesic effect of treatment.             We discussed with the patient that combining opioids, benzodiazepines, alcohol, illicit drugs or sleep aids increases the risk of respiratory depression including death. We discussed that these medications may cause drowsiness, sedation or dizziness and have counseled the patient not to drive or operate machinery. We have discussed that these medications will be prescribed only by one provider. We have discussed with the patient about age related risk factors and have thoroughly discussed the importance of taking these medications as prescribed. The patient verbalizes understanding.    ccreferring physic

## 2024-02-08 ENCOUNTER — HOSPITAL ENCOUNTER (OUTPATIENT)
Dept: INFUSION THERAPY | Age: 54
Setting detail: INFUSION SERIES
Discharge: HOME | End: 2024-02-08

## 2024-02-14 ENCOUNTER — HOSPITAL ENCOUNTER (OUTPATIENT)
Dept: INFUSION THERAPY | Age: 54
Setting detail: INFUSION SERIES
Discharge: HOME | End: 2024-02-14
Payer: MEDICAID

## 2024-02-14 VITALS
SYSTOLIC BLOOD PRESSURE: 129 MMHG | DIASTOLIC BLOOD PRESSURE: 70 MMHG | TEMPERATURE: 97.1 F | OXYGEN SATURATION: 99 % | RESPIRATION RATE: 18 BRPM | HEART RATE: 64 BPM

## 2024-02-14 DIAGNOSIS — J45.50 SEVERE PERSISTENT ASTHMA, UNSPECIFIED WHETHER COMPLICATED: Primary | ICD-10-CM

## 2024-02-14 PROCEDURE — 6360000002 HC RX W HCPCS: Performed by: INTERNAL MEDICINE

## 2024-02-14 PROCEDURE — 6370000000 HC RX 637 (ALT 250 FOR IP): Performed by: INTERNAL MEDICINE

## 2024-02-14 PROCEDURE — 96360 HYDRATION IV INFUSION INIT: CPT

## 2024-02-14 PROCEDURE — 2580000003 HC RX 258: Performed by: INTERNAL MEDICINE

## 2024-02-14 PROCEDURE — 96372 THER/PROPH/DIAG INJ SC/IM: CPT

## 2024-02-14 RX ORDER — SODIUM CHLORIDE 0.9 % (FLUSH) 0.9 %
5-40 SYRINGE (ML) INJECTION PRN
Start: 2024-03-06

## 2024-02-14 RX ORDER — DIPHENHYDRAMINE HCL 25 MG
25 TABLET ORAL ONCE
Status: COMPLETED | OUTPATIENT
Start: 2024-02-14 | End: 2024-02-14

## 2024-02-14 RX ORDER — SODIUM CHLORIDE 0.9 % (FLUSH) 0.9 %
5-40 SYRINGE (ML) INJECTION PRN
Status: DISCONTINUED | OUTPATIENT
Start: 2024-02-14 | End: 2024-02-15 | Stop reason: HOSPADM

## 2024-02-14 RX ORDER — HEPARIN SODIUM (PORCINE) LOCK FLUSH IV SOLN 100 UNIT/ML 100 UNIT/ML
500 SOLUTION INTRAVENOUS PRN
Start: 2024-03-06

## 2024-02-14 RX ORDER — SODIUM CHLORIDE 9 MG/ML
INJECTION, SOLUTION INTRAVENOUS CONTINUOUS
OUTPATIENT
Start: 2024-03-06

## 2024-02-14 RX ORDER — HYDROXYZINE HYDROCHLORIDE 10 MG/1
10 TABLET, FILM COATED ORAL ONCE
Start: 2024-03-06 | End: 2024-03-06

## 2024-02-14 RX ORDER — 0.9 % SODIUM CHLORIDE 0.9 %
250 INTRAVENOUS SOLUTION INTRAVENOUS ONCE
Status: COMPLETED | OUTPATIENT
Start: 2024-02-14 | End: 2024-02-14

## 2024-02-14 RX ORDER — HYDROXYZINE HYDROCHLORIDE 10 MG/1
10 TABLET, FILM COATED ORAL ONCE
Status: COMPLETED | OUTPATIENT
Start: 2024-02-14 | End: 2024-02-14

## 2024-02-14 RX ORDER — EPINEPHRINE 1 MG/ML
0.3 INJECTION, SOLUTION, CONCENTRATE INTRAVENOUS PRN
OUTPATIENT
Start: 2024-03-06

## 2024-02-14 RX ORDER — DIPHENHYDRAMINE HCL 25 MG
25 TABLET ORAL ONCE
Start: 2024-03-06 | End: 2024-03-06

## 2024-02-14 RX ORDER — MEPERIDINE HYDROCHLORIDE 25 MG/ML
25 INJECTION INTRAMUSCULAR; INTRAVENOUS; SUBCUTANEOUS ONCE
Start: 2024-03-06 | End: 2024-03-06

## 2024-02-14 RX ORDER — ONDANSETRON 2 MG/ML
8 INJECTION INTRAMUSCULAR; INTRAVENOUS
OUTPATIENT
Start: 2024-03-06

## 2024-02-14 RX ORDER — ALBUTEROL SULFATE 90 UG/1
4 AEROSOL, METERED RESPIRATORY (INHALATION) PRN
OUTPATIENT
Start: 2024-03-06

## 2024-02-14 RX ORDER — DIPHENHYDRAMINE HYDROCHLORIDE 50 MG/ML
50 INJECTION INTRAMUSCULAR; INTRAVENOUS
OUTPATIENT
Start: 2024-03-06

## 2024-02-14 RX ORDER — ACETAMINOPHEN 325 MG/1
650 TABLET ORAL
OUTPATIENT
Start: 2024-03-06

## 2024-02-14 RX ORDER — 0.9 % SODIUM CHLORIDE 0.9 %
250 INTRAVENOUS SOLUTION INTRAVENOUS ONCE
Start: 2024-03-06 | End: 2024-03-06

## 2024-02-14 RX ADMIN — HYDROXYZINE HYDROCHLORIDE 10 MG: 10 TABLET ORAL at 14:29

## 2024-02-14 RX ADMIN — SODIUM CHLORIDE, PRESERVATIVE FREE 10 ML: 5 INJECTION INTRAVENOUS at 14:26

## 2024-02-14 RX ADMIN — DIPHENHYDRAMINE HYDROCHLORIDE 25 MG: 25 TABLET ORAL at 14:29

## 2024-02-14 RX ADMIN — SODIUM CHLORIDE 250 ML: 9 INJECTION, SOLUTION INTRAVENOUS at 14:28

## 2024-02-14 RX ADMIN — TEZEPELUMAB-EKKO 210 MG: 210 INJECTION, SOLUTION SUBCUTANEOUS at 15:01

## 2024-02-14 NOTE — PROGRESS NOTES
Patient tolerated hydration and tezspire injection well. Patient alert and oriented x3. No distress noted. Vital signs stable. Patient denies any new or worsening pain.  Offered patient education and/or discharge material. Patient declined. Patient denies any needs. All questions answered. D/C in stable condition.

## 2024-03-10 PROCEDURE — 99285 EMERGENCY DEPT VISIT HI MDM: CPT

## 2024-03-10 PROCEDURE — 93005 ELECTROCARDIOGRAM TRACING: CPT | Performed by: NURSE PRACTITIONER

## 2024-03-11 ENCOUNTER — HOSPITAL ENCOUNTER (EMERGENCY)
Age: 54
Discharge: HOME | End: 2024-03-11
Attending: STUDENT IN AN ORGANIZED HEALTH CARE EDUCATION/TRAINING PROGRAM
Payer: MEDICAID

## 2024-03-11 ENCOUNTER — APPOINTMENT (OUTPATIENT)
Dept: CT IMAGING | Age: 54
End: 2024-03-11
Payer: MEDICAID

## 2024-03-11 ENCOUNTER — APPOINTMENT (OUTPATIENT)
Dept: GENERAL RADIOLOGY | Age: 54
End: 2024-03-11
Payer: MEDICAID

## 2024-03-11 VITALS
SYSTOLIC BLOOD PRESSURE: 122 MMHG | BODY MASS INDEX: 36.03 KG/M2 | DIASTOLIC BLOOD PRESSURE: 94 MMHG | HEART RATE: 82 BPM | OXYGEN SATURATION: 98 % | TEMPERATURE: 97.8 F | WEIGHT: 167 LBS | RESPIRATION RATE: 16 BRPM | HEIGHT: 57 IN

## 2024-03-11 DIAGNOSIS — K76.89 LIVER CYST: ICD-10-CM

## 2024-03-11 DIAGNOSIS — R07.9 CHEST PAIN, UNSPECIFIED TYPE: Primary | ICD-10-CM

## 2024-03-11 DIAGNOSIS — R09.1 PLEURISY: ICD-10-CM

## 2024-03-11 LAB
ALBUMIN SERPL-MCNC: 4.1 G/DL (ref 3.5–5.2)
ALP SERPL-CCNC: 106 U/L (ref 35–104)
ALT SERPL-CCNC: 30 U/L (ref 0–32)
ANION GAP SERPL CALCULATED.3IONS-SCNC: 11 MMOL/L (ref 7–16)
AST SERPL-CCNC: 18 U/L (ref 0–31)
BASOPHILS # BLD: 0.05 K/UL (ref 0–0.2)
BASOPHILS NFR BLD: 1 % (ref 0–2)
BILIRUB SERPL-MCNC: 0.4 MG/DL (ref 0–1.2)
BILIRUB UR QL STRIP: NEGATIVE
BUN SERPL-MCNC: 10 MG/DL (ref 6–20)
CALCIUM SERPL-MCNC: 9.1 MG/DL (ref 8.6–10.2)
CHLORIDE SERPL-SCNC: 100 MMOL/L (ref 98–107)
CLARITY UR: CLEAR
CO2 SERPL-SCNC: 24 MMOL/L (ref 22–29)
COLOR UR: YELLOW
CREAT SERPL-MCNC: 0.8 MG/DL (ref 0.5–1)
D DIMER: 413 NG/ML DDU (ref 0–232)
EOSINOPHIL # BLD: 0.11 K/UL (ref 0.05–0.5)
EOSINOPHILS RELATIVE PERCENT: 1 % (ref 0–6)
ERYTHROCYTE [DISTWIDTH] IN BLOOD BY AUTOMATED COUNT: 12.3 % (ref 11.5–15)
GFR SERPL CREATININE-BSD FRML MDRD: >60 ML/MIN/1.73M2
GLUCOSE SERPL-MCNC: 105 MG/DL (ref 74–99)
GLUCOSE UR STRIP-MCNC: NEGATIVE MG/DL
HCT VFR BLD AUTO: 44.4 % (ref 34–48)
HGB BLD-MCNC: 14.5 G/DL (ref 11.5–15.5)
HGB UR QL STRIP.AUTO: ABNORMAL
IMM GRANULOCYTES # BLD AUTO: 0.04 K/UL (ref 0–0.58)
IMM GRANULOCYTES NFR BLD: 0 % (ref 0–5)
KETONES UR STRIP-MCNC: NEGATIVE MG/DL
LEUKOCYTE ESTERASE UR QL STRIP: ABNORMAL
LYMPHOCYTES NFR BLD: 3.47 K/UL (ref 1.5–4)
LYMPHOCYTES RELATIVE PERCENT: 34 % (ref 20–42)
MCH RBC QN AUTO: 28 PG (ref 26–35)
MCHC RBC AUTO-ENTMCNC: 32.7 G/DL (ref 32–34.5)
MCV RBC AUTO: 85.7 FL (ref 80–99.9)
MONOCYTES NFR BLD: 0.83 K/UL (ref 0.1–0.95)
MONOCYTES NFR BLD: 8 % (ref 2–12)
NEUTROPHILS NFR BLD: 56 % (ref 43–80)
NEUTS SEG NFR BLD: 5.82 K/UL (ref 1.8–7.3)
NITRITE UR QL STRIP: NEGATIVE
PH UR STRIP: 6 [PH] (ref 5–9)
PLATELET # BLD AUTO: 271 K/UL (ref 130–450)
PMV BLD AUTO: 9.9 FL (ref 7–12)
POTASSIUM SERPL-SCNC: 3.9 MMOL/L (ref 3.5–5)
PROT SERPL-MCNC: 7.1 G/DL (ref 6.4–8.3)
PROT UR STRIP-MCNC: NEGATIVE MG/DL
RBC # BLD AUTO: 5.18 M/UL (ref 3.5–5.5)
RBC #/AREA URNS HPF: NORMAL /HPF
SODIUM SERPL-SCNC: 135 MMOL/L (ref 132–146)
SP GR UR STRIP: 1.01 (ref 1–1.03)
TROPONIN I SERPL HS-MCNC: <6 NG/L (ref 0–9)
TROPONIN I SERPL HS-MCNC: <6 NG/L (ref 0–9)
UROBILINOGEN UR STRIP-ACNC: 0.2 EU/DL (ref 0–1)
WBC #/AREA URNS HPF: NORMAL /HPF
WBC OTHER # BLD: 10.3 K/UL (ref 4.5–11.5)

## 2024-03-11 PROCEDURE — 80053 COMPREHEN METABOLIC PANEL: CPT

## 2024-03-11 PROCEDURE — 6370000000 HC RX 637 (ALT 250 FOR IP): Performed by: NURSE PRACTITIONER

## 2024-03-11 PROCEDURE — 96374 THER/PROPH/DIAG INJ IV PUSH: CPT

## 2024-03-11 PROCEDURE — A4216 STERILE WATER/SALINE, 10 ML: HCPCS | Performed by: NURSE PRACTITIONER

## 2024-03-11 PROCEDURE — 71275 CT ANGIOGRAPHY CHEST: CPT

## 2024-03-11 PROCEDURE — 2500000003 HC RX 250 WO HCPCS: Performed by: NURSE PRACTITIONER

## 2024-03-11 PROCEDURE — 81001 URINALYSIS AUTO W/SCOPE: CPT

## 2024-03-11 PROCEDURE — 2580000003 HC RX 258: Performed by: NURSE PRACTITIONER

## 2024-03-11 PROCEDURE — 84484 ASSAY OF TROPONIN QUANT: CPT

## 2024-03-11 PROCEDURE — 6360000004 HC RX CONTRAST MEDICATION: Performed by: RADIOLOGY

## 2024-03-11 PROCEDURE — 85025 COMPLETE CBC W/AUTO DIFF WBC: CPT

## 2024-03-11 PROCEDURE — 96375 TX/PRO/DX INJ NEW DRUG ADDON: CPT

## 2024-03-11 PROCEDURE — 71045 X-RAY EXAM CHEST 1 VIEW: CPT

## 2024-03-11 PROCEDURE — 85379 FIBRIN DEGRADATION QUANT: CPT

## 2024-03-11 PROCEDURE — 6360000002 HC RX W HCPCS: Performed by: NURSE PRACTITIONER

## 2024-03-11 RX ORDER — DIPHENHYDRAMINE HYDROCHLORIDE 50 MG/ML
25 INJECTION INTRAMUSCULAR; INTRAVENOUS ONCE
Status: COMPLETED | OUTPATIENT
Start: 2024-03-11 | End: 2024-03-11

## 2024-03-11 RX ORDER — IBUPROFEN 800 MG/1
800 TABLET ORAL EVERY 8 HOURS PRN
Qty: 21 TABLET | Refills: 0 | Status: SHIPPED | OUTPATIENT
Start: 2024-03-11 | End: 2024-03-18

## 2024-03-11 RX ORDER — 0.9 % SODIUM CHLORIDE 0.9 %
1000 INTRAVENOUS SOLUTION INTRAVENOUS ONCE
Status: COMPLETED | OUTPATIENT
Start: 2024-03-11 | End: 2024-03-11

## 2024-03-11 RX ORDER — ASPIRIN 81 MG/1
324 TABLET, CHEWABLE ORAL ONCE
Status: COMPLETED | OUTPATIENT
Start: 2024-03-11 | End: 2024-03-11

## 2024-03-11 RX ADMIN — SODIUM CHLORIDE 1000 ML: 9 INJECTION, SOLUTION INTRAVENOUS at 00:29

## 2024-03-11 RX ADMIN — IOPAMIDOL 75 ML: 755 INJECTION, SOLUTION INTRAVENOUS at 02:33

## 2024-03-11 RX ADMIN — ASPIRIN 324 MG: 81 TABLET, CHEWABLE ORAL at 00:23

## 2024-03-11 RX ADMIN — DIPHENHYDRAMINE HYDROCHLORIDE 25 MG: 50 INJECTION INTRAMUSCULAR; INTRAVENOUS at 01:54

## 2024-03-11 RX ADMIN — WATER 125 MG: 1 INJECTION INTRAMUSCULAR; INTRAVENOUS; SUBCUTANEOUS at 02:00

## 2024-03-11 RX ADMIN — FAMOTIDINE 20 MG: 10 INJECTION, SOLUTION INTRAVENOUS at 01:54

## 2024-03-11 ASSESSMENT — HEART SCORE: ECG: 0

## 2024-03-11 ASSESSMENT — PAIN - FUNCTIONAL ASSESSMENT: PAIN_FUNCTIONAL_ASSESSMENT: 0-10

## 2024-03-11 ASSESSMENT — PAIN DESCRIPTION - FREQUENCY: FREQUENCY: INTERMITTENT

## 2024-03-11 ASSESSMENT — PAIN SCALES - GENERAL: PAINLEVEL_OUTOF10: 8

## 2024-03-11 ASSESSMENT — PAIN DESCRIPTION - ONSET: ONSET: SUDDEN

## 2024-03-11 ASSESSMENT — PAIN DESCRIPTION - DESCRIPTORS: DESCRIPTORS: STABBING

## 2024-03-11 ASSESSMENT — PAIN DESCRIPTION - LOCATION: LOCATION: CHEST

## 2024-03-11 ASSESSMENT — PAIN DESCRIPTION - PAIN TYPE: TYPE: ACUTE PAIN

## 2024-03-11 ASSESSMENT — PAIN DESCRIPTION - ORIENTATION: ORIENTATION: LEFT

## 2024-03-11 NOTE — ED PROVIDER NOTES
reflux disease), Headache(784.0), Irritable bowel syndrome, Numbness and tingling in right hand, CIERA on CPAP, Seizure disorder (HCC), and Vitamin D deficiency.    CONSULTS:  PCP    Discussion with Other Profesionals : None    Social Determinants : None    Records Reviewed : Source patient and Inpatient Notes epic medical records        Disposition Considerations (Tests not ordered but considered, Shared Decision Making, Pt Expectation of Test or Tx.):   Appropriate for outpatient management yes and Evaluation by myself and discharge recommended.      I am the Primary Clinician of Record.    Counseling:   The emergency provider has spoken with the patient and discussed today’s results, in addition to providing specific details for the plan of care and counseling regarding the diagnosis and prognosis.  Questions are answered at this time and they are agreeable with the plan.      --------------------------------- IMPRESSION AND DISPOSITION ---------------------------------    IMPRESSION  1. Chest pain, unspecified type    2. Pleurisy    3. Liver cyst        DISPOSITION  Disposition: Discharge to home  Patient condition is stable      NOTE: This report was transcribed using voice recognition software. Every effort was made to ensure accuracy; however, inadvertent computerized transcription errors may be present

## 2024-03-11 NOTE — ED NOTES
Radiology Procedure Waiver   Name: Ashlee Bustos  : 1970  MRN: [id number]    Date:  3/11/24    Time: 1:43 AM EDT    Benefits of immediately proceeding with Radiology exam(s) without pre-testing outweigh the risks or are not indicated as specified below and therefore the following is/are being waived:    [x] Pregnancy test   [] Patients LMP on-time and regular.   [] Patient had Tubal Ligation or has other Contraception Device.   [] Patient  is Menopausal or Premenarcheal.    [x] Patient had Full or Partial Hysterectomy.    [x] Protocol for Iodine allergy    [] MRI Questionnaire     [] BUN/Creatinine   [] Patient age w/no hx of renal dysfunction.   [] Patient on Dialysis.   [] Recent Normal Labs.  Electronically signed by NOY Taylor CNP on 3/11/24 at 1:43 AM EDT               Patient premedicated for CAT scan

## 2024-03-12 ENCOUNTER — TELEPHONE (OUTPATIENT)
Dept: ADMINISTRATIVE | Age: 54
End: 2024-03-12

## 2024-03-12 ENCOUNTER — OFFICE VISIT (OUTPATIENT)
Dept: PAIN MANAGEMENT | Age: 54
End: 2024-03-12
Payer: MEDICAID

## 2024-03-12 ENCOUNTER — OFFICE VISIT (OUTPATIENT)
Dept: PRIMARY CARE CLINIC | Age: 54
End: 2024-03-12
Payer: MEDICAID

## 2024-03-12 VITALS
DIASTOLIC BLOOD PRESSURE: 74 MMHG | RESPIRATION RATE: 16 BRPM | SYSTOLIC BLOOD PRESSURE: 130 MMHG | HEIGHT: 57 IN | OXYGEN SATURATION: 98 % | HEART RATE: 88 BPM | WEIGHT: 167.11 LBS | BODY MASS INDEX: 36.05 KG/M2 | TEMPERATURE: 97.7 F

## 2024-03-12 VITALS
WEIGHT: 167 LBS | HEART RATE: 80 BPM | RESPIRATION RATE: 16 BRPM | HEIGHT: 57 IN | SYSTOLIC BLOOD PRESSURE: 130 MMHG | DIASTOLIC BLOOD PRESSURE: 74 MMHG | BODY MASS INDEX: 36.03 KG/M2 | OXYGEN SATURATION: 98 %

## 2024-03-12 DIAGNOSIS — M47.812 CERVICAL FACET JOINT SYNDROME: ICD-10-CM

## 2024-03-12 DIAGNOSIS — M47.812 FACET ARTHROPATHY, CERVICAL: ICD-10-CM

## 2024-03-12 DIAGNOSIS — M47.816 LUMBAR FACET ARTHROPATHY: ICD-10-CM

## 2024-03-12 DIAGNOSIS — M79.7 FIBROMYALGIA: ICD-10-CM

## 2024-03-12 DIAGNOSIS — M50.30 DDD (DEGENERATIVE DISC DISEASE), CERVICAL: ICD-10-CM

## 2024-03-12 DIAGNOSIS — M54.12 CERVICAL RADICULOPATHY: ICD-10-CM

## 2024-03-12 DIAGNOSIS — R07.89 ATYPICAL CHEST PAIN: Primary | ICD-10-CM

## 2024-03-12 DIAGNOSIS — G89.4 CHRONIC PAIN SYNDROME: Primary | ICD-10-CM

## 2024-03-12 DIAGNOSIS — M16.12 PRIMARY OSTEOARTHRITIS OF LEFT HIP: ICD-10-CM

## 2024-03-12 DIAGNOSIS — M54.16 LUMBAR RADICULOPATHY: ICD-10-CM

## 2024-03-12 DIAGNOSIS — M47.816 LUMBAR SPONDYLOSIS: ICD-10-CM

## 2024-03-12 DIAGNOSIS — M51.9 LUMBAR DISC DISORDER: ICD-10-CM

## 2024-03-12 PROCEDURE — G8427 DOCREV CUR MEDS BY ELIG CLIN: HCPCS | Performed by: PHYSICIAN ASSISTANT

## 2024-03-12 PROCEDURE — G8417 CALC BMI ABV UP PARAM F/U: HCPCS | Performed by: FAMILY MEDICINE

## 2024-03-12 PROCEDURE — G8484 FLU IMMUNIZE NO ADMIN: HCPCS | Performed by: PHYSICIAN ASSISTANT

## 2024-03-12 PROCEDURE — 3017F COLORECTAL CA SCREEN DOC REV: CPT | Performed by: PHYSICIAN ASSISTANT

## 2024-03-12 PROCEDURE — 99214 OFFICE O/P EST MOD 30 MIN: CPT | Performed by: FAMILY MEDICINE

## 2024-03-12 PROCEDURE — G8484 FLU IMMUNIZE NO ADMIN: HCPCS | Performed by: FAMILY MEDICINE

## 2024-03-12 PROCEDURE — G8427 DOCREV CUR MEDS BY ELIG CLIN: HCPCS | Performed by: FAMILY MEDICINE

## 2024-03-12 PROCEDURE — 3017F COLORECTAL CA SCREEN DOC REV: CPT | Performed by: FAMILY MEDICINE

## 2024-03-12 PROCEDURE — 1036F TOBACCO NON-USER: CPT | Performed by: PHYSICIAN ASSISTANT

## 2024-03-12 PROCEDURE — 99213 OFFICE O/P EST LOW 20 MIN: CPT

## 2024-03-12 PROCEDURE — G8417 CALC BMI ABV UP PARAM F/U: HCPCS | Performed by: PHYSICIAN ASSISTANT

## 2024-03-12 PROCEDURE — 99213 OFFICE O/P EST LOW 20 MIN: CPT | Performed by: PHYSICIAN ASSISTANT

## 2024-03-12 PROCEDURE — 1036F TOBACCO NON-USER: CPT | Performed by: FAMILY MEDICINE

## 2024-03-12 RX ORDER — MELATONIN
1 DAILY
Qty: 30 TABLET | Refills: 5 | Status: SHIPPED | OUTPATIENT
Start: 2024-03-12

## 2024-03-12 RX ORDER — ALBUTEROL SULFATE 2.5 MG/3ML
2.5 SOLUTION RESPIRATORY (INHALATION) EVERY 6 HOURS PRN
Qty: 120 EACH | Refills: 3 | Status: SHIPPED | OUTPATIENT
Start: 2024-03-12

## 2024-03-12 RX ORDER — VITAMIN A ACETATE, BETA CAROTENE, ASCORBIC ACID, CHOLECALCIFEROL, .ALPHA.-TOCOPHEROL ACETATE, DL-, THIAMINE MONONITRATE, RIBOFLAVIN, NIACINAMIDE, PYRIDOXINE HYDROCHLORIDE, FOLIC ACID, CYANOCOBALAMIN, CALCIUM CARBONATE, FERROUS FUMARATE, ZINC OXIDE, CUPRIC OXIDE 3080; 12; 120; 400; 1; 1.84; 3; 20; 22; 920; 25; 200; 27; 10; 2 [IU]/1; UG/1; MG/1; [IU]/1; MG/1; MG/1; MG/1; MG/1; MG/1; [IU]/1; MG/1; MG/1; MG/1; MG/1; MG/1
TABLET, FILM COATED ORAL
Qty: 30 TABLET | Refills: 5 | Status: SHIPPED | OUTPATIENT
Start: 2024-03-12

## 2024-03-12 RX ORDER — LIDOCAINE 50 MG/G
1 PATCH TOPICAL DAILY
Qty: 30 PATCH | Refills: 0 | Status: SHIPPED | OUTPATIENT
Start: 2024-03-12 | End: 2024-04-11

## 2024-03-12 RX ORDER — PREDNISONE 20 MG/1
20 TABLET ORAL 2 TIMES DAILY
Qty: 10 TABLET | Refills: 0 | Status: SHIPPED | OUTPATIENT
Start: 2024-03-12 | End: 2024-03-17

## 2024-03-12 ASSESSMENT — PATIENT HEALTH QUESTIONNAIRE - PHQ9
SUM OF ALL RESPONSES TO PHQ QUESTIONS 1-9: 0
SUM OF ALL RESPONSES TO PHQ QUESTIONS 1-9: 0
3. TROUBLE FALLING OR STAYING ASLEEP: 0
5. POOR APPETITE OR OVEREATING: 0
SUM OF ALL RESPONSES TO PHQ9 QUESTIONS 1 & 2: 0
2. FEELING DOWN, DEPRESSED OR HOPELESS: 0
1. LITTLE INTEREST OR PLEASURE IN DOING THINGS: 0
10. IF YOU CHECKED OFF ANY PROBLEMS, HOW DIFFICULT HAVE THESE PROBLEMS MADE IT FOR YOU TO DO YOUR WORK, TAKE CARE OF THINGS AT HOME, OR GET ALONG WITH OTHER PEOPLE: 0
SUM OF ALL RESPONSES TO PHQ QUESTIONS 1-9: 0
SUM OF ALL RESPONSES TO PHQ QUESTIONS 1-9: 0
6. FEELING BAD ABOUT YOURSELF - OR THAT YOU ARE A FAILURE OR HAVE LET YOURSELF OR YOUR FAMILY DOWN: 0
4. FEELING TIRED OR HAVING LITTLE ENERGY: 0
9. THOUGHTS THAT YOU WOULD BE BETTER OFF DEAD, OR OF HURTING YOURSELF: 0
7. TROUBLE CONCENTRATING ON THINGS, SUCH AS READING THE NEWSPAPER OR WATCHING TELEVISION: 0

## 2024-03-12 ASSESSMENT — ENCOUNTER SYMPTOMS
COLOR CHANGE: 0
NAUSEA: 0
BLOOD IN STOOL: 0
DIARRHEA: 0
SINUS PRESSURE: 0
SPUTUM PRODUCTION: 0
SORE THROAT: 0
ABDOMINAL PAIN: 0
APNEA: 0
CONSTIPATION: 0
COUGH: 0
EYE PAIN: 0
EYE ITCHING: 0
WHEEZING: 0
SHORTNESS OF BREATH: 0
RHINORRHEA: 0
BACK PAIN: 0
VOMITING: 0
EYE REDNESS: 0
CHEST TIGHTNESS: 0

## 2024-03-12 NOTE — PROGRESS NOTES
Chief Complaint:     Chief Complaint   Patient presents with    Follow-Up from Hospital    Chest Pain         Chest Pain   This is a new problem. The current episode started yesterday. The problem occurs daily. The problem has been waxing and waning. The pain is present in the substernal region and lateral region. The pain is moderate. The quality of the pain is described as tightness and pressure. The pain does not radiate. Pertinent negatives include no abdominal pain, back pain, cough, diaphoresis, dizziness, exertional chest pressure, fever, headaches, lower extremity edema, malaise/fatigue, nausea, near-syncope, numbness, palpitations, PND, shortness of breath, sputum production, syncope, vomiting or weakness. The pain is aggravated by nothing. She has tried nothing for the symptoms. The treatment provided no relief. There are no known risk factors.       Patient Active Problem List   Diagnosis    Cervical stenosis of spinal canal    DDD (degenerative disc disease), cervical    Lumbar radiculopathy    History of TIA (transient ischemic attack) and stroke    Spinal stenosis of lumbar region without neurogenic claudication    Fibromyalgia    Migraines without aura and without status migrainosus, not intractable    Primary stabbing headache    Chronic pain syndrome    Primary osteoarthritis of left hip    Cervical disc disorder    Cervical facet joint syndrome    Cervical radiculopathy    Cervical stenosis of spine    Lumbar spondylosis    Lumbar facet arthropathy    Lumbar disc disorder    Greater trochanteric bursitis of left hip    Pain in left hip    Facet arthropathy, cervical    Primary osteoarthritis of left knee    Hepatic cyst    Lung nodule    Irritable bowel syndrome    Hepatic steatosis    Class 2 severe obesity due to excess calories with serious comorbidity and body mass index (BMI) of 37.0 to 37.9 in adult (HCC)    CIERA on CPAP    Severe persistent asthma    Acute anal fissure       Past Medical

## 2024-03-12 NOTE — PROGRESS NOTES
Ashlee Bustos presents to the [facility] on 3/12/2024. Ashlee is complaining of pain low back radiating to left hip. The pain is constant. The pain is described as gnawing and grabbing. Pain is rated on her best day at a 3, on her worst day at a 10, and on average at a 5 on the VAS scale. She took her last dose of Motrin and oxycodone 3/09/24       Any procedures since your last visit: No.    She is  on NSAIDS and  is  on anticoagulation medications to include ASA and is managed by Abdi Grijalva DO.     Pacemaker or defibrillator: No.    Medication Contract and Consent for Opioid Use Documents Filed       Patient Documents       Type of Document Status Date Received Received By Description    Medication Contract Received 7/18/2019 11:59 AM DIANA KWON 7/18/19 medication contract    Medication Contract Received 10/9/2020  3:01 PM ALEXEI VENTURA pain pt agreement    Medication Contract Received 11/5/2021 11:29 AM SNOW MUNIZ Pain Mgmt Patient Agreement 11.5.2021    Medication Contract Received 12/13/2021  4:21 PM LEONARDA PINA pain management    Consent Opioid Use Received 12/12/2022 12:23 PM ESTEFANY ARAMBULA pt agreement 12/12/22    Consent Opioid Use Received 1/10/2024  4:08 PM ESTEFANY ARAMBULA Consent Opioid Use                       /74   Pulse 88   Temp 97.7 °F (36.5 °C) (Infrared)   Resp 16   Ht 1.448 m (4' 9\")   Wt 75.8 kg (167 lb 1.7 oz)   LMP 03/27/2008   SpO2 98%   BMI 36.16 kg/m²      Patient's last menstrual period was 03/27/2008.  
functional status (ability to engage in work or other purposeful activities, the pain intensity and its interference with activities of daily living, quality of family life and social activities, and the physical activity);Obtaining appropriate analgesic effect of treatment.          We discussed with the patient that combining opioids, benzodiazepines, alcohol, illicit drugs or sleep aids increases the risk of respiratory depression including death. We discussed that these medications may cause drowsiness, sedation or dizziness and have counseled the patient not to drive or operate machinery. We have discussed that these medications will be prescribed only by one provider. We have discussed with the patient about age related risk factors and have thoroughly discussed the importance of taking these medications as prescribed. The patient verbalizes understanding.    ccreferring physic

## 2024-03-12 NOTE — TELEPHONE ENCOUNTER
Referral from Dr Grijalva  DX:  atypical chest pain  ([facility] 10/6/21)  please advise scheduling

## 2024-03-13 ENCOUNTER — HOSPITAL ENCOUNTER (OUTPATIENT)
Dept: INFUSION THERAPY | Age: 54
Setting detail: INFUSION SERIES
Discharge: HOME | End: 2024-03-13

## 2024-03-13 LAB
EKG ATRIAL RATE: 82 BPM
EKG P AXIS: 75 DEGREES
EKG P-R INTERVAL: 138 MS
EKG Q-T INTERVAL: 388 MS
EKG QRS DURATION: 88 MS
EKG QTC CALCULATION (BAZETT): 453 MS
EKG R AXIS: 59 DEGREES
EKG T AXIS: 55 DEGREES
EKG VENTRICULAR RATE: 82 BPM

## 2024-03-13 PROCEDURE — 93010 ELECTROCARDIOGRAM REPORT: CPT | Performed by: INTERNAL MEDICINE

## 2024-03-16 LAB
SEND OUT REPORT: NORMAL
TEST NAME: NORMAL

## 2024-03-20 ENCOUNTER — TELEPHONE (OUTPATIENT)
Dept: PAIN MANAGEMENT | Age: 54
End: 2024-03-20

## 2024-03-20 DIAGNOSIS — M54.16 LUMBAR RADICULOPATHY: ICD-10-CM

## 2024-03-20 DIAGNOSIS — M47.812 FACET ARTHROPATHY, CERVICAL: ICD-10-CM

## 2024-03-20 DIAGNOSIS — M54.12 CERVICAL RADICULOPATHY: ICD-10-CM

## 2024-03-20 DIAGNOSIS — M47.812 CERVICAL FACET JOINT SYNDROME: ICD-10-CM

## 2024-03-20 DIAGNOSIS — G89.4 CHRONIC PAIN SYNDROME: ICD-10-CM

## 2024-03-20 DIAGNOSIS — M47.816 LUMBAR FACET ARTHROPATHY: ICD-10-CM

## 2024-03-20 RX ORDER — OXYCODONE HYDROCHLORIDE 5 MG/1
5 TABLET ORAL DAILY PRN
Qty: 25 TABLET | Refills: 0 | Status: SHIPPED | OUTPATIENT
Start: 2024-03-20 | End: 2024-04-14

## 2024-03-20 NOTE — TELEPHONE ENCOUNTER
Ashlee called to request pain medication, I did call her after reading your note and she said she needs it refilled. Last filled 2/15/24, OARRS is consistent. Last office visit was 2/15/24, next appt is 4/14/24. Thank you

## 2024-03-25 ENCOUNTER — HOSPITAL ENCOUNTER (OUTPATIENT)
Dept: INFUSION THERAPY | Age: 54
Setting detail: INFUSION SERIES
Discharge: HOME | End: 2024-03-25
Payer: MEDICAID

## 2024-03-25 VITALS
HEART RATE: 101 BPM | TEMPERATURE: 97.4 F | RESPIRATION RATE: 16 BRPM | SYSTOLIC BLOOD PRESSURE: 106 MMHG | DIASTOLIC BLOOD PRESSURE: 80 MMHG | OXYGEN SATURATION: 99 %

## 2024-03-25 DIAGNOSIS — J45.50 SEVERE PERSISTENT ASTHMA, UNSPECIFIED WHETHER COMPLICATED: Primary | ICD-10-CM

## 2024-03-25 PROCEDURE — 6360000002 HC RX W HCPCS: Performed by: INTERNAL MEDICINE

## 2024-03-25 PROCEDURE — 96361 HYDRATE IV INFUSION ADD-ON: CPT

## 2024-03-25 PROCEDURE — 6370000000 HC RX 637 (ALT 250 FOR IP): Performed by: INTERNAL MEDICINE

## 2024-03-25 PROCEDURE — 96372 THER/PROPH/DIAG INJ SC/IM: CPT

## 2024-03-25 PROCEDURE — 2580000003 HC RX 258

## 2024-03-25 PROCEDURE — 96360 HYDRATION IV INFUSION INIT: CPT

## 2024-03-25 PROCEDURE — 2580000003 HC RX 258: Performed by: INTERNAL MEDICINE

## 2024-03-25 RX ORDER — SODIUM CHLORIDE 0.9 % (FLUSH) 0.9 %
5-40 SYRINGE (ML) INJECTION PRN
Start: 2024-04-15

## 2024-03-25 RX ORDER — DIPHENHYDRAMINE HYDROCHLORIDE 50 MG/ML
50 INJECTION INTRAMUSCULAR; INTRAVENOUS
OUTPATIENT
Start: 2024-04-15

## 2024-03-25 RX ORDER — HYDROXYZINE HYDROCHLORIDE 10 MG/1
10 TABLET, FILM COATED ORAL ONCE
Status: COMPLETED | OUTPATIENT
Start: 2024-03-25 | End: 2024-03-25

## 2024-03-25 RX ORDER — ONDANSETRON 2 MG/ML
8 INJECTION INTRAMUSCULAR; INTRAVENOUS
OUTPATIENT
Start: 2024-04-15

## 2024-03-25 RX ORDER — HEPARIN SODIUM (PORCINE) LOCK FLUSH IV SOLN 100 UNIT/ML 100 UNIT/ML
500 SOLUTION INTRAVENOUS PRN
Start: 2024-04-15

## 2024-03-25 RX ORDER — SODIUM CHLORIDE 0.9 % (FLUSH) 0.9 %
SYRINGE (ML) INJECTION
Status: COMPLETED
Start: 2024-03-25 | End: 2024-03-25

## 2024-03-25 RX ORDER — 0.9 % SODIUM CHLORIDE 0.9 %
250 INTRAVENOUS SOLUTION INTRAVENOUS ONCE
Status: COMPLETED | OUTPATIENT
Start: 2024-03-25 | End: 2024-03-25

## 2024-03-25 RX ORDER — DIPHENHYDRAMINE HCL 25 MG
25 TABLET ORAL ONCE
Start: 2024-04-15 | End: 2024-04-15

## 2024-03-25 RX ORDER — HYDROXYZINE HYDROCHLORIDE 10 MG/1
10 TABLET, FILM COATED ORAL ONCE
Start: 2024-04-15 | End: 2024-04-15

## 2024-03-25 RX ORDER — DIPHENHYDRAMINE HCL 25 MG
25 TABLET ORAL ONCE
Status: COMPLETED | OUTPATIENT
Start: 2024-03-25 | End: 2024-03-25

## 2024-03-25 RX ORDER — EPINEPHRINE 1 MG/ML
0.3 INJECTION, SOLUTION, CONCENTRATE INTRAVENOUS PRN
OUTPATIENT
Start: 2024-04-15

## 2024-03-25 RX ORDER — ACETAMINOPHEN 325 MG/1
650 TABLET ORAL
OUTPATIENT
Start: 2024-04-15

## 2024-03-25 RX ORDER — SODIUM CHLORIDE 9 MG/ML
INJECTION, SOLUTION INTRAVENOUS CONTINUOUS
OUTPATIENT
Start: 2024-04-15

## 2024-03-25 RX ORDER — ALBUTEROL SULFATE 90 UG/1
4 AEROSOL, METERED RESPIRATORY (INHALATION) PRN
OUTPATIENT
Start: 2024-04-15

## 2024-03-25 RX ORDER — MEPERIDINE HYDROCHLORIDE 25 MG/ML
25 INJECTION INTRAMUSCULAR; INTRAVENOUS; SUBCUTANEOUS ONCE
Start: 2024-04-15 | End: 2024-04-15

## 2024-03-25 RX ORDER — 0.9 % SODIUM CHLORIDE 0.9 %
250 INTRAVENOUS SOLUTION INTRAVENOUS ONCE
Start: 2024-04-15 | End: 2024-04-15

## 2024-03-25 RX ADMIN — SODIUM CHLORIDE 250 ML: 9 INJECTION, SOLUTION INTRAVENOUS at 15:23

## 2024-03-25 RX ADMIN — SODIUM CHLORIDE, PRESERVATIVE FREE 10 ML: 5 INJECTION INTRAVENOUS at 15:22

## 2024-03-25 RX ADMIN — HYDROXYZINE HYDROCHLORIDE 10 MG: 10 TABLET ORAL at 15:17

## 2024-03-25 RX ADMIN — DIPHENHYDRAMINE HYDROCHLORIDE 25 MG: 25 TABLET ORAL at 15:17

## 2024-03-25 RX ADMIN — TEZEPELUMAB-EKKO 210 MG: 210 INJECTION, SOLUTION SUBCUTANEOUS at 16:00

## 2024-03-25 NOTE — PROGRESS NOTES
Patient tolerated normal saline infusion and tezspire injection well. She has not had any issues and does not want to stay on unit post injection for observation. Patient alert and oriented x3. No distress noted. Vital signs stable. Patient denies any new or worsening pain. Offered patient education and/or discharge material. Patient declined. Patient denies any needs. All questions answered. D/C in stable condition with her .

## 2024-04-09 ENCOUNTER — TELEPHONE (OUTPATIENT)
Dept: ADMINISTRATIVE | Age: 54
End: 2024-04-09

## 2024-04-09 DIAGNOSIS — Z01.818 PREOPERATIVE CLEARANCE: Primary | ICD-10-CM

## 2024-04-11 ENCOUNTER — OFFICE VISIT (OUTPATIENT)
Dept: CARDIOLOGY CLINIC | Age: 54
End: 2024-04-11
Payer: MEDICAID

## 2024-04-11 VITALS
RESPIRATION RATE: 18 BRPM | SYSTOLIC BLOOD PRESSURE: 122 MMHG | HEIGHT: 59 IN | HEART RATE: 81 BPM | BODY MASS INDEX: 36.69 KG/M2 | DIASTOLIC BLOOD PRESSURE: 78 MMHG | WEIGHT: 182 LBS

## 2024-04-11 DIAGNOSIS — R07.9 CHEST PAIN, UNSPECIFIED TYPE: ICD-10-CM

## 2024-04-11 DIAGNOSIS — I51.9 HEART PROBLEM: ICD-10-CM

## 2024-04-11 DIAGNOSIS — R00.2 PALPITATIONS: Primary | ICD-10-CM

## 2024-04-11 DIAGNOSIS — G47.33 OSA (OBSTRUCTIVE SLEEP APNEA): ICD-10-CM

## 2024-04-11 PROCEDURE — G8417 CALC BMI ABV UP PARAM F/U: HCPCS | Performed by: INTERNAL MEDICINE

## 2024-04-11 PROCEDURE — 1036F TOBACCO NON-USER: CPT | Performed by: INTERNAL MEDICINE

## 2024-04-11 PROCEDURE — 93000 ELECTROCARDIOGRAM COMPLETE: CPT | Performed by: INTERNAL MEDICINE

## 2024-04-11 PROCEDURE — 3017F COLORECTAL CA SCREEN DOC REV: CPT | Performed by: INTERNAL MEDICINE

## 2024-04-11 PROCEDURE — G8427 DOCREV CUR MEDS BY ELIG CLIN: HCPCS | Performed by: INTERNAL MEDICINE

## 2024-04-11 PROCEDURE — 99214 OFFICE O/P EST MOD 30 MIN: CPT | Performed by: INTERNAL MEDICINE

## 2024-04-11 NOTE — PROGRESS NOTES
Patient was seen today and a 7 day [employer] X[employer]  monitor was placed. Monitor was ordered by . The monitor was applied, instructions were given to the patient. Patient stated understanding and gave verbalize readback.  Monitor company:[employer]  Serial number: [id number]

## 2024-04-11 NOTE — PROGRESS NOTES
OUTPATIENT CARDIOLOGY FOLLOW-UP    Name: Ashlee Bustos    Age: 53 y.o.    Primary Care Physician: Abdi Grijalva DO    Date of Service: 4/11/2024    Chief Complaint:   Chief Complaint   Patient presents with    Follow-Up from Hospital        Interim History:   Here for follow-up.  Seen by me 10/2021 for palpitations, event monitor showed sinus rhythm.  Echo was normal.  No follow-up since then.    Seen in the ER 3/2024 for multitude of symptoms including tiredness, numbness in the hands, short of breath, headache.  She was told she had a \"arrhythmia.\" EKG from the ER showed sinus rhythm in the 80s.  CTA pulmonary was negative for PE and troponins were normal.    Reports worsening palpitations couple times per day, feels the heart racing at times for a few minutes, also notices pulse and feels it in her ear especially at rest.  Also reports chest pain, last seconds, with exertion, details difficult to ascertain.    Reports somewhat inconsistent use of CPAP.    Requesting cardiac clearance for treatment of hemorrhoids perianal Botox injections under LMAC.    Review of Systems:   Negative except as scribed above    Past Medical History:  Past Medical History:   Diagnosis Date    Arthritis     Asthma     uses inhalers twice a day    Cerebral artery occlusion with cerebral infarction (HCC)     2015-slight right sided weakness, ambulates normal    Cervical spinal stenosis     Chronic back pain     Chronic kidney disease     Class 2 severe obesity due to excess calories with serious comorbidity and body mass index (BMI) of 37.0 to 37.9 in adult (HCC)     COVID 2021    vomiting, diarrhea, body aches, fever, cough- coulc not confirm month    Disorder of thyroid gland 09/11/2012    no medication    GERD (gastroesophageal reflux disease)     Headache(784.0)     Irritable bowel syndrome     Numbness and tingling in right hand 03/06/2020    CIERA on CPAP     uses machine    Seizure disorder (HCC)     20 or 30 years ago

## 2024-04-16 ENCOUNTER — OFFICE VISIT (OUTPATIENT)
Dept: PULMONOLOGY | Age: 54
End: 2024-04-16
Payer: MEDICAID

## 2024-04-16 VITALS
SYSTOLIC BLOOD PRESSURE: 100 MMHG | TEMPERATURE: 97.8 F | RESPIRATION RATE: 19 BRPM | HEART RATE: 65 BPM | WEIGHT: 179 LBS | DIASTOLIC BLOOD PRESSURE: 64 MMHG | BODY MASS INDEX: 37.57 KG/M2 | HEIGHT: 58 IN | OXYGEN SATURATION: 96 %

## 2024-04-16 DIAGNOSIS — E66.01 MORBID OBESITY (HCC): ICD-10-CM

## 2024-04-16 DIAGNOSIS — G47.33 OSA (OBSTRUCTIVE SLEEP APNEA): ICD-10-CM

## 2024-04-16 DIAGNOSIS — J45.50 SEVERE PERSISTENT ASTHMA, UNSPECIFIED WHETHER COMPLICATED: Primary | ICD-10-CM

## 2024-04-16 DIAGNOSIS — J43.9 NOCTURNAL HYPOXEMIA DUE TO EMPHYSEMA (HCC): ICD-10-CM

## 2024-04-16 DIAGNOSIS — R91.1 LUNG NODULE: ICD-10-CM

## 2024-04-16 DIAGNOSIS — G47.36 NOCTURNAL HYPOXEMIA DUE TO EMPHYSEMA (HCC): ICD-10-CM

## 2024-04-16 LAB
EXPIRATORY TIME: 6.9 SEC
FEF 25-75% %PRED-PRE: 71 L/SEC
FEF 25-75% PRED: 2.19 L/SEC
FEF 25-75-PRE: 1.56 L/SEC
FENO: 24 PPB
FEV1 %PRED-PRE: 81 %
FEV1 PRED: 2.15 L
FEV1/FVC %PRED-PRE: 95 %
FEV1/FVC PRED: 81 %
FEV1/FVC: 77 %
FEV1: 1.75 L
FVC %PRED-PRE: 85 %
FVC PRED: 2.66 L
FVC: 2.26 L
PEF %PRED-PRE: NORMAL
PEF PRED: NORMAL
PEF-PRE: NORMAL

## 2024-04-16 PROCEDURE — 95012 NITRIC OXIDE EXP GAS DETER: CPT | Performed by: INTERNAL MEDICINE

## 2024-04-16 PROCEDURE — 94010 BREATHING CAPACITY TEST: CPT | Performed by: INTERNAL MEDICINE

## 2024-04-16 ASSESSMENT — PULMONARY FUNCTION TESTS
FVC_PERCENT_PREDICTED_PRE: 85
FEV1/FVC: 77
FVC_PREDICTED: 2.66
FENO: 24
FVC: 2.26
FEV1/FVC_PREDICTED: 81
FEV1: 1.75
FEV1_PREDICTED: 2.15
FEV1_PERCENT_PREDICTED_PRE: 81
FEV1/FVC_PERCENT_PREDICTED_PRE: 95

## 2024-04-16 NOTE — PROGRESS NOTES
Patient to be followed up with physician in 6 months.  Patient to be referred to pulmonary rehab and to weight loss.    
pets but told her she should not try to obtain more.  We went ahead and did allergy testing to see whether or not she is exposed any wheezing animals including hypersensitivity panel = normal and histoplasmosis testing= not detected. She is to remain on Advair 500 mg twice a day with proper instruction and rinsing her mouth after each use.  She was told again only to use albuterol as needed. Continue with biologics but - pre-treat with  ml NS.We discussed her overall exposures such as detergents looking at which she washes her clothes and, changing her filter, washing in very hot water, and hypoallergenic skin creams and detergent underarm deodorant.  All questions were answered. She did not qualify for oxygen.  Sleep hygiene was reviewed and discussed. If no improvement with CPAP then sleep referral.  We discussed weight loss in the office today I asked her to login and record all meals that she is eating and reduce her calories. We referred her to pulmonary rehabilitation and to the weight loss clinic.  We hope this updates you on our evaluation and clinical thinking. Thank you for entrusting us to participate in Ashlee Bustos care.  If you have any questions, please don't hesitate to call us at the [facility].         Sincerely,      Danilo Fountain D.O., MPH, [facility], NIEVES, [facility]  Professor of Internal Medicine  Director of [facility] & [facility]      During today's visit  Ashlee Bustos is a 53 y.o. female was seen, examined and discussed. This is confirmation that I have personally seen and examined the patient and that the key elements of the encounter were performed by me (> 85 % time).  The medications & laboratory data was discussed and adjusted where necessary. The radiographic images were reviewed or with radiologist or consultant if felt dis-concordant with the exam or history. The above findings were corroborated, plans confirmed and changes made if needed.

## 2024-04-17 ENCOUNTER — OFFICE VISIT (OUTPATIENT)
Dept: PAIN MANAGEMENT | Age: 54
End: 2024-04-17
Payer: MEDICAID

## 2024-04-17 VITALS
OXYGEN SATURATION: 95 % | HEART RATE: 70 BPM | RESPIRATION RATE: 18 BRPM | WEIGHT: 179 LBS | HEIGHT: 58 IN | TEMPERATURE: 96.8 F | DIASTOLIC BLOOD PRESSURE: 60 MMHG | SYSTOLIC BLOOD PRESSURE: 100 MMHG | BODY MASS INDEX: 37.57 KG/M2

## 2024-04-17 DIAGNOSIS — M54.12 CERVICAL RADICULOPATHY: ICD-10-CM

## 2024-04-17 DIAGNOSIS — M50.30 DDD (DEGENERATIVE DISC DISEASE), CERVICAL: ICD-10-CM

## 2024-04-17 DIAGNOSIS — M16.12 PRIMARY OSTEOARTHRITIS OF LEFT HIP: ICD-10-CM

## 2024-04-17 DIAGNOSIS — M47.812 FACET ARTHROPATHY, CERVICAL: ICD-10-CM

## 2024-04-17 DIAGNOSIS — G89.4 CHRONIC PAIN SYNDROME: Primary | ICD-10-CM

## 2024-04-17 DIAGNOSIS — M79.7 FIBROMYALGIA: ICD-10-CM

## 2024-04-17 DIAGNOSIS — M47.816 LUMBAR FACET ARTHROPATHY: ICD-10-CM

## 2024-04-17 DIAGNOSIS — M51.9 LUMBAR DISC DISORDER: ICD-10-CM

## 2024-04-17 DIAGNOSIS — M47.812 CERVICAL FACET JOINT SYNDROME: ICD-10-CM

## 2024-04-17 DIAGNOSIS — M54.16 LUMBAR RADICULOPATHY: ICD-10-CM

## 2024-04-17 DIAGNOSIS — M47.816 LUMBAR SPONDYLOSIS: ICD-10-CM

## 2024-04-17 PROCEDURE — 1036F TOBACCO NON-USER: CPT | Performed by: PHYSICIAN ASSISTANT

## 2024-04-17 PROCEDURE — 99213 OFFICE O/P EST LOW 20 MIN: CPT

## 2024-04-17 PROCEDURE — 99213 OFFICE O/P EST LOW 20 MIN: CPT | Performed by: PHYSICIAN ASSISTANT

## 2024-04-17 PROCEDURE — G8417 CALC BMI ABV UP PARAM F/U: HCPCS | Performed by: PHYSICIAN ASSISTANT

## 2024-04-17 PROCEDURE — 3017F COLORECTAL CA SCREEN DOC REV: CPT | Performed by: PHYSICIAN ASSISTANT

## 2024-04-17 PROCEDURE — G8427 DOCREV CUR MEDS BY ELIG CLIN: HCPCS | Performed by: PHYSICIAN ASSISTANT

## 2024-04-17 RX ORDER — OXYCODONE HYDROCHLORIDE 5 MG/1
5 TABLET ORAL DAILY PRN
Qty: 30 TABLET | Refills: 0 | Status: SHIPPED | OUTPATIENT
Start: 2024-04-17 | End: 2024-05-17

## 2024-04-17 NOTE — PROGRESS NOTES
[facility]   [address], [place] [address]  [phone]    Follow up Note      Ashleeher JACQUELIN Samaniegos     Date of Visit:  2024    CC:  Patient presents for follow up   Chief Complaint   Patient presents with    Back Pain               HPI:    Pain is unchanged.    Appropriate analgesia with current medications regimen: yes.    Change in quality of symptoms:no.    Medication side effects:none.   Recent diagnostic testing:none.   Recent interventional procedures: None.       She has been on anticoagulation medications to include ASA. The patient  has not been on herbal supplements.  The patient is not diabetic.     Imagin/15/2022 MRI lumbar spine    FINDINGS:   No fracture or malalignment.  Vertebral body height and interspaces are well   maintained.  Conus medullaris is visualized and is unremarkable.  No evidence   of epidural mass or hematoma.  No prevertebral soft tissue edema.  Note made   of a incidental intraosseous hemangioma located in T11 vertebral body.       L1/L2: No central canal stenosis or neural foraminal narrowing.       L2/L3: No central canal stenosis or neural foraminal narrowing.       L3/L4: Mild facet hypertrophy with hypertrophy of ligamentum flavum.  No   central canal stenosis.  There is mild bilateral neural foraminal narrowing   more significant on the right.       L4/L5: Moderate facet hypertrophy.  No central canal stenosis.  No   significant neural foraminal narrowing.       L5/S1: No central canal stenosis.  No significant neural foraminal narrowing.           Impression   1. Mild bilateral neural foraminal narrowing at L3/L4 more significant on the   right.  No central canal stenosis.   2. Moderate bilateral facet hypertrophy at L4/L5.   3. No fracture or malalignment.           Cervical spine MRI 2018  1. Multilevel degenerative disc disease extending from C3 through C7.   Moderately severe spinal canal stenosis at C3-C4.

## 2024-04-17 NOTE — PROGRESS NOTES
Ashlee Bustos presents to the [facility] on 4/17/2024. Ashlee is complaining of pain back.and left hip The pain is constant. The pain is described as gnawing and grabbing. Pain is rated on her best day at a 4, on her worst day at a 10, and on average at a 8 on the VAS scale. She took her last dose of Motrin and oxycodone today.      Any procedures since your last visit: No    She is  on NSAIDS and  is  on anticoagulation medications to include ASA and is managed by Cardiology.     Pacemaker or defibrillator: No .    Medication Contract and Consent for Opioid Use Documents Filed       Patient Documents       Type of Document Status Date Received Received By Description    Medication Contract Received 7/18/2019 11:59 AM DIANA KWON 7/18/19 medication contract    Medication Contract Received 10/9/2020  3:01 PM ALEXEI VENTURA pain pt agreement    Medication Contract Received 11/5/2021 11:29 AM SNOW MUNIZ Pain Mgmt Patient Agreement 11.5.2021    Medication Contract Received 12/13/2021  4:21 PM LEONARDA PINA pain management    Consent Opioid Use Received 12/12/2022 12:23 PM ESTEFANY ARAMBULA pt agreement 12/12/22    Consent Opioid Use Received 1/10/2024  4:08 PM ESTEFANY ARAMBULA Consent Opioid Use                       Resp 18   Ht 1.473 m (4' 9.99\")   Wt 81.2 kg (179 lb)   LMP 03/27/2008   BMI 37.42 kg/m²      Patient's last menstrual period was 03/27/2008.

## 2024-04-18 ENCOUNTER — TELEPHONE (OUTPATIENT)
Dept: CARDIOLOGY | Age: 54
End: 2024-04-18

## 2024-04-18 NOTE — TELEPHONE ENCOUNTER
Spoke with patient and confirmed treadmill stress test appointment on 4/22/24 at 1130. Instructions for test reviewed with patient, questions answered. Patient verbalized understanding.

## 2024-04-22 ENCOUNTER — HOSPITAL ENCOUNTER (OUTPATIENT)
Dept: CARDIOLOGY | Age: 54
Discharge: HOME | End: 2024-04-24
Payer: MEDICAID

## 2024-04-22 ENCOUNTER — HOSPITAL ENCOUNTER (OUTPATIENT)
Dept: INFUSION THERAPY | Age: 54
Setting detail: INFUSION SERIES
Discharge: HOME | End: 2024-04-22

## 2024-04-22 VITALS
HEIGHT: 59 IN | SYSTOLIC BLOOD PRESSURE: 98 MMHG | WEIGHT: 182 LBS | DIASTOLIC BLOOD PRESSURE: 60 MMHG | BODY MASS INDEX: 36.69 KG/M2 | HEART RATE: 72 BPM | RESPIRATION RATE: 12 BRPM

## 2024-04-22 DIAGNOSIS — R07.9 CHEST PAIN, UNSPECIFIED TYPE: ICD-10-CM

## 2024-04-22 DIAGNOSIS — J45.50 SEVERE PERSISTENT ASTHMA, UNSPECIFIED WHETHER COMPLICATED: Primary | ICD-10-CM

## 2024-04-22 LAB
ECHO BSA: 1.85 M2
STRESS BASELINE DIAS BP: 60 MMHG
STRESS BASELINE HR: 69 BPM
STRESS BASELINE SYS BP: 98 MMHG
STRESS ESTIMATED WORKLOAD: 4.7 METS
STRESS EXERCISE DUR MIN: 3 MIN
STRESS EXERCISE DUR SEC: 5 SEC
STRESS O2 SAT PEAK: 99 %
STRESS O2 SAT REST: 97 %
STRESS PEAK DIAS BP: 62 MMHG
STRESS PEAK SYS BP: 130 MMHG
STRESS PERCENT HR ACHIEVED: 70 %
STRESS POST PEAK HR: 117 BPM
STRESS RATE PRESSURE PRODUCT: NORMAL BPM*MMHG
STRESS ST DEPRESSION: 0 MM
STRESS TARGET HR: 166 BPM

## 2024-04-22 PROCEDURE — 93017 CV STRESS TEST TRACING ONLY: CPT

## 2024-04-22 PROCEDURE — 93016 CV STRESS TEST SUPVJ ONLY: CPT | Performed by: INTERNAL MEDICINE

## 2024-04-22 PROCEDURE — 93018 CV STRESS TEST I&R ONLY: CPT | Performed by: INTERNAL MEDICINE

## 2024-04-22 RX ORDER — ALBUTEROL SULFATE 90 UG/1
4 AEROSOL, METERED RESPIRATORY (INHALATION) PRN
Status: CANCELLED | OUTPATIENT
Start: 2024-05-20

## 2024-04-22 RX ORDER — MEPERIDINE HYDROCHLORIDE 25 MG/ML
25 INJECTION INTRAMUSCULAR; INTRAVENOUS; SUBCUTANEOUS ONCE
Status: CANCELLED
Start: 2024-05-20 | End: 2024-05-20

## 2024-04-22 RX ORDER — SODIUM CHLORIDE 9 MG/ML
INJECTION, SOLUTION INTRAVENOUS CONTINUOUS
Status: CANCELLED | OUTPATIENT
Start: 2024-05-20

## 2024-04-22 RX ORDER — DIPHENHYDRAMINE HCL 25 MG
25 TABLET ORAL ONCE
Status: DISCONTINUED | OUTPATIENT
Start: 2024-04-22 | End: 2024-04-22

## 2024-04-22 RX ORDER — ONDANSETRON 2 MG/ML
8 INJECTION INTRAMUSCULAR; INTRAVENOUS
Status: CANCELLED | OUTPATIENT
Start: 2024-05-20

## 2024-04-22 RX ORDER — EPINEPHRINE 1 MG/ML
0.3 INJECTION, SOLUTION, CONCENTRATE INTRAVENOUS PRN
Status: CANCELLED | OUTPATIENT
Start: 2024-05-20

## 2024-04-22 RX ORDER — ACETAMINOPHEN 325 MG/1
650 TABLET ORAL
Status: CANCELLED | OUTPATIENT
Start: 2024-05-20

## 2024-04-22 RX ORDER — SODIUM CHLORIDE 0.9 % (FLUSH) 0.9 %
5-40 SYRINGE (ML) INJECTION PRN
Status: DISCONTINUED | OUTPATIENT
Start: 2024-04-22 | End: 2024-04-22

## 2024-04-22 RX ORDER — SODIUM CHLORIDE 0.9 % (FLUSH) 0.9 %
5-40 SYRINGE (ML) INJECTION PRN
Status: CANCELLED
Start: 2024-05-20

## 2024-04-22 RX ORDER — HEPARIN SODIUM (PORCINE) LOCK FLUSH IV SOLN 100 UNIT/ML 100 UNIT/ML
500 SOLUTION INTRAVENOUS PRN
Status: CANCELLED
Start: 2024-05-20

## 2024-04-22 RX ORDER — DIPHENHYDRAMINE HCL 25 MG
25 TABLET ORAL ONCE
Status: CANCELLED
Start: 2024-05-20 | End: 2024-05-20

## 2024-04-22 RX ORDER — 0.9 % SODIUM CHLORIDE 0.9 %
250 INTRAVENOUS SOLUTION INTRAVENOUS ONCE
Status: DISCONTINUED | OUTPATIENT
Start: 2024-04-22 | End: 2024-04-22

## 2024-04-22 RX ORDER — HYDROXYZINE HYDROCHLORIDE 10 MG/1
10 TABLET, FILM COATED ORAL ONCE
Status: CANCELLED
Start: 2024-05-20 | End: 2024-05-20

## 2024-04-22 RX ORDER — 0.9 % SODIUM CHLORIDE 0.9 %
250 INTRAVENOUS SOLUTION INTRAVENOUS ONCE
Status: CANCELLED
Start: 2024-05-20 | End: 2024-05-20

## 2024-04-22 RX ORDER — DIPHENHYDRAMINE HYDROCHLORIDE 50 MG/ML
50 INJECTION INTRAMUSCULAR; INTRAVENOUS
Status: CANCELLED | OUTPATIENT
Start: 2024-05-20

## 2024-04-22 RX ORDER — HYDROXYZINE HYDROCHLORIDE 10 MG/1
10 TABLET, FILM COATED ORAL ONCE
Status: DISCONTINUED | OUTPATIENT
Start: 2024-04-22 | End: 2024-04-22

## 2024-04-24 ENCOUNTER — HOSPITAL ENCOUNTER (OUTPATIENT)
Dept: INFUSION THERAPY | Age: 54
Setting detail: INFUSION SERIES
Discharge: HOME | End: 2024-04-24
Payer: MEDICAID

## 2024-04-24 VITALS
HEART RATE: 71 BPM | TEMPERATURE: 97 F | DIASTOLIC BLOOD PRESSURE: 80 MMHG | RESPIRATION RATE: 16 BRPM | OXYGEN SATURATION: 99 % | SYSTOLIC BLOOD PRESSURE: 112 MMHG

## 2024-04-24 DIAGNOSIS — J45.50 SEVERE PERSISTENT ASTHMA, UNSPECIFIED WHETHER COMPLICATED: Primary | ICD-10-CM

## 2024-04-24 PROCEDURE — 2580000003 HC RX 258: Performed by: INTERNAL MEDICINE

## 2024-04-24 PROCEDURE — 96372 THER/PROPH/DIAG INJ SC/IM: CPT

## 2024-04-24 PROCEDURE — 96360 HYDRATION IV INFUSION INIT: CPT

## 2024-04-24 PROCEDURE — 6360000002 HC RX W HCPCS: Performed by: INTERNAL MEDICINE

## 2024-04-24 PROCEDURE — 6370000000 HC RX 637 (ALT 250 FOR IP): Performed by: INTERNAL MEDICINE

## 2024-04-24 RX ORDER — HYDROXYZINE HYDROCHLORIDE 10 MG/1
10 TABLET, FILM COATED ORAL ONCE
Status: COMPLETED | OUTPATIENT
Start: 2024-04-24 | End: 2024-04-24

## 2024-04-24 RX ORDER — 0.9 % SODIUM CHLORIDE 0.9 %
250 INTRAVENOUS SOLUTION INTRAVENOUS ONCE
Status: COMPLETED | OUTPATIENT
Start: 2024-04-24 | End: 2024-04-24

## 2024-04-24 RX ORDER — EPINEPHRINE 1 MG/ML
0.3 INJECTION, SOLUTION, CONCENTRATE INTRAVENOUS PRN
OUTPATIENT
Start: 2024-05-20

## 2024-04-24 RX ORDER — DIPHENHYDRAMINE HCL 25 MG
25 TABLET ORAL ONCE
Start: 2024-05-20 | End: 2024-05-20

## 2024-04-24 RX ORDER — DIPHENHYDRAMINE HYDROCHLORIDE 50 MG/ML
50 INJECTION INTRAMUSCULAR; INTRAVENOUS
OUTPATIENT
Start: 2024-05-20

## 2024-04-24 RX ORDER — ALBUTEROL SULFATE 90 UG/1
4 AEROSOL, METERED RESPIRATORY (INHALATION) PRN
OUTPATIENT
Start: 2024-05-20

## 2024-04-24 RX ORDER — MEPERIDINE HYDROCHLORIDE 25 MG/ML
25 INJECTION INTRAMUSCULAR; INTRAVENOUS; SUBCUTANEOUS ONCE
Start: 2024-05-20 | End: 2024-05-20

## 2024-04-24 RX ORDER — ONDANSETRON 2 MG/ML
8 INJECTION INTRAMUSCULAR; INTRAVENOUS
OUTPATIENT
Start: 2024-05-20

## 2024-04-24 RX ORDER — 0.9 % SODIUM CHLORIDE 0.9 %
250 INTRAVENOUS SOLUTION INTRAVENOUS ONCE
Start: 2024-05-20 | End: 2024-05-20

## 2024-04-24 RX ORDER — DIPHENHYDRAMINE HCL 25 MG
25 TABLET ORAL ONCE
Status: COMPLETED | OUTPATIENT
Start: 2024-04-24 | End: 2024-04-24

## 2024-04-24 RX ORDER — ACETAMINOPHEN 325 MG/1
650 TABLET ORAL
OUTPATIENT
Start: 2024-05-20

## 2024-04-24 RX ORDER — SODIUM CHLORIDE 0.9 % (FLUSH) 0.9 %
5-40 SYRINGE (ML) INJECTION PRN
Start: 2024-05-20

## 2024-04-24 RX ORDER — SODIUM CHLORIDE 0.9 % (FLUSH) 0.9 %
5-40 SYRINGE (ML) INJECTION PRN
Status: DISCONTINUED | OUTPATIENT
Start: 2024-04-24 | End: 2024-04-25 | Stop reason: HOSPADM

## 2024-04-24 RX ORDER — HEPARIN SODIUM (PORCINE) LOCK FLUSH IV SOLN 100 UNIT/ML 100 UNIT/ML
500 SOLUTION INTRAVENOUS PRN
Start: 2024-05-20

## 2024-04-24 RX ORDER — HYDROXYZINE HYDROCHLORIDE 10 MG/1
10 TABLET, FILM COATED ORAL ONCE
Start: 2024-05-20 | End: 2024-05-20

## 2024-04-24 RX ORDER — SODIUM CHLORIDE 9 MG/ML
INJECTION, SOLUTION INTRAVENOUS CONTINUOUS
OUTPATIENT
Start: 2024-05-20

## 2024-04-24 RX ADMIN — TEZEPELUMAB-EKKO 210 MG: 210 INJECTION, SOLUTION SUBCUTANEOUS at 15:00

## 2024-04-24 RX ADMIN — SODIUM CHLORIDE, PRESERVATIVE FREE 10 ML: 5 INJECTION INTRAVENOUS at 15:05

## 2024-04-24 RX ADMIN — SODIUM CHLORIDE 250 ML: 9 INJECTION, SOLUTION INTRAVENOUS at 14:32

## 2024-04-24 RX ADMIN — DIPHENHYDRAMINE HYDROCHLORIDE 25 MG: 25 TABLET ORAL at 14:33

## 2024-04-24 RX ADMIN — HYDROXYZINE HYDROCHLORIDE 10 MG: 10 TABLET ORAL at 14:40

## 2024-04-24 NOTE — FLOWSHEET NOTE
Discharged to home in stable condition, tolerated injection well, all questions answered, VS stable, does not want dc instructions.

## 2024-05-02 ENCOUNTER — HOSPITAL ENCOUNTER (OUTPATIENT)
Dept: PULMONOLOGY | Age: 54
Setting detail: THERAPIES SERIES
Discharge: HOME | End: 2024-05-02
Payer: MEDICAID

## 2024-05-02 VITALS — OXYGEN SATURATION: 96 %

## 2024-05-02 PROCEDURE — 94625 PHY/QHP OP PULM RHB W/O MNTR: CPT

## 2024-05-02 PROCEDURE — 94626 PHY/QHP OP PULM RHB W/MNTR: CPT

## 2024-05-02 ASSESSMENT — PULMONARY FUNCTION TESTS
FEV1 (%PREDICTED): 81
FEV1/FVC: 77
FVC (LITERS): 2.26

## 2024-05-02 ASSESSMENT — EXERCISE STRESS TEST
PEAK_METS: 1.8
PEAK_RPE: 2
PEAK_BP: 92/60
PEAK_HR: 83

## 2024-05-02 ASSESSMENT — LIFESTYLE VARIABLES: SMOKELESS_TOBACCO: NO

## 2024-05-02 NOTE — PROGRESS NOTES
05/02/24 1300   Treatment Diagnosis   Treatment Diagnosis 1 Asthma   Referral Date 04/16/24   Oxygen Saturation / Titration    Stages of Change  Action   Oxygen Intervention   Oxygen Use No   O2 Sat Greater Than 90% Yes   Nurse/Patient Discussion  Pt. uses auto CPAP machine at night   Individual Treatment Plan   ITP Visit Type Initial assessment   1st Date of Exercise 05/02/24   Visit #/Total Visits 2/36   FVC (Liters) 2.26 liters   FEV1 (%PRED) 81   FEV1/FVC 77   Risk Stratification Low   Pulmonary ITP Exercise;Nutrition;Education;Psychosocial   Exercise   Stages of Change Action   Exercise Test Six minute walk test   Distance Walked in  mi   Peak HR 83   Peak BP 92/60   Peak RPE 2   Peak Mets 1.8   O2 Saturation 96   Stops 0   SPO2 Range 94-98   Distance Walked (miles) 0.09   Distance Walked in Feet (Calculated) 475.2 ft   Exercise Prescription   Mode Treadmill;Bike;Ergometer  (nu step)   Frequency per week 2   Duration Per Session 30-60   Intensity METS       2-4   Progression Increase intensity and stamina while maintaining SpO2 > 88%   SpO2 96 %   O2 Device Room air   Symptoms with Exercise Other (comment)  (Pt. has pain in her hip and will let us know if any exercise bothers it. She gets injections.)   Exercise Blood Pressures   Resting /70   Is BP WDL Yes   Exercise Intervention   Type walking   Frequency 3 x a week   Duration 10-15minutes   Comments Pt. encouraged to exercise at home also   Exercise Education   Education Energy conservation;Equipment orientation;Exercise safety;Home exercise plan;Physically active;Purse lip/abdominal breathing;RPE/RPD scale;Signs/symptoms to report;Warm up/cool down   Exercise Target Group   Target Goal(s) Home activity;SaO2>89%   Psychosocial   Stages of Change Action;Maintenance   Family Support Yes   Psychosocial Intervention   Interventions No intervention indicated;PCP notified  (PHQ9 score of 9)   Currently Taking Psychotropic Meds No   Psychosocial Education

## 2024-05-03 ENCOUNTER — TELEPHONE (OUTPATIENT)
Dept: CARDIOLOGY CLINIC | Age: 54
End: 2024-05-03

## 2024-05-03 DIAGNOSIS — R00.2 PALPITATIONS: ICD-10-CM

## 2024-05-03 NOTE — TELEPHONE ENCOUNTER
Patient notified of monitor results and will go to ER if severe chest pain  script e-scribed patient verbalized understanding of Metoprolol Tartrate 25 mg BID chart reflect changes per Dr. Wagner patient will follow up  as scheduled she states she will call to schedule stress [phone].

## 2024-05-03 NOTE — TELEPHONE ENCOUNTER
----- Message from Francisco Javier Wagner MD sent at 5/2/2024  7:10 PM EDT -----  Monitor showed baseline rhythm was normal with an average heart rate in the 80s.  She did have 2 episodes of SVT, fast rhythm from the top chambers of the heart, between 13 and 15 seconds.  1 of those episodes correlated with when she triggered the device.    Since she is still having bothersome palpitations would be reasonable to start metoprolol tartrate 25 mg twice daily.    Will await results of stress test, but I do not see this was yet scheduled.  That needs to be done before she can have her hemorrhoid procedure as she was reporting chest pain.    ER if chest pain severe.    Otherwise follow-up as scheduled.

## 2024-05-06 ENCOUNTER — TELEPHONE (OUTPATIENT)
Dept: CARDIOLOGY CLINIC | Age: 54
End: 2024-05-06

## 2024-05-06 DIAGNOSIS — R07.9 CHEST PAIN, UNSPECIFIED TYPE: Primary | ICD-10-CM

## 2024-05-06 RX ORDER — REGADENOSON 0.08 MG/ML
0.4 INJECTION, SOLUTION INTRAVENOUS
OUTPATIENT
Start: 2024-05-06

## 2024-05-06 NOTE — TELEPHONE ENCOUNTER
----- Message from Francisco Javier Wagner MD sent at 5/5/2024  4:34 PM EDT -----  The stress test was nondiagnostic since she was not able to achieve target heart rate. Please order pharm nuclear stress dx chest pain.

## 2024-05-07 ENCOUNTER — HOSPITAL ENCOUNTER (OUTPATIENT)
Dept: PULMONOLOGY | Age: 54
Setting detail: THERAPIES SERIES
Discharge: HOME | End: 2024-05-07
Payer: MEDICAID

## 2024-05-07 PROCEDURE — 94626 PHY/QHP OP PULM RHB W/MNTR: CPT

## 2024-05-13 ENCOUNTER — TELEPHONE (OUTPATIENT)
Dept: CARDIOLOGY | Age: 54
End: 2024-05-13

## 2024-05-13 NOTE — TELEPHONE ENCOUNTER
Spoke with patient and confirmed pharmacological  stress test appointment on 5/15/2024 at 0830. Instructions for test,given  hold lopressor 12 hours prior to the test ,no caffeine 12 hours prior to the test  bring inhaler to the test , and information reviewed with patient, questions answered. Patient verbalized understanding.

## 2024-05-14 ENCOUNTER — HOSPITAL ENCOUNTER (OUTPATIENT)
Dept: PULMONOLOGY | Age: 54
Setting detail: THERAPIES SERIES
Discharge: HOME | End: 2024-05-14
Payer: MEDICAID

## 2024-05-14 PROCEDURE — 94626 PHY/QHP OP PULM RHB W/MNTR: CPT

## 2024-05-16 ENCOUNTER — HOSPITAL ENCOUNTER (OUTPATIENT)
Dept: PULMONOLOGY | Age: 54
Setting detail: THERAPIES SERIES
Discharge: HOME | End: 2024-05-16
Payer: MEDICAID

## 2024-05-16 PROCEDURE — 94626 PHY/QHP OP PULM RHB W/MNTR: CPT

## 2024-05-20 ENCOUNTER — TELEPHONE (OUTPATIENT)
Dept: CARDIOLOGY | Age: 54
End: 2024-05-20

## 2024-05-20 NOTE — TELEPHONE ENCOUNTER
Attempted to call patient on listed phone number but there was no answer and unable to leave message.  Called and spoke w/ patient's spouse, Richard, to confirm stress test for Wednesday, 5/22/24, starting at 0830.  Instructions given and medications reviewed.  Instructed for patient to hold Lopressor for 12 hours prior to test and bring inhaler to test.  Instructed no caffeine products for 12 hours prior to test.  All questions answered.

## 2024-05-21 ENCOUNTER — HOSPITAL ENCOUNTER (OUTPATIENT)
Dept: PULMONOLOGY | Age: 54
Setting detail: THERAPIES SERIES
Discharge: HOME | End: 2024-05-21
Payer: MEDICAID

## 2024-05-21 PROCEDURE — 94626 PHY/QHP OP PULM RHB W/MNTR: CPT

## 2024-05-22 ENCOUNTER — HOSPITAL ENCOUNTER (OUTPATIENT)
Dept: CARDIOLOGY | Age: 54
Discharge: HOME | End: 2024-05-24
Attending: INTERNAL MEDICINE
Payer: MEDICAID

## 2024-05-22 ENCOUNTER — TELEPHONE (OUTPATIENT)
Dept: CARDIOLOGY CLINIC | Age: 54
End: 2024-05-22

## 2024-05-22 VITALS
RESPIRATION RATE: 14 BRPM | SYSTOLIC BLOOD PRESSURE: 108 MMHG | HEIGHT: 59 IN | WEIGHT: 179 LBS | DIASTOLIC BLOOD PRESSURE: 70 MMHG | HEART RATE: 65 BPM | BODY MASS INDEX: 36.08 KG/M2

## 2024-05-22 DIAGNOSIS — R07.9 CHEST PAIN, UNSPECIFIED TYPE: ICD-10-CM

## 2024-05-22 LAB
ECHO BSA: 1.84 M2
NUC STRESS EJECTION FRACTION: 78 %
STRESS BASELINE DIAS BP: 70 MMHG
STRESS BASELINE HR: 66 BPM
STRESS BASELINE SYS BP: 108 MMHG
STRESS ESTIMATED WORKLOAD: 1.1 METS
STRESS PEAK DIAS BP: 72 MMHG
STRESS PEAK SYS BP: 120 MMHG
STRESS PERCENT HR ACHIEVED: 77 %
STRESS POST PEAK HR: 127 BPM
STRESS RATE PRESSURE PRODUCT: NORMAL BPM*MMHG
STRESS TARGET HR: 166 BPM

## 2024-05-22 PROCEDURE — 6360000002 HC RX W HCPCS: Performed by: INTERNAL MEDICINE

## 2024-05-22 PROCEDURE — 93017 CV STRESS TEST TRACING ONLY: CPT

## 2024-05-22 PROCEDURE — 3430000000 HC RX DIAGNOSTIC RADIOPHARMACEUTICAL: Performed by: INTERNAL MEDICINE

## 2024-05-22 PROCEDURE — 2580000003 HC RX 258: Performed by: INTERNAL MEDICINE

## 2024-05-22 PROCEDURE — A9500 TC99M SESTAMIBI: HCPCS | Performed by: INTERNAL MEDICINE

## 2024-05-22 RX ORDER — TETRAKIS(2-METHOXYISOBUTYLISOCYANIDE)COPPER(I) TETRAFLUOROBORATE 1 MG/ML
35 INJECTION, POWDER, LYOPHILIZED, FOR SOLUTION INTRAVENOUS
Status: COMPLETED | OUTPATIENT
Start: 2024-05-22 | End: 2024-05-22

## 2024-05-22 RX ORDER — TETRAKIS(2-METHOXYISOBUTYLISOCYANIDE)COPPER(I) TETRAFLUOROBORATE 1 MG/ML
9.8 INJECTION, POWDER, LYOPHILIZED, FOR SOLUTION INTRAVENOUS
Status: COMPLETED | OUTPATIENT
Start: 2024-05-22 | End: 2024-05-22

## 2024-05-22 RX ORDER — SODIUM CHLORIDE 0.9 % (FLUSH) 0.9 %
10 SYRINGE (ML) INJECTION PRN
Status: DISCONTINUED | OUTPATIENT
Start: 2024-05-22 | End: 2024-05-25 | Stop reason: HOSPADM

## 2024-05-22 RX ORDER — REGADENOSON 0.08 MG/ML
0.4 INJECTION, SOLUTION INTRAVENOUS
Status: COMPLETED | OUTPATIENT
Start: 2024-05-22 | End: 2024-05-22

## 2024-05-22 RX ORDER — OXYCODONE HYDROCHLORIDE AND ACETAMINOPHEN 5; 325 MG/1; MG/1
1 TABLET ORAL EVERY 4 HOURS PRN
COMMUNITY

## 2024-05-22 RX ADMIN — SODIUM CHLORIDE, PRESERVATIVE FREE 10 ML: 5 INJECTION INTRAVENOUS at 09:54

## 2024-05-22 RX ADMIN — Medication 9.8 MILLICURIE: at 08:55

## 2024-05-22 RX ADMIN — SODIUM CHLORIDE, PRESERVATIVE FREE 10 ML: 5 INJECTION INTRAVENOUS at 09:55

## 2024-05-22 RX ADMIN — REGADENOSON 0.4 MG: 0.08 INJECTION, SOLUTION INTRAVENOUS at 09:54

## 2024-05-22 RX ADMIN — SODIUM CHLORIDE, PRESERVATIVE FREE 10 ML: 5 INJECTION INTRAVENOUS at 08:55

## 2024-05-22 RX ADMIN — Medication 35 MILLICURIE: at 09:54

## 2024-05-22 NOTE — TELEPHONE ENCOUNTER
Patient's  Richard notified  of the patient stress results and patient may  proceed with her colonoscopy per Dr. Wagner recommendations patient does not speak english will follow as scheduled.

## 2024-05-22 NOTE — TELEPHONE ENCOUNTER
----- Message from Francisco Javier Wagner MD sent at 5/22/2024  1:37 PM EDT -----  Stress test showed normal blood flow to the heart, no evidence of blockages.  May proceed with colonoscopy or other procedures without further cardiac testing.  RCRI class I risk, 0.4% risk of major cardiac event.  Okay to hold aspirin prior to procedure if needed.    Otherwise can follow-up as scheduled.

## 2024-05-22 NOTE — RESULT ENCOUNTER NOTE
Stress test showed normal blood flow to the heart, no evidence of blockages.  May proceed with colonoscopy or other procedures without further cardiac testing.  RCRI class I risk, 0.4% risk of major cardiac event.  Okay to hold aspirin prior to procedure if needed.    Otherwise can follow-up as scheduled.

## 2024-05-28 ENCOUNTER — HOSPITAL ENCOUNTER (OUTPATIENT)
Dept: PULMONOLOGY | Age: 54
Setting detail: THERAPIES SERIES
Discharge: HOME | End: 2024-05-28
Payer: MEDICAID

## 2024-05-28 PROCEDURE — 94626 PHY/QHP OP PULM RHB W/MNTR: CPT

## 2024-05-29 ENCOUNTER — OFFICE VISIT (OUTPATIENT)
Dept: PAIN MANAGEMENT | Age: 54
End: 2024-05-29
Payer: MEDICAID

## 2024-05-29 ENCOUNTER — PREP FOR PROCEDURE (OUTPATIENT)
Dept: PAIN MANAGEMENT | Age: 54
End: 2024-05-29

## 2024-05-29 VITALS
WEIGHT: 179 LBS | HEIGHT: 59 IN | DIASTOLIC BLOOD PRESSURE: 64 MMHG | TEMPERATURE: 96.4 F | OXYGEN SATURATION: 100 % | HEART RATE: 75 BPM | SYSTOLIC BLOOD PRESSURE: 100 MMHG | RESPIRATION RATE: 18 BRPM | BODY MASS INDEX: 36.08 KG/M2

## 2024-05-29 DIAGNOSIS — M47.816 LUMBAR SPONDYLOSIS: ICD-10-CM

## 2024-05-29 DIAGNOSIS — M54.12 CERVICAL RADICULOPATHY: ICD-10-CM

## 2024-05-29 DIAGNOSIS — M50.30 DDD (DEGENERATIVE DISC DISEASE), CERVICAL: ICD-10-CM

## 2024-05-29 DIAGNOSIS — G89.4 CHRONIC PAIN SYNDROME: Primary | ICD-10-CM

## 2024-05-29 DIAGNOSIS — M47.816 LUMBAR FACET ARTHROPATHY: ICD-10-CM

## 2024-05-29 DIAGNOSIS — M54.16 LUMBAR RADICULOPATHY: ICD-10-CM

## 2024-05-29 DIAGNOSIS — R20.0 PERINEAL NUMBNESS: ICD-10-CM

## 2024-05-29 DIAGNOSIS — M16.12 PRIMARY OSTEOARTHRITIS OF LEFT HIP: ICD-10-CM

## 2024-05-29 DIAGNOSIS — Z79.891 ENCOUNTER FOR LONG-TERM OPIATE ANALGESIC USE: ICD-10-CM

## 2024-05-29 DIAGNOSIS — M51.9 LUMBAR DISC DISORDER: ICD-10-CM

## 2024-05-29 DIAGNOSIS — M79.7 FIBROMYALGIA: ICD-10-CM

## 2024-05-29 DIAGNOSIS — M47.812 CERVICAL FACET JOINT SYNDROME: ICD-10-CM

## 2024-05-29 DIAGNOSIS — M47.812 FACET ARTHROPATHY, CERVICAL: ICD-10-CM

## 2024-05-29 LAB
SEND OUT REPORT: NORMAL
TEST NAME: NORMAL

## 2024-05-29 PROCEDURE — 3017F COLORECTAL CA SCREEN DOC REV: CPT | Performed by: PHYSICIAN ASSISTANT

## 2024-05-29 PROCEDURE — 99213 OFFICE O/P EST LOW 20 MIN: CPT | Performed by: PHYSICIAN ASSISTANT

## 2024-05-29 PROCEDURE — 1036F TOBACCO NON-USER: CPT | Performed by: PHYSICIAN ASSISTANT

## 2024-05-29 PROCEDURE — G8427 DOCREV CUR MEDS BY ELIG CLIN: HCPCS | Performed by: PHYSICIAN ASSISTANT

## 2024-05-29 PROCEDURE — G8417 CALC BMI ABV UP PARAM F/U: HCPCS | Performed by: PHYSICIAN ASSISTANT

## 2024-05-29 RX ORDER — OXYCODONE HYDROCHLORIDE 5 MG/1
5 TABLET ORAL DAILY PRN
Qty: 30 TABLET | Refills: 0 | Status: SHIPPED | OUTPATIENT
Start: 2024-05-29 | End: 2024-06-28

## 2024-05-29 NOTE — PROGRESS NOTES
Ashlee Bustos presents to the [facility] on 5/29/2024. Ashlee is complaining of pain in her back, right knee, left wrist and left hip. The pain is constant. The pain is described as gnawing and grabbing. Pain is rated on her best day at a 4, on her worst day at a 10, and on average at a 6 on the VAS scale. She took her last dose of Motrin and percocet yesterday.      Any procedures since your last visit: No    She is  on NSAIDS and  is  on anticoagulation medications to include ASA and is managed by Abdi Grijalva DO       Pacemaker or defibrillator: No     Medication Contract and Consent for Opioid Use Documents Filed       Patient Documents       Type of Document Status Date Received Received By Description    Medication Contract Received 7/18/2019 11:59 AM DIANA KWON 7/18/19 medication contract    Medication Contract Received 10/9/2020  3:01 PM ALEXEI VENTURA pain pt agreement    Medication Contract Received 11/5/2021 11:29 AM SNOW MUNIZ Pain Mgmt Patient Agreement 11.5.2021    Medication Contract Received 12/13/2021  4:21 PM LEONARDA PINA pain management    Consent Opioid Use Received 12/12/2022 12:23 PM ESTEFANY ARAMBULA pt agreement 12/12/22    Consent Opioid Use Received 1/10/2024  4:08 PM ESTEFANY ARAMBULA Consent Opioid Use                       Resp 18   Ht 1.499 m (4' 11.02\")   Wt 81.2 kg (179 lb)   LMP 03/27/2008   BMI 36.13 kg/m²      Patient's last menstrual period was 03/27/2008.    
and L5 ordered today.  Needs clearance.   OARRS report reviewed   UDS ordered today - this has been delayed as she has attempted to discontinue her opioids multiple times. (Last pill today)  Patient encouraged to stay active and to lose weight. Currently in aqua therapy.  Treatment plan discussed with the patient and her including medications side effects.      Controlled Substance Monitoring:    Acute and Chronic Pain Monitoring:   RX Monitoring Periodic Controlled Substance Monitoring   5/29/2024   2:46 PM Possible medication side effects, risk of tolerance/dependence & alternative treatments discussed.;No signs of potential drug abuse or diversion identified.;Assessed functional status (ability to engage in work or other purposeful activities, the pain intensity and its interference with activities of daily living, quality of family life and social activities, and the physical activity);Obtaining appropriate analgesic effect of treatment.;Random urine drug screen sent today.          We discussed with the patient that combining opioids, benzodiazepines, alcohol, illicit drugs or sleep aids increases the risk of respiratory depression including death. We discussed that these medications may cause drowsiness, sedation or dizziness and have counseled the patient not to drive or operate machinery. We have discussed that these medications will be prescribed only by one provider. We have discussed with the patient about age related risk factors and have thoroughly discussed the importance of taking these medications as prescribed. The patient verbalizes understanding.    ccreferring physic

## 2024-05-30 ENCOUNTER — HOSPITAL ENCOUNTER (OUTPATIENT)
Dept: PULMONOLOGY | Age: 54
Setting detail: THERAPIES SERIES
Discharge: HOME | End: 2024-05-30
Payer: MEDICAID

## 2024-05-30 PROCEDURE — 94626 PHY/QHP OP PULM RHB W/MNTR: CPT

## 2024-06-03 ENCOUNTER — HOSPITAL ENCOUNTER (OUTPATIENT)
Dept: MRI IMAGING | Age: 54
Discharge: HOME | End: 2024-06-05
Payer: MEDICAID

## 2024-06-03 DIAGNOSIS — M54.16 LUMBAR RADICULOPATHY: ICD-10-CM

## 2024-06-03 DIAGNOSIS — R20.0 PERINEAL NUMBNESS: ICD-10-CM

## 2024-06-03 PROCEDURE — 72148 MRI LUMBAR SPINE W/O DYE: CPT

## 2024-06-04 ENCOUNTER — HOSPITAL ENCOUNTER (OUTPATIENT)
Dept: PULMONOLOGY | Age: 54
Setting detail: THERAPIES SERIES
Discharge: HOME | End: 2024-06-04
Payer: MEDICAID

## 2024-06-04 PROCEDURE — 94626 PHY/QHP OP PULM RHB W/MNTR: CPT

## 2024-06-06 ENCOUNTER — HOSPITAL ENCOUNTER (OUTPATIENT)
Dept: PULMONOLOGY | Age: 54
Setting detail: THERAPIES SERIES
Discharge: HOME | End: 2024-06-06
Payer: MEDICAID

## 2024-06-06 ENCOUNTER — HOSPITAL ENCOUNTER (OUTPATIENT)
Dept: INFUSION THERAPY | Age: 54
Setting detail: INFUSION SERIES
Discharge: HOME | End: 2024-06-06

## 2024-06-06 ENCOUNTER — TELEPHONE (OUTPATIENT)
Dept: PAIN MANAGEMENT | Age: 54
End: 2024-06-06

## 2024-06-06 DIAGNOSIS — J45.50 SEVERE PERSISTENT ASTHMA, UNSPECIFIED WHETHER COMPLICATED: Primary | ICD-10-CM

## 2024-06-06 PROCEDURE — 94626 PHY/QHP OP PULM RHB W/MNTR: CPT

## 2024-06-06 RX ORDER — 0.9 % SODIUM CHLORIDE 0.9 %
250 INTRAVENOUS SOLUTION INTRAVENOUS ONCE
Status: DISCONTINUED | OUTPATIENT
Start: 2024-06-06 | End: 2024-06-09 | Stop reason: HOSPADM

## 2024-06-06 RX ORDER — DIPHENHYDRAMINE HCL 25 MG
25 TABLET ORAL ONCE
Status: CANCELLED
Start: 2024-06-20 | End: 2024-06-20

## 2024-06-06 RX ORDER — ACETAMINOPHEN 325 MG/1
650 TABLET ORAL
Status: CANCELLED | OUTPATIENT
Start: 2024-06-20

## 2024-06-06 RX ORDER — DIPHENHYDRAMINE HCL 25 MG
25 TABLET ORAL ONCE
Status: DISCONTINUED | OUTPATIENT
Start: 2024-06-06 | End: 2024-06-09 | Stop reason: HOSPADM

## 2024-06-06 RX ORDER — SODIUM CHLORIDE 0.9 % (FLUSH) 0.9 %
5-40 SYRINGE (ML) INJECTION PRN
Status: CANCELLED
Start: 2024-06-20

## 2024-06-06 RX ORDER — MEPERIDINE HYDROCHLORIDE 25 MG/ML
25 INJECTION INTRAMUSCULAR; INTRAVENOUS; SUBCUTANEOUS ONCE
Status: CANCELLED
Start: 2024-06-20 | End: 2024-06-20

## 2024-06-06 RX ORDER — HEPARIN SODIUM (PORCINE) LOCK FLUSH IV SOLN 100 UNIT/ML 100 UNIT/ML
500 SOLUTION INTRAVENOUS PRN
Status: CANCELLED
Start: 2024-06-20

## 2024-06-06 RX ORDER — DIPHENHYDRAMINE HYDROCHLORIDE 50 MG/ML
50 INJECTION INTRAMUSCULAR; INTRAVENOUS
Status: CANCELLED | OUTPATIENT
Start: 2024-06-20

## 2024-06-06 RX ORDER — ALBUTEROL SULFATE 90 UG/1
4 AEROSOL, METERED RESPIRATORY (INHALATION) PRN
Status: CANCELLED | OUTPATIENT
Start: 2024-06-20

## 2024-06-06 RX ORDER — ONDANSETRON 2 MG/ML
8 INJECTION INTRAMUSCULAR; INTRAVENOUS
Status: CANCELLED | OUTPATIENT
Start: 2024-06-20

## 2024-06-06 RX ORDER — EPINEPHRINE 1 MG/ML
0.3 INJECTION, SOLUTION, CONCENTRATE INTRAVENOUS PRN
Status: CANCELLED | OUTPATIENT
Start: 2024-06-20

## 2024-06-06 RX ORDER — SODIUM CHLORIDE 9 MG/ML
INJECTION, SOLUTION INTRAVENOUS CONTINUOUS
Status: CANCELLED | OUTPATIENT
Start: 2024-06-20

## 2024-06-06 RX ORDER — HYDROXYZINE HYDROCHLORIDE 10 MG/1
10 TABLET, FILM COATED ORAL ONCE
Status: DISCONTINUED | OUTPATIENT
Start: 2024-06-06 | End: 2024-06-09 | Stop reason: HOSPADM

## 2024-06-06 RX ORDER — 0.9 % SODIUM CHLORIDE 0.9 %
250 INTRAVENOUS SOLUTION INTRAVENOUS ONCE
Status: CANCELLED
Start: 2024-06-20 | End: 2024-06-20

## 2024-06-06 RX ORDER — HYDROXYZINE HYDROCHLORIDE 10 MG/1
10 TABLET, FILM COATED ORAL ONCE
Status: CANCELLED
Start: 2024-06-20 | End: 2024-06-20

## 2024-06-08 LAB
SEND OUT REPORT: NORMAL
TEST NAME: NORMAL

## 2024-06-11 ENCOUNTER — OFFICE VISIT (OUTPATIENT)
Dept: BARIATRICS/WEIGHT MGMT | Age: 54
End: 2024-06-11
Payer: MEDICAID

## 2024-06-11 ENCOUNTER — HOSPITAL ENCOUNTER (OUTPATIENT)
Dept: PULMONOLOGY | Age: 54
Setting detail: THERAPIES SERIES
Discharge: HOME | End: 2024-06-11
Payer: MEDICAID

## 2024-06-11 VITALS
WEIGHT: 187.8 LBS | HEIGHT: 59 IN | SYSTOLIC BLOOD PRESSURE: 122 MMHG | HEART RATE: 72 BPM | BODY MASS INDEX: 37.86 KG/M2 | TEMPERATURE: 97.6 F | DIASTOLIC BLOOD PRESSURE: 71 MMHG

## 2024-06-11 DIAGNOSIS — G47.33 OSA ON CPAP: Primary | ICD-10-CM

## 2024-06-11 DIAGNOSIS — E66.01 CLASS 2 SEVERE OBESITY DUE TO EXCESS CALORIES WITH SERIOUS COMORBIDITY AND BODY MASS INDEX (BMI) OF 37.0 TO 37.9 IN ADULT (HCC): ICD-10-CM

## 2024-06-11 PROCEDURE — G8417 CALC BMI ABV UP PARAM F/U: HCPCS | Performed by: INTERNAL MEDICINE

## 2024-06-11 PROCEDURE — G8427 DOCREV CUR MEDS BY ELIG CLIN: HCPCS | Performed by: INTERNAL MEDICINE

## 2024-06-11 PROCEDURE — 1036F TOBACCO NON-USER: CPT | Performed by: INTERNAL MEDICINE

## 2024-06-11 PROCEDURE — 3017F COLORECTAL CA SCREEN DOC REV: CPT | Performed by: INTERNAL MEDICINE

## 2024-06-11 PROCEDURE — 99205 OFFICE O/P NEW HI 60 MIN: CPT | Performed by: INTERNAL MEDICINE

## 2024-06-11 PROCEDURE — 99202 OFFICE O/P NEW SF 15 MIN: CPT

## 2024-06-11 PROCEDURE — 94626 PHY/QHP OP PULM RHB W/MNTR: CPT

## 2024-06-11 NOTE — PROGRESS NOTES
In person  used    CC -   CIERA, Obesity    BACKGROUND -   First visit: 6/11/24    Obesity (all weight in lbs)  Initial Ht 59\", Wt 187.8,  BMI 37.91  Began about 3-4 months ago -  gained about 30 lbs  HS Grad wt - unsure but was skinny per pt    Lowest   wt 120 (about 22 yrs ago)   Highest  wt 187.8  Pattern of wt gain: up and down  Wt change past yr: +11 lbs  Most wt lost: 30-40 lbs  Other diets attempted: fasting with exercising (used apple cider vinegar), lactose free diet - hard for her to maintain changes in her diet    Initial Diet:    Number of meals per day - 3    Number of snacks per day - 3    Meal volume - 12\" plate,  no seconds    Fast food/convenience store - 0x/week    Restaurants (not fast food) - 0x/week (~1x/month)   Sweets - 7d/week (ice cream)   Chips - 0d/week   Crackers/pretzels - 0d/week   Nuts - 7d/week (eg pistachio)   Peanut Butter - 0d/week   Popcorn - 0d/week   Dried fruit - 0d/week   Whole fruit - 7d/week (fruit smoothie with cherry, banana, milk, turmeric and cinnamon)   Breakfast cereal - 0d/week   Granola/Protein/Energy bar - 0d/week   Sugar sweetened beverages - no pop/soda, no fruit juice, no coffee, makes her own tea without sugar or honey, no energy drinks   Protein - No supplements   Fiber - No supplements. Eats a lot of vegetables - avocado, lettuce, tomatoes, carrots, green beans    Wt effect of HR foods = 1400 Reece/wk = 200 Reece/d= 12% DEN = 21 lb/year.     Initial Exercise:    Gym membership - No. Up and down the steps at home.    Walking - Treadmill at home - plans to restart using    Running - no    Resistance - no    Aerobic class - no. Pulmonary rehab 2x/wk - bicycle, hand exercise, ?ellpitical     Sleep: uses CPAP but not every night (does not use it when she feels tightness with breathing) 3-4x/night. Good rest when using CPAP, wakes up with little bit of headache when she does not use.  ______________________    [facility] -  Past Medical History:   Diagnosis Date

## 2024-06-11 NOTE — PATIENT INSTRUCTIONS
Rules:  Limit ice cream intake as much as possible, avoid taking it at night  Limit chips/crackers/pretzels/nuts/popcorn to 150 reece/day  Eliminate all sugar sweetened beverages (including fruit juice)    Requirements:  Make sure protein intake is at least 70 grams per day (do not count protein every day; instead spot check your intake every 2-3 weeks and make sure what you think you are getting is close to accurate; consider using a protein shake if needed; these are in the pharmacy section of the stores, not the grocery section; Premier, Pure Protein and Fairlife are relatively inexpensive and taste good to most patients; other options are Nectar, Boost Max, Ensure Max, BeneProtein and GNC lean (which is lactose-free);   Nectar fruit, Premier Protein Clear, IsoPure Protein Drink, and Protein 2 O are water-based options; Quest (or Cosco, which is cheaper and is ordered on Amazon) and the [employer] 1 protein bars can also be used, but have less protein in them ) (<200 Reece, >25 g protein) - (chicken, fish, turkey, egg white)  (Disclaimer: Dietary supplements rarely have their listed ingredients and the amount of each verified by a third party other. Sometimes they give verification for their claims to be GMO and gluten free and to be organic. However, even such verifications as these may still be untrustworthy.)  Make sure that fiber intake is at least 22 grams per day. Do this in part or whole by taking 12 tablespoons of Fiber one original cereal or Shreya's All Bran Buds cereal, or 4 tablespoons of wheat dextrin powder (Benefiber or generic brand); for both of these, start with 1/8th - 1/4th the target amount and every week add another 1/8th - 1/4th until reaching the target).  Also, fiber gummies containing inulin (such as Nature Darnell Short Benefiber) or Fiber Choice Pre-biotic tablets containing inulin are also an option.1 cup of beans or peas and [employer] Xtreme Wellness High Fiber (7grams in 30

## 2024-06-13 ENCOUNTER — HOSPITAL ENCOUNTER (OUTPATIENT)
Dept: INFUSION THERAPY | Age: 54
Setting detail: INFUSION SERIES
Discharge: HOME | End: 2024-06-13
Payer: MEDICAID

## 2024-06-13 VITALS
HEART RATE: 78 BPM | OXYGEN SATURATION: 97 % | DIASTOLIC BLOOD PRESSURE: 73 MMHG | SYSTOLIC BLOOD PRESSURE: 111 MMHG | RESPIRATION RATE: 16 BRPM | TEMPERATURE: 97.2 F

## 2024-06-13 DIAGNOSIS — J45.50 SEVERE PERSISTENT ASTHMA, UNSPECIFIED WHETHER COMPLICATED: Primary | ICD-10-CM

## 2024-06-13 PROCEDURE — 96372 THER/PROPH/DIAG INJ SC/IM: CPT

## 2024-06-13 PROCEDURE — 6370000000 HC RX 637 (ALT 250 FOR IP): Performed by: INTERNAL MEDICINE

## 2024-06-13 PROCEDURE — 96361 HYDRATE IV INFUSION ADD-ON: CPT

## 2024-06-13 PROCEDURE — 2580000003 HC RX 258: Performed by: INTERNAL MEDICINE

## 2024-06-13 PROCEDURE — 96360 HYDRATION IV INFUSION INIT: CPT

## 2024-06-13 PROCEDURE — 6360000002 HC RX W HCPCS: Performed by: INTERNAL MEDICINE

## 2024-06-13 RX ORDER — ONDANSETRON 2 MG/ML
8 INJECTION INTRAMUSCULAR; INTRAVENOUS
OUTPATIENT
Start: 2024-07-04

## 2024-06-13 RX ORDER — SODIUM CHLORIDE 0.9 % (FLUSH) 0.9 %
5-40 SYRINGE (ML) INJECTION PRN
Start: 2024-07-04

## 2024-06-13 RX ORDER — 0.9 % SODIUM CHLORIDE 0.9 %
250 INTRAVENOUS SOLUTION INTRAVENOUS ONCE
Start: 2024-07-04 | End: 2024-07-04

## 2024-06-13 RX ORDER — MEPERIDINE HYDROCHLORIDE 25 MG/ML
25 INJECTION INTRAMUSCULAR; INTRAVENOUS; SUBCUTANEOUS ONCE
Start: 2024-07-04 | End: 2024-07-04

## 2024-06-13 RX ORDER — SODIUM CHLORIDE 9 MG/ML
INJECTION, SOLUTION INTRAVENOUS CONTINUOUS
OUTPATIENT
Start: 2024-07-04

## 2024-06-13 RX ORDER — ACETAMINOPHEN 325 MG/1
650 TABLET ORAL
OUTPATIENT
Start: 2024-07-04

## 2024-06-13 RX ORDER — SODIUM CHLORIDE 0.9 % (FLUSH) 0.9 %
5-40 SYRINGE (ML) INJECTION PRN
Status: DISCONTINUED | OUTPATIENT
Start: 2024-06-13 | End: 2024-06-14 | Stop reason: HOSPADM

## 2024-06-13 RX ORDER — ALBUTEROL SULFATE 90 UG/1
4 AEROSOL, METERED RESPIRATORY (INHALATION) PRN
OUTPATIENT
Start: 2024-07-04

## 2024-06-13 RX ORDER — 0.9 % SODIUM CHLORIDE 0.9 %
250 INTRAVENOUS SOLUTION INTRAVENOUS ONCE
Status: COMPLETED | OUTPATIENT
Start: 2024-06-13 | End: 2024-06-13

## 2024-06-13 RX ORDER — HEPARIN SODIUM (PORCINE) LOCK FLUSH IV SOLN 100 UNIT/ML 100 UNIT/ML
500 SOLUTION INTRAVENOUS PRN
Start: 2024-07-04

## 2024-06-13 RX ORDER — DIPHENHYDRAMINE HCL 25 MG
25 TABLET ORAL ONCE
Status: COMPLETED | OUTPATIENT
Start: 2024-06-13 | End: 2024-06-13

## 2024-06-13 RX ORDER — DIPHENHYDRAMINE HYDROCHLORIDE 50 MG/ML
50 INJECTION INTRAMUSCULAR; INTRAVENOUS
OUTPATIENT
Start: 2024-07-04

## 2024-06-13 RX ORDER — HYDROXYZINE HYDROCHLORIDE 10 MG/1
10 TABLET, FILM COATED ORAL ONCE
Status: COMPLETED | OUTPATIENT
Start: 2024-06-13 | End: 2024-06-13

## 2024-06-13 RX ORDER — HYDROXYZINE HYDROCHLORIDE 10 MG/1
10 TABLET, FILM COATED ORAL ONCE
Start: 2024-07-04 | End: 2024-07-04

## 2024-06-13 RX ORDER — DIPHENHYDRAMINE HCL 25 MG
25 TABLET ORAL ONCE
Start: 2024-07-04 | End: 2024-07-04

## 2024-06-13 RX ORDER — EPINEPHRINE 1 MG/ML
0.3 INJECTION, SOLUTION, CONCENTRATE INTRAVENOUS PRN
OUTPATIENT
Start: 2024-07-04

## 2024-06-13 RX ADMIN — HYDROXYZINE HYDROCHLORIDE 10 MG: 10 TABLET ORAL at 14:46

## 2024-06-13 RX ADMIN — SODIUM CHLORIDE 250 ML: 9 INJECTION, SOLUTION INTRAVENOUS at 14:45

## 2024-06-13 RX ADMIN — DIPHENHYDRAMINE HYDROCHLORIDE 25 MG: 25 TABLET ORAL at 14:46

## 2024-06-13 RX ADMIN — TEZEPELUMAB-EKKO 210 MG: 210 INJECTION, SOLUTION SUBCUTANEOUS at 15:12

## 2024-06-13 RX ADMIN — SODIUM CHLORIDE, PRESERVATIVE FREE 10 ML: 5 INJECTION INTRAVENOUS at 14:45

## 2024-06-18 ENCOUNTER — HOSPITAL ENCOUNTER (OUTPATIENT)
Dept: PULMONOLOGY | Age: 54
Setting detail: THERAPIES SERIES
Discharge: HOME | End: 2024-06-18
Payer: MEDICAID

## 2024-06-18 PROCEDURE — 94626 PHY/QHP OP PULM RHB W/MNTR: CPT

## 2024-06-20 ENCOUNTER — OFFICE VISIT (OUTPATIENT)
Dept: PRIMARY CARE CLINIC | Age: 54
End: 2024-06-20
Payer: MEDICAID

## 2024-06-20 ENCOUNTER — HOSPITAL ENCOUNTER (OUTPATIENT)
Dept: PULMONOLOGY | Age: 54
Setting detail: THERAPIES SERIES
Discharge: HOME | End: 2024-06-20
Payer: MEDICAID

## 2024-06-20 VITALS
BODY MASS INDEX: 37.29 KG/M2 | OXYGEN SATURATION: 98 % | DIASTOLIC BLOOD PRESSURE: 76 MMHG | HEART RATE: 77 BPM | HEIGHT: 59 IN | RESPIRATION RATE: 16 BRPM | WEIGHT: 185 LBS | SYSTOLIC BLOOD PRESSURE: 132 MMHG

## 2024-06-20 DIAGNOSIS — G47.33 OSA ON CPAP: ICD-10-CM

## 2024-06-20 DIAGNOSIS — M16.12 PRIMARY OSTEOARTHRITIS OF LEFT HIP: ICD-10-CM

## 2024-06-20 DIAGNOSIS — F32.0 CURRENT MILD EPISODE OF MAJOR DEPRESSIVE DISORDER WITHOUT PRIOR EPISODE (HCC): ICD-10-CM

## 2024-06-20 DIAGNOSIS — G43.009 MIGRAINE WITHOUT AURA AND WITHOUT STATUS MIGRAINOSUS, NOT INTRACTABLE: Chronic | ICD-10-CM

## 2024-06-20 DIAGNOSIS — Z00.01 ENCOUNTER FOR WELL ADULT EXAM WITH ABNORMAL FINDINGS: Primary | ICD-10-CM

## 2024-06-20 DIAGNOSIS — J45.50 SEVERE PERSISTENT ASTHMA WITHOUT COMPLICATION: ICD-10-CM

## 2024-06-20 PROBLEM — K60.0 ACUTE ANAL FISSURE: Status: RESOLVED | Noted: 2024-01-10 | Resolved: 2024-06-20

## 2024-06-20 PROBLEM — K76.89 HEPATIC CYST: Status: RESOLVED | Noted: 2022-01-21 | Resolved: 2024-06-20

## 2024-06-20 PROBLEM — K76.0 HEPATIC STEATOSIS: Status: RESOLVED | Noted: 2022-06-08 | Resolved: 2024-06-20

## 2024-06-20 PROCEDURE — 99396 PREV VISIT EST AGE 40-64: CPT | Performed by: FAMILY MEDICINE

## 2024-06-20 PROCEDURE — 94626 PHY/QHP OP PULM RHB W/MNTR: CPT

## 2024-06-20 RX ORDER — VITAMIN A ACETATE, BETA CAROTENE, ASCORBIC ACID, CHOLECALCIFEROL, .ALPHA.-TOCOPHEROL ACETATE, DL-, THIAMINE MONONITRATE, RIBOFLAVIN, NIACINAMIDE, PYRIDOXINE HYDROCHLORIDE, FOLIC ACID, CYANOCOBALAMIN, CALCIUM CARBONATE, FERROUS FUMARATE, ZINC OXIDE, CUPRIC OXIDE 3080; 12; 120; 400; 1; 1.84; 3; 20; 22; 920; 25; 200; 27; 10; 2 [IU]/1; UG/1; MG/1; [IU]/1; MG/1; MG/1; MG/1; MG/1; MG/1; [IU]/1; MG/1; MG/1; MG/1; MG/1; MG/1
TABLET, FILM COATED ORAL
Qty: 30 TABLET | Refills: 5 | Status: SHIPPED | OUTPATIENT
Start: 2024-06-20

## 2024-06-20 RX ORDER — BUPROPION HYDROCHLORIDE 150 MG/1
150 TABLET ORAL EVERY MORNING
Qty: 30 TABLET | Refills: 3 | Status: SHIPPED | OUTPATIENT
Start: 2024-06-20

## 2024-06-20 RX ORDER — MELATONIN
1 DAILY
Qty: 30 TABLET | Refills: 5 | Status: SHIPPED | OUTPATIENT
Start: 2024-06-20

## 2024-06-20 RX ORDER — CLINDAMYCIN HYDROCHLORIDE 300 MG/1
CAPSULE ORAL
COMMUNITY
Start: 2024-06-17

## 2024-06-20 ASSESSMENT — ENCOUNTER SYMPTOMS
EYE REDNESS: 0
EYE ITCHING: 0
NAUSEA: 0
BLOOD IN STOOL: 0
SORE THROAT: 0
CONSTIPATION: 0
SHORTNESS OF BREATH: 0
DIARRHEA: 0
BACK PAIN: 0
EYE PAIN: 0
ABDOMINAL PAIN: 0
SINUS PRESSURE: 0
VOMITING: 0
COLOR CHANGE: 0
WHEEZING: 0
CHEST TIGHTNESS: 0
COUGH: 0
APNEA: 0
RHINORRHEA: 0

## 2024-06-20 NOTE — PROGRESS NOTES
Well Adult Note  Name: Ashlee Bustos Today’s Date: 2024   MRN: [id number] Sex: Female   Age: 53 y.o. Ethnicity:  /    : 1970 Race:  /       Ashlee Bustos is here for well adult exam.  History:  Feels well  Depression  Having dental surgery  Appetite good  No change in bowel or bladder function  Vaccines UTD    Review of Systems   Constitutional:  Negative for activity change, appetite change, fatigue and fever.   HENT:  Negative for congestion, ear pain, hearing loss, nosebleeds, rhinorrhea, sinus pressure and sore throat.    Eyes:  Negative for pain, redness, itching and visual disturbance.   Respiratory:  Negative for apnea, cough, chest tightness, shortness of breath and wheezing.    Cardiovascular:  Negative for chest pain, palpitations and leg swelling.   Gastrointestinal:  Negative for abdominal pain, blood in stool, constipation, diarrhea, nausea and vomiting.   Endocrine: Negative.    Genitourinary:  Negative for decreased urine volume, difficulty urinating, dysuria, frequency, hematuria and urgency.   Musculoskeletal:  Negative for arthralgias, back pain, gait problem, myalgias and neck pain.   Skin:  Negative for color change and rash.   Allergic/Immunologic: Negative for environmental allergies and food allergies.   Neurological:  Negative for dizziness, weakness, light-headedness, numbness and headaches.   Hematological:  Negative for adenopathy. Does not bruise/bleed easily.   Psychiatric/Behavioral:  Negative for behavioral problems, dysphoric mood and sleep disturbance. The patient is not nervous/anxious and is not hyperactive.    All other systems reviewed and are negative.      Allergies   Allergen Reactions    Fish-Derived Products Anaphylaxis    Dye [Iodides] Itching    Robaxin [Methocarbamol] Other (See Comments)     Very tired, felt like she couldn't even function with her daily activities.         Prior to Visit Medications    Medication Sig Taking?

## 2024-06-24 ENCOUNTER — OFFICE VISIT (OUTPATIENT)
Dept: PAIN MANAGEMENT | Age: 54
End: 2024-06-24
Payer: MEDICAID

## 2024-06-24 VITALS
WEIGHT: 185 LBS | DIASTOLIC BLOOD PRESSURE: 60 MMHG | OXYGEN SATURATION: 96 % | RESPIRATION RATE: 18 BRPM | TEMPERATURE: 97.3 F | HEIGHT: 59 IN | HEART RATE: 76 BPM | SYSTOLIC BLOOD PRESSURE: 114 MMHG | BODY MASS INDEX: 37.29 KG/M2

## 2024-06-24 DIAGNOSIS — M50.30 DDD (DEGENERATIVE DISC DISEASE), CERVICAL: ICD-10-CM

## 2024-06-24 DIAGNOSIS — M47.816 LUMBAR SPONDYLOSIS: ICD-10-CM

## 2024-06-24 DIAGNOSIS — M47.816 LUMBAR FACET ARTHROPATHY: ICD-10-CM

## 2024-06-24 DIAGNOSIS — M54.16 LUMBAR RADICULOPATHY: ICD-10-CM

## 2024-06-24 DIAGNOSIS — M16.12 PRIMARY OSTEOARTHRITIS OF LEFT HIP: ICD-10-CM

## 2024-06-24 DIAGNOSIS — M54.12 CERVICAL RADICULOPATHY: ICD-10-CM

## 2024-06-24 DIAGNOSIS — M47.812 FACET ARTHROPATHY, CERVICAL: ICD-10-CM

## 2024-06-24 DIAGNOSIS — M47.812 CERVICAL FACET JOINT SYNDROME: ICD-10-CM

## 2024-06-24 DIAGNOSIS — R20.0 PERINEAL NUMBNESS: ICD-10-CM

## 2024-06-24 DIAGNOSIS — M79.7 FIBROMYALGIA: ICD-10-CM

## 2024-06-24 DIAGNOSIS — G89.4 CHRONIC PAIN SYNDROME: Primary | ICD-10-CM

## 2024-06-24 DIAGNOSIS — M51.9 LUMBAR DISC DISORDER: ICD-10-CM

## 2024-06-24 PROCEDURE — 1036F TOBACCO NON-USER: CPT | Performed by: PHYSICIAN ASSISTANT

## 2024-06-24 PROCEDURE — G8427 DOCREV CUR MEDS BY ELIG CLIN: HCPCS | Performed by: PHYSICIAN ASSISTANT

## 2024-06-24 PROCEDURE — 99213 OFFICE O/P EST LOW 20 MIN: CPT | Performed by: PHYSICIAN ASSISTANT

## 2024-06-24 PROCEDURE — 3017F COLORECTAL CA SCREEN DOC REV: CPT | Performed by: PHYSICIAN ASSISTANT

## 2024-06-24 PROCEDURE — G8417 CALC BMI ABV UP PARAM F/U: HCPCS | Performed by: PHYSICIAN ASSISTANT

## 2024-06-24 RX ORDER — OXYCODONE HYDROCHLORIDE 5 MG/1
5 TABLET ORAL DAILY PRN
Qty: 30 TABLET | Refills: 0 | Status: SHIPPED | OUTPATIENT
Start: 2024-06-28 | End: 2024-07-28

## 2024-06-24 NOTE — PROGRESS NOTES
Ashlee Bustos presents to the [facility] on 6/24/2024. Aslhee is complaining of pain back, right knee, left wrist and left hip. The pain is constant. The pain is described as gnawing and grabbing. Pain is rated on her best day at a 4, on her worst day at a 10, and on average at a 6 on the VAS scale. She took her last dose of Percocet and Motrin today.      Any procedures since your last visit: No    She is  on NSAIDS and  is  on anticoagulation medications to include ASA and is managed by PCP.     Pacemaker or defibrillator: No   Medication Contract and Consent for Opioid Use Documents Filed       Patient Documents       Type of Document Status Date Received Received By Description    Medication Contract Received 7/18/2019 11:59 AM DIANA KWON 7/18/19 medication contract    Medication Contract Received 10/9/2020  3:01 PM ALEXEI VENTURA pain pt agreement    Medication Contract Received 11/5/2021 11:29 AM SNOW MUNIZ Pain Mgmt Patient Agreement 11.5.2021    Medication Contract Received 12/13/2021  4:21 PM LEONARDA PINA pain management    Consent Opioid Use Received 12/12/2022 12:23 PM ESTEFANY ARAMBULA pt agreement 12/12/22    Consent Opioid Use Received 1/10/2024  4:08 PM ESTEFANY ARAMBULA Consent Opioid Use                       LMP 03/27/2008      Patient's last menstrual period was 03/27/2008.

## 2024-06-24 NOTE — PROGRESS NOTES
[facility]   [address], [place] [address]  [phone]    Follow up Note      Ashleeher JACQUELIN Samaniegos     Date of Visit:  2024    CC:  Patient presents for follow up   Chief Complaint   Patient presents with    Back Pain               HPI:    Pain is unchanged.    Appropriate analgesia with current medications regimen: yes.    Change in quality of symptoms:no.    Medication side effects:none.   Recent diagnostic testing:none.   Recent interventional procedures: None.       She has been on anticoagulation medications to include ASA. The patient  has not been on herbal supplements.  The patient is not diabetic.     Imagin/15/2022 MRI lumbar spine    FINDINGS:   No fracture or malalignment.  Vertebral body height and interspaces are well   maintained.  Conus medullaris is visualized and is unremarkable.  No evidence   of epidural mass or hematoma.  No prevertebral soft tissue edema.  Note made   of a incidental intraosseous hemangioma located in T11 vertebral body.       L1/L2: No central canal stenosis or neural foraminal narrowing.       L2/L3: No central canal stenosis or neural foraminal narrowing.       L3/L4: Mild facet hypertrophy with hypertrophy of ligamentum flavum.  No   central canal stenosis.  There is mild bilateral neural foraminal narrowing   more significant on the right.       L4/L5: Moderate facet hypertrophy.  No central canal stenosis.  No   significant neural foraminal narrowing.       L5/S1: No central canal stenosis.  No significant neural foraminal narrowing.           Impression   1. Mild bilateral neural foraminal narrowing at L3/L4 more significant on the   right.  No central canal stenosis.   2. Moderate bilateral facet hypertrophy at L4/L5.   3. No fracture or malalignment.           Cervical spine MRI 2018  1. Multilevel degenerative disc disease extending from C3 through C7.   Moderately severe spinal canal stenosis at C3-C4.

## 2024-06-25 ENCOUNTER — HOSPITAL ENCOUNTER (OUTPATIENT)
Dept: PULMONOLOGY | Age: 54
Setting detail: THERAPIES SERIES
Discharge: HOME | End: 2024-06-25
Payer: MEDICAID

## 2024-06-25 PROCEDURE — 94626 PHY/QHP OP PULM RHB W/MNTR: CPT

## 2024-06-26 ENCOUNTER — HOSPITAL ENCOUNTER (OUTPATIENT)
Dept: MRI IMAGING | Age: 54
Discharge: HOME | End: 2024-06-28
Payer: MEDICAID

## 2024-06-26 DIAGNOSIS — R20.0 PERINEAL NUMBNESS: ICD-10-CM

## 2024-06-26 PROCEDURE — 72195 MRI PELVIS W/O DYE: CPT

## 2024-07-02 ENCOUNTER — HOSPITAL ENCOUNTER (OUTPATIENT)
Dept: PULMONOLOGY | Age: 54
Setting detail: THERAPIES SERIES
Discharge: HOME | End: 2024-07-02
Payer: MEDICAID

## 2024-07-02 PROCEDURE — 94626 PHY/QHP OP PULM RHB W/MNTR: CPT

## 2024-07-04 ENCOUNTER — APPOINTMENT (OUTPATIENT)
Dept: PULMONOLOGY | Age: 54
End: 2024-07-04
Payer: MEDICAID

## 2024-07-09 ENCOUNTER — HOSPITAL ENCOUNTER (OUTPATIENT)
Dept: PULMONOLOGY | Age: 54
Setting detail: THERAPIES SERIES
Discharge: HOME | End: 2024-07-09
Payer: MEDICAID

## 2024-07-09 PROCEDURE — 94626 PHY/QHP OP PULM RHB W/MNTR: CPT

## 2024-07-11 ENCOUNTER — HOSPITAL ENCOUNTER (OUTPATIENT)
Dept: INFUSION THERAPY | Age: 54
Setting detail: INFUSION SERIES
Discharge: HOME | End: 2024-07-11
Payer: MEDICAID

## 2024-07-11 ENCOUNTER — HOSPITAL ENCOUNTER (OUTPATIENT)
Dept: PULMONOLOGY | Age: 54
Setting detail: THERAPIES SERIES
Discharge: HOME | End: 2024-07-11
Payer: MEDICAID

## 2024-07-11 VITALS
SYSTOLIC BLOOD PRESSURE: 106 MMHG | RESPIRATION RATE: 18 BRPM | DIASTOLIC BLOOD PRESSURE: 75 MMHG | TEMPERATURE: 97.6 F | HEART RATE: 71 BPM | OXYGEN SATURATION: 98 %

## 2024-07-11 DIAGNOSIS — J45.50 SEVERE PERSISTENT ASTHMA, UNSPECIFIED WHETHER COMPLICATED: Primary | ICD-10-CM

## 2024-07-11 PROCEDURE — 94626 PHY/QHP OP PULM RHB W/MNTR: CPT

## 2024-07-11 PROCEDURE — 96360 HYDRATION IV INFUSION INIT: CPT

## 2024-07-11 PROCEDURE — 96372 THER/PROPH/DIAG INJ SC/IM: CPT

## 2024-07-11 PROCEDURE — 2580000003 HC RX 258: Performed by: INTERNAL MEDICINE

## 2024-07-11 PROCEDURE — 96361 HYDRATE IV INFUSION ADD-ON: CPT

## 2024-07-11 PROCEDURE — 6360000002 HC RX W HCPCS: Performed by: INTERNAL MEDICINE

## 2024-07-11 PROCEDURE — 6370000000 HC RX 637 (ALT 250 FOR IP): Performed by: INTERNAL MEDICINE

## 2024-07-11 RX ORDER — EPINEPHRINE 1 MG/ML
0.3 INJECTION, SOLUTION, CONCENTRATE INTRAVENOUS PRN
OUTPATIENT
Start: 2024-08-08

## 2024-07-11 RX ORDER — SODIUM CHLORIDE 0.9 % (FLUSH) 0.9 %
5-40 SYRINGE (ML) INJECTION PRN
Status: DISCONTINUED | OUTPATIENT
Start: 2024-07-11 | End: 2024-07-12 | Stop reason: HOSPADM

## 2024-07-11 RX ORDER — HEPARIN SODIUM (PORCINE) LOCK FLUSH IV SOLN 100 UNIT/ML 100 UNIT/ML
500 SOLUTION INTRAVENOUS PRN
Start: 2024-08-08

## 2024-07-11 RX ORDER — 0.9 % SODIUM CHLORIDE 0.9 %
250 INTRAVENOUS SOLUTION INTRAVENOUS ONCE
Start: 2024-08-08 | End: 2024-08-08

## 2024-07-11 RX ORDER — HYDROXYZINE HYDROCHLORIDE 10 MG/1
10 TABLET, FILM COATED ORAL ONCE
Status: COMPLETED | OUTPATIENT
Start: 2024-07-11 | End: 2024-07-11

## 2024-07-11 RX ORDER — MEPERIDINE HYDROCHLORIDE 25 MG/ML
25 INJECTION INTRAMUSCULAR; INTRAVENOUS; SUBCUTANEOUS ONCE
Start: 2024-08-08 | End: 2024-08-08

## 2024-07-11 RX ORDER — DIPHENHYDRAMINE HCL 25 MG
25 TABLET ORAL ONCE
Status: COMPLETED | OUTPATIENT
Start: 2024-07-11 | End: 2024-07-11

## 2024-07-11 RX ORDER — ACETAMINOPHEN 325 MG/1
650 TABLET ORAL
OUTPATIENT
Start: 2024-08-08

## 2024-07-11 RX ORDER — SODIUM CHLORIDE 9 MG/ML
INJECTION, SOLUTION INTRAVENOUS CONTINUOUS
OUTPATIENT
Start: 2024-08-08

## 2024-07-11 RX ORDER — ONDANSETRON 2 MG/ML
8 INJECTION INTRAMUSCULAR; INTRAVENOUS
OUTPATIENT
Start: 2024-08-08

## 2024-07-11 RX ORDER — 0.9 % SODIUM CHLORIDE 0.9 %
250 INTRAVENOUS SOLUTION INTRAVENOUS ONCE
Status: COMPLETED | OUTPATIENT
Start: 2024-07-11 | End: 2024-07-11

## 2024-07-11 RX ORDER — ALBUTEROL SULFATE 90 UG/1
4 AEROSOL, METERED RESPIRATORY (INHALATION) PRN
OUTPATIENT
Start: 2024-08-08

## 2024-07-11 RX ORDER — DIPHENHYDRAMINE HCL 25 MG
25 TABLET ORAL ONCE
Start: 2024-08-08 | End: 2024-08-08

## 2024-07-11 RX ORDER — HYDROXYZINE HYDROCHLORIDE 10 MG/1
10 TABLET, FILM COATED ORAL ONCE
Start: 2024-08-08 | End: 2024-08-08

## 2024-07-11 RX ORDER — DIPHENHYDRAMINE HYDROCHLORIDE 50 MG/ML
50 INJECTION INTRAMUSCULAR; INTRAVENOUS
OUTPATIENT
Start: 2024-08-08

## 2024-07-11 RX ORDER — SODIUM CHLORIDE 0.9 % (FLUSH) 0.9 %
5-40 SYRINGE (ML) INJECTION PRN
Start: 2024-08-08

## 2024-07-11 RX ADMIN — HYDROXYZINE HYDROCHLORIDE 10 MG: 10 TABLET ORAL at 14:47

## 2024-07-11 RX ADMIN — TEZEPELUMAB-EKKO 210 MG: 210 INJECTION, SOLUTION SUBCUTANEOUS at 15:26

## 2024-07-11 RX ADMIN — SODIUM CHLORIDE, PRESERVATIVE FREE 10 ML: 5 INJECTION INTRAVENOUS at 14:45

## 2024-07-11 RX ADMIN — DIPHENHYDRAMINE HYDROCHLORIDE 25 MG: 25 TABLET ORAL at 14:47

## 2024-07-11 RX ADMIN — SODIUM CHLORIDE 250 ML: 9 INJECTION, SOLUTION INTRAVENOUS at 14:49

## 2024-07-11 ASSESSMENT — PAIN SCALES - GENERAL: PAINLEVEL_OUTOF10: 0

## 2024-07-11 NOTE — PROGRESS NOTES
[facility]  Family Medicine Attending    Hospital Course: 43 y.o. male with PMHx of polysubstance abuse including opioids and tobacco, abstinent for 5 years, diverticulitis who presents with rib pain and LLE pain. He also has recently noted difficulty w urination. ALP elevated 200, AST 76, Calcium 10.5 Hgb 12.3, Platelets 116.  . CT chest concerning for mediastinal LAD. CT Lumbar concerning for metastatic disease & retroperitoneal LAD.     S: He is NPO awaiting procedures on my rounds. Reports pain inadequately controlled over time.     O: VS- Blood pressure 111/72, pulse 85, temperature 98.3 °F (36.8 °C), temperature source Oral, resp. rate 18, height 1.702 m (5' 7\"), weight 88.5 kg (195 lb), SpO2 97 %.  Exam is as noted by resident with the following changes, additions or corrections:  Gen: NAD AAOx3  HEENT NCAT EOMI Anicterici. No facial droop.   CV RRR   Resp CTAB tender over left chest wall ~ribs 4-6.   Abd S NT ND no HSM.   Back: TTP over lower lumbar area. Limited ROM.   Ext: Left Lower 4/5 strength prox, 5/5 distal. RLE 5/5 prox-distal.     Impressions:   Principal Problem:    Retroperitoneal lymphadenopathy  Active Problems:    Chronic midline low back pain with left-sided sciatica    Elevated PSA    Urinary hesitancy    Pelvic pain    Osteopenia of lumbar spine    Acute midline low back pain with sciatica  Resolved Problems:    * No resolved hospital problems. *      Plan:     1) Enlarged Prostate w/ elevated PSA - concerning for prostate cancer w metastatic lesions on CT. Pending Lumbar MRI w/wo contrast. Casodex per Urology.     2) Elevated calcium - improved. Follow PTHrp    3) Mediastinal LAD - Pulm consulted, labs, bronch per pulm w/ bx.   See resident note for further details.     4) Vitamin D - replacement.        Attending Physician Statement  I have reviewed the chart and seen the patient with the resident(s).  I personally reviewed images, EKG's and similar tests, if  broad-based central disc herniation notable at C3/C4, C4/C5, C5/C6, and C6/C7   resulting in mild-to-moderate effacement of ventral cord. 2.  No fracture or malalignment.             Cervical spine MRI 04/2018  1. Multilevel degenerative disc disease extending from C3 through C7. Moderately severe spinal canal stenosis at C3-C4. Moderate spinal   canal stenosis at C4-C5. . Moderately severe spinal canal stenosis at   C5-C6. Abnormal signal intensity within the cervical spinal cord C3-C5   without evidence of enhancement. Findings are most likely reflective   of myelomalacia changes secondary to the underlying degree of cord   compression as described above. No active demyelination identified. 2. Slight loss of normally expected cervical lordosis C3-C6.      CT Lumbar spine 2017  1. Mild diffuse osteopenia, no compression fracture or   spondylolisthesis.       2. Mild multilevel degenerative disc disease and facet joint   arthropathy of the lower lumbar spine more pronounced at L4-L5.      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         Otherwise, no musculoskeletal pathology to explain patient's pain.      Previous treatments: Physical Therapy, Epidural Steroid Injection with  and medications. .  tizanidine dizziness, flexeril sedating, norco severe gi distress                                        Potential Aberrant Drug-Related Behavior:    No     Urine Drug Screening:  Saliva screen 08/2020 consistent for Ultram     OARRS report:  01/2021 consistent. Past Medical History: Reviewed    Past Surgical History: Reviewed     Home Medications: Reviewed    Allergies: Reviewed     Social History: Reviewed     REVIEW OF SYSTEMS:     Tony Benavidez denies fever/chills, chest pain, shortness of breath, new bowel or bladder complaints. All other review of systems was negative.     PHYSICAL EXAMINATION:     General:       A & O x3    Lungs: Breathing:Normal expansion. Clear to auscultation. No rales, rhonchi, or wheezing. Impression:                  Plan:  Follow up on her neck,low back and left hip pain. Neck pain primarily axial with arm weakness  Plans to restart aqua therapy for low back pain, really helps  On hold until feeling better: Bilateral Cervical medial branch block [id number] with sedation   Pending appointment with  for worsening neck pain  Trial tylenol with codeine temporarily x 7 days same MME until pt able to tolerate solid foods  DC Tramadol 50mg po daily-bid prn pain #40       Continue Relafen 750mg po bid   Continue Tizanidine 2mg po bid prn spasms   MRI of cervical spine reviewed with patient and daughter  OARRS report reviewed 01/2021.   Patient encouraged to stay active and to lose weight. Patient had tried and failed physical therapy. Treatment plan discussed with the patient and her daughter including medications and procedure side effects.     We discussed with the patient that combining opioids, benzodiazepines, alcohol, illicit drugs or sleep aids increases the risk of respiratory depression including death. We discussed that these medications may cause drowsiness, sedation or dizziness and have counseled the patient not to drive or operate machinery. We have discussed that these medications will be prescribed only by one provider. We have discussed with the patient about age related risk factors and have thoroughly discussed the importance of taking these medications as prescribed. The patient verbalizes understanding. Patient advised regarding steps to help prevent the spread of COVID-19   SOURCE - https://catherine-abiodun.info/. html     1-Stay home except to get medical care  2-Clean your hands often for atleast 20 secnds, avoid touching: Avoid touching your eyes, nose, and mouth with unwashed hands. 3-Seek medical attention: Seek prompt medical attention if your illness is worsening (e.g., difficulty breathing). Call you doctor first.  3-Wear a facemask if you are sick   4-Cover your coughs and sneezes        I affirm this is a Patient Initiated Episode with an Established Patient who has not had a related appointment within my department in the past 7 days or scheduled within the next 24 hours.     Total Time: 12 min    Note: not billable if this call serves to triage the patient into an appointment for the relevant concern

## 2024-07-11 NOTE — PROGRESS NOTES
Patient tolerated Tezspire well. Remained on unit for 10 minutes after treatment. Patient alert and oriented x3. No distress noted. Vital signs stable. Patient denies any new or worsening pain. Educated patient on possible side effects and treatment of medication.     Patient verbalized understanding. Offered patient education and/or discharge material. Patient declines. Patient denies any needs. All questions answered. D/C in stable condition.

## 2024-07-16 ENCOUNTER — HOSPITAL ENCOUNTER (OUTPATIENT)
Dept: PULMONOLOGY | Age: 54
Setting detail: THERAPIES SERIES
Discharge: HOME | End: 2024-07-16
Payer: MEDICAID

## 2024-07-16 PROCEDURE — 94626 PHY/QHP OP PULM RHB W/MNTR: CPT

## 2024-07-18 ENCOUNTER — HOSPITAL ENCOUNTER (OUTPATIENT)
Dept: PULMONOLOGY | Age: 54
Setting detail: THERAPIES SERIES
Discharge: HOME | End: 2024-07-18
Payer: MEDICAID

## 2024-07-18 PROCEDURE — 94626 PHY/QHP OP PULM RHB W/MNTR: CPT

## 2024-07-23 ENCOUNTER — HOSPITAL ENCOUNTER (OUTPATIENT)
Dept: PULMONOLOGY | Age: 54
Setting detail: THERAPIES SERIES
Discharge: HOME | End: 2024-07-23
Payer: MEDICAID

## 2024-07-23 PROCEDURE — 94626 PHY/QHP OP PULM RHB W/MNTR: CPT

## 2024-07-24 ENCOUNTER — OFFICE VISIT (OUTPATIENT)
Dept: PAIN MANAGEMENT | Age: 54
End: 2024-07-24
Payer: MEDICAID

## 2024-07-24 VITALS
BODY MASS INDEX: 37.29 KG/M2 | TEMPERATURE: 97.3 F | HEART RATE: 75 BPM | RESPIRATION RATE: 18 BRPM | WEIGHT: 185 LBS | HEIGHT: 59 IN | DIASTOLIC BLOOD PRESSURE: 90 MMHG | SYSTOLIC BLOOD PRESSURE: 140 MMHG | OXYGEN SATURATION: 97 %

## 2024-07-24 DIAGNOSIS — M50.30 DDD (DEGENERATIVE DISC DISEASE), CERVICAL: ICD-10-CM

## 2024-07-24 DIAGNOSIS — M54.12 CERVICAL RADICULOPATHY: ICD-10-CM

## 2024-07-24 DIAGNOSIS — M16.12 PRIMARY OSTEOARTHRITIS OF LEFT HIP: ICD-10-CM

## 2024-07-24 DIAGNOSIS — M54.16 LUMBAR RADICULOPATHY: ICD-10-CM

## 2024-07-24 DIAGNOSIS — M47.816 LUMBAR SPONDYLOSIS: ICD-10-CM

## 2024-07-24 DIAGNOSIS — M47.812 FACET ARTHROPATHY, CERVICAL: ICD-10-CM

## 2024-07-24 DIAGNOSIS — M79.7 FIBROMYALGIA: ICD-10-CM

## 2024-07-24 DIAGNOSIS — M47.816 LUMBAR FACET ARTHROPATHY: ICD-10-CM

## 2024-07-24 DIAGNOSIS — M47.812 CERVICAL FACET JOINT SYNDROME: ICD-10-CM

## 2024-07-24 DIAGNOSIS — G89.4 CHRONIC PAIN SYNDROME: Primary | ICD-10-CM

## 2024-07-24 DIAGNOSIS — M51.9 LUMBAR DISC DISORDER: ICD-10-CM

## 2024-07-24 PROCEDURE — G8417 CALC BMI ABV UP PARAM F/U: HCPCS | Performed by: PHYSICIAN ASSISTANT

## 2024-07-24 PROCEDURE — G8427 DOCREV CUR MEDS BY ELIG CLIN: HCPCS | Performed by: PHYSICIAN ASSISTANT

## 2024-07-24 PROCEDURE — 3017F COLORECTAL CA SCREEN DOC REV: CPT | Performed by: PHYSICIAN ASSISTANT

## 2024-07-24 PROCEDURE — 99213 OFFICE O/P EST LOW 20 MIN: CPT | Performed by: PHYSICIAN ASSISTANT

## 2024-07-24 PROCEDURE — 1036F TOBACCO NON-USER: CPT | Performed by: PHYSICIAN ASSISTANT

## 2024-07-24 RX ORDER — OXYCODONE HYDROCHLORIDE 5 MG/1
5 TABLET ORAL DAILY PRN
Qty: 30 TABLET | Refills: 0 | Status: SHIPPED | OUTPATIENT
Start: 2024-07-29 | End: 2024-08-28

## 2024-07-24 NOTE — PROGRESS NOTES
Ashlee Bustos presents to the [facility] on 7/24/2024. Ashlee is complaining of pain back, right knee, left wrist and left hip. The pain is constant. The pain is described as gnawing and grabbing. Pain is rated on her best day at a 4, on her worst day at a 10, and on average at a 6 on the VAS scale. She took her last dose of Percocet and Motrin yesterday.      Any procedures since your last visit: No    She is  on NSAIDS and  is  on anticoagulation medications to include ASA and is managed by PCP.     Pacemaker or defibrillator: No   Medication Contract and Consent for Opioid Use Documents Filed       Patient Documents       Type of Document Status Date Received Received By Description    Medication Contract Received 7/18/2019 11:59 AM DIANA KWON 7/18/19 medication contract    Medication Contract Received 10/9/2020  3:01 PM ALEXEI VENTURA pain pt agreement    Medication Contract Received 11/5/2021 11:29 AM SNOW MUNIZ Pain Mgmt Patient Agreement 11.5.2021    Medication Contract Received 12/13/2021  4:21 PM LEONARDA PINA pain management    Consent Opioid Use Received 12/12/2022 12:23 PM ESTEFANY ARAMBULA pt agreement 12/12/22    Consent Opioid Use Received 1/10/2024  4:08 PM ESTEFANY ARAMBULA Consent Opioid Use                       LMP 03/27/2008      Patient's last menstrual period was 03/27/2008.    
daily activities.       Social History     Socioeconomic History    Marital status:      Spouse name: Richard Bustos    Number of children: 4    Years of education: 8 years    Highest education level: Not on file   Occupational History    Occupation: disability   Tobacco Use    Smoking status: Former     Current packs/day: 0.00     Average packs/day: 2.0 packs/day for 6.0 years (12.0 ttl pk-yrs)     Types: Cigarettes     Start date:      Quit date:      Years since quittin.5     Passive exposure: Past    Smokeless tobacco: Never   Vaping Use    Vaping Use: Never used   Substance and Sexual Activity    Alcohol use: No    Drug use: No    Sexual activity: Defer     Partners: Male     Comment:    Other Topics Concern    Not on file   Social History Narrative    Not on file     Social Determinants of Health     Financial Resource Strain: Low Risk  (2023)    Overall Financial Resource Strain (CARDIA)     Difficulty of Paying Living Expenses: Not hard at all   Food Insecurity: Not on file (2023)   Transportation Needs: Unknown (2023)    PRAPARE - Transportation     Lack of Transportation (Medical): Not on file     Lack of Transportation (Non-Medical): No   Physical Activity: Not on file   Stress: Not on file   Social Connections: Not on file   Intimate Partner Violence: Not on file   Housing Stability: Unknown (2023)    Housing Stability Vital Sign     Unable to Pay for Housing in the Last Year: Not on file     Number of Places Lived in the Last Year: Not on file     Unstable Housing in the Last Year: No       Family History   Problem Relation Age of Onset    Heart Disease Mother         enlarged heart    Migraines Mother     Kidney Disease Father         failure    Heart Disease Sister     Migraines Sister     Breast Cancer Maternal Aunt     Asthma Child     Asthma Child         bone disease    Anemia Child     Diabetes Other        REVIEW OF SYSTEMS:     Ashlee brown

## 2024-07-25 ENCOUNTER — HOSPITAL ENCOUNTER (OUTPATIENT)
Dept: PULMONOLOGY | Age: 54
Setting detail: THERAPIES SERIES
Discharge: HOME | End: 2024-07-25
Payer: MEDICAID

## 2024-07-25 PROCEDURE — 94626 PHY/QHP OP PULM RHB W/MNTR: CPT

## 2024-07-29 ENCOUNTER — OFFICE VISIT (OUTPATIENT)
Dept: PRIMARY CARE CLINIC | Age: 54
End: 2024-07-29
Payer: MEDICAID

## 2024-07-29 VITALS
RESPIRATION RATE: 16 BRPM | WEIGHT: 176 LBS | DIASTOLIC BLOOD PRESSURE: 74 MMHG | HEART RATE: 86 BPM | SYSTOLIC BLOOD PRESSURE: 126 MMHG | OXYGEN SATURATION: 98 % | HEIGHT: 59 IN | BODY MASS INDEX: 35.48 KG/M2

## 2024-07-29 DIAGNOSIS — R53.83 FATIGUE, UNSPECIFIED TYPE: ICD-10-CM

## 2024-07-29 DIAGNOSIS — Z86.73 HISTORY OF TIA (TRANSIENT ISCHEMIC ATTACK) AND STROKE: Chronic | ICD-10-CM

## 2024-07-29 DIAGNOSIS — G43.009 MIGRAINE WITHOUT AURA AND WITHOUT STATUS MIGRAINOSUS, NOT INTRACTABLE: Chronic | ICD-10-CM

## 2024-07-29 DIAGNOSIS — F41.9 ANXIETY: ICD-10-CM

## 2024-07-29 DIAGNOSIS — G47.00 INSOMNIA, UNSPECIFIED TYPE: ICD-10-CM

## 2024-07-29 DIAGNOSIS — G47.33 OSA ON CPAP: Primary | ICD-10-CM

## 2024-07-29 DIAGNOSIS — E78.5 DYSLIPIDEMIA: ICD-10-CM

## 2024-07-29 PROBLEM — G44.85 PRIMARY STABBING HEADACHE: Status: RESOLVED | Noted: 2020-01-02 | Resolved: 2024-07-29

## 2024-07-29 PROBLEM — M47.816 LUMBAR FACET ARTHROPATHY: Status: RESOLVED | Noted: 2020-04-23 | Resolved: 2024-07-29

## 2024-07-29 PROBLEM — M54.12 CERVICAL RADICULOPATHY: Status: RESOLVED | Noted: 2020-04-23 | Resolved: 2024-07-29

## 2024-07-29 PROBLEM — M25.552 PAIN IN LEFT HIP: Status: RESOLVED | Noted: 2020-07-17 | Resolved: 2024-07-29

## 2024-07-29 PROBLEM — M47.812 CERVICAL FACET JOINT SYNDROME: Status: RESOLVED | Noted: 2020-04-23 | Resolved: 2024-07-29

## 2024-07-29 PROBLEM — M47.816 LUMBAR SPONDYLOSIS: Status: RESOLVED | Noted: 2020-04-23 | Resolved: 2024-07-29

## 2024-07-29 PROBLEM — M48.02 CERVICAL STENOSIS OF SPINE: Status: RESOLVED | Noted: 2020-04-23 | Resolved: 2024-07-29

## 2024-07-29 PROCEDURE — G8427 DOCREV CUR MEDS BY ELIG CLIN: HCPCS | Performed by: FAMILY MEDICINE

## 2024-07-29 PROCEDURE — 1036F TOBACCO NON-USER: CPT | Performed by: FAMILY MEDICINE

## 2024-07-29 PROCEDURE — 99214 OFFICE O/P EST MOD 30 MIN: CPT | Performed by: FAMILY MEDICINE

## 2024-07-29 PROCEDURE — 3017F COLORECTAL CA SCREEN DOC REV: CPT | Performed by: FAMILY MEDICINE

## 2024-07-29 PROCEDURE — G8417 CALC BMI ABV UP PARAM F/U: HCPCS | Performed by: FAMILY MEDICINE

## 2024-07-29 ASSESSMENT — ENCOUNTER SYMPTOMS
VOMITING: 0
APNEA: 0
ABDOMINAL PAIN: 0
CHEST TIGHTNESS: 0
BLOOD IN STOOL: 0
SINUS PRESSURE: 0
EYE ITCHING: 0
NAUSEA: 0
COUGH: 0
EYE PAIN: 0
BACK PAIN: 0
RHINORRHEA: 0
COLOR CHANGE: 0
EYE REDNESS: 0
SORE THROAT: 0
DIARRHEA: 0
SHORTNESS OF BREATH: 0
WHEEZING: 0
CONSTIPATION: 0

## 2024-07-29 NOTE — PROGRESS NOTES
List   Diagnosis    Cervical stenosis of spinal canal    History of TIA (transient ischemic attack) and stroke    Spinal stenosis of lumbar region without neurogenic claudication    Fibromyalgia    Migraines without aura and without status migrainosus, not intractable    Chronic pain syndrome    Primary osteoarthritis of left hip    Cervical disc disorder    Lumbar disc disorder    Greater trochanteric bursitis of left hip    Primary osteoarthritis of left knee    Lung nodule    Irritable bowel syndrome    Class 2 severe obesity due to excess calories with serious comorbidity and body mass index (BMI) of 37.0 to 37.9 in adult (HCC)    CIERA on CPAP    Severe persistent asthma    Fatigue    Anxiety    Insomnia    Dyslipidemia       CIERA on CPAP  Migraines without aura and without status migrainosus, not intractable  History of TIA (transient ischemic attack) and stroke  Fatigue, unspecified type  -     CBC; Future  -     Comprehensive Metabolic Panel; Future  -     TSH; Future  Anxiety  Insomnia, unspecified type  Dyslipidemia  -     CBC; Future  -     Comprehensive Metabolic Panel; Future  -     Lipid Panel; Future  -     TSH; Future      Orders Placed This Encounter    CBC     Standing Status:   Future     Standing Expiration Date:   7/29/2025    Comprehensive Metabolic Panel     Standing Status:   Future     Standing Expiration Date:   7/29/2025    Lipid Panel     Standing Status:   Future     Standing Expiration Date:   7/29/2025     Order Specific Question:   Is Patient Fasting?/# of Hours     Answer:   10    TSH     Standing Status:   Future     Standing Expiration Date:   7/29/2025      Counseled regarding above diagnosis, including possible risks and complications,  especially if left uncontrolled.     Counseled regarding the possible side effects, risks, benefits and alternatives to treatment; patient and/or guardian verbalizes understanding, agrees, feels comfortable with and wishes to proceed with above

## 2024-07-30 ENCOUNTER — HOSPITAL ENCOUNTER (OUTPATIENT)
Dept: PULMONOLOGY | Age: 54
Setting detail: THERAPIES SERIES
Discharge: HOME | End: 2024-07-30
Payer: MEDICAID

## 2024-07-30 PROCEDURE — 94626 PHY/QHP OP PULM RHB W/MNTR: CPT

## 2024-08-01 ENCOUNTER — HOSPITAL ENCOUNTER (OUTPATIENT)
Dept: PULMONOLOGY | Age: 54
Setting detail: THERAPIES SERIES
Discharge: HOME | End: 2024-08-01
Payer: MEDICAID

## 2024-08-01 PROCEDURE — 94626 PHY/QHP OP PULM RHB W/MNTR: CPT

## 2024-08-06 ENCOUNTER — HOSPITAL ENCOUNTER (OUTPATIENT)
Dept: PULMONOLOGY | Age: 54
Setting detail: THERAPIES SERIES
Discharge: HOME | End: 2024-08-06
Payer: MEDICAID

## 2024-08-06 PROCEDURE — 94626 PHY/QHP OP PULM RHB W/MNTR: CPT

## 2024-08-08 ENCOUNTER — HOSPITAL ENCOUNTER (OUTPATIENT)
Dept: PULMONOLOGY | Age: 54
Setting detail: THERAPIES SERIES
Discharge: HOME | End: 2024-08-08
Payer: MEDICAID

## 2024-08-08 PROCEDURE — 94626 PHY/QHP OP PULM RHB W/MNTR: CPT

## 2024-08-12 ENCOUNTER — HOSPITAL ENCOUNTER (OUTPATIENT)
Dept: INFUSION THERAPY | Age: 54
Setting detail: INFUSION SERIES
Discharge: HOME | End: 2024-08-12

## 2024-08-12 DIAGNOSIS — J45.50 SEVERE PERSISTENT ASTHMA, UNSPECIFIED WHETHER COMPLICATED: Primary | ICD-10-CM

## 2024-08-12 RX ORDER — 0.9 % SODIUM CHLORIDE 0.9 %
250 INTRAVENOUS SOLUTION INTRAVENOUS ONCE
Status: DISCONTINUED | OUTPATIENT
Start: 2024-08-12 | End: 2024-08-15 | Stop reason: HOSPADM

## 2024-08-12 RX ORDER — EPINEPHRINE 1 MG/ML
0.3 INJECTION, SOLUTION, CONCENTRATE INTRAVENOUS PRN
Status: CANCELLED | OUTPATIENT
Start: 2024-09-02

## 2024-08-12 RX ORDER — ACETAMINOPHEN 325 MG/1
650 TABLET ORAL
Status: CANCELLED | OUTPATIENT
Start: 2024-09-02

## 2024-08-12 RX ORDER — DIPHENHYDRAMINE HCL 25 MG
25 TABLET ORAL ONCE
Status: DISCONTINUED | OUTPATIENT
Start: 2024-08-12 | End: 2024-08-15 | Stop reason: HOSPADM

## 2024-08-12 RX ORDER — SODIUM CHLORIDE 0.9 % (FLUSH) 0.9 %
5-40 SYRINGE (ML) INJECTION PRN
Status: DISCONTINUED | OUTPATIENT
Start: 2024-08-12 | End: 2024-08-15 | Stop reason: HOSPADM

## 2024-08-12 RX ORDER — ONDANSETRON 2 MG/ML
8 INJECTION INTRAMUSCULAR; INTRAVENOUS
Status: CANCELLED | OUTPATIENT
Start: 2024-09-02

## 2024-08-12 RX ORDER — SODIUM CHLORIDE 9 MG/ML
INJECTION, SOLUTION INTRAVENOUS CONTINUOUS
Status: CANCELLED | OUTPATIENT
Start: 2024-09-02

## 2024-08-12 RX ORDER — ALBUTEROL SULFATE 90 UG/1
4 AEROSOL, METERED RESPIRATORY (INHALATION) PRN
Status: CANCELLED | OUTPATIENT
Start: 2024-09-02

## 2024-08-12 RX ORDER — HEPARIN SODIUM (PORCINE) LOCK FLUSH IV SOLN 100 UNIT/ML 100 UNIT/ML
500 SOLUTION INTRAVENOUS PRN
Status: CANCELLED
Start: 2024-09-02

## 2024-08-12 RX ORDER — DIPHENHYDRAMINE HCL 25 MG
25 TABLET ORAL ONCE
Status: CANCELLED
Start: 2024-09-02 | End: 2024-09-02

## 2024-08-12 RX ORDER — HYDROXYZINE HYDROCHLORIDE 10 MG/1
10 TABLET, FILM COATED ORAL ONCE
Status: DISCONTINUED | OUTPATIENT
Start: 2024-08-12 | End: 2024-08-15 | Stop reason: HOSPADM

## 2024-08-12 RX ORDER — SODIUM CHLORIDE 0.9 % (FLUSH) 0.9 %
5-40 SYRINGE (ML) INJECTION PRN
Status: CANCELLED
Start: 2024-09-02

## 2024-08-12 RX ORDER — 0.9 % SODIUM CHLORIDE 0.9 %
250 INTRAVENOUS SOLUTION INTRAVENOUS ONCE
Status: CANCELLED
Start: 2024-09-02 | End: 2024-09-02

## 2024-08-12 RX ORDER — MEPERIDINE HYDROCHLORIDE 25 MG/ML
25 INJECTION INTRAMUSCULAR; INTRAVENOUS; SUBCUTANEOUS ONCE
Status: CANCELLED
Start: 2024-09-02 | End: 2024-09-02

## 2024-08-12 RX ORDER — DIPHENHYDRAMINE HYDROCHLORIDE 50 MG/ML
50 INJECTION INTRAMUSCULAR; INTRAVENOUS
Status: CANCELLED | OUTPATIENT
Start: 2024-09-02

## 2024-08-12 RX ORDER — HYDROXYZINE HYDROCHLORIDE 10 MG/1
10 TABLET, FILM COATED ORAL ONCE
Status: CANCELLED
Start: 2024-09-02 | End: 2024-09-02

## 2024-08-13 ENCOUNTER — HOSPITAL ENCOUNTER (OUTPATIENT)
Dept: PULMONOLOGY | Age: 54
Setting detail: THERAPIES SERIES
Discharge: HOME | End: 2024-08-13
Payer: MEDICAID

## 2024-08-13 ENCOUNTER — HOSPITAL ENCOUNTER (OUTPATIENT)
Dept: MRI IMAGING | Age: 54
Discharge: HOME | End: 2024-08-15
Payer: MEDICAID

## 2024-08-13 DIAGNOSIS — M54.12 CERVICAL RADICULOPATHY: ICD-10-CM

## 2024-08-13 PROCEDURE — 72141 MRI NECK SPINE W/O DYE: CPT

## 2024-08-13 PROCEDURE — 94626 PHY/QHP OP PULM RHB W/MNTR: CPT

## 2024-08-14 ENCOUNTER — TELEPHONE (OUTPATIENT)
Dept: PULMONOLOGY | Age: 54
End: 2024-08-14

## 2024-08-14 NOTE — TELEPHONE ENCOUNTER
Mailed letter to patient to inform her of the CT of the Chest that is scheduled for her  at [place], [place] . This test is scheduled on  Wednesday, October 16, 2024 at  11:00 am. Please arrive 15 minutes prior to appointment time. No Test Prep is needed

## 2024-08-15 ENCOUNTER — HOSPITAL ENCOUNTER (OUTPATIENT)
Dept: PULMONOLOGY | Age: 54
Setting detail: THERAPIES SERIES
Discharge: HOME | End: 2024-08-15
Payer: MEDICAID

## 2024-08-15 PROCEDURE — 94626 PHY/QHP OP PULM RHB W/MNTR: CPT

## 2024-08-16 ENCOUNTER — HOSPITAL ENCOUNTER (OUTPATIENT)
Dept: PHYSICAL THERAPY | Age: 54
Setting detail: THERAPIES SERIES
Discharge: HOME | End: 2024-08-16
Payer: MEDICAID

## 2024-08-16 PROCEDURE — 97161 PT EVAL LOW COMPLEX 20 MIN: CPT | Performed by: PHYSICAL THERAPIST

## 2024-08-16 ASSESSMENT — PAIN DESCRIPTION - LOCATION: LOCATION: GENERALIZED

## 2024-08-16 ASSESSMENT — PAIN SCALES - GENERAL: PAINLEVEL_OUTOF10: 5

## 2024-08-16 ASSESSMENT — PAIN DESCRIPTION - PAIN TYPE: TYPE: CHRONIC PAIN

## 2024-08-16 NOTE — PROGRESS NOTES
Chronic pain  Pain Location: Generalized      OBJECTIVE EXAMINATION   Restrictions:   None     Review of Systems:  Follows Commands: Within Functional Limits  Integumentary Assessment  Edema: Yes (swelling noted across L cervical/upper trap region)    Palpation:   Cervical Spine Palpation: tightness noted across B upper trap region; pain with palpation to L upper trap region  Lumbar Spine Palpation: pain with palpation across L LS region    Ambulation/Gait (if applicable):   Ambulation  Surface: Level tile  Device: No Device  Assistance: Independent  Quality of Gait: stable gait mechanics- limited heel-toe    Balance Screen:   Balance  Posture: Fair (fwd head/rounded shoulders)  Sitting - Static: Good  Sitting - Dynamic: Good  Standing - Static: Good  Standing - Dynamic: Good    Neuro Screen:   Left Dermatomes  Right Dermatomes      Left LE Dermatomes  L1 (Inguinal Ligament): Intact  L2 (Medial Anterior Thigh): Intact  L3 (Medial Knee): Intact  L4 (Medial Malleolus): Intact  L5 (Dorsum 3rd MTP): Intact  S1 (Lateral Malleolus): Intact  S2 (Popliteal Fossa): Intact Right LE Dermatomes  L1 (Inguinal Ligament): Intact  L2 (Medial Anterior Thigh): Intact  L3 (Medial Knee): Intact  L4 (Medial Malleolus): Intact  L5 (Dorsum 3rd MTP): Intact  S1 (Lateral Malleolus): Intact  S2 (Popliteal Fossa): Intact       Sensation      Sensation  Overall Sensation Status: WFL     Left AROM  Right AROM       LLE- WFL   LUE- WFL     RLE- WFL   RUE- WFL         Left Strength  Right Strength         Strength LLE  L Hip Flexion: 3+/5  L Hip ABduction: 4-/5  L Hip ADduction: 4-/5  L Knee Flexion: 3+/5  L Knee Extension: 3+/5  L Ankle Dorsiflexion: 3/5, 3+/5    Strength RLE  R Hip Flexion: 4+/5  R Hip ABduction: 4+/5  R Hip ADduction: 4+/5  R Knee Flexion: 4+/5  R Knee Extension: 4+/5  R Ankle Dorsiflexion: 4+/5     Cervical Assessment     AROM Cervical Spine   Cervical spine general AROM: Limited 25% all planes   Cervical Strength  Cervical

## 2024-08-19 ENCOUNTER — HOSPITAL ENCOUNTER (OUTPATIENT)
Dept: INFUSION THERAPY | Age: 54
Setting detail: INFUSION SERIES
Discharge: HOME | End: 2024-08-19
Payer: MEDICAID

## 2024-08-19 VITALS
DIASTOLIC BLOOD PRESSURE: 79 MMHG | RESPIRATION RATE: 16 BRPM | TEMPERATURE: 97.5 F | HEART RATE: 95 BPM | SYSTOLIC BLOOD PRESSURE: 124 MMHG | OXYGEN SATURATION: 98 %

## 2024-08-19 DIAGNOSIS — J45.50 SEVERE PERSISTENT ASTHMA, UNSPECIFIED WHETHER COMPLICATED: Primary | ICD-10-CM

## 2024-08-19 PROCEDURE — 96361 HYDRATE IV INFUSION ADD-ON: CPT

## 2024-08-19 PROCEDURE — 96360 HYDRATION IV INFUSION INIT: CPT

## 2024-08-19 PROCEDURE — 96372 THER/PROPH/DIAG INJ SC/IM: CPT

## 2024-08-19 PROCEDURE — 6360000002 HC RX W HCPCS: Performed by: INTERNAL MEDICINE

## 2024-08-19 PROCEDURE — 2580000003 HC RX 258: Performed by: INTERNAL MEDICINE

## 2024-08-19 PROCEDURE — 6370000000 HC RX 637 (ALT 250 FOR IP): Performed by: INTERNAL MEDICINE

## 2024-08-19 RX ORDER — ALBUTEROL SULFATE 90 UG/1
4 AEROSOL, METERED RESPIRATORY (INHALATION) PRN
OUTPATIENT
Start: 2024-09-09

## 2024-08-19 RX ORDER — EPINEPHRINE 1 MG/ML
0.3 INJECTION, SOLUTION, CONCENTRATE INTRAVENOUS PRN
OUTPATIENT
Start: 2024-09-09

## 2024-08-19 RX ORDER — MEPERIDINE HYDROCHLORIDE 25 MG/ML
25 INJECTION INTRAMUSCULAR; INTRAVENOUS; SUBCUTANEOUS ONCE
Start: 2024-09-09 | End: 2024-09-09

## 2024-08-19 RX ORDER — ONDANSETRON 2 MG/ML
8 INJECTION INTRAMUSCULAR; INTRAVENOUS
OUTPATIENT
Start: 2024-09-09

## 2024-08-19 RX ORDER — HYDROXYZINE HYDROCHLORIDE 10 MG/1
10 TABLET, FILM COATED ORAL ONCE
Status: COMPLETED | OUTPATIENT
Start: 2024-08-19 | End: 2024-08-19

## 2024-08-19 RX ORDER — DIPHENHYDRAMINE HCL 25 MG
25 TABLET ORAL ONCE
Status: COMPLETED | OUTPATIENT
Start: 2024-08-19 | End: 2024-08-19

## 2024-08-19 RX ORDER — ACETAMINOPHEN 325 MG/1
650 TABLET ORAL
OUTPATIENT
Start: 2024-09-09

## 2024-08-19 RX ORDER — HYDROXYZINE HYDROCHLORIDE 10 MG/1
10 TABLET, FILM COATED ORAL ONCE
Start: 2024-09-09 | End: 2024-09-09

## 2024-08-19 RX ORDER — DIPHENHYDRAMINE HYDROCHLORIDE 50 MG/ML
50 INJECTION INTRAMUSCULAR; INTRAVENOUS
OUTPATIENT
Start: 2024-09-09

## 2024-08-19 RX ORDER — DIPHENHYDRAMINE HCL 25 MG
25 TABLET ORAL ONCE
Start: 2024-09-09 | End: 2024-09-09

## 2024-08-19 RX ORDER — SODIUM CHLORIDE 0.9 % (FLUSH) 0.9 %
5-40 SYRINGE (ML) INJECTION PRN
Start: 2024-09-09

## 2024-08-19 RX ORDER — SODIUM CHLORIDE 9 MG/ML
INJECTION, SOLUTION INTRAVENOUS CONTINUOUS
OUTPATIENT
Start: 2024-09-09

## 2024-08-19 RX ORDER — 0.9 % SODIUM CHLORIDE 0.9 %
250 INTRAVENOUS SOLUTION INTRAVENOUS ONCE
Status: COMPLETED | OUTPATIENT
Start: 2024-08-19 | End: 2024-08-19

## 2024-08-19 RX ORDER — HEPARIN SODIUM (PORCINE) LOCK FLUSH IV SOLN 100 UNIT/ML 100 UNIT/ML
500 SOLUTION INTRAVENOUS PRN
Start: 2024-09-09

## 2024-08-19 RX ORDER — 0.9 % SODIUM CHLORIDE 0.9 %
250 INTRAVENOUS SOLUTION INTRAVENOUS ONCE
Start: 2024-09-09 | End: 2024-09-09

## 2024-08-19 RX ORDER — SODIUM CHLORIDE 0.9 % (FLUSH) 0.9 %
5-40 SYRINGE (ML) INJECTION PRN
Status: DISCONTINUED | OUTPATIENT
Start: 2024-08-19 | End: 2024-08-20 | Stop reason: HOSPADM

## 2024-08-19 RX ADMIN — SODIUM CHLORIDE, PRESERVATIVE FREE 10 ML: 5 INJECTION INTRAVENOUS at 13:38

## 2024-08-19 RX ADMIN — TEZEPELUMAB-EKKO 210 MG: 210 INJECTION, SOLUTION SUBCUTANEOUS at 14:13

## 2024-08-19 RX ADMIN — DIPHENHYDRAMINE HYDROCHLORIDE 25 MG: 25 TABLET ORAL at 13:40

## 2024-08-19 RX ADMIN — SODIUM CHLORIDE 250 ML: 9 INJECTION, SOLUTION INTRAVENOUS at 13:38

## 2024-08-19 RX ADMIN — HYDROXYZINE HYDROCHLORIDE 10 MG: 10 TABLET ORAL at 13:40

## 2024-08-19 NOTE — FLOWSHEET NOTE
Patient tolerated injection well. . Patient alert and oriented x3. No distress noted. Vital signs stable. Patient denies any new or worsening pain. Educated patient on possible side effects and treatment of medication.     Patient verbalized understanding. Offered patient education and/or discharge material. Patient declined.Patient denies any needs. All questions answered. D/C in stable condition.

## 2024-08-21 ENCOUNTER — OFFICE VISIT (OUTPATIENT)
Dept: PAIN MANAGEMENT | Age: 54
End: 2024-08-21
Payer: MEDICAID

## 2024-08-21 VITALS
SYSTOLIC BLOOD PRESSURE: 106 MMHG | TEMPERATURE: 97.1 F | DIASTOLIC BLOOD PRESSURE: 70 MMHG | HEIGHT: 59 IN | BODY MASS INDEX: 35.48 KG/M2 | OXYGEN SATURATION: 97 % | HEART RATE: 64 BPM | RESPIRATION RATE: 18 BRPM | WEIGHT: 176 LBS

## 2024-08-21 DIAGNOSIS — M47.816 LUMBAR SPONDYLOSIS: ICD-10-CM

## 2024-08-21 DIAGNOSIS — M47.816 LUMBAR FACET ARTHROPATHY: ICD-10-CM

## 2024-08-21 DIAGNOSIS — M47.812 CERVICAL FACET JOINT SYNDROME: ICD-10-CM

## 2024-08-21 DIAGNOSIS — G89.4 CHRONIC PAIN SYNDROME: Primary | ICD-10-CM

## 2024-08-21 DIAGNOSIS — M16.12 PRIMARY OSTEOARTHRITIS OF LEFT HIP: ICD-10-CM

## 2024-08-21 DIAGNOSIS — M54.16 LUMBAR RADICULOPATHY: ICD-10-CM

## 2024-08-21 DIAGNOSIS — M79.7 FIBROMYALGIA: ICD-10-CM

## 2024-08-21 DIAGNOSIS — M54.12 CERVICAL RADICULOPATHY: ICD-10-CM

## 2024-08-21 DIAGNOSIS — M51.9 LUMBAR DISC DISORDER: ICD-10-CM

## 2024-08-21 DIAGNOSIS — M50.30 DDD (DEGENERATIVE DISC DISEASE), CERVICAL: ICD-10-CM

## 2024-08-21 DIAGNOSIS — M47.812 FACET ARTHROPATHY, CERVICAL: ICD-10-CM

## 2024-08-21 PROCEDURE — G8427 DOCREV CUR MEDS BY ELIG CLIN: HCPCS | Performed by: PHYSICIAN ASSISTANT

## 2024-08-21 PROCEDURE — 3017F COLORECTAL CA SCREEN DOC REV: CPT | Performed by: PHYSICIAN ASSISTANT

## 2024-08-21 PROCEDURE — G8417 CALC BMI ABV UP PARAM F/U: HCPCS | Performed by: PHYSICIAN ASSISTANT

## 2024-08-21 PROCEDURE — 1036F TOBACCO NON-USER: CPT | Performed by: PHYSICIAN ASSISTANT

## 2024-08-21 PROCEDURE — 99213 OFFICE O/P EST LOW 20 MIN: CPT | Performed by: PHYSICIAN ASSISTANT

## 2024-08-21 RX ORDER — OXYCODONE HYDROCHLORIDE 5 MG/1
5 TABLET ORAL DAILY PRN
Qty: 30 TABLET | Refills: 0 | Status: SHIPPED | OUTPATIENT
Start: 2024-08-29 | End: 2024-09-28

## 2024-08-21 NOTE — PROGRESS NOTES
Ashlee Bustos presents to the [facility] on 8/21/2024. Ashlee is complaining of pain in her lower back, neck, right knee, left wrist and left hip. The pain is constant. The pain is described as gnawing and grabbing. Pain is rated on her best day at a 4, on her worst day at a 10, and on average at a 6 on the VAS scale. She took her last dose of Percocet and Motrin yesterday.      Any procedures since your last visit: No    She is  on NSAIDS and  is  on anticoagulation medications to include ASA and is managed by Abdi Grijalva,        Pacemaker or defibrillator: No     Medication Contract and Consent for Opioid Use Documents Filed       Patient Documents       Type of Document Status Date Received Received By Description    Medication Contract Received 7/18/2019 11:59 AM DIANA KWON 7/18/19 medication contract    Medication Contract Received 10/9/2020  3:01 PM ALEXEI VENTURA pain pt agreement    Medication Contract Received 11/5/2021 11:29 AM SNOW MUNIZ Pain Mgmt Patient Agreement 11.5.2021    Medication Contract Received 12/13/2021  4:21 PM LEONARDA PINA pain management    Consent Opioid Use Received 12/12/2022 12:23 PM ESTEFANY ARAMBULA pt agreement 12/12/22    Consent Opioid Use Received 1/10/2024  4:08 PM ESTEFANY ARAMBULA Consent Opioid Use                       Resp 18   Ht 1.499 m (4' 11.02\")   Wt 79.8 kg (176 lb)   LMP 03/27/2008   BMI 35.53 kg/m²      Patient's last menstrual period was 03/27/2008.    
Monitoring Periodic Controlled Substance Monitoring   8/21/2024   1:38 PM Possible medication side effects, risk of tolerance/dependence & alternative treatments discussed.;No signs of potential drug abuse or diversion identified.;Assessed functional status (ability to engage in work or other purposeful activities, the pain intensity and its interference with activities of daily living, quality of family life and social activities, and the physical activity);Obtaining appropriate analgesic effect of treatment.            We discussed with the patient that combining opioids, benzodiazepines, alcohol, illicit drugs or sleep aids increases the risk of respiratory depression including death. We discussed that these medications may cause drowsiness, sedation or dizziness and have counseled the patient not to drive or operate machinery. We have discussed that these medications will be prescribed only by one provider. We have discussed with the patient about age related risk factors and have thoroughly discussed the importance of taking these medications as prescribed. The patient verbalizes understanding.    ccreferring physic

## 2024-08-22 ENCOUNTER — HOSPITAL ENCOUNTER (OUTPATIENT)
Dept: PULMONOLOGY | Age: 54
Setting detail: THERAPIES SERIES
Discharge: HOME | End: 2024-08-22
Payer: MEDICAID

## 2024-08-22 PROCEDURE — 94626 PHY/QHP OP PULM RHB W/MNTR: CPT

## 2024-08-27 ENCOUNTER — HOSPITAL ENCOUNTER (OUTPATIENT)
Dept: PULMONOLOGY | Age: 54
Setting detail: THERAPIES SERIES
Discharge: HOME | End: 2024-08-27
Payer: MEDICAID

## 2024-08-27 PROCEDURE — 94626 PHY/QHP OP PULM RHB W/MNTR: CPT

## 2024-08-28 DIAGNOSIS — J45.50 SEVERE PERSISTENT ASTHMA WITHOUT COMPLICATION: Primary | ICD-10-CM

## 2024-08-28 RX ORDER — EPINEPHRINE 0.3 MG/.3ML
INJECTION SUBCUTANEOUS
Qty: 0.3 ML | Refills: 0 | Status: SHIPPED | OUTPATIENT
Start: 2024-08-28

## 2024-08-29 ENCOUNTER — HOSPITAL ENCOUNTER (OUTPATIENT)
Dept: PULMONOLOGY | Age: 54
Setting detail: THERAPIES SERIES
Discharge: HOME | End: 2024-08-29
Payer: MEDICAID

## 2024-08-29 PROCEDURE — 94626 PHY/QHP OP PULM RHB W/MNTR: CPT

## 2024-09-16 ENCOUNTER — HOSPITAL ENCOUNTER (OUTPATIENT)
Dept: INFUSION THERAPY | Age: 54
Setting detail: INFUSION SERIES
Discharge: HOME | End: 2024-09-16

## 2024-09-16 DIAGNOSIS — J45.50 SEVERE PERSISTENT ASTHMA, UNSPECIFIED WHETHER COMPLICATED: Primary | ICD-10-CM

## 2024-09-16 RX ORDER — EPINEPHRINE 1 MG/ML
0.3 INJECTION, SOLUTION, CONCENTRATE INTRAVENOUS PRN
Status: CANCELLED | OUTPATIENT
Start: 2024-10-14

## 2024-09-16 RX ORDER — DIPHENHYDRAMINE HYDROCHLORIDE 50 MG/ML
50 INJECTION INTRAMUSCULAR; INTRAVENOUS
Status: CANCELLED | OUTPATIENT
Start: 2024-10-14

## 2024-09-16 RX ORDER — 0.9 % SODIUM CHLORIDE 0.9 %
250 INTRAVENOUS SOLUTION INTRAVENOUS ONCE
Status: CANCELLED
Start: 2024-10-14 | End: 2024-10-14

## 2024-09-16 RX ORDER — 0.9 % SODIUM CHLORIDE 0.9 %
250 INTRAVENOUS SOLUTION INTRAVENOUS ONCE
Status: DISCONTINUED | OUTPATIENT
Start: 2024-09-16 | End: 2024-09-19 | Stop reason: HOSPADM

## 2024-09-16 RX ORDER — ONDANSETRON 2 MG/ML
8 INJECTION INTRAMUSCULAR; INTRAVENOUS
Status: CANCELLED | OUTPATIENT
Start: 2024-10-14

## 2024-09-16 RX ORDER — DIPHENHYDRAMINE HCL 25 MG
25 TABLET ORAL ONCE
Status: DISCONTINUED | OUTPATIENT
Start: 2024-09-16 | End: 2024-09-19 | Stop reason: HOSPADM

## 2024-09-16 RX ORDER — HYDROXYZINE HYDROCHLORIDE 10 MG/1
10 TABLET, FILM COATED ORAL ONCE
Status: CANCELLED
Start: 2024-10-14 | End: 2024-10-14

## 2024-09-16 RX ORDER — SODIUM CHLORIDE 9 MG/ML
INJECTION, SOLUTION INTRAVENOUS CONTINUOUS
Status: CANCELLED | OUTPATIENT
Start: 2024-10-14

## 2024-09-16 RX ORDER — HEPARIN SODIUM (PORCINE) LOCK FLUSH IV SOLN 100 UNIT/ML 100 UNIT/ML
500 SOLUTION INTRAVENOUS PRN
Status: CANCELLED
Start: 2024-10-14

## 2024-09-16 RX ORDER — HYDROXYZINE HYDROCHLORIDE 10 MG/1
10 TABLET, FILM COATED ORAL ONCE
Status: DISCONTINUED | OUTPATIENT
Start: 2024-09-16 | End: 2024-09-19 | Stop reason: HOSPADM

## 2024-09-16 RX ORDER — ACETAMINOPHEN 325 MG/1
650 TABLET ORAL
Status: CANCELLED | OUTPATIENT
Start: 2024-10-14

## 2024-09-16 RX ORDER — MEPERIDINE HYDROCHLORIDE 25 MG/ML
25 INJECTION INTRAMUSCULAR; INTRAVENOUS; SUBCUTANEOUS ONCE
Status: CANCELLED
Start: 2024-10-14 | End: 2024-10-14

## 2024-09-16 RX ORDER — SODIUM CHLORIDE 0.9 % (FLUSH) 0.9 %
5-40 SYRINGE (ML) INJECTION PRN
Status: CANCELLED
Start: 2024-10-14

## 2024-09-16 RX ORDER — DIPHENHYDRAMINE HCL 25 MG
25 TABLET ORAL ONCE
Status: CANCELLED
Start: 2024-10-14 | End: 2024-10-14

## 2024-09-16 RX ORDER — ALBUTEROL SULFATE 90 UG/1
4 INHALANT RESPIRATORY (INHALATION) PRN
Status: CANCELLED | OUTPATIENT
Start: 2024-10-14

## 2024-09-17 ENCOUNTER — HOSPITAL ENCOUNTER (OUTPATIENT)
Dept: PULMONOLOGY | Age: 54
Setting detail: THERAPIES SERIES
Discharge: HOME | End: 2024-09-17
Payer: MEDICAID

## 2024-09-17 PROCEDURE — 94626 PHY/QHP OP PULM RHB W/MNTR: CPT

## 2024-09-19 ENCOUNTER — HOSPITAL ENCOUNTER (OUTPATIENT)
Dept: PULMONOLOGY | Age: 54
Setting detail: THERAPIES SERIES
Discharge: HOME | End: 2024-09-19
Payer: MEDICAID

## 2024-09-19 PROCEDURE — 94626 PHY/QHP OP PULM RHB W/MNTR: CPT

## 2024-09-20 ENCOUNTER — HOSPITAL ENCOUNTER (OUTPATIENT)
Dept: INFUSION THERAPY | Age: 54
Setting detail: INFUSION SERIES
Discharge: HOME | End: 2024-09-20

## 2024-09-20 DIAGNOSIS — J45.50 SEVERE PERSISTENT ASTHMA, UNSPECIFIED WHETHER COMPLICATED: Primary | ICD-10-CM

## 2024-09-20 RX ORDER — HYDROXYZINE HYDROCHLORIDE 10 MG/1
10 TABLET, FILM COATED ORAL ONCE
Status: DISCONTINUED | OUTPATIENT
Start: 2024-09-20 | End: 2024-09-20

## 2024-09-20 RX ORDER — DIPHENHYDRAMINE HCL 25 MG
25 TABLET ORAL ONCE
Status: DISCONTINUED | OUTPATIENT
Start: 2024-09-20 | End: 2024-09-20

## 2024-09-20 RX ORDER — ONDANSETRON 2 MG/ML
8 INJECTION INTRAMUSCULAR; INTRAVENOUS
Status: CANCELLED | OUTPATIENT
Start: 2024-10-14

## 2024-09-20 RX ORDER — DIPHENHYDRAMINE HCL 25 MG
25 TABLET ORAL ONCE
Status: CANCELLED
Start: 2024-10-14 | End: 2024-10-14

## 2024-09-20 RX ORDER — ACETAMINOPHEN 325 MG/1
650 TABLET ORAL
Status: CANCELLED | OUTPATIENT
Start: 2024-10-14

## 2024-09-20 RX ORDER — 0.9 % SODIUM CHLORIDE 0.9 %
250 INTRAVENOUS SOLUTION INTRAVENOUS ONCE
Status: CANCELLED
Start: 2024-10-14 | End: 2024-10-14

## 2024-09-20 RX ORDER — EPINEPHRINE 1 MG/ML
0.3 INJECTION, SOLUTION, CONCENTRATE INTRAVENOUS PRN
Status: CANCELLED | OUTPATIENT
Start: 2024-10-14

## 2024-09-20 RX ORDER — 0.9 % SODIUM CHLORIDE 0.9 %
250 INTRAVENOUS SOLUTION INTRAVENOUS ONCE
Status: DISCONTINUED | OUTPATIENT
Start: 2024-09-20 | End: 2024-09-20

## 2024-09-20 RX ORDER — DIPHENHYDRAMINE HYDROCHLORIDE 50 MG/ML
50 INJECTION INTRAMUSCULAR; INTRAVENOUS
Status: CANCELLED | OUTPATIENT
Start: 2024-10-14

## 2024-09-20 RX ORDER — HEPARIN SODIUM (PORCINE) LOCK FLUSH IV SOLN 100 UNIT/ML 100 UNIT/ML
500 SOLUTION INTRAVENOUS PRN
Status: CANCELLED
Start: 2024-10-14

## 2024-09-20 RX ORDER — MEPERIDINE HYDROCHLORIDE 25 MG/ML
25 INJECTION INTRAMUSCULAR; INTRAVENOUS; SUBCUTANEOUS ONCE
Status: CANCELLED
Start: 2024-10-14 | End: 2024-10-14

## 2024-09-20 RX ORDER — SODIUM CHLORIDE 9 MG/ML
INJECTION, SOLUTION INTRAVENOUS CONTINUOUS
Status: CANCELLED | OUTPATIENT
Start: 2024-10-14

## 2024-09-20 RX ORDER — SODIUM CHLORIDE 0.9 % (FLUSH) 0.9 %
5-40 SYRINGE (ML) INJECTION PRN
Status: CANCELLED
Start: 2024-10-14

## 2024-09-20 RX ORDER — HYDROXYZINE HYDROCHLORIDE 10 MG/1
10 TABLET, FILM COATED ORAL ONCE
Status: CANCELLED
Start: 2024-10-14 | End: 2024-10-14

## 2024-09-20 RX ORDER — ALBUTEROL SULFATE 90 UG/1
4 INHALANT RESPIRATORY (INHALATION) PRN
Status: CANCELLED | OUTPATIENT
Start: 2024-10-14

## 2024-09-23 ENCOUNTER — HOSPITAL ENCOUNTER (OUTPATIENT)
Dept: INFUSION THERAPY | Age: 54
Setting detail: INFUSION SERIES
Discharge: HOME | End: 2024-09-23
Payer: MEDICAID

## 2024-09-23 VITALS
HEART RATE: 78 BPM | SYSTOLIC BLOOD PRESSURE: 101 MMHG | DIASTOLIC BLOOD PRESSURE: 72 MMHG | OXYGEN SATURATION: 98 % | TEMPERATURE: 96.9 F | RESPIRATION RATE: 16 BRPM

## 2024-09-23 DIAGNOSIS — J45.50 SEVERE PERSISTENT ASTHMA, UNSPECIFIED WHETHER COMPLICATED: Primary | ICD-10-CM

## 2024-09-23 PROCEDURE — 2580000003 HC RX 258: Performed by: INTERNAL MEDICINE

## 2024-09-23 PROCEDURE — 96372 THER/PROPH/DIAG INJ SC/IM: CPT

## 2024-09-23 PROCEDURE — 6370000000 HC RX 637 (ALT 250 FOR IP): Performed by: INTERNAL MEDICINE

## 2024-09-23 PROCEDURE — 96360 HYDRATION IV INFUSION INIT: CPT

## 2024-09-23 PROCEDURE — 6360000002 HC RX W HCPCS: Performed by: INTERNAL MEDICINE

## 2024-09-23 RX ORDER — DIPHENHYDRAMINE HYDROCHLORIDE 50 MG/ML
50 INJECTION INTRAMUSCULAR; INTRAVENOUS
OUTPATIENT
Start: 2024-10-14

## 2024-09-23 RX ORDER — SODIUM CHLORIDE 0.9 % (FLUSH) 0.9 %
5-40 SYRINGE (ML) INJECTION PRN
Status: DISCONTINUED | OUTPATIENT
Start: 2024-09-23 | End: 2024-09-24 | Stop reason: HOSPADM

## 2024-09-23 RX ORDER — ALBUTEROL SULFATE 90 UG/1
4 INHALANT RESPIRATORY (INHALATION) PRN
OUTPATIENT
Start: 2024-10-14

## 2024-09-23 RX ORDER — ACETAMINOPHEN 325 MG/1
650 TABLET ORAL
OUTPATIENT
Start: 2024-10-14

## 2024-09-23 RX ORDER — HEPARIN SODIUM (PORCINE) LOCK FLUSH IV SOLN 100 UNIT/ML 100 UNIT/ML
500 SOLUTION INTRAVENOUS PRN
Start: 2024-10-14

## 2024-09-23 RX ORDER — 0.9 % SODIUM CHLORIDE 0.9 %
250 INTRAVENOUS SOLUTION INTRAVENOUS ONCE
Status: COMPLETED | OUTPATIENT
Start: 2024-09-23 | End: 2024-09-23

## 2024-09-23 RX ORDER — ONDANSETRON 2 MG/ML
8 INJECTION INTRAMUSCULAR; INTRAVENOUS
OUTPATIENT
Start: 2024-10-14

## 2024-09-23 RX ORDER — SODIUM CHLORIDE 9 MG/ML
INJECTION, SOLUTION INTRAVENOUS CONTINUOUS
OUTPATIENT
Start: 2024-10-14

## 2024-09-23 RX ORDER — DIPHENHYDRAMINE HCL 25 MG
25 TABLET ORAL ONCE
Start: 2024-10-14 | End: 2024-10-14

## 2024-09-23 RX ORDER — 0.9 % SODIUM CHLORIDE 0.9 %
250 INTRAVENOUS SOLUTION INTRAVENOUS ONCE
Start: 2024-10-14 | End: 2024-10-14

## 2024-09-23 RX ORDER — HEPARIN SODIUM (PORCINE) LOCK FLUSH IV SOLN 100 UNIT/ML 100 UNIT/ML
500 SOLUTION INTRAVENOUS PRN
Status: DISCONTINUED | OUTPATIENT
Start: 2024-09-23 | End: 2024-09-24 | Stop reason: HOSPADM

## 2024-09-23 RX ORDER — DIPHENHYDRAMINE HCL 25 MG
25 TABLET ORAL ONCE
Status: COMPLETED | OUTPATIENT
Start: 2024-09-23 | End: 2024-09-23

## 2024-09-23 RX ORDER — HYDROXYZINE HYDROCHLORIDE 10 MG/1
10 TABLET, FILM COATED ORAL ONCE
Start: 2024-10-14 | End: 2024-10-14

## 2024-09-23 RX ORDER — EPINEPHRINE 1 MG/ML
0.3 INJECTION, SOLUTION, CONCENTRATE INTRAVENOUS PRN
OUTPATIENT
Start: 2024-10-14

## 2024-09-23 RX ORDER — MEPERIDINE HYDROCHLORIDE 25 MG/ML
25 INJECTION INTRAMUSCULAR; INTRAVENOUS; SUBCUTANEOUS ONCE
Start: 2024-10-14 | End: 2024-10-14

## 2024-09-23 RX ORDER — SODIUM CHLORIDE 0.9 % (FLUSH) 0.9 %
5-40 SYRINGE (ML) INJECTION PRN
Start: 2024-10-14

## 2024-09-23 RX ORDER — HYDROXYZINE HYDROCHLORIDE 10 MG/1
10 TABLET, FILM COATED ORAL ONCE
Status: COMPLETED | OUTPATIENT
Start: 2024-09-23 | End: 2024-09-23

## 2024-09-23 RX ADMIN — SODIUM CHLORIDE, PRESERVATIVE FREE 10 ML: 5 INJECTION INTRAVENOUS at 14:17

## 2024-09-23 RX ADMIN — HYDROXYZINE HYDROCHLORIDE 10 MG: 10 TABLET ORAL at 14:15

## 2024-09-23 RX ADMIN — DIPHENHYDRAMINE HYDROCHLORIDE 25 MG: 25 TABLET ORAL at 14:16

## 2024-09-23 RX ADMIN — SODIUM CHLORIDE 250 ML: 9 INJECTION, SOLUTION INTRAVENOUS at 14:21

## 2024-09-23 RX ADMIN — TEZEPELUMAB-EKKO 210 MG: 210 INJECTION, SOLUTION SUBCUTANEOUS at 14:58

## 2024-10-02 ENCOUNTER — OFFICE VISIT (OUTPATIENT)
Dept: PAIN MANAGEMENT | Age: 54
End: 2024-10-02
Payer: MEDICAID

## 2024-10-02 VITALS
BODY MASS INDEX: 35.48 KG/M2 | DIASTOLIC BLOOD PRESSURE: 68 MMHG | WEIGHT: 176 LBS | HEIGHT: 59 IN | TEMPERATURE: 96.8 F | SYSTOLIC BLOOD PRESSURE: 100 MMHG | HEART RATE: 73 BPM | RESPIRATION RATE: 18 BRPM | OXYGEN SATURATION: 96 %

## 2024-10-02 DIAGNOSIS — M16.12 PRIMARY OSTEOARTHRITIS OF LEFT HIP: ICD-10-CM

## 2024-10-02 DIAGNOSIS — M54.12 CERVICAL RADICULOPATHY: ICD-10-CM

## 2024-10-02 DIAGNOSIS — M47.812 CERVICAL FACET JOINT SYNDROME: ICD-10-CM

## 2024-10-02 DIAGNOSIS — G89.4 CHRONIC PAIN SYNDROME: Primary | ICD-10-CM

## 2024-10-02 DIAGNOSIS — M47.816 LUMBAR SPONDYLOSIS: ICD-10-CM

## 2024-10-02 DIAGNOSIS — M50.30 DDD (DEGENERATIVE DISC DISEASE), CERVICAL: ICD-10-CM

## 2024-10-02 DIAGNOSIS — M51.9 LUMBAR DISC DISORDER: ICD-10-CM

## 2024-10-02 DIAGNOSIS — M79.7 FIBROMYALGIA: ICD-10-CM

## 2024-10-02 DIAGNOSIS — M47.816 LUMBAR FACET ARTHROPATHY: ICD-10-CM

## 2024-10-02 DIAGNOSIS — M54.16 LUMBAR RADICULOPATHY: ICD-10-CM

## 2024-10-02 DIAGNOSIS — M47.812 FACET ARTHROPATHY, CERVICAL: ICD-10-CM

## 2024-10-02 PROCEDURE — G8484 FLU IMMUNIZE NO ADMIN: HCPCS | Performed by: PHYSICIAN ASSISTANT

## 2024-10-02 PROCEDURE — 99213 OFFICE O/P EST LOW 20 MIN: CPT | Performed by: PHYSICIAN ASSISTANT

## 2024-10-02 PROCEDURE — G8427 DOCREV CUR MEDS BY ELIG CLIN: HCPCS | Performed by: PHYSICIAN ASSISTANT

## 2024-10-02 PROCEDURE — 3017F COLORECTAL CA SCREEN DOC REV: CPT | Performed by: PHYSICIAN ASSISTANT

## 2024-10-02 PROCEDURE — 99213 OFFICE O/P EST LOW 20 MIN: CPT

## 2024-10-02 PROCEDURE — G8417 CALC BMI ABV UP PARAM F/U: HCPCS | Performed by: PHYSICIAN ASSISTANT

## 2024-10-02 PROCEDURE — 1036F TOBACCO NON-USER: CPT | Performed by: PHYSICIAN ASSISTANT

## 2024-10-02 RX ORDER — OXYCODONE HYDROCHLORIDE 5 MG/1
5 TABLET ORAL DAILY PRN
Qty: 30 TABLET | Refills: 0 | Status: SHIPPED | OUTPATIENT
Start: 2024-10-02 | End: 2024-11-01

## 2024-10-02 NOTE — PROGRESS NOTES
Ashlee Bustos presents to the [facility] on 10/2/2024. Ashlee is complaining of pain in her lower back, neck, right knee, left wrist and left hip. The pain is constant. The pain is described as gnawing and grabbing. Pain is rated on her best day at a 4, on her worst day at a 10, and on average at a 6 on the VAS scale. She took her last dose of Percocet and Motrin 4 days ago.      Any procedures since your last visit: No    She is  on NSAIDS and  is  on anticoagulation medications to include ASA and is managed by Abdi Grijalva DO  .     Pacemaker or defibrillator: No     Medication Contract and Consent for Opioid Use Documents Filed       Patient Documents       Type of Document Status Date Received Received By Description    Medication Contract Received 7/18/2019 11:59 AM DIANA KWON 7/18/19 medication contract    Medication Contract Received 10/9/2020  3:01 PM ALEXEI VENTURA pain pt agreement    Medication Contract Received 11/5/2021 11:29 AM SNOW MUNIZ Pain Mgmt Patient Agreement 11.5.2021    Medication Contract Received 12/13/2021  4:21 PM LEONARDA PINA pain management    Consent Opioid Use Received 12/12/2022 12:23 PM ESTEFANY ARAMBULA pt agreement 12/12/22    Consent Opioid Use Received 1/10/2024  4:08 PM ESTEFANY ARAMBULA Consent Opioid Use                       Resp 18   Ht 1.499 m (4' 11.02\")   Wt 79.8 kg (176 lb)   LMP 03/27/2008   BMI 35.53 kg/m²      Patient's last menstrual period was 03/27/2008.    
x3  Build:Overweight    HEENT:    Head:normocephalic and atraumatic  Sclera: icterus absent,    Lungs:    Breathing:Normal expansion.  No wheezing.    Abdomen:    Shape:non-distended and normal        Extremities:    Tremors:None bilaterally upper and lower  Range of motion:Generally normal shoulders, pain with internal rotation of hips not done.  Intact:Yes  Edema:Normal    Neurological:    Gait:antalgic    Dermatology:    Skin:no unusual rashes, no skin lesions, no palpable subcutaneous nodules, and good skin turgor    Impression:      Neck and low back pain with radiation to the Left upper and lower extremity  Cervical spine MRI 2019 Multilevel degenerative disc disease with moderate to severe stenosis at C3-4/C4-5 and C5-6  Lumbar spine CT 2017 Mild degenerative disc disease and facet arthropathy most pronounced at L4-5  Patient had seen neurosurgery and surgery C3-4/C4-5 and C5-6 ACDF was recommended.     She will be getting some of her teeth out soon and eventually all of them. Hold injections until this is completed.           Plan:  Follow up for neck/low back and Left hip pain  RF Oxycodone 5 mg QD PRN.    Continue with Lidocaine patches  Saw gyn regarding pelvic issues  Avoid NSAIDs (H/O GI ulcers)              Aquatherapy -continue.  Had evaluation.                Continue chiropractics              Seeing ortho regarding her hip              Zynex TENs unit - cost prohibitive              Left lumbar TFESI L4 and L5  - on hold.    OARRS report reviewed   Patient encouraged to stay active and to lose weight.   Treatment plan discussed with the patient and her including medications side effects.      Controlled Substance Monitoring:    Acute and Chronic Pain Monitoring:   RX Monitoring Periodic Controlled Substance Monitoring   10/2/2024   9:24 AM Possible medication side effects, risk of tolerance/dependence & alternative treatments discussed.;No signs of potential drug abuse or diversion

## 2024-10-07 RX ORDER — SODIUM CHLORIDE 9 MG/ML
INJECTION, SOLUTION INTRAVENOUS CONTINUOUS
OUTPATIENT
Start: 2024-10-21

## 2024-10-07 RX ORDER — SODIUM CHLORIDE 0.9 % (FLUSH) 0.9 %
5-40 SYRINGE (ML) INJECTION PRN
Start: 2024-10-21

## 2024-10-07 RX ORDER — EPINEPHRINE 1 MG/ML
0.3 INJECTION, SOLUTION, CONCENTRATE INTRAVENOUS PRN
OUTPATIENT
Start: 2024-10-21

## 2024-10-07 RX ORDER — MEPERIDINE HYDROCHLORIDE 25 MG/ML
25 INJECTION INTRAMUSCULAR; INTRAVENOUS; SUBCUTANEOUS ONCE
Start: 2024-10-21 | End: 2024-10-21

## 2024-10-07 RX ORDER — ACETAMINOPHEN 325 MG/1
650 TABLET ORAL
OUTPATIENT
Start: 2024-10-21

## 2024-10-07 RX ORDER — ONDANSETRON 2 MG/ML
8 INJECTION INTRAMUSCULAR; INTRAVENOUS
OUTPATIENT
Start: 2024-10-21

## 2024-10-07 RX ORDER — HEPARIN SODIUM (PORCINE) LOCK FLUSH IV SOLN 100 UNIT/ML 100 UNIT/ML
500 SOLUTION INTRAVENOUS PRN
Start: 2024-10-21

## 2024-10-07 RX ORDER — ALBUTEROL SULFATE 90 UG/1
4 INHALANT RESPIRATORY (INHALATION) PRN
OUTPATIENT
Start: 2024-10-21

## 2024-10-07 RX ORDER — DIPHENHYDRAMINE HYDROCHLORIDE 50 MG/ML
50 INJECTION INTRAMUSCULAR; INTRAVENOUS
OUTPATIENT
Start: 2024-10-21

## 2024-10-15 ENCOUNTER — HOSPITAL ENCOUNTER (OUTPATIENT)
Dept: PULMONOLOGY | Age: 54
Setting detail: THERAPIES SERIES
Discharge: HOME | End: 2024-10-15
Payer: MEDICAID

## 2024-10-15 PROCEDURE — 94626 PHY/QHP OP PULM RHB W/MNTR: CPT

## 2024-10-16 ENCOUNTER — HOSPITAL ENCOUNTER (OUTPATIENT)
Dept: CT IMAGING | Age: 54
Discharge: HOME | End: 2024-10-18
Attending: INTERNAL MEDICINE
Payer: MEDICAID

## 2024-10-16 DIAGNOSIS — J43.9 NOCTURNAL HYPOXEMIA DUE TO EMPHYSEMA (HCC): ICD-10-CM

## 2024-10-16 DIAGNOSIS — R91.1 LUNG NODULE: ICD-10-CM

## 2024-10-16 DIAGNOSIS — G47.36 NOCTURNAL HYPOXEMIA DUE TO EMPHYSEMA (HCC): ICD-10-CM

## 2024-10-16 DIAGNOSIS — J45.50 SEVERE PERSISTENT ASTHMA, UNSPECIFIED WHETHER COMPLICATED: ICD-10-CM

## 2024-10-16 DIAGNOSIS — E66.01 MORBID OBESITY: ICD-10-CM

## 2024-10-16 DIAGNOSIS — G47.33 OSA (OBSTRUCTIVE SLEEP APNEA): ICD-10-CM

## 2024-10-16 PROCEDURE — 71250 CT THORAX DX C-: CPT

## 2024-10-17 ENCOUNTER — OFFICE VISIT (OUTPATIENT)
Dept: PULMONOLOGY | Age: 54
End: 2024-10-17

## 2024-10-17 ENCOUNTER — HOSPITAL ENCOUNTER (OUTPATIENT)
Dept: PULMONOLOGY | Age: 54
Setting detail: THERAPIES SERIES
End: 2024-10-17
Payer: MEDICAID

## 2024-10-17 VITALS
WEIGHT: 182 LBS | SYSTOLIC BLOOD PRESSURE: 108 MMHG | OXYGEN SATURATION: 97 % | TEMPERATURE: 97.1 F | DIASTOLIC BLOOD PRESSURE: 69 MMHG | RESPIRATION RATE: 18 BRPM | BODY MASS INDEX: 38.2 KG/M2 | HEIGHT: 58 IN | HEART RATE: 72 BPM

## 2024-10-17 DIAGNOSIS — R76.8 ELEVATED IGE LEVEL: ICD-10-CM

## 2024-10-17 DIAGNOSIS — G47.33 OSA (OBSTRUCTIVE SLEEP APNEA): ICD-10-CM

## 2024-10-17 DIAGNOSIS — R09.02 HYPOXEMIA: ICD-10-CM

## 2024-10-17 DIAGNOSIS — J45.50 SEVERE PERSISTENT ASTHMA, UNSPECIFIED WHETHER COMPLICATED: Primary | ICD-10-CM

## 2024-10-17 LAB
EXPIRATORY TIME: 6.78 SEC
FEF 25-75% %PRED-PRE: 124 L/SEC
FEF 25-75% PRED: 2.17 L/SEC
FEF 25-75-PRE: 2.69 L/SEC
FENO: 17 PPB
FEV1 %PRED-PRE: 89 %
FEV1 PRED: 2.14 L
FEV1/FVC %PRED-PRE: 108 %
FEV1/FVC PRED: 81 %
FEV1/FVC: 88 %
FEV1: 1.9 L
FVC %PRED-PRE: 81 %
FVC PRED: 2.65 L
FVC: 2.17 L
PEF %PRED-PRE: NORMAL
PEF PRED: NORMAL
PEF-PRE: NORMAL

## 2024-10-17 RX ORDER — FLUTICASONE PROPIONATE AND SALMETEROL 500; 50 UG/1; UG/1
1 POWDER RESPIRATORY (INHALATION) EVERY 12 HOURS
Qty: 1 EACH | Refills: 12 | Status: SHIPPED | OUTPATIENT
Start: 2024-10-17

## 2024-10-17 ASSESSMENT — PULMONARY FUNCTION TESTS
FVC: 2.17
FEV1_PERCENT_PREDICTED_PRE: 89
FENO: 17
FVC_PREDICTED: 2.65
FEV1/FVC_PREDICTED: 81
FEV1/FVC: 88
FEV1/FVC_PERCENT_PREDICTED_PRE: 108
FEV1_PREDICTED: 2.14
FEV1: 1.90
FVC_PERCENT_PREDICTED_PRE: 81

## 2024-10-17 NOTE — PROGRESS NOTES
Patient to follow up with physician in 6 months.  Patient to continue on with Tezspire but benadryl to be increased to 50mg before treatment.    
the spine.  No gross muscle weakness.  Muscle size, tone and strength are normal. No involuntary movements.    Extremities:  TRace lower extremity edema. No ulcerations, varicosities or erythema. Coordination appears adequate.  Sensory function appears intact.  Deep tendon reflexes are normal.   Skin:  Warm and dry.  Contact dermitis and changes with pigmentation axilla, groin, and under breast. No bruises. Old surgical scars.   Neurological/Psychiatric: General behavior, level of consciousness, thought content is normal. Emotional status is normal.  Cranial nerves II-XII are intact.      DATA:   The data collected below information that was obtained, reviewed, analyzed and interpreted today.     Tod[facility] Office Spirometry was compared to previous test if available and demonstrates an FVC of 2.26 liters which is 85 % of predicted with an FEV1 of 1.75 liters which is 88 % of predicted. FEV1/FVC ratio is 77 %. Mid expiratory flow rates are 71 % of predicted.  Maximum voluntary ventilation is 81 liters per minute or 98 % of predicted.  Flow volume loop shows no signs of intrathoracic or extrathoracic process. Impression: Normalized lung function from moderate airflow obstruction    CT SCAN CHEST 2024: Mediastinum: Thyroid is homogeneous in attenuation. No bulky mediastinal adenopathy. Central airways are patent. Esophagus with normal course and caliber.  Cardiac size within normal limits without pericardial effusion.  Lungs/pleura: Lungs are clear without focal opacification or consolidation. No dominant nodule or mass lesion.  No bronchial wall thickening or mucous plugging.  No pleural effusion or pleural process.   Upper Abdomen: Visualized portions of the upper abdomen reveals low-attenuation focus segment 4 of likely cyst or hemangioma unchanged appearance of segment 4 lesion from prior comparisons indicating stability Soft Tissues/Bones: No acute osseous or soft tissue findings. No aggressive osseous

## 2024-10-20 ENCOUNTER — APPOINTMENT (OUTPATIENT)
Dept: CT IMAGING | Age: 54
End: 2024-10-20
Payer: MEDICAID

## 2024-10-20 ENCOUNTER — HOSPITAL ENCOUNTER (EMERGENCY)
Age: 54
Discharge: HOME | End: 2024-10-20
Attending: STUDENT IN AN ORGANIZED HEALTH CARE EDUCATION/TRAINING PROGRAM
Payer: MEDICAID

## 2024-10-20 VITALS
SYSTOLIC BLOOD PRESSURE: 132 MMHG | RESPIRATION RATE: 18 BRPM | DIASTOLIC BLOOD PRESSURE: 77 MMHG | TEMPERATURE: 98.2 F | HEART RATE: 70 BPM | OXYGEN SATURATION: 98 %

## 2024-10-20 DIAGNOSIS — S39.012A STRAIN OF LUMBAR REGION, INITIAL ENCOUNTER: Primary | ICD-10-CM

## 2024-10-20 LAB
ALBUMIN SERPL-MCNC: 4.3 G/DL (ref 3.5–5.2)
ALP SERPL-CCNC: 104 U/L (ref 35–104)
ALT SERPL-CCNC: 16 U/L (ref 0–32)
ANION GAP SERPL CALCULATED.3IONS-SCNC: 15 MMOL/L (ref 7–16)
AST SERPL-CCNC: 23 U/L (ref 0–31)
BACTERIA URNS QL MICRO: ABNORMAL
BASOPHILS # BLD: 0.04 K/UL (ref 0–0.2)
BASOPHILS NFR BLD: 1 % (ref 0–2)
BILIRUB SERPL-MCNC: 0.7 MG/DL (ref 0–1.2)
BILIRUB UR QL STRIP: NEGATIVE
BUN SERPL-MCNC: 8 MG/DL (ref 6–20)
CALCIUM SERPL-MCNC: 10.2 MG/DL (ref 8.6–10.2)
CHLORIDE SERPL-SCNC: 101 MMOL/L (ref 98–107)
CLARITY UR: CLEAR
CO2 SERPL-SCNC: 21 MMOL/L (ref 22–29)
COLOR UR: YELLOW
CREAT SERPL-MCNC: 0.9 MG/DL (ref 0.5–1)
EOSINOPHIL # BLD: 0.03 K/UL (ref 0.05–0.5)
EOSINOPHILS RELATIVE PERCENT: 0 % (ref 0–6)
ERYTHROCYTE [DISTWIDTH] IN BLOOD BY AUTOMATED COUNT: 12.6 % (ref 11.5–15)
GFR, ESTIMATED: 81 ML/MIN/1.73M2
GLUCOSE SERPL-MCNC: 90 MG/DL (ref 74–99)
GLUCOSE UR STRIP-MCNC: NEGATIVE MG/DL
HCT VFR BLD AUTO: 44.3 % (ref 34–48)
HGB BLD-MCNC: 14.5 G/DL (ref 11.5–15.5)
HGB UR QL STRIP.AUTO: ABNORMAL
IMM GRANULOCYTES # BLD AUTO: <0.03 K/UL (ref 0–0.58)
IMM GRANULOCYTES NFR BLD: 0 % (ref 0–5)
KETONES UR STRIP-MCNC: NEGATIVE MG/DL
LACTATE BLDV-SCNC: 1.4 MMOL/L (ref 0.5–2.2)
LEUKOCYTE ESTERASE UR QL STRIP: ABNORMAL
LIPASE SERPL-CCNC: 26 U/L (ref 13–60)
LYMPHOCYTES NFR BLD: 1.88 K/UL (ref 1.5–4)
LYMPHOCYTES RELATIVE PERCENT: 25 % (ref 20–42)
MCH RBC QN AUTO: 27 PG (ref 26–35)
MCHC RBC AUTO-ENTMCNC: 32.7 G/DL (ref 32–34.5)
MCV RBC AUTO: 82.5 FL (ref 80–99.9)
MONOCYTES NFR BLD: 0.48 K/UL (ref 0.1–0.95)
MONOCYTES NFR BLD: 6 % (ref 2–12)
NEUTROPHILS NFR BLD: 68 % (ref 43–80)
NEUTS SEG NFR BLD: 5.05 K/UL (ref 1.8–7.3)
NITRITE UR QL STRIP: NEGATIVE
PH UR STRIP: 6 [PH] (ref 5–9)
PLATELET # BLD AUTO: 280 K/UL (ref 130–450)
PMV BLD AUTO: 9.9 FL (ref 7–12)
POTASSIUM SERPL-SCNC: 4.7 MMOL/L (ref 3.5–5)
PROT SERPL-MCNC: 7.6 G/DL (ref 6.4–8.3)
PROT UR STRIP-MCNC: NEGATIVE MG/DL
RBC # BLD AUTO: 5.37 M/UL (ref 3.5–5.5)
RBC #/AREA URNS HPF: ABNORMAL /HPF
SODIUM SERPL-SCNC: 137 MMOL/L (ref 132–146)
SP GR UR STRIP: <1.005 (ref 1–1.03)
UROBILINOGEN UR STRIP-ACNC: 0.2 EU/DL (ref 0–1)
WBC #/AREA URNS HPF: ABNORMAL /HPF
WBC OTHER # BLD: 7.5 K/UL (ref 4.5–11.5)

## 2024-10-20 PROCEDURE — 96374 THER/PROPH/DIAG INJ IV PUSH: CPT

## 2024-10-20 PROCEDURE — 74176 CT ABD & PELVIS W/O CONTRAST: CPT

## 2024-10-20 PROCEDURE — 80053 COMPREHEN METABOLIC PANEL: CPT

## 2024-10-20 PROCEDURE — 87086 URINE CULTURE/COLONY COUNT: CPT

## 2024-10-20 PROCEDURE — 96372 THER/PROPH/DIAG INJ SC/IM: CPT

## 2024-10-20 PROCEDURE — 85025 COMPLETE CBC W/AUTO DIFF WBC: CPT

## 2024-10-20 PROCEDURE — 6370000000 HC RX 637 (ALT 250 FOR IP): Performed by: STUDENT IN AN ORGANIZED HEALTH CARE EDUCATION/TRAINING PROGRAM

## 2024-10-20 PROCEDURE — 6360000002 HC RX W HCPCS: Performed by: STUDENT IN AN ORGANIZED HEALTH CARE EDUCATION/TRAINING PROGRAM

## 2024-10-20 PROCEDURE — 96375 TX/PRO/DX INJ NEW DRUG ADDON: CPT

## 2024-10-20 PROCEDURE — 83605 ASSAY OF LACTIC ACID: CPT

## 2024-10-20 PROCEDURE — 83690 ASSAY OF LIPASE: CPT

## 2024-10-20 PROCEDURE — 99284 EMERGENCY DEPT VISIT MOD MDM: CPT

## 2024-10-20 PROCEDURE — 81001 URINALYSIS AUTO W/SCOPE: CPT

## 2024-10-20 PROCEDURE — 87077 CULTURE AEROBIC IDENTIFY: CPT

## 2024-10-20 RX ORDER — KETOROLAC TROMETHAMINE 30 MG/ML
15 INJECTION, SOLUTION INTRAMUSCULAR; INTRAVENOUS ONCE
Status: COMPLETED | OUTPATIENT
Start: 2024-10-20 | End: 2024-10-20

## 2024-10-20 RX ORDER — FENTANYL CITRATE 50 UG/ML
25 INJECTION, SOLUTION INTRAMUSCULAR; INTRAVENOUS ONCE
Status: COMPLETED | OUTPATIENT
Start: 2024-10-20 | End: 2024-10-20

## 2024-10-20 RX ORDER — CYCLOBENZAPRINE HCL 5 MG
5 TABLET ORAL 2 TIMES DAILY PRN
Qty: 10 TABLET | Refills: 0 | Status: SHIPPED | OUTPATIENT
Start: 2024-10-20 | End: 2024-10-30

## 2024-10-20 RX ORDER — LIDOCAINE 4 G/G
1 PATCH TOPICAL DAILY
Status: DISCONTINUED | OUTPATIENT
Start: 2024-10-20 | End: 2024-10-20 | Stop reason: HOSPADM

## 2024-10-20 RX ORDER — ORPHENADRINE CITRATE 30 MG/ML
60 INJECTION INTRAMUSCULAR; INTRAVENOUS ONCE
Status: COMPLETED | OUTPATIENT
Start: 2024-10-20 | End: 2024-10-20

## 2024-10-20 RX ADMIN — ORPHENADRINE CITRATE 60 MG: 60 INJECTION INTRAMUSCULAR; INTRAVENOUS at 16:44

## 2024-10-20 RX ADMIN — FENTANYL CITRATE 25 MCG: 50 INJECTION INTRAMUSCULAR; INTRAVENOUS at 16:45

## 2024-10-20 RX ADMIN — KETOROLAC TROMETHAMINE 15 MG: 30 INJECTION, SOLUTION INTRAMUSCULAR at 18:55

## 2024-10-20 ASSESSMENT — PAIN DESCRIPTION - LOCATION
LOCATION: FLANK
LOCATION: BACK
LOCATION: BACK

## 2024-10-20 ASSESSMENT — PAIN DESCRIPTION - ORIENTATION
ORIENTATION: RIGHT;LEFT
ORIENTATION: LOWER
ORIENTATION: LOWER

## 2024-10-20 ASSESSMENT — PAIN SCALES - GENERAL
PAINLEVEL_OUTOF10: 10
PAINLEVEL_OUTOF10: 10
PAINLEVEL_OUTOF10: 8

## 2024-10-20 ASSESSMENT — PAIN DESCRIPTION - ONSET: ONSET: SUDDEN

## 2024-10-20 ASSESSMENT — PAIN DESCRIPTION - DESCRIPTORS
DESCRIPTORS: DISCOMFORT;SHOOTING
DESCRIPTORS: SHARP;DISCOMFORT
DESCRIPTORS: DISCOMFORT;SHARP;SHOOTING

## 2024-10-20 ASSESSMENT — PAIN - FUNCTIONAL ASSESSMENT: PAIN_FUNCTIONAL_ASSESSMENT: 0-10

## 2024-10-20 ASSESSMENT — PAIN DESCRIPTION - FREQUENCY: FREQUENCY: CONTINUOUS

## 2024-10-20 ASSESSMENT — PAIN DESCRIPTION - PAIN TYPE: TYPE: ACUTE PAIN

## 2024-10-20 NOTE — ED PROVIDER NOTES
<0.03 0.00 - 0.58 k/uL   CMP   Result Value Ref Range    Sodium 137 132 - 146 mmol/L    Potassium 4.7 3.5 - 5.0 mmol/L    Chloride 101 98 - 107 mmol/L    CO2 21 (L) 22 - 29 mmol/L    Anion Gap 15 7 - 16 mmol/L    Glucose 90 74 - 99 mg/dL    BUN 8 6 - 20 mg/dL    Creatinine 0.9 0.50 - 1.00 mg/dL    Est, Glom Filt Rate 81 >60 mL/min/1.73m2    Calcium 10.2 8.6 - 10.2 mg/dL    Total Protein 7.6 6.4 - 8.3 g/dL    Albumin 4.3 3.5 - 5.2 g/dL    Total Bilirubin 0.7 0.0 - 1.2 mg/dL    Alkaline Phosphatase 104 35 - 104 U/L    ALT 16 0 - 32 U/L    AST 23 0 - 31 U/L   Lactic Acid   Result Value Ref Range    Lactic Acid 1.4 0.5 - 2.2 mmol/L   Lipase   Result Value Ref Range    Lipase 26 13 - 60 U/L       Radiology:  CT ABDOMEN PELVIS WO CONTRAST Additional Contrast? None   Final Result   No acute process in the abdomen or pelvis.             ------------------------- NURSING NOTES AND VITALS REVIEWED ---------------------------  Date / Time Roomed:  10/20/2024  2:54 PM  ED Bed Assignment:  02/02    The nursing notes within the ED encounter and vital signs as below have been reviewed.   /77   Pulse 70   Temp 98.2 °F (36.8 °C) (Oral)   Resp 18   LMP 03/27/2008   SpO2 98%   Oxygen Saturation Interpretation: Normal      ------------------------------------------ PROGRESS NOTES ------------------------------------------  11:55 PM EDT  I have spoken with the patient and discussed today’s results, in addition to providing specific details for the plan of care and counseling regarding the diagnosis and prognosis.  Their questions are answered at this time and they are agreeable with the plan. I discussed at length with them reasons for immediate return here for re evaluation. They will followup with their primary care physician by calling their office tomorrow.      --------------------------------- ADDITIONAL PROVIDER NOTES ---------------------------------  At this time the patient is without objective evidence of an acute

## 2024-10-20 NOTE — DISCHARGE INSTRUCTIONS
CT ABDOMEN PELVIS WO CONTRAST Additional Contrast? None   Final Result   No acute process in the abdomen or pelvis.

## 2024-10-22 ENCOUNTER — OFFICE VISIT (OUTPATIENT)
Dept: PRIMARY CARE CLINIC | Age: 54
End: 2024-10-22
Payer: MEDICAID

## 2024-10-22 ENCOUNTER — HOSPITAL ENCOUNTER (OUTPATIENT)
Dept: PULMONOLOGY | Age: 54
Setting detail: THERAPIES SERIES
End: 2024-10-22
Payer: MEDICAID

## 2024-10-22 VITALS
SYSTOLIC BLOOD PRESSURE: 128 MMHG | DIASTOLIC BLOOD PRESSURE: 70 MMHG | HEART RATE: 77 BPM | OXYGEN SATURATION: 97 % | BODY MASS INDEX: 38.2 KG/M2 | HEIGHT: 58 IN | WEIGHT: 182 LBS | RESPIRATION RATE: 16 BRPM

## 2024-10-22 DIAGNOSIS — M54.31 SCIATICA OF RIGHT SIDE: ICD-10-CM

## 2024-10-22 DIAGNOSIS — S33.5XXD LUMBAR SPRAIN, SUBSEQUENT ENCOUNTER: Primary | ICD-10-CM

## 2024-10-22 DIAGNOSIS — J45.50 SEVERE PERSISTENT ASTHMA WITHOUT COMPLICATION: Primary | ICD-10-CM

## 2024-10-22 PROCEDURE — G8427 DOCREV CUR MEDS BY ELIG CLIN: HCPCS | Performed by: FAMILY MEDICINE

## 2024-10-22 PROCEDURE — 96372 THER/PROPH/DIAG INJ SC/IM: CPT | Performed by: FAMILY MEDICINE

## 2024-10-22 PROCEDURE — G8484 FLU IMMUNIZE NO ADMIN: HCPCS | Performed by: FAMILY MEDICINE

## 2024-10-22 PROCEDURE — 3017F COLORECTAL CA SCREEN DOC REV: CPT | Performed by: FAMILY MEDICINE

## 2024-10-22 PROCEDURE — 1036F TOBACCO NON-USER: CPT | Performed by: FAMILY MEDICINE

## 2024-10-22 PROCEDURE — G8417 CALC BMI ABV UP PARAM F/U: HCPCS | Performed by: FAMILY MEDICINE

## 2024-10-22 PROCEDURE — 99213 OFFICE O/P EST LOW 20 MIN: CPT | Performed by: FAMILY MEDICINE

## 2024-10-22 RX ORDER — METHYLPREDNISOLONE ACETATE 80 MG/ML
80 INJECTION, SUSPENSION INTRA-ARTICULAR; INTRALESIONAL; INTRAMUSCULAR; SOFT TISSUE ONCE
Status: COMPLETED | OUTPATIENT
Start: 2024-10-22 | End: 2024-10-22

## 2024-10-22 RX ORDER — PREDNISONE 10 MG/1
TABLET ORAL
Qty: 18 TABLET | Refills: 0 | Status: SHIPPED | OUTPATIENT
Start: 2024-10-22

## 2024-10-22 RX ADMIN — METHYLPREDNISOLONE ACETATE 80 MG: 80 INJECTION, SUSPENSION INTRA-ARTICULAR; INTRALESIONAL; INTRAMUSCULAR; SOFT TISSUE at 13:41

## 2024-10-22 ASSESSMENT — ENCOUNTER SYMPTOMS
NAUSEA: 0
VOMITING: 0
RHINORRHEA: 0
BACK PAIN: 1
CHEST TIGHTNESS: 0
SHORTNESS OF BREATH: 0
EYE PAIN: 0
CONSTIPATION: 0
COUGH: 0
APNEA: 0
SINUS PRESSURE: 0
WHEEZING: 0
EYE ITCHING: 0
ABDOMINAL PAIN: 0
EYE REDNESS: 0
SORE THROAT: 0
COLOR CHANGE: 0
DIARRHEA: 0
BLOOD IN STOOL: 0

## 2024-10-22 NOTE — PROGRESS NOTES
Dyslipidemia       Lumbar sprain, subsequent encounter  -     methylPREDNISolone acetate (DEPO-MEDROL) injection 80 mg; 80 mg, IntraMUSCular, ONCE, 1 dose, On Tue 10/22/24 at 1400  Sciatica of right side  -     methylPREDNISolone acetate (DEPO-MEDROL) injection 80 mg; 80 mg, IntraMUSCular, ONCE, 1 dose, On Tue 10/22/24 at 1400      Orders Placed This Encounter    methylPREDNISolone acetate (DEPO-MEDROL) injection 80 mg    predniSONE (DELTASONE) 10 MG tablet     Simg daily x3 days, then 20mg daily x3 days, then 10mg daily x3 days     Dispense:  18 tablet     Refill:  0        No follow-ups on file.      I spent 20 minutes with this patient.  I spent greater than 50% of the time counseling this patient.        Abdi Grijalva DO  10/22/2024  1:38 PM

## 2024-10-23 LAB
MICROORGANISM SPEC CULT: ABNORMAL
MICROORGANISM SPEC CULT: ABNORMAL
SERVICE CMNT-IMP: ABNORMAL
SPECIMEN DESCRIPTION: ABNORMAL

## 2024-10-25 DIAGNOSIS — J45.50 SEVERE PERSISTENT ASTHMA WITHOUT COMPLICATION: Primary | ICD-10-CM

## 2024-10-30 ENCOUNTER — OFFICE VISIT (OUTPATIENT)
Dept: PAIN MANAGEMENT | Age: 54
End: 2024-10-30
Payer: MEDICAID

## 2024-10-30 VITALS
HEART RATE: 94 BPM | SYSTOLIC BLOOD PRESSURE: 130 MMHG | OXYGEN SATURATION: 96 % | BODY MASS INDEX: 38.2 KG/M2 | TEMPERATURE: 96.8 F | HEIGHT: 58 IN | RESPIRATION RATE: 18 BRPM | DIASTOLIC BLOOD PRESSURE: 80 MMHG | WEIGHT: 182 LBS

## 2024-10-30 DIAGNOSIS — G89.4 CHRONIC PAIN SYNDROME: Primary | ICD-10-CM

## 2024-10-30 DIAGNOSIS — M47.812 FACET ARTHROPATHY, CERVICAL: ICD-10-CM

## 2024-10-30 DIAGNOSIS — M54.12 CERVICAL RADICULOPATHY: ICD-10-CM

## 2024-10-30 DIAGNOSIS — M47.816 LUMBAR SPONDYLOSIS: ICD-10-CM

## 2024-10-30 DIAGNOSIS — M79.7 FIBROMYALGIA: ICD-10-CM

## 2024-10-30 DIAGNOSIS — M50.30 DDD (DEGENERATIVE DISC DISEASE), CERVICAL: ICD-10-CM

## 2024-10-30 DIAGNOSIS — M54.16 LUMBAR RADICULOPATHY: ICD-10-CM

## 2024-10-30 DIAGNOSIS — M51.9 LUMBAR DISC DISORDER: ICD-10-CM

## 2024-10-30 DIAGNOSIS — M47.812 CERVICAL FACET JOINT SYNDROME: ICD-10-CM

## 2024-10-30 DIAGNOSIS — M16.12 PRIMARY OSTEOARTHRITIS OF LEFT HIP: ICD-10-CM

## 2024-10-30 DIAGNOSIS — M47.816 LUMBAR FACET ARTHROPATHY: ICD-10-CM

## 2024-10-30 PROCEDURE — 3017F COLORECTAL CA SCREEN DOC REV: CPT | Performed by: PHYSICIAN ASSISTANT

## 2024-10-30 PROCEDURE — 1036F TOBACCO NON-USER: CPT | Performed by: PHYSICIAN ASSISTANT

## 2024-10-30 PROCEDURE — 99213 OFFICE O/P EST LOW 20 MIN: CPT | Performed by: PHYSICIAN ASSISTANT

## 2024-10-30 PROCEDURE — G8427 DOCREV CUR MEDS BY ELIG CLIN: HCPCS | Performed by: PHYSICIAN ASSISTANT

## 2024-10-30 PROCEDURE — G8417 CALC BMI ABV UP PARAM F/U: HCPCS | Performed by: PHYSICIAN ASSISTANT

## 2024-10-30 PROCEDURE — G8484 FLU IMMUNIZE NO ADMIN: HCPCS | Performed by: PHYSICIAN ASSISTANT

## 2024-10-30 RX ORDER — OXYCODONE HYDROCHLORIDE 5 MG/1
5 TABLET ORAL DAILY PRN
Qty: 30 TABLET | Refills: 0 | Status: SHIPPED | OUTPATIENT
Start: 2024-11-01 | End: 2024-12-01

## 2024-10-30 NOTE — PROGRESS NOTES
[facility]   [address], [place] [address]  [phone]    Follow up Note      Ashlee Bustos     Date of Visit:  10/30/2024    CC:  Patient presents for follow up   Chief Complaint   Patient presents with    Back Pain    Neck Pain       HPI:    Pain is a little worse after having a lumbar sprain over a week ago.  Doing somewhat better now.  States that she was reaching for a towel getting out of the shower.    Appropriate analgesia with current medications regimen: yes.    Change in quality of symptoms:no.    Medication side effects:none.   Recent diagnostic testing:none.   Recent interventional procedures: None.       She has been on anticoagulation medications to include ASA. The patient  has not been on herbal supplements.  The patient is not diabetic.     Imagin/15/2022 MRI lumbar spine    FINDINGS:   No fracture or malalignment.  Vertebral body height and interspaces are well   maintained.  Conus medullaris is visualized and is unremarkable.  No evidence   of epidural mass or hematoma.  No prevertebral soft tissue edema.  Note made   of a incidental intraosseous hemangioma located in T11 vertebral body.       L1/L2: No central canal stenosis or neural foraminal narrowing.       L2/L3: No central canal stenosis or neural foraminal narrowing.       L3/L4: Mild facet hypertrophy with hypertrophy of ligamentum flavum.  No   central canal stenosis.  There is mild bilateral neural foraminal narrowing   more significant on the right.       L4/L5: Moderate facet hypertrophy.  No central canal stenosis.  No   significant neural foraminal narrowing.       L5/S1: No central canal stenosis.  No significant neural foraminal narrowing.           Impression   1. Mild bilateral neural foraminal narrowing at L3/L4 more significant on the   right.  No central canal stenosis.   2. Moderate bilateral facet hypertrophy at L4/L5.   3. No fracture or malalignment.

## 2024-10-30 NOTE — PROGRESS NOTES
Ashlee Bustos presents to the [facility] on 10/30/2024. Ashlee is complaining of pain back, neck, right knee, left wrist and left hip. The pain is constant. The pain is described as gnawing and grabbing. Pain is rated on her best day at a 4, on her worst day at a 10, and on average at a 6 on the VAS scale. She took her last dose of oxycodone, Flexeril, and pain patches and topical creams  today.      Any procedures since your last visit: No,  She is  on NSAIDS and  is  on anticoagulation medications to include ASA   Pacemaker or defibrillator: No   Medication Contract and Consent for Opioid Use Documents Filed       Patient Documents       Type of Document Status Date Received Received By Description    Medication Contract Received 7/18/2019 11:59 AM DIANA KWON 7/18/19 medication contract    Medication Contract Received 10/9/2020  3:01 PM ALEXEI VNETURA pain pt agreement    Medication Contract Received 11/5/2021 11:29 AM SNOW MUNIZ Pain Mgmt Patient Agreement 11.5.2021    Medication Contract Received 12/13/2021  4:21 PM LEONARDA PINA pain management    Consent Opioid Use Received 12/12/2022 12:23 PM ESTEFANY ARAMBULA pt agreement 12/12/22    Consent Opioid Use Received 1/10/2024  4:08 PM ESTEFANY ARAMBULA Consent Opioid Use                       LMP 03/27/2008      Patient's last menstrual period was 03/27/2008.

## 2024-10-31 ENCOUNTER — APPOINTMENT (OUTPATIENT)
Dept: PULMONOLOGY | Age: 54
End: 2024-10-31
Payer: MEDICAID

## 2024-10-31 RX ORDER — HYDROXYZINE HYDROCHLORIDE 10 MG/1
10 TABLET, FILM COATED ORAL ONCE
Status: CANCELLED
Start: 2024-10-31 | End: 2024-10-31

## 2024-10-31 RX ORDER — 0.9 % SODIUM CHLORIDE 0.9 %
250 INTRAVENOUS SOLUTION INTRAVENOUS ONCE
Status: CANCELLED
Start: 2024-10-31 | End: 2024-10-31

## 2024-10-31 RX ORDER — DIPHENHYDRAMINE HYDROCHLORIDE 50 MG/ML
50 INJECTION INTRAMUSCULAR; INTRAVENOUS ONCE
Status: CANCELLED
Start: 2024-10-31 | End: 2024-10-31

## 2024-10-31 RX ORDER — ONDANSETRON 2 MG/ML
4 INJECTION INTRAMUSCULAR; INTRAVENOUS ONCE
Status: CANCELLED
Start: 2024-10-31 | End: 2024-10-31

## 2024-11-01 ENCOUNTER — HOSPITAL ENCOUNTER (OUTPATIENT)
Dept: INFUSION THERAPY | Age: 54
Setting detail: INFUSION SERIES
Discharge: HOME | End: 2024-11-01
Payer: MEDICAID

## 2024-11-01 VITALS
RESPIRATION RATE: 18 BRPM | SYSTOLIC BLOOD PRESSURE: 106 MMHG | HEART RATE: 75 BPM | OXYGEN SATURATION: 98 % | DIASTOLIC BLOOD PRESSURE: 78 MMHG

## 2024-11-01 DIAGNOSIS — J45.50 SEVERE PERSISTENT ASTHMA, UNSPECIFIED WHETHER COMPLICATED: Primary | ICD-10-CM

## 2024-11-01 PROCEDURE — 96372 THER/PROPH/DIAG INJ SC/IM: CPT

## 2024-11-01 PROCEDURE — 2500000003 HC RX 250 WO HCPCS: Performed by: INTERNAL MEDICINE

## 2024-11-01 PROCEDURE — 6360000002 HC RX W HCPCS: Performed by: INTERNAL MEDICINE

## 2024-11-01 PROCEDURE — 96374 THER/PROPH/DIAG INJ IV PUSH: CPT

## 2024-11-01 PROCEDURE — 96361 HYDRATE IV INFUSION ADD-ON: CPT

## 2024-11-01 PROCEDURE — 2580000003 HC RX 258: Performed by: INTERNAL MEDICINE

## 2024-11-01 PROCEDURE — 96375 TX/PRO/DX INJ NEW DRUG ADDON: CPT

## 2024-11-01 PROCEDURE — 6370000000 HC RX 637 (ALT 250 FOR IP): Performed by: INTERNAL MEDICINE

## 2024-11-01 RX ORDER — DIPHENHYDRAMINE HCL 25 MG
25 TABLET ORAL ONCE
Start: 2024-11-25 | End: 2024-11-25

## 2024-11-01 RX ORDER — ONDANSETRON 2 MG/ML
4 INJECTION INTRAMUSCULAR; INTRAVENOUS ONCE
Status: COMPLETED | OUTPATIENT
Start: 2024-11-01 | End: 2024-11-01

## 2024-11-01 RX ORDER — HYDROXYZINE HYDROCHLORIDE 10 MG/1
10 TABLET, FILM COATED ORAL ONCE
Status: COMPLETED | OUTPATIENT
Start: 2024-11-01 | End: 2024-11-01

## 2024-11-01 RX ORDER — DIPHENHYDRAMINE HCL 25 MG
25 TABLET ORAL ONCE
Status: COMPLETED | OUTPATIENT
Start: 2024-11-01 | End: 2024-11-01

## 2024-11-01 RX ORDER — SODIUM CHLORIDE 9 MG/ML
INJECTION, SOLUTION INTRAVENOUS CONTINUOUS
OUTPATIENT
Start: 2024-11-18

## 2024-11-01 RX ORDER — ONDANSETRON 2 MG/ML
8 INJECTION INTRAMUSCULAR; INTRAVENOUS
OUTPATIENT
Start: 2024-11-18

## 2024-11-01 RX ORDER — HYDROXYZINE HYDROCHLORIDE 10 MG/1
10 TABLET, FILM COATED ORAL ONCE
Start: 2024-11-18 | End: 2024-11-18

## 2024-11-01 RX ORDER — ACETAMINOPHEN 325 MG/1
650 TABLET ORAL
OUTPATIENT
Start: 2024-11-18

## 2024-11-01 RX ORDER — SODIUM CHLORIDE 0.9 % (FLUSH) 0.9 %
5-40 SYRINGE (ML) INJECTION PRN
Status: DISCONTINUED | OUTPATIENT
Start: 2024-11-01 | End: 2024-11-02 | Stop reason: HOSPADM

## 2024-11-01 RX ORDER — SODIUM CHLORIDE 0.9 % (FLUSH) 0.9 %
5-40 SYRINGE (ML) INJECTION PRN
Start: 2024-11-18

## 2024-11-01 RX ORDER — HEPARIN SODIUM (PORCINE) LOCK FLUSH IV SOLN 100 UNIT/ML 100 UNIT/ML
500 SOLUTION INTRAVENOUS PRN
Start: 2024-11-18

## 2024-11-01 RX ORDER — EPINEPHRINE 1 MG/ML
0.3 INJECTION, SOLUTION, CONCENTRATE INTRAVENOUS PRN
OUTPATIENT
Start: 2024-11-18

## 2024-11-01 RX ORDER — ONDANSETRON 2 MG/ML
4 INJECTION INTRAMUSCULAR; INTRAVENOUS ONCE
Start: 2024-11-18 | End: 2024-11-18

## 2024-11-01 RX ORDER — ALBUTEROL SULFATE 90 UG/1
4 INHALANT RESPIRATORY (INHALATION) PRN
OUTPATIENT
Start: 2024-11-18

## 2024-11-01 RX ORDER — DIPHENHYDRAMINE HYDROCHLORIDE 50 MG/ML
50 INJECTION INTRAMUSCULAR; INTRAVENOUS
OUTPATIENT
Start: 2024-11-18

## 2024-11-01 RX ORDER — 0.9 % SODIUM CHLORIDE 0.9 %
250 INTRAVENOUS SOLUTION INTRAVENOUS ONCE
Start: 2024-11-18 | End: 2024-11-18

## 2024-11-01 RX ORDER — 0.9 % SODIUM CHLORIDE 0.9 %
250 INTRAVENOUS SOLUTION INTRAVENOUS ONCE
Status: COMPLETED | OUTPATIENT
Start: 2024-11-01 | End: 2024-11-01

## 2024-11-01 RX ORDER — MEPERIDINE HYDROCHLORIDE 25 MG/ML
25 INJECTION INTRAMUSCULAR; INTRAVENOUS; SUBCUTANEOUS ONCE
Start: 2024-11-18 | End: 2024-11-18

## 2024-11-01 RX ORDER — DIPHENHYDRAMINE HYDROCHLORIDE 50 MG/ML
50 INJECTION INTRAMUSCULAR; INTRAVENOUS ONCE
Status: CANCELLED
Start: 2024-11-18 | End: 2024-11-18

## 2024-11-01 RX ADMIN — TEZEPELUMAB-EKKO 210 MG: 210 INJECTION, SOLUTION SUBCUTANEOUS at 15:11

## 2024-11-01 RX ADMIN — HYDROXYZINE HYDROCHLORIDE 10 MG: 10 TABLET ORAL at 14:15

## 2024-11-01 RX ADMIN — DIPHENHYDRAMINE HYDROCHLORIDE 25 MG: 25 TABLET ORAL at 14:16

## 2024-11-01 RX ADMIN — SODIUM CHLORIDE 250 ML: 9 INJECTION, SOLUTION INTRAVENOUS at 14:00

## 2024-11-01 RX ADMIN — ONDANSETRON 4 MG: 2 INJECTION INTRAMUSCULAR; INTRAVENOUS at 14:36

## 2024-11-01 RX ADMIN — SODIUM CHLORIDE, PRESERVATIVE FREE 20 MG: 5 INJECTION INTRAVENOUS at 14:29

## 2024-11-01 NOTE — PROGRESS NOTES
Patient here for her Teszpire injection. She has added today 2 new pre medication, zofran and pepcid and 50 MG BENADRYL IV. Patient does not want benadryl IV due to it causes her to be too drowsey. Call placed to Isha Hurley in Dr. Fountains office. Order changed back to benadryl 25 mg oral and she will let the doctor know. Complete explanation of pepcid and zofrarn given to patient as well as handouts. Patient was ordered this due to 6 hours after her injections she get intense joint pain, needs to take ibuprofen and then get nauseated. All pre medications given with IV hydration. A full 30 minute wait time before injection and observation for 30 minutes after. No adverse reaction noted and patient discharged home in stable condition.

## 2024-11-21 ENCOUNTER — TELEPHONE (OUTPATIENT)
Dept: PAIN MANAGEMENT | Age: 54
End: 2024-11-21

## 2024-11-21 DIAGNOSIS — M54.16 LUMBAR RADICULOPATHY: ICD-10-CM

## 2024-11-21 NOTE — TELEPHONE ENCOUNTER
Call to Ashlee Bustos that procedure was scheduled for 12.02.2024 and that [facility] should call her a few days before for the pre op call and after 3:00 PM the business day before with the arrival time. Instructed Ashlee to hold ibuprofen for 24 hours, Celebrex, Mobic, and naprosyn for 4 days and any aspirin containing products, CoQ 10, or fish oil for 7 days. Instructed to call office back if any questions. Ashlee verbalized understanding.    Electronically signed by Steph Galvin RN on 11/21/2024 at 4:37 PM

## 2024-11-22 ENCOUNTER — TELEPHONE (OUTPATIENT)
Dept: PAIN MANAGEMENT | Age: 54
End: 2024-11-22

## 2024-11-22 NOTE — TELEPHONE ENCOUNTER
Ashlee is having an upcoming Transforaminal Epidural Steroid Injection with Dr. Steele on 12/02/2024. Would it be acceptable for patient to hold Aspirin 7 days prior to procedure? Please advise. Thank you

## 2024-11-22 NOTE — TELEPHONE ENCOUNTER
Patient is having an upcoming Transforaminal Epidural Steroid Injection with anesthesia with Dr. Steele on 12/02/2024. Would patient be cleared for this procedure? Please advise. Thank you

## 2024-11-26 ENCOUNTER — OFFICE VISIT (OUTPATIENT)
Dept: PRIMARY CARE CLINIC | Age: 54
End: 2024-11-26
Payer: MEDICAID

## 2024-11-26 VITALS
WEIGHT: 180 LBS | RESPIRATION RATE: 16 BRPM | DIASTOLIC BLOOD PRESSURE: 72 MMHG | HEART RATE: 86 BPM | TEMPERATURE: 97.1 F | HEIGHT: 58 IN | OXYGEN SATURATION: 96 % | BODY MASS INDEX: 37.79 KG/M2 | SYSTOLIC BLOOD PRESSURE: 124 MMHG

## 2024-11-26 DIAGNOSIS — H69.93 DYSFUNCTION OF BOTH EUSTACHIAN TUBES: ICD-10-CM

## 2024-11-26 DIAGNOSIS — J01.90 ACUTE BACTERIAL SINUSITIS: Primary | ICD-10-CM

## 2024-11-26 DIAGNOSIS — B96.89 ACUTE BACTERIAL SINUSITIS: Primary | ICD-10-CM

## 2024-11-26 PROCEDURE — G8427 DOCREV CUR MEDS BY ELIG CLIN: HCPCS | Performed by: FAMILY MEDICINE

## 2024-11-26 PROCEDURE — 99213 OFFICE O/P EST LOW 20 MIN: CPT | Performed by: FAMILY MEDICINE

## 2024-11-26 PROCEDURE — 1036F TOBACCO NON-USER: CPT | Performed by: FAMILY MEDICINE

## 2024-11-26 PROCEDURE — 3017F COLORECTAL CA SCREEN DOC REV: CPT | Performed by: FAMILY MEDICINE

## 2024-11-26 PROCEDURE — G8484 FLU IMMUNIZE NO ADMIN: HCPCS | Performed by: FAMILY MEDICINE

## 2024-11-26 PROCEDURE — G8417 CALC BMI ABV UP PARAM F/U: HCPCS | Performed by: FAMILY MEDICINE

## 2024-11-26 RX ORDER — CEFDINIR 300 MG/1
300 CAPSULE ORAL 2 TIMES DAILY
Qty: 20 CAPSULE | Refills: 0 | Status: SHIPPED | OUTPATIENT
Start: 2024-11-26 | End: 2024-12-06

## 2024-11-26 RX ORDER — PREDNISONE 10 MG/1
TABLET ORAL
Qty: 18 TABLET | Refills: 0 | Status: SHIPPED | OUTPATIENT
Start: 2024-11-26

## 2024-11-26 ASSESSMENT — ENCOUNTER SYMPTOMS
DIARRHEA: 0
ABDOMINAL PAIN: 0
EYE PAIN: 0
SHORTNESS OF BREATH: 0
SORE THROAT: 1
COUGH: 1
WHEEZING: 0
APNEA: 0
VOMITING: 0
SINUS PRESSURE: 0
CONSTIPATION: 0
COLOR CHANGE: 0
BLOOD IN STOOL: 0
EYE ITCHING: 0
NAUSEA: 0
RHINORRHEA: 0
EYE REDNESS: 0
BACK PAIN: 0
CHEST TIGHTNESS: 0

## 2024-11-26 NOTE — PROGRESS NOTES
Chief Complaint:     Chief Complaint   Patient presents with    Fever     X3 days    Headache    Pharyngitis    Ear Pain         Fever   This is a new problem. The current episode started yesterday. The problem occurs intermittently. The problem has been waxing and waning. The maximum temperature noted was 100 to 100.9 F. Associated symptoms include congestion, coughing, ear pain, headaches and a sore throat. Pertinent negatives include no abdominal pain, chest pain, diarrhea, nausea, rash, urinary pain, vomiting or wheezing. She has tried acetaminophen and NSAIDs for the symptoms. The treatment provided moderate relief.   Pharyngitis  This is a new problem. The current episode started in the past 7 days. The problem occurs daily. The problem has been waxing and waning. Associated symptoms include congestion, coughing, a fever, headaches and a sore throat. Pertinent negatives include no abdominal pain, arthralgias, chest pain, fatigue, myalgias, nausea, neck pain, numbness, rash, vomiting or weakness. Nothing aggravates the symptoms. She has tried nothing for the symptoms. The treatment provided no relief.   Ear Pain   There is pain in the right ear. This is a new problem. The current episode started in the past 7 days. The problem has been unchanged. The maximum temperature recorded prior to her arrival was 100.4 - 100.9 F. The pain is moderate. Associated symptoms include coughing, headaches and a sore throat. Pertinent negatives include no abdominal pain, diarrhea, hearing loss, neck pain, rash, rhinorrhea or vomiting. She has tried NSAIDs for the symptoms. The treatment provided mild relief.       Patient Active Problem List   Diagnosis    Cervical stenosis of spinal canal    History of TIA (transient ischemic attack) and stroke    Spinal stenosis of lumbar region without neurogenic claudication    Fibromyalgia    Migraines without aura and without status migrainosus, not intractable    Chronic pain syndrome

## 2024-11-27 ENCOUNTER — TELEPHONE (OUTPATIENT)
Dept: PAIN MANAGEMENT | Age: 54
End: 2024-11-27

## 2024-11-27 DIAGNOSIS — M54.12 CERVICAL RADICULOPATHY: ICD-10-CM

## 2024-11-27 DIAGNOSIS — M54.16 LUMBAR RADICULOPATHY: ICD-10-CM

## 2024-11-27 DIAGNOSIS — M47.812 CERVICAL FACET JOINT SYNDROME: ICD-10-CM

## 2024-11-27 DIAGNOSIS — G89.4 CHRONIC PAIN SYNDROME: ICD-10-CM

## 2024-11-27 DIAGNOSIS — M47.812 FACET ARTHROPATHY, CERVICAL: ICD-10-CM

## 2024-11-27 DIAGNOSIS — M47.816 LUMBAR FACET ARTHROPATHY: ICD-10-CM

## 2024-11-27 RX ORDER — OXYCODONE HYDROCHLORIDE 5 MG/1
5 TABLET ORAL DAILY PRN
Qty: 3 TABLET | Refills: 0 | Status: SHIPPED
Start: 2024-12-01 | End: 2024-12-02 | Stop reason: SDUPTHER

## 2024-11-27 NOTE — TELEPHONE ENCOUNTER
Ashlee is requesting a refill of Oxycodone. Last filled 11/1. OARRS is consistent. Last appt 10/30. Next appt 12/3. She had to cancel today d/t illness. thanks

## 2024-12-02 ENCOUNTER — HOSPITAL ENCOUNTER (OUTPATIENT)
Dept: INFUSION THERAPY | Age: 54
Setting detail: INFUSION SERIES
Discharge: HOME | End: 2024-12-02

## 2024-12-02 ENCOUNTER — TELEPHONE (OUTPATIENT)
Dept: PAIN MANAGEMENT | Age: 54
End: 2024-12-02

## 2024-12-02 DIAGNOSIS — M54.12 CERVICAL RADICULOPATHY: ICD-10-CM

## 2024-12-02 DIAGNOSIS — G89.4 CHRONIC PAIN SYNDROME: ICD-10-CM

## 2024-12-02 DIAGNOSIS — M47.812 CERVICAL FACET JOINT SYNDROME: ICD-10-CM

## 2024-12-02 DIAGNOSIS — M47.812 FACET ARTHROPATHY, CERVICAL: ICD-10-CM

## 2024-12-02 DIAGNOSIS — M54.16 LUMBAR RADICULOPATHY: ICD-10-CM

## 2024-12-02 DIAGNOSIS — M47.816 LUMBAR FACET ARTHROPATHY: ICD-10-CM

## 2024-12-02 RX ORDER — OXYCODONE HYDROCHLORIDE 5 MG/1
5 TABLET ORAL DAILY PRN
Qty: 11 TABLET | Refills: 0 | Status: SHIPPED | OUTPATIENT
Start: 2024-12-02 | End: 2024-12-13

## 2024-12-02 NOTE — TELEPHONE ENCOUNTER
Pt called in she is still very sickly and needs to reschedule her appointment for tomorrow pt was moved to 12/13 she will need her medication to cover her until then please advise thanks

## 2025-01-02 ENCOUNTER — TELEPHONE (OUTPATIENT)
Dept: PAIN MANAGEMENT | Age: 55
End: 2025-01-02

## 2025-01-02 NOTE — TELEPHONE ENCOUNTER
Patient states she has a tooth infection. Has not been feeling well (3rd reschedule) Will call to reschedule when she is feeling better.

## 2025-01-03 ENCOUNTER — OFFICE VISIT (OUTPATIENT)
Dept: PRIMARY CARE CLINIC | Age: 55
End: 2025-01-03

## 2025-01-03 VITALS
WEIGHT: 184 LBS | TEMPERATURE: 97.1 F | SYSTOLIC BLOOD PRESSURE: 128 MMHG | RESPIRATION RATE: 16 BRPM | OXYGEN SATURATION: 98 % | DIASTOLIC BLOOD PRESSURE: 72 MMHG | HEIGHT: 58 IN | HEART RATE: 88 BPM | BODY MASS INDEX: 38.62 KG/M2

## 2025-01-03 DIAGNOSIS — K08.89 TOOTHACHE: ICD-10-CM

## 2025-01-03 DIAGNOSIS — B96.89 ACUTE BACTERIAL SINUSITIS: Primary | ICD-10-CM

## 2025-01-03 DIAGNOSIS — J01.90 ACUTE BACTERIAL SINUSITIS: Primary | ICD-10-CM

## 2025-01-03 RX ORDER — NEBULIZER ACCESSORIES
1 KIT MISCELLANEOUS DAILY PRN
Qty: 1 KIT | Refills: 0 | Status: SHIPPED | OUTPATIENT
Start: 2025-01-03

## 2025-01-03 RX ORDER — AMOXICILLIN 875 MG/1
875 TABLET, COATED ORAL 2 TIMES DAILY
Qty: 20 TABLET | Refills: 0 | Status: SHIPPED | OUTPATIENT
Start: 2025-01-03 | End: 2025-01-13

## 2025-01-03 ASSESSMENT — ENCOUNTER SYMPTOMS
BACK PAIN: 0
VOMITING: 0
EYE ITCHING: 0
RHINORRHEA: 1
CONSTIPATION: 0
EYE PAIN: 0
NAUSEA: 0
ABDOMINAL PAIN: 0
COLOR CHANGE: 0
EYE REDNESS: 0
WHEEZING: 0
DIARRHEA: 0
COUGH: 1
SORE THROAT: 0
SINUS PAIN: 1
BLOOD IN STOOL: 0
SINUS PRESSURE: 0
SHORTNESS OF BREATH: 0
APNEA: 0
CHEST TIGHTNESS: 0

## 2025-01-03 ASSESSMENT — PATIENT HEALTH QUESTIONNAIRE - PHQ9
7. TROUBLE CONCENTRATING ON THINGS, SUCH AS READING THE NEWSPAPER OR WATCHING TELEVISION: NOT AT ALL
6. FEELING BAD ABOUT YOURSELF - OR THAT YOU ARE A FAILURE OR HAVE LET YOURSELF OR YOUR FAMILY DOWN: NOT AT ALL
4. FEELING TIRED OR HAVING LITTLE ENERGY: NOT AT ALL
10. IF YOU CHECKED OFF ANY PROBLEMS, HOW DIFFICULT HAVE THESE PROBLEMS MADE IT FOR YOU TO DO YOUR WORK, TAKE CARE OF THINGS AT HOME, OR GET ALONG WITH OTHER PEOPLE: NOT DIFFICULT AT ALL
SUM OF ALL RESPONSES TO PHQ QUESTIONS 1-9: 0
3. TROUBLE FALLING OR STAYING ASLEEP: NOT AT ALL
SUM OF ALL RESPONSES TO PHQ QUESTIONS 1-9: 0
9. THOUGHTS THAT YOU WOULD BE BETTER OFF DEAD, OR OF HURTING YOURSELF: NOT AT ALL
SUM OF ALL RESPONSES TO PHQ QUESTIONS 1-9: 0
8. MOVING OR SPEAKING SO SLOWLY THAT OTHER PEOPLE COULD HAVE NOTICED. OR THE OPPOSITE, BEING SO FIGETY OR RESTLESS THAT YOU HAVE BEEN MOVING AROUND A LOT MORE THAN USUAL: NOT AT ALL
SUM OF ALL RESPONSES TO PHQ9 QUESTIONS 1 & 2: 0
SUM OF ALL RESPONSES TO PHQ QUESTIONS 1-9: 0
5. POOR APPETITE OR OVEREATING: NOT AT ALL
2. FEELING DOWN, DEPRESSED OR HOPELESS: NOT AT ALL
1. LITTLE INTEREST OR PLEASURE IN DOING THINGS: NOT AT ALL

## 2025-01-03 NOTE — PROGRESS NOTES
sinusitis  Toothache      Orders Placed This Encounter    amoxicillin (AMOXIL) 875 MG tablet     Sig: Take 1 tablet by mouth 2 times daily for 10 days     Dispense:  20 tablet     Refill:  0    Respiratory Therapy Supplies (NEBULIZER/TUBING/MOUTHPIECE) KIT     Si kit by Does not apply route daily as needed (asthma)     Dispense:  1 kit     Refill:  0        Return if symptoms worsen or fail to improve.    I spent 20 minutes with this patient providing this service today, including time spent seeing the patient as well as documentation and chart review, excluding any separately billed procedures.     Abdi Grijalva DO  1/3/2025  12:08 PM

## 2025-01-09 ENCOUNTER — HOSPITAL ENCOUNTER (OUTPATIENT)
Dept: INFUSION THERAPY | Age: 55
Setting detail: INFUSION SERIES
Discharge: HOME | End: 2025-01-09
Payer: MEDICAID

## 2025-01-09 VITALS
DIASTOLIC BLOOD PRESSURE: 76 MMHG | OXYGEN SATURATION: 98 % | SYSTOLIC BLOOD PRESSURE: 127 MMHG | TEMPERATURE: 97 F | HEART RATE: 73 BPM | RESPIRATION RATE: 16 BRPM

## 2025-01-09 DIAGNOSIS — J45.50 SEVERE PERSISTENT ASTHMA, UNSPECIFIED WHETHER COMPLICATED: Primary | ICD-10-CM

## 2025-01-09 PROCEDURE — 96374 THER/PROPH/DIAG INJ IV PUSH: CPT

## 2025-01-09 PROCEDURE — 96361 HYDRATE IV INFUSION ADD-ON: CPT

## 2025-01-09 PROCEDURE — 2500000003 HC RX 250 WO HCPCS: Performed by: INTERNAL MEDICINE

## 2025-01-09 PROCEDURE — 6360000002 HC RX W HCPCS: Performed by: INTERNAL MEDICINE

## 2025-01-09 PROCEDURE — 6370000000 HC RX 637 (ALT 250 FOR IP): Performed by: INTERNAL MEDICINE

## 2025-01-09 PROCEDURE — 2580000003 HC RX 258: Performed by: INTERNAL MEDICINE

## 2025-01-09 PROCEDURE — 96372 THER/PROPH/DIAG INJ SC/IM: CPT

## 2025-01-09 PROCEDURE — 96375 TX/PRO/DX INJ NEW DRUG ADDON: CPT

## 2025-01-09 RX ORDER — ACETAMINOPHEN 325 MG/1
650 TABLET ORAL
OUTPATIENT
Start: 2025-01-23

## 2025-01-09 RX ORDER — EPINEPHRINE 1 MG/ML
0.3 INJECTION, SOLUTION, CONCENTRATE INTRAVENOUS PRN
OUTPATIENT
Start: 2025-01-23

## 2025-01-09 RX ORDER — ALBUTEROL SULFATE 90 UG/1
4 INHALANT RESPIRATORY (INHALATION) PRN
OUTPATIENT
Start: 2025-01-23

## 2025-01-09 RX ORDER — SODIUM CHLORIDE 0.9 % (FLUSH) 0.9 %
5-40 SYRINGE (ML) INJECTION PRN
Status: DISCONTINUED | OUTPATIENT
Start: 2025-01-09 | End: 2025-01-10 | Stop reason: HOSPADM

## 2025-01-09 RX ORDER — 0.9 % SODIUM CHLORIDE 0.9 %
250 INTRAVENOUS SOLUTION INTRAVENOUS ONCE
Status: COMPLETED | OUTPATIENT
Start: 2025-01-09 | End: 2025-01-09

## 2025-01-09 RX ORDER — ONDANSETRON 2 MG/ML
4 INJECTION INTRAMUSCULAR; INTRAVENOUS ONCE
Status: COMPLETED | OUTPATIENT
Start: 2025-01-09 | End: 2025-01-09

## 2025-01-09 RX ORDER — DIPHENHYDRAMINE HCL 25 MG
25 TABLET ORAL ONCE
Start: 2025-01-23 | End: 2025-01-23

## 2025-01-09 RX ORDER — SODIUM CHLORIDE 0.9 % (FLUSH) 0.9 %
5-40 SYRINGE (ML) INJECTION PRN
Start: 2025-01-23

## 2025-01-09 RX ORDER — HYDROXYZINE HYDROCHLORIDE 10 MG/1
10 TABLET, FILM COATED ORAL ONCE
Status: COMPLETED | OUTPATIENT
Start: 2025-01-09 | End: 2025-01-09

## 2025-01-09 RX ORDER — ONDANSETRON 2 MG/ML
8 INJECTION INTRAMUSCULAR; INTRAVENOUS
OUTPATIENT
Start: 2025-01-23

## 2025-01-09 RX ORDER — DIPHENHYDRAMINE HYDROCHLORIDE 50 MG/ML
50 INJECTION INTRAMUSCULAR; INTRAVENOUS
OUTPATIENT
Start: 2025-01-23

## 2025-01-09 RX ORDER — ONDANSETRON 2 MG/ML
4 INJECTION INTRAMUSCULAR; INTRAVENOUS ONCE
Start: 2025-01-23 | End: 2025-01-23

## 2025-01-09 RX ORDER — HYDROCORTISONE SODIUM SUCCINATE 100 MG/2ML
100 INJECTION INTRAMUSCULAR; INTRAVENOUS
OUTPATIENT
Start: 2025-01-23

## 2025-01-09 RX ORDER — MEPERIDINE HYDROCHLORIDE 25 MG/ML
25 INJECTION INTRAMUSCULAR; INTRAVENOUS; SUBCUTANEOUS ONCE
Start: 2025-01-23 | End: 2025-01-23

## 2025-01-09 RX ORDER — DIPHENHYDRAMINE HCL 25 MG
25 TABLET ORAL ONCE
Status: COMPLETED | OUTPATIENT
Start: 2025-01-09 | End: 2025-01-09

## 2025-01-09 RX ORDER — 0.9 % SODIUM CHLORIDE 0.9 %
250 INTRAVENOUS SOLUTION INTRAVENOUS ONCE
Start: 2025-01-23 | End: 2025-01-23

## 2025-01-09 RX ORDER — SODIUM CHLORIDE 9 MG/ML
INJECTION, SOLUTION INTRAVENOUS CONTINUOUS
OUTPATIENT
Start: 2025-01-23

## 2025-01-09 RX ORDER — HEPARIN SODIUM (PORCINE) LOCK FLUSH IV SOLN 100 UNIT/ML 100 UNIT/ML
500 SOLUTION INTRAVENOUS PRN
Start: 2025-01-23

## 2025-01-09 RX ORDER — HYDROXYZINE HYDROCHLORIDE 10 MG/1
10 TABLET, FILM COATED ORAL ONCE
Start: 2025-01-23 | End: 2025-01-23

## 2025-01-09 RX ADMIN — HYDROXYZINE HYDROCHLORIDE 10 MG: 10 TABLET ORAL at 14:22

## 2025-01-09 RX ADMIN — SODIUM CHLORIDE 250 ML: 9 INJECTION, SOLUTION INTRAVENOUS at 14:11

## 2025-01-09 RX ADMIN — SODIUM CHLORIDE, PRESERVATIVE FREE 10 ML: 5 INJECTION INTRAVENOUS at 14:09

## 2025-01-09 RX ADMIN — TEZEPELUMAB-EKKO 210 MG: 210 INJECTION, SOLUTION SUBCUTANEOUS at 14:59

## 2025-01-09 RX ADMIN — FAMOTIDINE 20 MG: 10 INJECTION, SOLUTION INTRAVENOUS at 14:19

## 2025-01-09 RX ADMIN — DIPHENHYDRAMINE HYDROCHLORIDE 25 MG: 25 TABLET ORAL at 14:22

## 2025-01-09 RX ADMIN — ONDANSETRON 4 MG: 2 INJECTION INTRAMUSCULAR; INTRAVENOUS at 14:18

## 2025-01-09 NOTE — PROGRESS NOTES
Patient tolerated tezspire injection and pre hydration well. Remained on unit for 45 minutes after treatment. Patient alert and oriented x3. No distress noted. Vital signs stable. Patient denies any new or worsening pain.  Provided patient education and/or discharge material. Patient denies any needs. All questions answered. D/C in stable condition.

## 2025-01-16 ENCOUNTER — TELEPHONE (OUTPATIENT)
Dept: PULMONOLOGY | Age: 55
End: 2025-01-16

## 2025-01-22 ENCOUNTER — TELEMEDICINE (OUTPATIENT)
Dept: PAIN MANAGEMENT | Age: 55
End: 2025-01-22
Payer: MEDICAID

## 2025-01-22 DIAGNOSIS — M79.7 FIBROMYALGIA: ICD-10-CM

## 2025-01-22 DIAGNOSIS — M47.812 FACET ARTHROPATHY, CERVICAL: ICD-10-CM

## 2025-01-22 DIAGNOSIS — M47.816 LUMBAR FACET ARTHROPATHY: ICD-10-CM

## 2025-01-22 DIAGNOSIS — M16.12 PRIMARY OSTEOARTHRITIS OF LEFT HIP: ICD-10-CM

## 2025-01-22 DIAGNOSIS — M50.30 DDD (DEGENERATIVE DISC DISEASE), CERVICAL: ICD-10-CM

## 2025-01-22 DIAGNOSIS — G89.4 CHRONIC PAIN SYNDROME: ICD-10-CM

## 2025-01-22 DIAGNOSIS — M54.12 CERVICAL RADICULOPATHY: ICD-10-CM

## 2025-01-22 DIAGNOSIS — M51.9 LUMBAR DISC DISORDER: ICD-10-CM

## 2025-01-22 DIAGNOSIS — M47.812 CERVICAL FACET JOINT SYNDROME: ICD-10-CM

## 2025-01-22 DIAGNOSIS — M54.16 LUMBAR RADICULOPATHY: Primary | ICD-10-CM

## 2025-01-22 DIAGNOSIS — M47.816 LUMBAR SPONDYLOSIS: ICD-10-CM

## 2025-01-22 PROCEDURE — G8427 DOCREV CUR MEDS BY ELIG CLIN: HCPCS | Performed by: PHYSICIAN ASSISTANT

## 2025-01-22 PROCEDURE — 99213 OFFICE O/P EST LOW 20 MIN: CPT | Performed by: PHYSICIAN ASSISTANT

## 2025-01-22 PROCEDURE — 1036F TOBACCO NON-USER: CPT | Performed by: PHYSICIAN ASSISTANT

## 2025-01-22 PROCEDURE — 3017F COLORECTAL CA SCREEN DOC REV: CPT | Performed by: PHYSICIAN ASSISTANT

## 2025-01-22 PROCEDURE — G8417 CALC BMI ABV UP PARAM F/U: HCPCS | Performed by: PHYSICIAN ASSISTANT

## 2025-01-22 RX ORDER — OXYCODONE HYDROCHLORIDE 5 MG/1
5 TABLET ORAL DAILY PRN
Qty: 30 TABLET | Refills: 0 | Status: SHIPPED | OUTPATIENT
Start: 2025-01-22 | End: 2025-02-21

## 2025-01-22 NOTE — PROGRESS NOTES
[facility]   [address], [place] [address]  [phone]    Follow up Note      Ashleeher JACQUELIN Bustos     Date of Visit:  2025    CC:  Patient presents for follow up   Chief Complaint   Patient presents with    Follow-up     Pain level 8        HPI:    Pain is worse due to being out of medication.      Appropriate analgesia with current medications regimen: yes.    Change in quality of symptoms:no.    Medication side effects:none.   Recent diagnostic testing:none.   Recent interventional procedures: None.       She has been on anticoagulation medications to include ASA. The patient  has not been on herbal supplements.  The patient is not diabetic.     Imagin/15/2022 MRI lumbar spine    FINDINGS:   No fracture or malalignment.  Vertebral body height and interspaces are well   maintained.  Conus medullaris is visualized and is unremarkable.  No evidence   of epidural mass or hematoma.  No prevertebral soft tissue edema.  Note made   of a incidental intraosseous hemangioma located in T11 vertebral body.       L1/L2: No central canal stenosis or neural foraminal narrowing.       L2/L3: No central canal stenosis or neural foraminal narrowing.       L3/L4: Mild facet hypertrophy with hypertrophy of ligamentum flavum.  No   central canal stenosis.  There is mild bilateral neural foraminal narrowing   more significant on the right.       L4/L5: Moderate facet hypertrophy.  No central canal stenosis.  No   significant neural foraminal narrowing.       L5/S1: No central canal stenosis.  No significant neural foraminal narrowing.           Impression   1. Mild bilateral neural foraminal narrowing at L3/L4 more significant on the   right.  No central canal stenosis.   2. Moderate bilateral facet hypertrophy at L4/L5.   3. No fracture or malalignment.           Cervical spine MRI 2018  1. Multilevel degenerative disc disease extending from C3 through C7.   Moderately severe

## 2025-01-22 NOTE — PROGRESS NOTES
Ashlee Bustos was read the following message “We want to confirm that, for purposes of billing, this is a virtual visit with your provider for which we will submit a claim for reimbursement with your insurance company. You will be responsible for any copays, coinsurance amounts or other amounts not covered by your insurance company. If you do not accept this, unfortunately we will not be able to schedule or proceed with a virtual visit with the provider. Do you accept? Ashlee responded Yes .

## 2025-02-10 ENCOUNTER — HOSPITAL ENCOUNTER (OUTPATIENT)
Dept: INFUSION THERAPY | Age: 55
Setting detail: INFUSION SERIES
Discharge: HOME | End: 2025-02-10
Payer: MEDICAID

## 2025-02-10 VITALS
HEART RATE: 66 BPM | SYSTOLIC BLOOD PRESSURE: 115 MMHG | DIASTOLIC BLOOD PRESSURE: 80 MMHG | RESPIRATION RATE: 18 BRPM | OXYGEN SATURATION: 98 % | TEMPERATURE: 97.5 F

## 2025-02-10 DIAGNOSIS — J45.50 SEVERE PERSISTENT ASTHMA, UNSPECIFIED WHETHER COMPLICATED (HCC): Primary | ICD-10-CM

## 2025-02-10 PROCEDURE — 96375 TX/PRO/DX INJ NEW DRUG ADDON: CPT

## 2025-02-10 PROCEDURE — 6360000002 HC RX W HCPCS: Performed by: INTERNAL MEDICINE

## 2025-02-10 PROCEDURE — 2580000003 HC RX 258: Performed by: INTERNAL MEDICINE

## 2025-02-10 PROCEDURE — 6370000000 HC RX 637 (ALT 250 FOR IP): Performed by: INTERNAL MEDICINE

## 2025-02-10 PROCEDURE — 96361 HYDRATE IV INFUSION ADD-ON: CPT

## 2025-02-10 PROCEDURE — 96374 THER/PROPH/DIAG INJ IV PUSH: CPT

## 2025-02-10 PROCEDURE — 2500000003 HC RX 250 WO HCPCS: Performed by: INTERNAL MEDICINE

## 2025-02-10 PROCEDURE — 96372 THER/PROPH/DIAG INJ SC/IM: CPT

## 2025-02-10 RX ORDER — EPINEPHRINE 1 MG/ML
0.3 INJECTION, SOLUTION, CONCENTRATE INTRAVENOUS PRN
OUTPATIENT
Start: 2025-03-03

## 2025-02-10 RX ORDER — SODIUM CHLORIDE 0.9 % (FLUSH) 0.9 %
5-40 SYRINGE (ML) INJECTION PRN
Start: 2025-03-03

## 2025-02-10 RX ORDER — DIPHENHYDRAMINE HCL 25 MG
25 TABLET ORAL ONCE
Start: 2025-03-03 | End: 2025-03-03

## 2025-02-10 RX ORDER — ACETAMINOPHEN 325 MG/1
650 TABLET ORAL
OUTPATIENT
Start: 2025-03-03

## 2025-02-10 RX ORDER — AMOXICILLIN 875 MG/1
875 TABLET, COATED ORAL 2 TIMES DAILY
COMMUNITY

## 2025-02-10 RX ORDER — DIPHENHYDRAMINE HCL 25 MG
25 TABLET ORAL ONCE
Status: COMPLETED | OUTPATIENT
Start: 2025-02-10 | End: 2025-02-10

## 2025-02-10 RX ORDER — 0.9 % SODIUM CHLORIDE 0.9 %
250 INTRAVENOUS SOLUTION INTRAVENOUS ONCE
Start: 2025-03-03 | End: 2025-03-03

## 2025-02-10 RX ORDER — 0.9 % SODIUM CHLORIDE 0.9 %
250 INTRAVENOUS SOLUTION INTRAVENOUS ONCE
Status: COMPLETED | OUTPATIENT
Start: 2025-02-10 | End: 2025-02-10

## 2025-02-10 RX ORDER — HYDROXYZINE HYDROCHLORIDE 10 MG/1
10 TABLET, FILM COATED ORAL ONCE
Start: 2025-03-03 | End: 2025-03-03

## 2025-02-10 RX ORDER — ALBUTEROL SULFATE 90 UG/1
4 INHALANT RESPIRATORY (INHALATION) PRN
OUTPATIENT
Start: 2025-03-03

## 2025-02-10 RX ORDER — DIPHENHYDRAMINE HYDROCHLORIDE 50 MG/ML
50 INJECTION INTRAMUSCULAR; INTRAVENOUS
OUTPATIENT
Start: 2025-03-03

## 2025-02-10 RX ORDER — ONDANSETRON 2 MG/ML
4 INJECTION INTRAMUSCULAR; INTRAVENOUS ONCE
Start: 2025-03-03 | End: 2025-03-03

## 2025-02-10 RX ORDER — SODIUM CHLORIDE 0.9 % (FLUSH) 0.9 %
5-40 SYRINGE (ML) INJECTION PRN
Status: DISCONTINUED | OUTPATIENT
Start: 2025-02-10 | End: 2025-02-11 | Stop reason: HOSPADM

## 2025-02-10 RX ORDER — ONDANSETRON 2 MG/ML
8 INJECTION INTRAMUSCULAR; INTRAVENOUS
OUTPATIENT
Start: 2025-03-03

## 2025-02-10 RX ORDER — ONDANSETRON 2 MG/ML
4 INJECTION INTRAMUSCULAR; INTRAVENOUS ONCE
Status: COMPLETED | OUTPATIENT
Start: 2025-02-10 | End: 2025-02-10

## 2025-02-10 RX ORDER — SODIUM CHLORIDE 9 MG/ML
INJECTION, SOLUTION INTRAVENOUS CONTINUOUS
OUTPATIENT
Start: 2025-03-03

## 2025-02-10 RX ORDER — HYDROXYZINE HYDROCHLORIDE 10 MG/1
10 TABLET, FILM COATED ORAL ONCE
Status: COMPLETED | OUTPATIENT
Start: 2025-02-10 | End: 2025-02-10

## 2025-02-10 RX ORDER — HEPARIN SODIUM (PORCINE) LOCK FLUSH IV SOLN 100 UNIT/ML 100 UNIT/ML
500 SOLUTION INTRAVENOUS PRN
Start: 2025-03-03

## 2025-02-10 RX ORDER — MEPERIDINE HYDROCHLORIDE 25 MG/ML
25 INJECTION INTRAMUSCULAR; INTRAVENOUS; SUBCUTANEOUS ONCE
Start: 2025-03-03 | End: 2025-03-03

## 2025-02-10 RX ORDER — HYDROCORTISONE SODIUM SUCCINATE 100 MG/2ML
100 INJECTION INTRAMUSCULAR; INTRAVENOUS
OUTPATIENT
Start: 2025-03-03

## 2025-02-10 RX ADMIN — FAMOTIDINE 20 MG: 10 INJECTION, SOLUTION INTRAVENOUS at 14:18

## 2025-02-10 RX ADMIN — HYDROXYZINE HYDROCHLORIDE 10 MG: 10 TABLET ORAL at 14:13

## 2025-02-10 RX ADMIN — SODIUM CHLORIDE, PRESERVATIVE FREE 10 ML: 5 INJECTION INTRAVENOUS at 14:17

## 2025-02-10 RX ADMIN — SODIUM CHLORIDE 250 ML: 9 INJECTION, SOLUTION INTRAVENOUS at 14:18

## 2025-02-10 RX ADMIN — ONDANSETRON 4 MG: 2 INJECTION INTRAMUSCULAR; INTRAVENOUS at 14:18

## 2025-02-10 RX ADMIN — TEZEPELUMAB-EKKO 210 MG: 210 INJECTION, SOLUTION SUBCUTANEOUS at 14:53

## 2025-02-10 RX ADMIN — DIPHENHYDRAMINE HYDROCHLORIDE 25 MG: 25 TABLET ORAL at 14:13

## 2025-02-10 ASSESSMENT — PAIN DESCRIPTION - DIRECTION: RADIATING_TOWARDS: NONRADIATING

## 2025-02-10 ASSESSMENT — PAIN - FUNCTIONAL ASSESSMENT: PAIN_FUNCTIONAL_ASSESSMENT: PREVENTS OR INTERFERES SOME ACTIVE ACTIVITIES AND ADLS

## 2025-02-10 ASSESSMENT — PAIN DESCRIPTION - ORIENTATION: ORIENTATION: LEFT

## 2025-02-10 ASSESSMENT — PAIN SCALES - GENERAL
PAINLEVEL_OUTOF10: 5
PAINLEVEL_OUTOF10: 5

## 2025-02-10 ASSESSMENT — PAIN DESCRIPTION - DESCRIPTORS: DESCRIPTORS: DISCOMFORT;SPASM

## 2025-02-10 ASSESSMENT — PAIN DESCRIPTION - FREQUENCY: FREQUENCY: CONTINUOUS

## 2025-02-10 ASSESSMENT — PAIN DESCRIPTION - ONSET: ONSET: ON-GOING

## 2025-02-10 ASSESSMENT — PAIN DESCRIPTION - PAIN TYPE: TYPE: ACUTE PAIN

## 2025-02-10 ASSESSMENT — PAIN DESCRIPTION - LOCATION: LOCATION: HEAD;NECK

## 2025-02-10 NOTE — PROGRESS NOTES
Patient tolerated tezspire injection well. Remained on unit for 30min after treatment. Patient alert and oriented x3. No distress noted. Vital signs stable. Patient denies any new or worsening pain.  Offered patient education and/or discharge material. Patient declined. Patient denies any needs. All questions answered. D/C in stable condition.

## 2025-02-21 ENCOUNTER — OFFICE VISIT (OUTPATIENT)
Dept: PAIN MANAGEMENT | Age: 55
End: 2025-02-21
Payer: MEDICAID

## 2025-02-21 ENCOUNTER — OFFICE VISIT (OUTPATIENT)
Dept: PRIMARY CARE CLINIC | Age: 55
End: 2025-02-21
Payer: MEDICAID

## 2025-02-21 VITALS
HEIGHT: 58 IN | DIASTOLIC BLOOD PRESSURE: 78 MMHG | TEMPERATURE: 97.9 F | SYSTOLIC BLOOD PRESSURE: 122 MMHG | BODY MASS INDEX: 39.67 KG/M2 | RESPIRATION RATE: 18 BRPM | HEART RATE: 76 BPM | OXYGEN SATURATION: 97 % | WEIGHT: 189 LBS

## 2025-02-21 VITALS
HEART RATE: 80 BPM | HEIGHT: 58 IN | SYSTOLIC BLOOD PRESSURE: 122 MMHG | BODY MASS INDEX: 39.67 KG/M2 | OXYGEN SATURATION: 96 % | WEIGHT: 189 LBS | DIASTOLIC BLOOD PRESSURE: 80 MMHG | TEMPERATURE: 96.9 F | RESPIRATION RATE: 18 BRPM

## 2025-02-21 DIAGNOSIS — R10.32 LLQ PAIN: ICD-10-CM

## 2025-02-21 DIAGNOSIS — K57.92 DIVERTICULITIS: ICD-10-CM

## 2025-02-21 DIAGNOSIS — M79.7 FIBROMYALGIA: ICD-10-CM

## 2025-02-21 DIAGNOSIS — M47.816 LUMBAR SPONDYLOSIS: ICD-10-CM

## 2025-02-21 DIAGNOSIS — M47.816 LUMBAR FACET ARTHROPATHY: ICD-10-CM

## 2025-02-21 DIAGNOSIS — M51.9 LUMBAR DISC DISORDER: ICD-10-CM

## 2025-02-21 DIAGNOSIS — M47.812 CERVICAL FACET JOINT SYNDROME: ICD-10-CM

## 2025-02-21 DIAGNOSIS — M50.30 DDD (DEGENERATIVE DISC DISEASE), CERVICAL: ICD-10-CM

## 2025-02-21 DIAGNOSIS — R53.83 FATIGUE, UNSPECIFIED TYPE: ICD-10-CM

## 2025-02-21 DIAGNOSIS — M54.16 LUMBAR RADICULOPATHY: Primary | ICD-10-CM

## 2025-02-21 DIAGNOSIS — F41.9 ANXIETY: ICD-10-CM

## 2025-02-21 DIAGNOSIS — M54.12 CERVICAL RADICULOPATHY: ICD-10-CM

## 2025-02-21 DIAGNOSIS — E78.5 DYSLIPIDEMIA: ICD-10-CM

## 2025-02-21 DIAGNOSIS — M47.812 FACET ARTHROPATHY, CERVICAL: ICD-10-CM

## 2025-02-21 DIAGNOSIS — G89.4 CHRONIC PAIN SYNDROME: ICD-10-CM

## 2025-02-21 DIAGNOSIS — R10.32 LLQ PAIN: Primary | ICD-10-CM

## 2025-02-21 DIAGNOSIS — M16.12 PRIMARY OSTEOARTHRITIS OF LEFT HIP: ICD-10-CM

## 2025-02-21 DIAGNOSIS — K62.5 RECTAL BLEED: ICD-10-CM

## 2025-02-21 LAB
ALBUMIN: 4.3 G/DL (ref 3.5–5.2)
ALP BLD-CCNC: 101 U/L (ref 35–104)
ALT SERPL-CCNC: 21 U/L (ref 0–32)
ANION GAP SERPL CALCULATED.3IONS-SCNC: 13 MMOL/L (ref 7–16)
AST SERPL-CCNC: 18 U/L (ref 0–31)
BASOPHILS ABSOLUTE: 0.05 K/UL (ref 0–0.2)
BASOPHILS RELATIVE PERCENT: 1 % (ref 0–2)
BILIRUB SERPL-MCNC: 0.5 MG/DL (ref 0–1.2)
BUN BLDV-MCNC: 7 MG/DL (ref 6–20)
CALCIUM SERPL-MCNC: 9.7 MG/DL (ref 8.6–10.2)
CHLORIDE BLD-SCNC: 105 MMOL/L (ref 98–107)
CO2: 23 MMOL/L (ref 22–29)
CREAT SERPL-MCNC: 0.8 MG/DL (ref 0.5–1)
EOSINOPHILS ABSOLUTE: 0.04 K/UL (ref 0.05–0.5)
EOSINOPHILS RELATIVE PERCENT: 1 % (ref 0–6)
GFR, ESTIMATED: 83 ML/MIN/1.73M2
GLUCOSE BLD-MCNC: 106 MG/DL (ref 74–99)
HCT VFR BLD CALC: 42.3 % (ref 34–48)
HEMOGLOBIN: 13.8 G/DL (ref 11.5–15.5)
IMMATURE GRANULOCYTES %: 0 % (ref 0–5)
IMMATURE GRANULOCYTES ABSOLUTE: <0.03 K/UL (ref 0–0.58)
LIPASE: 33 U/L (ref 13–60)
LYMPHOCYTES ABSOLUTE: 1.99 K/UL (ref 1.5–4)
LYMPHOCYTES RELATIVE PERCENT: 28 % (ref 20–42)
MCH RBC QN AUTO: 27.6 PG (ref 26–35)
MCHC RBC AUTO-ENTMCNC: 32.6 G/DL (ref 32–34.5)
MCV RBC AUTO: 84.6 FL (ref 80–99.9)
MONOCYTES ABSOLUTE: 0.49 K/UL (ref 0.1–0.95)
MONOCYTES RELATIVE PERCENT: 7 % (ref 2–12)
NEUTROPHILS ABSOLUTE: 4.41 K/UL (ref 1.8–7.3)
NEUTROPHILS RELATIVE PERCENT: 63 % (ref 43–80)
PDW BLD-RTO: 12.5 % (ref 11.5–15)
PLATELET # BLD: 298 K/UL (ref 130–450)
PMV BLD AUTO: 10.6 FL (ref 7–12)
POTASSIUM SERPL-SCNC: 4.4 MMOL/L (ref 3.5–5)
RBC # BLD: 5 M/UL (ref 3.5–5.5)
SED RATE, AUTOMATED: 14 MM/HR (ref 0–20)
SODIUM BLD-SCNC: 141 MMOL/L (ref 132–146)
TOTAL PROTEIN: 7.4 G/DL (ref 6.4–8.3)
WBC # BLD: 7 K/UL (ref 4.5–11.5)

## 2025-02-21 PROCEDURE — 1036F TOBACCO NON-USER: CPT | Performed by: FAMILY MEDICINE

## 2025-02-21 PROCEDURE — 3017F COLORECTAL CA SCREEN DOC REV: CPT | Performed by: FAMILY MEDICINE

## 2025-02-21 PROCEDURE — 99213 OFFICE O/P EST LOW 20 MIN: CPT | Performed by: PHYSICIAN ASSISTANT

## 2025-02-21 PROCEDURE — G8417 CALC BMI ABV UP PARAM F/U: HCPCS | Performed by: FAMILY MEDICINE

## 2025-02-21 PROCEDURE — G8427 DOCREV CUR MEDS BY ELIG CLIN: HCPCS | Performed by: FAMILY MEDICINE

## 2025-02-21 PROCEDURE — 99214 OFFICE O/P EST MOD 30 MIN: CPT | Performed by: FAMILY MEDICINE

## 2025-02-21 RX ORDER — OXYCODONE HYDROCHLORIDE 5 MG/1
5 TABLET ORAL DAILY PRN
Qty: 30 TABLET | Refills: 0 | Status: SHIPPED | OUTPATIENT
Start: 2025-02-21 | End: 2025-03-23

## 2025-02-21 RX ORDER — LIDOCAINE 50 MG/G
1 PATCH TOPICAL DAILY
Qty: 30 PATCH | Refills: 5 | Status: SHIPPED | OUTPATIENT
Start: 2025-02-21 | End: 2025-03-23

## 2025-02-21 RX ORDER — CEFDINIR 300 MG/1
300 CAPSULE ORAL 2 TIMES DAILY
Qty: 20 CAPSULE | Refills: 0 | Status: SHIPPED | OUTPATIENT
Start: 2025-02-21 | End: 2025-03-03

## 2025-02-21 ASSESSMENT — ENCOUNTER SYMPTOMS
BLOOD IN STOOL: 0
EYE REDNESS: 0
SHORTNESS OF BREATH: 0
BACK PAIN: 0
HEMATOCHEZIA: 1
EYE ITCHING: 0
HEMATEMESIS: 0
APNEA: 0
EYE PAIN: 0
COUGH: 0
SINUS PRESSURE: 0
COLOR CHANGE: 0
VOMITING: 0
CONSTIPATION: 1
WHEEZING: 0
SORE THROAT: 0
RHINORRHEA: 0
ABDOMINAL PAIN: 1
CHEST TIGHTNESS: 0
RECTAL PAIN: 0
NAUSEA: 0
DIARRHEA: 0

## 2025-02-21 NOTE — PROGRESS NOTES
Ashlee Bustos presents to the [facility] on 2/21/2025. Ashlee is complaining of pain back and neck. The pain is constant. The pain is described as stabbing and sharp. Pain is rated on her best day at a 8, on her worst day at a 10, and on average at a 8 on the VAS scale. She took her last dose of Motrin and oxycodone yesterday.      Any procedures since your last visit: No    She is  on NSAIDS and  is not on anticoagulation medications   Pacemaker or defibrillator: No     Medication Contract and Consent for Opioid Use Documents Filed       Patient Documents       Type of Document Status Date Received Received By Description    Medication Contract Received 7/18/2019 11:59 AM DIANA KWON 7/18/19 medication contract    Medication Contract Received 10/9/2020  3:01 PM ALEXEI VENTURA pain pt agreement    Medication Contract Received 11/5/2021 11:29 AM SNOW MUNIZ Pain Mgmt Patient Agreement 11.5.2021    Medication Contract Received 12/13/2021  4:21 PM LEONARDA PINA pain management    Consent Opioid Use Received 12/12/2022 12:23 PM ESTEFANY ARAMBULA pt agreement 12/12/22    Consent Opioid Use Received 1/10/2024  4:08 PM ESTEFANY ARAMBULA Consent Opioid Use                       Resp 18   Ht 1.473 m (4' 9.99\")   Wt 85.7 kg (189 lb)   LMP 03/27/2008   BMI 39.51 kg/m²      Patient's last menstrual period was 03/27/2008.

## 2025-02-21 NOTE — PROGRESS NOTES
[facility]   [address], [place] [address]  [phone]    Follow up Note      Ashlee Bustos     Date of Visit:  2025    CC:  Patient presents for follow up   Chief Complaint   Patient presents with    Neck Pain    Lower Back Pain       HPI:    Pain is worse due to the frigid weather.      Appropriate analgesia with current medications regimen: yes.    Change in quality of symptoms:no.    Medication side effects:none.   Recent diagnostic testing:none.   Recent interventional procedures: None.       She has been on anticoagulation medications to include ASA. The patient  has not been on herbal supplements.  The patient is not diabetic.     Imagin/15/2022 MRI lumbar spine    FINDINGS:   No fracture or malalignment.  Vertebral body height and interspaces are well   maintained.  Conus medullaris is visualized and is unremarkable.  No evidence   of epidural mass or hematoma.  No prevertebral soft tissue edema.  Note made   of a incidental intraosseous hemangioma located in T11 vertebral body.       L1/L2: No central canal stenosis or neural foraminal narrowing.       L2/L3: No central canal stenosis or neural foraminal narrowing.       L3/L4: Mild facet hypertrophy with hypertrophy of ligamentum flavum.  No   central canal stenosis.  There is mild bilateral neural foraminal narrowing   more significant on the right.       L4/L5: Moderate facet hypertrophy.  No central canal stenosis.  No   significant neural foraminal narrowing.       L5/S1: No central canal stenosis.  No significant neural foraminal narrowing.           Impression   1. Mild bilateral neural foraminal narrowing at L3/L4 more significant on the   right.  No central canal stenosis.   2. Moderate bilateral facet hypertrophy at L4/L5.   3. No fracture or malalignment.           Cervical spine MRI 2018  1. Multilevel degenerative disc disease extending from C3 through C7.   Moderately severe

## 2025-02-21 NOTE — PROGRESS NOTES
Chief Complaint:     Chief Complaint   Patient presents with    Rectal Bleeding     Pain in abdominal.    Hyperlipidemia    Fatigue         Rectal Bleeding   The current episode started 3 to 5 days ago. The problem occurs occasionally. The problem has been unchanged. The pain is mild. The stool is described as soft, bloody and mixed with blood. There was no prior successful therapy. There was no prior unsuccessful therapy. Associated symptoms include abdominal pain. Pertinent negatives include no fever, no diarrhea, no hematemesis, no nausea, no rectal pain, no vomiting, no hematuria, no chest pain, no headaches, no coughing and no rash. There were no sick contacts. She has received no recent medical care.   Hyperlipidemia  This is a chronic problem. The current episode started more than 1 year ago. The problem is controlled. Recent lipid tests were reviewed and are normal. Pertinent negatives include no chest pain, myalgias or shortness of breath. She is currently on no antihyperlipidemic treatment. The current treatment provides no improvement of lipids. There are no compliance problems.  Risk factors for coronary artery disease include dyslipidemia.   Fatigue  Associated symptoms include abdominal pain and fatigue. Pertinent negatives include no arthralgias, chest pain, congestion, coughing, fever, headaches, myalgias, nausea, neck pain, numbness, rash, sore throat, vomiting or weakness.   Abdominal Pain  This is a recurrent problem. The current episode started 1 to 4 weeks ago. The problem occurs intermittently. The problem has been waxing and waning. The pain is located in the RLQ and RUQ. The pain is mild. The quality of the pain is colicky. Associated symptoms include constipation and hematochezia. Pertinent negatives include no arthralgias, diarrhea, dysuria, fever, frequency, headaches, hematuria, myalgias, nausea or vomiting. Nothing aggravates the pain. The pain is relieved by Nothing. She has tried

## 2025-02-28 ENCOUNTER — HOSPITAL ENCOUNTER (OUTPATIENT)
Age: 55
Setting detail: OBSERVATION
Discharge: HOME | End: 2025-02-28
Attending: EMERGENCY MEDICINE | Admitting: INTERNAL MEDICINE
Payer: MEDICAID

## 2025-02-28 ENCOUNTER — APPOINTMENT (OUTPATIENT)
Dept: CT IMAGING | Age: 55
End: 2025-02-28
Attending: EMERGENCY MEDICINE
Payer: MEDICAID

## 2025-02-28 ENCOUNTER — APPOINTMENT (OUTPATIENT)
Dept: GENERAL RADIOLOGY | Age: 55
End: 2025-02-28
Payer: MEDICAID

## 2025-02-28 VITALS
TEMPERATURE: 97.8 F | DIASTOLIC BLOOD PRESSURE: 64 MMHG | SYSTOLIC BLOOD PRESSURE: 104 MMHG | HEART RATE: 85 BPM | OXYGEN SATURATION: 92 % | WEIGHT: 186 LBS | RESPIRATION RATE: 22 BRPM | BODY MASS INDEX: 40.13 KG/M2 | HEIGHT: 57 IN

## 2025-02-28 DIAGNOSIS — R07.9 CHEST PAIN, UNSPECIFIED TYPE: Primary | ICD-10-CM

## 2025-02-28 LAB
ALBUMIN SERPL-MCNC: 4.4 G/DL (ref 3.5–5.2)
ALP SERPL-CCNC: 104 U/L (ref 35–104)
ALT SERPL-CCNC: 55 U/L (ref 0–32)
ANION GAP SERPL CALCULATED.3IONS-SCNC: 11 MMOL/L (ref 7–16)
AST SERPL-CCNC: 67 U/L (ref 0–31)
BASOPHILS # BLD: 0.05 K/UL (ref 0–0.2)
BASOPHILS NFR BLD: 1 % (ref 0–2)
BILIRUB SERPL-MCNC: 0.8 MG/DL (ref 0–1.2)
BNP SERPL-MCNC: 36 PG/ML (ref 0–125)
BUN SERPL-MCNC: 12 MG/DL (ref 6–20)
CALCIUM SERPL-MCNC: 9.5 MG/DL (ref 8.6–10.2)
CHLORIDE SERPL-SCNC: 99 MMOL/L (ref 98–107)
CHOLEST SERPL-MCNC: 167 MG/DL
CO2 SERPL-SCNC: 24 MMOL/L (ref 22–29)
CREAT SERPL-MCNC: 0.9 MG/DL (ref 0.5–1)
D-DIMER QUANTITATIVE: 280 NG/ML DDU (ref 0–230)
EKG ATRIAL RATE: 72 BPM
EKG P AXIS: 63 DEGREES
EKG P-R INTERVAL: 132 MS
EKG Q-T INTERVAL: 382 MS
EKG QRS DURATION: 78 MS
EKG QTC CALCULATION (BAZETT): 418 MS
EKG R AXIS: 45 DEGREES
EKG T AXIS: 35 DEGREES
EKG VENTRICULAR RATE: 72 BPM
EOSINOPHIL # BLD: 0.09 K/UL (ref 0.05–0.5)
EOSINOPHILS RELATIVE PERCENT: 1 % (ref 0–6)
ERYTHROCYTE [DISTWIDTH] IN BLOOD BY AUTOMATED COUNT: 12.6 % (ref 11.5–15)
GFR, ESTIMATED: 81 ML/MIN/1.73M2
GLUCOSE SERPL-MCNC: 111 MG/DL (ref 74–99)
HBA1C MFR BLD: 5.3 % (ref 4–5.6)
HCT VFR BLD AUTO: 41.2 % (ref 34–48)
HDLC SERPL-MCNC: 59 MG/DL
HGB BLD-MCNC: 13.7 G/DL (ref 11.5–15.5)
IMM GRANULOCYTES # BLD AUTO: <0.03 K/UL (ref 0–0.58)
IMM GRANULOCYTES NFR BLD: 0 % (ref 0–5)
LDLC SERPL CALC-MCNC: 93 MG/DL
LIPASE SERPL-CCNC: 39 U/L (ref 13–60)
LYMPHOCYTES NFR BLD: 2.77 K/UL (ref 1.5–4)
LYMPHOCYTES RELATIVE PERCENT: 37 % (ref 20–42)
MCH RBC QN AUTO: 27.8 PG (ref 26–35)
MCHC RBC AUTO-ENTMCNC: 33.3 G/DL (ref 32–34.5)
MCV RBC AUTO: 83.6 FL (ref 80–99.9)
MONOCYTES NFR BLD: 0.42 K/UL (ref 0.1–0.95)
MONOCYTES NFR BLD: 6 % (ref 2–12)
NEUTROPHILS NFR BLD: 55 % (ref 43–80)
NEUTS SEG NFR BLD: 4.07 K/UL (ref 1.8–7.3)
PLATELET # BLD AUTO: 259 K/UL (ref 130–450)
PMV BLD AUTO: 10.6 FL (ref 7–12)
POTASSIUM SERPL-SCNC: 4 MMOL/L (ref 3.5–5)
PROT SERPL-MCNC: 7.2 G/DL (ref 6.4–8.3)
RBC # BLD AUTO: 4.93 M/UL (ref 3.5–5.5)
SODIUM SERPL-SCNC: 134 MMOL/L (ref 132–146)
T4 FREE SERPL-MCNC: 1.3 NG/DL (ref 0.9–1.7)
TRIGL SERPL-MCNC: 73 MG/DL
TROPONIN I SERPL HS-MCNC: <6 NG/L (ref 0–9)
TROPONIN I SERPL HS-MCNC: <6 NG/L (ref 0–9)
TSH SERPL DL<=0.05 MIU/L-ACNC: 1.36 UIU/ML (ref 0.27–4.2)
VLDLC SERPL CALC-MCNC: 15 MG/DL
WBC OTHER # BLD: 7.4 K/UL (ref 4.5–11.5)

## 2025-02-28 PROCEDURE — 84439 ASSAY OF FREE THYROXINE: CPT

## 2025-02-28 PROCEDURE — 84443 ASSAY THYROID STIM HORMONE: CPT

## 2025-02-28 PROCEDURE — 93010 ELECTROCARDIOGRAM REPORT: CPT | Performed by: INTERNAL MEDICINE

## 2025-02-28 PROCEDURE — 71045 X-RAY EXAM CHEST 1 VIEW: CPT

## 2025-02-28 PROCEDURE — 6370000000 HC RX 637 (ALT 250 FOR IP): Performed by: EMERGENCY MEDICINE

## 2025-02-28 PROCEDURE — 96374 THER/PROPH/DIAG INJ IV PUSH: CPT

## 2025-02-28 PROCEDURE — 6360000004 HC RX CONTRAST MEDICATION: Performed by: RADIOLOGY

## 2025-02-28 PROCEDURE — 83036 HEMOGLOBIN GLYCOSYLATED A1C: CPT

## 2025-02-28 PROCEDURE — 71275 CT ANGIOGRAPHY CHEST: CPT

## 2025-02-28 PROCEDURE — 85379 FIBRIN DEGRADATION QUANT: CPT

## 2025-02-28 PROCEDURE — APPSS180 APP SPLIT SHARED TIME > 60 MINUTES: Performed by: NURSE PRACTITIONER

## 2025-02-28 PROCEDURE — 6370000000 HC RX 637 (ALT 250 FOR IP): Performed by: INTERNAL MEDICINE

## 2025-02-28 PROCEDURE — 83880 ASSAY OF NATRIURETIC PEPTIDE: CPT

## 2025-02-28 PROCEDURE — 99285 EMERGENCY DEPT VISIT HI MDM: CPT

## 2025-02-28 PROCEDURE — 93005 ELECTROCARDIOGRAM TRACING: CPT | Performed by: EMERGENCY MEDICINE

## 2025-02-28 PROCEDURE — 2580000003 HC RX 258: Performed by: EMERGENCY MEDICINE

## 2025-02-28 PROCEDURE — 85025 COMPLETE CBC W/AUTO DIFF WBC: CPT

## 2025-02-28 PROCEDURE — 2500000003 HC RX 250 WO HCPCS: Performed by: EMERGENCY MEDICINE

## 2025-02-28 PROCEDURE — 80053 COMPREHEN METABOLIC PANEL: CPT

## 2025-02-28 PROCEDURE — 80061 LIPID PANEL: CPT

## 2025-02-28 PROCEDURE — G0378 HOSPITAL OBSERVATION PER HR: HCPCS

## 2025-02-28 PROCEDURE — 96375 TX/PRO/DX INJ NEW DRUG ADDON: CPT

## 2025-02-28 PROCEDURE — 6360000002 HC RX W HCPCS: Performed by: EMERGENCY MEDICINE

## 2025-02-28 PROCEDURE — 83690 ASSAY OF LIPASE: CPT

## 2025-02-28 PROCEDURE — 84484 ASSAY OF TROPONIN QUANT: CPT

## 2025-02-28 RX ORDER — ONDANSETRON 2 MG/ML
4 INJECTION INTRAMUSCULAR; INTRAVENOUS ONCE
Status: COMPLETED | OUTPATIENT
Start: 2025-02-28 | End: 2025-02-28

## 2025-02-28 RX ORDER — DIPHENHYDRAMINE HYDROCHLORIDE 50 MG/ML
25 INJECTION INTRAMUSCULAR; INTRAVENOUS ONCE
Status: COMPLETED | OUTPATIENT
Start: 2025-02-28 | End: 2025-02-28

## 2025-02-28 RX ORDER — FAMOTIDINE 20 MG/1
20 TABLET, FILM COATED ORAL 2 TIMES DAILY
Status: DISCONTINUED | OUTPATIENT
Start: 2025-02-28 | End: 2025-02-28 | Stop reason: HOSPADM

## 2025-02-28 RX ORDER — KETOROLAC TROMETHAMINE 30 MG/ML
15 INJECTION, SOLUTION INTRAMUSCULAR; INTRAVENOUS ONCE
Status: COMPLETED | OUTPATIENT
Start: 2025-02-28 | End: 2025-02-28

## 2025-02-28 RX ORDER — FAMOTIDINE 20 MG/1
20 TABLET, FILM COATED ORAL 2 TIMES DAILY
Qty: 60 TABLET | Refills: 0 | Status: SHIPPED | OUTPATIENT
Start: 2025-02-28

## 2025-02-28 RX ORDER — ASPIRIN 81 MG/1
243 TABLET, CHEWABLE ORAL ONCE
Status: COMPLETED | OUTPATIENT
Start: 2025-02-28 | End: 2025-02-28

## 2025-02-28 RX ORDER — IOPAMIDOL 755 MG/ML
75 INJECTION, SOLUTION INTRAVASCULAR
Status: COMPLETED | OUTPATIENT
Start: 2025-02-28 | End: 2025-02-28

## 2025-02-28 RX ORDER — CALCIUM CARBONATE 500 MG/1
500 TABLET, CHEWABLE ORAL 3 TIMES DAILY PRN
Status: DISCONTINUED | OUTPATIENT
Start: 2025-02-28 | End: 2025-02-28 | Stop reason: HOSPADM

## 2025-02-28 RX ADMIN — DIPHENHYDRAMINE HYDROCHLORIDE 25 MG: 50 INJECTION INTRAMUSCULAR; INTRAVENOUS at 06:20

## 2025-02-28 RX ADMIN — ASPIRIN 81 MG CHEWABLE TABLET 243 MG: 81 TABLET CHEWABLE at 04:04

## 2025-02-28 RX ADMIN — ONDANSETRON 4 MG: 2 INJECTION, SOLUTION INTRAMUSCULAR; INTRAVENOUS at 04:01

## 2025-02-28 RX ADMIN — FAMOTIDINE 20 MG: 20 TABLET, FILM COATED ORAL at 13:33

## 2025-02-28 RX ADMIN — FAMOTIDINE 20 MG: 10 INJECTION, SOLUTION INTRAVENOUS at 06:20

## 2025-02-28 RX ADMIN — IOPAMIDOL 75 ML: 755 INJECTION, SOLUTION INTRAVENOUS at 06:44

## 2025-02-28 RX ADMIN — KETOROLAC TROMETHAMINE 15 MG: 30 INJECTION, SOLUTION INTRAMUSCULAR at 11:14

## 2025-02-28 RX ADMIN — WATER 125 MG: 1 INJECTION INTRAMUSCULAR; INTRAVENOUS; SUBCUTANEOUS at 06:21

## 2025-02-28 RX ADMIN — CALCIUM CARBONATE 500 MG: 500 TABLET, CHEWABLE ORAL at 13:33

## 2025-02-28 ASSESSMENT — PAIN SCALES - GENERAL
PAINLEVEL_OUTOF10: 9
PAINLEVEL_OUTOF10: 7
PAINLEVEL_OUTOF10: 4

## 2025-02-28 ASSESSMENT — PAIN DESCRIPTION - ORIENTATION
ORIENTATION: UPPER;MID
ORIENTATION: MID;UPPER
ORIENTATION: LEFT

## 2025-02-28 ASSESSMENT — PAIN DESCRIPTION - FREQUENCY: FREQUENCY: CONTINUOUS

## 2025-02-28 ASSESSMENT — PAIN DESCRIPTION - LOCATION
LOCATION: ABDOMEN
LOCATION: ABDOMEN
LOCATION: CHEST

## 2025-02-28 ASSESSMENT — PAIN DESCRIPTION - DESCRIPTORS
DESCRIPTORS: PRESSURE
DESCRIPTORS: ACHING;SHARP;DISCOMFORT
DESCRIPTORS: ACHING;BURNING;SHARP

## 2025-02-28 ASSESSMENT — PAIN - FUNCTIONAL ASSESSMENT: PAIN_FUNCTIONAL_ASSESSMENT: 0-10

## 2025-02-28 ASSESSMENT — PAIN DESCRIPTION - PAIN TYPE: TYPE: ACUTE PAIN

## 2025-02-28 NOTE — ED NOTES
Patient refusing Covid/ flu combo swab. Patient stated she has been tested recently and she knows she is not positive. Patient stated she does not like the swab and is refusing after patient educated on importance of test. ED provider notified.

## 2025-02-28 NOTE — PROGRESS NOTES
CLINICAL PHARMACY NOTE: MEDS TO BEDS    Total # of Prescriptions Filled: 1   The following medications were delivered to the patient:  Famotidine 20 mg    Additional Documentation:   Delivered to pt

## 2025-02-28 NOTE — ED NOTES
Radiology Procedure Waiver   Name: Ashlee Bustos  : 1970  MRN: [id number]    Date:  25    Time: 4:34 AM EST    Benefits of immediately proceeding with Radiology exam(s) without pre-testing outweigh the risks or are not indicated as specified below and therefore the following is/are being waived:    [] Pregnancy test   [] Patients LMP on-time and regular.   [] Patient had Tubal Ligation or has other Contraception Device.   [] Patient  is Menopausal or Premenarcheal.    [] Patient had Full or Partial Hysterectomy.    [x] Protocol for Iodine allergy    [] MRI Questionnaire     [] BUN/Creatinine   [] Patient age w/no hx of renal dysfunction.   [] Patient on Dialysis.   [] Recent Normal Labs.  Electronically signed by Rosendo Marquis MD on 25 at 4:34 AM Rosendo Sawyer MD  [phone]

## 2025-02-28 NOTE — H&P
[facility]              [address] [place] [address]                           HISTORY & PHYSICAL      PATIENT NAME: ZULMA DEAL                : 1970  MED REC NO: [id number]                        ROOM: 38  ACCOUNT NO: [id number]                       ADMIT DATE: 2025  PROVIDER: Trav Bishop DO      PRIMARY CARE PHYSICIAN:  Abdi Grijalva DO    CHIEF COMPLAINT:  Chest pain.    HISTORY OF PRESENT ILLNESS:  The patient is a 54-year-old female who presented to the emergency room.  She states she was awakened from sleep with chest burning and shortness of breath.  She presented to the emergency room and was assigned observation status.    REVIEW OF SYSTEMS:  Remarkable for above-stated chief complaint plus chronic neck and back pain.    ALLERGIES:  TO FISH DERIVED PRODUCTS, IODINE DYE, ROBAXIN.      MEDICATIONS PRIOR TO ADMISSION:  Albuterol, aspirin, Omnicef, EpiPen, Advair, Lidoderm, Roxicodone p.r.n., prenatal vitamin, Tezspire, vitamin D3.    SOCIAL HISTORY:  The patient quit tobacco. No alcohol.    PAST MEDICAL HISTORY:  Acid reflux, arthritis, asthma, cerebral artery occlusion, cerebral infarction, cerebral spinal stenosis, chronic back pain, chronic kidney disease, morbid obesity, irritable bowel syndrome, kidney stone, CIERA on CPAP, seizure disorder, vitamin D deficiency.    PAST SURGICAL HISTORY:  Tubal ligation, left hip surgery, cholecystectomy.    PHYSICAL EXAMINATION:  GENERAL APPEARANCE:  Reveals a 54-year-old female who is alert, cooperative, and a fair historian.  VITAL SIGNS:  On admission, temperature 97.6, pulse 72, respirations 20, blood pressure 126/83.  HEAD:  Normocephalic, atraumatic.  EYES:  Pupils equal and reactive to light.  Extraocular muscles intact.  Fundi not well visualized.  NOSE:  No obstruction, polyp, or discharge noted.  MOUTH:  Mucosa without lesion.  PHARYNX:  Noninjected without exudate.  NECK:   Supple.  No JVD.  No thyromegaly.  No carotid bruits.  HEART:  Regular rate and rhythm without murmur.  LUNGS:  Clear to auscultation bilaterally.  ABDOMEN:  Positive bowel sounds.  Soft, nontender.  No rebound.  No guarding.  No hepatosplenomegaly.  No masses.  BACK:  With minimal increased thoracic kyphosis.  EXTREMITIES:  Without edema.  LYMPH NODES:  No adenopathy noted.  SKIN:  Without rash or lesion.    IMPRESSION:  Chest pain, morbid obesity, chronic obstructive pulmonary disease, chronic neck and back pain.    PLAN:  Assign patient to observation status.  Cardiac telemetry.  Cardiology to see.  Discharge plan, home when stable.          TONA ADAM DO      D:  02/28/2025 12:37:15     T:  02/28/2025 15:02:36     TELLO/TRI  Job #:  [id number]     Doc#:  [id number]

## 2025-02-28 NOTE — CONSULTS
Inpatient Cardiology Consultation      Reason for Consult: Chest pain    Consulting Physician: Dr. Wagner    Requesting Physician: Dr. Torres    Date of Consultation: 2/28/2025    HISTORY OF PRESENT ILLNESS: 54-year-old obese  female known to Dr. Wagner.    iPad  services utilized    Around 0200 feeling like vomiting, nauseated , dizzy, and heart was racing.  Got up and went to the bathroom to splashed face with water and then developed pain in L shoulder and felt out of breath. L shoulder pain lasted around 7-8 minutes. Then developed pain in upper abdomen that is worse with palpation/deep inspiration. Reportedly vomited  x 1. Also complains of pain on L side of chest that is reproducible with palpation.   Sleeps in bed with 2 pillows  Inconsistent use of C-pap. In the last month reportedly on wore it 4 times. Does not wear O2. No LE swelling or early CAD.     [facility] 2/28/2025 /83 HR 77 SR afebrile O2 saturation 98% on RA    Troponin <6  x 2, NT proBNP 36, glucose 111, ALT 55 AST 67, CBC WNL, D-dimer 280.    CTA of the chest no PE subtle patchy groundglass opacity identified within the mid and lower lung fields bilaterally.    ED medications aspirin 243 mg PO, Benadryl, Pepcid, Toradol, Solu-Medrol, Zofran    Patient found resting on stretcher in no apparent distress.     Attempted to use I-Pad language services but connection was poor and session was cut off.  Required use of another iPad for translation services        Please note: past medical records were reviewed per electronic medical record (EMR) - see detailed reports under Past Medical/ Surgical History.   Past Medical History:    2019 stress test.  Nonischemic.  EF 86%  2/2022 TTE: EF 60-65% normal LV size.  Normal diastolic. NWM.  Normal LV wall thickness.  Normal RV size and function.  No VHD  5/22/2024 for Azam Reed: Nonischemic EF 78% NWM.   Palpitations  10/20/2021 7-day event monitor: Baseline SR.

## 2025-02-28 NOTE — ED PROVIDER NOTES
potassium is 4 BUN is a 12 creatinine was 0.9 troponin was 6 D-dimer is 280 chest x-ray showed no infiltrate or effusion patient CT chest showed no PE did show subtle patchy groundglass opacities midlung could be atelectasis versus interstitial edema.  Patient vital signs here are stable she is not hypoxic.  Patient made aware results and findings and plan internal medicine consulted    Social Determinants affecting Dx or Tx: Patient is a past smoker    Chronic Conditions: Patient has a history of acid reflux arthritis asthma history of stroke history of kidney stone history of paresthesia history of seizure disorder vitamin D deficiency    Records Reviewed:   Echo 2/21/2022 ejection fraction 60 to 65% normal left ventricular and systolic function normal diastolic function    Nuclear stress test 5/6/2019 normal examination without evidence of treadmill stress-induced left ventricular ischemia  Re-Evaluations:             Patient was ordered aspirin here in emergency room vital signs noted.  Patient was premedicated for CT.  Patient made aware results and finding      Consultations:               Internal medicine  Critical Care:         This patient's ED course included: a personal history and physicial eaxmination    This patient has been closely monitored during their ED course.    Counseling:   The emergency provider has spoken with the patient and discussed today’s results, in addition to providing specific details for the plan of care and counseling regarding the diagnosis and prognosis.  Questions are answered at this time and they are agreeable with the plan.       --------------------------------- IMPRESSION AND DISPOSITION ---------------------------------    IMPRESSION  1. Chest pain, unspecified type        DISPOSITION  Disposition: Admit  Patient condition is stable        NOTE: This report was transcribed using voice recognition software. Every effort was made to ensure accuracy; however, inadvertent  computerized transcription errors may be present          Rosendo Marquis MD  02/28/25 0808       Rosendo Marquis MD  02/28/25 0809

## 2025-02-28 NOTE — PROGRESS NOTES
Department of Emergency Medicine    ED  Provider Note - Sign out / Oncoming Provider   Admit Date/RoomTime: 2/28/2025  2:55 AM  0700AM EST      I received this patient at sign out from Dr. Marquis.  I have discussed the patient's initial exam, treatment and plan of care with the out going physician.  I have introduced my self to the patient / family and have answered their questions to this point.  I have examined the patient myself and reviewed ordered tests / medications and  reviewed any available results to this point.   If a resident is involved in the Emergency Department care, I have discussed my findings and plan with them as well.    Labs Reviewed   COMPREHENSIVE METABOLIC PANEL - Abnormal; Notable for the following components:       Result Value    Glucose 111 (*)     ALT 55 (*)     AST 67 (*)     All other components within normal limits   D-DIMER, QUANTITATIVE - Abnormal; Notable for the following components:    D-Dimer, Quant 280 (*)     All other components within normal limits   COVID-19 & INFLUENZA COMBO   TROPONIN   LIPASE   CBC WITH AUTO DIFFERENTIAL   TROPONIN   BRAIN NATRIURETIC PEPTIDE     CTA CHEST W CONTRAST   Final Result   1. No evidence of pulmonary embolism.   2. Subtle patchy ground-glass opacity identified within the mid and lower   lung fields bilaterally suggesting either atelectatic change or possible mild   interstitial edema or pneumonia.         XR CHEST PORTABLE   Final Result   No acute disease.           Medications   ketorolac (TORADOL) injection 15 mg (has no administration in time range)   aspirin chewable tablet 243 mg (243 mg Oral Given 2/28/25 0404)   ondansetron (ZOFRAN) injection 4 mg (4 mg IntraVENous Given 2/28/25 0401)   diphenhydrAMINE (BENADRYL) injection 25 mg (25 mg IntraVENous Given 2/28/25 0620)   methylPREDNISolone sodium succ (SOLU-MEDROL) 125 mg in sterile water 2 mL injection (125 mg IntraVENous Given 2/28/25 0621)   famotidine (PEPCID) 20 MG/2ML 20 mg in

## 2025-03-13 ENCOUNTER — OFFICE VISIT (OUTPATIENT)
Dept: SURGERY | Age: 55
End: 2025-03-13

## 2025-03-13 VITALS
BODY MASS INDEX: 39.05 KG/M2 | HEART RATE: 75 BPM | WEIGHT: 181 LBS | DIASTOLIC BLOOD PRESSURE: 70 MMHG | TEMPERATURE: 98.1 F | HEIGHT: 57 IN | RESPIRATION RATE: 18 BRPM | SYSTOLIC BLOOD PRESSURE: 100 MMHG | OXYGEN SATURATION: 96 %

## 2025-03-13 DIAGNOSIS — R10.13 EPIGASTRIC PAIN: ICD-10-CM

## 2025-03-13 DIAGNOSIS — R10.32 LLQ PAIN: Primary | ICD-10-CM

## 2025-03-13 DIAGNOSIS — Z86.0100 HISTORY OF COLON POLYPS: ICD-10-CM

## 2025-03-13 DIAGNOSIS — R12 HEARTBURN: ICD-10-CM

## 2025-03-13 PROBLEM — R19.7 DIARRHEA: Status: ACTIVE | Noted: 2025-03-13

## 2025-03-13 RX ORDER — DIPHENHYDRAMINE HCL 25 MG
50 CAPSULE ORAL ONCE
Qty: 2 CAPSULE | Refills: 0 | Status: CANCELLED | OUTPATIENT
Start: 2025-03-13 | End: 2025-03-13

## 2025-03-13 RX ORDER — PANTOPRAZOLE SODIUM 40 MG/1
40 TABLET, DELAYED RELEASE ORAL DAILY
Qty: 30 TABLET | Refills: 3 | Status: SHIPPED | OUTPATIENT
Start: 2025-03-13

## 2025-03-13 RX ORDER — PREDNISONE 50 MG/1
50 TABLET ORAL EVERY 6 HOURS
Qty: 3 TABLET | Refills: 0 | Status: CANCELLED | OUTPATIENT
Start: 2025-03-13 | End: 2025-03-14

## 2025-03-13 NOTE — PROGRESS NOTES
Progress Note - Follow up    Patient's Name/Date of Birth: Ashlee Bustos / 1970    Date: 3/13/2025    PCP: Abdi Grijalva DO    Referring Physician:   Abdi Grijalva DO  [phone]    Chief Complaint   Patient presents with    Abdominal Pain     LLQ pain / Diverticulitis / Rectal bleed   Pt states bright red bleeding, every time she eats something abdominal pain is worse       HPI:    The patient said she is having lower abdominal pain for the past year or so. She is having diarrhea/loose stool after eating especially. No constipation. No rectal bleeding. No unintentional weight loss. She is also having heartburn issues again as well. She has had polyps in the past, though her last colonoscopy 3 years ago she had hemorrhoid banding and no polyps were seen. She said she has pain and grumbling when she eats. She has been on pepcid without much relief of her symptoms. I did a cholecystectomy in 2022.     Patient's medications, allergies, past medical, surgical, social and family histories were reviewed and updated as appropriate.    Review of Systems  Constitutional: negative  Respiratory: negative  Cardiovascular: negative  Gastrointestinal: as in hpi  Genitourinary:negative  Integument/breast: negative    Physical Exam:  /70   Pulse 75   Temp 98.1 °F (36.7 °C) (Infrared)   Resp 18   Ht 1.448 m (4' 9.01\")   Wt 82.1 kg (181 lb)   LMP 03/27/2008   SpO2 96%   BMI 39.16 kg/m²   General appearance: alert, cooperative and in no acute distress.  Lungs: normal work of breathing  Heart: regular rate  Abdomen: moderate LLQ and epigastric tenderness, soft, nondistended  Musculoskeletal: symmetrical without edema.  Skin: normal     Data Reviewed:   CT abd pelvis without contrast 10/24: IMPRESSION:  No acute process in the abdomen or pelvis.    Impression/Plan:  54 y.o. year old female with LLQ and epigastric pain, loose stool, heartburn, h/o polyps, h/o cholecystectomy    All available labs and

## 2025-03-13 NOTE — PATIENT INSTRUCTIONS
General Surgery - Dr. Saima Noland MD, FACS    Preoperative Instructions    Please read the following information very carefully. It contains information that is necessary to best prepare you for your upcoming procedure.    Make arrangements for a  to take you to and from your procedure. YOU MUST HAVE SOMEONE DRIVE YOU HOME - this cannot be a taxi or public transportation. You will not be administered anesthesia without someone to go home and be at home with you that day.  Nothing to eat or drink after midnight the night before your procedure.  Follow your bowel prep instructions if you have them for this procedure.    3 days prior to your procedure: Stop taking blood thinners like Coumadin or Plavix or Xarelto.  5 days prior to your procedure: Stop taking Aspirin or Aspirin containing products.   If you cannot stop any of these medications prior to your procedure, please contact our office.    Medications morning of procedure:  Only heart, breathing, blood pressure, and seizure medications are permitted on the morning of your procedure. These medications can be taken with a sip of water.    IF YOU ARE UNABLE TO KEEP THE ABOVE SCHEDULED PROCEDURE, YOU MUST NOTIFY DR. NOLAND'S OFFICE [phone]. NOT THE FACILITY.    NO CHEWING GUM OR CHEWING TOBACCO AFTER MIDNIGHT ON DAY OF PROCEDURE.    YOU MUST HAVE TRANSPORTATION TO AND FROM THE FACILITY.    Colonoscopy: Before Your Procedure  What is a colonoscopy?     A colonoscopy is a test that lets a doctor look inside your colon. The doctor uses a thin, lighted tube called a colonoscope to look for problems. These include small growths called polyps, cancer, or bleeding.  During the test, the doctor can take samples of tissue that can be checked for cancer or other problems. This is called a biopsy. The doctor can also take out polyps.  Before the test, you will need to stop eating solid foods. You also will be given instructions on how to clean out

## 2025-03-24 ENCOUNTER — OFFICE VISIT (OUTPATIENT)
Dept: PAIN MANAGEMENT | Age: 55
End: 2025-03-24
Payer: MEDICAID

## 2025-03-24 VITALS
RESPIRATION RATE: 18 BRPM | WEIGHT: 181 LBS | SYSTOLIC BLOOD PRESSURE: 116 MMHG | TEMPERATURE: 96.9 F | HEIGHT: 57 IN | BODY MASS INDEX: 39.05 KG/M2 | HEART RATE: 81 BPM | DIASTOLIC BLOOD PRESSURE: 71 MMHG | OXYGEN SATURATION: 95 %

## 2025-03-24 DIAGNOSIS — M16.12 PRIMARY OSTEOARTHRITIS OF LEFT HIP: ICD-10-CM

## 2025-03-24 DIAGNOSIS — G89.4 CHRONIC PAIN SYNDROME: ICD-10-CM

## 2025-03-24 DIAGNOSIS — M79.7 FIBROMYALGIA: ICD-10-CM

## 2025-03-24 DIAGNOSIS — M54.16 LUMBAR RADICULOPATHY: Primary | ICD-10-CM

## 2025-03-24 DIAGNOSIS — M47.812 CERVICAL FACET JOINT SYNDROME: ICD-10-CM

## 2025-03-24 DIAGNOSIS — M54.12 CERVICAL RADICULOPATHY: ICD-10-CM

## 2025-03-24 DIAGNOSIS — M50.30 DDD (DEGENERATIVE DISC DISEASE), CERVICAL: ICD-10-CM

## 2025-03-24 DIAGNOSIS — M47.816 LUMBAR FACET ARTHROPATHY: ICD-10-CM

## 2025-03-24 DIAGNOSIS — M51.9 LUMBAR DISC DISORDER: ICD-10-CM

## 2025-03-24 DIAGNOSIS — M47.812 FACET ARTHROPATHY, CERVICAL: ICD-10-CM

## 2025-03-24 DIAGNOSIS — M47.816 LUMBAR SPONDYLOSIS: ICD-10-CM

## 2025-03-24 PROCEDURE — 1036F TOBACCO NON-USER: CPT | Performed by: PHYSICIAN ASSISTANT

## 2025-03-24 PROCEDURE — 3017F COLORECTAL CA SCREEN DOC REV: CPT | Performed by: PHYSICIAN ASSISTANT

## 2025-03-24 PROCEDURE — 99213 OFFICE O/P EST LOW 20 MIN: CPT | Performed by: PHYSICIAN ASSISTANT

## 2025-03-24 PROCEDURE — G8427 DOCREV CUR MEDS BY ELIG CLIN: HCPCS | Performed by: PHYSICIAN ASSISTANT

## 2025-03-24 PROCEDURE — G8417 CALC BMI ABV UP PARAM F/U: HCPCS | Performed by: PHYSICIAN ASSISTANT

## 2025-03-24 RX ORDER — OXYCODONE HYDROCHLORIDE 5 MG/1
5 TABLET ORAL DAILY PRN
Qty: 30 TABLET | Refills: 0 | Status: SHIPPED | OUTPATIENT
Start: 2025-03-24 | End: 2025-04-23

## 2025-03-24 NOTE — PROGRESS NOTES
Ashlee Bustos presents to the [facility] on 3/24/2025. Ashlee is complaining of pain in her back and neck. The pain is constant. The pain is described as stabbing and sharp. Pain is rated on her best day at a 5, on her worst day at a 10, and on average at a 5 on the VAS scale. She took her last dose of Motrin and oxycodone yesterday.      Any procedures since your last visit: No    She is  on NSAIDS and  is not on anticoagulation medications to include ASA and is managed by Abdi Grijalva DO (Hx of TIA)       Pacemaker or defibrillator: No     Medication Contract and Consent for Opioid Use Documents Filed       Patient Documents       Type of Document Status Date Received Received By Description    Medication Contract Received 7/18/2019 11:59 AM DIANA KWON 7/18/19 medication contract    Medication Contract Received 10/9/2020  3:01 PM ALEXEI VENTURA pain pt agreement    Medication Contract Received 11/5/2021 11:29 AM SNOW MUNIZ Pain Mgmt Patient Agreement 11.5.2021    Medication Contract Received 12/13/2021  4:21 PM LEONARDA PINA pain management    Consent Opioid Use Received 12/12/2022 12:23 PM ESTEFANY ARAMBULA pt agreement 12/12/22    Consent Opioid Use Received 1/10/2024  4:08 PM ESTEFANY ARAMBULA Consent Opioid Use    Consent Opioid Use Received 2/21/2025  2:36 PM LIZZ ROCHA Consent Opioid Use                       Resp 18   Ht 1.448 m (4' 9.01\")   Wt 82.1 kg (181 lb)   LMP 03/27/2008   BMI 39.16 kg/m²      Patient's last menstrual period was 03/27/2008.    
(Infrared)   Resp 18   Ht 1.448 m (4' 9.01\")   Wt 82.1 kg (181 lb)   LMP 03/27/2008   SpO2 95%   BMI 39.16 kg/m²     General:      General appearance:   pleasant and well-hydrated.   , in mild discomfort and A & O x3  Build:Overweight    HEENT:    Head:normocephalic and atraumatic  Sclera: icterus absent,    Lungs:    Breathing:Normal expansion.  No wheezing.    Abdomen:    Shape:non-distended and normal     Extremities:    Tremors:None bilaterally upper and lower  Range of motion:Generally normal shoulders, pain with internal rotation of hips - not done.  Intact:Yes  Edema:Normal    Neurological:    Gait:antalgic    Dermatology:    Skin:no unusual rashes, no skin lesions, no palpable subcutaneous nodules, and good skin turgor    Impression:    Neck and low back pain with radiation to the Left upper and lower extremity  Cervical spine MRI 2019 Multilevel degenerative disc disease with moderate to severe stenosis at C3-4/C4-5 and C5-6  Lumbar spine CT 2017 Mild degenerative disc disease and facet arthropathy most pronounced at L4-5  Patient had seen neurosurgery and surgery C3-4/C4-5 and C5-6 ACDF was recommended.            Plan:  Follow up for neck/low back and Left hip pain  RF Oxycodone 5 mg QD PRN.    Continue with Lidocaine patches  Saw gyn regarding pelvic issues  Avoid NSAIDs (H/O GI ulcers)              Aquatherapy - Had evaluation.  States she called back recently.                Continue chiropractics              Seeing ortho regarding her hip              Zynex TENs unit - cost prohibitive              Right lumbar L4 TFESI - on hold due to being sick.   OARRS report reviewed   UDS ordered today (last pill yesterday).  Buccal not in chart.  Will have staff locate it.    Patient encouraged to stay active and to lose weight.   Treatment plan discussed with the patient and her including medications side effects.      Controlled Substance Monitoring:    Acute and Chronic Pain Monitoring:   RX

## 2025-04-11 ENCOUNTER — OFFICE VISIT (OUTPATIENT)
Dept: PULMONOLOGY | Age: 55
End: 2025-04-11

## 2025-04-11 VITALS
RESPIRATION RATE: 18 BRPM | DIASTOLIC BLOOD PRESSURE: 72 MMHG | TEMPERATURE: 97.8 F | HEART RATE: 86 BPM | BODY MASS INDEX: 38.2 KG/M2 | HEIGHT: 58 IN | WEIGHT: 182 LBS | OXYGEN SATURATION: 96 % | SYSTOLIC BLOOD PRESSURE: 116 MMHG

## 2025-04-11 DIAGNOSIS — J45.50 SEVERE PERSISTENT ASTHMA, UNSPECIFIED WHETHER COMPLICATED (HCC): Primary | ICD-10-CM

## 2025-04-11 DIAGNOSIS — R09.02 HYPOXEMIA: ICD-10-CM

## 2025-04-11 DIAGNOSIS — G47.33 OSA (OBSTRUCTIVE SLEEP APNEA): ICD-10-CM

## 2025-04-11 DIAGNOSIS — J45.51 SEVERE PERSISTENT ASTHMA WITH ACUTE EXACERBATION (HCC): ICD-10-CM

## 2025-04-11 LAB
EXPIRATORY TIME: 6.69 SEC
FEF 25-75% %PRED-PRE: 50 L/SEC
FEF 25-75% PRED: 2.15 L/SEC
FEF 25-75-PRE: 1.08 L/SEC
FEV1 %PRED-PRE: 66 %
FEV1 PRED: 2.13 L
FEV1/FVC %PRED-PRE: 86 %
FEV1/FVC PRED: 81 %
FEV1/FVC: 70 %
FEV1: 1.42 L
FVC %PRED-PRE: 77 %
FVC PRED: 2.64 L
FVC: 2.04 L
PEF %PRED-PRE: 33 L/SEC
PEF PRED: 5.33 L/SEC
PEF-PRE: 1.78 L/SEC

## 2025-04-11 RX ORDER — METHYLPREDNISOLONE 4 MG/1
TABLET ORAL
Qty: 1 KIT | Refills: 1 | Status: SHIPPED | OUTPATIENT
Start: 2025-04-11 | End: 2025-04-17

## 2025-04-11 RX ORDER — DOXYCYCLINE HYCLATE 100 MG
100 TABLET ORAL 2 TIMES DAILY
Qty: 20 TABLET | Refills: 0 | Status: SHIPPED | OUTPATIENT
Start: 2025-04-11 | End: 2025-04-21

## 2025-04-11 RX ORDER — CHLORHEXIDINE GLUCONATE ORAL RINSE 1.2 MG/ML
15 SOLUTION DENTAL 2 TIMES DAILY
Qty: 900 ML | Refills: 0 | Status: SHIPPED | OUTPATIENT
Start: 2025-04-11 | End: 2025-05-11

## 2025-04-11 ASSESSMENT — PULMONARY FUNCTION TESTS
FVC_PREDICTED: 2.64
FEV1/FVC_PREDICTED: 81
FVC: 2.04
FEV1: 1.42
FVC_PERCENT_PREDICTED_PRE: 77
FEV1/FVC_PERCENT_PREDICTED_PRE: 86
FEV1/FVC: 70
FEV1_PREDICTED: 2.13
FEV1_PERCENT_PREDICTED_PRE: 66

## 2025-04-11 NOTE — PROGRESS NOTES
Patient to follow up with physician in 2 months.  Patient had an ambulatory pulse ox test during office visit.  Patient started in room air at 94% during walking patient went down to 86% and started on 2 liters and oxygen level went up to 92%.  Patient ordered antibiotics and steroids during office visit.  
panel = normal and histoplasmosis testing= not detected. She is to remain on Advair 500 mg twice a day with proper instruction and rinsing her mouth after each use.  She was told again only to use albuterol as needed. Continue with biologics but - pre-treat with  ml NS.We discussed her overall exposures such as detergents looking at which she washes her clothes and, changing her filter, washing in very hot water, and hypoallergenic skin creams and detergent underarm deodorant.  All questions were answered. Sleep hygiene was reviewed and discussed. If no improvement with CPAP then sleep referral.  We discussed weight loss in the office today. We referred her to pulmonary rehabilitation and to the weight loss clinic.  We hope this updates you on our evaluation and clinical thinking. Thank you for entrusting us to participate in [facility].  If you have any questions, please don't hesitate to call us at the [facility] [place].         Sincerely,      Danilo Fountain D.O., MPH, FCCP, NIEVES, FACP  Professor of Internal Medicine  Director of Pulmonary [facility] [place]

## 2025-04-14 DIAGNOSIS — R09.02 HYPOXEMIA: ICD-10-CM

## 2025-04-14 DIAGNOSIS — J45.50 SEVERE PERSISTENT ASTHMA, UNSPECIFIED WHETHER COMPLICATED (HCC): ICD-10-CM

## 2025-04-14 DIAGNOSIS — R91.1 LUNG NODULE: Primary | ICD-10-CM

## 2025-04-15 ENCOUNTER — TELEPHONE (OUTPATIENT)
Dept: PULMONOLOGY | Age: 55
End: 2025-04-15

## 2025-04-15 NOTE — TELEPHONE ENCOUNTER
Called patient, she is interested in coming back to pul rehab.  Told her we will check her insurance coverage and get back to her.

## 2025-04-17 DIAGNOSIS — J45.50 SEVERE PERSISTENT ASTHMA, UNSPECIFIED WHETHER COMPLICATED (HCC): Primary | ICD-10-CM

## 2025-04-17 DIAGNOSIS — J43.9 NOCTURNAL HYPOXEMIA DUE TO EMPHYSEMA (HCC): ICD-10-CM

## 2025-04-17 DIAGNOSIS — G47.36 NOCTURNAL HYPOXEMIA DUE TO EMPHYSEMA (HCC): ICD-10-CM

## 2025-04-21 ENCOUNTER — OFFICE VISIT (OUTPATIENT)
Dept: PAIN MANAGEMENT | Age: 55
End: 2025-04-21
Payer: MEDICAID

## 2025-04-21 VITALS
RESPIRATION RATE: 18 BRPM | OXYGEN SATURATION: 98 % | WEIGHT: 182 LBS | HEART RATE: 91 BPM | SYSTOLIC BLOOD PRESSURE: 103 MMHG | TEMPERATURE: 96.8 F | BODY MASS INDEX: 39.26 KG/M2 | DIASTOLIC BLOOD PRESSURE: 68 MMHG | HEIGHT: 57 IN

## 2025-04-21 DIAGNOSIS — G89.4 CHRONIC PAIN SYNDROME: ICD-10-CM

## 2025-04-21 DIAGNOSIS — G89.29 CHRONIC PAIN OF RIGHT KNEE: ICD-10-CM

## 2025-04-21 DIAGNOSIS — M25.561 CHRONIC PAIN OF RIGHT KNEE: ICD-10-CM

## 2025-04-21 DIAGNOSIS — M50.30 DDD (DEGENERATIVE DISC DISEASE), CERVICAL: ICD-10-CM

## 2025-04-21 DIAGNOSIS — M47.816 LUMBAR FACET ARTHROPATHY: ICD-10-CM

## 2025-04-21 DIAGNOSIS — M47.812 FACET ARTHROPATHY, CERVICAL: ICD-10-CM

## 2025-04-21 DIAGNOSIS — M54.16 LUMBAR RADICULOPATHY: ICD-10-CM

## 2025-04-21 DIAGNOSIS — M47.816 LUMBAR SPONDYLOSIS: ICD-10-CM

## 2025-04-21 DIAGNOSIS — M79.7 FIBROMYALGIA: ICD-10-CM

## 2025-04-21 DIAGNOSIS — M16.12 PRIMARY OSTEOARTHRITIS OF LEFT HIP: ICD-10-CM

## 2025-04-21 DIAGNOSIS — Z79.891 ENCOUNTER FOR LONG-TERM OPIATE ANALGESIC USE: Primary | ICD-10-CM

## 2025-04-21 DIAGNOSIS — M47.812 CERVICAL FACET JOINT SYNDROME: ICD-10-CM

## 2025-04-21 DIAGNOSIS — M51.9 LUMBAR DISC DISORDER: ICD-10-CM

## 2025-04-21 DIAGNOSIS — M54.12 CERVICAL RADICULOPATHY: ICD-10-CM

## 2025-04-21 LAB
SEND OUT REPORT: NORMAL
TEST NAME: NORMAL

## 2025-04-21 PROCEDURE — 99214 OFFICE O/P EST MOD 30 MIN: CPT | Performed by: PHYSICIAN ASSISTANT

## 2025-04-21 PROCEDURE — G8427 DOCREV CUR MEDS BY ELIG CLIN: HCPCS | Performed by: PHYSICIAN ASSISTANT

## 2025-04-21 PROCEDURE — 3017F COLORECTAL CA SCREEN DOC REV: CPT | Performed by: PHYSICIAN ASSISTANT

## 2025-04-21 PROCEDURE — 1036F TOBACCO NON-USER: CPT | Performed by: PHYSICIAN ASSISTANT

## 2025-04-21 PROCEDURE — G8417 CALC BMI ABV UP PARAM F/U: HCPCS | Performed by: PHYSICIAN ASSISTANT

## 2025-04-21 RX ORDER — OXYCODONE HYDROCHLORIDE 5 MG/1
5 TABLET ORAL DAILY PRN
Qty: 30 TABLET | Refills: 0 | Status: SHIPPED | OUTPATIENT
Start: 2025-04-23 | End: 2025-05-23

## 2025-04-21 NOTE — PROGRESS NOTES
[facility]   [facility] [address], [place] [address]  [phone]    Follow up Note      Ashleeher JACQUELIN Bustos     Date of Visit:  2025    CC:  Patient presents for follow up   Chief Complaint   Patient presents with    Back Pain    Neck Pain       HPI:    Pain is worse to her right knee.  No injury.  Appropriate analgesia with current medications regimen: yes.    Change in quality of symptoms:no.    Medication side effects:none.   Recent diagnostic testing:none.   Recent interventional procedures: None.       She has been on anticoagulation medications to include ASA. The patient  has not been on herbal supplements.  The patient is not diabetic.     Imagin/15/2022 MRI lumbar spine    FINDINGS:   No fracture or malalignment.  Vertebral body height and interspaces are well   maintained.  Conus medullaris is visualized and is unremarkable.  No evidence   of epidural mass or hematoma.  No prevertebral soft tissue edema.  Note made   of a incidental intraosseous hemangioma located in T11 vertebral body.       L1/L2: No central canal stenosis or neural foraminal narrowing.       L2/L3: No central canal stenosis or neural foraminal narrowing.       L3/L4: Mild facet hypertrophy with hypertrophy of ligamentum flavum.  No   central canal stenosis.  There is mild bilateral neural foraminal narrowing   more significant on the right.       L4/L5: Moderate facet hypertrophy.  No central canal stenosis.  No   significant neural foraminal narrowing.       L5/S1: No central canal stenosis.  No significant neural foraminal narrowing.           Impression   1. Mild bilateral neural foraminal narrowing at L3/L4 more significant on the   right.  No central canal stenosis.   2. Moderate bilateral facet hypertrophy at L4/L5.   3. No fracture or malalignment.           Cervical spine MRI 2018  1. Multilevel degenerative disc disease extending from C3 through C7.   Moderately severe spinal

## 2025-04-21 NOTE — PROGRESS NOTES
Ashlee Bustos presents to the [facility] on 4/21/2025. Ashlee is complaining of pain right knee,back and neck. The pain is constant. The pain is described as stabbing and sharp. Pain is rated on her best day at a 5, on her worst day at a 10, and on average at a 5 on the VAS scale. She took her last dose of Motrin and oxycodone today.      Any procedures since your last visit: No,  She is  on NSAIDS and  is  on anticoagulation medications to include ASA and is managed by Abdi Grijalva DO .     Pacemaker or defibrillator: No   Do you want someone present when the provider examines you? No    Medication Contract and Consent for Opioid Use Documents Filed       Patient Documents       Type of Document Status Date Received Received By Description    Medication Contract Received 7/18/2019 11:59 AM DIANA KWON 7/18/19 medication contract    Medication Contract Received 10/9/2020  3:01 PM ALEXEI VENTURA pain pt agreement    Medication Contract Received 11/5/2021 11:29 AM SNOW MUNIZ Pain Mgmt Patient Agreement 11.5.2021    Medication Contract Received 12/13/2021  4:21 PM LEONARDA PINA pain management    Consent Opioid Use Received 12/12/2022 12:23 PM ESTEFANY ARAMBULA pt agreement 12/12/22    Consent Opioid Use Received 1/10/2024  4:08 PM ESTEFANY ARAMBULA Consent Opioid Use    Consent Opioid Use Received 2/21/2025  2:36 PM LIZZ ROCHA Consent Opioid Use                       LMP 03/27/2008      Patient's last menstrual period was 03/27/2008.

## 2025-04-27 ENCOUNTER — HOSPITAL ENCOUNTER (EMERGENCY)
Age: 55
Discharge: HOME | End: 2025-04-27
Attending: EMERGENCY MEDICINE
Payer: MEDICAID

## 2025-04-27 ENCOUNTER — APPOINTMENT (OUTPATIENT)
Dept: CT IMAGING | Age: 55
End: 2025-04-27
Payer: MEDICAID

## 2025-04-27 VITALS
WEIGHT: 180 LBS | HEART RATE: 97 BPM | SYSTOLIC BLOOD PRESSURE: 103 MMHG | DIASTOLIC BLOOD PRESSURE: 55 MMHG | HEIGHT: 59 IN | OXYGEN SATURATION: 97 % | RESPIRATION RATE: 20 BRPM | BODY MASS INDEX: 36.29 KG/M2 | TEMPERATURE: 98.2 F

## 2025-04-27 DIAGNOSIS — R19.7 NAUSEA VOMITING AND DIARRHEA: ICD-10-CM

## 2025-04-27 DIAGNOSIS — K52.9 ENTEROCOLITIS: Primary | ICD-10-CM

## 2025-04-27 DIAGNOSIS — R11.2 NAUSEA VOMITING AND DIARRHEA: ICD-10-CM

## 2025-04-27 LAB
ALBUMIN SERPL-MCNC: 4.8 G/DL (ref 3.5–5.2)
ALP SERPL-CCNC: 114 U/L (ref 35–104)
ALT SERPL-CCNC: 34 U/L (ref 0–32)
ANION GAP SERPL CALCULATED.3IONS-SCNC: 17 MMOL/L (ref 7–16)
AST SERPL-CCNC: 27 U/L (ref 0–31)
BASOPHILS # BLD: 0.09 K/UL (ref 0–0.2)
BASOPHILS NFR BLD: 1 % (ref 0–2)
BILIRUB SERPL-MCNC: 0.7 MG/DL (ref 0–1.2)
BUN SERPL-MCNC: 16 MG/DL (ref 6–20)
CALCIUM SERPL-MCNC: 10.2 MG/DL (ref 8.6–10.2)
CHLORIDE SERPL-SCNC: 103 MMOL/L (ref 98–107)
CO2 SERPL-SCNC: 15 MMOL/L (ref 22–29)
CREAT SERPL-MCNC: 1 MG/DL (ref 0.5–1)
EOSINOPHIL # BLD: 0 K/UL (ref 0.05–0.5)
EOSINOPHILS RELATIVE PERCENT: 0 % (ref 0–6)
ERYTHROCYTE [DISTWIDTH] IN BLOOD BY AUTOMATED COUNT: 13.2 % (ref 11.5–15)
GFR, ESTIMATED: 66 ML/MIN/1.73M2
GLUCOSE SERPL-MCNC: 135 MG/DL (ref 74–99)
HCT VFR BLD AUTO: 49.2 % (ref 34–48)
HEMOCCULT SP1 STL QL: NEGATIVE
HGB BLD-MCNC: 16.2 G/DL (ref 11.5–15.5)
LACTATE BLDV-SCNC: 1.6 MMOL/L (ref 0.5–2.2)
LIPASE SERPL-CCNC: 131 U/L (ref 13–60)
LYMPHOCYTES NFR BLD: 0.69 K/UL (ref 1.5–4)
LYMPHOCYTES RELATIVE PERCENT: 7 % (ref 20–42)
MAGNESIUM SERPL-MCNC: 2.4 MG/DL (ref 1.6–2.6)
MCH RBC QN AUTO: 27.5 PG (ref 26–35)
MCHC RBC AUTO-ENTMCNC: 32.9 G/DL (ref 32–34.5)
MCV RBC AUTO: 83.4 FL (ref 80–99.9)
MONOCYTES NFR BLD: 0.26 K/UL (ref 0.1–0.95)
MONOCYTES NFR BLD: 3 % (ref 2–12)
NEUTROPHILS NFR BLD: 90 % (ref 43–80)
NEUTS SEG NFR BLD: 8.87 K/UL (ref 1.8–7.3)
PLATELET # BLD AUTO: 308 K/UL (ref 130–450)
PMV BLD AUTO: 10.3 FL (ref 7–12)
POTASSIUM SERPL-SCNC: 4.6 MMOL/L (ref 3.5–5)
PROT SERPL-MCNC: 8.5 G/DL (ref 6.4–8.3)
RBC # BLD AUTO: 5.9 M/UL (ref 3.5–5.5)
RBC # BLD: NORMAL 10*6/UL
SODIUM SERPL-SCNC: 135 MMOL/L (ref 132–146)
TROPONIN I SERPL HS-MCNC: <6 NG/L (ref 0–14)
WBC OTHER # BLD: 9.9 K/UL (ref 4.5–11.5)

## 2025-04-27 PROCEDURE — 84484 ASSAY OF TROPONIN QUANT: CPT

## 2025-04-27 PROCEDURE — 87329 GIARDIA AG IA: CPT

## 2025-04-27 PROCEDURE — 87077 CULTURE AEROBIC IDENTIFY: CPT

## 2025-04-27 PROCEDURE — 96361 HYDRATE IV INFUSION ADD-ON: CPT

## 2025-04-27 PROCEDURE — 83690 ASSAY OF LIPASE: CPT

## 2025-04-27 PROCEDURE — 93005 ELECTROCARDIOGRAM TRACING: CPT

## 2025-04-27 PROCEDURE — 80053 COMPREHEN METABOLIC PANEL: CPT

## 2025-04-27 PROCEDURE — 87427 SHIGA-LIKE TOXIN AG IA: CPT

## 2025-04-27 PROCEDURE — 96375 TX/PRO/DX INJ NEW DRUG ADDON: CPT

## 2025-04-27 PROCEDURE — 87046 STOOL CULTR AEROBIC BACT EA: CPT

## 2025-04-27 PROCEDURE — 87324 CLOSTRIDIUM AG IA: CPT

## 2025-04-27 PROCEDURE — 96374 THER/PROPH/DIAG INJ IV PUSH: CPT

## 2025-04-27 PROCEDURE — 87425 ROTAVIRUS AG IA: CPT

## 2025-04-27 PROCEDURE — 6360000002 HC RX W HCPCS

## 2025-04-27 PROCEDURE — 74176 CT ABD & PELVIS W/O CONTRAST: CPT

## 2025-04-27 PROCEDURE — 96372 THER/PROPH/DIAG INJ SC/IM: CPT

## 2025-04-27 PROCEDURE — 82270 OCCULT BLOOD FECES: CPT

## 2025-04-27 PROCEDURE — 87493 C DIFF AMPLIFIED PROBE: CPT

## 2025-04-27 PROCEDURE — 6360000002 HC RX W HCPCS: Performed by: EMERGENCY MEDICINE

## 2025-04-27 PROCEDURE — 2580000003 HC RX 258

## 2025-04-27 PROCEDURE — 99284 EMERGENCY DEPT VISIT MOD MDM: CPT

## 2025-04-27 PROCEDURE — 83735 ASSAY OF MAGNESIUM: CPT

## 2025-04-27 PROCEDURE — 85025 COMPLETE CBC W/AUTO DIFF WBC: CPT

## 2025-04-27 PROCEDURE — 87449 NOS EACH ORGANISM AG IA: CPT

## 2025-04-27 PROCEDURE — 83605 ASSAY OF LACTIC ACID: CPT

## 2025-04-27 PROCEDURE — 87045 FECES CULTURE AEROBIC BACT: CPT

## 2025-04-27 RX ORDER — FENTANYL CITRATE 50 UG/ML
25 INJECTION, SOLUTION INTRAMUSCULAR; INTRAVENOUS ONCE
Refills: 0 | Status: DISCONTINUED | OUTPATIENT
Start: 2025-04-27 | End: 2025-04-27

## 2025-04-27 RX ORDER — ONDANSETRON 2 MG/ML
4 INJECTION INTRAMUSCULAR; INTRAVENOUS ONCE
Status: COMPLETED | OUTPATIENT
Start: 2025-04-27 | End: 2025-04-27

## 2025-04-27 RX ORDER — PANTOPRAZOLE SODIUM 40 MG/10ML
40 INJECTION, POWDER, LYOPHILIZED, FOR SOLUTION INTRAVENOUS ONCE
Status: COMPLETED | OUTPATIENT
Start: 2025-04-27 | End: 2025-04-27

## 2025-04-27 RX ORDER — DICYCLOMINE HCL 20 MG
20 TABLET ORAL 4 TIMES DAILY PRN
Qty: 40 TABLET | Refills: 0 | Status: SHIPPED | OUTPATIENT
Start: 2025-04-27

## 2025-04-27 RX ORDER — DICYCLOMINE HYDROCHLORIDE 10 MG/ML
20 INJECTION INTRAMUSCULAR ONCE
Status: COMPLETED | OUTPATIENT
Start: 2025-04-27 | End: 2025-04-27

## 2025-04-27 RX ORDER — METRONIDAZOLE 500 MG/1
500 TABLET ORAL 3 TIMES DAILY
Qty: 30 TABLET | Refills: 0 | Status: SHIPPED | OUTPATIENT
Start: 2025-04-27 | End: 2025-04-28 | Stop reason: ALTCHOICE

## 2025-04-27 RX ORDER — 0.9 % SODIUM CHLORIDE 0.9 %
1000 INTRAVENOUS SOLUTION INTRAVENOUS ONCE
Status: COMPLETED | OUTPATIENT
Start: 2025-04-27 | End: 2025-04-27

## 2025-04-27 RX ORDER — FENTANYL CITRATE 50 UG/ML
25 INJECTION, SOLUTION INTRAMUSCULAR; INTRAVENOUS ONCE
Refills: 0 | Status: COMPLETED | OUTPATIENT
Start: 2025-04-27 | End: 2025-04-27

## 2025-04-27 RX ORDER — CEFDINIR 300 MG/1
300 CAPSULE ORAL 2 TIMES DAILY
Qty: 14 CAPSULE | Refills: 0 | Status: SHIPPED | OUTPATIENT
Start: 2025-04-27 | End: 2025-05-04

## 2025-04-27 RX ORDER — ONDANSETRON 4 MG/1
4 TABLET, ORALLY DISINTEGRATING ORAL 3 TIMES DAILY PRN
Qty: 21 TABLET | Refills: 0 | Status: SHIPPED | OUTPATIENT
Start: 2025-04-27

## 2025-04-27 RX ORDER — KETOROLAC TROMETHAMINE 15 MG/ML
15 INJECTION, SOLUTION INTRAMUSCULAR; INTRAVENOUS ONCE
Status: COMPLETED | OUTPATIENT
Start: 2025-04-27 | End: 2025-04-27

## 2025-04-27 RX ADMIN — SODIUM CHLORIDE 1000 ML: 0.9 INJECTION, SOLUTION INTRAVENOUS at 11:54

## 2025-04-27 RX ADMIN — DICYCLOMINE HYDROCHLORIDE 20 MG: 10 INJECTION, SOLUTION INTRAMUSCULAR at 13:17

## 2025-04-27 RX ADMIN — ONDANSETRON 4 MG: 2 INJECTION, SOLUTION INTRAMUSCULAR; INTRAVENOUS at 11:52

## 2025-04-27 RX ADMIN — PANTOPRAZOLE SODIUM 40 MG: 40 INJECTION, POWDER, FOR SOLUTION INTRAVENOUS at 11:53

## 2025-04-27 RX ADMIN — KETOROLAC TROMETHAMINE 15 MG: 15 INJECTION, SOLUTION INTRAMUSCULAR; INTRAVENOUS at 14:41

## 2025-04-27 RX ADMIN — SODIUM CHLORIDE 1000 ML: 0.9 INJECTION, SOLUTION INTRAVENOUS at 13:16

## 2025-04-27 RX ADMIN — FENTANYL CITRATE 25 MCG: 50 INJECTION INTRAMUSCULAR; INTRAVENOUS at 13:16

## 2025-04-27 ASSESSMENT — PAIN DESCRIPTION - DESCRIPTORS: DESCRIPTORS: ACHING

## 2025-04-27 ASSESSMENT — PAIN DESCRIPTION - LOCATION
LOCATION: ABDOMEN

## 2025-04-27 ASSESSMENT — PAIN SCALES - GENERAL
PAINLEVEL_OUTOF10: 3
PAINLEVEL_OUTOF10: 10
PAINLEVEL_OUTOF10: 7
PAINLEVEL_OUTOF10: 10

## 2025-04-27 NOTE — DISCHARGE INSTRUCTIONS
CT ABDOMEN PELVIS WO CONTRAST Additional Contrast? None   Final Result   1. Fluid distended small bowel segments with air-fluid levels in the mid   abdomen involving the proximal through mid small bowel without focal   inflammatory segment or definitive transition point concerning for enteritis   or chronic stasis in the absence of clear transition point early obstructive   elements considered. Fluid-filled extension into the colonic segments   enterocolitis considered.   2. Hepatic steatosis.   3. 3.3 cm segment 4 hepatic lesion likely cyst or hemangioma.  Stable dating   back to 2023 favored benign   4. Lung bases without consolidation or effusion however heterogeneity with   septal thickening mosaicism right slightly greater than left favoring trace   interstitial edema pattern less likely bronchiolitis.

## 2025-04-27 NOTE — ED PROVIDER NOTES
[facility] EMERGENCY DEPARTMENT  EMERGENCY DEPARTMENT ENCOUNTER        Pt Name: Ashlee Bustos  MRN: [id number]  Birthdate 1970  Date of evaluation: 4/27/2025  Provider: Luis Alfredo Major MD  PCP: Abdi Grijalva DO  Note Started: 12:26 PM EDT 4/27/25    CHIEF COMPLAINT       Chief Complaint   Patient presents with    Vomiting     Since last night, states family member was recently sick.     Diarrhea       HISTORY OF PRESENT ILLNESS: 1 or more Elements   History From: Patient    Limitations to history : None  Social Determinants : None    Ashlee Bustos is a 54 y.o. female with history of obesity, asthma, spinal stenosis, TIA, hyperlipidemia, GERD who presents with complaints of multiple episodes of nausea, vomiting, diarrhea.  Patient mentions that her grandchild has been sick at home yesterday and was throwing up and she started having the same similar symptoms starting last night.  Patient mentions that she has been having multiple episodes of nausea vomiting and diarrhea and abdominal discomfort.  She is not able to keep anything down since yesterday.    She receives Tezspire injection for her asthma.    Denies any fever, chills, headache, dizziness, vision changes, neck tenderness or stiffness, weakness, chest pain, palpitations, leg swelling/tenderness, sob, cough, dysuria, hematuria, constipation, bloody stools.    Nursing Notes were all reviewed and agreed with or any disagreements were addressed in the HPI.    ROS:   Pertinent positives and negatives are stated within HPI, all other systems reviewed and are negative.      --------------------------------------------- PAST HISTORY ---------------------------------------------  Past Medical History:       Diagnosis Date    Acid reflux     doesn't take medication    Arthritis     Asthma     uses inhalers twice a day    Cerebral artery occlusion with cerebral infarction (HCC)     2015-slight right sided weakness, ambulates normal

## 2025-04-28 ENCOUNTER — RESULTS FOLLOW-UP (OUTPATIENT)
Dept: PHARMACY | Age: 55
End: 2025-04-28

## 2025-04-28 LAB
C DIFF GDH + TOXINS A+B STL QL IA.RAPID: ABNORMAL
C DIFFICILE TOXINS, PCR: POSITIVE
EKG ATRIAL RATE: 96 BPM
EKG P AXIS: 62 DEGREES
EKG P-R INTERVAL: 124 MS
EKG Q-T INTERVAL: 332 MS
EKG QRS DURATION: 80 MS
EKG QTC CALCULATION (BAZETT): 419 MS
EKG R AXIS: 53 DEGREES
EKG T AXIS: 43 DEGREES
EKG VENTRICULAR RATE: 96 BPM
G LAMBLIA AG STL QL IA: NEGATIVE
ROTAVIRUS ANTIGEN: POSITIVE
SOURCE, 60200063: ABNORMAL
SOURCE: NORMAL
SPECIMEN DESCRIPTION: ABNORMAL
SPECIMEN DESCRIPTION: ABNORMAL
SPECIMEN DESCRIPTION: NORMAL

## 2025-04-28 PROCEDURE — 93010 ELECTROCARDIOGRAM REPORT: CPT | Performed by: INTERNAL MEDICINE

## 2025-04-28 RX ORDER — VANCOMYCIN HYDROCHLORIDE 125 MG/1
125 CAPSULE ORAL 4 TIMES DAILY
Qty: 40 CAPSULE | Refills: 0 | Status: SHIPPED | OUTPATIENT
Start: 2025-04-28 | End: 2025-05-08

## 2025-04-30 LAB
MICROORGANISM SPEC CULT: NORMAL
MICROORGANISM SPEC CULT: NORMAL
SEND OUT REPORT: NORMAL
SPECIMEN DESCRIPTION: NORMAL
TEST NAME: NORMAL

## 2025-05-01 ENCOUNTER — OFFICE VISIT (OUTPATIENT)
Dept: PRIMARY CARE CLINIC | Age: 55
End: 2025-05-01
Payer: MEDICAID

## 2025-05-01 VITALS
TEMPERATURE: 97.4 F | RESPIRATION RATE: 18 BRPM | WEIGHT: 180 LBS | SYSTOLIC BLOOD PRESSURE: 124 MMHG | HEIGHT: 59 IN | BODY MASS INDEX: 36.29 KG/M2 | HEART RATE: 77 BPM | OXYGEN SATURATION: 96 % | DIASTOLIC BLOOD PRESSURE: 78 MMHG

## 2025-05-01 DIAGNOSIS — R10.84 GENERALIZED ABDOMINAL PAIN: ICD-10-CM

## 2025-05-01 DIAGNOSIS — E78.5 DYSLIPIDEMIA: ICD-10-CM

## 2025-05-01 DIAGNOSIS — R53.83 FATIGUE, UNSPECIFIED TYPE: ICD-10-CM

## 2025-05-01 DIAGNOSIS — Z09 HOSPITAL DISCHARGE FOLLOW-UP: Primary | ICD-10-CM

## 2025-05-01 DIAGNOSIS — K52.9 GASTROENTERITIS: ICD-10-CM

## 2025-05-01 DIAGNOSIS — F41.9 ANXIETY: ICD-10-CM

## 2025-05-01 DIAGNOSIS — J45.50 SEVERE PERSISTENT ASTHMA WITHOUT COMPLICATION (HCC): ICD-10-CM

## 2025-05-01 DIAGNOSIS — A04.72 C. DIFFICILE COLITIS: ICD-10-CM

## 2025-05-01 PROBLEM — R19.7 DIARRHEA: Status: RESOLVED | Noted: 2025-03-13 | Resolved: 2025-05-01

## 2025-05-01 PROBLEM — R10.13 EPIGASTRIC PAIN: Status: RESOLVED | Noted: 2025-03-13 | Resolved: 2025-05-01

## 2025-05-01 PROBLEM — R07.9 CHEST PAIN: Status: RESOLVED | Noted: 2019-05-05 | Resolved: 2025-05-01

## 2025-05-01 PROBLEM — R07.9 ACUTE CHEST PAIN: Status: RESOLVED | Noted: 2025-02-28 | Resolved: 2025-05-01

## 2025-05-01 PROCEDURE — G8417 CALC BMI ABV UP PARAM F/U: HCPCS | Performed by: FAMILY MEDICINE

## 2025-05-01 PROCEDURE — 1111F DSCHRG MED/CURRENT MED MERGE: CPT | Performed by: FAMILY MEDICINE

## 2025-05-01 PROCEDURE — 3017F COLORECTAL CA SCREEN DOC REV: CPT | Performed by: FAMILY MEDICINE

## 2025-05-01 PROCEDURE — 1036F TOBACCO NON-USER: CPT | Performed by: FAMILY MEDICINE

## 2025-05-01 PROCEDURE — 99214 OFFICE O/P EST MOD 30 MIN: CPT | Performed by: FAMILY MEDICINE

## 2025-05-01 PROCEDURE — G8427 DOCREV CUR MEDS BY ELIG CLIN: HCPCS | Performed by: FAMILY MEDICINE

## 2025-05-01 RX ORDER — ALBUTEROL SULFATE 0.83 MG/ML
2.5 SOLUTION RESPIRATORY (INHALATION) 4 TIMES DAILY PRN
Qty: 120 EACH | Refills: 3 | Status: SHIPPED | OUTPATIENT
Start: 2025-05-01

## 2025-05-01 ASSESSMENT — ENCOUNTER SYMPTOMS
ABDOMINAL PAIN: 1
DIARRHEA: 1
VOMITING: 1
RHINORRHEA: 0
BLOATING: 1
APNEA: 0
WHEEZING: 0
CHEST TIGHTNESS: 0
CONSTIPATION: 0
BACK PAIN: 0
FLATUS: 0
EYE ITCHING: 0
SORE THROAT: 0
SINUS PRESSURE: 0
COUGH: 0
NAUSEA: 0
EYE REDNESS: 0
SHORTNESS OF BREATH: 0
EYE PAIN: 0
COLOR CHANGE: 0
BLOOD IN STOOL: 0

## 2025-05-01 NOTE — PROGRESS NOTES
Chief Complaint:     Chief Complaint   Patient presents with    Follow-Up from Hospital         Diarrhea   This is a new problem. The current episode started in the past 7 days. The problem occurs 5 to 10 times per day. The problem has been unchanged. The stool consistency is described as Watery. The patient states that diarrhea does not awaken her from sleep. Associated symptoms include abdominal pain, bloating, myalgias and vomiting. Pertinent negatives include no arthralgias, coughing, fever, headaches, increased  flatus, sweats, URI or weight loss. Nothing aggravates the symptoms. There are no known risk factors. She has tried nothing for the symptoms.   Abdominal Pain  This is a new problem. The current episode started in the past 7 days. The problem occurs daily. The pain is located in the generalized abdominal region. The pain is moderate. The quality of the pain is colicky and dull. The abdominal pain does not radiate. Associated symptoms include diarrhea, myalgias and vomiting. Pertinent negatives include no arthralgias, constipation, dysuria, fever, flatus, frequency, headaches, hematuria, nausea or weight loss. Nothing aggravates the pain. The pain is relieved by Nothing. She has tried nothing for the symptoms. The treatment provided no relief.       Patient Active Problem List   Diagnosis    Cervical stenosis of spinal canal    History of TIA (transient ischemic attack) and stroke    Spinal stenosis of lumbar region without neurogenic claudication    Fibromyalgia    Migraines without aura and without status migrainosus, not intractable    Chronic pain syndrome    Primary osteoarthritis of left hip    Cervical disc disorder    Lumbar disc disorder    Greater trochanteric bursitis of left hip    Primary osteoarthritis of left knee    Lung nodule    Irritable bowel syndrome    Class 2 severe obesity due to excess calories with serious comorbidity and body mass index (BMI) of 37.0 to 37.9 in adult (HCC)

## 2025-05-06 ENCOUNTER — HOSPITAL ENCOUNTER (OUTPATIENT)
Dept: PULMONOLOGY | Age: 55
Setting detail: THERAPIES SERIES
Discharge: HOME | End: 2025-05-06
Payer: MEDICAID

## 2025-05-06 PROCEDURE — 94626 PHY/QHP OP PULM RHB W/MNTR: CPT

## 2025-05-06 PROCEDURE — 94625 PHY/QHP OP PULM RHB W/O MNTR: CPT

## 2025-05-06 ASSESSMENT — PATIENT HEALTH QUESTIONNAIRE - PHQ9
SUM OF ALL RESPONSES TO PHQ QUESTIONS 1-9: 4
2. FEELING DOWN, DEPRESSED OR HOPELESS: SEVERAL DAYS
6. FEELING BAD ABOUT YOURSELF - OR THAT YOU ARE A FAILURE OR HAVE LET YOURSELF OR YOUR FAMILY DOWN: SEVERAL DAYS
8. MOVING OR SPEAKING SO SLOWLY THAT OTHER PEOPLE COULD HAVE NOTICED. OR THE OPPOSITE, BEING SO FIGETY OR RESTLESS THAT YOU HAVE BEEN MOVING AROUND A LOT MORE THAN USUAL: NOT AT ALL
1. LITTLE INTEREST OR PLEASURE IN DOING THINGS: NOT AT ALL
SUM OF ALL RESPONSES TO PHQ QUESTIONS 1-9: 4
SUM OF ALL RESPONSES TO PHQ QUESTIONS 1-9: 4
4. FEELING TIRED OR HAVING LITTLE ENERGY: SEVERAL DAYS
5. POOR APPETITE OR OVEREATING: NOT AT ALL
10. IF YOU CHECKED OFF ANY PROBLEMS, HOW DIFFICULT HAVE THESE PROBLEMS MADE IT FOR YOU TO DO YOUR WORK, TAKE CARE OF THINGS AT HOME, OR GET ALONG WITH OTHER PEOPLE: NOT DIFFICULT AT ALL
SUM OF ALL RESPONSES TO PHQ QUESTIONS 1-9: 4
3. TROUBLE FALLING OR STAYING ASLEEP: SEVERAL DAYS
9. THOUGHTS THAT YOU WOULD BE BETTER OFF DEAD, OR OF HURTING YOURSELF: NOT AT ALL
7. TROUBLE CONCENTRATING ON THINGS, SUCH AS READING THE NEWSPAPER OR WATCHING TELEVISION: NOT AT ALL

## 2025-05-06 ASSESSMENT — PULMONARY FUNCTION TESTS
FEV1/FVC: 70
FEV1 (%PREDICTED): 66

## 2025-05-06 NOTE — PROGRESS NOTES
05/06/25 0900   Treatment Diagnosis   Treatment Diagnosis 1 Asthma   Referral Date 04/11/25   PFT Date 04/11/25   FEV1 (%PRED) 66   FEV1/FVC 70   Individual Treatment Plan   ITP Visit Type Initial assessment   Visit #/Total Visits 2/36   Supplemental Oxygen   Oxygen Use Yes   Oxygen Assessment   Stages of Change  Action   Oxygen Type  Nasal canula   Oxygen Compliant Yes   O2 Flow Rate (l/min) - Rest 2 l/min   O2 Flow Rate (l/min) - Exercise 3 l/min   O2 Flow Rate (l/min) - Sleep 3 l/min   Breath Sounds Diminished   O2 Sat Greater Than or Equal to 90% Yes   Oxygen Intervention   Prescribed Oxygen Flow Rate 2-3   Prescribed SpO2 above 88%   Titration Plan Keep sp02 above 88% on 3 Liter of oxygen   Staff/Patient Discussion Yes   Oxygen Education   Oxygen Education  Oxygen safety / hazard;Traveling with oxygen;Proper care of oxygen equipment;Emergency oxygen usage;Types of oxygen   Oxygen Goals   Patient Target Goals  Keep Spo2 equal to or greater than 90%;Wean O2 as tolerated   Patient Treatment Goal Keep sp02 above 88% on 3 Liter of oxygen   Goal Status Initial   Exercise Assessment   Stages of Change Action   Assisted Devices Cane   Test Six minute walk test   Data Measured Before Test   Heart Rate 82   Resting Blood Pressure 104/68   SpO2 97   O2 Device Nasal cannula   O2 Flow Rate (l/min) 3 l/min   RPD 0   Data Measured During Test   Indicate Mode of RPE 6 minutes/submax   Modality Treadmill   Treadmill Speed 1.6 mph   Treadmill Grade 1 %   METS 2.5   Symptoms SOB   Problems While Exercising fatigue, sob   Describe Type of Pain You Are Having none   Blood Pressure 100/64   Lowest SpO2 95 %   O2 Device Nasal cannula   O2 Flow Rate (l/min) 3 l/min   RPD 1   Data Measured at Peak   Distance Calculated in  mi   Distance (miles) 0.16   Distance (Feet-Calculated) 844.8 ft   Peak Heart Rate 99   Peak Blood Pressure 100/64   SpO2 99   O2 Flow Rate (L/min) 3 l/min   Data Measured at 5 Minutes After Test   Heart Rate 74

## 2025-05-08 ENCOUNTER — HOSPITAL ENCOUNTER (OUTPATIENT)
Dept: PULMONOLOGY | Age: 55
Setting detail: THERAPIES SERIES
Discharge: HOME | End: 2025-05-08
Payer: MEDICAID

## 2025-05-08 PROCEDURE — 94626 PHY/QHP OP PULM RHB W/MNTR: CPT

## 2025-05-08 NOTE — PROGRESS NOTES
[facility] PRE-ADMISSION TESTING INSTRUCTIONS: [phone]  [address] [place] [address]    The Preadmission Testing patient is instructed accordingly using the following criteria (check applicable):    ARRIVAL INSTRUCTIONS:     Arrival Time:    [x] Parking the day of Surgery is located in the Main Entrance lot.  Upon entering through the main entrance (Entrance A) make an immediate right to the surgery reception desk    [x] Bring photo ID and insurance card    [] Bring in a copy of Living will or Durable Power of  papers.    [x] Please be sure to arrange for a responsible adult to provide transportation to and from the hospital    [x] Please arrange for a responsible adult to be with you for the 24 hour period post procedure, due to having anesthesia    [x] Please notify surgeon if you develop any illness between now and time of surgery (cold, cough, sore throat, fever, nausea, vomiting) or any signs of infections  including skin, wounds, and dental.    [x] If you awake am of surgery not feeling well or have temperature >100 please call [phone].    GENERAL INSTRUCTIONS:    [x] NOTHING BY MOUTH AFTER MIDNIGHT. You may only have up to 8oz of water from midnight until 4 hours prior to surgery. No gum, no candy, no mints.        [x] You may brush your teeth    [x] Take medications as instructed:      [] Bring inhalers day of surgery    [x] Stop herbal supplements and vitamins 5 days prior to procedure    [x] Follow preop dosing of blood thinners per physician instructions    [] Take 1/2 dose of evening insulin, but no insulin after midnight    [] No oral diabetic medications after midnight    [] If diabetic and have low blood sugar or feel symptomatic, take 1-2oz apple juice only    [] Bring urine specimen day of surgery    [x] Shower or bath with soap, lather and rinse well, AM of Surgery, no lotion, powders or creams to surgical site    [x] Please do not wear any nail

## 2025-05-09 PROBLEM — R19.7 DIARRHEA: Status: ACTIVE | Noted: 2025-03-13

## 2025-05-09 PROBLEM — R10.13 EPIGASTRIC PAIN: Status: ACTIVE | Noted: 2025-03-13

## 2025-05-12 ENCOUNTER — HOSPITAL ENCOUNTER (OUTPATIENT)
Age: 55
Setting detail: OUTPATIENT SURGERY
Discharge: HOME | End: 2025-05-12
Attending: SURGERY | Admitting: SURGERY
Payer: MEDICAID

## 2025-05-12 ENCOUNTER — ANESTHESIA EVENT (OUTPATIENT)
Dept: ENDOSCOPY | Age: 55
End: 2025-05-12
Payer: MEDICAID

## 2025-05-12 ENCOUNTER — ANESTHESIA (OUTPATIENT)
Dept: ENDOSCOPY | Age: 55
End: 2025-05-12
Payer: MEDICAID

## 2025-05-12 ENCOUNTER — APPOINTMENT (OUTPATIENT)
Dept: GENERAL RADIOLOGY | Age: 55
End: 2025-05-12
Attending: SURGERY
Payer: MEDICAID

## 2025-05-12 VITALS
SYSTOLIC BLOOD PRESSURE: 119 MMHG | BODY MASS INDEX: 36.29 KG/M2 | OXYGEN SATURATION: 97 % | TEMPERATURE: 97.1 F | RESPIRATION RATE: 18 BRPM | DIASTOLIC BLOOD PRESSURE: 76 MMHG | WEIGHT: 180 LBS | HEART RATE: 75 BPM | HEIGHT: 59 IN

## 2025-05-12 DIAGNOSIS — R19.7 DIARRHEA: ICD-10-CM

## 2025-05-12 DIAGNOSIS — R12 HEARTBURN: ICD-10-CM

## 2025-05-12 DIAGNOSIS — R10.13 EPIGASTRIC PAIN: ICD-10-CM

## 2025-05-12 DIAGNOSIS — Z86.0100 HISTORY OF COLON POLYPS: ICD-10-CM

## 2025-05-12 PROCEDURE — 88305 TISSUE EXAM BY PATHOLOGIST: CPT

## 2025-05-12 PROCEDURE — 2580000003 HC RX 258: Performed by: NURSE ANESTHETIST, CERTIFIED REGISTERED

## 2025-05-12 PROCEDURE — 3609010300 HC COLONOSCOPY W/BIOPSY SINGLE/MULTIPLE: Performed by: SURGERY

## 2025-05-12 PROCEDURE — 3700000001 HC ADD 15 MINUTES (ANESTHESIA): Performed by: SURGERY

## 2025-05-12 PROCEDURE — 74022 RADEX COMPL AQT ABD SERIES: CPT

## 2025-05-12 PROCEDURE — 3609012400 HC EGD TRANSORAL BIOPSY SINGLE/MULTIPLE: Performed by: SURGERY

## 2025-05-12 PROCEDURE — 88342 IMHCHEM/IMCYTCHM 1ST ANTB: CPT

## 2025-05-12 PROCEDURE — 2709999900 HC NON-CHARGEABLE SUPPLY: Performed by: SURGERY

## 2025-05-12 PROCEDURE — 7100000011 HC PHASE II RECOVERY - ADDTL 15 MIN: Performed by: SURGERY

## 2025-05-12 PROCEDURE — 3700000000 HC ANESTHESIA ATTENDED CARE: Performed by: SURGERY

## 2025-05-12 PROCEDURE — 6360000002 HC RX W HCPCS: Performed by: NURSE ANESTHETIST, CERTIFIED REGISTERED

## 2025-05-12 PROCEDURE — 7100000010 HC PHASE II RECOVERY - FIRST 15 MIN: Performed by: SURGERY

## 2025-05-12 PROCEDURE — 2720000010 HC SURG SUPPLY STERILE: Performed by: SURGERY

## 2025-05-12 RX ORDER — OMEPRAZOLE 20 MG/1
20 CAPSULE, DELAYED RELEASE ORAL
Qty: 30 CAPSULE | Refills: 0 | Status: SHIPPED | OUTPATIENT
Start: 2025-05-12

## 2025-05-12 RX ORDER — SODIUM CHLORIDE 9 MG/ML
INJECTION, SOLUTION INTRAVENOUS
Status: DISCONTINUED | OUTPATIENT
Start: 2025-05-12 | End: 2025-05-12 | Stop reason: SDUPTHER

## 2025-05-12 RX ORDER — PROPOFOL 10 MG/ML
INJECTION, EMULSION INTRAVENOUS
Status: DISCONTINUED | OUTPATIENT
Start: 2025-05-12 | End: 2025-05-12 | Stop reason: SDUPTHER

## 2025-05-12 RX ADMIN — PROPOFOL 350 MG: 10 INJECTION, EMULSION INTRAVENOUS at 10:30

## 2025-05-12 RX ADMIN — SODIUM CHLORIDE: 9 INJECTION, SOLUTION INTRAVENOUS at 10:21

## 2025-05-12 ASSESSMENT — PAIN DESCRIPTION - DESCRIPTORS
DESCRIPTORS: ACHING;CRUSHING;DISCOMFORT
DESCRIPTORS: ACHING;CRUSHING;DISCOMFORT
DESCRIPTORS: OTHER (COMMENT)
DESCRIPTORS: ACHING;CRUSHING;DISCOMFORT

## 2025-05-12 ASSESSMENT — PAIN DESCRIPTION - FREQUENCY
FREQUENCY: CONTINUOUS

## 2025-05-12 ASSESSMENT — PAIN DESCRIPTION - LOCATION
LOCATION: ABDOMEN

## 2025-05-12 ASSESSMENT — PAIN DESCRIPTION - ORIENTATION
ORIENTATION: MID;UPPER
ORIENTATION: MID;UPPER
ORIENTATION: LEFT;UPPER
ORIENTATION: MID;UPPER

## 2025-05-12 ASSESSMENT — PAIN DESCRIPTION - PAIN TYPE
TYPE: ACUTE PAIN;SURGICAL PAIN
TYPE: ACUTE PAIN

## 2025-05-12 ASSESSMENT — PAIN SCALES - GENERAL
PAINLEVEL_OUTOF10: 10
PAINLEVEL_OUTOF10: 10
PAINLEVEL_OUTOF10: 8
PAINLEVEL_OUTOF10: 10

## 2025-05-12 ASSESSMENT — PAIN DESCRIPTION - ONSET: ONSET: ON-GOING

## 2025-05-12 NOTE — OP NOTE
Operative Note: EGD and Colonoscopy    Ashlee Bustos     DATE OF PROCEDURE: 5/12/2025  SURGEON: Dr. EVERT NOLAND MD, M.D.     PREOPERATIVE DIAGNOSES:   Abdominal pain  Change in bowel habits  History of polyps  Heartburn    POSTOPERATIVE DIAGNOSES:   GERD    Polyp x 1    OPERATION:    EGD esophagogastroduodenoscopy With antral, duodenal, distal esophageal biopsies                   Colonoscopy to the cecum with forceps polypectomy  Random colon biopsies    SPECIMENS:  ID Type Source Tests Collected by Time Destination   A : antral bx Tissue Tissue SURGICAL PATHOLOGY Evert Noland MD 5/12/2025 1036    B : duodenal bx Tissue Tissue SURGICAL PATHOLOGY Evert Noland MD 5/12/2025 1036    C : distal esophagus bx Tissue Tissue SURGICAL PATHOLOGY Evert Noland MD 5/12/2025 1036    D : random colon bx Tissue Colon SURGICAL PATHOLOGY Evert Noland MD 5/12/2025 1045    E : colon polyp bx at 90cm Tissue Colon SURGICAL PATHOLOGY Evert Noland MD 5/12/2025 1042        BLOOD LOSS: Minimal    ANESTHESIA: LMAC    CONSENT AND INDICATIONS:  This is a 54 y.o. year old female who is having the above.. I have discussed with the patient and/or the patient representative the indication, alternatives, and the possible risks and/or complications of the planned procedure and the anesthesia methods. The patient and/or patient representative appear to understand and agree to proceed.    PROCEDURE: The patient was placed on the table and sedated via LMAC. Bite block was placed. A lubricated scope was easily passed into the upper esophagus which looked normal. The distal esophagus looked abnormal: GERD and biopsies were taken. The scope was passed into the stomach and retroflexed. There was a small sliding hiatal hernia. The scope was passed down toward the pylorus. The antral mucosa all looked normal. Biopsy was taken. The scope was then passed through the pylorus into the

## 2025-05-12 NOTE — H&P
Patient's office history and physical was reviewed.    Patient examined.    There has been no change in the patient's history and physical.      Physician Signature: Electronically signed by Dr. Saima Zarate    Patient's Name/Date of Birth: Ashlee Bustos / 1970      PCP: Abdi Grijalva DO    Referring Physician:   No ref. provider found  N/A    No chief complaint on file.      HPI:    The patient said she is having lower abdominal pain for the past year or so. She is having diarrhea/loose stool after eating especially. No constipation. No rectal bleeding. No unintentional weight loss. She is also having heartburn issues again as well. She has had polyps in the past, though her last colonoscopy 3 years ago she had hemorrhoid banding and no polyps were seen. She said she has pain and grumbling when she eats. She has been on pepcid without much relief of her symptoms. I did a cholecystectomy in 2022.     Patient's medications, allergies, past medical, surgical, social and family histories were reviewed and updated as appropriate.    Review of Systems  Constitutional: negative  Respiratory: negative  Cardiovascular: negative  Gastrointestinal: as in hpi  Genitourinary:negative  Integument/breast: negative    Physical Exam:  /76   Pulse 78   Resp 20   Ht 1.499 m (4' 11\")   Wt 81.6 kg (180 lb)   LMP 03/27/2008   SpO2 97%   BMI 36.36 kg/m²   General appearance: alert, cooperative and in no acute distress.  Lungs: normal work of breathing  Heart: regular rate  Abdomen: moderate LLQ and epigastric tenderness, soft, nondistended  Musculoskeletal: symmetrical without edema.  Skin: normal     Data Reviewed:   CT abd pelvis without contrast 10/24: IMPRESSION:  No acute process in the abdomen or pelvis.    Impression/Plan:  54 y.o. year old female with LLQ and epigastric pain, loose stool, heartburn, h/o polyps, h/o cholecystectomy    All available labs and pathology reviewed.  All imaging

## 2025-05-12 NOTE — ANESTHESIA PRE PROCEDURE
Provider, MD Esdras   fluticasone-salmeterol (ADVAIR DISKUS) 500-50 MCG/ACT AEPB diskus inhaler Inhale 1 puff into the lungs in the morning and 1 puff in the evening. 9/22/22   Abdi Grijalva,        Current medications:    No current facility-administered medications for this encounter.       Allergies:    Allergies   Allergen Reactions    Fish-Derived Products Anaphylaxis    Dye [Iodides] Itching    Robaxin [Methocarbamol] Other (See Comments)     Very tired, felt like she couldn't even function with her daily activities.       Problem List:    Patient Active Problem List   Diagnosis Code    Cervical stenosis of spinal canal M48.02    History of TIA (transient ischemic attack) and stroke Z86.73    Spinal stenosis of lumbar region without neurogenic claudication M48.061    Fibromyalgia M79.7    Migraines without aura and without status migrainosus, not intractable G43.009    Chronic pain syndrome G89.4    Primary osteoarthritis of left hip M16.12    Cervical disc disorder M50.90    Lumbar disc disorder M51.9    Greater trochanteric bursitis of left hip M70.62    Primary osteoarthritis of left knee M17.12    Lung nodule R91.1    Irritable bowel syndrome K58.9    Class 2 severe obesity due to excess calories with serious comorbidity and body mass index (BMI) of 37.0 to 37.9 in adult ([facility]) E66.812, E66.01, Z68.37    CIERA on CPAP G47.33    Severe persistent asthma ([facility]) J45.50    Fatigue R53.83    Anxiety F41.9    Insomnia G47.00    Dyslipidemia E78.5    Lumbar radiculopathy M54.16    Heartburn R12    History of colon polyps Z86.0100    Epigastric pain R10.13    Diarrhea R19.7       Past Medical History:        Diagnosis Date    Acid reflux     Arthritis     Asthma     uses inhalers twice a day    Cerebral artery occlusion with cerebral infarction ([facility])     2015-slight right sided weakness, ambulates normal    Cervical spinal stenosis     Chronic back pain     Chronic kidney disease     Class 2 severe

## 2025-05-12 NOTE — DISCHARGE INSTRUCTIONS
[facility] Colonoscopy PROCEDURE DISCHARGE INSTRUCTIONS  You may be drowsy or lightheaded after receiving sedation or anesthesia.    A responsible person should be with you for the next 24 hours.    Please follow the instructions checked below:    DIET INSTRUCTIONS:  [x]Start with light diet and progress to your normal diet as you feel like eating. If you experience nausea or repeated episodes of vomiting which persist beyond 12-24 hours, notify your doctor.  []Other     ACTIVITY INSTRUCTIONS:  [x]Rest today. Increase activity as tolerated    []No heavy lifting or strenuous activity     [x]No driving for today  []Other      MEDICATION INSTRUCTIONS:    []Prescriptions sent with you.  Use as directed.  When taking pain medications, you may experience dizziness or drowsiness.  Do not drink alcohol or drive when taking these medications.  [x]Continue preop medications                               Post-procedure Care   If any tissue was removed:   It will be sent to a lab to be examined. It may take 1-2 weeks for results. The doctor will usually give an initial report after the scope is removed. Other tests may be recommended.   A small amount of bleeding may occur during the first few days after the procedure.     When you return home after the procedure, be sure to follow your doctor's instructions, which may include:   Resume medicines as instructed by your doctor.   Resume normal diet, unless directed otherwise by your doctor.   The sedative will make you drowsy. Avoid driving, operating machinery, or making important decisions for the rest of the day.   Rest for the remainder of the day.     After arriving home, contact your doctor if any of the following occurs:   Bleeding from your rectum, notify your doctor if you pass a teaspoonful of blood or more.   Black, tarry stools   Severe abdominal pain   Hard, swollen abdomen   Signs of infection, including fever or chills   Inability to pass gas or

## 2025-05-14 NOTE — ANESTHESIA POSTPROCEDURE EVALUATION
Department of Anesthesiology  Postprocedure Note    Patient: Ashlee Bustos  MRN: [id number]  YOB: 1970  Date of evaluation: 5/14/2025    Procedure Summary       Date: 05/12/25 Room / Location: [place] [address] / [facility]    Anesthesia Start: 1021 Anesthesia Stop: 1049    Procedures:       ESOPHAGOGASTRODUODENOSCOPY BIOPSY      COLONOSCOPY BIOPSY Diagnosis:       Epigastric pain      Heartburn      Diarrhea      History of colon polyps      (Epigastric pain [R10.13])      (Heartburn [R12])      (Diarrhea [R19.7])      (History of colon polyps [Z86.0100])    Surgeons: Saima Milner MD Responsible Provider: Tone Read MD    Anesthesia Type: MAC ASA Status: 3            Anesthesia Type: No value filed.    Willis Phase I:      Willis Phase II: Willis Score: 10    Anesthesia Post Evaluation    Patient location during evaluation: PACU  Patient participation: complete - patient participated  Level of consciousness: awake and alert  Airway patency: patent  Nausea & Vomiting: no nausea and no vomiting  Cardiovascular status: hemodynamically stable  Respiratory status: acceptable  Hydration status: euvolemic  There was medical reason for not using a multimodal analgesia pain management approach.Pain management: adequate    No notable events documented.

## 2025-05-15 ENCOUNTER — APPOINTMENT (OUTPATIENT)
Dept: PULMONOLOGY | Age: 55
End: 2025-05-15
Payer: MEDICAID

## 2025-05-16 LAB — SURGICAL PATHOLOGY REPORT: NORMAL

## 2025-05-20 ENCOUNTER — OFFICE VISIT (OUTPATIENT)
Age: 55
End: 2025-05-20
Payer: MEDICAID

## 2025-05-20 ENCOUNTER — HOSPITAL ENCOUNTER (OUTPATIENT)
Dept: PULMONOLOGY | Age: 55
Setting detail: THERAPIES SERIES
Discharge: HOME | End: 2025-05-20
Payer: MEDICAID

## 2025-05-20 ENCOUNTER — RESULTS FOLLOW-UP (OUTPATIENT)
Dept: SURGERY | Age: 55
End: 2025-05-20

## 2025-05-20 VITALS
DIASTOLIC BLOOD PRESSURE: 67 MMHG | OXYGEN SATURATION: 97 % | TEMPERATURE: 97.3 F | SYSTOLIC BLOOD PRESSURE: 129 MMHG | HEIGHT: 59 IN | HEART RATE: 62 BPM | WEIGHT: 180 LBS | RESPIRATION RATE: 16 BRPM | BODY MASS INDEX: 36.29 KG/M2

## 2025-05-20 DIAGNOSIS — M16.12 PRIMARY OSTEOARTHRITIS OF LEFT HIP: ICD-10-CM

## 2025-05-20 DIAGNOSIS — M47.816 LUMBAR SPONDYLOSIS: ICD-10-CM

## 2025-05-20 DIAGNOSIS — M50.30 DDD (DEGENERATIVE DISC DISEASE), CERVICAL: ICD-10-CM

## 2025-05-20 DIAGNOSIS — M47.812 FACET ARTHROPATHY, CERVICAL: ICD-10-CM

## 2025-05-20 DIAGNOSIS — M51.9 LUMBAR DISC DISORDER: ICD-10-CM

## 2025-05-20 DIAGNOSIS — M47.816 LUMBAR FACET ARTHROPATHY: ICD-10-CM

## 2025-05-20 DIAGNOSIS — M47.812 CERVICAL FACET JOINT SYNDROME: ICD-10-CM

## 2025-05-20 DIAGNOSIS — M79.7 FIBROMYALGIA: ICD-10-CM

## 2025-05-20 DIAGNOSIS — G89.4 CHRONIC PAIN SYNDROME: ICD-10-CM

## 2025-05-20 DIAGNOSIS — M54.12 CERVICAL RADICULOPATHY: ICD-10-CM

## 2025-05-20 DIAGNOSIS — M54.16 LUMBAR RADICULOPATHY: Primary | ICD-10-CM

## 2025-05-20 PROCEDURE — G8427 DOCREV CUR MEDS BY ELIG CLIN: HCPCS | Performed by: PHYSICIAN ASSISTANT

## 2025-05-20 PROCEDURE — G8417 CALC BMI ABV UP PARAM F/U: HCPCS | Performed by: PHYSICIAN ASSISTANT

## 2025-05-20 PROCEDURE — 99212 OFFICE O/P EST SF 10 MIN: CPT | Performed by: PHYSICIAN ASSISTANT

## 2025-05-20 PROCEDURE — 94626 PHY/QHP OP PULM RHB W/MNTR: CPT

## 2025-05-20 PROCEDURE — 3017F COLORECTAL CA SCREEN DOC REV: CPT | Performed by: PHYSICIAN ASSISTANT

## 2025-05-20 PROCEDURE — 1036F TOBACCO NON-USER: CPT | Performed by: PHYSICIAN ASSISTANT

## 2025-05-20 PROCEDURE — 99213 OFFICE O/P EST LOW 20 MIN: CPT | Performed by: PHYSICIAN ASSISTANT

## 2025-05-20 RX ORDER — OXYCODONE HYDROCHLORIDE 5 MG/1
5 TABLET ORAL DAILY PRN
Qty: 30 TABLET | Refills: 0 | Status: SHIPPED | OUTPATIENT
Start: 2025-05-23 | End: 2025-06-22

## 2025-05-20 NOTE — PROGRESS NOTES
[facility]   [address], [place] [address]  [phone]    Follow up Note      Ashlee Bustos     Date of Visit:  2025    CC:  Patient presents for follow up   Chief Complaint   Patient presents with    Back Pain    Neck Pain       HPI:    Pain is a little worse due to the colder weather.    Appropriate analgesia with current medications regimen: yes.    Change in quality of symptoms:no.    Medication side effects:none.   Recent diagnostic testing:none.   Recent interventional procedures: None.       She has been on anticoagulation medications to include ASA. The patient  has not been on herbal supplements.  The patient is not diabetic.     Imagin/15/2022 MRI lumbar spine    FINDINGS:   No fracture or malalignment.  Vertebral body height and interspaces are well   maintained.  Conus medullaris is visualized and is unremarkable.  No evidence   of epidural mass or hematoma.  No prevertebral soft tissue edema.  Note made   of a incidental intraosseous hemangioma located in T11 vertebral body.       L1/L2: No central canal stenosis or neural foraminal narrowing.       L2/L3: No central canal stenosis or neural foraminal narrowing.       L3/L4: Mild facet hypertrophy with hypertrophy of ligamentum flavum.  No   central canal stenosis.  There is mild bilateral neural foraminal narrowing   more significant on the right.       L4/L5: Moderate facet hypertrophy.  No central canal stenosis.  No   significant neural foraminal narrowing.       L5/S1: No central canal stenosis.  No significant neural foraminal narrowing.           Impression   1. Mild bilateral neural foraminal narrowing at L3/L4 more significant on the   right.  No central canal stenosis.   2. Moderate bilateral facet hypertrophy at L4/L5.   3. No fracture or malalignment.           Cervical spine MRI 2018  1. Multilevel degenerative disc disease extending from C3 through C7.   Moderately severe

## 2025-05-20 NOTE — PROGRESS NOTES
Ashlee Bustos presents to the [facility] on 5/20/2025. Ashlee is complaining of pain in her back and neck. The pain is constant. The pain is described as stabbing and sharp. Pain is rated on her best day at a 5, on her worst day at a 10, and on average at a 5 on the VAS scale. She took her last dose of Motrin and oxycodone yesterdau.      Any procedures since your last visit: No    She is  on NSAIDS and  is not on anticoagulation medications to include ASA and is managed by Abdi Grijalva DO (hx of TIA)       Pacemaker or defibrillator: No     Medication Contract and Consent for Opioid Use Documents Filed       Patient Documents       Type of Document Status Date Received Received By Description    Medication Contract Received 7/18/2019 11:59 AM DIANA KWON 7/18/19 medication contract    Medication Contract Received 10/9/2020  3:01 PM ALEXEI VENTURA pain pt agreement    Medication Contract Received 11/5/2021 11:29 AM SNOW MUNIZ Pain Mgmt Patient Agreement 11.5.2021    Medication Contract Received 12/13/2021  4:21 PM LEONARDA PINA pain management    Consent Opioid Use Received 12/12/2022 12:23 PM ESTEFANY ARAMBULA pt agreement 12/12/22    Consent Opioid Use Received 1/10/2024  4:08 PM ESTEFANY ARAMBULA Consent Opioid Use    Consent Opioid Use Received 2/21/2025  2:36 PM LIZZ ROCHA Consent Opioid Use                       Resp 16   Ht 1.499 m (4' 11.02\")   Wt 81.6 kg (180 lb)   LMP 03/27/2008   BMI 36.34 kg/m²      Patient's last menstrual period was 03/27/2008.

## 2025-05-22 ENCOUNTER — HOSPITAL ENCOUNTER (OUTPATIENT)
Dept: PULMONOLOGY | Age: 55
Setting detail: THERAPIES SERIES
Discharge: HOME | End: 2025-05-22
Payer: MEDICAID

## 2025-05-22 PROCEDURE — 94626 PHY/QHP OP PULM RHB W/MNTR: CPT

## 2025-05-29 ENCOUNTER — OFFICE VISIT (OUTPATIENT)
Dept: PRIMARY CARE CLINIC | Age: 55
End: 2025-05-29
Payer: MEDICAID

## 2025-05-29 ENCOUNTER — HOSPITAL ENCOUNTER (OUTPATIENT)
Dept: PULMONOLOGY | Age: 55
Setting detail: THERAPIES SERIES
Discharge: HOME | End: 2025-05-29
Payer: MEDICAID

## 2025-05-29 VITALS
SYSTOLIC BLOOD PRESSURE: 126 MMHG | RESPIRATION RATE: 16 BRPM | TEMPERATURE: 97.3 F | OXYGEN SATURATION: 99 % | HEART RATE: 78 BPM | WEIGHT: 178 LBS | DIASTOLIC BLOOD PRESSURE: 66 MMHG | BODY MASS INDEX: 35.88 KG/M2 | HEIGHT: 59 IN

## 2025-05-29 DIAGNOSIS — K64.4 EXTERNAL PROLAPSED HEMORRHOIDS: ICD-10-CM

## 2025-05-29 DIAGNOSIS — K21.00 GASTROESOPHAGEAL REFLUX DISEASE WITH ESOPHAGITIS WITHOUT HEMORRHAGE: Primary | ICD-10-CM

## 2025-05-29 PROCEDURE — 99213 OFFICE O/P EST LOW 20 MIN: CPT | Performed by: FAMILY MEDICINE

## 2025-05-29 PROCEDURE — 1036F TOBACCO NON-USER: CPT | Performed by: FAMILY MEDICINE

## 2025-05-29 PROCEDURE — 94626 PHY/QHP OP PULM RHB W/MNTR: CPT

## 2025-05-29 PROCEDURE — 3017F COLORECTAL CA SCREEN DOC REV: CPT | Performed by: FAMILY MEDICINE

## 2025-05-29 PROCEDURE — G8427 DOCREV CUR MEDS BY ELIG CLIN: HCPCS | Performed by: FAMILY MEDICINE

## 2025-05-29 PROCEDURE — G8417 CALC BMI ABV UP PARAM F/U: HCPCS | Performed by: FAMILY MEDICINE

## 2025-05-29 SDOH — ECONOMIC STABILITY: FOOD INSECURITY: WITHIN THE PAST 12 MONTHS, THE FOOD YOU BOUGHT JUST DIDN'T LAST AND YOU DIDN'T HAVE MONEY TO GET MORE.: NEVER TRUE

## 2025-05-29 SDOH — ECONOMIC STABILITY: FOOD INSECURITY: WITHIN THE PAST 12 MONTHS, YOU WORRIED THAT YOUR FOOD WOULD RUN OUT BEFORE YOU GOT MONEY TO BUY MORE.: NEVER TRUE

## 2025-05-29 ASSESSMENT — ENCOUNTER SYMPTOMS
DIARRHEA: 0
BLOOD IN STOOL: 0
SINUS PRESSURE: 0
SORE THROAT: 0
CHOKING: 0
BELCHING: 0
VOMITING: 0
APNEA: 0
CHEST TIGHTNESS: 0
SHORTNESS OF BREATH: 0
EYE ITCHING: 0
COLOR CHANGE: 0
COUGH: 0
BACK PAIN: 0
HEARTBURN: 1
EYE REDNESS: 0
ABDOMINAL PAIN: 0
EYE PAIN: 0
WHEEZING: 0
RHINORRHEA: 0
CONSTIPATION: 0
NAUSEA: 0

## 2025-05-29 NOTE — PROGRESS NOTES
Chief Complaint:     Chief Complaint   Patient presents with    Follow-up    Gastroesophageal Reflux    Hemorrhoids                Gastroesophageal Reflux  She complains of heartburn. She reports no abdominal pain, no belching, no chest pain, no choking, no coughing, no dysphagia, no nausea, no sore throat or no wheezing. This is a recurrent problem. The current episode started more than 1 month ago. The problem occurs frequently. The problem has been gradually improving. The heartburn does not wake her from sleep. The heartburn does not limit her activity. The heartburn doesn't change with position. The symptoms are aggravated by certain foods, bending, caffeine and stress. Pertinent negatives include no fatigue. Risk factors include obesity. She has tried a PPI for the symptoms. The treatment provided moderate relief.   Hemorrhoids  This is a recurrent problem. The current episode started more than 1 month ago. The problem occurs daily. The problem has been unchanged. Pertinent negatives include no abdominal pain, arthralgias, chest pain, congestion, coughing, fatigue, fever, headaches, myalgias, nausea, neck pain, numbness, rash, sore throat, vomiting or weakness. Nothing aggravates the symptoms. She has tried nothing for the symptoms. The treatment provided no relief.       Patient Active Problem List   Diagnosis    Cervical stenosis of spinal canal    History of TIA (transient ischemic attack) and stroke    Spinal stenosis of lumbar region without neurogenic claudication    Fibromyalgia    Migraines without aura and without status migrainosus, not intractable    Chronic pain syndrome    Primary osteoarthritis of left hip    Cervical disc disorder    Lumbar disc disorder    Greater trochanteric bursitis of left hip    Primary osteoarthritis of left knee    Lung nodule    Irritable bowel syndrome    Class 2 severe obesity due to excess calories with serious comorbidity and body mass index (BMI) of 37.0 to 37.9 in

## 2025-06-03 ENCOUNTER — HOSPITAL ENCOUNTER (OUTPATIENT)
Dept: PULMONOLOGY | Age: 55
Setting detail: THERAPIES SERIES
Discharge: HOME | End: 2025-06-03
Payer: MEDICAID

## 2025-06-03 PROCEDURE — 94626 PHY/QHP OP PULM RHB W/MNTR: CPT

## 2025-06-03 NOTE — PROGRESS NOTES
Do you currently have any of the following:    Fever: No  Headache:  No  Cough: No  Shortness of breath: No  Exposed to anyone with these symptoms: No                                                                                                                Tea Martinez presents to the [address] [id number] on 8/13/2021. Yahaira Quintanilla is complaining of pain in her neck. The pain is persistent. The pain is described as sharp and burning. Pain is rated on her best day at a 3, on her worst day at a 10, and on average at a 5 on the VAS scale. She took her last dose of Percocet yesterday. Yahaira Quintanilla does not have issues with constipation. Any procedures since your last visit: Yes, Bilateral Cervical medial branch blocks at  Levels C4, C5 and C6  & Left  Greater trochanter bursea steroid injection with 80 % relief. She is not on NSAIDS and  is  on anticoagulation medications to include ASA and is managed by Jasbir Lara DO. Pacemaker or defibrillator: No Physician managing device is N/A. Medication Contract and Consent for Opioid Use Documents Filed     Patient Documents       Type of Document Status Date Received Received By Description     Medication Contract Received 7/18/2019 11:59 AM Donnie SINGLETON 7/18/19 medication contract     Medication Contract Received 10/9/2020  3:01 PM ALEXEI VENTURA pain pt agreement                   /70   Pulse 70   Temp 97.7 °F (36.5 °C) (Infrared)   Resp 16   LMP 03/27/2008   SpO2 98%      Patient's last menstrual period was 03/27/2008. 97.7

## 2025-06-05 ENCOUNTER — HOSPITAL ENCOUNTER (OUTPATIENT)
Dept: PULMONOLOGY | Age: 55
Setting detail: THERAPIES SERIES
Discharge: HOME | End: 2025-06-05
Payer: MEDICAID

## 2025-06-05 PROCEDURE — 94626 PHY/QHP OP PULM RHB W/MNTR: CPT

## 2025-06-06 ENCOUNTER — TELEPHONE (OUTPATIENT)
Dept: PHYSICAL THERAPY | Age: 55
End: 2025-06-06

## 2025-06-06 ENCOUNTER — OFFICE VISIT (OUTPATIENT)
Age: 55
End: 2025-06-06
Payer: MEDICAID

## 2025-06-06 VITALS
BODY MASS INDEX: 35.48 KG/M2 | RESPIRATION RATE: 18 BRPM | WEIGHT: 176 LBS | SYSTOLIC BLOOD PRESSURE: 111 MMHG | HEIGHT: 59 IN | HEART RATE: 65 BPM | DIASTOLIC BLOOD PRESSURE: 81 MMHG | OXYGEN SATURATION: 99 % | TEMPERATURE: 98 F

## 2025-06-06 DIAGNOSIS — R76.8 ELEVATED IGE LEVEL: ICD-10-CM

## 2025-06-06 DIAGNOSIS — J45.50 SEVERE PERSISTENT ASTHMA WITHOUT COMPLICATION (HCC): ICD-10-CM

## 2025-06-06 DIAGNOSIS — R09.02 HYPOXEMIA: Primary | ICD-10-CM

## 2025-06-06 DIAGNOSIS — G47.33 OSA (OBSTRUCTIVE SLEEP APNEA): ICD-10-CM

## 2025-06-06 LAB
EXPIRATORY TIME: 6.81 SEC
FEF 25-75% %PRED-PRE: 98 L/SEC
FEF 25-75% PRED: 2.15 L/SEC
FEF 25-75-PRE: 2.11 L/SEC
FEV1 %PRED-PRE: 89 %
FEV1 PRED: 2.12 L
FEV1/FVC %PRED-PRE: 104 %
FEV1/FVC PRED: 81 %
FEV1/FVC: 85 %
FEV1: 1.89 L
FVC %PRED-PRE: 84 %
FVC PRED: 2.64 L
FVC: 2.24 L
PARTS PER BILLION: 17
PEF %PRED-PRE: NORMAL
PEF PRED: NORMAL
PEF-PRE: NORMAL

## 2025-06-06 PROCEDURE — 1036F TOBACCO NON-USER: CPT | Performed by: INTERNAL MEDICINE

## 2025-06-06 PROCEDURE — G8427 DOCREV CUR MEDS BY ELIG CLIN: HCPCS | Performed by: INTERNAL MEDICINE

## 2025-06-06 PROCEDURE — 99213 OFFICE O/P EST LOW 20 MIN: CPT | Performed by: INTERNAL MEDICINE

## 2025-06-06 PROCEDURE — 3017F COLORECTAL CA SCREEN DOC REV: CPT | Performed by: INTERNAL MEDICINE

## 2025-06-06 PROCEDURE — G8417 CALC BMI ABV UP PARAM F/U: HCPCS | Performed by: INTERNAL MEDICINE

## 2025-06-06 PROCEDURE — 99212 OFFICE O/P EST SF 10 MIN: CPT | Performed by: INTERNAL MEDICINE

## 2025-06-06 ASSESSMENT — PULMONARY FUNCTION TESTS
FEV1/FVC: 85
FEV1: 1.89
FEV1/FVC_PERCENT_PREDICTED_PRE: 104
FVC_PERCENT_PREDICTED_PRE: 84
FVC_PREDICTED: 2.64
FVC: 2.24
FEV1_PREDICTED: 2.12
FEV1/FVC_PREDICTED: 81
FEV1_PERCENT_PREDICTED_PRE: 89

## 2025-06-06 NOTE — PROGRESS NOTES
[facility] in regards to CPAP supplies will send over new order today.   Orders Placed This Encounter    Spirometry Without Bronchodilator      
allergic flare at home with use of her albuterol rescue inhaler.  As far as the ground glass opacity recommend a follow-up CT scan in 1 year, which will be the next visit.  In addition it certainly possible that her environmental exposures including chickens and ducks may be contributing to the ground glass abnormality.  For more important issue is her underlying asthma.  To determine her underlying phenotype of asthma as for diagnostic work-up thus we checked total IgE = 1901 kU/L, ANCA = PR3 MPO = negative, Allergies = + x 1000!.   NiOx was 17. Aspergillous Ig G, = normal.  Very pleased that she removed all her environmental exposure. We went ahead and did allergy testing to see whether or not she is exposed any wheezing animals including hypersensitivity panel = normal and histoplasmosis testing= not detected. She is to remain on Advair 500 mg twice a day with proper instruction and rinsing her mouth after each use.  She was told again only to use albuterol as needed. Continue with biologics but - pre-treat with  ml NS. We discussed her overall exposures such as detergents looking at which she washes her clothes and, changing her filter, washing in very hot water, and hypoallergenic skin creams and detergent underarm deodorant.  All questions were answered. Sleep hygiene was reviewed and discussed. If no improvement with CPAP then sleep referral.  We discussed weight loss in the office today. We referred her to pulmonary rehabilitation and to the weight loss clinic.  We hope this updates you on our evaluation and clinical thinking. Thank you for entrusting us to participate in [facility].  If you have any questions, please don't hesitate to call us at the [facility].         Sincerely,      Danilo Fountain D.O., MPH, [facility], NIEVES, AAMIRP  Professor of Internal Medicine  Director of [facility]monary [facility]

## 2025-06-10 ENCOUNTER — HOSPITAL ENCOUNTER (OUTPATIENT)
Dept: PULMONOLOGY | Age: 55
Setting detail: THERAPIES SERIES
Discharge: HOME | End: 2025-06-10
Payer: MEDICAID

## 2025-06-10 PROCEDURE — 94626 PHY/QHP OP PULM RHB W/MNTR: CPT

## 2025-06-17 ENCOUNTER — HOSPITAL ENCOUNTER (OUTPATIENT)
Dept: PULMONOLOGY | Age: 55
Setting detail: THERAPIES SERIES
Discharge: HOME | End: 2025-06-17
Payer: MEDICAID

## 2025-06-17 PROCEDURE — 94626 PHY/QHP OP PULM RHB W/MNTR: CPT

## 2025-06-23 ENCOUNTER — OFFICE VISIT (OUTPATIENT)
Dept: SURGERY | Age: 55
End: 2025-06-23
Payer: MEDICAID

## 2025-06-23 ENCOUNTER — OFFICE VISIT (OUTPATIENT)
Age: 55
End: 2025-06-23
Payer: MEDICAID

## 2025-06-23 VITALS
SYSTOLIC BLOOD PRESSURE: 116 MMHG | BODY MASS INDEX: 35.68 KG/M2 | HEIGHT: 59 IN | HEART RATE: 80 BPM | OXYGEN SATURATION: 96 % | TEMPERATURE: 97.9 F | DIASTOLIC BLOOD PRESSURE: 81 MMHG | RESPIRATION RATE: 18 BRPM | WEIGHT: 177 LBS

## 2025-06-23 VITALS
DIASTOLIC BLOOD PRESSURE: 60 MMHG | HEIGHT: 59 IN | TEMPERATURE: 97.3 F | BODY MASS INDEX: 35.48 KG/M2 | RESPIRATION RATE: 16 BRPM | HEART RATE: 85 BPM | OXYGEN SATURATION: 96 % | WEIGHT: 176 LBS | SYSTOLIC BLOOD PRESSURE: 102 MMHG

## 2025-06-23 DIAGNOSIS — M47.816 LUMBAR SPONDYLOSIS: ICD-10-CM

## 2025-06-23 DIAGNOSIS — K21.9 GASTROESOPHAGEAL REFLUX DISEASE WITHOUT ESOPHAGITIS: Primary | ICD-10-CM

## 2025-06-23 DIAGNOSIS — M51.9 LUMBAR DISC DISORDER: ICD-10-CM

## 2025-06-23 DIAGNOSIS — D12.6 TUBULAR ADENOMA OF COLON: ICD-10-CM

## 2025-06-23 DIAGNOSIS — M54.12 CERVICAL RADICULOPATHY: ICD-10-CM

## 2025-06-23 DIAGNOSIS — M47.812 FACET ARTHROPATHY, CERVICAL: ICD-10-CM

## 2025-06-23 DIAGNOSIS — M16.12 PRIMARY OSTEOARTHRITIS OF LEFT HIP: ICD-10-CM

## 2025-06-23 DIAGNOSIS — M47.816 LUMBAR FACET ARTHROPATHY: ICD-10-CM

## 2025-06-23 DIAGNOSIS — M54.16 LUMBAR RADICULOPATHY: Primary | ICD-10-CM

## 2025-06-23 DIAGNOSIS — M50.30 DDD (DEGENERATIVE DISC DISEASE), CERVICAL: ICD-10-CM

## 2025-06-23 DIAGNOSIS — M79.7 FIBROMYALGIA: ICD-10-CM

## 2025-06-23 DIAGNOSIS — M47.812 CERVICAL FACET JOINT SYNDROME: ICD-10-CM

## 2025-06-23 DIAGNOSIS — G89.4 CHRONIC PAIN SYNDROME: ICD-10-CM

## 2025-06-23 PROCEDURE — G8417 CALC BMI ABV UP PARAM F/U: HCPCS | Performed by: PHYSICIAN ASSISTANT

## 2025-06-23 PROCEDURE — 99213 OFFICE O/P EST LOW 20 MIN: CPT | Performed by: SURGERY

## 2025-06-23 PROCEDURE — 99213 OFFICE O/P EST LOW 20 MIN: CPT | Performed by: PHYSICIAN ASSISTANT

## 2025-06-23 PROCEDURE — 99212 OFFICE O/P EST SF 10 MIN: CPT | Performed by: PHYSICIAN ASSISTANT

## 2025-06-23 PROCEDURE — G8417 CALC BMI ABV UP PARAM F/U: HCPCS | Performed by: SURGERY

## 2025-06-23 PROCEDURE — 3017F COLORECTAL CA SCREEN DOC REV: CPT | Performed by: PHYSICIAN ASSISTANT

## 2025-06-23 PROCEDURE — 3017F COLORECTAL CA SCREEN DOC REV: CPT | Performed by: SURGERY

## 2025-06-23 PROCEDURE — G8427 DOCREV CUR MEDS BY ELIG CLIN: HCPCS | Performed by: PHYSICIAN ASSISTANT

## 2025-06-23 PROCEDURE — 1036F TOBACCO NON-USER: CPT | Performed by: PHYSICIAN ASSISTANT

## 2025-06-23 PROCEDURE — 1036F TOBACCO NON-USER: CPT | Performed by: SURGERY

## 2025-06-23 PROCEDURE — G8427 DOCREV CUR MEDS BY ELIG CLIN: HCPCS | Performed by: SURGERY

## 2025-06-23 RX ORDER — OXYCODONE HYDROCHLORIDE 5 MG/1
5 TABLET ORAL DAILY PRN
Qty: 30 TABLET | Refills: 0 | Status: SHIPPED | OUTPATIENT
Start: 2025-06-23 | End: 2025-07-23

## 2025-06-23 RX ORDER — OMEPRAZOLE 20 MG/1
20 CAPSULE, DELAYED RELEASE ORAL
Qty: 60 CAPSULE | Refills: 0 | Status: SHIPPED | OUTPATIENT
Start: 2025-06-23

## 2025-06-23 NOTE — PROGRESS NOTES
[facility]   [address], [place] [address]  [phone]    Follow up Note      Ashleeher JACQUELIN Samaniegos     Date of Visit:  2025    CC:  Patient presents for follow up   Chief Complaint   Patient presents with    Back Pain    Neck Pain       HPI:    Pain is unchanged.    Appropriate analgesia with current medications regimen: yes.    Change in quality of symptoms:no.    Medication side effects:none.   Recent diagnostic testing:none.   Recent interventional procedures: None.       She has been on anticoagulation medications to include ASA. The patient  has not been on herbal supplements.  The patient is not diabetic.     Imagin/15/2022 MRI lumbar spine    FINDINGS:   No fracture or malalignment.  Vertebral body height and interspaces are well   maintained.  Conus medullaris is visualized and is unremarkable.  No evidence   of epidural mass or hematoma.  No prevertebral soft tissue edema.  Note made   of a incidental intraosseous hemangioma located in T11 vertebral body.       L1/L2: No central canal stenosis or neural foraminal narrowing.       L2/L3: No central canal stenosis or neural foraminal narrowing.       L3/L4: Mild facet hypertrophy with hypertrophy of ligamentum flavum.  No   central canal stenosis.  There is mild bilateral neural foraminal narrowing   more significant on the right.       L4/L5: Moderate facet hypertrophy.  No central canal stenosis.  No   significant neural foraminal narrowing.       L5/S1: No central canal stenosis.  No significant neural foraminal narrowing.           Impression   1. Mild bilateral neural foraminal narrowing at L3/L4 more significant on the   right.  No central canal stenosis.   2. Moderate bilateral facet hypertrophy at L4/L5.   3. No fracture or malalignment.           Cervical spine MRI 2018  1. Multilevel degenerative disc disease extending from C3 through C7.   Moderately severe spinal canal stenosis at C3-C4.

## 2025-06-23 NOTE — PROGRESS NOTES
Progress Note - Follow up    Patient's Name/Date of Birth: Ashlee Bustos / 1970    Date: 6/23/2025    PCP: Abdi Grijalva DO    Referring Physician:   Abdi Grijalva DO  [phone]    Chief Complaint   Patient presents with    Follow-up    Colonoscopy     OFFICE VISIT - egd/colon follow up         HPI:    The patient has been on omeprazole with some relief. She said she is taking it in the mornings and has good relief all day but has symptoms at night. She stops eating at 6 PM. When she lies flat, she has heartburn issues. No n/v.    Patient's medications, allergies, past medical, surgical, social and family histories were reviewed and updated as appropriate.    Review of Systems  Constitutional: negative  Respiratory: negative  Cardiovascular: negative  Gastrointestinal: as in hpi  Genitourinary:negative  Integument/breast: negative    Physical Exam:  /81   Pulse 80   Temp 97.9 °F (36.6 °C)   Resp 18   Ht 1.492 m (4' 10.74\")   Wt 80.3 kg (177 lb)   LMP 03/27/2008   SpO2 96%   BMI 36.07 kg/m²   General appearance: alert, cooperative and in no acute distress.  Lungs: normal work of breathing  Heart: regular rate  Abdomen: soft, nontender, nondistended  Musculoskeletal: symmetrical without edema.  Skin: beck,     Data Reviewed:   Pathology:     Path Number: [id number]    -- Diagnosis --  A.  Stomach, antrum, biopsy: Chronic gastritis; negative for  intestinal metaplasia  Immunostain negative for Helicobacter pylori organisms    B.  Duodenum, biopsy: No significant pathologic change  The villous architecture is preserved and there is no evidence of  intraepithelial lymphocytosis.    C.  Distal esophagus, biopsy: No significant pathologic change  Negative for intestinal metaplasia    D.  Colon, random biopsy: No significant pathologic change  There is no evidence of chronic or active colitis including  lymphocytic and collagenous colitis.    E.  Colon polyp at 90 cm, biopsy: Tubular adenoma

## 2025-06-23 NOTE — PROGRESS NOTES
Ashlee Bustos presents to the [facility] on 6/23/2025. Ashlee is complaining of pain in her back and neck. The pain is constant. The pain is described as stabbing and sharp. Pain is rated on her best day at a 5, on her worst day at a 10, and on average at a 5 on the VAS scale. She took her last dose of Motrin and oxycodone 2 days ago.      Any procedures since your last visit: No    She is  on NSAIDS and  is  on anticoagulation medications to include ASA and is managed by Abdi Grijalva DO (hx of TIA)       Pacemaker or defibrillator: No     Medication Contract and Consent for Opioid Use Documents Filed       Patient Documents       Type of Document Status Date Received Received By Description    Medication Contract Received 7/18/2019 11:59 AM DIANA KWON 7/18/19 medication contract    Medication Contract Received 10/9/2020  3:01 PM ALEXEI VENTURA pain pt agreement    Medication Contract Received 11/5/2021 11:29 AM SNOW MUNIZ Pain Mgmt Patient Agreement 11.5.2021    Medication Contract Received 12/13/2021  4:21 PM LEONARDA PINA pain management    Consent Opioid Use Received 12/12/2022 12:23 PM ESTEFANY ARAMBULA pt agreement 12/12/22    Consent Opioid Use Received 1/10/2024  4:08 PM ESTEFANY ARAMBULA Consent Opioid Use    Consent Opioid Use Received 2/21/2025  2:36 PM LIZZ ROCHA Consent Opioid Use                       Resp 16   Ht 1.492 m (4' 10.74\")   Wt 79.8 kg (176 lb)   LMP 03/27/2008   BMI 35.86 kg/m²      Patient's last menstrual period was 03/27/2008.

## 2025-06-24 ENCOUNTER — HOSPITAL ENCOUNTER (OUTPATIENT)
Dept: PULMONOLOGY | Age: 55
Setting detail: THERAPIES SERIES
Discharge: HOME | End: 2025-06-24
Payer: MEDICAID

## 2025-06-24 PROCEDURE — 94626 PHY/QHP OP PULM RHB W/MNTR: CPT

## 2025-06-26 ENCOUNTER — HOSPITAL ENCOUNTER (OUTPATIENT)
Dept: PULMONOLOGY | Age: 55
Setting detail: THERAPIES SERIES
Discharge: HOME | End: 2025-06-26
Payer: MEDICAID

## 2025-06-26 PROCEDURE — 94626 PHY/QHP OP PULM RHB W/MNTR: CPT

## 2025-07-03 ENCOUNTER — HOSPITAL ENCOUNTER (OUTPATIENT)
Dept: PULMONOLOGY | Age: 55
Setting detail: THERAPIES SERIES
Discharge: HOME | End: 2025-07-03
Payer: MEDICAID

## 2025-07-03 PROCEDURE — 93798 PHYS/QHP OP CAR RHAB W/ECG: CPT

## 2025-07-03 PROCEDURE — 94626 PHY/QHP OP PULM RHB W/MNTR: CPT

## 2025-07-08 ENCOUNTER — HOSPITAL ENCOUNTER (OUTPATIENT)
Dept: PULMONOLOGY | Age: 55
Setting detail: THERAPIES SERIES
Discharge: HOME | End: 2025-07-08
Payer: MEDICAID

## 2025-07-08 PROCEDURE — 94626 PHY/QHP OP PULM RHB W/MNTR: CPT

## 2025-07-10 ENCOUNTER — HOSPITAL ENCOUNTER (OUTPATIENT)
Dept: PULMONOLOGY | Age: 55
Setting detail: THERAPIES SERIES
Discharge: HOME | End: 2025-07-10
Payer: MEDICAID

## 2025-07-10 PROCEDURE — 94626 PHY/QHP OP PULM RHB W/MNTR: CPT

## 2025-07-15 ENCOUNTER — HOSPITAL ENCOUNTER (OUTPATIENT)
Dept: PULMONOLOGY | Age: 55
Setting detail: THERAPIES SERIES
Discharge: HOME | End: 2025-07-15
Payer: MEDICAID

## 2025-07-15 PROCEDURE — 94626 PHY/QHP OP PULM RHB W/MNTR: CPT

## 2025-07-15 PROCEDURE — 94625 PHY/QHP OP PULM RHB W/O MNTR: CPT

## 2025-07-15 NOTE — CONSULTS
Nutrition Assessment & Medical Nutrition Therapy      Date: 7/15/25   Time: 10:00 am   Patient Name: Ashlee Bustos   PCP - General: Abdi Grijalva DO   Fax: [phone]   Reason for Visit:  Pt seen in pulmonary rehab for initial nutrition assessment and diet education.    Dx: Pt is a 54 y.o. female with hx of asthma.   PMH: GERD, colon polyps, CIERA w/CPAP, TIA, IBS    Goals:  Pt to improve meal consistency and variety with nutrient balance- add small breakfast daily   Include more fruits and vegetables daily per MyPlate method.   Limits high sodium/fat snacks at night - choose healthier options provided.   Practice mindful eating with portion control to aid in wt loss of 1 - 2 pounds per week to goal.     Current Eating Pattern:  Pt often skips breakfast and is inconsistent with lunch but routinely includes dinner and midnight snack.   Breakfast - Skips  Lunch - Skips or has HB eggs and 2 toast + renny tea on days of exercise  Dinner - Red skin potatoes, entree salad with fresh vegetables, cheese, chicken or tuna with oil, vinegar and lemon dressing.   Snacks - Salt and vinegar potato chips, crackers, oatmeal, peanut butter, fruit and veg smoothie  Drinks - mostly water    Allergies/Food Sensitivities:   Noted allergy to fish derived products and dyes (iodides) - pt states she eats tuna w/o concern    Based on pt reported diet hx/diet recall and discussion, there are no cultural or Sikhism food preferences, dietary restrictions or Sikhism practices identified at this time.      Dietary History/Limitations/Time/Prep Issues:  Pt lives with her , daughter and 6 grand-kids currently.  She has started fixing different items for herself for dinner to control calories.  She was previously big on snack chips and candy and cakes but has limited recently.  Pt is not in the habit of eating in the morning due to feeling bloated and acid reflux issues.      Stress/Environment Issues that may be affecting po

## 2025-07-17 ENCOUNTER — HOSPITAL ENCOUNTER (OUTPATIENT)
Dept: PULMONOLOGY | Age: 55
Setting detail: THERAPIES SERIES
Discharge: HOME | End: 2025-07-17
Payer: MEDICAID

## 2025-07-17 PROCEDURE — 94626 PHY/QHP OP PULM RHB W/MNTR: CPT

## 2025-07-22 ENCOUNTER — HOSPITAL ENCOUNTER (OUTPATIENT)
Dept: PULMONOLOGY | Age: 55
Setting detail: THERAPIES SERIES
Discharge: HOME | End: 2025-07-22
Payer: MEDICAID

## 2025-07-22 PROCEDURE — 94626 PHY/QHP OP PULM RHB W/MNTR: CPT

## 2025-07-24 ENCOUNTER — HOSPITAL ENCOUNTER (OUTPATIENT)
Dept: PULMONOLOGY | Age: 55
Setting detail: THERAPIES SERIES
Discharge: HOME | End: 2025-07-24
Payer: MEDICAID

## 2025-07-24 PROCEDURE — 94626 PHY/QHP OP PULM RHB W/MNTR: CPT

## 2025-07-28 ENCOUNTER — OFFICE VISIT (OUTPATIENT)
Age: 55
End: 2025-07-28
Payer: MEDICAID

## 2025-07-28 VITALS
HEART RATE: 76 BPM | OXYGEN SATURATION: 97 % | WEIGHT: 177 LBS | DIASTOLIC BLOOD PRESSURE: 69 MMHG | TEMPERATURE: 97.1 F | BODY MASS INDEX: 35.68 KG/M2 | HEIGHT: 59 IN | SYSTOLIC BLOOD PRESSURE: 104 MMHG | RESPIRATION RATE: 18 BRPM

## 2025-07-28 DIAGNOSIS — M54.12 CERVICAL RADICULOPATHY: ICD-10-CM

## 2025-07-28 DIAGNOSIS — M54.16 LUMBAR RADICULOPATHY: Primary | ICD-10-CM

## 2025-07-28 DIAGNOSIS — G89.4 CHRONIC PAIN SYNDROME: ICD-10-CM

## 2025-07-28 DIAGNOSIS — M79.7 FIBROMYALGIA: ICD-10-CM

## 2025-07-28 DIAGNOSIS — M50.30 DDD (DEGENERATIVE DISC DISEASE), CERVICAL: ICD-10-CM

## 2025-07-28 DIAGNOSIS — M51.9 LUMBAR DISC DISORDER: ICD-10-CM

## 2025-07-28 DIAGNOSIS — M47.816 LUMBAR SPONDYLOSIS: ICD-10-CM

## 2025-07-28 DIAGNOSIS — M16.12 PRIMARY OSTEOARTHRITIS OF LEFT HIP: ICD-10-CM

## 2025-07-28 DIAGNOSIS — M47.812 CERVICAL FACET JOINT SYNDROME: ICD-10-CM

## 2025-07-28 DIAGNOSIS — M47.816 LUMBAR FACET ARTHROPATHY: ICD-10-CM

## 2025-07-28 DIAGNOSIS — M47.812 FACET ARTHROPATHY, CERVICAL: ICD-10-CM

## 2025-07-28 PROCEDURE — 99212 OFFICE O/P EST SF 10 MIN: CPT | Performed by: PHYSICIAN ASSISTANT

## 2025-07-28 PROCEDURE — 3017F COLORECTAL CA SCREEN DOC REV: CPT | Performed by: PHYSICIAN ASSISTANT

## 2025-07-28 PROCEDURE — G8427 DOCREV CUR MEDS BY ELIG CLIN: HCPCS | Performed by: PHYSICIAN ASSISTANT

## 2025-07-28 PROCEDURE — G8417 CALC BMI ABV UP PARAM F/U: HCPCS | Performed by: PHYSICIAN ASSISTANT

## 2025-07-28 PROCEDURE — 99213 OFFICE O/P EST LOW 20 MIN: CPT | Performed by: PHYSICIAN ASSISTANT

## 2025-07-28 PROCEDURE — 1036F TOBACCO NON-USER: CPT | Performed by: PHYSICIAN ASSISTANT

## 2025-07-28 RX ORDER — OXYCODONE HYDROCHLORIDE 5 MG/1
5 TABLET ORAL DAILY PRN
Qty: 30 TABLET | Refills: 0 | Status: SHIPPED | OUTPATIENT
Start: 2025-08-27 | End: 2025-09-26

## 2025-07-28 RX ORDER — OXYCODONE HYDROCHLORIDE 5 MG/1
5 TABLET ORAL DAILY PRN
Qty: 30 TABLET | Refills: 0 | Status: SHIPPED | OUTPATIENT
Start: 2025-07-28 | End: 2025-07-28 | Stop reason: SDUPTHER

## 2025-07-28 RX ORDER — LIDOCAINE 50 MG/G
1 PATCH TOPICAL DAILY
Qty: 30 PATCH | Refills: 5 | Status: SHIPPED | OUTPATIENT
Start: 2025-07-28 | End: 2025-08-27

## 2025-07-28 NOTE — PROGRESS NOTES
97.1                                                                                                          Ashlee Bustos presents to the [facility] on 7/28/2025. Ashlee is complaining of pain back and neck. The pain is constant. The pain is described as stabbing and sharp. Pain is rated on her best day at a 3, on her worst day at a 10, and on average at a 4 on the VAS scale. She took her last dose of Motrin and oxycodone last dose oxycodone 3 days ago, taking Ibuprofen.      Any procedures since your last visit: No  She is  on NSAIDS and  is  on anticoagulation medications to include ASA and is managed by Abdi Grijalva DO .     Pacemaker or defibrillator: No   Do you want someone present when the provider examines you? No    Medication Contract and Consent for Opioid Use Documents Filed       Patient Documents       Type of Document Status Date Received Received By Description    Medication Contract Received 7/18/2019 11:59 AM DIANA KWON 7/18/19 medication contract    Medication Contract Received 10/9/2020  3:01 PM ALEXEI VENTURA pain pt agreement    Medication Contract Received 11/5/2021 11:29 AM SNOW MUNIZ Pain Mgmt Patient Agreement 11.5.2021    Medication Contract Received 12/13/2021  4:21 PM LEONARDA PINA pain management    Consent Opioid Use Received 12/12/2022 12:23 PM ESTEFANY ARAMBULA pt agreement 12/12/22    Consent Opioid Use Received 1/10/2024  4:08 PM ESTEFANY ARAMBULA Consent Opioid Use    Consent Opioid Use Received 2/21/2025  2:36 PM LIZZ ROCHA Consent Opioid Use                       LMP 03/27/2008      Patient's last menstrual period was 03/27/2008.  
SEDATION performed by James Steele MD at [facility] OR    TUBAL LIGATION      UPPER GASTROINTESTINAL ENDOSCOPY  07/10/2017    UPPER GASTROINTESTINAL ENDOSCOPY N/A 5/12/2025    ESOPHAGOGASTRODUODENOSCOPY BIOPSY performed by Saima Milner MD at [facility] ENDOSCOPY       Prior to Admission medications    Medication Sig Start Date End Date Taking? Authorizing Provider   omeprazole (PRILOSEC) 20 MG delayed release capsule Take 1 capsule by mouth every morning (before breakfast) 6/23/25  Yes Saima Milner MD   albuterol (PROVENTIL) (2.5 MG/3ML) 0.083% nebulizer solution Take 3 mLs by nebulization 4 times daily as needed for Wheezing 5/1/25  Yes Abdi Grijalva DO   ondansetron (ZOFRAN-ODT) 4 MG disintegrating tablet Take 1 tablet by mouth 3 times daily as needed for Nausea or Vomiting 4/27/25  Yes Luis Alfredo Major MD   dicyclomine (BENTYL) 20 MG tablet Take 1 tablet by mouth 4 times daily as needed (cramps) 4/27/25  Yes Luis Alfredo Major MD   Respiratory Therapy Supplies (NEBULIZER/TUBING/MOUTHPIECE) KIT 1 kit by Does not apply route daily as needed (asthma) 1/3/25  Yes Abdi Grijalva DO   EPINEPHrine (EPIPEN 2-ABUNDIO) 0.3 MG/0.3ML SOAJ injection Inject into muscle for allergic reaction 8/28/24  Yes Abdi Grijalva, DO   Prenatal Vit-Fe Fumarate-FA (PRENATAL PLUS) 27-1 MG TABS 1 pill daily 6/20/24  Yes Abdi Grijalva, DO   vitamin D3 (CHOLECALCIFEROL) 25 MCG (1000 UT) TABS tablet Take 1 tablet by mouth daily 6/20/24  Yes Abdi Grijalva, DO   tezepelumab-ekko (TEZSPIRE) 210 MG/1.91ML SOSY injection Inject 1.91 mLs into the skin every 28 days Atarax 10mg and beandryl 25mg po prior to injection as well as 250cc normal saline bolus   Yes ProviderEsdras MD   aspirin 81 MG EC tablet Take 1 tablet by mouth as needed for Pain   Yes ProviderEsdras MD   fluticasone-salmeterol (ADVAIR DISKUS) 500-50 MCG/ACT AEPB diskus inhaler Inhale 1 puff into the lungs in the morning and 1 puff in the evening.

## 2025-07-29 ENCOUNTER — HOSPITAL ENCOUNTER (OUTPATIENT)
Dept: PULMONOLOGY | Age: 55
Setting detail: THERAPIES SERIES
Discharge: HOME | End: 2025-07-29
Payer: MEDICAID

## 2025-07-29 PROCEDURE — 94626 PHY/QHP OP PULM RHB W/MNTR: CPT

## 2025-07-31 ENCOUNTER — HOSPITAL ENCOUNTER (OUTPATIENT)
Dept: PULMONOLOGY | Age: 55
Setting detail: THERAPIES SERIES
Discharge: HOME | End: 2025-07-31
Payer: MEDICAID

## 2025-07-31 PROCEDURE — 94625 PHY/QHP OP PULM RHB W/O MNTR: CPT

## 2025-07-31 PROCEDURE — 94626 PHY/QHP OP PULM RHB W/MNTR: CPT

## 2025-08-05 ENCOUNTER — HOSPITAL ENCOUNTER (OUTPATIENT)
Dept: PULMONOLOGY | Age: 55
Setting detail: THERAPIES SERIES
Discharge: HOME | End: 2025-08-05
Payer: MEDICAID

## 2025-08-05 PROCEDURE — 94626 PHY/QHP OP PULM RHB W/MNTR: CPT

## 2025-08-07 ENCOUNTER — HOSPITAL ENCOUNTER (OUTPATIENT)
Dept: PULMONOLOGY | Age: 55
Setting detail: THERAPIES SERIES
Discharge: HOME | End: 2025-08-07
Payer: MEDICAID

## 2025-08-07 PROCEDURE — 94626 PHY/QHP OP PULM RHB W/MNTR: CPT

## 2025-08-11 ENCOUNTER — HOSPITAL ENCOUNTER (OUTPATIENT)
Dept: INFUSION THERAPY | Age: 55
Setting detail: INFUSION SERIES
Discharge: HOME | End: 2025-08-11

## 2025-08-11 DIAGNOSIS — J45.50 SEVERE PERSISTENT ASTHMA, UNSPECIFIED WHETHER COMPLICATED (HCC): Primary | ICD-10-CM

## 2025-08-11 RX ORDER — EPINEPHRINE 1 MG/ML
0.3 INJECTION, SOLUTION, CONCENTRATE INTRAVENOUS PRN
OUTPATIENT
Start: 2025-08-25

## 2025-08-11 RX ORDER — 0.9 % SODIUM CHLORIDE 0.9 %
250 INTRAVENOUS SOLUTION INTRAVENOUS ONCE
Status: DISCONTINUED | OUTPATIENT
Start: 2025-08-11 | End: 2025-08-11

## 2025-08-11 RX ORDER — ALBUTEROL SULFATE 90 UG/1
4 INHALANT RESPIRATORY (INHALATION) PRN
OUTPATIENT
Start: 2025-08-25

## 2025-08-11 RX ORDER — MEPERIDINE HYDROCHLORIDE 25 MG/ML
25 INJECTION INTRAMUSCULAR; INTRAVENOUS; SUBCUTANEOUS ONCE
Start: 2025-08-25 | End: 2025-08-25

## 2025-08-11 RX ORDER — SODIUM CHLORIDE 9 MG/ML
INJECTION, SOLUTION INTRAVENOUS CONTINUOUS
OUTPATIENT
Start: 2025-08-25

## 2025-08-11 RX ORDER — ACETAMINOPHEN 325 MG/1
650 TABLET ORAL
OUTPATIENT
Start: 2025-08-25

## 2025-08-11 RX ORDER — DIPHENHYDRAMINE HCL 25 MG
25 TABLET ORAL ONCE
Start: 2025-08-25 | End: 2025-08-25

## 2025-08-11 RX ORDER — ONDANSETRON 2 MG/ML
4 INJECTION INTRAMUSCULAR; INTRAVENOUS ONCE
Start: 2025-08-25 | End: 2025-08-25

## 2025-08-11 RX ORDER — ONDANSETRON 2 MG/ML
4 INJECTION INTRAMUSCULAR; INTRAVENOUS ONCE
Status: DISCONTINUED | OUTPATIENT
Start: 2025-08-11 | End: 2025-08-11

## 2025-08-11 RX ORDER — HYDROCORTISONE SODIUM SUCCINATE 100 MG/2ML
100 INJECTION INTRAMUSCULAR; INTRAVENOUS
OUTPATIENT
Start: 2025-08-25

## 2025-08-11 RX ORDER — DIPHENHYDRAMINE HYDROCHLORIDE 50 MG/ML
50 INJECTION, SOLUTION INTRAMUSCULAR; INTRAVENOUS
OUTPATIENT
Start: 2025-08-25

## 2025-08-11 RX ORDER — 0.9 % SODIUM CHLORIDE 0.9 %
250 INTRAVENOUS SOLUTION INTRAVENOUS ONCE
Start: 2025-08-25 | End: 2025-08-25

## 2025-08-11 RX ORDER — SODIUM CHLORIDE 0.9 % (FLUSH) 0.9 %
5-40 SYRINGE (ML) INJECTION PRN
Status: DISCONTINUED | OUTPATIENT
Start: 2025-08-11 | End: 2025-08-11

## 2025-08-11 RX ORDER — DIPHENHYDRAMINE HCL 25 MG
25 TABLET ORAL ONCE
Status: DISCONTINUED | OUTPATIENT
Start: 2025-08-11 | End: 2025-08-11

## 2025-08-11 RX ORDER — ONDANSETRON 2 MG/ML
8 INJECTION INTRAMUSCULAR; INTRAVENOUS
OUTPATIENT
Start: 2025-08-25

## 2025-08-11 RX ORDER — HYDROXYZINE HYDROCHLORIDE 10 MG/1
10 TABLET, FILM COATED ORAL ONCE
Start: 2025-08-25 | End: 2025-08-25

## 2025-08-11 RX ORDER — SODIUM CHLORIDE 0.9 % (FLUSH) 0.9 %
5-40 SYRINGE (ML) INJECTION PRN
Start: 2025-08-25

## 2025-08-11 RX ORDER — HEPARIN SODIUM (PORCINE) LOCK FLUSH IV SOLN 100 UNIT/ML 100 UNIT/ML
500 SOLUTION INTRAVENOUS PRN
Start: 2025-08-25

## 2025-08-11 RX ORDER — HYDROXYZINE HYDROCHLORIDE 10 MG/1
10 TABLET, FILM COATED ORAL ONCE
Status: DISCONTINUED | OUTPATIENT
Start: 2025-08-11 | End: 2025-08-11

## 2025-08-12 ENCOUNTER — HOSPITAL ENCOUNTER (OUTPATIENT)
Dept: PULMONOLOGY | Age: 55
Setting detail: THERAPIES SERIES
Discharge: HOME | End: 2025-08-12
Payer: MEDICAID

## 2025-08-12 PROCEDURE — 94626 PHY/QHP OP PULM RHB W/MNTR: CPT

## 2025-08-14 ENCOUNTER — HOSPITAL ENCOUNTER (OUTPATIENT)
Dept: PULMONOLOGY | Age: 55
Setting detail: THERAPIES SERIES
Discharge: HOME | End: 2025-08-14
Payer: MEDICAID

## 2025-08-14 PROCEDURE — 94626 PHY/QHP OP PULM RHB W/MNTR: CPT

## 2025-08-19 ENCOUNTER — HOSPITAL ENCOUNTER (OUTPATIENT)
Dept: PULMONOLOGY | Age: 55
Setting detail: THERAPIES SERIES
Discharge: HOME | End: 2025-08-19
Payer: MEDICAID

## 2025-08-19 PROCEDURE — 94626 PHY/QHP OP PULM RHB W/MNTR: CPT

## 2025-08-21 ENCOUNTER — HOSPITAL ENCOUNTER (OUTPATIENT)
Dept: INFUSION THERAPY | Age: 55
Setting detail: INFUSION SERIES
End: 2025-08-21

## 2025-08-26 ENCOUNTER — HOSPITAL ENCOUNTER (OUTPATIENT)
Dept: PULMONOLOGY | Age: 55
Setting detail: THERAPIES SERIES
Discharge: HOME | End: 2025-08-26
Payer: MEDICAID

## 2025-08-26 PROCEDURE — 94626 PHY/QHP OP PULM RHB W/MNTR: CPT

## 2025-08-27 ENCOUNTER — HOSPITAL ENCOUNTER (EMERGENCY)
Age: 55
Discharge: HOME | End: 2025-08-27
Payer: MEDICAID

## 2025-08-27 ENCOUNTER — APPOINTMENT (OUTPATIENT)
Dept: GENERAL RADIOLOGY | Age: 55
End: 2025-08-27
Payer: MEDICAID

## 2025-08-27 VITALS
TEMPERATURE: 98.8 F | DIASTOLIC BLOOD PRESSURE: 88 MMHG | HEIGHT: 59 IN | WEIGHT: 176 LBS | RESPIRATION RATE: 18 BRPM | SYSTOLIC BLOOD PRESSURE: 136 MMHG | OXYGEN SATURATION: 97 % | BODY MASS INDEX: 35.48 KG/M2 | HEART RATE: 77 BPM

## 2025-08-27 DIAGNOSIS — M25.461 EFFUSION OF RIGHT KNEE: ICD-10-CM

## 2025-08-27 DIAGNOSIS — S83.91XA SPRAIN OF RIGHT KNEE, UNSPECIFIED LIGAMENT, INITIAL ENCOUNTER: Primary | ICD-10-CM

## 2025-08-27 PROCEDURE — 99283 EMERGENCY DEPT VISIT LOW MDM: CPT

## 2025-08-27 PROCEDURE — 73590 X-RAY EXAM OF LOWER LEG: CPT

## 2025-08-27 PROCEDURE — 73564 X-RAY EXAM KNEE 4 OR MORE: CPT

## 2025-08-27 RX ORDER — ACETAMINOPHEN 500 MG
1000 TABLET ORAL ONCE
Status: DISCONTINUED | OUTPATIENT
Start: 2025-08-27 | End: 2025-08-27

## 2025-08-27 ASSESSMENT — PAIN DESCRIPTION - FREQUENCY: FREQUENCY: CONTINUOUS

## 2025-08-27 ASSESSMENT — PAIN DESCRIPTION - ORIENTATION: ORIENTATION: RIGHT

## 2025-08-27 ASSESSMENT — PAIN - FUNCTIONAL ASSESSMENT
PAIN_FUNCTIONAL_ASSESSMENT: 0-10
PAIN_FUNCTIONAL_ASSESSMENT: ACTIVITIES ARE NOT PREVENTED

## 2025-08-27 ASSESSMENT — PAIN SCALES - GENERAL: PAINLEVEL_OUTOF10: 6

## 2025-08-27 ASSESSMENT — LIFESTYLE VARIABLES
HOW MANY STANDARD DRINKS CONTAINING ALCOHOL DO YOU HAVE ON A TYPICAL DAY: PATIENT DOES NOT DRINK
HOW OFTEN DO YOU HAVE A DRINK CONTAINING ALCOHOL: NEVER

## 2025-08-27 ASSESSMENT — PAIN DESCRIPTION - DESCRIPTORS: DESCRIPTORS: ACHING

## 2025-08-27 ASSESSMENT — PAIN DESCRIPTION - LOCATION: LOCATION: KNEE

## 2025-08-27 ASSESSMENT — PAIN DESCRIPTION - ONSET: ONSET: ON-GOING

## 2025-08-27 ASSESSMENT — PAIN DESCRIPTION - PAIN TYPE: TYPE: ACUTE PAIN

## 2025-08-29 ENCOUNTER — OFFICE VISIT (OUTPATIENT)
Dept: PRIMARY CARE CLINIC | Age: 55
End: 2025-08-29
Payer: MEDICAID

## 2025-08-29 VITALS
SYSTOLIC BLOOD PRESSURE: 124 MMHG | BODY MASS INDEX: 35.48 KG/M2 | HEIGHT: 59 IN | OXYGEN SATURATION: 98 % | TEMPERATURE: 97.2 F | HEART RATE: 77 BPM | WEIGHT: 176 LBS | RESPIRATION RATE: 16 BRPM | DIASTOLIC BLOOD PRESSURE: 72 MMHG

## 2025-08-29 DIAGNOSIS — R07.89 LEFT-SIDED CHEST WALL PAIN: ICD-10-CM

## 2025-08-29 DIAGNOSIS — M25.361 INSTABILITY OF RIGHT KNEE JOINT: ICD-10-CM

## 2025-08-29 DIAGNOSIS — S83.91XA SPRAIN OF RIGHT KNEE, UNSPECIFIED LIGAMENT, INITIAL ENCOUNTER: ICD-10-CM

## 2025-08-29 DIAGNOSIS — M25.461 EFFUSION OF RIGHT KNEE: Primary | ICD-10-CM

## 2025-08-29 PROCEDURE — G8417 CALC BMI ABV UP PARAM F/U: HCPCS | Performed by: FAMILY MEDICINE

## 2025-08-29 PROCEDURE — 3017F COLORECTAL CA SCREEN DOC REV: CPT | Performed by: FAMILY MEDICINE

## 2025-08-29 PROCEDURE — 1036F TOBACCO NON-USER: CPT | Performed by: FAMILY MEDICINE

## 2025-08-29 PROCEDURE — G8427 DOCREV CUR MEDS BY ELIG CLIN: HCPCS | Performed by: FAMILY MEDICINE

## 2025-08-29 RX ORDER — KETOROLAC TROMETHAMINE 30 MG/ML
30 INJECTION, SOLUTION INTRAMUSCULAR; INTRAVENOUS ONCE
Status: COMPLETED | OUTPATIENT
Start: 2025-08-29 | End: 2025-08-29

## 2025-08-29 RX ADMIN — KETOROLAC TROMETHAMINE 30 MG: 30 INJECTION, SOLUTION INTRAMUSCULAR; INTRAVENOUS at 10:52

## 2025-08-29 ASSESSMENT — ENCOUNTER SYMPTOMS
APNEA: 0
BACK PAIN: 0
CONSTIPATION: 0
EYE REDNESS: 0
RHINORRHEA: 0
WHEEZING: 0
CHEST TIGHTNESS: 0
BLOOD IN STOOL: 0
ABDOMINAL PAIN: 0
EYE PAIN: 0
EYE ITCHING: 0
COLOR CHANGE: 0
VOMITING: 0
SORE THROAT: 0
NAUSEA: 0
DIARRHEA: 0
SHORTNESS OF BREATH: 0
COUGH: 0
SINUS PRESSURE: 0

## 2025-09-02 DIAGNOSIS — K21.9 GASTROESOPHAGEAL REFLUX DISEASE WITHOUT ESOPHAGITIS: Primary | ICD-10-CM

## 2025-09-02 RX ORDER — OMEPRAZOLE 20 MG/1
20 CAPSULE, DELAYED RELEASE ORAL
Qty: 90 CAPSULE | Refills: 3 | Status: SHIPPED | OUTPATIENT
Start: 2025-09-02

## 2025-09-04 ENCOUNTER — APPOINTMENT (OUTPATIENT)
Dept: CT IMAGING | Age: 55
End: 2025-09-04
Payer: MEDICAID

## 2025-09-04 ENCOUNTER — HOSPITAL ENCOUNTER (EMERGENCY)
Age: 55
Discharge: HOME | End: 2025-09-04
Payer: MEDICAID

## 2025-09-04 VITALS
TEMPERATURE: 97.5 F | WEIGHT: 176 LBS | RESPIRATION RATE: 18 BRPM | DIASTOLIC BLOOD PRESSURE: 81 MMHG | HEART RATE: 88 BPM | OXYGEN SATURATION: 99 % | SYSTOLIC BLOOD PRESSURE: 131 MMHG | BODY MASS INDEX: 35.55 KG/M2

## 2025-09-04 DIAGNOSIS — R07.89 CHEST WALL PAIN: ICD-10-CM

## 2025-09-04 DIAGNOSIS — S20.212A CONTUSION OF LEFT CHEST WALL, INITIAL ENCOUNTER: Primary | ICD-10-CM

## 2025-09-04 PROCEDURE — 71250 CT THORAX DX C-: CPT

## 2025-09-04 PROCEDURE — 99284 EMERGENCY DEPT VISIT MOD MDM: CPT

## 2025-09-04 RX ORDER — LIDOCAINE 4 G/G
1 PATCH TOPICAL DAILY
Qty: 30 PATCH | Refills: 0 | Status: SHIPPED | OUTPATIENT
Start: 2025-09-04 | End: 2025-10-04

## 2025-09-04 RX ORDER — LIDOCAINE 4 G/G
1 PATCH TOPICAL DAILY
Status: DISCONTINUED | OUTPATIENT
Start: 2025-09-04 | End: 2025-09-04 | Stop reason: HOSPADM

## 2025-09-04 ASSESSMENT — PAIN - FUNCTIONAL ASSESSMENT: PAIN_FUNCTIONAL_ASSESSMENT: 0-10

## 2025-09-04 ASSESSMENT — PAIN DESCRIPTION - ORIENTATION: ORIENTATION: LEFT

## 2025-09-04 ASSESSMENT — LIFESTYLE VARIABLES: HOW MANY STANDARD DRINKS CONTAINING ALCOHOL DO YOU HAVE ON A TYPICAL DAY: PATIENT DOES NOT DRINK

## 2025-09-04 ASSESSMENT — PAIN DESCRIPTION - LOCATION: LOCATION: RIB CAGE

## 2025-09-04 ASSESSMENT — PAIN DESCRIPTION - PAIN TYPE: TYPE: ACUTE PAIN

## 2025-09-04 ASSESSMENT — PAIN DESCRIPTION - ONSET: ONSET: ON-GOING

## 2025-09-04 ASSESSMENT — PAIN DESCRIPTION - DESCRIPTORS: DESCRIPTORS: SHARP;SHOOTING

## 2025-09-04 ASSESSMENT — PAIN SCALES - GENERAL: PAINLEVEL_OUTOF10: 9

## 2025-09-04 ASSESSMENT — PAIN DESCRIPTION - FREQUENCY: FREQUENCY: CONTINUOUS

## (undated) DEVICE — BANDAGE ADH W1XL3IN NAT FAB WVN FLX DURABLE N ADH PD SEAL

## (undated) DEVICE — 12 ML SYRINGE,LUER-LOCK TIP: Brand: MONOJECT

## (undated) DEVICE — NEEDLE HYPO 18GA L1.5IN PNK POLYPR HUB S STL THN WALL FILL

## (undated) DEVICE — Z DISCONTINUED APPLICATOR SURG PREP 0.35OZ 2% CHG 70% ISO ALC W/ HI LT

## (undated) DEVICE — NEEDLE HYPO 27GA L1.25IN GRY POLYPR HUB S STL REG BVL STR

## (undated) DEVICE — KIT,ANTI FOG,W/SPONGE & FLUID,SOFT PACK: Brand: MEDLINE

## (undated) DEVICE — 6 ML SYRINGE LUER-LOCK TIP: Brand: MONOJECT

## (undated) DEVICE — SPONGE GZ W4XL4IN RAYON POLY FILL CVR W/ NONWOVEN FAB

## (undated) DEVICE — TOWEL,OR,DSP,ST,BLUE,STD,6/PK,12PK/CS: Brand: MEDLINE

## (undated) DEVICE — GLOVE ORANGE PI 7 1/2   MSG9075

## (undated) DEVICE — 3M™ RED DOT™ MONITORING ELECTRODE WITH FOAM TAPE AND STICKY GEL 2560, 50/BAG, 20/CASE, 72/PLT: Brand: RED DOT™

## (undated) DEVICE — GAUZE,SPONGE,4"X4",12PLY,STERILE,LF,2'S: Brand: MEDLINE

## (undated) DEVICE — NEEDLE CLOSURE OMNICLOSE

## (undated) DEVICE — PERMANENT CAUTERY HOOK: Brand: ENDOWRIST

## (undated) DEVICE — CLIP INT L POLYMER LOK LIG HEM O LOK

## (undated) DEVICE — INSUFFLATION NEEDLE TO ESTABLISH PNEUMOPERITONEUM.: Brand: INSUFFLATION NEEDLE

## (undated) DEVICE — GRADUATE TRIANG MEASURE 1000ML BLK PRNT

## (undated) DEVICE — INSUFFLATION TUBING SET WITH FILTER, FUNNEL CONNECTOR AND LUER LOCK: Brand: JOSNOE MEDICAL INC

## (undated) DEVICE — NEEDLE HYPO 25GA L1.5IN BLU POLYPR HUB S STL REG BVL STR

## (undated) DEVICE — SOLUTION IV 1000ML 0.9% SOD CHL PH 5 INJ USP VIAFLX PLAS

## (undated) DEVICE — ROUND TIP SCISSORS: Brand: ENDOWRIST

## (undated) DEVICE — DRAPE,LAP,CHOLE,W/TROUGHS,STERILE: Brand: MEDLINE

## (undated) DEVICE — ANCHOR TISSUE RETRIEVAL SYSTEM, BAG SIZE 125 ML, PORT SIZE 8 MM: Brand: ANCHOR TISSUE RETRIEVAL SYSTEM

## (undated) DEVICE — GOWN,SIRUS,FABRNF,L,20/CS: Brand: MEDLINE

## (undated) DEVICE — CLIP INT M L POLYMER LOK LIG HEM O LOK

## (undated) DEVICE — 3 ML SYRINGE LUER-LOCK TIP: Brand: MONOJECT

## (undated) DEVICE — ELECTRODE PT RET AD L9FT HI MOIST COND ADH HYDRGEL CORDED

## (undated) DEVICE — WARMER SCP 2 ANTIFOG LAP DISP

## (undated) DEVICE — CAESAR GRASPING FORCEPS: Brand: CAESAR

## (undated) DEVICE — TRAY EPI SGL DOSE 18GA NDL CUST AULTMAN HOSP

## (undated) DEVICE — PACK PROCEDURE SURG GEN CUST

## (undated) DEVICE — CANNULA SEAL

## (undated) DEVICE — SOLUTION IV IRRIG POUR BRL 0.9% SODIUM CHL 2F7124

## (undated) DEVICE — CADIERE FORCEPS: Brand: ENDOWRIST

## (undated) DEVICE — COLUMN DRAPE

## (undated) DEVICE — NEEDLE SPNL 22GA L5IN BLK HUB S STL W/ QNCKE PNT W/OUT

## (undated) DEVICE — BLOCK BITE 60FR RUBBER ADLT DENTAL

## (undated) DEVICE — BLADELESS OBTURATOR: Brand: WECK VISTA

## (undated) DEVICE — APPLICATOR MEDICATED 10.5 CC SOLUTION HI LT ORNG CHLORAPREP

## (undated) DEVICE — SUCTION IRRIGATOR: Brand: ENDOWRIST

## (undated) DEVICE — DOUBLE BASIN SET: Brand: MEDLINE INDUSTRIES, INC.

## (undated) DEVICE — INTENDED FOR TISSUE SEPARATION, AND OTHER PROCEDURES THAT REQUIRE A SHARP SURGICAL BLADE TO PUNCTURE OR CUT.: Brand: BARD-PARKER ® STAINLESS STEEL BLADES

## (undated) DEVICE — GLOVE SURG SZ 6 L12IN FNGR THK94MIL TRNSLUC YEL LTX HYDRGEL

## (undated) DEVICE — ARM DRAPE

## (undated) DEVICE — NEEDLE SPNL 25GA L3.5IN BLU HUB S STL REG WALL FIT STYL W/

## (undated) DEVICE — FORCEPS BX OVL CUP FEN DISPOSABLE CAP L 160CM RAD JAW 4

## (undated) DEVICE — SPONGE GZ W4XL4IN RAYON POLY CVR W/NONWOVEN FAB STRL 2/PK

## (undated) DEVICE — CHLORAPREP 26ML ORANGE

## (undated) DEVICE — ADHESIVE SKIN CLSR 0.7ML TOP DERMBND ADV

## (undated) DEVICE — INSTRUMENT CLAMP TOWEL LARGE REUSABLE

## (undated) DEVICE — MEDIUM-LARGE CLIP APPLIER: Brand: ENDOWRIST